# Patient Record
Sex: MALE | Race: WHITE | Employment: OTHER | URBAN - METROPOLITAN AREA
[De-identification: names, ages, dates, MRNs, and addresses within clinical notes are randomized per-mention and may not be internally consistent; named-entity substitution may affect disease eponyms.]

---

## 2017-05-01 ENCOUNTER — HOSPITAL ENCOUNTER (OUTPATIENT)
Dept: CT IMAGING | Age: 57
Discharge: HOME | End: 2017-05-01
Attending: INTERNAL MEDICINE
Payer: COMMERCIAL

## 2017-05-01 DIAGNOSIS — C64.2 RENAL CELL CARCINOMA OF LEFT KIDNEY (HCC): ICD-10-CM

## 2017-05-01 PROCEDURE — 71260 CT THORAX DX C+: CPT

## 2017-05-01 PROCEDURE — 74177 CT ABD & PELVIS W/CONTRAST: CPT

## 2017-05-17 ENCOUNTER — OFFICE VISIT (OUTPATIENT)
Dept: HEMATOLOGY/ONCOLOGY | Facility: HOSPITAL | Age: 57
End: 2017-05-17
Attending: INTERNAL MEDICINE
Payer: COMMERCIAL

## 2017-05-17 VITALS
OXYGEN SATURATION: 97 % | TEMPERATURE: 98 F | BODY MASS INDEX: 29.82 KG/M2 | DIASTOLIC BLOOD PRESSURE: 59 MMHG | HEART RATE: 91 BPM | HEIGHT: 70 IN | RESPIRATION RATE: 18 BRPM | SYSTOLIC BLOOD PRESSURE: 104 MMHG | WEIGHT: 208.31 LBS

## 2017-05-17 DIAGNOSIS — C64.2 RENAL CELL CARCINOMA OF LEFT KIDNEY (HCC): Primary | ICD-10-CM

## 2017-05-17 PROCEDURE — 99213 OFFICE O/P EST LOW 20 MIN: CPT | Performed by: INTERNAL MEDICINE

## 2017-05-17 NOTE — PROGRESS NOTES
Pt here for follow up. Pt states he fell and hurt his back. He states he then had fevers for a couple of weeks. He does not know how high his fever was because he didn't take his temperature.

## 2017-05-17 NOTE — PROGRESS NOTES
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      Adjacent to a calcified plaqu stability. 3. Mildly distended pelvic small bowel loops with stool like material and a more decompressed appearing distal ileum may be on the basis of stasis related to a large quantity of stool in the right colon.  The patient has GI symptoms, further sharon Oral Tab Take 30 mg by mouth daily. Disp:  Rfl:    Albuterol (PROVENTIL IN) Inhale 2 puffs into the lungs as needed.  Disp:  Rfl:          Vital Signs:      Height: 177.8 cm (5' 10\") (05/17 1346)  Weight: 94.484 kg (208 lb 4.8 oz) (05/17 1346)  BSA (Calcul I will see him in 12 months with labs and CT scan.     Peri Spencer MD

## 2017-05-18 ENCOUNTER — HOSPITAL ENCOUNTER (OUTPATIENT)
Age: 57
Discharge: HOME | End: 2017-05-18
Payer: COMMERCIAL

## 2017-05-18 ENCOUNTER — APPOINTMENT (OUTPATIENT)
Dept: GENERAL RADIOLOGY | Age: 57
End: 2017-05-18
Attending: NURSE PRACTITIONER
Payer: COMMERCIAL

## 2017-05-18 VITALS
OXYGEN SATURATION: 94 % | RESPIRATION RATE: 18 BRPM | DIASTOLIC BLOOD PRESSURE: 64 MMHG | TEMPERATURE: 99 F | SYSTOLIC BLOOD PRESSURE: 104 MMHG | HEART RATE: 76 BPM

## 2017-05-18 DIAGNOSIS — S92.535A CLOSED NONDISPLACED FRACTURE OF DISTAL PHALANX OF LESSER TOE OF LEFT FOOT, INITIAL ENCOUNTER: Primary | ICD-10-CM

## 2017-05-18 PROCEDURE — 99213 OFFICE O/P EST LOW 20 MIN: CPT

## 2017-05-18 PROCEDURE — 28510 TREATMENT OF TOE FRACTURE: CPT

## 2017-05-18 PROCEDURE — 99212 OFFICE O/P EST SF 10 MIN: CPT

## 2017-05-18 PROCEDURE — 73660 X-RAY EXAM OF TOE(S): CPT | Performed by: NURSE PRACTITIONER

## 2017-05-18 PROCEDURE — 73630 X-RAY EXAM OF FOOT: CPT | Performed by: NURSE PRACTITIONER

## 2017-05-18 NOTE — ED PROVIDER NOTES
Patient Seen in: THE [facility] Immediate Care In [facility]    History   Patient presents with: Toe Injury    Stated Complaint: L - toe injury    HPI    16-year-old male who presents to the Medicare with complaints of left fifth toe pain.   Patient states last COLONOSCOPY N/A 3/21/2016    Comment Procedure: COLONOSCOPY;  Surgeon: Shelton Rivas MD;  Location: [address] [place] [address] ENDOSCOPY     Right 3/25/2016    Comment Procedure: KNEE ARTHROSCOPY;  Surgeon: Mihir Ya MD;  Location: [address] [place] [address] MAIN OR       Medications : Neurological: Negative. Hematological: Negative. Psychiatric/Behavioral: Negative. All other systems reviewed and are negative. Positive for stated complaint: L - toe injury  Other systems are as noted in HPI.   Constitutional and vital signs 5/18/2017  PROCEDURE:  XR FOOT, COMPLETE (MIN 3 VIEWS), LEFT (CPT=73630)  TECHNIQUE:  AP, oblique, and lateral views were obtained. COMPARISON:  None.   INDICATIONS:  L - toe injury  PATIENT STATED HISTORY: (As transcribed by Technologist)  Patient has bru Closed nondisplaced fracture of distal phalanx of lesser toe of left foot, initial encounter  (primary encounter diagnosis)    Disposition:  Discharge    Follow-up:  David Wen, [address] [address]      As ne

## 2017-07-24 ENCOUNTER — HOSPITAL ENCOUNTER (OUTPATIENT)
Dept: NUCLEAR MEDICINE | Facility: HOSPITAL | Age: 57
Discharge: HOME | End: 2017-07-24
Attending: NURSE PRACTITIONER
Payer: COMMERCIAL

## 2017-07-24 DIAGNOSIS — R91.1 LUNG NODULE: ICD-10-CM

## 2017-07-24 DIAGNOSIS — J44.9 COPD (CHRONIC OBSTRUCTIVE PULMONARY DISEASE) (HCC): ICD-10-CM

## 2017-07-24 DIAGNOSIS — C64.9 RENAL CELL CARCINOMA (HCC): ICD-10-CM

## 2017-07-24 LAB — GLUCOSE BLD-MCNC: 114 MG/DL (ref 65–99)

## 2017-07-24 PROCEDURE — 78815 PET IMAGE W/CT SKULL-THIGH: CPT | Performed by: NURSE PRACTITIONER

## 2017-07-24 PROCEDURE — 82962 GLUCOSE BLOOD TEST: CPT

## 2017-07-25 VITALS — WEIGHT: 200 LBS | HEIGHT: 70 IN | BODY MASS INDEX: 28.63 KG/M2

## 2017-07-25 RX ORDER — ISOSORBIDE MONONITRATE 20 MG/1
20 TABLET ORAL DAILY
COMMUNITY
End: 2020-11-03

## 2017-08-01 ENCOUNTER — APPOINTMENT (OUTPATIENT)
Dept: LAB | Facility: HOSPITAL | Age: 57
End: 2017-08-01
Attending: NURSE PRACTITIONER
Payer: COMMERCIAL

## 2017-08-01 ENCOUNTER — HOSPITAL ENCOUNTER (OUTPATIENT)
Dept: CT IMAGING | Facility: HOSPITAL | Age: 57
Discharge: HOME | End: 2017-08-01
Attending: NURSE PRACTITIONER
Payer: COMMERCIAL

## 2017-09-22 RX ORDER — AMLODIPINE, VALSARTAN AND HYDROCHLOROTHIAZIDE 5; 160; 12.5 MG/1; MG/1; MG/1
TABLET ORAL DAILY
COMMUNITY
End: 2021-07-20 | Stop reason: ALTCHOICE

## 2017-09-25 ENCOUNTER — APPOINTMENT (OUTPATIENT)
Dept: LAB | Age: 57
End: 2017-09-25
Payer: COMMERCIAL

## 2017-09-25 DIAGNOSIS — I35.0 AORTIC STENOSIS: ICD-10-CM

## 2017-09-25 DIAGNOSIS — I48.91 ATRIAL FIBRILLATION (HCC): ICD-10-CM

## 2017-09-25 LAB
ATRIAL RATE: 86 BPM
P AXIS: 15 DEGREES
P-R INTERVAL: 132 MS
Q-T INTERVAL: 350 MS
QRS DURATION: 98 MS
QTC CALCULATION (BEZET): 418 MS
R AXIS: 38 DEGREES
T AXIS: 29 DEGREES
VENTRICULAR RATE: 86 BPM

## 2017-09-25 PROCEDURE — 93010 ELECTROCARDIOGRAM REPORT: CPT | Performed by: INTERNAL MEDICINE

## 2017-09-25 PROCEDURE — 93005 ELECTROCARDIOGRAM TRACING: CPT

## 2017-09-26 ENCOUNTER — ANESTHESIA EVENT (OUTPATIENT)
Dept: ENDOSCOPY | Facility: HOSPITAL | Age: 57
End: 2017-09-26
Payer: COMMERCIAL

## 2017-09-27 NOTE — OR NURSING
Received a call from Dr. Shivani Green office stating they request that the medical clearance come from patient's cardiologist, Dr. Edvin Tracey. Faxed this request to Dr. Emma Sawyer office as well as another FYI to Dr. Beth Mak.  Called Dr. Emma Sawyer office and s/w Rona Salas

## 2017-09-27 NOTE — OR NURSING
Chart reviewed by anesthesiologist, Dr. Matti Gardner for history of aortic stenosis. Received an order for medical clearance. Faxed this request to the surgeon and Dr. Asaf Arriaga office. Received fax confirmation.  Telephoned Dr. Asaf Arriaga office and spoke to [address] [place] [address]

## 2017-09-28 ENCOUNTER — ANESTHESIA (OUTPATIENT)
Dept: ENDOSCOPY | Facility: HOSPITAL | Age: 57
End: 2017-09-28
Payer: COMMERCIAL

## 2017-09-28 ENCOUNTER — SURGERY (OUTPATIENT)
Age: 57
End: 2017-09-28

## 2017-09-28 ENCOUNTER — HOSPITAL ENCOUNTER (OUTPATIENT)
Facility: HOSPITAL | Age: 57
Setting detail: HOSPITAL OUTPATIENT SURGERY
Discharge: HOME | End: 2017-09-28
Attending: INTERNAL MEDICINE | Admitting: INTERNAL MEDICINE
Payer: COMMERCIAL

## 2017-09-28 VITALS
TEMPERATURE: 98 F | BODY MASS INDEX: 28.06 KG/M2 | WEIGHT: 196 LBS | SYSTOLIC BLOOD PRESSURE: 146 MMHG | DIASTOLIC BLOOD PRESSURE: 96 MMHG | OXYGEN SATURATION: 96 % | HEIGHT: 70 IN | HEART RATE: 94 BPM | RESPIRATION RATE: 16 BRPM

## 2017-09-28 DIAGNOSIS — I35.0 AORTIC STENOSIS: ICD-10-CM

## 2017-09-28 DIAGNOSIS — I48.91 ATRIAL FIBRILLATION (HCC): Primary | ICD-10-CM

## 2017-09-28 PROCEDURE — 0DB78ZX EXCISION OF STOMACH, PYLORUS, VIA NATURAL OR ARTIFICIAL OPENING ENDOSCOPIC, DIAGNOSTIC: ICD-10-PCS | Performed by: INTERNAL MEDICINE

## 2017-09-28 PROCEDURE — 82962 GLUCOSE BLOOD TEST: CPT

## 2017-09-28 PROCEDURE — 88305 TISSUE EXAM BY PATHOLOGIST: CPT | Performed by: INTERNAL MEDICINE

## 2017-09-28 PROCEDURE — 0DB58ZX EXCISION OF ESOPHAGUS, VIA NATURAL OR ARTIFICIAL OPENING ENDOSCOPIC, DIAGNOSTIC: ICD-10-PCS | Performed by: INTERNAL MEDICINE

## 2017-09-28 PROCEDURE — 0DB98ZX EXCISION OF DUODENUM, VIA NATURAL OR ARTIFICIAL OPENING ENDOSCOPIC, DIAGNOSTIC: ICD-10-PCS | Performed by: INTERNAL MEDICINE

## 2017-09-28 RX ORDER — HYDROMORPHONE HYDROCHLORIDE 1 MG/ML
0.4 INJECTION, SOLUTION INTRAMUSCULAR; INTRAVENOUS; SUBCUTANEOUS EVERY 5 MIN PRN
Status: DISCONTINUED | OUTPATIENT
Start: 2017-09-28 | End: 2017-09-28

## 2017-09-28 RX ORDER — NALOXONE HYDROCHLORIDE 0.4 MG/ML
80 INJECTION, SOLUTION INTRAMUSCULAR; INTRAVENOUS; SUBCUTANEOUS AS NEEDED
Status: DISCONTINUED | OUTPATIENT
Start: 2017-09-28 | End: 2017-09-28

## 2017-09-28 RX ORDER — DEXTROSE MONOHYDRATE 25 G/50ML
50 INJECTION, SOLUTION INTRAVENOUS
Status: DISCONTINUED | OUTPATIENT
Start: 2017-09-28 | End: 2017-09-28

## 2017-09-28 RX ORDER — SODIUM CHLORIDE, SODIUM LACTATE, POTASSIUM CHLORIDE, CALCIUM CHLORIDE 600; 310; 30; 20 MG/100ML; MG/100ML; MG/100ML; MG/100ML
INJECTION, SOLUTION INTRAVENOUS CONTINUOUS
Status: DISCONTINUED | OUTPATIENT
Start: 2017-09-28 | End: 2017-09-28

## 2017-09-28 NOTE — OPERATIVE REPORT
Janelle Hayward Patient Status:  Hospital Outpatient Surgery    1960 MRN [id number]   [facility] ENDOSCOPY Attending No att. providers found   Hosp Day # 0 PCP Adrian White MD     PREOPERATIVE DIAGNOSIS/INDICATION: GIB, anemia  PO

## 2017-09-28 NOTE — ANESTHESIA POSTPROCEDURE EVALUATION
[address] Patient Status:  Hospital Outpatient Surgery   Age/Gender 61year old male MRN [id number]   Location [facility]. Attending Bandar Osborn MD   Hosp Day # 0 PCP Guido Eddy MD       Anesthesia Post-op N

## 2017-09-28 NOTE — INTERVAL H&P NOTE
Pre-op Diagnosis: HEMATEMESIS    The above referenced H&P was reviewed by Terence Vargas MD on 9/28/2017, the patient was examined and no significant changes have occurred in the patient's condition since the H&P was performed.   I discussed with the pat

## 2017-09-28 NOTE — ANESTHESIA PREPROCEDURE EVALUATION
PRE-OP EVALUATION    Patient Name: Rupert Faxon    Pre-op Diagnosis: HEMATEMESIS    Procedure(s):  ESOPHAGOGASTRODUODENOSCOPY     Surgeon(s) and Role:     Robert Romero MD - Primary    Pre-op vitals reviewed.   Temp: 98.4 °F (36.9 °C)  Pulse: 86  Re Anesthesia Evaluation    Patient summary reviewed.     Anesthetic Complications  (-) history of anesthetic complications         GI/Hepatic/Renal      (+) GERD                           Cardiovascular  Comment: Aortic stenosis mild with valve area 2.6  Pt i NPO status verified and patient meets guidelines.           Plan/risks discussed with: patient and spouse                Present on Admission:  **None**

## 2017-10-02 ENCOUNTER — HOSPITAL ENCOUNTER (OUTPATIENT)
Dept: CV DIAGNOSTICS | Age: 57
Discharge: HOME | End: 2017-10-02
Attending: FAMILY MEDICINE
Payer: COMMERCIAL

## 2017-10-02 ENCOUNTER — HOSPITAL ENCOUNTER (OUTPATIENT)
Dept: CT IMAGING | Age: 57
Discharge: HOME | End: 2017-10-02
Attending: INTERNAL MEDICINE
Payer: COMMERCIAL

## 2017-10-02 DIAGNOSIS — R91.1 LUNG NODULE: ICD-10-CM

## 2017-10-02 DIAGNOSIS — I35.0 AORTIC STENOSIS: ICD-10-CM

## 2017-10-02 PROCEDURE — 93306 TTE W/DOPPLER COMPLETE: CPT | Performed by: FAMILY MEDICINE

## 2017-10-02 PROCEDURE — 71250 CT THORAX DX C-: CPT | Performed by: INTERNAL MEDICINE

## 2017-10-10 ENCOUNTER — OFFICE VISIT (OUTPATIENT)
Dept: PHYSICAL THERAPY | Age: 57
End: 2017-10-10
Attending: FAMILY MEDICINE
Payer: COMMERCIAL

## 2017-10-10 DIAGNOSIS — M62.838 MUSCLE SPASM OF BOTH LOWER LEGS: ICD-10-CM

## 2017-10-10 PROCEDURE — 97110 THERAPEUTIC EXERCISES: CPT | Performed by: PHYSICAL THERAPIST

## 2017-10-10 PROCEDURE — 97161 PT EVAL LOW COMPLEX 20 MIN: CPT | Performed by: PHYSICAL THERAPIST

## 2017-10-10 NOTE — PROGRESS NOTES
LOWER EXTREMITY EVALUATION:   Referring Physician: Dr. Carter Hines  Diagnosis: Muscle spasm of both lower legs (T10.743)     Date of Service: 10/10/2017     PATIENT Tyesha Del Valle is a 61year old y/o male who presents to therapy today with complai lordosis  Gait: intact  Palpation: tender to left ITband, hamstring > R  Sensation: WNL to light touch          Myotomes: intact          Reflexes: diminished L4-5 on left    AROM:   Hip Knee   WNL limited to IR Full             Accessory motion: Central P Activity; Gait Training; Electrical Stim; Pt education; Home exercise program instructions;     Education or treatment limitation: None  Rehab Potential:good    FOTO: 66/100      Patient/Family/Caregiver was advised of these findings, precautions, and gallo

## 2017-10-12 ENCOUNTER — OFFICE VISIT (OUTPATIENT)
Dept: PHYSICAL THERAPY | Age: 57
End: 2017-10-12
Attending: FAMILY MEDICINE
Payer: COMMERCIAL

## 2017-10-12 PROCEDURE — 97110 THERAPEUTIC EXERCISES: CPT | Performed by: PHYSICAL THERAPIST

## 2017-10-12 PROCEDURE — 97140 MANUAL THERAPY 1/> REGIONS: CPT | Performed by: PHYSICAL THERAPIST

## 2017-10-12 NOTE — PROGRESS NOTES
Dx: Muscle spasm of both lower legs (B54.197)               Subjective: Porsche Xavier reports his symptoms were worse last night as he had difficulty getting to sleep with increased pain to his left leg.  He admits trying to stretch more which only increased his pa

## 2017-10-17 ENCOUNTER — APPOINTMENT (OUTPATIENT)
Dept: PHYSICAL THERAPY | Age: 57
End: 2017-10-17
Attending: FAMILY MEDICINE
Payer: COMMERCIAL

## 2017-10-19 ENCOUNTER — OFFICE VISIT (OUTPATIENT)
Dept: PHYSICAL THERAPY | Age: 57
End: 2017-10-19
Attending: FAMILY MEDICINE
Payer: COMMERCIAL

## 2017-10-19 PROCEDURE — 97140 MANUAL THERAPY 1/> REGIONS: CPT | Performed by: PHYSICAL THERAPIST

## 2017-10-19 PROCEDURE — 97110 THERAPEUTIC EXERCISES: CPT | Performed by: PHYSICAL THERAPIST

## 2017-10-19 NOTE — PROGRESS NOTES
Dx: Muscle spasm of both lower legs (Z98.549)               Subjective: [facility] reports his leg cramps are decreasing. He is performing his stretching and exercises which seems to be helping. He has improved ability to reach down and touch the floor.  He is h hamstring stretch and flossing 2x10            Charges: Arley 2( 27 min) M x 1 ( 8 min)   Total Timed Treatment: 35 min     Total Treatment Time: 35 min

## 2017-10-24 ENCOUNTER — OFFICE VISIT (OUTPATIENT)
Dept: PHYSICAL THERAPY | Age: 57
End: 2017-10-24
Attending: FAMILY MEDICINE
Payer: COMMERCIAL

## 2017-10-24 PROCEDURE — 97110 THERAPEUTIC EXERCISES: CPT | Performed by: PHYSICAL THERAPIST

## 2017-10-24 PROCEDURE — 97140 MANUAL THERAPY 1/> REGIONS: CPT | Performed by: PHYSICAL THERAPIST

## 2017-10-24 NOTE — PROGRESS NOTES
Dx: Muscle spasm of both lower legs (N46.760)               Subjective: Yocasta Erazo continues to report improved cramp symptoms allowing him improved sleep at night. He does have some knee and back discomfort today but relates it to the weather.      Objective: 2x20 RTB       Standing heel raises 2x20 On step 2x15 2x15       STM to glute med and max x 3 min STM to glute med and max x 3 min Swiss ball bent knee hamstring bridge 5 sec hold x 10       - Lateral hamstring stretch and flossing 2x10 DKSA lateral hamstr

## 2017-10-26 ENCOUNTER — OFFICE VISIT (OUTPATIENT)
Dept: PHYSICAL THERAPY | Age: 57
End: 2017-10-26
Attending: FAMILY MEDICINE
Payer: COMMERCIAL

## 2017-10-26 PROCEDURE — 97140 MANUAL THERAPY 1/> REGIONS: CPT | Performed by: PHYSICAL THERAPIST

## 2017-10-26 PROCEDURE — 97110 THERAPEUTIC EXERCISES: CPT | Performed by: PHYSICAL THERAPIST

## 2017-10-26 NOTE — PROGRESS NOTES
Dx: Muscle spasm of both lower legs (R53.998)               Subjective: Nida Esparza reports his hamstring flexibility continues to improve. He does have some increased back discomfort associated with cleaning out his garage over the last couple days.  He remains 3x30 sec       - Standing bent over hamstring flossing x 10 Standing bent over hamstring flossing x 10    Prostretch flossing 2x10 Standing bent over hamstring flossing x 10    Prostretch flossing 2x10      - Shuttle DL press 3x10 8 bands SL press 6 bands

## 2017-10-31 ENCOUNTER — OFFICE VISIT (OUTPATIENT)
Dept: PHYSICAL THERAPY | Age: 57
End: 2017-10-31
Attending: FAMILY MEDICINE
Payer: COMMERCIAL

## 2017-10-31 PROCEDURE — 97110 THERAPEUTIC EXERCISES: CPT | Performed by: PHYSICAL THERAPIST

## 2017-10-31 NOTE — PROGRESS NOTES
Dx: Muscle spasm of both lower legs (D12.622)               Subjective: Daniel Falling almost no hamstring cramping at this time. He did have slight increase in back pain following last session and would like to avoid back specific exercises.  He is encouraged by hi calf stretch 3x30 sec  3x30 sec  3x20 sec  3x30 sec  Slant board gastroc stretch 2x30 sec      - Standing bent over hamstring flossing x 10 Standing bent over hamstring flossing x 10    Prostretch flossing 2x10 Standing bent over hamstring flossing x 10

## 2017-11-03 ENCOUNTER — OFFICE VISIT (OUTPATIENT)
Dept: PHYSICAL THERAPY | Age: 57
End: 2017-11-03
Attending: FAMILY MEDICINE
Payer: COMMERCIAL

## 2017-11-03 PROCEDURE — 97110 THERAPEUTIC EXERCISES: CPT | Performed by: PHYSICAL THERAPIST

## 2017-11-03 NOTE — PROGRESS NOTES
Discharge Summary    Pt has attended 7, cancelled 0, and no shown 0 visits in Physical Therapy. Dx: Muscle spasm of both lower legs (C64.203)     Subjective: Efren Alice states he can continue independently at this time.  He has occasional back flare ups when visits)  · Pt will have normal neuromobility to decrease pain at night and allow for sleep (6 visits)  · Pt will be independent and compliant with comprehensive HEP to maintain progress achieved in PT (6 visits)    Plan: D/C to independent HEP    Patient/F bands SL press 6 bands  3x10 7 bands 2x15 2x15 7 bands Plank SA x 1:30   TRX squat 3x10 2x15 Standing hip extension 2x20 RTB Standing hip extension 2x20 RTB 2x10 flexion/extension RTB Side stepping RTB 15 feet x 3 laps   Standing heel raises 2x20 On step 2

## 2018-01-25 ENCOUNTER — HOSPITAL ENCOUNTER (OUTPATIENT)
Dept: CV DIAGNOSTICS | Age: 58
Discharge: HOME | End: 2018-01-25
Attending: FAMILY MEDICINE
Payer: COMMERCIAL

## 2018-01-25 DIAGNOSIS — I35.0 AORTIC VALVE STENOSIS: ICD-10-CM

## 2018-01-25 PROCEDURE — 93306 TTE W/DOPPLER COMPLETE: CPT | Performed by: FAMILY MEDICINE

## 2018-03-26 ENCOUNTER — RT VISIT (OUTPATIENT)
Dept: RESPIRATORY THERAPY | Facility: HOSPITAL | Age: 58
End: 2018-03-26
Attending: INTERNAL MEDICINE
Payer: COMMERCIAL

## 2018-03-26 DIAGNOSIS — J44.9 COPD (CHRONIC OBSTRUCTIVE PULMONARY DISEASE) (HCC): ICD-10-CM

## 2018-03-26 PROCEDURE — 94726 PLETHYSMOGRAPHY LUNG VOLUMES: CPT

## 2018-03-26 PROCEDURE — 94060 EVALUATION OF WHEEZING: CPT

## 2018-03-26 PROCEDURE — 94729 DIFFUSING CAPACITY: CPT

## 2018-03-26 NOTE — PROCEDURES
Spirometry and  flow volume loop are consistent with severe airway obstruction.   There was no change in spirometry after bronchodilator challenge   No evidence of restriction  There is an  increase in the RV suggesting possible air trapping   There is peyton

## 2018-05-18 DIAGNOSIS — C64.2 RENAL CELL CARCINOMA OF LEFT KIDNEY (HCC): Primary | ICD-10-CM

## 2018-05-23 ENCOUNTER — HOSPITAL ENCOUNTER (OUTPATIENT)
Dept: CT IMAGING | Age: 58
Discharge: HOME | End: 2018-05-23
Attending: INTERNAL MEDICINE
Payer: COMMERCIAL

## 2018-05-23 DIAGNOSIS — C64.2 RENAL CELL CARCINOMA OF LEFT KIDNEY (HCC): ICD-10-CM

## 2018-05-23 PROCEDURE — 71260 CT THORAX DX C+: CPT | Performed by: INTERNAL MEDICINE

## 2018-05-23 PROCEDURE — 82565 ASSAY OF CREATININE: CPT

## 2018-05-23 PROCEDURE — 74177 CT ABD & PELVIS W/CONTRAST: CPT | Performed by: INTERNAL MEDICINE

## 2018-05-30 ENCOUNTER — OFFICE VISIT (OUTPATIENT)
Dept: HEMATOLOGY/ONCOLOGY | Facility: HOSPITAL | Age: 58
End: 2018-05-30
Attending: INTERNAL MEDICINE
Payer: COMMERCIAL

## 2018-05-30 VITALS
HEART RATE: 78 BPM | WEIGHT: 218 LBS | SYSTOLIC BLOOD PRESSURE: 123 MMHG | TEMPERATURE: 98 F | BODY MASS INDEX: 31.21 KG/M2 | DIASTOLIC BLOOD PRESSURE: 77 MMHG | RESPIRATION RATE: 20 BRPM | HEIGHT: 70 IN | OXYGEN SATURATION: 91 %

## 2018-05-30 DIAGNOSIS — D50.0 IRON DEFICIENCY ANEMIA DUE TO CHRONIC BLOOD LOSS: ICD-10-CM

## 2018-05-30 DIAGNOSIS — C64.2 RENAL CELL CARCINOMA OF LEFT KIDNEY (HCC): Primary | ICD-10-CM

## 2018-05-30 PROCEDURE — 99214 OFFICE O/P EST MOD 30 MIN: CPT | Performed by: INTERNAL MEDICINE

## 2018-05-30 NOTE — PROGRESS NOTES
Cancer Center Progress Note    Problem List:      Patient Active Problem List:     Anemia     HTN (hypertension)     DM2 (diabetes mellitus, type 2) (HCC)     COPD (chronic obstructive pulmonary disease) (HCC)     Clear cell renal cell carcinoma of left ki measures 10 mm (image 103) which is stable. Essentially stable right upper lobe micro nodule measuring 5 mm on image 28. MEDIASTINUM:  Mildly enlarged lymph node within the pretracheal region is essentially stable measuring 1.2 x 1.7 cm.   SKYLER:  Right hil habits, diarrhea, constipation and abdominal pain. Hematologic/lymphatic: Negative for easy bruising, bleeding, and lymphadenopathy. Allergies:        Other                   ANAPHYLAXIS    Comment:Bee stings    Medications:    Amlodipine-Valsartan-HCTZ edema. No calf tenderness. Lymphatics: There is no palpable lymphadenopathy throughout in the cervical, supraclavicular, or axillary regions. Lab Results  Component Value Date   WBC 9.6 05/30/2018   RBC 5.29 05/30/2018   HGB 16.0 05/30/2018   HCT 47.

## 2018-05-30 NOTE — PROGRESS NOTES
Outpatient Oncology Care Plan  Problem list:  knowledge deficit    Problems related to:    disease/disease progression    Interventions:  provided nutrition education    Expected outcomes:  verbalize how to care for self    Progress towards outcome:  natalia

## 2018-10-15 ENCOUNTER — APPOINTMENT (OUTPATIENT)
Dept: GENERAL RADIOLOGY | Age: 58
End: 2018-10-15
Attending: NURSE PRACTITIONER
Payer: COMMERCIAL

## 2018-10-15 ENCOUNTER — HOSPITAL ENCOUNTER (OUTPATIENT)
Age: 58
Discharge: HOME | End: 2018-10-15
Payer: COMMERCIAL

## 2018-10-15 VITALS
HEIGHT: 70 IN | WEIGHT: 205 LBS | OXYGEN SATURATION: 94 % | TEMPERATURE: 98 F | BODY MASS INDEX: 29.35 KG/M2 | RESPIRATION RATE: 20 BRPM | SYSTOLIC BLOOD PRESSURE: 138 MMHG | HEART RATE: 82 BPM | DIASTOLIC BLOOD PRESSURE: 74 MMHG

## 2018-10-15 DIAGNOSIS — S90.32XA CONTUSION OF LEFT FOOT, INITIAL ENCOUNTER: Primary | ICD-10-CM

## 2018-10-15 PROCEDURE — 99213 OFFICE O/P EST LOW 20 MIN: CPT

## 2018-10-15 PROCEDURE — 73630 X-RAY EXAM OF FOOT: CPT | Performed by: NURSE PRACTITIONER

## 2018-10-15 NOTE — ED PROVIDER NOTES
Patient Seen in: [address] [facility] In [facility]    History   Patient presents with:   Foot Injury    Stated Complaint: left foot pain    54-year-old male who presents to the immediate care with complaints of bruising and swelling to the left foot ov Performed by Ayanna Garcia MD at [address] [place] [address] ENDOSCOPY   • CYSTOSCOPY N/A 5/6/2014    Performed by Gianna Melvin MD at [address] [place] [address] MAIN OR   • ESOPHAGOGASTRODUODENOSCOPY (EGD) N/A 9/28/2017    Performed by Olive Singh MD at [address] [place] [address] ENDOSCOPY   • KNEE ARTHROSCOPY Physical Exam   Constitutional: He is oriented to person, place, and time. He appears well-developed and well-nourished. HENT:   Head: Normocephalic and atraumatic. Eyes: Pupils are equal, round, and reactive to light. Neck: Normal range of motion. I have discussed with the patient and/or family the results of test, differential diagnosis, treatment plan, warning signs and symptoms which should prompt immediate return.   The patient and/or family expressed clear understanding of these instructions and

## 2018-10-15 NOTE — ED INITIAL ASSESSMENT (HPI)
Left foot -  Angle iron of bed frame fell on the foot Friday. Swelling and purplish bruise noted. Pain worse Saturday. Take tylenol as needed for pain.    C/o pain on weight bearing

## 2019-03-06 ENCOUNTER — HOSPITAL ENCOUNTER (OUTPATIENT)
Age: 59
Discharge: ANOTHER HOSPITAL | End: 2019-03-06
Attending: FAMILY MEDICINE
Payer: COMMERCIAL

## 2019-03-06 ENCOUNTER — HOSPITAL ENCOUNTER (EMERGENCY)
Facility: HOSPITAL | Age: 59
Discharge: HOME | End: 2019-03-06
Attending: EMERGENCY MEDICINE
Payer: COMMERCIAL

## 2019-03-06 ENCOUNTER — APPOINTMENT (OUTPATIENT)
Dept: GENERAL RADIOLOGY | Facility: HOSPITAL | Age: 59
End: 2019-03-06
Attending: EMERGENCY MEDICINE
Payer: COMMERCIAL

## 2019-03-06 VITALS
WEIGHT: 205 LBS | DIASTOLIC BLOOD PRESSURE: 85 MMHG | BODY MASS INDEX: 29.35 KG/M2 | OXYGEN SATURATION: 95 % | RESPIRATION RATE: 20 BRPM | HEIGHT: 70 IN | HEART RATE: 102 BPM | SYSTOLIC BLOOD PRESSURE: 147 MMHG | TEMPERATURE: 99 F

## 2019-03-06 VITALS
OXYGEN SATURATION: 94 % | RESPIRATION RATE: 34 BRPM | HEART RATE: 79 BPM | DIASTOLIC BLOOD PRESSURE: 106 MMHG | SYSTOLIC BLOOD PRESSURE: 161 MMHG

## 2019-03-06 DIAGNOSIS — J43.1 PANLOBULAR EMPHYSEMA (HCC): Primary | ICD-10-CM

## 2019-03-06 DIAGNOSIS — Z86.79 HISTORY OF ATRIAL FIBRILLATION: ICD-10-CM

## 2019-03-06 DIAGNOSIS — Z92.29 HX: ANTICOAGULATION: ICD-10-CM

## 2019-03-06 DIAGNOSIS — J44.1 COPD EXACERBATION (HCC): ICD-10-CM

## 2019-03-06 DIAGNOSIS — R09.02 HYPOXIA: Primary | ICD-10-CM

## 2019-03-06 LAB
ALBUMIN SERPL-MCNC: 3.1 G/DL (ref 3.4–5)
ALBUMIN/GLOB SERPL: 0.7 {RATIO} (ref 1–2)
ALP LIVER SERPL-CCNC: 67 U/L (ref 45–117)
ALT SERPL-CCNC: 30 U/L (ref 16–61)
ANION GAP SERPL CALC-SCNC: 8 MMOL/L (ref 0–18)
AST SERPL-CCNC: 24 U/L (ref 15–37)
BASOPHILS # BLD AUTO: 0.05 X10(3) UL (ref 0–0.2)
BASOPHILS NFR BLD AUTO: 0.6 %
BILIRUB SERPL-MCNC: 0.3 MG/DL (ref 0.1–2)
BUN BLD-MCNC: 15 MG/DL (ref 7–18)
BUN/CREAT SERPL: 20.8 (ref 10–20)
CALCIUM BLD-MCNC: 7.9 MG/DL (ref 8.5–10.1)
CHLORIDE SERPL-SCNC: 107 MMOL/L (ref 98–107)
CO2 SERPL-SCNC: 26 MMOL/L (ref 21–32)
CREAT BLD-MCNC: 0.72 MG/DL (ref 0.7–1.3)
D-DIMER: <0.27 UG/ML FEU (ref 0–0.49)
DEPRECATED RDW RBC AUTO: 42.3 FL (ref 35.1–46.3)
EOSINOPHIL # BLD AUTO: 0.29 X10(3) UL (ref 0–0.7)
EOSINOPHIL NFR BLD AUTO: 3.3 %
ERYTHROCYTE [DISTWIDTH] IN BLOOD BY AUTOMATED COUNT: 13.1 % (ref 11–15)
FLUAV + FLUBV RNA SPEC NAA+PROBE: NEGATIVE
GLOBULIN PLAS-MCNC: 4.4 G/DL (ref 2.8–4.4)
GLUCOSE BLD-MCNC: 93 MG/DL (ref 70–99)
HCT VFR BLD AUTO: 49.6 % (ref 39–53)
HGB BLD-MCNC: 17.1 G/DL (ref 13–17.5)
IMM GRANULOCYTES # BLD AUTO: 0.05 X10(3) UL (ref 0–1)
IMM GRANULOCYTES NFR BLD: 0.6 %
LYMPHOCYTES # BLD AUTO: 2.14 X10(3) UL (ref 1–4)
LYMPHOCYTES NFR BLD AUTO: 24.2 %
M PROTEIN MFR SERPL ELPH: 7.5 G/DL (ref 6.4–8.2)
MCH RBC QN AUTO: 30.5 PG (ref 26–34)
MCHC RBC AUTO-ENTMCNC: 34.5 G/DL (ref 31–37)
MCV RBC AUTO: 88.4 FL (ref 80–100)
MONOCYTES # BLD AUTO: 1.05 X10(3) UL (ref 0.1–1)
MONOCYTES NFR BLD AUTO: 11.9 %
NEUTROPHILS # BLD AUTO: 5.25 X10 (3) UL (ref 1.5–7.7)
NEUTROPHILS # BLD AUTO: 5.25 X10(3) UL (ref 1.5–7.7)
NEUTROPHILS NFR BLD AUTO: 59.4 %
NT-PROBNP SERPL-MCNC: 245 PG/ML (ref ?–125)
OSMOLALITY SERPL CALC.SUM OF ELEC: 293 MOSM/KG (ref 275–295)
PLATELET # BLD AUTO: 201 10(3)UL (ref 150–450)
POTASSIUM SERPL-SCNC: 3.4 MMOL/L (ref 3.5–5.1)
PROCALCITONIN SERPL-MCNC: 0.04 NG/ML
RBC # BLD AUTO: 5.61 X10(6)UL (ref 4.3–5.7)
SODIUM SERPL-SCNC: 141 MMOL/L (ref 136–145)
TROPONIN I SERPL-MCNC: <0.045 NG/ML (ref ?–0.04)
WBC # BLD AUTO: 8.8 X10(3) UL (ref 4–11)

## 2019-03-06 PROCEDURE — 87798 DETECT AGENT NOS DNA AMP: CPT | Performed by: EMERGENCY MEDICINE

## 2019-03-06 PROCEDURE — 71046 X-RAY EXAM CHEST 2 VIEWS: CPT | Performed by: EMERGENCY MEDICINE

## 2019-03-06 PROCEDURE — 94644 CONT INHLJ TX 1ST HOUR: CPT

## 2019-03-06 PROCEDURE — 94640 AIRWAY INHALATION TREATMENT: CPT

## 2019-03-06 PROCEDURE — 99215 OFFICE O/P EST HI 40 MIN: CPT

## 2019-03-06 PROCEDURE — 96374 THER/PROPH/DIAG INJ IV PUSH: CPT

## 2019-03-06 PROCEDURE — 99285 EMERGENCY DEPT VISIT HI MDM: CPT

## 2019-03-06 PROCEDURE — 93010 ELECTROCARDIOGRAM REPORT: CPT

## 2019-03-06 PROCEDURE — 84145 PROCALCITONIN (PCT): CPT | Performed by: EMERGENCY MEDICINE

## 2019-03-06 PROCEDURE — 87999 UNLISTED MICROBIOLOGY PX: CPT

## 2019-03-06 PROCEDURE — 85378 FIBRIN DEGRADE SEMIQUANT: CPT | Performed by: EMERGENCY MEDICINE

## 2019-03-06 PROCEDURE — 80053 COMPREHEN METABOLIC PANEL: CPT | Performed by: EMERGENCY MEDICINE

## 2019-03-06 PROCEDURE — 93005 ELECTROCARDIOGRAM TRACING: CPT

## 2019-03-06 PROCEDURE — 84484 ASSAY OF TROPONIN QUANT: CPT | Performed by: EMERGENCY MEDICINE

## 2019-03-06 PROCEDURE — 85025 COMPLETE CBC W/AUTO DIFF WBC: CPT | Performed by: EMERGENCY MEDICINE

## 2019-03-06 PROCEDURE — 83880 ASSAY OF NATRIURETIC PEPTIDE: CPT | Performed by: EMERGENCY MEDICINE

## 2019-03-06 PROCEDURE — 87502 INFLUENZA DNA AMP PROBE: CPT | Performed by: EMERGENCY MEDICINE

## 2019-03-06 RX ORDER — IPRATROPIUM BROMIDE AND ALBUTEROL SULFATE 2.5; .5 MG/3ML; MG/3ML
3 SOLUTION RESPIRATORY (INHALATION)
Status: DISCONTINUED | OUTPATIENT
Start: 2019-03-06 | End: 2019-03-06

## 2019-03-06 RX ORDER — IPRATROPIUM BROMIDE AND ALBUTEROL SULFATE 2.5; .5 MG/3ML; MG/3ML
3 SOLUTION RESPIRATORY (INHALATION) ONCE
Status: COMPLETED | OUTPATIENT
Start: 2019-03-06 | End: 2019-03-06

## 2019-03-06 RX ORDER — METHYLPREDNISOLONE SODIUM SUCCINATE 125 MG/2ML
125 INJECTION, POWDER, LYOPHILIZED, FOR SOLUTION INTRAMUSCULAR; INTRAVENOUS ONCE
Status: COMPLETED | OUTPATIENT
Start: 2019-03-06 | End: 2019-03-06

## 2019-03-06 NOTE — ED INITIAL ASSESSMENT (HPI)
Ambulated to room with labored breathing, shortness of breath. Has had shortness of breath and cold symptoms for the last three days.

## 2019-03-06 NOTE — ED INITIAL ASSESSMENT (HPI)
Pt stated he saw his PCP on Friday after having flu like symptoms. Pt states since then he has been increasing SOB.  Pt states he is unable to walk without being SOB      Pt denies any chest pain      Pt was seen at immediate care today      Per EMS saw st

## 2019-03-06 NOTE — ED PROVIDER NOTES
Patient Seen in: THE [facility] Immediate Care In [facility]    History   Patient presents with:  Dyspnea CHRISTI SOB (respiratory)    Stated Complaint: DIFF IN BREATHING, HX OF COPD    HPI  This is a 59 yo M with remarkable for COPD, A fib, Renal cell Ca here with 5/6/2014   • LAPAROSCOPIC NEPHRECTOMY Left 5/6/2014    Performed by Lora Ervin MD at [facility] MAIN OR   • OTHER  meatus of urethra   • OTHER Right     foreign body removal-arm   • REPAIR ROTATOR CUFF,ACUTE Right    • UPPER GI ENDOSCOPY,EXAM         Family h Encounter      EKG 12 Lead Once      Place PIV Once          Order Comments:              Saline lock      Initiate Oxygen Protocol Until Discontinued          Order Comments:              2L Supplemental Oxygen for all patients presenting with SPO2 is <95

## 2019-03-06 NOTE — ED NOTES
Ambulated to room with shortness of breath, in respiratory distress, labored breathing. Pulse Ox showed 85-86% on room air. Patient appears flushed, diaphoretic. Placed patient on oxygen at 4LPM/NC. Dr Isha Downing was called to the room to assess the patient.

## 2019-03-07 NOTE — ED PROVIDER NOTES
Patient Seen in: [facility] Emergency Department    History   No chief complaint on file. Stated Complaint: SOB    HPI    13-year-old male presents emergency department who saw his primary care physician on Friday after flulike symptoms.   States MD at [facility] MAIN OR   • [address] [address] Left 5/6/2014   • LAPAROSCOPIC NEPHRECTOMY Left 5/6/2014    Performed by Joanna Sharma MD at [facility] MAIN OR   • OTHER  meatus of urethra   • OTHER Right     foreign body removal-arm   • REPAIR ROTATOR CUFF,ACUT through XII intact with no gross focal sensory or motor abnormality.       ED Course     Labs Reviewed   COMP METABOLIC PANEL (14) - Abnormal; Notable for the following components:       Result Value    Potassium 3.4 (*)     BUN/CREA Ratio 20.8 (*)     Calc (cpt=71046)    Result Date: 3/6/2019  CONCLUSION:  Similar findings to prior exam.  Emphysematous changes are noted. A definite consolidation is not identified.     Dictated by: Lissette Dasilva MD on 3/06/2019 at 19:02     Approved by: Lissette Dasilva

## 2019-03-08 LAB
ATRIAL RATE: 94 BPM
ATRIAL RATE: 99 BPM
P AXIS: 56 DEGREES
P AXIS: 61 DEGREES
P-R INTERVAL: 148 MS
P-R INTERVAL: 156 MS
Q-T INTERVAL: 352 MS
Q-T INTERVAL: 354 MS
QRS DURATION: 102 MS
QRS DURATION: 104 MS
QTC CALCULATION (BEZET): 442 MS
QTC CALCULATION (BEZET): 451 MS
R AXIS: 36 DEGREES
R AXIS: 37 DEGREES
T AXIS: 37 DEGREES
T AXIS: 51 DEGREES
VENTRICULAR RATE: 94 BPM
VENTRICULAR RATE: 99 BPM

## 2019-05-09 DIAGNOSIS — C64.2 RENAL CELL CARCINOMA OF LEFT KIDNEY (HCC): Primary | ICD-10-CM

## 2019-05-14 ENCOUNTER — HOSPITAL ENCOUNTER (OUTPATIENT)
Dept: CT IMAGING | Age: 59
Discharge: HOME | End: 2019-05-14
Attending: INTERNAL MEDICINE
Payer: COMMERCIAL

## 2019-05-14 DIAGNOSIS — C64.2 RENAL CELL CARCINOMA OF LEFT KIDNEY (HCC): ICD-10-CM

## 2019-05-14 PROCEDURE — 82565 ASSAY OF CREATININE: CPT

## 2019-05-14 PROCEDURE — 74177 CT ABD & PELVIS W/CONTRAST: CPT | Performed by: INTERNAL MEDICINE

## 2019-05-14 PROCEDURE — 71260 CT THORAX DX C+: CPT | Performed by: INTERNAL MEDICINE

## 2019-05-20 ENCOUNTER — APPOINTMENT (OUTPATIENT)
Dept: HEMATOLOGY/ONCOLOGY | Facility: HOSPITAL | Age: 59
End: 2019-05-20
Attending: INTERNAL MEDICINE
Payer: COMMERCIAL

## 2019-08-28 ENCOUNTER — HOSPITAL ENCOUNTER (OUTPATIENT)
Age: 59
Discharge: HOME | End: 2019-08-28
Payer: COMMERCIAL

## 2019-08-28 VITALS
HEART RATE: 88 BPM | DIASTOLIC BLOOD PRESSURE: 75 MMHG | SYSTOLIC BLOOD PRESSURE: 123 MMHG | BODY MASS INDEX: 29 KG/M2 | OXYGEN SATURATION: 91 % | RESPIRATION RATE: 22 BRPM | WEIGHT: 202 LBS | TEMPERATURE: 99 F

## 2019-08-28 DIAGNOSIS — S05.02XA ABRASION OF LEFT CORNEA, INITIAL ENCOUNTER: Primary | ICD-10-CM

## 2019-08-28 PROCEDURE — 90471 IMMUNIZATION ADMIN: CPT

## 2019-08-28 PROCEDURE — 99213 OFFICE O/P EST LOW 20 MIN: CPT

## 2019-08-28 PROCEDURE — 99214 OFFICE O/P EST MOD 30 MIN: CPT

## 2019-08-28 RX ORDER — TETRACAINE HYDROCHLORIDE 5 MG/ML
1 SOLUTION OPHTHALMIC ONCE
Status: COMPLETED | OUTPATIENT
Start: 2019-08-28 | End: 2019-08-28

## 2019-08-28 RX ORDER — OFLOXACIN 3 MG/ML
2 SOLUTION/ DROPS OPHTHALMIC EVERY 4 HOURS
Qty: 5 ML | Refills: 0 | Status: SHIPPED | OUTPATIENT
Start: 2019-08-28 | End: 2020-11-03

## 2019-08-28 RX ORDER — ACETAMINOPHEN 500 MG
500 TABLET ORAL EVERY 6 HOURS PRN
COMMUNITY
End: 2021-07-20 | Stop reason: ALTCHOICE

## 2019-08-28 RX ORDER — BUDESONIDE AND FORMOTEROL FUMARATE DIHYDRATE 160; 4.5 UG/1; UG/1
1 AEROSOL RESPIRATORY (INHALATION) 2 TIMES DAILY
Status: ON HOLD | COMMUNITY
End: 2021-09-28

## 2019-08-28 NOTE — ED INITIAL ASSESSMENT (HPI)
Pt. Got Lt. Eye poked with a pine needle yesterday evening about 5pm. Pt. States he & his wife flushed the eye.

## 2019-08-28 NOTE — ED PROVIDER NOTES
Patient Seen in: [facility]    History   Patient presents with:   Eye Visual Problem (opthalmic): Lt.    Stated Complaint: left eye injury x yesterday     HPI    Patient is a 48-year-old male with a medical history of A. fib curr are as noted in HPI. Constitutional and vital signs reviewed. All other systems reviewed and negative except as noted above.     Physical Exam     ED Triage Vitals [08/28/19 1033]   /75   Pulse 88   Resp 22   Temp 99.1 °F (37.3 °C)   Temp src Te MD  [address] [facility]  [phone]              Medications Prescribed:  Current Discharge Medication List    START taking these medications    ofloxacin 0.3 % Ophthalmic Solution  Place 2 drops into the left eye every 4 (four) hours.   Tarry Flathumberto

## 2019-11-29 ENCOUNTER — HOSPITAL ENCOUNTER (OUTPATIENT)
Dept: CV DIAGNOSTICS | Age: 59
Discharge: HOME | End: 2019-11-29
Attending: SPECIALIST
Payer: COMMERCIAL

## 2019-11-29 DIAGNOSIS — I35.0 AORTIC STENOSIS: ICD-10-CM

## 2019-11-29 DIAGNOSIS — J44.9 COPD (CHRONIC OBSTRUCTIVE PULMONARY DISEASE) (HCC): ICD-10-CM

## 2019-11-29 DIAGNOSIS — I48.91 A-FIB (HCC): ICD-10-CM

## 2019-11-29 DIAGNOSIS — I10 HYPERTENSION, UNSPECIFIED TYPE: ICD-10-CM

## 2019-11-29 PROCEDURE — 93306 TTE W/DOPPLER COMPLETE: CPT | Performed by: SPECIALIST

## 2019-12-06 ENCOUNTER — HOSPITAL ENCOUNTER (OUTPATIENT)
Dept: MRI IMAGING | Age: 59
Discharge: HOME | End: 2019-12-06
Attending: ORTHOPAEDIC SURGERY
Payer: COMMERCIAL

## 2019-12-06 DIAGNOSIS — M23.91 INTERNAL DERANGEMENT OF RIGHT KNEE: ICD-10-CM

## 2019-12-06 PROCEDURE — 73721 MRI JNT OF LWR EXTRE W/O DYE: CPT | Performed by: ORTHOPAEDIC SURGERY

## 2020-01-01 ENCOUNTER — EXTERNAL RECORD (OUTPATIENT)
Dept: HEALTH INFORMATION MANAGEMENT | Facility: OTHER | Age: 60
End: 2020-01-01

## 2020-11-02 ENCOUNTER — APPOINTMENT (OUTPATIENT)
Dept: LAB | Age: 60
End: 2020-11-02
Attending: SPECIALIST
Payer: COMMERCIAL

## 2020-11-02 DIAGNOSIS — I35.0 AORTIC STENOSIS: ICD-10-CM

## 2020-11-03 VITALS — BODY MASS INDEX: 32.21 KG/M2 | WEIGHT: 225 LBS | HEIGHT: 70 IN

## 2020-11-04 ENCOUNTER — HOSPITAL ENCOUNTER (OUTPATIENT)
Dept: INTERVENTIONAL RADIOLOGY/VASCULAR | Facility: HOSPITAL | Age: 60
Discharge: HOME | End: 2020-11-04
Attending: SPECIALIST
Payer: COMMERCIAL

## 2020-11-04 DIAGNOSIS — I35.0 AORTIC STENOSIS: Primary | ICD-10-CM

## 2020-11-05 ENCOUNTER — HOSPITAL ENCOUNTER (OUTPATIENT)
Dept: INTERVENTIONAL RADIOLOGY/VASCULAR | Facility: HOSPITAL | Age: 60
Discharge: HOME | End: 2020-11-05
Attending: SPECIALIST | Admitting: SPECIALIST
Payer: COMMERCIAL

## 2020-11-05 VITALS
HEIGHT: 70 IN | WEIGHT: 225 LBS | HEART RATE: 82 BPM | BODY MASS INDEX: 32.21 KG/M2 | DIASTOLIC BLOOD PRESSURE: 67 MMHG | TEMPERATURE: 98 F | OXYGEN SATURATION: 98 % | RESPIRATION RATE: 17 BRPM | SYSTOLIC BLOOD PRESSURE: 126 MMHG

## 2020-11-05 DIAGNOSIS — I35.0 NONRHEUMATIC AORTIC VALVE STENOSIS: ICD-10-CM

## 2020-11-05 PROCEDURE — 93010 ELECTROCARDIOGRAM REPORT: CPT | Performed by: INTERNAL MEDICINE

## 2020-11-05 PROCEDURE — 99153 MOD SED SAME PHYS/QHP EA: CPT

## 2020-11-05 PROCEDURE — 4A023N8 MEASUREMENT OF CARDIAC SAMPLING AND PRESSURE, BILATERAL, PERCUTANEOUS APPROACH: ICD-10-PCS | Performed by: SPECIALIST

## 2020-11-05 PROCEDURE — 82962 GLUCOSE BLOOD TEST: CPT

## 2020-11-05 PROCEDURE — 99152 MOD SED SAME PHYS/QHP 5/>YRS: CPT

## 2020-11-05 PROCEDURE — 93460 R&L HRT ART/VENTRICLE ANGIO: CPT

## 2020-11-05 PROCEDURE — B215YZZ FLUOROSCOPY OF LEFT HEART USING OTHER CONTRAST: ICD-10-PCS | Performed by: SPECIALIST

## 2020-11-05 PROCEDURE — B211YZZ FLUOROSCOPY OF MULTIPLE CORONARY ARTERIES USING OTHER CONTRAST: ICD-10-PCS | Performed by: SPECIALIST

## 2020-11-05 PROCEDURE — 93005 ELECTROCARDIOGRAM TRACING: CPT

## 2020-11-05 RX ORDER — SODIUM CHLORIDE 9 MG/ML
INJECTION, SOLUTION INTRAVENOUS
Status: DISCONTINUED | OUTPATIENT
Start: 2020-11-06 | End: 2020-11-05 | Stop reason: HOSPADM

## 2020-11-05 RX ORDER — TRAMADOL HYDROCHLORIDE 50 MG/1
50 TABLET ORAL EVERY 6 HOURS PRN
Status: CANCELLED | OUTPATIENT
Start: 2020-11-05

## 2020-11-05 RX ORDER — MIDAZOLAM HYDROCHLORIDE 1 MG/ML
INJECTION INTRAMUSCULAR; INTRAVENOUS
Status: COMPLETED
Start: 2020-11-05 | End: 2020-11-05

## 2020-11-05 RX ORDER — LIDOCAINE HYDROCHLORIDE 10 MG/ML
INJECTION, SOLUTION EPIDURAL; INFILTRATION; INTRACAUDAL; PERINEURAL
Status: COMPLETED
Start: 2020-11-05 | End: 2020-11-05

## 2020-11-05 RX ORDER — TRAMADOL HYDROCHLORIDE 50 MG/1
100 TABLET ORAL EVERY 6 HOURS PRN
Status: CANCELLED | OUTPATIENT
Start: 2020-11-05

## 2020-11-05 RX ORDER — HEPARIN SODIUM 5000 [USP'U]/ML
INJECTION, SOLUTION INTRAVENOUS; SUBCUTANEOUS
Status: COMPLETED
Start: 2020-11-05 | End: 2020-11-05

## 2020-11-05 RX ORDER — ACETAMINOPHEN 325 MG/1
650 TABLET ORAL EVERY 4 HOURS PRN
Status: CANCELLED | OUTPATIENT
Start: 2020-11-05

## 2020-11-05 RX ORDER — ASPIRIN 81 MG/1
TABLET, CHEWABLE ORAL
Status: COMPLETED
Start: 2020-11-05 | End: 2020-11-05

## 2020-11-06 NOTE — PROGRESS NOTES
Assumed care of patient at [phone]. Right groin cath site CDI, soft, no oozing or hematoma. Patient able to ambulate to the bathroom and void without difficulty. Groin site stable. Discharge instructions discussed by previous RN and reiterated this evening.  P

## 2020-11-07 NOTE — PROCEDURES
[facility]    PATIENT'S NAME: Addis Astudillo   ATTENDING PHYSICIAN: Edu Núñez M.D. OPERATING PHYSICIAN: Edu Núñez M.D.    PATIENT ACCOUNT#:   [de-identified]    LOCATION:  [address]  MEDICAL RECORD #:   [id number]       DATE OF BIRTH mean of 3. RV 47/3, EDP 6. PA 49/17, mean of 30. Wedge pressure 15/10, mean of 9. LVEDP of 12. LV pressure was 583 systolic. Mean gradient across the aortic valve was 49.83. Calculated aortic valve area is 0.75 sq cm.   Cardiac output was calculated

## 2020-11-12 ENCOUNTER — TELEPHONE (OUTPATIENT)
Dept: CARDIOLOGY CLINIC | Facility: HOSPITAL | Age: 60
End: 2020-11-12

## 2020-12-01 ENCOUNTER — APPOINTMENT (OUTPATIENT)
Dept: LAB | Age: 60
End: 2020-12-01
Attending: FAMILY MEDICINE
Payer: COMMERCIAL

## 2020-12-01 DIAGNOSIS — Z20.828 EXPOSURE TO SARS-ASSOCIATED CORONAVIRUS: ICD-10-CM

## 2020-12-10 ENCOUNTER — HOSPITAL ENCOUNTER (OUTPATIENT)
Dept: ULTRASOUND IMAGING | Facility: HOSPITAL | Age: 60
Discharge: HOME | End: 2020-12-10
Attending: NURSE PRACTITIONER
Payer: COMMERCIAL

## 2020-12-10 ENCOUNTER — HOSPITAL ENCOUNTER (OUTPATIENT)
Dept: CARDIOLOGY CLINIC | Facility: HOSPITAL | Age: 60
Discharge: HOME | End: 2020-12-10
Attending: NURSE PRACTITIONER
Payer: COMMERCIAL

## 2020-12-10 ENCOUNTER — HOSPITAL ENCOUNTER (OUTPATIENT)
Dept: CV DIAGNOSTICS | Facility: HOSPITAL | Age: 60
Discharge: HOME | End: 2020-12-10
Attending: INTERNAL MEDICINE
Payer: COMMERCIAL

## 2020-12-10 ENCOUNTER — HOSPITAL ENCOUNTER (OUTPATIENT)
Dept: CT IMAGING | Facility: HOSPITAL | Age: 60
Discharge: HOME | End: 2020-12-10
Attending: INTERNAL MEDICINE
Payer: COMMERCIAL

## 2020-12-10 ENCOUNTER — HOSPITAL ENCOUNTER (OUTPATIENT)
Dept: CT IMAGING | Facility: HOSPITAL | Age: 60
Discharge: HOME | End: 2020-12-10
Attending: NURSE PRACTITIONER
Payer: COMMERCIAL

## 2020-12-10 ENCOUNTER — HOSPITAL ENCOUNTER (OUTPATIENT)
Dept: CV DIAGNOSTICS | Facility: HOSPITAL | Age: 60
Discharge: HOME | End: 2020-12-10
Attending: NURSE PRACTITIONER
Payer: COMMERCIAL

## 2020-12-10 ENCOUNTER — HOSPITAL ENCOUNTER (OUTPATIENT)
Dept: LAB | Facility: HOSPITAL | Age: 60
Discharge: HOME | End: 2020-12-10
Attending: INTERNAL MEDICINE
Payer: COMMERCIAL

## 2020-12-10 ENCOUNTER — APPOINTMENT (OUTPATIENT)
Dept: CT IMAGING | Facility: HOSPITAL | Age: 60
End: 2020-12-10
Attending: NURSE PRACTITIONER
Payer: COMMERCIAL

## 2020-12-10 VITALS
RESPIRATION RATE: 12 BRPM | OXYGEN SATURATION: 90 % | HEART RATE: 100 BPM | DIASTOLIC BLOOD PRESSURE: 67 MMHG | SYSTOLIC BLOOD PRESSURE: 136 MMHG

## 2020-12-10 DIAGNOSIS — I35.0 AORTIC STENOSIS: Primary | ICD-10-CM

## 2020-12-10 DIAGNOSIS — J44.9 STAGE 3 SEVERE COPD BY GOLD CLASSIFICATION (HCC): ICD-10-CM

## 2020-12-10 DIAGNOSIS — I35.0 AORTIC STENOSIS, SEVERE: ICD-10-CM

## 2020-12-10 DIAGNOSIS — I35.0 AORTIC STENOSIS, SEVERE: Primary | ICD-10-CM

## 2020-12-10 DIAGNOSIS — R91.1 LUNG NODULE: ICD-10-CM

## 2020-12-10 DIAGNOSIS — R59.0 LYMPHADENOPATHY, MEDIASTINAL: ICD-10-CM

## 2020-12-10 PROCEDURE — 36415 COLL VENOUS BLD VENIPUNCTURE: CPT | Performed by: NURSE PRACTITIONER

## 2020-12-10 PROCEDURE — 99242 OFF/OP CONSLTJ NEW/EST SF 20: CPT | Performed by: INTERNAL MEDICINE

## 2020-12-10 PROCEDURE — 93880 EXTRACRANIAL BILAT STUDY: CPT | Performed by: NURSE PRACTITIONER

## 2020-12-10 PROCEDURE — 93306 TTE W/DOPPLER COMPLETE: CPT | Performed by: NURSE PRACTITIONER

## 2020-12-10 PROCEDURE — 93010 ELECTROCARDIOGRAM REPORT: CPT | Performed by: INTERNAL MEDICINE

## 2020-12-10 PROCEDURE — 71275 CT ANGIOGRAPHY CHEST: CPT | Performed by: INTERNAL MEDICINE

## 2020-12-10 PROCEDURE — 80048 BASIC METABOLIC PNL TOTAL CA: CPT | Performed by: NURSE PRACTITIONER

## 2020-12-10 PROCEDURE — 71275 CT ANGIOGRAPHY CHEST: CPT | Performed by: NURSE PRACTITIONER

## 2020-12-10 PROCEDURE — 93005 ELECTROCARDIOGRAM TRACING: CPT

## 2020-12-10 PROCEDURE — 75635 CT ANGIO ABDOMINAL ARTERIES: CPT | Performed by: NURSE PRACTITIONER

## 2020-12-10 NOTE — CONSULTS
[address]    PATIENT'S NAME: Samuel Drummond   ATTENDING PHYSICIAN: BRAD Montero   CONSULTING PHYSICIAN: Salvatore Calderon M.D.    PATIENT ACCOUNT#:   [de-identified]    LOCATION:  [facility]  MEDICAL RECORD #:   [id number]       DATE OF BIRTH:  10 to surgery, yet the patient strongly prefers a percutaneous approach and Dr. Itzel Pedraza has recommended consideration for transcatheter aortic valve replacement.       PHYSICAL EXAMINATION:    LUNGS:  The lungs are hyperinflated with a prolongation of the expirato

## 2020-12-17 ENCOUNTER — TELEPHONE (OUTPATIENT)
Dept: CARDIOLOGY CLINIC | Facility: HOSPITAL | Age: 60
End: 2020-12-17

## 2020-12-17 NOTE — TELEPHONE ENCOUNTER
I called to review TAVR testing results, and update pt that structural team  feel he is a good candidate for TF TAVR. Agreed on TAVR date 1/7. I further advised I would contact him next week with specific preop instructions. He verbalized understanding.

## 2020-12-22 ENCOUNTER — TELEPHONE (OUTPATIENT)
Dept: CARDIOLOGY CLINIC | Facility: HOSPITAL | Age: 60
End: 2020-12-22

## 2020-12-22 NOTE — TELEPHONE ENCOUNTER
Preop TAVR Instructions:    I spoke with patient and provided the following preop/admission instructions for upcoming transcatheter valve surgery on 1/7/21  · Patient will have Covid testing on Monday 1/4/21 at [facility] 0945am -prior to surgery and jairo

## 2021-01-04 ENCOUNTER — LAB ENCOUNTER (OUTPATIENT)
Dept: LAB | Age: 61
End: 2021-01-04
Attending: NURSE PRACTITIONER
Payer: COMMERCIAL

## 2021-01-04 DIAGNOSIS — Z01.810 PREOP CARDIOVASCULAR EXAM: ICD-10-CM

## 2021-01-04 NOTE — H&P
[facility]  [facility] H & P    Iris Guadarrama Patient Status:  Surgery Admit - Inpt    1960 MRN [id number]   Location [facility] 8NE-A Attending Raymundo Francis MD   Hosp Day # 0 PCP Aristeo Boyer MD     History of Present 9/28/2017    Performed by Alee Sethi MD at [facility] ENDOSCOPY   • KNEE ARTHROSCOPY Right 3/25/2016    Performed by Jason Fernández MD at [facility] MAIN OR   • [address] [address] Left 5/6/2014   • LAPAROSCOPIC NEPHRECTOMY Left 5/6/2014    Performed by daily., Disp: , Rfl:     •  Tiotropium Bromide Monohydrate (SPIRIVA HANDIHALER) 18 MCG Inhalation Cap, Inhale 18 mcg into the lungs daily. , Disp: , Rfl:     •  Multiple Vitamins-Minerals (MULTI-VITAMIN/MINERALS) Oral Tab, Take 1 tablet by mouth daily. , Dis apex      Echo: 12/10/20:  1. Procedure narrative: Transthoracic echocardiography for ventricular function evaluation and assessment of valvular function.  Image quality was suboptimal. The study was technically limited due to poor acoustic window availabil criteria. Labs:       Lab Results   Component Value Date    PT 16.9 (H) 05/05/2014    PT 13.3 05/04/2014    INR 1.41 (H) 05/05/2014    INR 1.05 05/04/2014       Assessment:  1. Severe, symptomatic aortic stenosis  2.  Chronic diastolic HF, preserved E

## 2021-01-05 ENCOUNTER — ANESTHESIA EVENT (OUTPATIENT)
Dept: CARDIAC SURGERY | Facility: HOSPITAL | Age: 61
DRG: 287 | End: 2021-01-05
Payer: COMMERCIAL

## 2021-01-05 LAB — SARS-COV-2 RNA RESP QL NAA+PROBE: NOT DETECTED

## 2021-01-06 ENCOUNTER — HOSPITAL ENCOUNTER (INPATIENT)
Facility: HOSPITAL | Age: 61
LOS: 2 days | Discharge: HOME | DRG: 287 | End: 2021-01-08
Attending: INTERNAL MEDICINE | Admitting: INTERNAL MEDICINE
Payer: COMMERCIAL

## 2021-01-06 ENCOUNTER — APPOINTMENT (OUTPATIENT)
Dept: GENERAL RADIOLOGY | Facility: HOSPITAL | Age: 61
DRG: 287 | End: 2021-01-06
Attending: NURSE PRACTITIONER
Payer: COMMERCIAL

## 2021-01-06 LAB
ALBUMIN SERPL-MCNC: 3.2 G/DL (ref 3.4–5)
ALBUMIN/GLOB SERPL: 0.7 {RATIO} (ref 1–2)
ALP LIVER SERPL-CCNC: 69 U/L
ALT SERPL-CCNC: 25 U/L
ANION GAP SERPL CALC-SCNC: 6 MMOL/L (ref 0–18)
ANTIBODY SCREEN: NEGATIVE
AST SERPL-CCNC: 12 U/L (ref 15–37)
ATRIAL RATE: 91 BPM
BILIRUB SERPL-MCNC: 0.5 MG/DL (ref 0.1–2)
BUN BLD-MCNC: 23 MG/DL (ref 7–18)
BUN/CREAT SERPL: 24 (ref 10–20)
CALCIUM BLD-MCNC: 10 MG/DL (ref 8.5–10.1)
CHLORIDE SERPL-SCNC: 99 MMOL/L (ref 98–112)
CO2 SERPL-SCNC: 31 MMOL/L (ref 21–32)
CREAT BLD-MCNC: 0.96 MG/DL
DEPRECATED RDW RBC AUTO: 44.5 FL (ref 35.1–46.3)
ERYTHROCYTE [DISTWIDTH] IN BLOOD BY AUTOMATED COUNT: 14.3 % (ref 11–15)
EST. AVERAGE GLUCOSE BLD GHB EST-MCNC: 126 MG/DL (ref 68–126)
GLOBULIN PLAS-MCNC: 4.6 G/DL (ref 2.8–4.4)
GLUCOSE BLD-MCNC: 127 MG/DL (ref 70–99)
HAV IGM SER QL: 2.1 MG/DL (ref 1.6–2.6)
HBA1C MFR BLD HPLC: 6 % (ref ?–5.7)
HCT VFR BLD AUTO: 51.7 %
HGB BLD-MCNC: 17.3 G/DL
INR BLD: 0.87 (ref 0.89–1.11)
M PROTEIN MFR SERPL ELPH: 7.8 G/DL (ref 6.4–8.2)
MCH RBC QN AUTO: 29.4 PG (ref 26–34)
MCHC RBC AUTO-ENTMCNC: 33.5 G/DL (ref 31–37)
MCV RBC AUTO: 87.9 FL
NT-PROBNP SERPL-MCNC: 251 PG/ML (ref ?–125)
OSMOLALITY SERPL CALC.SUM OF ELEC: 287 MOSM/KG (ref 275–295)
P AXIS: 52 DEGREES
P-R INTERVAL: 158 MS
PLATELET # BLD AUTO: 222 10(3)UL (ref 150–450)
POTASSIUM SERPL-SCNC: 3.7 MMOL/L (ref 3.5–5.1)
PSA SERPL DL<=0.01 NG/ML-MCNC: 12.1 SECONDS (ref 12.4–14.6)
Q-T INTERVAL: 358 MS
QRS DURATION: 100 MS
QTC CALCULATION (BEZET): 440 MS
R AXIS: -2 DEGREES
RBC # BLD AUTO: 5.88 X10(6)UL
RH BLOOD TYPE: POSITIVE
SODIUM SERPL-SCNC: 136 MMOL/L (ref 136–145)
T AXIS: 52 DEGREES
VENTRICULAR RATE: 91 BPM
WBC # BLD AUTO: 11.5 X10(3) UL (ref 4–11)

## 2021-01-06 PROCEDURE — 86901 BLOOD TYPING SEROLOGIC RH(D): CPT | Performed by: NURSE PRACTITIONER

## 2021-01-06 PROCEDURE — 71045 X-RAY EXAM CHEST 1 VIEW: CPT | Performed by: NURSE PRACTITIONER

## 2021-01-06 PROCEDURE — 83036 HEMOGLOBIN GLYCOSYLATED A1C: CPT | Performed by: NURSE PRACTITIONER

## 2021-01-06 PROCEDURE — 87081 CULTURE SCREEN ONLY: CPT | Performed by: NURSE PRACTITIONER

## 2021-01-06 PROCEDURE — 83880 ASSAY OF NATRIURETIC PEPTIDE: CPT | Performed by: NURSE PRACTITIONER

## 2021-01-06 PROCEDURE — 86900 BLOOD TYPING SEROLOGIC ABO: CPT | Performed by: NURSE PRACTITIONER

## 2021-01-06 PROCEDURE — 80053 COMPREHEN METABOLIC PANEL: CPT | Performed by: NURSE PRACTITIONER

## 2021-01-06 PROCEDURE — 93010 ELECTROCARDIOGRAM REPORT: CPT | Performed by: INTERNAL MEDICINE

## 2021-01-06 PROCEDURE — 93005 ELECTROCARDIOGRAM TRACING: CPT

## 2021-01-06 PROCEDURE — 99223 1ST HOSP IP/OBS HIGH 75: CPT | Performed by: INTERNAL MEDICINE

## 2021-01-06 PROCEDURE — 76937 US GUIDE VASCULAR ACCESS: CPT

## 2021-01-06 PROCEDURE — 86850 RBC ANTIBODY SCREEN: CPT | Performed by: NURSE PRACTITIONER

## 2021-01-06 PROCEDURE — 85027 COMPLETE CBC AUTOMATED: CPT | Performed by: NURSE PRACTITIONER

## 2021-01-06 PROCEDURE — 83735 ASSAY OF MAGNESIUM: CPT | Performed by: NURSE PRACTITIONER

## 2021-01-06 PROCEDURE — 85610 PROTHROMBIN TIME: CPT | Performed by: NURSE PRACTITIONER

## 2021-01-06 PROCEDURE — 86920 COMPATIBILITY TEST SPIN: CPT

## 2021-01-06 RX ORDER — VARENICLINE TARTRATE 1 MG/1
1 TABLET, FILM COATED ORAL 2 TIMES DAILY
COMMUNITY
Start: 2020-12-15 | End: 2021-07-20 | Stop reason: ALTCHOICE

## 2021-01-06 RX ORDER — METFORMIN HYDROCHLORIDE 500 MG/1
500 TABLET, EXTENDED RELEASE ORAL DAILY
COMMUNITY
Start: 2020-12-18

## 2021-01-06 RX ORDER — SODIUM CHLORIDE 9 MG/ML
INJECTION, SOLUTION INTRAVENOUS CONTINUOUS
Status: DISCONTINUED | OUTPATIENT
Start: 2021-01-07 | End: 2021-01-07

## 2021-01-06 RX ORDER — ATORVASTATIN CALCIUM 20 MG/1
20 TABLET, FILM COATED ORAL DAILY
Status: DISCONTINUED | OUTPATIENT
Start: 2021-01-06 | End: 2021-01-07

## 2021-01-06 RX ORDER — VARENICLINE TARTRATE 1 MG/1
1 TABLET, FILM COATED ORAL 2 TIMES DAILY
Status: DISCONTINUED | OUTPATIENT
Start: 2021-01-06 | End: 2021-01-08

## 2021-01-06 RX ORDER — AMLODIPINE, VALSARTAN AND HYDROCHLOROTHIAZIDE 10; 320; 25 MG/1; MG/1; MG/1
1 TABLET ORAL DAILY
Status: ON HOLD | COMMUNITY
Start: 2020-12-16 | End: 2021-01-08

## 2021-01-06 RX ORDER — SODIUM CHLORIDE 9 MG/ML
INJECTION, SOLUTION INTRAVENOUS CONTINUOUS
Status: DISCONTINUED | OUTPATIENT
Start: 2021-01-06 | End: 2021-01-06

## 2021-01-06 RX ORDER — ATORVASTATIN CALCIUM 20 MG/1
20 TABLET, FILM COATED ORAL NIGHTLY
COMMUNITY
Start: 2020-12-16 | End: 2021-11-29

## 2021-01-06 RX ORDER — ACETAMINOPHEN 500 MG
500 TABLET ORAL EVERY 6 HOURS PRN
Status: DISCONTINUED | OUTPATIENT
Start: 2021-01-06 | End: 2021-01-08

## 2021-01-06 RX ORDER — MULTIVITAMIN/IRON/FOLIC ACID 18MG-0.4MG
375 TABLET ORAL NIGHTLY
Status: DISCONTINUED | OUTPATIENT
Start: 2021-01-06 | End: 2021-01-08

## 2021-01-06 RX ORDER — CHLORHEXIDINE GLUCONATE 4 G/100ML
30 SOLUTION TOPICAL ONCE
Status: COMPLETED | OUTPATIENT
Start: 2021-01-06 | End: 2021-01-06

## 2021-01-06 NOTE — CM/SW NOTE
Received per protocol \"surgery\" order for a TAVR on 1/7. Will alina order complete, and follow closely for discharge plans/recommendations.  &  to remain available and supportive for discharge planning needs.

## 2021-01-07 ENCOUNTER — APPOINTMENT (OUTPATIENT)
Dept: CV DIAGNOSTICS | Facility: HOSPITAL | Age: 61
DRG: 287 | End: 2021-01-07
Attending: NURSE PRACTITIONER
Payer: COMMERCIAL

## 2021-01-07 ENCOUNTER — ANESTHESIA (OUTPATIENT)
Dept: CARDIAC SURGERY | Facility: HOSPITAL | Age: 61
DRG: 287 | End: 2021-01-07
Payer: COMMERCIAL

## 2021-01-07 ENCOUNTER — APPOINTMENT (OUTPATIENT)
Dept: GENERAL RADIOLOGY | Facility: HOSPITAL | Age: 61
DRG: 287 | End: 2021-01-07
Attending: NURSE PRACTITIONER
Payer: COMMERCIAL

## 2021-01-07 PROBLEM — Z95.2 S/P TAVR (TRANSCATHETER AORTIC VALVE REPLACEMENT): Status: ACTIVE | Noted: 2021-01-07

## 2021-01-07 PROBLEM — I35.0 MODERATE AORTIC STENOSIS: Status: ACTIVE | Noted: 2021-01-07

## 2021-01-07 PROBLEM — Z95.2 S/P TAVR (TRANSCATHETER AORTIC VALVE REPLACEMENT): Status: RESOLVED | Noted: 2021-01-07 | Resolved: 2021-01-07

## 2021-01-07 LAB
ALBUMIN SERPL-MCNC: 3 G/DL (ref 3.4–5)
ALBUMIN/GLOB SERPL: 0.7 {RATIO} (ref 1–2)
ALP LIVER SERPL-CCNC: 66 U/L
ALT SERPL-CCNC: 20 U/L
ANION GAP SERPL CALC-SCNC: 7 MMOL/L (ref 0–18)
APTT PPP: 59 SECONDS (ref 25.4–36.1)
AST SERPL-CCNC: 20 U/L (ref 15–37)
ATRIAL RATE: 88 BPM
BILIRUB SERPL-MCNC: 0.5 MG/DL (ref 0.1–2)
BUN BLD-MCNC: 21 MG/DL (ref 7–18)
BUN/CREAT SERPL: 19.6 (ref 10–20)
CALCIUM BLD-MCNC: 8.6 MG/DL (ref 8.5–10.1)
CHLORIDE SERPL-SCNC: 107 MMOL/L (ref 98–112)
CO2 SERPL-SCNC: 25 MMOL/L (ref 21–32)
CREAT BLD-MCNC: 1.07 MG/DL
DEPRECATED RDW RBC AUTO: 45.4 FL (ref 35.1–46.3)
ERYTHROCYTE [DISTWIDTH] IN BLOOD BY AUTOMATED COUNT: 14.1 % (ref 11–15)
GLOBULIN PLAS-MCNC: 4.1 G/DL (ref 2.8–4.4)
GLUCOSE BLD-MCNC: 100 MG/DL (ref 70–99)
GLUCOSE BLD-MCNC: 109 MG/DL (ref 70–99)
GLUCOSE BLD-MCNC: 114 MG/DL (ref 70–99)
GLUCOSE BLD-MCNC: 123 MG/DL (ref 70–99)
GLUCOSE BLD-MCNC: 94 MG/DL (ref 70–99)
HAV IGM SER QL: 2 MG/DL (ref 1.6–2.6)
HCT VFR BLD AUTO: 49.9 %
HGB BLD-MCNC: 16.3 G/DL
INR BLD: 0.98 (ref 0.89–1.11)
ISTAT ACTIVATED CLOTTING TIME: 125 SECONDS (ref 74–137)
ISTAT ACTIVATED CLOTTING TIME: 153 SECONDS (ref 74–137)
ISTAT BLOOD GAS BASE EXCESS: 3 MMOL/L (ref ?–30)
ISTAT BLOOD GAS HCO3: 30.1 MEQ/L (ref 22–26)
ISTAT BLOOD GAS O2 SATURATION: 100 % (ref 92–100)
ISTAT BLOOD GAS PCO2: 64.1 MMHG (ref 35–45)
ISTAT BLOOD GAS PH: 7.28 (ref 7.35–7.45)
ISTAT BLOOD GAS PO2: >400 MMHG (ref 80–105)
ISTAT BLOOD GAS TCO2: 32 MMOL/L (ref 22–32)
ISTAT HEMATOCRIT: 49 %
ISTAT IONIZED CALCIUM: 1.16 MMOL/L (ref 1.12–1.32)
ISTAT POTASSIUM: 4 MMOL/L (ref 3.6–5.1)
ISTAT SODIUM: 139 MMOL/L (ref 136–145)
M PROTEIN MFR SERPL ELPH: 7.1 G/DL (ref 6.4–8.2)
MCH RBC QN AUTO: 29.2 PG (ref 26–34)
MCHC RBC AUTO-ENTMCNC: 32.7 G/DL (ref 31–37)
MCV RBC AUTO: 89.3 FL
OSMOLALITY SERPL CALC.SUM OF ELEC: 291 MOSM/KG (ref 275–295)
P AXIS: 68 DEGREES
P-R INTERVAL: 156 MS
PLATELET # BLD AUTO: 218 10(3)UL (ref 150–450)
POTASSIUM SERPL-SCNC: 3.8 MMOL/L (ref 3.5–5.1)
PSA SERPL DL<=0.01 NG/ML-MCNC: 13.3 SECONDS (ref 12.4–14.6)
Q-T INTERVAL: 388 MS
QRS DURATION: 106 MS
QTC CALCULATION (BEZET): 469 MS
R AXIS: 42 DEGREES
RBC # BLD AUTO: 5.59 X10(6)UL
SODIUM SERPL-SCNC: 139 MMOL/L (ref 136–145)
T AXIS: 39 DEGREES
VENTRICULAR RATE: 88 BPM
WBC # BLD AUTO: 9.9 X10(3) UL (ref 4–11)

## 2021-01-07 PROCEDURE — 85014 HEMATOCRIT: CPT

## 2021-01-07 PROCEDURE — 93010 ELECTROCARDIOGRAM REPORT: CPT | Performed by: INTERNAL MEDICINE

## 2021-01-07 PROCEDURE — 82962 GLUCOSE BLOOD TEST: CPT

## 2021-01-07 PROCEDURE — 94640 AIRWAY INHALATION TREATMENT: CPT

## 2021-01-07 PROCEDURE — 4A023N7 MEASUREMENT OF CARDIAC SAMPLING AND PRESSURE, LEFT HEART, PERCUTANEOUS APPROACH: ICD-10-PCS | Performed by: INTERNAL MEDICINE

## 2021-01-07 PROCEDURE — 93005 ELECTROCARDIOGRAM TRACING: CPT

## 2021-01-07 PROCEDURE — 85027 COMPLETE CBC AUTOMATED: CPT | Performed by: NURSE PRACTITIONER

## 2021-01-07 PROCEDURE — 93355 ECHO TRANSESOPHAGEAL (TEE): CPT | Performed by: NURSE PRACTITIONER

## 2021-01-07 PROCEDURE — 84295 ASSAY OF SERUM SODIUM: CPT

## 2021-01-07 PROCEDURE — 84132 ASSAY OF SERUM POTASSIUM: CPT

## 2021-01-07 PROCEDURE — 85730 THROMBOPLASTIN TIME PARTIAL: CPT | Performed by: NURSE PRACTITIONER

## 2021-01-07 PROCEDURE — 82330 ASSAY OF CALCIUM: CPT

## 2021-01-07 PROCEDURE — 85610 PROTHROMBIN TIME: CPT | Performed by: NURSE PRACTITIONER

## 2021-01-07 PROCEDURE — B24BZZ4 ULTRASONOGRAPHY OF HEART WITH AORTA, TRANSESOPHAGEAL: ICD-10-PCS | Performed by: INTERNAL MEDICINE

## 2021-01-07 PROCEDURE — 80053 COMPREHEN METABOLIC PANEL: CPT | Performed by: NURSE PRACTITIONER

## 2021-01-07 PROCEDURE — 71045 X-RAY EXAM CHEST 1 VIEW: CPT | Performed by: NURSE PRACTITIONER

## 2021-01-07 PROCEDURE — 83735 ASSAY OF MAGNESIUM: CPT | Performed by: NURSE PRACTITIONER

## 2021-01-07 PROCEDURE — 85347 COAGULATION TIME ACTIVATED: CPT

## 2021-01-07 PROCEDURE — 82803 BLOOD GASES ANY COMBINATION: CPT

## 2021-01-07 PROCEDURE — 33361 REPLACE AORTIC VALVE PERQ: CPT | Performed by: INTERNAL MEDICINE

## 2021-01-07 PROCEDURE — B41CYZZ FLUOROSCOPY OF PELVIC ARTERIES USING OTHER CONTRAST: ICD-10-PCS | Performed by: INTERNAL MEDICINE

## 2021-01-07 PROCEDURE — 93355 ECHO TRANSESOPHAGEAL (TEE): CPT | Performed by: INTERNAL MEDICINE

## 2021-01-07 RX ORDER — DEXTROSE MONOHYDRATE 25 G/50ML
50 INJECTION, SOLUTION INTRAVENOUS
Status: DISCONTINUED | OUTPATIENT
Start: 2021-01-07 | End: 2021-01-08

## 2021-01-07 RX ORDER — ACETAMINOPHEN AND CODEINE PHOSPHATE 300; 30 MG/1; MG/1
1 TABLET ORAL EVERY 4 HOURS PRN
Status: DISCONTINUED | OUTPATIENT
Start: 2021-01-07 | End: 2021-01-08

## 2021-01-07 RX ORDER — MEPERIDINE HYDROCHLORIDE 25 MG/ML
12.5 INJECTION INTRAMUSCULAR; INTRAVENOUS; SUBCUTANEOUS AS NEEDED
Status: DISCONTINUED | OUTPATIENT
Start: 2021-01-07 | End: 2021-01-08

## 2021-01-07 RX ORDER — SODIUM PHOSPHATE, DIBASIC AND SODIUM PHOSPHATE, MONOBASIC 7; 19 G/133ML; G/133ML
1 ENEMA RECTAL ONCE AS NEEDED
Status: DISCONTINUED | OUTPATIENT
Start: 2021-01-07 | End: 2021-01-07

## 2021-01-07 RX ORDER — ATORVASTATIN CALCIUM 20 MG/1
20 TABLET, FILM COATED ORAL NIGHTLY
Status: DISCONTINUED | OUTPATIENT
Start: 2021-01-07 | End: 2021-01-08

## 2021-01-07 RX ORDER — FAMOTIDINE 20 MG/1
20 TABLET ORAL 2 TIMES DAILY
Status: DISCONTINUED | OUTPATIENT
Start: 2021-01-07 | End: 2021-01-08

## 2021-01-07 RX ORDER — METOCLOPRAMIDE HYDROCHLORIDE 5 MG/ML
10 INJECTION INTRAMUSCULAR; INTRAVENOUS ONCE AS NEEDED
Status: ACTIVE | OUTPATIENT
Start: 2021-01-07 | End: 2021-01-07

## 2021-01-07 RX ORDER — VERAPAMIL HYDROCHLORIDE 2.5 MG/ML
INJECTION, SOLUTION INTRAVENOUS
Status: COMPLETED
Start: 2021-01-07 | End: 2021-01-07

## 2021-01-07 RX ORDER — ROCURONIUM BROMIDE 10 MG/ML
INJECTION, SOLUTION INTRAVENOUS AS NEEDED
Status: DISCONTINUED | OUTPATIENT
Start: 2021-01-07 | End: 2021-01-07 | Stop reason: SURG

## 2021-01-07 RX ORDER — SODIUM CHLORIDE 9 MG/ML
INJECTION, SOLUTION INTRAVENOUS CONTINUOUS
Status: DISCONTINUED | OUTPATIENT
Start: 2021-01-07 | End: 2021-01-08

## 2021-01-07 RX ORDER — DOBUTAMINE HYDROCHLORIDE 200 MG/100ML
INJECTION INTRAVENOUS CONTINUOUS PRN
Status: DISCONTINUED | OUTPATIENT
Start: 2021-01-07 | End: 2021-01-08

## 2021-01-07 RX ORDER — CEFAZOLIN SODIUM/WATER 2 G/20 ML
2 SYRINGE (ML) INTRAVENOUS EVERY 8 HOURS
Status: COMPLETED | OUTPATIENT
Start: 2021-01-07 | End: 2021-01-08

## 2021-01-07 RX ORDER — NITROGLYCERIN 20 MG/100ML
INJECTION INTRAVENOUS
Status: COMPLETED
Start: 2021-01-07 | End: 2021-01-07

## 2021-01-07 RX ORDER — POLYETHYLENE GLYCOL 3350 17 G/17G
17 POWDER, FOR SOLUTION ORAL DAILY PRN
Status: DISCONTINUED | OUTPATIENT
Start: 2021-01-07 | End: 2021-01-08

## 2021-01-07 RX ORDER — NALOXONE HYDROCHLORIDE 0.4 MG/ML
80 INJECTION, SOLUTION INTRAMUSCULAR; INTRAVENOUS; SUBCUTANEOUS AS NEEDED
Status: ACTIVE | OUTPATIENT
Start: 2021-01-07 | End: 2021-01-07

## 2021-01-07 RX ORDER — ONDANSETRON 2 MG/ML
4 INJECTION INTRAMUSCULAR; INTRAVENOUS ONCE AS NEEDED
Status: ACTIVE | OUTPATIENT
Start: 2021-01-07 | End: 2021-01-07

## 2021-01-07 RX ORDER — DIPHENHYDRAMINE HYDROCHLORIDE 50 MG/ML
12.5 INJECTION INTRAMUSCULAR; INTRAVENOUS AS NEEDED
Status: ACTIVE | OUTPATIENT
Start: 2021-01-07 | End: 2021-01-07

## 2021-01-07 RX ORDER — SODIUM CHLORIDE 9 MG/ML
INJECTION, SOLUTION INTRAVENOUS CONTINUOUS PRN
Status: DISCONTINUED | OUTPATIENT
Start: 2021-01-07 | End: 2021-01-07 | Stop reason: SURG

## 2021-01-07 RX ORDER — NITROGLYCERIN 20 MG/100ML
INJECTION INTRAVENOUS CONTINUOUS PRN
Status: DISCONTINUED | OUTPATIENT
Start: 2021-01-07 | End: 2021-01-08

## 2021-01-07 RX ORDER — SODIUM CHLORIDE, SODIUM LACTATE, POTASSIUM CHLORIDE, CALCIUM CHLORIDE 600; 310; 30; 20 MG/100ML; MG/100ML; MG/100ML; MG/100ML
INJECTION, SOLUTION INTRAVENOUS CONTINUOUS
Status: DISCONTINUED | OUTPATIENT
Start: 2021-01-07 | End: 2021-01-07

## 2021-01-07 RX ORDER — ONDANSETRON 2 MG/ML
4 INJECTION INTRAMUSCULAR; INTRAVENOUS EVERY 6 HOURS PRN
Status: DISCONTINUED | OUTPATIENT
Start: 2021-01-07 | End: 2021-01-08

## 2021-01-07 RX ORDER — PHENYLEPHRINE HCL 10 MG/ML
VIAL (ML) INJECTION AS NEEDED
Status: DISCONTINUED | OUTPATIENT
Start: 2021-01-07 | End: 2021-01-07 | Stop reason: SURG

## 2021-01-07 RX ORDER — ALBUMIN, HUMAN INJ 5% 5 %
250 SOLUTION INTRAVENOUS ONCE AS NEEDED
Status: ACTIVE | OUTPATIENT
Start: 2021-01-07 | End: 2021-01-07

## 2021-01-07 RX ORDER — DOCUSATE SODIUM 100 MG/1
100 CAPSULE, LIQUID FILLED ORAL 2 TIMES DAILY
Status: DISCONTINUED | OUTPATIENT
Start: 2021-01-07 | End: 2021-01-08

## 2021-01-07 RX ORDER — HEPARIN SODIUM 1000 [USP'U]/ML
INJECTION, SOLUTION INTRAVENOUS; SUBCUTANEOUS AS NEEDED
Status: DISCONTINUED | OUTPATIENT
Start: 2021-01-07 | End: 2021-01-07 | Stop reason: SURG

## 2021-01-07 RX ORDER — FAMOTIDINE 10 MG/ML
20 INJECTION, SOLUTION INTRAVENOUS 2 TIMES DAILY
Status: DISCONTINUED | OUTPATIENT
Start: 2021-01-07 | End: 2021-01-07

## 2021-01-07 RX ORDER — BISACODYL 10 MG
10 SUPPOSITORY, RECTAL RECTAL
Status: DISCONTINUED | OUTPATIENT
Start: 2021-01-07 | End: 2021-01-07

## 2021-01-07 RX ORDER — MIDAZOLAM HYDROCHLORIDE 1 MG/ML
INJECTION INTRAMUSCULAR; INTRAVENOUS AS NEEDED
Status: DISCONTINUED | OUTPATIENT
Start: 2021-01-07 | End: 2021-01-07 | Stop reason: SURG

## 2021-01-07 RX ADMIN — HEPARIN SODIUM 2000 UNITS: 1000 INJECTION, SOLUTION INTRAVENOUS; SUBCUTANEOUS at 14:21:00

## 2021-01-07 RX ADMIN — PHENYLEPHRINE HCL 100 MCG: 10 MG/ML VIAL (ML) INJECTION at 13:10:00

## 2021-01-07 RX ADMIN — SODIUM CHLORIDE: 9 INJECTION, SOLUTION INTRAVENOUS at 12:20:00

## 2021-01-07 RX ADMIN — MIDAZOLAM HYDROCHLORIDE 1 MG: 1 INJECTION INTRAMUSCULAR; INTRAVENOUS at 12:24:00

## 2021-01-07 RX ADMIN — MIDAZOLAM HYDROCHLORIDE 1 MG: 1 INJECTION INTRAMUSCULAR; INTRAVENOUS at 12:29:00

## 2021-01-07 RX ADMIN — PHENYLEPHRINE HCL 100 MCG: 10 MG/ML VIAL (ML) INJECTION at 13:02:00

## 2021-01-07 RX ADMIN — MIDAZOLAM HYDROCHLORIDE 2 MG: 1 INJECTION INTRAMUSCULAR; INTRAVENOUS at 12:22:00

## 2021-01-07 RX ADMIN — SODIUM CHLORIDE: 9 INJECTION, SOLUTION INTRAVENOUS at 15:04:00

## 2021-01-07 RX ADMIN — ROCURONIUM BROMIDE 40 MG: 10 INJECTION, SOLUTION INTRAVENOUS at 13:50:00

## 2021-01-07 RX ADMIN — PHENYLEPHRINE HCL 100 MCG: 10 MG/ML VIAL (ML) INJECTION at 14:23:00

## 2021-01-07 RX ADMIN — ROCURONIUM BROMIDE 80 MG: 10 INJECTION, SOLUTION INTRAVENOUS at 12:45:00

## 2021-01-07 NOTE — PROGRESS NOTES
Pt is A+Ox4. VSS on RA. NSR on tele. Aortic murmur. Voids via bathroom. NPO since midnight. IVF infusing. PreTAVR orders completed. Up independently. Wife at bedside. Pt and wife updated on POC for today. Awaiting TAVR. WCTM patient at this time.

## 2021-01-07 NOTE — CONSULTS
[facility]  Report of Consultation    Neelam Ramirez Patient Status:  Inpatient    1960 MRN [id number]   [facility] 2NE-A Attending Victory Goodell, MD   Hosp Day # 0 PCP Aubrie De La Garza MD     Reason for Consultation:   Melvin Deleon 5/6/2014    Performed by Gianna Melvin MD at [facility] MAIN OR   • NEPHRECTOMY Left 2014   • OTHER  meatus of urethra   • OTHER Right     foreign body removal-arm   • REPAIR ROTATOR CUFF,ACUTE Right    • UPPER GI ENDOSCOPY,EXAM       Family History   Problem Re is 32.4 kg/m².   General:  No acute distress, alert, oriente   Cardiovascular: Regular heart sounds, ++ systolic murmus heard at all cardiac areas   Lungs: Normal respiratory effort, CTABL  No edema    Labs:  Lab Results   Component Value Date    WBC 11.5 0

## 2021-01-07 NOTE — ANESTHESIA PROCEDURE NOTES
Airway  Date/Time: 1/7/2021 12:46 PM  Urgency: elective      General Information and Staff    Patient location during procedure: OR  Anesthesiologist: Marito Ernandez MD  Performed: anesthesiologist     Indications and Patient Condition  Indications f

## 2021-01-07 NOTE — ANESTHESIA PROCEDURE NOTES
Peripheral IV  Date/Time: 1/7/2021 1:00 PM  Inserted by: Tracey Sequeira MD    Placement  Needle size: 16 G  Laterality: right  Location: hand  Local anesthetic: none  Site prep: Betadine  Technique: anatomical landmarks  Attempts: 1

## 2021-01-07 NOTE — PLAN OF CARE
1500 Received from [facility] lab awake from sedation . Upset because they were not able to due TAVR . Wanting to speak to an MD. Dr. Melissa Rosales here to discuss case with wife and Patient. Received with Right arterial and venous sheaths.  Removed manual pressure

## 2021-01-07 NOTE — OPERATIVE REPORT
[address]    PATIENT'S NAME: Lance Zhang   ATTENDING PHYSICIAN: Idalmis Mosqueda M.D. OPERATING PHYSICIAN: Macy Moscoso M.D.    PATIENT ACCOUNT#:   [de-identified]    LOCATION:  [address] [place] [address]  MEDICAL RECORD #:   [id number]       DATE OF BIRTH: micropuncture sheath followed, followed by placement of a 5-Romanian sheath. During this time, Dr. Tobias Lopez interrogated the ascending aorta and aortic valve.   His measurements suggested that the patient had moderate aortic stenosis, but he did appear to ha placed in the right common femoral vein using ultrasound approach, which was a 5-Bengali sheath, and it will also be held manually for hemostasis. There were no apparent acute complications noted, and the patient tolerated the procedure well.     SUMMARY:

## 2021-01-07 NOTE — PAYOR COMM NOTE
--------------  CONTINUED STAY REVIEW    Payor: [facility]  Subscriber #:  [id number]  Authorization Number: [id number]    Admit date: 1/6/21  Admit time: 1107    Admitting Physician: Cody Paz MD  Attending Physician:  Cody Paz MD  Primary Care gradient across the left upper outflow tract is likely a product of a mixed picture of a subaortic membrane in the presence of moderate aortic stenosis.     Case was discussed with Dr. Ana Maria Hummel.   Patient will require additional imaging to reassess this area suc injection     Date Action Dose Route User    1/7/2021 1245 Given 50 mg Intravenous Eddie Birmingham MD      Rocuronium Bromide [facility]) injection     Date Action Dose Route User    1/7/2021 1350 Given 40 mg Intravenous Eddie Birmingham MD    1/7/2

## 2021-01-07 NOTE — PROGRESS NOTES
Pt A/Ox4. VSS. NSR. Room air, maintaining O2 sats. Voiding, ambulating ad anuel. Plan of care: TAVR today. Groins shaved, CHG bath done, fluids at 50ml/hr. All needs met, updated on plan of care.

## 2021-01-07 NOTE — OPERATIVE REPORT
CARDIOVASCULAR SURGERY  OPERATIVE NOTE  TAVR        DATE OF PROCEDURE: 1/7/21    INDICATIONS:         63year old with symptomatic aortic stenosis  Pt seen by Dr Chad Cowan   STS Risk Score: 0.65 %      The assessment is that the patient is at low risk for open

## 2021-01-07 NOTE — ANESTHESIA POSTPROCEDURE EVALUATION
[facility] Patient Status:  Inpatient   Age/Gender 63year old male MRN [id number]   [facility] 6NE-A Attending Raymundo Francis MD   Hosp Day # 1 PCP Aristeo Boyer MD       Anesthesia Post-op Note    Procedure(s):   Evonne

## 2021-01-07 NOTE — PROGRESS NOTES
[facility]  JOSE Note    Kathya Eugenio Location:      N [id number]   Admission Date 1/6/2021 Operation Date 1/7/2021   Attending Physician Bhargavi Patricia MD Operating Physician Khalif Salcido MD     Pre-Operative Diagnosis: Severe aortic stenosis.

## 2021-01-07 NOTE — OPERATIVE REPORT
Full note dictated,    By JOSE, patient had a blood emergent aortic valve area of 2.1 cm². There appeared to be a subaortic membrane. There was no significant mitral stenosis.     Pull back across left-ventricular apex revealed gradient at the apex of appr

## 2021-01-07 NOTE — ANESTHESIA PREPROCEDURE EVALUATION
PRE-OP EVALUATION    Patient Name: Nathalia Mode    Pre-op Diagnosis: aortic stenosis    Procedure(s):  transcatheter aortic valve replacment, 26mm  Ultra, femoral approach    Surgeon(s) and Role:     * Mundo Maier MD - Primary     * Lincoln Burks •  Tiotropium Bromide Monohydrate (SPIRIVA HANDIHALER) 18 MCG Inhalation Cap, Inhale 18 mcg into the lungs daily. , Disp: , Rfl: , 1/6/2021 at Unknown time    •  Multiple Vitamins-Minerals (MULTI-VITAMIN/MINERALS) Oral Tab, Take 1 tablet by mouth daily. Michael Newton 1. Procedure narrative: Transthoracic echocardiography for ventricular function evaluation and assessment of valvular function. Image quality was suboptimal. The study was technically limited due to poor acoustic window availability.  Intravenous contrast ( ECG reviewed.   Exercise tolerance: poor           (+) hypertension and well controlled                                    Endo/Other  Comment: hba1c 6    (+) diabetes and well controlled, type 2,                          Pulmonary        (+) COPD and mild MCV 87.9 01/06/2021    MCH 29.4 01/06/2021    MCHC 33.5 01/06/2021    RDW 14.3 01/06/2021    .0 01/06/2021     Lab Results   Component Value Date     01/06/2021    K 3.7 01/06/2021    CL 99 01/06/2021    CO2 31.0 01/06/2021    BUN 23 (H) 01/0

## 2021-01-08 ENCOUNTER — APPOINTMENT (OUTPATIENT)
Dept: GENERAL RADIOLOGY | Facility: HOSPITAL | Age: 61
DRG: 287 | End: 2021-01-08
Attending: NURSE PRACTITIONER
Payer: COMMERCIAL

## 2021-01-08 ENCOUNTER — APPOINTMENT (OUTPATIENT)
Dept: CV DIAGNOSTICS | Facility: HOSPITAL | Age: 61
DRG: 287 | End: 2021-01-08
Attending: NURSE PRACTITIONER
Payer: COMMERCIAL

## 2021-01-08 VITALS
RESPIRATION RATE: 32 BRPM | TEMPERATURE: 99 F | BODY MASS INDEX: 32 KG/M2 | WEIGHT: 225.81 LBS | OXYGEN SATURATION: 83 % | HEART RATE: 91 BPM | SYSTOLIC BLOOD PRESSURE: 119 MMHG | DIASTOLIC BLOOD PRESSURE: 63 MMHG

## 2021-01-08 LAB
ANION GAP SERPL CALC-SCNC: 3 MMOL/L (ref 0–18)
BUN BLD-MCNC: 21 MG/DL (ref 7–18)
BUN/CREAT SERPL: 20.8 (ref 10–20)
CALCIUM BLD-MCNC: 8.4 MG/DL (ref 8.5–10.1)
CHLORIDE SERPL-SCNC: 105 MMOL/L (ref 98–112)
CO2 SERPL-SCNC: 31 MMOL/L (ref 21–32)
CREAT BLD-MCNC: 1.01 MG/DL
DEPRECATED RDW RBC AUTO: 46.7 FL (ref 35.1–46.3)
ERYTHROCYTE [DISTWIDTH] IN BLOOD BY AUTOMATED COUNT: 14.3 % (ref 11–15)
GLUCOSE BLD-MCNC: 89 MG/DL (ref 70–99)
GLUCOSE BLD-MCNC: 97 MG/DL (ref 70–99)
HAV IGM SER QL: 2.2 MG/DL (ref 1.6–2.6)
HCT VFR BLD AUTO: 48.3 %
HGB BLD-MCNC: 15.5 G/DL
INR BLD: 0.98 (ref 0.89–1.11)
MCH RBC QN AUTO: 29.1 PG (ref 26–34)
MCHC RBC AUTO-ENTMCNC: 32.1 G/DL (ref 31–37)
MCV RBC AUTO: 90.8 FL
OSMOLALITY SERPL CALC.SUM OF ELEC: 290 MOSM/KG (ref 275–295)
PLATELET # BLD AUTO: 211 10(3)UL (ref 150–450)
POTASSIUM SERPL-SCNC: 4.2 MMOL/L (ref 3.5–5.1)
PSA SERPL DL<=0.01 NG/ML-MCNC: 13.3 SECONDS (ref 12.4–14.6)
RBC # BLD AUTO: 5.32 X10(6)UL
SODIUM SERPL-SCNC: 139 MMOL/L (ref 136–145)
WBC # BLD AUTO: 11.8 X10(3) UL (ref 4–11)

## 2021-01-08 PROCEDURE — 71045 X-RAY EXAM CHEST 1 VIEW: CPT | Performed by: NURSE PRACTITIONER

## 2021-01-08 PROCEDURE — 82962 GLUCOSE BLOOD TEST: CPT

## 2021-01-08 PROCEDURE — 85610 PROTHROMBIN TIME: CPT | Performed by: INTERNAL MEDICINE

## 2021-01-08 PROCEDURE — 85027 COMPLETE CBC AUTOMATED: CPT | Performed by: NURSE PRACTITIONER

## 2021-01-08 PROCEDURE — 80048 BASIC METABOLIC PNL TOTAL CA: CPT | Performed by: NURSE PRACTITIONER

## 2021-01-08 PROCEDURE — 83735 ASSAY OF MAGNESIUM: CPT | Performed by: NURSE PRACTITIONER

## 2021-01-08 PROCEDURE — 99238 HOSP IP/OBS DSCHRG MGMT 30/<: CPT | Performed by: INTERNAL MEDICINE

## 2021-01-08 NOTE — PROCEDURES
[address]    PATIENT'S NAME: Nina Montgomery   ATTENDING PHYSICIAN: Georgie Ruiz M.D. OPERATING PHYSICIAN: Purnima Johnston M.D.    PATIENT ACCOUNT#:   [de-identified]    LOCATION:  [address] [place] [address]  MEDICAL RECORD #:   [id number]       DATE OF BIRTH:  5 pseudofusion with the raphe between the left and right coronary cusp. The leaflets are calcified and thickened, with mildly-moderately reduced systolic separation noted in ventricular systole.   Adequate residual systolic excursion is present such that the gradient across the native aortic valve was approximately 25 mmHg, consistent with moderate valvular stenosis. CONCLUSIONS:  Moderate aortic valve stenosis.   Subaortic left ventricular outflow tract membrane with an associated left ventricular outflow t

## 2021-01-08 NOTE — PLAN OF CARE
Assumed care of patient at [address] [place] [address]. Patient is AOX4, following commands, and OKEEFE. On  3L NC attempted to wean O2 but was unsuccessful. Patient states his O2 sats at home are 89-90% and he does not use O2. Dyspnea with exertion. Bilateral groins accessed.  Righ

## 2021-01-08 NOTE — PROGRESS NOTES
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 • Chronic HFpEF, NYHA II  • HTN  • HL-statin  • PAF-Xarelto  • DMII  • PHTN  • COPD  • Hx renal ca with left nephrectomy  • ABIGAIL    Plan:   · No TAVR valve placed as above d/t gradient above subaortic membrane.   · Patient will

## 2021-01-08 NOTE — PLAN OF CARE
Received this a.m. rob groins intact, no hematoma, immediately asking about being discharged, insisting that he was leaving no matter what, asking to speak with doctors. vaughn Mariscal notified with pt wishes.   at bedside to speak with pt, o

## 2021-01-08 NOTE — PROGRESS NOTES
[facility]  Progress Note    Reshma Jimenez Patient Status:  Inpatient    1960 MRN [id number]   [facility] 6NE-A Attending Waqar Reyes MD   Hosp Day # 2 PCP Claude Jj MD     Assessment/Plan:  Patient Active Problem List: normoactive. Nontender. Extremities:  No lower extremity edema noted.         Labs:  Recent Labs     01/06/21  1220 01/07/21  1508   WBC 11.5* 9.9   HGB 17.3 16.3   .0 218.0     Recent Labs     01/06/21  1220 01/06/21  1220 01/07/21  1508 01/07/21

## 2021-01-10 LAB — BLOOD TYPE BARCODE: 5100

## 2021-01-11 NOTE — DISCHARGE SUMMARY
[facility]  Discharge Summary    Kathya Becker Patient Status:  Inpatient    1960 MRN [id number]   [facility] 6NE-A Attending No att. providers found   Hosp Day # 2 PCP Candy Whiting MD         Admit date: 2021    [address] Angio-seal, and the R was sealed with manual hold d/t presence of calcifications and stenosis in R KRITSAN.  He suffered no complications, and was discharged home the next day with plans to have Cardiac MRI and f/u with Dr. Marianna Glover.          Procedures:   JOSE, abor anything heavier than 5lbs for 1 week. · Walk at least 3 times per day for 5-10 minutes at a time. Increase your activity gradually. · Keep legs elevated while sitting. · No tight fitting underwear.      HYGIENE:  · Wash incisions with mild soap and nirav could result in death.      CONTACT NUMBERS FOR YOUR REFERENCE:  · [facility] (Dr. Farida Godwin) [phone]  · [facility] (Dr. Berlin Dickinson and Dr. Patricia Bush) [phone]       Signed:  Rhea Bertrand, SATNAM  1/11/2021  1400

## 2021-01-11 NOTE — PAYOR COMM NOTE
Payor:  IGLESIA ROMERO  Subscriber #:  [id number]  Authorization Number: [id number]    Admit date: 1/6/21  Admit time:  1107  Discharge Date: 1/8/2021

## 2021-01-13 ENCOUNTER — HOSPITAL ENCOUNTER (OUTPATIENT)
Dept: ULTRASOUND IMAGING | Facility: HOSPITAL | Age: 61
Discharge: HOME | End: 2021-01-13
Attending: SPECIALIST
Payer: COMMERCIAL

## 2021-01-13 ENCOUNTER — ORDER TRANSCRIPTION (OUTPATIENT)
Dept: ADMINISTRATIVE | Facility: HOSPITAL | Age: 61
End: 2021-01-13

## 2021-01-13 DIAGNOSIS — I72.9 PSEUDOANEURYSM (HCC): Primary | ICD-10-CM

## 2021-01-13 DIAGNOSIS — I72.9 PSEUDOANEURYSM (HCC): ICD-10-CM

## 2021-01-13 PROCEDURE — 93925 LOWER EXTREMITY STUDY: CPT | Performed by: SPECIALIST

## 2021-02-04 NOTE — PAYOR COMM NOTE
--------------  REQUESTING APPROVAL FOR ALL DAYS UP TO ( but not including)  1/8    PLEASE FAX  DAYS AUTHORIZED -[phone]    [facility] YOU!      1/7 CONTINUED STAY REVIEW    Payor: Tatyana Mora 150 PPO  Subscriber #:  [id number]  Authorization Number: [id number] leaflets are calcified and thickened, with mildly-moderately reduced systolic separation noted in ventricular systole.   Adequate residual systolic excursion is present such that the presence of severe significant aortic stenosis can be excluded based on th with moderate valvular stenosis.     CONCLUSIONS:  Moderate aortic valve stenosis. Subaortic left ventricular outflow tract membrane with an associated left ventricular outflow tract gradient as described above. Transfemoral TAVR was not performed.     NU

## 2021-03-16 ENCOUNTER — LAB ENCOUNTER (OUTPATIENT)
Dept: LAB | Age: 61
End: 2021-03-16
Attending: INTERNAL MEDICINE
Payer: COMMERCIAL

## 2021-03-16 DIAGNOSIS — Z01.818 PREOP EXAMINATION: ICD-10-CM

## 2021-03-16 DIAGNOSIS — Z11.59 ENCOUNTER FOR SCREENING FOR OTHER VIRAL DISEASES: ICD-10-CM

## 2021-03-17 LAB — SARS-COV-2 RNA RESP QL NAA+PROBE: NOT DETECTED

## 2021-03-19 ENCOUNTER — RT VISIT (OUTPATIENT)
Dept: RESPIRATORY THERAPY | Facility: HOSPITAL | Age: 61
End: 2021-03-19
Attending: INTERNAL MEDICINE
Payer: COMMERCIAL

## 2021-03-19 DIAGNOSIS — J44.9 STAGE 3 SEVERE COPD BY GOLD CLASSIFICATION (HCC): ICD-10-CM

## 2021-03-19 PROCEDURE — 94060 EVALUATION OF WHEEZING: CPT

## 2021-03-19 PROCEDURE — 94726 PLETHYSMOGRAPHY LUNG VOLUMES: CPT

## 2021-03-19 PROCEDURE — 94729 DIFFUSING CAPACITY: CPT

## 2021-03-19 NOTE — PROCEDURES
Findings:  Postbronchodilator FEV1 is 1.31L, 37% predicted. Postbronchodilator FVC is 3.17L, 69% predicted. FEV1/ FVC ratio is 0.41. There is a significant bronchodilator response after   administration of albuterol.    The flow-volume loop demonstrates

## 2021-04-02 ENCOUNTER — IMMUNIZATION (OUTPATIENT)
Dept: LAB | Age: 61
End: 2021-04-02
Attending: HOSPITALIST
Payer: COMMERCIAL

## 2021-04-02 DIAGNOSIS — Z23 NEED FOR VACCINATION: Primary | ICD-10-CM

## 2021-04-02 PROCEDURE — 0001A SARSCOV2 VAC 30MCG/0.3ML IM: CPT

## 2021-05-27 ENCOUNTER — HOSPITAL ENCOUNTER (OUTPATIENT)
Dept: GENERAL RADIOLOGY | Age: 61
Discharge: HOME | End: 2021-05-27
Attending: INTERNAL MEDICINE
Payer: COMMERCIAL

## 2021-05-27 DIAGNOSIS — J44.9 STAGE 3 SEVERE COPD BY GOLD CLASSIFICATION (HCC): ICD-10-CM

## 2021-05-27 PROCEDURE — 71046 X-RAY EXAM CHEST 2 VIEWS: CPT | Performed by: INTERNAL MEDICINE

## 2021-06-11 ENCOUNTER — IMMUNIZATION (OUTPATIENT)
Dept: LAB | Facility: HOSPITAL | Age: 61
End: 2021-06-11
Attending: HOSPITALIST
Payer: COMMERCIAL

## 2021-06-11 DIAGNOSIS — Z23 NEED FOR VACCINATION: Primary | ICD-10-CM

## 2021-06-11 PROCEDURE — 0002A SARSCOV2 VAC 30MCG/0.3ML IM: CPT

## 2021-07-20 ENCOUNTER — HOSPITAL ENCOUNTER (OUTPATIENT)
Facility: HOSPITAL | Age: 61
Setting detail: OBSERVATION
Discharge: HOME | End: 2021-07-22
Attending: EMERGENCY MEDICINE | Admitting: INTERNAL MEDICINE
Payer: COMMERCIAL

## 2021-07-20 ENCOUNTER — APPOINTMENT (OUTPATIENT)
Dept: GENERAL RADIOLOGY | Facility: HOSPITAL | Age: 61
End: 2021-07-20
Attending: EMERGENCY MEDICINE
Payer: COMMERCIAL

## 2021-07-20 ENCOUNTER — APPOINTMENT (OUTPATIENT)
Dept: ULTRASOUND IMAGING | Facility: HOSPITAL | Age: 61
End: 2021-07-20
Attending: EMERGENCY MEDICINE
Payer: COMMERCIAL

## 2021-07-20 ENCOUNTER — APPOINTMENT (OUTPATIENT)
Dept: CT IMAGING | Facility: HOSPITAL | Age: 61
End: 2021-07-20
Attending: EMERGENCY MEDICINE
Payer: COMMERCIAL

## 2021-07-20 ENCOUNTER — APPOINTMENT (OUTPATIENT)
Dept: CV DIAGNOSTICS | Facility: HOSPITAL | Age: 61
End: 2021-07-20
Attending: EMERGENCY MEDICINE
Payer: COMMERCIAL

## 2021-07-20 DIAGNOSIS — E87.70 HYPERVOLEMIA, UNSPECIFIED HYPERVOLEMIA TYPE: Primary | ICD-10-CM

## 2021-07-20 DIAGNOSIS — I48.91 ATRIAL FIBRILLATION WITH RVR (HCC): ICD-10-CM

## 2021-07-20 DIAGNOSIS — R06.00 DYSPNEA, UNSPECIFIED TYPE: ICD-10-CM

## 2021-07-20 DIAGNOSIS — R09.02 HYPOXEMIA: ICD-10-CM

## 2021-07-20 PROBLEM — R73.9 HYPERGLYCEMIA: Status: ACTIVE | Noted: 2021-07-20

## 2021-07-20 LAB
ALBUMIN SERPL-MCNC: 3 G/DL (ref 3.4–5)
ALBUMIN/GLOB SERPL: 0.6 {RATIO} (ref 1–2)
ALP LIVER SERPL-CCNC: 73 U/L
ALT SERPL-CCNC: 22 U/L
ANION GAP SERPL CALC-SCNC: 3 MMOL/L (ref 0–18)
AST SERPL-CCNC: 26 U/L (ref 15–37)
BASOPHILS # BLD AUTO: 0.06 X10(3) UL (ref 0–0.2)
BASOPHILS NFR BLD AUTO: 0.5 %
BILIRUB SERPL-MCNC: 0.6 MG/DL (ref 0.1–2)
BILIRUB UR QL STRIP.AUTO: NEGATIVE
BUN BLD-MCNC: 18 MG/DL (ref 7–18)
BUN/CREAT SERPL: 18.9 (ref 10–20)
CALCIUM BLD-MCNC: 9 MG/DL (ref 8.5–10.1)
CHLORIDE SERPL-SCNC: 103 MMOL/L (ref 98–112)
CO2 SERPL-SCNC: 32 MMOL/L (ref 21–32)
COLOR UR AUTO: YELLOW
CREAT BLD-MCNC: 0.95 MG/DL
D-DIMER: 2.13 UG/ML FEU (ref ?–0.61)
DEPRECATED RDW RBC AUTO: 51.3 FL (ref 35.1–46.3)
EOSINOPHIL # BLD AUTO: 0.13 X10(3) UL (ref 0–0.7)
EOSINOPHIL NFR BLD AUTO: 1.1 %
ERYTHROCYTE [DISTWIDTH] IN BLOOD BY AUTOMATED COUNT: 16.2 % (ref 11–15)
EST. AVERAGE GLUCOSE BLD GHB EST-MCNC: 114 MG/DL (ref 68–126)
GLOBULIN PLAS-MCNC: 5.3 G/DL (ref 2.8–4.4)
GLUCOSE BLD-MCNC: 129 MG/DL (ref 70–99)
GLUCOSE BLD-MCNC: 168 MG/DL (ref 70–99)
GLUCOSE UR STRIP.AUTO-MCNC: NEGATIVE MG/DL
HBA1C MFR BLD HPLC: 5.6 % (ref ?–5.7)
HCT VFR BLD AUTO: 39.3 %
HGB BLD-MCNC: 11.7 G/DL
IMM GRANULOCYTES # BLD AUTO: 0.08 X10(3) UL (ref 0–1)
IMM GRANULOCYTES NFR BLD: 0.7 %
INR BLD: 1.94 (ref 0.89–1.11)
KETONES UR STRIP.AUTO-MCNC: NEGATIVE MG/DL
LEUKOCYTE ESTERASE UR QL STRIP.AUTO: NEGATIVE
LIPASE SERPL-CCNC: 86 U/L (ref 73–393)
LYMPHOCYTES # BLD AUTO: 1.25 X10(3) UL (ref 1–4)
LYMPHOCYTES NFR BLD AUTO: 10.5 %
M PROTEIN MFR SERPL ELPH: 8.3 G/DL (ref 6.4–8.2)
MCH RBC QN AUTO: 27.8 PG (ref 26–34)
MCHC RBC AUTO-ENTMCNC: 29.8 G/DL (ref 31–37)
MCV RBC AUTO: 93.3 FL
MONOCYTES # BLD AUTO: 1.04 X10(3) UL (ref 0.1–1)
MONOCYTES NFR BLD AUTO: 8.7 %
NEUTROPHILS # BLD AUTO: 9.37 X10 (3) UL (ref 1.5–7.7)
NEUTROPHILS # BLD AUTO: 9.37 X10(3) UL (ref 1.5–7.7)
NEUTROPHILS NFR BLD AUTO: 78.5 %
NITRITE UR QL STRIP.AUTO: NEGATIVE
NT-PROBNP SERPL-MCNC: 4454 PG/ML (ref ?–125)
OSMOLALITY SERPL CALC.SUM OF ELEC: 290 MOSM/KG (ref 275–295)
PH UR STRIP.AUTO: 6 [PH] (ref 5–8)
PLATELET # BLD AUTO: 339 10(3)UL (ref 150–450)
POTASSIUM SERPL-SCNC: 4 MMOL/L (ref 3.5–5.1)
PROT UR STRIP.AUTO-MCNC: 30 MG/DL
PSA SERPL DL<=0.01 NG/ML-MCNC: 22.7 SECONDS (ref 12.4–14.6)
RBC # BLD AUTO: 4.21 X10(6)UL
RBC UR QL AUTO: NEGATIVE
SARS-COV-2 RNA RESP QL NAA+PROBE: NOT DETECTED
SODIUM SERPL-SCNC: 138 MMOL/L (ref 136–145)
SP GR UR STRIP.AUTO: 1.01 (ref 1–1.03)
TROPONIN I SERPL-MCNC: <0.045 NG/ML (ref ?–0.04)
TSI SER-ACNC: 2.03 MIU/ML (ref 0.36–3.74)
UROBILINOGEN UR STRIP.AUTO-MCNC: <2 MG/DL
WBC # BLD AUTO: 11.9 X10(3) UL (ref 4–11)

## 2021-07-20 PROCEDURE — 71045 X-RAY EXAM CHEST 1 VIEW: CPT | Performed by: EMERGENCY MEDICINE

## 2021-07-20 PROCEDURE — 93306 TTE W/DOPPLER COMPLETE: CPT | Performed by: EMERGENCY MEDICINE

## 2021-07-20 PROCEDURE — 93971 EXTREMITY STUDY: CPT | Performed by: EMERGENCY MEDICINE

## 2021-07-20 PROCEDURE — 71260 CT THORAX DX C+: CPT | Performed by: EMERGENCY MEDICINE

## 2021-07-20 PROCEDURE — 99220 INITIAL OBSERVATION CARE,LEVL III: CPT | Performed by: INTERNAL MEDICINE

## 2021-07-20 RX ORDER — DEXTROSE MONOHYDRATE 25 G/50ML
50 INJECTION, SOLUTION INTRAVENOUS
Status: DISCONTINUED | OUTPATIENT
Start: 2021-07-20 | End: 2021-07-22

## 2021-07-20 RX ORDER — SODIUM PHOSPHATE, DIBASIC AND SODIUM PHOSPHATE, MONOBASIC 7; 19 G/133ML; G/133ML
1 ENEMA RECTAL ONCE AS NEEDED
Status: DISCONTINUED | OUTPATIENT
Start: 2021-07-20 | End: 2021-07-22

## 2021-07-20 RX ORDER — PYRIDOXINE HCL (VITAMIN B6) 100 MG
1 TABLET ORAL DAILY
COMMUNITY

## 2021-07-20 RX ORDER — MELATONIN
3 NIGHTLY PRN
Status: DISCONTINUED | OUTPATIENT
Start: 2021-07-20 | End: 2021-07-22

## 2021-07-20 RX ORDER — POLYETHYLENE GLYCOL 3350 17 G/17G
17 POWDER, FOR SOLUTION ORAL DAILY PRN
Status: DISCONTINUED | OUTPATIENT
Start: 2021-07-20 | End: 2021-07-22

## 2021-07-20 RX ORDER — ALBUTEROL SULFATE 90 UG/1
2 AEROSOL, METERED RESPIRATORY (INHALATION) EVERY 6 HOURS PRN
COMMUNITY

## 2021-07-20 RX ORDER — GUAIFENESIN 600 MG
1200 TABLET, EXTENDED RELEASE 12 HR ORAL DAILY
Status: DISCONTINUED | OUTPATIENT
Start: 2021-07-21 | End: 2021-07-22

## 2021-07-20 RX ORDER — ATORVASTATIN CALCIUM 20 MG/1
20 TABLET, FILM COATED ORAL DAILY
Status: DISCONTINUED | OUTPATIENT
Start: 2021-07-21 | End: 2021-07-22

## 2021-07-20 RX ORDER — ACETAMINOPHEN 325 MG/1
650 TABLET ORAL EVERY 6 HOURS PRN
Status: DISCONTINUED | OUTPATIENT
Start: 2021-07-20 | End: 2021-07-22

## 2021-07-20 RX ORDER — METOPROLOL SUCCINATE 25 MG/1
25 TABLET, EXTENDED RELEASE ORAL 2 TIMES DAILY
COMMUNITY
End: 2021-08-10

## 2021-07-20 RX ORDER — METOPROLOL SUCCINATE 25 MG/1
25 TABLET, EXTENDED RELEASE ORAL
Status: DISCONTINUED | OUTPATIENT
Start: 2021-07-20 | End: 2021-07-22

## 2021-07-20 RX ORDER — METOPROLOL SUCCINATE 25 MG/1
25 TABLET, EXTENDED RELEASE ORAL ONCE
Status: DISCONTINUED | OUTPATIENT
Start: 2021-07-20 | End: 2021-07-22

## 2021-07-20 RX ORDER — METOPROLOL TARTRATE 5 MG/5ML
5 INJECTION INTRAVENOUS ONCE
Status: COMPLETED | OUTPATIENT
Start: 2021-07-20 | End: 2021-07-20

## 2021-07-20 RX ORDER — ONDANSETRON 2 MG/ML
4 INJECTION INTRAMUSCULAR; INTRAVENOUS EVERY 6 HOURS PRN
Status: DISCONTINUED | OUTPATIENT
Start: 2021-07-20 | End: 2021-07-22

## 2021-07-20 RX ORDER — FUROSEMIDE 40 MG/1
40 TABLET ORAL DAILY
COMMUNITY
End: 2021-08-10

## 2021-07-20 RX ORDER — LEVALBUTEROL INHALATION SOLUTION 0.63 MG/3ML
0.63 SOLUTION RESPIRATORY (INHALATION) EVERY 4 HOURS PRN
COMMUNITY
End: 2021-07-20 | Stop reason: CLARIF

## 2021-07-20 RX ORDER — MULTIVITAMIN WITH IRON
250 TABLET ORAL EVERY EVENING
COMMUNITY

## 2021-07-20 RX ORDER — TRAMADOL HYDROCHLORIDE 50 MG/1
50 TABLET ORAL EVERY 6 HOURS PRN
Status: DISCONTINUED | OUTPATIENT
Start: 2021-07-20 | End: 2021-07-22

## 2021-07-20 RX ORDER — ALBUTEROL SULFATE 2.5 MG/3ML
1.25 SOLUTION RESPIRATORY (INHALATION) EVERY 6 HOURS PRN
Status: DISCONTINUED | OUTPATIENT
Start: 2021-07-20 | End: 2021-07-22

## 2021-07-20 RX ORDER — BISACODYL 10 MG
10 SUPPOSITORY, RECTAL RECTAL
Status: DISCONTINUED | OUTPATIENT
Start: 2021-07-20 | End: 2021-07-22

## 2021-07-20 RX ORDER — FUROSEMIDE 10 MG/ML
40 INJECTION INTRAMUSCULAR; INTRAVENOUS ONCE
Status: COMPLETED | OUTPATIENT
Start: 2021-07-20 | End: 2021-07-20

## 2021-07-20 RX ORDER — ALBUTEROL SULFATE 90 UG/1
2 AEROSOL, METERED RESPIRATORY (INHALATION) EVERY 6 HOURS PRN
Status: DISCONTINUED | OUTPATIENT
Start: 2021-07-20 | End: 2021-07-22

## 2021-07-20 RX ORDER — ASPIRIN 81 MG/1
81 TABLET ORAL DAILY
COMMUNITY

## 2021-07-20 RX ORDER — HYDROCHLOROTHIAZIDE 25 MG/1
25 TABLET ORAL DAILY
COMMUNITY
End: 2021-07-20 | Stop reason: CLARIF

## 2021-07-20 RX ORDER — TRAMADOL HYDROCHLORIDE 50 MG/1
50 TABLET ORAL EVERY 6 HOURS PRN
COMMUNITY
End: 2021-09-15

## 2021-07-20 RX ORDER — ALBUTEROL SULFATE 1.5 MG/3ML
1.25 SOLUTION RESPIRATORY (INHALATION) EVERY 6 HOURS PRN
Status: ON HOLD | COMMUNITY
End: 2021-09-22

## 2021-07-20 RX ORDER — ASPIRIN 81 MG/1
81 TABLET, CHEWABLE ORAL DAILY
Status: DISCONTINUED | OUTPATIENT
Start: 2021-07-21 | End: 2021-07-22

## 2021-07-20 RX ORDER — FLUTICASONE FUROATE, UMECLIDINIUM BROMIDE AND VILANTEROL TRIFENATATE 200; 62.5; 25 UG/1; UG/1; UG/1
1 POWDER RESPIRATORY (INHALATION) DAILY
COMMUNITY
End: 2021-07-20 | Stop reason: CLARIF

## 2021-07-20 RX ORDER — FUROSEMIDE 10 MG/ML
40 INJECTION INTRAMUSCULAR; INTRAVENOUS
Status: DISCONTINUED | OUTPATIENT
Start: 2021-07-20 | End: 2021-07-20

## 2021-07-20 RX ORDER — GUAIFENESIN 600 MG
1200 TABLET, EXTENDED RELEASE 12 HR ORAL DAILY
COMMUNITY
End: 2021-09-28

## 2021-07-20 RX ORDER — FUROSEMIDE 10 MG/ML
40 INJECTION INTRAMUSCULAR; INTRAVENOUS
Status: DISCONTINUED | OUTPATIENT
Start: 2021-07-20 | End: 2021-07-22

## 2021-07-20 NOTE — H&P
[facility]  History and Physical     Raj Franks Patient Status:  Emergency    1960 MRN [id number]   Location [facility] Attending Calvin Henry MD   Hosp Day # 0 PCP Jair Roldan MD     Chief Complaint: f Other and unspecified hyperlipidemia    • Paroxysmal atrial fibrillation (HCC)    • Pneumonia, organism unspecified(486)    • Shortness of breath     with exertion   • Type II or unspecified type diabetes mellitus without mention of complication, not state Tablet 12 Hr, Take 1,200 mg by mouth daily. , Disp: , Rfl:   metoprolol succinate 25 MG Oral Tablet 24 Hr, Take 25 mg by mouth 2 (two) times a day., Disp: , Rfl:   traMADol HCl 50 MG Oral Tab, Take 50 mg by mouth every 6 (six) hours as needed for Pain., Dis 3.  HEENT: Normocephalic atraumatic. Moist mucous membranes. EOM-I. PERRLA. Anicteric. Neck: No lymphadenopathy. No JVD. No carotid bruits. Respiratory: +bibasilar crackles. No wheezes. No rhonchi.  On 2L NC  Cardiovascular: tachycardic to 140's, mid-ster tachycardia  1. Rate control as above  2. Continue IV diuresis lasix 40mg IV BID  3. Repeat echo following ablation  3. Hx of AS, s/p AVR and subaortic myomectomy 4/21 at [address].  COPD - recently on 2L NC at rest, 4L with exertion - not in exacerbation

## 2021-07-20 NOTE — ED PROVIDER NOTES
Patient Seen in: [facility] Emergency Department      History   Patient presents with:  Arrythmia/Palpitations    Stated Complaint: afib RVR    HPI/Subjective:   HPI    69-year-old man, history of COPD on home O2, recent aortic valve replacement 2 m 105.2 kg   SpO2 93%   BMI 33.29 kg/m²         Physical Exam        Physical Exam  Vitals signs and nursing note reviewed. General: Chronically ill-appearing gentleman sitting up in bed no acute distress, diaphoretic. Head: Normocephalic and atraumatic. Normal   TROPONIN I - Normal   TSH W REFLEX TO FREE T4 - Normal   RAPID SARS-COV-2 BY PCR - Normal   CBC WITH DIFFERENTIAL WITH PLATELET    Narrative: The following orders were created for panel order CBC With Differential With Platelet.   Procedure Aleena Love MD on 7/20/2021 at 1:39 PM     Finalized by (CST): Luis Stiles MD on 7/20/2021 at 1:39 PM       Total critical care time: Approximately 45 minutes  Due to a high probability of clinically significant, life threatening deterioration, the patient r spoke with Dr. Darinel Zaman covering for him and he would prefer the patient be [facility] to the [facility].  Admission disposition: 7/20/2021  3:50 PM                                  Disposition and Plan     Clinical Impression:  Hypervolemia, unspecif

## 2021-07-20 NOTE — ED QUICK NOTES
Orders for admission, patient is aware of plan and ready to go upstairs. Any questions, please call ED RN [address]  at [address] [place] [address] At [place].      Vaccinated: YES  Type of COVID test sent:RAPID  COVID Suspicion level: Low      Titratable drug(s) infusing:DILTIAZEM  R

## 2021-07-20 NOTE — CONSULTS
[facility] [address] [place] [address] Cardiology  Consultation Note      Merlin Hocking Patient Status:  Emergency    1960 MRN [id number]   Location [facility] [facility] Attending Crissy Duggan MD   Hosp Day # 0 PCP Olga Villanueva MD     [facility] noted. Established locally with Dr. Sue Staples, seen in office today, SOB and tachycardia, ECG showed likely atrial fibrillation/flutter with LBBB, sent to ER. Trop negative, BNP >4000. Lasix 40mg IV given.      Medications:  No current facilit smoked 0.00 packs per day for 30.00 years. He has never used smokeless tobacco. He reports previous alcohol use of about 8.0 standard drinks of alcohol per week. He reports that he does not use drugs.      Allergies    Bees                    ANAPHYLAXIS  O testing:    EKG: Normal sinus rhythm    Labs:   Lab Results   Component Value Date    PT 16.9 (H) 05/05/2014    PT 13.3 05/04/2014    INR 1.94 (H) 07/20/2021    INR 0.98 01/08/2021        Lab Results   Component Value Date    WBC 11.9 07/20/2021    HGB 11.

## 2021-07-21 ENCOUNTER — APPOINTMENT (OUTPATIENT)
Dept: INTERVENTIONAL RADIOLOGY/VASCULAR | Facility: HOSPITAL | Age: 61
End: 2021-07-21
Attending: INTERNAL MEDICINE
Payer: COMMERCIAL

## 2021-07-21 LAB
ANION GAP SERPL CALC-SCNC: 2 MMOL/L (ref 0–18)
ATRIAL RATE: 68 BPM
ATRIAL RATE: 74 BPM
ATRIAL RATE: 76 BPM
BASOPHILS # BLD AUTO: 0.07 X10(3) UL (ref 0–0.2)
BASOPHILS NFR BLD AUTO: 0.5 %
BUN BLD-MCNC: 17 MG/DL (ref 7–18)
BUN/CREAT SERPL: 22.4 (ref 10–20)
CALCIUM BLD-MCNC: 8.7 MG/DL (ref 8.5–10.1)
CHLORIDE SERPL-SCNC: 101 MMOL/L (ref 98–112)
CO2 SERPL-SCNC: 35 MMOL/L (ref 21–32)
CREAT BLD-MCNC: 0.76 MG/DL
DEPRECATED RDW RBC AUTO: 51.8 FL (ref 35.1–46.3)
EOSINOPHIL # BLD AUTO: 0.2 X10(3) UL (ref 0–0.7)
EOSINOPHIL NFR BLD AUTO: 1.5 %
ERYTHROCYTE [DISTWIDTH] IN BLOOD BY AUTOMATED COUNT: 16.3 % (ref 11–15)
GLUCOSE BLD-MCNC: 109 MG/DL (ref 70–99)
GLUCOSE BLD-MCNC: 118 MG/DL (ref 70–99)
GLUCOSE BLD-MCNC: 128 MG/DL (ref 70–99)
GLUCOSE BLD-MCNC: 139 MG/DL (ref 70–99)
GLUCOSE BLD-MCNC: 155 MG/DL (ref 70–99)
HCT VFR BLD AUTO: 38.7 %
HGB BLD-MCNC: 11.6 G/DL
IMM GRANULOCYTES # BLD AUTO: 0.06 X10(3) UL (ref 0–1)
IMM GRANULOCYTES NFR BLD: 0.5 %
LYMPHOCYTES # BLD AUTO: 1.72 X10(3) UL (ref 1–4)
LYMPHOCYTES NFR BLD AUTO: 13.2 %
MCH RBC QN AUTO: 27.8 PG (ref 26–34)
MCHC RBC AUTO-ENTMCNC: 30 G/DL (ref 31–37)
MCV RBC AUTO: 92.6 FL
MONOCYTES # BLD AUTO: 1.35 X10(3) UL (ref 0.1–1)
MONOCYTES NFR BLD AUTO: 10.4 %
NEUTROPHILS # BLD AUTO: 9.6 X10 (3) UL (ref 1.5–7.7)
NEUTROPHILS # BLD AUTO: 9.6 X10(3) UL (ref 1.5–7.7)
NEUTROPHILS NFR BLD AUTO: 73.9 %
OSMOLALITY SERPL CALC.SUM OF ELEC: 289 MOSM/KG (ref 275–295)
P AXIS: 50 DEGREES
P AXIS: 66 DEGREES
P-R INTERVAL: 166 MS
P-R INTERVAL: 172 MS
PLATELET # BLD AUTO: 335 10(3)UL (ref 150–450)
POTASSIUM SERPL-SCNC: 3.6 MMOL/L (ref 3.5–5.1)
POTASSIUM SERPL-SCNC: 4.3 MMOL/L (ref 3.5–5.1)
Q-T INTERVAL: 360 MS
Q-T INTERVAL: 452 MS
Q-T INTERVAL: 456 MS
QRS DURATION: 158 MS
QRS DURATION: 162 MS
QRS DURATION: 166 MS
QTC CALCULATION (BEZET): 484 MS
QTC CALCULATION (BEZET): 501 MS
QTC CALCULATION (BEZET): 553 MS
R AXIS: 49 DEGREES
R AXIS: 66 DEGREES
R AXIS: 69 DEGREES
RBC # BLD AUTO: 4.18 X10(6)UL
SODIUM SERPL-SCNC: 138 MMOL/L (ref 136–145)
T AXIS: 201 DEGREES
T AXIS: 217 DEGREES
T AXIS: 225 DEGREES
VENTRICULAR RATE: 142 BPM
VENTRICULAR RATE: 68 BPM
VENTRICULAR RATE: 74 BPM
WBC # BLD AUTO: 13 X10(3) UL (ref 4–11)

## 2021-07-21 PROCEDURE — 5A2204Z RESTORATION OF CARDIAC RHYTHM, SINGLE: ICD-10-PCS | Performed by: INTERNAL MEDICINE

## 2021-07-21 PROCEDURE — 99225 SUBSEQUENT OBSERVATION CARE: CPT | Performed by: HOSPITALIST

## 2021-07-21 RX ORDER — DILTIAZEM HYDROCHLORIDE 120 MG/1
120 CAPSULE, EXTENDED RELEASE ORAL DAILY
Status: DISCONTINUED | OUTPATIENT
Start: 2021-07-21 | End: 2021-07-22

## 2021-07-21 RX ORDER — SODIUM CHLORIDE 9 MG/ML
INJECTION, SOLUTION INTRAVENOUS CONTINUOUS
Status: DISCONTINUED | OUTPATIENT
Start: 2021-07-21 | End: 2021-07-22

## 2021-07-21 RX ORDER — POTASSIUM CHLORIDE 20 MEQ/1
40 TABLET, EXTENDED RELEASE ORAL EVERY 4 HOURS
Status: COMPLETED | OUTPATIENT
Start: 2021-07-21 | End: 2021-07-21

## 2021-07-21 NOTE — PROGRESS NOTES
[facility]  Progress Note     Rubin Garcia Patient Status:  Observation    1960 MRN [id number]   [facility] 8NE-A Attending Chelsea Zhu MD   Hosp Day # 0 PCP Mona Lopes MD     Chief Complaint: fariba    S: Lyssae Results    COVID-19  Lab Results   Component Value Date    COVID19 Not Detected 07/20/2021    COVID19 Not Detected 03/16/2021    COVID19 Not Detected 01/04/2021       Pro-Calcitonin  No results for input(s): PCT in the last 168 hours.     Cardiac  Recent La MD

## 2021-07-21 NOTE — PLAN OF CARE
Pt received from ED around [facility], upon arriving HR sustaining in 140s. Pt denies chest pain/ palpitations. Cardizem gtt infusing at 20 mg/hr. EKG completed. 10 mg bolus of cardizem given per order, HR 120s-130s.  Attempted to call [facility] cards x2, unable to co

## 2021-07-21 NOTE — PLAN OF CARE
Assumed care this am.  Pt down for cardioversion. CV successful and pt arrived back in NSR. Remains on 4L NC. Lungs dim with crackles in bases. No SOB at rest. Pt  Has been transitioned to PO cardizem. Plans for lasix today. Xarelto given as ordered.

## 2021-07-21 NOTE — RESPIRATORY THERAPY NOTE
ABIGAIL protocol initiated. Pt has CPAP unit at home, but does not use. Refusing CPAP at this time. Will continue to monitor.

## 2021-07-21 NOTE — PROGRESS NOTES
Dr Radha Menon at [facility] for CV and spoke with patient about procedure. Cardizem drip turned off. Once ready, patient sedated and shocked x1 to NSR. Patient tolerated well. EKG obtained. Patient awake shortly after, oriented, neuro intact.  Patients wife at bedside

## 2021-07-21 NOTE — PROGRESS NOTES
Patient here for cardioversion with Dr Pilo Ramirez. Informed consent obtained. Patient states he is on Xarelto with no missed doses. Patients wife, Melody Aragon, was updated with POC.

## 2021-07-21 NOTE — PROGRESS NOTES
[facility] Card[facility]  Progress Note    Clifford Moran Patient Status:  Observation    1960 MRN [id number]   [facility] 8NE-A Attending Solomon Sargent MD   Hosp Day # 0 PCP Scout Melton MD     Outpatient cardiologist:  Stephanie Ramsey NaCl (cardIZEM) 1 mg/mL IVPB add-vantage, 2.5-20 mg/hr, Intravenous, Continuous  furosemide (LASIX) injection 40 mg, 40 mg, Intravenous, BID (Diuretic)  albuterol sulfate (VENTOLIN) (2.5 MG/3ML) 0.083% nebulizer solution 1.25 mg, 1.25 mg, Nebulization, Q6H (XARELTO) tab 20 mg, 20 mg, Oral, Daily with food  metoprolol succinate (Toprol XL) 24 hr tab 25 mg, 25 mg, Oral, Once        Laboratory Data:  Lab Results   Component Value Date    WBC 13.0 07/21/2021    HGB 11.6 07/21/2021    HCT 38.7 07/21/2021    PLT 3

## 2021-07-21 NOTE — PLAN OF CARE
Alert and oriented x4. On 3L O2, sats 92%. A flutter on tele, rates controlled in the 90's. Cardizem gtt infusing. Continent to bowel and bladder. Denies pain. QID accucheck. Up with SBA. Call light within reach.        Problem: Patient/Family Goals  Goal:

## 2021-07-21 NOTE — DIETARY NOTE
[facility]     Admitting diagnosis:  Hypoxemia [R09.02]  Atrial fibrillation with RVR ([facility] [address] [I48.91]  Hypervolemia, unspecified hypervolemia type [E87.70]  Dyspnea, unspecified type [R06.00]    Ht: 177.8 cm (5' 10

## 2021-07-22 VITALS
RESPIRATION RATE: 20 BRPM | DIASTOLIC BLOOD PRESSURE: 68 MMHG | HEIGHT: 70 IN | TEMPERATURE: 98 F | BODY MASS INDEX: 32.35 KG/M2 | OXYGEN SATURATION: 93 % | WEIGHT: 226 LBS | HEART RATE: 92 BPM | SYSTOLIC BLOOD PRESSURE: 136 MMHG

## 2021-07-22 LAB
ATRIAL RATE: 138 BPM
ATRIAL RATE: 141 BPM
GLUCOSE BLD-MCNC: 103 MG/DL (ref 70–99)
Q-T INTERVAL: 356 MS
Q-T INTERVAL: 360 MS
QRS DURATION: 152 MS
QRS DURATION: 158 MS
QTC CALCULATION (BEZET): 539 MS
QTC CALCULATION (BEZET): 543 MS
R AXIS: 12 DEGREES
R AXIS: 63 DEGREES
T AXIS: 180 DEGREES
T AXIS: 237 DEGREES
VENTRICULAR RATE: 137 BPM
VENTRICULAR RATE: 138 BPM

## 2021-07-22 PROCEDURE — 99217 OBSERVATION CARE DISCHARGE: CPT | Performed by: HOSPITALIST

## 2021-07-22 RX ORDER — DILTIAZEM HYDROCHLORIDE 120 MG/1
120 CAPSULE, COATED, EXTENDED RELEASE ORAL DAILY
Qty: 90 CAPSULE | Refills: 3 | Status: SHIPPED | OUTPATIENT
Start: 2021-07-23 | End: 2021-09-15

## 2021-07-22 NOTE — PROGRESS NOTES
[facility]  Progress Note    Edi Celis Patient Status:  Observation    1960 MRN [id number]   [facility] 8NE-A Attending Julieth Horowitz MD   Hosp Day # 0 PCP Leia Pereira MD     Outpatient cardiologist:  Buster Mtz clubbing/cyanosis; moves all 4 extremities normally    0.9% NaCl infusion, , Intravenous, Continuous  dilTIAZem (DILACOR XR) 24 hr cap 120 mg, 120 mg, Oral, Daily  furosemide (LASIX) injection 40 mg, 40 mg, Intravenous, BID (Diuretic)  albuterol sulfate (V 100 UNIT/ML flexpen 1-10 Units, 1-10 Units, Subcutaneous, TID AC and HS  Rivaroxaban (XARELTO) tab 20 mg, 20 mg, Oral, Daily with food  metoprolol succinate (Toprol XL) 24 hr tab 25 mg, 25 mg, Oral, Once        Laboratory Data:     Lab Results   Component

## 2021-07-22 NOTE — PROGRESS NOTES
[facility]  Progress Note     Samantha Krishna Patient Status:  Observation    1960 MRN [id number]   [facility] 8NE-A Attending Leticia Jose MD   Hosp Day # 0 PCP Mariano Grace MD     Chief Complaint: palpitations    S: [employer] COVID-19 Lab Results    COVID-19  Lab Results   Component Value Date    COVID19 Not Detected 07/20/2021    COVID19 Not Detected 03/16/2021    COVID19 Not Detected 01/04/2021       Pro-Calcitonin  No results for input(s): PCT in the last 168 hours.     C

## 2021-07-22 NOTE — PLAN OF CARE
Pt is s/p successful cardioversion. NSR w/ BBB, on Xarelto. Resp status at baseline. Cleared for dc and pt eager to go home. Await hospitalist for final dc clearance.       Problem: Patient/Family Goals  Goal: Patient/Family Long Term Goal  Description: hematoma  - Assess quality of pulses, skin color and temperature  - Assess for signs of decreased coronary artery perfusion - ex.  Angina  - Evaluate fluid balance, assess for edema, trend weights  Outcome: Adequate for Discharge  Goal: Absence of cardiac a

## 2021-07-22 NOTE — PLAN OF CARE
Received patient, alert and oriented. Denied chest pain. SR on tele. On baseline O2 at 4L/NC. Discussed POC. Due meds given. O2 sat down to 83% on 4L/NC. RT placed patient on 6L/Hi patricia NC and O2 sat 90-93%.  Safety measures reinforced, call light within donald optimize hemodynamic stability  - Monitor arterial and/or venous puncture sites for bleeding and/or hematoma  - Assess quality of pulses, skin color and temperature  - Assess for signs of decreased coronary artery perfusion - ex.  Angina  - Evaluate fluid b

## 2021-07-26 NOTE — DISCHARGE SUMMARY
[facility]  DISCHARGE SUMMARY     Tanner Elena Patient Status:  Observation    1960 MRN [id number]   [facility] 8NE-A Attending No att. providers found   Hosp Day # 0 PCP Harry Singleton MD     Date of Admission: 2021 wide-complex tachycardia. His heart rate was 130s. He was seen by cardiology here. He underwent cardioversion which was successful. He was started on p.o. Cardizem as well as Xarelto.   He was also diuresed for tachycardia related heart failure exacerba Ipratropium Bromide 0.02 % Soln  Commonly known as: ATROVENT      Take 500 mcg by nebulization 4 (four) times daily. Refills: 0     magnesium 250 MG Tabs      Take 375 mg by mouth every evening.    Refills: 0     metFORMIN HCl  MG Tb24  Commonly k patients, visitors and care teams, all patients and visitors are required to wear a mask during their entire visit, only to be removed if asked to do so by your provider.   We are working to limit the number of people in our waiting rooms and ask that mary

## 2021-09-22 ENCOUNTER — HOSPITAL ENCOUNTER (INPATIENT)
Facility: HOSPITAL | Age: 61
LOS: 6 days | Discharge: HOME HEALTH | DRG: 291 | End: 2021-09-28
Attending: EMERGENCY MEDICINE | Admitting: HOSPITALIST
Payer: COMMERCIAL

## 2021-09-22 ENCOUNTER — APPOINTMENT (OUTPATIENT)
Dept: GENERAL RADIOLOGY | Facility: HOSPITAL | Age: 61
DRG: 291 | End: 2021-09-22
Attending: EMERGENCY MEDICINE
Payer: COMMERCIAL

## 2021-09-22 DIAGNOSIS — R09.02 HYPOXIA: ICD-10-CM

## 2021-09-22 DIAGNOSIS — E87.6 HYPOKALEMIA: ICD-10-CM

## 2021-09-22 DIAGNOSIS — I50.9 ACUTE ON CHRONIC CONGESTIVE HEART FAILURE, UNSPECIFIED HEART FAILURE TYPE (HCC): Primary | ICD-10-CM

## 2021-09-22 DIAGNOSIS — J90 PLEURAL EFFUSION: ICD-10-CM

## 2021-09-22 DIAGNOSIS — I48.91 ATRIAL FIBRILLATION WITH RAPID VENTRICULAR RESPONSE (HCC): ICD-10-CM

## 2021-09-22 PROCEDURE — 99223 1ST HOSP IP/OBS HIGH 75: CPT | Performed by: INTERNAL MEDICINE

## 2021-09-22 PROCEDURE — 71045 X-RAY EXAM CHEST 1 VIEW: CPT | Performed by: EMERGENCY MEDICINE

## 2021-09-22 RX ORDER — FUROSEMIDE 10 MG/ML
40 INJECTION INTRAMUSCULAR; INTRAVENOUS ONCE
Status: COMPLETED | OUTPATIENT
Start: 2021-09-22 | End: 2021-09-22

## 2021-09-22 RX ORDER — MULTIVITAMIN WITH FOLIC ACID 400 MCG
1 TABLET ORAL DAILY
COMMUNITY

## 2021-09-22 RX ORDER — ATORVASTATIN CALCIUM 20 MG/1
20 TABLET, FILM COATED ORAL NIGHTLY
Status: DISCONTINUED | OUTPATIENT
Start: 2021-09-22 | End: 2021-09-28

## 2021-09-22 RX ORDER — POTASSIUM CHLORIDE 20 MEQ/1
40 TABLET, EXTENDED RELEASE ORAL ONCE
Status: COMPLETED | OUTPATIENT
Start: 2021-09-22 | End: 2021-09-22

## 2021-09-22 RX ORDER — CYCLOBENZAPRINE HCL 10 MG
10 TABLET ORAL 3 TIMES DAILY PRN
Status: DISCONTINUED | OUTPATIENT
Start: 2021-09-22 | End: 2021-09-28

## 2021-09-22 RX ORDER — ASPIRIN 81 MG/1
81 TABLET ORAL DAILY
Status: DISCONTINUED | OUTPATIENT
Start: 2021-09-23 | End: 2021-09-28

## 2021-09-22 RX ORDER — BUDESONIDE 0.5 MG/2ML
0.5 INHALANT ORAL 2 TIMES DAILY
Status: DISCONTINUED | OUTPATIENT
Start: 2021-09-22 | End: 2021-09-28

## 2021-09-22 RX ORDER — ALBUTEROL SULFATE 90 UG/1
2 AEROSOL, METERED RESPIRATORY (INHALATION) EVERY 6 HOURS PRN
Status: DISCONTINUED | OUTPATIENT
Start: 2021-09-22 | End: 2021-09-28

## 2021-09-22 RX ORDER — FUROSEMIDE 10 MG/ML
20 INJECTION INTRAMUSCULAR; INTRAVENOUS
Status: DISCONTINUED | OUTPATIENT
Start: 2021-09-22 | End: 2021-09-23

## 2021-09-22 RX ORDER — ENOXAPARIN SODIUM 100 MG/ML
40 INJECTION SUBCUTANEOUS DAILY
Status: DISCONTINUED | OUTPATIENT
Start: 2021-09-22 | End: 2021-09-22 | Stop reason: ALTCHOICE

## 2021-09-22 RX ORDER — MELATONIN
3 NIGHTLY PRN
Status: DISCONTINUED | OUTPATIENT
Start: 2021-09-22 | End: 2021-09-28

## 2021-09-22 RX ORDER — KETOCONAZOLE 20 MG/G
CREAM TOPICAL DAILY
COMMUNITY

## 2021-09-22 RX ORDER — ACETAMINOPHEN 325 MG/1
650 TABLET ORAL EVERY 6 HOURS PRN
Status: DISCONTINUED | OUTPATIENT
Start: 2021-09-22 | End: 2021-09-28

## 2021-09-22 RX ORDER — METOPROLOL TARTRATE 50 MG/1
50 TABLET, FILM COATED ORAL
Status: DISCONTINUED | OUTPATIENT
Start: 2021-09-22 | End: 2021-09-28

## 2021-09-22 RX ORDER — ALBUTEROL SULFATE 2.5 MG/3ML
1.25 SOLUTION RESPIRATORY (INHALATION) EVERY 4 HOURS PRN
Status: DISCONTINUED | OUTPATIENT
Start: 2021-09-22 | End: 2021-09-28

## 2021-09-22 RX ORDER — ONDANSETRON 2 MG/ML
4 INJECTION INTRAMUSCULAR; INTRAVENOUS EVERY 6 HOURS PRN
Status: DISCONTINUED | OUTPATIENT
Start: 2021-09-22 | End: 2021-09-28

## 2021-09-22 RX ORDER — CYCLOBENZAPRINE HCL 10 MG
10 TABLET ORAL 3 TIMES DAILY PRN
COMMUNITY

## 2021-09-22 NOTE — ED INITIAL ASSESSMENT (HPI)
Patient sts was at his doctors office and was instructed to come here. His doctor said he's retaining fluid. Open heart surgery done earlier this year and on 2 l NC at rest and 4L with activity.

## 2021-09-22 NOTE — ED PROVIDER NOTES
Patient Seen in: [facility] Emergency Department      History   Patient presents with:  Abnormal Result    Stated Complaint: abn cardiac work up     Subjective:   HPI    50-year-old male with history of atrial fibrillation on Xarelto, aortic stenosi Procedure Laterality Date   • ADENOIDECTOMY     • ANGIOGRAM     • APPENDECTOMY     • COLONOSCOPY N/A 3/21/2016    Procedure: COLONOSCOPY;  Surgeon: Xi Willis MD;  Location: [address] [address] ENDOSCOPY   • COLONOSCOPY     • LAPARO RADICAL NEPHRECTOMY Left 5/6/ present, no rebound, no rigidity, no guarding. no pulsatile masses.  No CVA tenderness  EXTREMITIES: 1+ bilateral pretibial edema, no calf tenderness  Rectal exam: Guaiac negative brown stool    ED Course     Labs Reviewed   COMP METABOLIC PANEL (14) - Abnor previous EKG.               A total of 40 minutes of critical care time (exclusive of billable procedures) was administered to manage the patient's cardiovascular instability due to his rapid atrial fibrillation requiring Cardizem bolus and continuous infus ICD-10-CM Noted POA    * (Principal) Acute on chronic congestive heart failure (HCC) I50.9 9/22/2021 Unknown

## 2021-09-23 ENCOUNTER — APPOINTMENT (OUTPATIENT)
Dept: CV DIAGNOSTICS | Facility: HOSPITAL | Age: 61
DRG: 291 | End: 2021-09-23
Attending: INTERNAL MEDICINE
Payer: COMMERCIAL

## 2021-09-23 PROCEDURE — 99232 SBSQ HOSP IP/OBS MODERATE 35: CPT | Performed by: INTERNAL MEDICINE

## 2021-09-23 PROCEDURE — 93306 TTE W/DOPPLER COMPLETE: CPT | Performed by: INTERNAL MEDICINE

## 2021-09-23 RX ORDER — POTASSIUM CHLORIDE 20 MEQ/1
40 TABLET, EXTENDED RELEASE ORAL EVERY 4 HOURS
Status: COMPLETED | OUTPATIENT
Start: 2021-09-23 | End: 2021-09-23

## 2021-09-23 RX ORDER — MAGNESIUM OXIDE 400 MG (241.3 MG MAGNESIUM) TABLET
400 TABLET ONCE
Status: COMPLETED | OUTPATIENT
Start: 2021-09-23 | End: 2021-09-23

## 2021-09-23 RX ORDER — FUROSEMIDE 10 MG/ML
40 INJECTION INTRAMUSCULAR; INTRAVENOUS
Status: DISCONTINUED | OUTPATIENT
Start: 2021-09-23 | End: 2021-09-24

## 2021-09-23 RX ORDER — METOLAZONE 2.5 MG/1
2.5 TABLET ORAL DAILY
Status: COMPLETED | OUTPATIENT
Start: 2021-09-23 | End: 2021-09-23

## 2021-09-23 NOTE — CONSULTS
[facility]  Report of Consultation    Janelle Hayward Patient Status:  Inpatient    1960 MRN [id number]   [facility] 8NE-A Attending Joi Carballo, [facility] Day # 1 PCP Adrian White MD     Reason for Consultation:  hy removal-arm   • OTHER SURGICAL HISTORY Right 3/25/2016    Procedure: KNEE ARTHROSCOPY;  Surgeon: Jonn Arteaga MD;  Location: [facility] MAIN OR   • REPAIR ROTATOR CUFF,ACUTE Right    • UPPER GI ENDOSCOPY,EXAM       Family History   Problem Relation Age of Ons budesonide (PULMICORT) 0.5 MG/2ML nebulizer solution 0.5 mg, 0.5 mg, Nebulization, BID  •  albuterol sulfate (VENTOLIN) (2.5 MG/3ML) 0.083% nebulizer solution 1.25 mg, 1.25 mg, Nebulization, Q4H PRN  •  Umeclidinium Bromide (INCRUSE ELLIPTA) 62.5 MCG/INH i (!) 82 % — —   09/22/21 2106 — — — — — (!) 83 % — —   09/22/21 2101 128/88 — — 118 — 93 % — —   09/22/21 1953 123/79 — — 120 — — — —   09/22/21 1952 120/69 — — 119 — — — —   09/22/21 1951 127/90 98 °F (36.7 °C) Oral 119 24 94 % — —   09/22/21 1900 119/88 — 54*  --    ALT 89*  --    BILT 0.6  --    TP 7.7  --        Recent Labs   Lab 09/22/21  1543 09/23/21  0433   MG 2.0 1.8       Lab Results   Component Value Date    PHOS 4.3 09/23/2021        Recent Labs   Lab 09/22/21  1543   INR 2.26*       No results fo

## 2021-09-23 NOTE — PAYOR COMM NOTE
--------------  ADMISSION REVIEW     Payor: IGLESIA ROMERO  Subscriber #:  [id number]  Authorization Number: [id number]    Admit date: 9/22/21  Admit time:  7:33 PM     Patient Seen in: [facility] Emergency Department    History   Stated Complaint: abn ca tolerate CPAP/BiPAP   • Visual impairment     reading glasses     Past Surgical History:   Procedure Laterality Date   • ADENOIDECTOMY     • ANGIOGRAM     • APPENDECTOMY     • COLONOSCOPY N/A 3/21/2016    Procedure: COLONOSCOPY;  Surgeon: Kentrell Pelayo for the following components:    Pro-Beta Natriuretic Peptide 4,978 (*)     All other components within normal limits   PROTHROMBIN TIME (PT) - Abnormal; Notable for the following components:    PT 25.5 (*)     INR 2.26 (*)     All other components within with shortness of breath, started 2 weeks back and has been progressively worsening. Uses 2 to 3 L of oxygen at rest at baseline at home and up to 4 L with activity at baseline according to patient.   Oxygen requirement has also been increasing at home, as 09/22/2021    CREATSERUM 1.00 09/22/2021    BUN 15 09/22/2021     09/22/2021    K 3.4 09/22/2021    CL 95 09/22/2021    CO2 37.0 09/22/2021     09/22/2021    CA 10.4 09/22/2021    ALB 2.6 09/22/2021    ALKPHO 92 09/22/2021    BILT 0.6 09/22/20 Cardizem drip from the emergency room  3. Cardiology on consult  3. Acute on chronic hypoxic respiratory failure  1. Oxygen as needed  2. On IV Lasix  4. Hypokalemia   1. Replace with electrolyte protocol  5. small bilateral pleural effusion  1.  On IV Lasi today.  Hr range 90-110s.        PHYSICAL EXAM:   /90   Pulse 57   Ht 5' 10\" (1.778 m)   Wt 230 lb (104.3 kg)   SpO2 94%   BMI 33.00 kg/m²   General: Awake and alert; in no acute distress  HEENT: Extraocular movements are intact; sclerae are anicter overload. He has received his second dose of lasix so far today and metolazone also added. States he is feeling a little better this AM.  On home O2, 2-3lpm at rest and 4lpm with exertion.     Physical Exam:               General: alert, cooperative, orie    Assessment:  · Acute on chronic systolic heart failure exacerbation  · Afib w RVR  · COPD  · Acute on chronic hypoxic resp failure sec to volume overload  · Hx left nephrectomy, renal cell ca  · Aortic stenosis        Plan:  · No signs of infection/CO Action Dose Route User    9/23/2021 0152 Given  Inhalation Yasmine Farrar RCP      aspirin EC tab 81 mg     Date Action Dose Route User    9/23/2021 0901 Given  Oral Mirlande Choe, RN      atorvastatin (LIPITOR) tab 20 mg     Date Action Dose Route Action Dose Route User    9/23/2021 0901 Given  Intravenous Garcia Choe, RADHA      Ipratropium Bromide (ATROVENT) 0.02 % nebulizer solution 500 mcg     Date Action Dose Route User    9/22/2021 2146 Given  Nebulization Siobhan Ng nasal cannula 7 L/min     09/23/21 0240 — 78 — — 88 % — High flow nasal cannula 7 L/min J    09/23/21 0235 — 78 — — 89 % — High flow nasal cannula 7 L/min     09/23/21 0234 — 78 — — 90 % — High flow nasal cannula 7 L/min J    09/23/21 0214 — 78 — — 8 L/min     09/22/21 2115 — 120 — — 88 % — High flow/High humidity 7 L/min     09/22/21 2113 — 119 — — 86 % — High flow/High humidity 6 L/min     09/22/21 2112 — 120 — — 87 % — High flow/High humidity 6 L/min     09/22/21 2108 — — — — 82 % — Nasal ca

## 2021-09-23 NOTE — CONSULTS
[facility]  Consultation Note      Basil Banegas Patient Status:  Inpatient    1960 MRN [id number]   [facility] 8NE-A Attending Nubia Hathaway, [address] [facility] Day # 1 PCP Gonzalo Harris MD     Reason for [facility] 4 mg, Intravenous, Q6H PRN  Fluticasone Furoate-Vilanterol (BREO ELLIPTA) 200-25 MCG/INH inhaler 1 puff, 1 puff, Inhalation, Daily  atorvastatin (LIPITOR) tab 20 mg, 20 mg, Oral, Nightly  Ipratropium Bromide (ATROVENT) 0.02 % nebulizer solution 500 mcg, 50 COLONOSCOPY     • LAPARO RADICAL NEPHRECTOMY Left 5/6/2014   • NEPHRECTOMY Left 2014   • OTHER  meatus of urethra   • OTHER Right     foreign body removal-arm   • OTHER SURGICAL HISTORY Right 3/25/2016    Procedure: KNEE ARTHROSCOPY;  Surgeon: Hannah Haynes are anicteric; scalp is atrauamatic; no thyromegaly  Neck: Supple; no JVD; no carotid bruits  Cardiac: Regular rate and regular rhythm; no murmurs/rubs/gallops are appreciated; PMI is non-displaced; there is no evidence of a sternal heave  Lungs: Clear to

## 2021-09-23 NOTE — ED QUICK NOTES
Orders for admission, patient is aware of plan and ready to go upstairs. Any questions, please call ED RN Lizzie Brown   at extension [phone]. Vaccinated?  yes  Type of COVID test sent: rapid   COVID Suspicion level: Low     Titratable drug(s) infusing:

## 2021-09-23 NOTE — PROGRESS NOTES
[facility]     Hospitalist Progress Note     Jim Gross Patient Status:  Inpatient    1960 MRN [id number]   [facility] 8NE-A Attending Patsy Burk, [address] [facility] Day # 1 PCP Guido Eddy MD     Chief Complaint: SOB 07/20/2021    COVID19 Not Detected 03/16/2021       Pro-Calcitonin  No results for input(s): PCT in the last 168 hours.     Cardiac  Recent Labs   Lab 09/22/21  1543   TROP <0.045   PBNP 4,978*       Creatinine Kinase  No results for input(s): CK in the las negative, may discontinue isolation: yes     Will the patient be referred to TCC on discharge?: no  Estimated date of discharge: 2 or more midnights  Discharge is dependent on: clinical state, consultant recs  At this point Mr. Jerad Blue is expected to be disc

## 2021-09-23 NOTE — PLAN OF CARE
NURSING ADMISSION NOTE        Patient admitted via Cart  Oriented to room. Safety precautions initiated. Bed in low position.      Admission navigator completed, PTA meds reviewed with pt and wife.     09/22/21 1951 09/22/21 1952 09/22/21 1953   Vital Sig Goal: to be able to move without becoming short of breath    Interventions:   - HFNC with humidity, , IS  - PRN albuterol and ipratropium nebs   - See additional Care Plan goals for specific interventions  - pulm notified of consult  - IV lasix  Outcome RESPIRATORY - ADULT  Goal: Achieves optimal ventilation and oxygenation  Description: INTERVENTIONS:  - Assess for changes in respiratory status  - Assess for changes in mentation and behavior  - Position to facilitate oxygenation and minimize respiratory

## 2021-09-23 NOTE — PLAN OF CARE
Pt is A+Ox4. VSS on 5L per NC. Baseline is 2.5L at rest. Afib with LBBB on tele. IV lasix. Cardizem gtt infusing per protocol. Oral Metoprolol started last night. Voiding in urinal. Up with SBA. Pt updated on POC at this time. [address] patient.          Problem measures for life threatening arrhythmias  - Monitor electrolytes and administer replacement therapy as ordered  Outcome: Progressing     Problem: SAFETY ADULT - FALL  Goal: Free from fall injury  Description: INTERVENTIONS:  - Assess pt frequently for phy

## 2021-09-23 NOTE — H&P
[facility] HOSPITALIST                                                               History & Physical         Kathya Eugenio Patient Status:  Emergency    1960 MRN [id number]   Location [address] Attending Mae Winston DO apnea     He was unable to tolerate CPAP/BiPAP   • Visual impairment     reading glasses       Past Surgical History:   Procedure Laterality Date   • ADENOIDECTOMY     • ANGIOGRAM     • APPENDECTOMY     • COLONOSCOPY N/A 3/21/2016    Procedure: COLONOSCOPY No wheezes. No rhonchi. Cardiovascular: S1, S2. Irregular ate and rhythm. No murmurs. Equal pulses   Abdomen: Soft, nontender, nondistended. Positive bowel sounds.  No rebound tenderness  Rectal exam done by ER physician and guaiac negative brown stool             Dictated by (CST): Meagan Longoria MD on 9/22/2021 at 4:45 PM        EKG  Sinus tachycardia with occasional Premature ventricular complexes   Left bundle branch block   Abnormal ECG   When compared with ECG of 21-JUL-2021 09:51,   Vent.  rate h

## 2021-09-24 PROCEDURE — 99232 SBSQ HOSP IP/OBS MODERATE 35: CPT | Performed by: INTERNAL MEDICINE

## 2021-09-24 RX ORDER — FUROSEMIDE 10 MG/ML
80 INJECTION INTRAMUSCULAR; INTRAVENOUS
Status: DISCONTINUED | OUTPATIENT
Start: 2021-09-24 | End: 2021-09-27

## 2021-09-24 RX ORDER — AMIODARONE HYDROCHLORIDE 200 MG/1
400 TABLET ORAL 3 TIMES DAILY
Status: DISCONTINUED | OUTPATIENT
Start: 2021-09-24 | End: 2021-09-28

## 2021-09-24 RX ORDER — POTASSIUM CHLORIDE 20 MEQ/1
40 TABLET, EXTENDED RELEASE ORAL EVERY 4 HOURS
Status: COMPLETED | OUTPATIENT
Start: 2021-09-24 | End: 2021-09-24

## 2021-09-24 RX ORDER — METOLAZONE 2.5 MG/1
2.5 TABLET ORAL DAILY
Status: COMPLETED | OUTPATIENT
Start: 2021-09-24 | End: 2021-09-26

## 2021-09-24 RX ORDER — SODIUM CHLORIDE/ALOE VERA
GEL (GRAM) NASAL AS NEEDED
Status: DISCONTINUED | OUTPATIENT
Start: 2021-09-24 | End: 2021-09-28

## 2021-09-24 NOTE — PLAN OF CARE
Assumed care for pt at 19:30 on 9/23    A&Ox4. Denies CHRISTI at rest. Upon return from , denies ARCEO. VSS on 6L HFNC. Around 2AM SpO2 downtrending, pt actually asleep, mouth open. Increased to 9L without result, resp therapist called.  Obtained order for JILLIAN [facility] Angina  - Evaluate fluid balance, assess for edema, trend weights  Outcome: Progressing  Goal: Absence of cardiac arrhythmias or at baseline  Description: INTERVENTIONS:  - Continuous cardiac monitoring, monitor vital signs, obtain 12 lead EKG if indicated

## 2021-09-24 NOTE — PROGRESS NOTES
[facility] Cardiology  Progress Note    Samantha Krishna Patient Status:  Inpatient    1960 MRN [id number]   [facility] 8NE-A Attending Alexus Pulliam, [address] [facility] Day # 2 PCP Mariano Grace MD     Reason for consultation CR tab 40 mEq, 40 mEq, Oral, Q4H  furosemide (LASIX) injection 40 mg, 40 mg, Intravenous, BID (Diuretic)  Roflumilast TABS 500 mcg, 500 mcg, Oral, Daily  dilTIAZem 100mg/100ml in NaCl (cardIZEM) 1 mg/mL IVPB add-vantage, 2.5-20 mg/hr, Intravenous, Continuo

## 2021-09-24 NOTE — DIETARY NOTE
[address]     Admitting diagnosis:  Hypokalemia [E87.6]  Pleural effusion [J90]  Hypoxia [R09.02]  Atrial fibrillation with rapid ventricular response ([address] [I48.91]  Acute on chronic congestive heart failure, un

## 2021-09-24 NOTE — PROGRESS NOTES
[facility]     Hospitalist Progress Note     Raj Franks Patient Status:  Inpatient    1960 MRN [id number]   [facility] 8NE-A Attending Rocio Martinez, [address] [facility] Day # 2 PCP Jair Roldan MD     Chief Complaint: SOB COVID-19 Lab Results    COVID-19  Lab Results   Component Value Date    COVID19 Not Detected 09/22/2021    COVID19 Not Detected 07/20/2021    COVID19 Not Detected 03/16/2021       Pro-Calcitonin  No results for input(s): PCT in the last 168 hours.     C doses  o Will need outpt GI f/u  • Hx of RCC with L nephrectomy        Plan of care discussed with pt, RN    Bisi Dasilva DO    Supplementary Documentation:     Quality:  · DVT Prophylaxis: xarelto  · CODE status: FULL  · Peters: no  · Central line:

## 2021-09-24 NOTE — PROGRESS NOTES
[facility] Pulmonary/Critical Care Progress Note     SUBJECTIVE/Interval history: All events, procedures, notes reviewed. Pt feels better today with less sob. LE edema improving.  He is concerned about restarting amiodar --    BUN 15  --  18 19*  --    CREATSERUM 1.00  --  0.90 0.99  --    GFRAA 93  --  106 95  --    GFRNAA 81  --  92 82  --    CA 10.4*  --  9.6 9.3  --      --  139 138  --    K 3.4*   < > 3.4* 3.3* 3.1*   CL 95*  --  98 96*  --    CO2 37.0*  --  38. • Roflumilast  500 mcg Oral Daily   • Fluticasone Furoate-Vilanterol  1 puff Inhalation Daily   • atorvastatin  20 mg Oral Nightly   • aspirin  81 mg Oral Daily   • Rivaroxaban  20 mg Oral Daily with food   • metoprolol tartrate  50 mg Oral 2x Daily(Beta

## 2021-09-24 NOTE — CM/SW NOTE
Care Progression Note:  Active Acute Medical Issue:   Acute on chronic congestive heart failure, unspecified heart failure type (HCC)   AFIB RVR, on iv diltiazem drip    Other Contributing Medical Factors/Dx:  Hx of afib, on xarelto, HTN, COPD    Length of

## 2021-09-25 PROCEDURE — 99232 SBSQ HOSP IP/OBS MODERATE 35: CPT | Performed by: INTERNAL MEDICINE

## 2021-09-25 NOTE — PLAN OF CARE
Problem: Patient/Family Goals  Goal: Patient/Family Long Term Goal  Description: Patient's Long Term Goal: to stop coming to the hospital and be able to finally start my pulmonary therapy coming up in October.      Interventions:  - CHF protocol, IV lasix for physical needs  - Identify cognitive and physical deficits and behaviors that affect risk of falls.   - [place] fall precautions as indicated by assessment.  - Educate pt/family on patient safety including physical limitations  - Instruct pt to call f

## 2021-09-25 NOTE — PLAN OF CARE
Assumed care of pt at 1930 a/o x4 resting in bed in no apparent distress. He is on 6L per nc and sats mid 90's and stated he is comfortable there. Lungs with crackles in bilat bases.   Monitor shows controlled AF with BBB on a Cardizem gtt and PO Amio dayron Progressing  Goal: Absence of cardiac arrhythmias or at baseline  Description: INTERVENTIONS:  - Continuous cardiac monitoring, monitor vital signs, obtain 12 lead EKG if indicated  - Evaluate effectiveness of antiarrhythmic and heart rate control medicati and rhythm and assess patient for signs and symptoms of electrolyte imbalances  - Administer electrolyte replacement as ordered  - Monitor response to electrolyte replacements, including rhythm and repeat lab results as appropriate  - Fluid restriction as

## 2021-09-25 NOTE — PROGRESS NOTES
[facility] Cardiology  Progress Note    Sunni Loss Patient Status:  Inpatient    1960 MRN [id number]   [facility] 8NE-A Attending Spring Carlin, [address] [facility] Day # 3 PCP Nirali Quiroga MD     Reason for consultation 2.5 mg, 2.5 mg, Oral, Daily  amiodarone (PACERONE) tab 400 mg, 400 mg, Oral, TID  furosemide (LASIX) injection 80 mg, 80 mg, Intravenous, BID (Diuretic)  Roflumilast TABS 500 mcg, 500 mcg, Oral, Daily  dilTIAZem 100mg/100ml in NaCl (cardIZEM) 1 mg/mL IVPB

## 2021-09-25 NOTE — PROGRESS NOTES
[facility]     Hospitalist Progress Note     Edi Celis Patient Status:  Inpatient    1960 MRN [id number]   [facility] 8NE-A Attending Jonatan Salas, [address] [facility] Day # 3 PCP Leia Pereira MD     Chief Complaint: SOB 38.0*  --  38.0*  --   --   --  40.0*   ALKPHO 92  --   --   --   --   --   --   --   --    AST 54*  --   --   --   --   --   --   --   --    ALT 89*  --   --   --   --   --   --   --   --    BILT 0.6  --   --   --   --   --   --   --   --    TP 7.7  -- BID  o Metolazone added daily  o BB  • Atrial fibrillation with RVR.  DCCV on 7/21  o Metoprolol BID  o xarelto  o Echo as above  o amiodarone  o repeat DCCV Monday if no improvement  • Hx of AVR - repeat Echo with normal functioning prosthesis  • Hypokalem

## 2021-09-25 NOTE — PLAN OF CARE
Pt. Is alert and oriented times four. . Lungs with crackles on bases. O2 at 6 liters nasal cannula. Pt. Is afib with BBB on monitor. Pt. Is up with stand by assist with walker.

## 2021-09-25 NOTE — PROGRESS NOTES
[facility]  Progress Note    Maria Elena Eubanks Patient Status:  Inpatient    1960 MRN [id number]   [place] 8NE-A Attending Piper England DO   Hosp Day # 3 PCP Khoa Arriaga MD     Subjective:  Maria Elena Eubanks is a(n) 63 y 38.0*  --  38.0*  --   --   --  40.0*   ALKPHO 92  --   --   --   --   --   --   --   --    AST 54*  --   --   --   --   --   --   --   --    ALT 89*  --   --   --   --   --   --   --   --    BILT 0.6  --   --   --   --   --   --   --   --    TP 7.7  -- Furoate-Vilanterol (BREO ELLIPTA) 200-25 MCG/INH inhaler 1 puff, 1 puff, Inhalation, Daily  atorvastatin (LIPITOR) tab 20 mg, 20 mg, Oral, Nightly  Ipratropium Bromide (ATROVENT) 0.02 % nebulizer solution 500 mcg, 500 mcg, Nebulization, Q6H PRN  cyclobenza

## 2021-09-26 ENCOUNTER — APPOINTMENT (OUTPATIENT)
Dept: GENERAL RADIOLOGY | Facility: HOSPITAL | Age: 61
DRG: 291 | End: 2021-09-26
Attending: INTERNAL MEDICINE
Payer: COMMERCIAL

## 2021-09-26 PROCEDURE — 99232 SBSQ HOSP IP/OBS MODERATE 35: CPT | Performed by: INTERNAL MEDICINE

## 2021-09-26 PROCEDURE — 71045 X-RAY EXAM CHEST 1 VIEW: CPT | Performed by: INTERNAL MEDICINE

## 2021-09-26 NOTE — PLAN OF CARE
Assumed care of pt at 1930 a/o x4 in no apparent distress getting a neb tx. Monitor shows controlled AFib on a cardizem gtt at 10mg/hr and also on PO Amiodarone which pt is not happy about.   He apparently had been cardioverted before when he failed on po Evaluate fluid balance, assess for edema, trend weights  Outcome: Progressing  Goal: Absence of cardiac arrhythmias or at baseline  Description: INTERVENTIONS:  - Continuous cardiac monitoring, monitor vital signs, obtain 12 lead EKG if indicated  - Evalua volume excess or deficit  - Monitor intake, output and patient weight  - Monitor urine specific gravity, serum osmolarity and serum sodium as indicated or ordered  - Monitor response to interventions for patient's volume status, including labs, urine outpu

## 2021-09-26 NOTE — PROGRESS NOTES
[facility]     Hospitalist Progress Note     Ayanna Howard Patient Status:  Inpatient    1960 MRN [id number]   [facility] 8NE-A Attending Unknown Ice, [address] [facility] [address] Day # 4 PCP Isela Blackwood MD     Chief Complaint: SOB of 1.55 mg/dL (H)).     Recent Labs   Lab 09/22/21  1543   PTP 25.5*   INR 2.26*            COVID-19 Lab Results    COVID-19  Lab Results   Component Value Date    COVID19 Not Detected 09/22/2021    COVID19 Not Detected 07/20/2021    COVID19 Not Detected 03 protocol, ICF  • Normocytic anemia  o Iron studies c/w iron deficiency  o IV iron x3 doses  o Will need outpt GI f/u  • Hx of RCC with L nephrectomy        Plan of care discussed with pt, RN    Rocio Gordon DO    Supplementary Documentation:     Vena Median

## 2021-09-26 NOTE — PROGRESS NOTES
[facility] Cardiology  Progress Note    Jenna Jennifervinita Patient Status:  Inpatient    1960 MRN [id number]   [facility] 8NE-A Attending Lorena Gonzalez, [address] [facility] Day # 4 PCP Isa Woods MD     Reason for consultation mg, Intravenous, BID  ayr saline nasal gel, , Nasal, PRN  amiodarone (PACERONE) tab 400 mg, 400 mg, Oral, TID  furosemide (LASIX) injection 80 mg, 80 mg, Intravenous, BID (Diuretic)  Roflumilast TABS 500 mcg, 500 mcg, Oral, Daily  dilTIAZem 100mg/100ml in

## 2021-09-26 NOTE — PLAN OF CARE
Pt. Is alert and oriented times four. Lungs with scattered wheezes and rhonchi noted. He is v paced on monitor. Afib with BBB on monitor. He is up in room with walker. O2 at 6 liters nasal cannula. IV cardizem infusing at 10 mg/hr.

## 2021-09-26 NOTE — PLAN OF CARE
Dr. Hazel Briscoe notified that patient is now on amiodarone and that he has had it in the past and has had side effects from it. He and his wife say that they think it impacted his lungs.

## 2021-09-26 NOTE — HOME CARE LIAISON
Patient provided with list of [facility] Patel providers from [facility], patient choice is [phone] reserved in [facility] and contact information placed on AVS.   Financial interest disclosure provided to patient.

## 2021-09-26 NOTE — PLAN OF CARE
Assumed care of patient at 0730. Alert and oriented x4. Tele rhythm a-fib, rates controlled. On 6 L/min HFNC. Breath sounds diminished bilaterally. Bed locked and in low position. Call light and personal items within reach. No c/o chest pain or n/v.  Some S baseline  Description: INTERVENTIONS:  - Continuous cardiac monitoring, monitor vital signs, obtain 12 lead EKG if indicated  - Evaluate effectiveness of antiarrhythmic and heart rate control medications as ordered  - Initiate emergency measures for life t electrolyte imbalances  - Administer electrolyte replacement as ordered  - Monitor response to electrolyte replacements, including rhythm and repeat lab results as appropriate  - Fluid restriction as ordered  - Instruct patient on fluid and nutrition restr

## 2021-09-26 NOTE — PROGRESS NOTES
[facility]  Progress Note    Clifford Moran Patient Status:  Inpatient    1960 MRN [id number]   [facility] 8NE-A Attending Cash Puga, DO   Hosp Day # 4 PCP Scout Melton MD     Subjective:  Clifford Moran is a(n) 63 y --     < > = values in this interval not displayed.        Recent Labs   Lab 09/22/21  1543 09/23/21  0433 09/24/21  0534   MG 2.0 1.8 1.9       Lab Results   Component Value Date    PHOS 4.3 09/23/2021        Recent Labs   Lab 09/22/21  1543   INR 2.26* PRN  cyclobenzaprine (FLEXERIL) tab 10 mg, 10 mg, Oral, TID PRN  aspirin EC tab 81 mg, 81 mg, Oral, Daily  Albuterol Sulfate  (90 Base) MCG/ACT inhaler 2 puff, 2 puff, Inhalation, Q6H PRN  Rivaroxaban (XARELTO) tab 20 mg, 20 mg, Oral, Daily with hillary

## 2021-09-27 ENCOUNTER — APPOINTMENT (OUTPATIENT)
Dept: INTERVENTIONAL RADIOLOGY/VASCULAR | Facility: HOSPITAL | Age: 61
DRG: 291 | End: 2021-09-27
Attending: INTERNAL MEDICINE
Payer: COMMERCIAL

## 2021-09-27 PROCEDURE — 99232 SBSQ HOSP IP/OBS MODERATE 35: CPT | Performed by: INTERNAL MEDICINE

## 2021-09-27 PROCEDURE — 5A2204Z RESTORATION OF CARDIAC RHYTHM, SINGLE: ICD-10-PCS | Performed by: INTERNAL MEDICINE

## 2021-09-27 RX ORDER — FUROSEMIDE 10 MG/ML
80 INJECTION INTRAMUSCULAR; INTRAVENOUS
Status: DISCONTINUED | OUTPATIENT
Start: 2021-09-27 | End: 2021-09-28

## 2021-09-27 RX ORDER — FUROSEMIDE 10 MG/ML
40 INJECTION INTRAMUSCULAR; INTRAVENOUS
Status: DISCONTINUED | OUTPATIENT
Start: 2021-09-27 | End: 2021-09-27

## 2021-09-27 RX ORDER — POTASSIUM CHLORIDE 20 MEQ/1
40 TABLET, EXTENDED RELEASE ORAL EVERY 4 HOURS
Status: COMPLETED | OUTPATIENT
Start: 2021-09-27 | End: 2021-09-27

## 2021-09-27 NOTE — PLAN OF CARE
Assumed care of patient at 0730. Alert and oriented x4. Tele rhythm a-fib, rates controlled. Cardizem gtt decreased to 5 mg/min based on parameters. On 6 L/min o2. Breath sounds diminished bilaterally. Bed locked and in low position.  Call light and persona fluid balance, assess for edema, trend weights  Outcome: Progressing  Goal: Absence of cardiac arrhythmias or at baseline  Description: INTERVENTIONS:  - Continuous cardiac monitoring, monitor vital signs, obtain 12 lead EKG if indicated  - Evaluate effect normal limits  Description: INTERVENTIONS:  - Monitor labs and rhythm and assess patient for signs and symptoms of electrolyte imbalances  - Administer electrolyte replacement as ordered  - Monitor response to electrolyte replacements, including rhythm and

## 2021-09-27 NOTE — PROGRESS NOTES
Patient here for elective cardioversion. Patient placed on monitor and rhythm verified. Procedure discussed with patient and questions/concerns addressed. Informed consent on chart from CTU 8.  Patient arrived on 4L nc, and maintained on the same throughout

## 2021-09-27 NOTE — CONSULTS
[facility]    Report of Consultation    Kathya Becker Patient Status:  Inpatient    1960 MRN [id number]   [facility] 8NE-A Attending Thalia Sawyer, DO   Hosp Day # 5 PCP Candy Whiting MD     Date of consult: 2021 meatus of urethra   • OTHER Right     foreign body removal-arm   • OTHER SURGICAL HISTORY Right 3/25/2016    Procedure: KNEE ARTHROSCOPY;  Surgeon: Kandi Armstrong MD;  Location: [facility] MAIN OR   • REPAIR ROTATOR CUFF,ACUTE Right    • UPPER GI ENDOSCOPY,EXAM tartrate (LOPRESSOR) tab 50 mg, 50 mg, Oral, 2x Daily(Beta Blocker)  •  budesonide (PULMICORT) 0.5 MG/2ML nebulizer solution 0.5 mg, 0.5 mg, Nebulization, BID  •  albuterol sulfate (VENTOLIN) (2.5 MG/3ML) 0.083% nebulizer solution 1.25 mg, 1.25 mg, Nebuliz WBC 10.5 09/23/2021    RBC 4.10 (L) 09/23/2021    HGB 10.0 (L) 09/23/2021    HCT 34.3 (L) 09/23/2021    .0 09/23/2021    MPV 9.8 02/21/2013    MCV 83.7 09/23/2021    MCH 24.4 (L) 09/23/2021    MCHC 29.2 (L) 09/23/2021    RDW 15.9 09/23/2021    NE held and not given till today  Sp metolazone on 9/23-9/26  -- cardioversion will likely help   -- check UA and urine lytes  -- If Cr worsening check renal US  -- monitor labs and urine output   -- avoid nsaids and nephrotoxins.  Renally dose meds     [place]

## 2021-09-27 NOTE — PLAN OF CARE
Assumed care for pt at 19:30 on 9/26    A&Ox4. Denies pain. Reports ARCEO. VSS n 6L HF NC. Lungs coarse, On Xarelto. +1 RLE edema, nonpitting on left. Voids in urinal. Cardizem @ 10mg/hr. Up with standby. NPO since midnight. Rapid covid swab sent.      Plan: Evaluate effectiveness of antiarrhythmic and heart rate control medications as ordered  - Initiate emergency measures for life threatening arrhythmias  - Monitor electrolytes and administer replacement therapy as ordered  Outcome: Progressing     Problem: including rhythm and repeat lab results as appropriate  - Fluid restriction as ordered  - Instruct patient on fluid and nutrition restrictions as appropriate  Outcome: Progressing  Goal: Hemodynamic stability and optimal renal function maintained  Descript

## 2021-09-27 NOTE — PROGRESS NOTES
[facility] Cardiology  Progress Note    Brian Allen Patient Status:  Inpatient    1960 MRN [id number]   [facility] 8NE-A Attending Flor Martines, [address] [facility] Day # 5 PCP Lavern Almaraz MD     Reason for consultation kg)  09/22/21 : 230 lb (104.3 kg)  09/15/21 : 233 lb (105.7 kg)      General: Awake and alert; in no acute distress  Cardiac: irregularly irregular; no murmurs/rubs/gallops are appreciated  Lungs: Clear to auscultation bilaterally; no accessory muscle use allowing our practice to participate in the care of your patient. Please do not hesitate to contact me if you have any questions.

## 2021-09-27 NOTE — PAYOR COMM NOTE
--------------   CONTINUED STAY REVIEW    Payor: Sancho Jacobs S #:  [id number]  Authorization Number: [id number]      :     [facility] Cardiology  Progress Note           Matthew Odom Patient Status:  Inpatient     all 4 extremities normally     ayr saline nasal gel, , Nasal, PRN  iron sucrose (VENOFER) IV Push 200 mg, 200 mg, Intravenous, Daily  metOLazone (ZAROXOLYN) tab 2.5 mg, 2.5 mg, Oral, Daily  amiodarone (PACERONE) tab 400 mg, 400 mg, Oral, TID  furosemide (L Group Cardiology  Progress Note           Sravani Vazquez Patient Status:  Inpatient    1960 MRN [id number]   [facility] 8NE-A Attending Ansley Silva, [address] [facility] Day # 4 PCP Mitzi Walter MD      Reason for consultation: acute 500 mg, Intravenous, BID  ayr saline nasal gel, , Nasal, PRN  amiodarone (PACERONE) tab 400 mg, 400 mg, Oral, TID  furosemide (LASIX) injection 80 mg, 80 mg, Intravenous, BID (Diuretic)  Roflumilast TABS 500 mcg, 500 mcg, Oral, Daily  dilTIAZem 100mg/100ml per cardiology     Objective:  Blood pressure 129/83, pulse 69, temperature 97.8 °F (36.6 °C), temperature source Oral, resp. rate 18, height 5' 10\" (1.778 m), weight 225 lb 1.4 oz (102.1 kg), SpO2 97 %.  8L HF     Intake/Output:     Intake/Output Summary BIIEGZ8Q, PIKPF2T, ABGHCO3, ABGBE, TEMP, ROMARIO, SITE, DEV, THGB in the last 168 hours.     Invalid input(s): VJJ22VRB, CHOB     Invalid input(s): CKTOTAL, TROPONINI, TROPONINT, CKMBINDEX     COVID-19 Lab Results     COVID-19        Lab Results   Component (LOPRESSOR) tab 50 mg, 50 mg, Oral, 2x Daily(Beta Blocker)  budesonide (PULMICORT) 0.5 MG/2ML nebulizer solution 0.5 mg, 0.5 mg, Nebulization, BID  albuterol sulfate (VENTOLIN) (2.5 MG/3ML) 0.083% nebulizer solution 1.25 mg, 1.25 mg, Nebulization, Q4H PRN signs: Temp:  [97.5 °F (36.4 °C)-98.1 °F (36.7 °C)] 97.5 °F (36.4 °C)  Pulse:  [64-91] 75  Resp:  [17-18] 18  BP: (104-132)/(58-83) 104/58     Physical Exam:    General: No acute distress. AO   Respiratory:  Course  bases. No wheezes. No rhonchi.  O2 via       Creatinine Kinase  No results for input(s): CK in the last 168 hours.     Inflammatory Markers  No results for input(s): CRP, SANTHOSH, LDH, DDIMER in the last 168 hours.     Imaging: Imaging data reviewed in Epic.     Medications:   • furosemide  40 mg PROCEDURE:  The risks, benefits and alternatives to cardioversion were discussed with the patient. The risks included, but are not limited to: lethal arrhythmia, stroke, respiratory failure, shock and death.  The benefits of the procedure included symptomat Date Action Dose Route User    9/26/2021 1600 Given  Intravenous Misty Goyal RN      amiodarone (PACERONE) tab 400 mg     Date Action Dose Route User    9/27/2021 8186 Given  Oral Misty Goyal RN    9/26/2021 2050 Given  Oral Jennifer Neil RN Procedures:      Plan:

## 2021-09-27 NOTE — PROCEDURES
[facility] Cardiology  Cardioversion Report    PREOPERATIVE DIAGNOSIS:  Atrial fibrillation. POSTOPERATIVE DIAGNOSIS:  Normal sinus rhythm. PROCEDURE PERFORMED:  Direct current cardioversion.      DESCRIPTION OF PROCEDURE:  The risks, benefi

## 2021-09-27 NOTE — PROGRESS NOTES
[facility]     Hospitalist Progress Note     Bronson Parker Patient Status:  Inpatient    1960 MRN [id number]   [facility] 8NE-A Attending [facility], [address] [facility] Day # 5 PCP Sulema Devine MD     Chief Complaint: SOB Clearance: 51.7 mL/min (A) (based on SCr of 1.55 mg/dL (H)).     Recent Labs   Lab 09/22/21  1543   PTP 25.5*   INR 2.26*            COVID-19 Lab Results    COVID-19  Lab Results   Component Value Date    COVID19 Not Detected 09/26/2021    COVID19 Not Detec metformin  o Hyperglycemia protocol, ICF  • Normocytic anemia  o Iron studies c/w iron deficiency  o IV iron x3 doses  o Will need outpt GI f/u  • Hx of RCC with L nephrectomy        Plan of care discussed with pt, RN  Alyson Verma NP     Patient seen a

## 2021-09-27 NOTE — PROGRESS NOTES
[facility] Pulmonary/Critical Care Progress Note     SUBJECTIVE/Interval history: All events, procedures, notes reviewed. Pt is s/p cardioversion. He feels well and denies cp or sob or palpiations. He wishes to go home. 8.9 8.5 8.2*    138 136   K 4.3 3.9 3.4*   CL 96* 95* 96*   CO2 40.0* 36.0* 36.0*     Recent Labs   Lab 09/22/21  1543 09/23/21  0433   RBC 4.47 4.10*   HGB 10.7* 10.0*   HCT 37.7* 34.3*   MCV 84.3 83.7   MCH 23.9* 24.4*   MCHC 28.4* 29.2*   RDW 15. 9 Bromide  1 puff Inhalation Daily     ayr saline nasal, acetaminophen, melatonin, ondansetron, Ipratropium Bromide, cyclobenzaprine, Albuterol Sulfate HFA, albuterol sulfate    ASSESSMENT    · Acute on chronic systolic heart failure exacerbation  · Afib w R

## 2021-09-28 VITALS
SYSTOLIC BLOOD PRESSURE: 108 MMHG | OXYGEN SATURATION: 86 % | BODY MASS INDEX: 31.5 KG/M2 | HEART RATE: 73 BPM | DIASTOLIC BLOOD PRESSURE: 88 MMHG | WEIGHT: 220 LBS | RESPIRATION RATE: 16 BRPM | HEIGHT: 70 IN | TEMPERATURE: 97 F

## 2021-09-28 PROCEDURE — 99239 HOSP IP/OBS DSCHRG MGMT >30: CPT | Performed by: INTERNAL MEDICINE

## 2021-09-28 RX ORDER — BUDESONIDE AND FORMOTEROL FUMARATE DIHYDRATE 160; 4.5 UG/1; UG/1
1 AEROSOL RESPIRATORY (INHALATION) DAILY
Refills: 0 | Status: SHIPPED | COMMUNITY
Start: 2021-09-28

## 2021-09-28 RX ORDER — AMIODARONE HYDROCHLORIDE 400 MG/1
TABLET ORAL
Qty: 270 TABLET | Refills: 0 | Status: SHIPPED | OUTPATIENT
Start: 2021-09-28 | End: 2021-11-29

## 2021-09-28 RX ORDER — FUROSEMIDE 40 MG/1
40 TABLET ORAL
Qty: 180 TABLET | Refills: 3 | Status: SHIPPED | OUTPATIENT
Start: 2021-09-28

## 2021-09-28 RX ORDER — POTASSIUM CHLORIDE 20 MEQ/1
40 TABLET, EXTENDED RELEASE ORAL EVERY 4 HOURS
Status: COMPLETED | OUTPATIENT
Start: 2021-09-28 | End: 2021-09-28

## 2021-09-28 RX ORDER — FUROSEMIDE 10 MG/ML
80 INJECTION INTRAMUSCULAR; INTRAVENOUS ONCE
Status: DISCONTINUED | OUTPATIENT
Start: 2021-09-28 | End: 2021-09-28

## 2021-09-28 RX ORDER — FUROSEMIDE 40 MG/1
40 TABLET ORAL
Status: DISCONTINUED | OUTPATIENT
Start: 2021-09-28 | End: 2021-09-28

## 2021-09-28 NOTE — PLAN OF CARE
Assumed care for pt at 19:30 on 9/27    A&Ox4, denies pain or CHRISTI. VSS on 4.5L HFNC, NSR 1AVB and BBB on tele. On Xarelto. Nonpitting BLE edema, lungs diminished. Continent of B&B, up with standby.  Melatonin given for trouble sleeping, pt reports mother ho coronary artery perfusion - ex.  Angina  - Evaluate fluid balance, assess for edema, trend weights  Outcome: Progressing  Goal: Absence of cardiac arrhythmias or at baseline  Description: INTERVENTIONS:  - Continuous cardiac monitoring, monitor vital signs, ELECTROLYTES - ADULT  Goal: Electrolytes maintained within normal limits  Description: INTERVENTIONS:  - Monitor labs and rhythm and assess patient for signs and symptoms of electrolyte imbalances  - Administer electrolyte replacement as ordered  - Monitor

## 2021-09-28 NOTE — PROGRESS NOTES
[facility] Cardiology  Progress Note    Humera Gil Patient Status:  Inpatient    1960 MRN [id number]   [facility] 8NE-A Attending Deirdre Jasmine, 160[address] [facility] Day # 6 PCP Manisha Brown MD     Reason for consultation injection 80 mg, 80 mg, Intravenous, BID (Diuretic)  ayr saline nasal gel, , Nasal, PRN  amiodarone (PACERONE) tab 400 mg, 400 mg, Oral, TID  Roflumilast TABS 500 mcg, 500 mcg, Oral, Daily  dilTIAZem 100mg/100ml in NaCl (cardIZEM) 1 mg/mL IVPB add-vantage,

## 2021-09-28 NOTE — PLAN OF CARE
NURSING DISCHARGE NOTE    Discharged Home via Wheelchair. Accompanied by Family member  Belongings Taken by patient/family. IVs taken out. Pt wheeled off unit in stable condition with family member. Belongings returned to pt.

## 2021-09-28 NOTE — PAYOR COMM NOTE
--------------   CONTINUED STAY REVIEW    Payor: IGLESIA PPSTACIA  Subscriber #:  [id number]  Authorization Number: [id number]    [facility] [address] [place] [address] Cardiology  Progress Note           Raj Franks Patient Status:  Inpatient    1960 MRN [id number] use  Abdomen: Soft, non-tender; bowel sounds are normoactive  Extremities: No clubbing/cyanosis; moves all 4 extremities normally     furosemide (LASIX) injection 80 mg, 80 mg, Intravenous, BID (Diuretic)  ayr saline nasal gel, , Nasal, PRN  amiodarone (PA Replace K  Po lasix now  Back on back 5 liters NC   Wt down 5 lbs good UO Monday   Cr level lower today  neph said ok for dc BMP 1 week   Follows w/ Dr June Briceño  As outpt   Dc this am mother entering hospice at [facility] - stressed need for him to follow diet a 9/27/2021 1650 Given  Oral Clifford Jaime RN      potassium chloride (K-DUR M20) CR tab 40 mEq     Date Action Dose Route User    9/28/2021 0818 Given  Oral Stephen Caba RN    9/28/2021 7447 Given  Oral Josh Casanova RN      Rivaroxaban Fei Reece t

## 2021-09-28 NOTE — PLAN OF CARE
Assumed care at 0730. A&O x4. 5 L via nasal cannula (on 2.5-4.5 at home). Pulse ox goal: 88%. Tele; NSR with BBB and AV block. Voids in urinal.  9/27. Standby assist. L and R peripheral IV heparin locked. Hopeful for discharge today. C at discharge. medications as ordered  - Initiate emergency measures for life threatening arrhythmias  - Monitor electrolytes and administer replacement therapy as ordered  Outcome: Progressing     Problem: SAFETY ADULT - FALL  Goal: Free from fall injury  Description: I restriction as ordered  - Instruct patient on fluid and nutrition restrictions as appropriate  Outcome: Progressing  Goal: Hemodynamic stability and optimal renal function maintained  Description: INTERVENTIONS:  - Monitor labs and assess for signs and sym

## 2021-09-28 NOTE — PROGRESS NOTES
[facility] Pulmonary/Critical Care Progress Note     SUBJECTIVE/Interval history: All events, procedures, notes reviewed. No arrhythmia overnight. Stable on 4.5 L O2. No cp or worsening hypoxia.      Review of Systems: 56*  --  67   CA 8.5  --  8.2*  --  8.4*     --  136  --  135*   K 3.9   < > 3.4* 3.1* 3.4*   CL 95*  --  96*  --  93*   CO2 36.0*  --  36.0*  --  39.0*    < > = values in this interval not displayed.      Recent Labs   Lab 09/22/21  1543 09/23/21  04 metoprolol tartrate  50 mg Oral 2x Daily(Beta Blocker)   • budesonide  0.5 mg Nebulization BID   • Umeclidinium Bromide  1 puff Inhalation Daily     ayr saline nasal, acetaminophen, melatonin, ondansetron, Ipratropium Bromide, cyclobenzaprine, albuterol, a

## 2021-09-28 NOTE — CM/SW NOTE
09/28/21 1100   Discharge disposition   Expected discharge disposition Home-Health   Post Acute Care Provider Residential   DME/Infusion Providers   (hx of home 02)   Discharge transportation Private car     Discussed w/rn, for dc today.  Per notes, need

## 2021-09-28 NOTE — PROGRESS NOTES
[facility]     Hospitalist Progress Note     Cas Smallwood Patient Status:  Inpatient    1960 MRN [id number]   [facility] 8NE-A Attending Augie Machado, 160[address] [facility] Day # 6 PCP Bina Lane MD     Chief Complaint: SOB --    BILT 0.6  --   --   --   --   --   --    TP 7.7  --   --   --   --   --   --     < > = values in this interval not displayed. Estimated Creatinine Clearance: 68.5 mL/min (based on SCr of 1.17 mg/dL).     Recent Labs   Lab 09/22/21  1543   PTP 25 prosthesis  • Hypokalemia  o replace  • CAD  • HTN, controlled  o BB  • HLD  o statin  • COPD - not in exacerbation  o PTA inhalers  • DM2  o Hold metformin  o Hyperglycemia protocol, ICF  • Normocytic anemia  o Iron studies c/w iron deficiency  o IV iron

## 2021-09-28 NOTE — PAYOR COMM NOTE
Discharge Notification    Patient Name: Juliet Ground: [facility]  Subscriber #: [id number]  Authorization Number: [id number]  Admit Date/Time: 9/22/2021 3:30 PM  Discharge Date/Time: 9/28/2021 12:00 PM

## 2021-09-29 ENCOUNTER — PATIENT OUTREACH (OUTPATIENT)
Dept: CASE MANAGEMENT | Age: 61
End: 2021-09-29

## 2021-09-29 NOTE — PROGRESS NOTES
1st attempt to contact Pt to help schedule the following AVS ADT HFU Appts:    ADT ORDERS     Follow up with MD Mykel Greene [id number] [address] [phone]    Follow up with Harry Singleton MD in 1 week(s) Na

## 2021-09-29 NOTE — DISCHARGE SUMMARY
[facility] HOSPITALIST  DISCHARGE SUMMARY     Maria Elena Eubanks Patient Status:  Inpatient    1960 MRN [id number]   [facility] 8NE-A Attending No att. providers found   Hosp Day # 6 PCP Khoa Arriaga MD     Date of Admission: 2021  Da stable followed by nephrology. Diuretic dose will be changed to 40 mg twice daily at discharge needs BMP 1 week. Creatinine lower 1.17 on day of discharge. Patient was also found to be anemic was given Venofer.   Patient also found to be in A. fib with R rocking motion, and showed no evidence of dehiscence.  Peak velocity (S): 2.81m/sec. Mean velocity (S): 1.9m/sec. Mean gradient   (S): 17mm Hg. Peak gradient (S): 32mm Hg. Valve area (VTI): 1.78cm^2.  Indexed valve area (VTI): 0.81cm^2/m^2.  Peak velocity r known as: PACERONE      Take 200mg PO TID thru 9/30 THEN reduce dose to 200mg PO BID   Quantity: 270 tablet  Refills: 0        CHANGE how you take these medications      Instructions Prescription details   Budesonide-Formoterol Fumarate 160-4.5 MCG/ACT Aer tartrate 50 MG Tabs  Commonly known as: LOPRESSOR      Take 1 tablet (50 mg total) by mouth 2 (two) times daily. Quantity: 180 tablet  Refills: 3     Rivaroxaban 20 MG Tabs  Commonly known as: XARELTO      Take 20 mg by mouth daily with food.    Refills: [address] Anny [address]               10/6/2021  1:30 PM  [facility] INITIAL [2143] 150 min [facility] Cardiopulmonary Rehabilitation [facility] PULM REHAB ROOM 1    Patient Instructions:         Location Instructions:      You diuresis given rising creatinine and darker UO with increased lasix. He underwent DCCV cardioversion with success and remained in NSR following.  His cardiac and diuretic regimen was optimized, he was weaned to his baseline O2 needs of 4L NC and was dischar

## 2021-10-06 ENCOUNTER — CARDPULM VISIT (OUTPATIENT)
Dept: CARDIAC REHAB | Facility: HOSPITAL | Age: 61
End: 2021-10-06
Attending: INTERNAL MEDICINE
Payer: COMMERCIAL

## 2021-10-12 ENCOUNTER — CARDPULM VISIT (OUTPATIENT)
Dept: CARDIAC REHAB | Facility: HOSPITAL | Age: 61
End: 2021-10-12
Attending: INTERNAL MEDICINE
Payer: COMMERCIAL

## 2021-10-14 ENCOUNTER — CARDPULM VISIT (OUTPATIENT)
Dept: CARDIAC REHAB | Facility: HOSPITAL | Age: 61
End: 2021-10-14
Attending: INTERNAL MEDICINE
Payer: COMMERCIAL

## 2021-10-19 ENCOUNTER — CARDPULM VISIT (OUTPATIENT)
Dept: CARDIAC REHAB | Facility: HOSPITAL | Age: 61
End: 2021-10-19
Attending: INTERNAL MEDICINE
Payer: COMMERCIAL

## 2021-10-21 ENCOUNTER — APPOINTMENT (OUTPATIENT)
Dept: CARDIAC REHAB | Facility: HOSPITAL | Age: 61
End: 2021-10-21
Attending: INTERNAL MEDICINE
Payer: COMMERCIAL

## 2021-10-26 ENCOUNTER — CARDPULM VISIT (OUTPATIENT)
Dept: CARDIAC REHAB | Facility: HOSPITAL | Age: 61
End: 2021-10-26
Attending: INTERNAL MEDICINE
Payer: COMMERCIAL

## 2021-10-28 ENCOUNTER — CARDPULM VISIT (OUTPATIENT)
Dept: CARDIAC REHAB | Facility: HOSPITAL | Age: 61
End: 2021-10-28
Attending: INTERNAL MEDICINE
Payer: COMMERCIAL

## 2021-11-02 ENCOUNTER — APPOINTMENT (OUTPATIENT)
Dept: CARDIAC REHAB | Facility: HOSPITAL | Age: 61
End: 2021-11-02
Attending: INTERNAL MEDICINE
Payer: COMMERCIAL

## 2021-11-04 ENCOUNTER — APPOINTMENT (OUTPATIENT)
Dept: CARDIAC REHAB | Facility: HOSPITAL | Age: 61
End: 2021-11-04
Attending: INTERNAL MEDICINE
Payer: COMMERCIAL

## 2021-11-09 ENCOUNTER — APPOINTMENT (OUTPATIENT)
Dept: CARDIAC REHAB | Facility: HOSPITAL | Age: 61
End: 2021-11-09
Attending: INTERNAL MEDICINE
Payer: COMMERCIAL

## 2021-11-10 PROBLEM — I50.32 CHRONIC HEART FAILURE WITH PRESERVED EJECTION FRACTION (HCC): Status: ACTIVE | Noted: 2021-11-10

## 2021-11-11 ENCOUNTER — APPOINTMENT (OUTPATIENT)
Dept: CARDIAC REHAB | Facility: HOSPITAL | Age: 61
End: 2021-11-11
Attending: INTERNAL MEDICINE
Payer: COMMERCIAL

## 2021-11-16 ENCOUNTER — APPOINTMENT (OUTPATIENT)
Dept: CARDIAC REHAB | Facility: HOSPITAL | Age: 61
End: 2021-11-16
Attending: INTERNAL MEDICINE
Payer: COMMERCIAL

## 2021-11-18 ENCOUNTER — APPOINTMENT (OUTPATIENT)
Dept: CARDIAC REHAB | Facility: HOSPITAL | Age: 61
End: 2021-11-18
Attending: INTERNAL MEDICINE
Payer: COMMERCIAL

## 2021-11-23 ENCOUNTER — APPOINTMENT (OUTPATIENT)
Dept: CARDIAC REHAB | Facility: HOSPITAL | Age: 61
End: 2021-11-23
Attending: INTERNAL MEDICINE
Payer: COMMERCIAL

## 2021-11-25 ENCOUNTER — APPOINTMENT (OUTPATIENT)
Dept: CARDIAC REHAB | Facility: HOSPITAL | Age: 61
End: 2021-11-25
Attending: INTERNAL MEDICINE
Payer: COMMERCIAL

## 2021-11-30 ENCOUNTER — APPOINTMENT (OUTPATIENT)
Dept: CARDIAC REHAB | Facility: HOSPITAL | Age: 61
End: 2021-11-30
Attending: INTERNAL MEDICINE
Payer: COMMERCIAL

## 2021-12-02 ENCOUNTER — APPOINTMENT (OUTPATIENT)
Dept: CARDIAC REHAB | Facility: HOSPITAL | Age: 61
End: 2021-12-02
Attending: INTERNAL MEDICINE
Payer: COMMERCIAL

## 2021-12-07 ENCOUNTER — APPOINTMENT (OUTPATIENT)
Dept: CARDIAC REHAB | Facility: HOSPITAL | Age: 61
End: 2021-12-07
Attending: INTERNAL MEDICINE
Payer: COMMERCIAL

## 2021-12-09 ENCOUNTER — APPOINTMENT (OUTPATIENT)
Dept: CARDIAC REHAB | Facility: HOSPITAL | Age: 61
End: 2021-12-09
Attending: INTERNAL MEDICINE
Payer: COMMERCIAL

## 2021-12-14 ENCOUNTER — APPOINTMENT (OUTPATIENT)
Dept: CARDIAC REHAB | Facility: HOSPITAL | Age: 61
End: 2021-12-14
Attending: INTERNAL MEDICINE
Payer: COMMERCIAL

## 2021-12-16 ENCOUNTER — APPOINTMENT (OUTPATIENT)
Dept: CARDIAC REHAB | Facility: HOSPITAL | Age: 61
End: 2021-12-16
Attending: INTERNAL MEDICINE
Payer: COMMERCIAL

## 2021-12-21 ENCOUNTER — APPOINTMENT (OUTPATIENT)
Dept: CARDIAC REHAB | Facility: HOSPITAL | Age: 61
End: 2021-12-21
Attending: INTERNAL MEDICINE
Payer: COMMERCIAL

## 2021-12-23 ENCOUNTER — APPOINTMENT (OUTPATIENT)
Dept: CARDIAC REHAB | Facility: HOSPITAL | Age: 61
End: 2021-12-23
Attending: INTERNAL MEDICINE
Payer: COMMERCIAL

## 2021-12-28 ENCOUNTER — APPOINTMENT (OUTPATIENT)
Dept: CARDIAC REHAB | Facility: HOSPITAL | Age: 61
End: 2021-12-28
Attending: INTERNAL MEDICINE
Payer: COMMERCIAL

## 2021-12-30 ENCOUNTER — APPOINTMENT (OUTPATIENT)
Dept: CARDIAC REHAB | Facility: HOSPITAL | Age: 61
End: 2021-12-30
Attending: INTERNAL MEDICINE
Payer: COMMERCIAL

## 2022-01-04 ENCOUNTER — APPOINTMENT (OUTPATIENT)
Dept: CARDIAC REHAB | Facility: HOSPITAL | Age: 62
End: 2022-01-04
Attending: INTERNAL MEDICINE

## 2022-01-06 ENCOUNTER — APPOINTMENT (OUTPATIENT)
Dept: CARDIAC REHAB | Facility: HOSPITAL | Age: 62
End: 2022-01-06
Attending: INTERNAL MEDICINE

## 2022-01-11 ENCOUNTER — APPOINTMENT (OUTPATIENT)
Dept: CARDIAC REHAB | Facility: HOSPITAL | Age: 62
End: 2022-01-11
Attending: INTERNAL MEDICINE

## 2022-01-13 ENCOUNTER — APPOINTMENT (OUTPATIENT)
Dept: CARDIAC REHAB | Facility: HOSPITAL | Age: 62
End: 2022-01-13
Attending: INTERNAL MEDICINE

## 2022-01-18 ENCOUNTER — APPOINTMENT (OUTPATIENT)
Dept: CARDIAC REHAB | Facility: HOSPITAL | Age: 62
End: 2022-01-18
Attending: INTERNAL MEDICINE

## 2022-01-20 ENCOUNTER — APPOINTMENT (OUTPATIENT)
Dept: CARDIAC REHAB | Facility: HOSPITAL | Age: 62
End: 2022-01-20
Attending: INTERNAL MEDICINE

## 2022-01-24 ENCOUNTER — HOSPITAL ENCOUNTER (OUTPATIENT)
Dept: CT IMAGING | Age: 62
Discharge: HOME | End: 2022-01-24
Attending: INTERNAL MEDICINE
Payer: COMMERCIAL

## 2022-01-24 DIAGNOSIS — J44.9 COPD, SEVERE (HCC): ICD-10-CM

## 2022-01-24 DIAGNOSIS — R59.0 LYMPHADENOPATHY, MEDIASTINAL: ICD-10-CM

## 2022-01-24 DIAGNOSIS — R91.1 LUNG NODULE: ICD-10-CM

## 2022-01-24 PROCEDURE — 71250 CT THORAX DX C-: CPT | Performed by: INTERNAL MEDICINE

## 2022-01-25 ENCOUNTER — APPOINTMENT (OUTPATIENT)
Dept: CARDIAC REHAB | Facility: HOSPITAL | Age: 62
End: 2022-01-25
Attending: INTERNAL MEDICINE

## 2022-01-27 ENCOUNTER — APPOINTMENT (OUTPATIENT)
Dept: CARDIAC REHAB | Facility: HOSPITAL | Age: 62
End: 2022-01-27
Attending: INTERNAL MEDICINE

## 2022-02-01 ENCOUNTER — APPOINTMENT (OUTPATIENT)
Dept: CARDIAC REHAB | Facility: HOSPITAL | Age: 62
End: 2022-02-01
Attending: INTERNAL MEDICINE

## 2022-02-03 ENCOUNTER — APPOINTMENT (OUTPATIENT)
Dept: CARDIAC REHAB | Facility: HOSPITAL | Age: 62
End: 2022-02-03
Attending: INTERNAL MEDICINE

## 2022-02-08 ENCOUNTER — APPOINTMENT (OUTPATIENT)
Dept: CARDIAC REHAB | Facility: HOSPITAL | Age: 62
End: 2022-02-08
Attending: INTERNAL MEDICINE

## 2022-02-10 ENCOUNTER — APPOINTMENT (OUTPATIENT)
Dept: CARDIAC REHAB | Facility: HOSPITAL | Age: 62
End: 2022-02-10
Attending: INTERNAL MEDICINE

## 2022-04-18 ENCOUNTER — HOSPITAL ENCOUNTER (OUTPATIENT)
Age: 62
Discharge: HOME | End: 2022-04-18
Payer: COMMERCIAL

## 2022-04-18 VITALS
BODY MASS INDEX: 28.63 KG/M2 | OXYGEN SATURATION: 93 % | DIASTOLIC BLOOD PRESSURE: 58 MMHG | HEIGHT: 70 IN | SYSTOLIC BLOOD PRESSURE: 144 MMHG | TEMPERATURE: 98 F | HEART RATE: 61 BPM | RESPIRATION RATE: 19 BRPM | WEIGHT: 200 LBS

## 2022-04-18 DIAGNOSIS — S00.412A EAR CANAL ABRASION, LEFT, INITIAL ENCOUNTER: Primary | ICD-10-CM

## 2022-04-18 PROCEDURE — 99213 OFFICE O/P EST LOW 20 MIN: CPT

## 2022-04-18 PROCEDURE — 99212 OFFICE O/P EST SF 10 MIN: CPT

## 2022-04-18 NOTE — ED INITIAL ASSESSMENT (HPI)
sts ear itched last night. Took a q-tip to clean them out. sts approx 20 min later, left ear started to bleed. sts been bleeding since. sts that he is on blood thinners and anemic. Does feel a little light headed but doesn'y know if related to his chronic conditions.   Pt is 2L NC home O2

## 2022-04-19 PROBLEM — I35.1 AORTIC VALVE REGURGITATION: Status: ACTIVE | Noted: 2022-04-19

## 2022-04-19 PROBLEM — D72.829 LEUKOCYTOSIS (LEUCOCYTOSIS): Status: ACTIVE | Noted: 2021-05-07

## 2022-08-10 ENCOUNTER — HOSPITAL ENCOUNTER (OUTPATIENT)
Dept: GENERAL RADIOLOGY | Age: 62
Discharge: HOME | End: 2022-08-10
Attending: INTERNAL MEDICINE
Payer: COMMERCIAL

## 2022-08-10 DIAGNOSIS — J44.9 STAGE 3 SEVERE COPD BY GOLD CLASSIFICATION (HCC): ICD-10-CM

## 2022-08-10 PROCEDURE — 71046 X-RAY EXAM CHEST 2 VIEWS: CPT | Performed by: INTERNAL MEDICINE

## 2022-11-18 ENCOUNTER — LAB ENCOUNTER (OUTPATIENT)
Dept: LAB | Age: 62
End: 2022-11-18
Attending: STUDENT IN AN ORGANIZED HEALTH CARE EDUCATION/TRAINING PROGRAM
Payer: COMMERCIAL

## 2022-11-26 ENCOUNTER — LAB ENCOUNTER (OUTPATIENT)
Dept: LAB | Age: 62
End: 2022-11-26
Attending: STUDENT IN AN ORGANIZED HEALTH CARE EDUCATION/TRAINING PROGRAM
Payer: COMMERCIAL

## 2022-11-26 DIAGNOSIS — Z01.818 PRE-OP TESTING: ICD-10-CM

## 2022-11-27 LAB — SARS-COV-2 RNA RESP QL NAA+PROBE: NOT DETECTED

## 2022-12-22 ENCOUNTER — MED REC SCAN ONLY (OUTPATIENT)
Facility: CLINIC | Age: 62
End: 2022-12-22

## 2022-12-27 ENCOUNTER — OFFICE VISIT (OUTPATIENT)
Facility: CLINIC | Age: 62
End: 2022-12-27
Payer: COMMERCIAL

## 2022-12-27 VITALS
RESPIRATION RATE: 16 BRPM | OXYGEN SATURATION: 94 % | DIASTOLIC BLOOD PRESSURE: 60 MMHG | HEART RATE: 84 BPM | WEIGHT: 193 LBS | SYSTOLIC BLOOD PRESSURE: 98 MMHG | HEIGHT: 70 IN | BODY MASS INDEX: 27.63 KG/M2

## 2022-12-27 DIAGNOSIS — R91.8 MULTIPLE PULMONARY NODULES: ICD-10-CM

## 2022-12-27 DIAGNOSIS — J44.9 CHRONIC OBSTRUCTIVE PULMONARY DISEASE, UNSPECIFIED COPD TYPE (HCC): Primary | ICD-10-CM

## 2022-12-27 DIAGNOSIS — Z72.0 TOBACCO ABUSE: ICD-10-CM

## 2022-12-27 DIAGNOSIS — J96.11 CHRONIC RESPIRATORY FAILURE WITH HYPOXIA (HCC): ICD-10-CM

## 2022-12-27 PROCEDURE — 3078F DIAST BP <80 MM HG: CPT | Performed by: INTERNAL MEDICINE

## 2022-12-27 PROCEDURE — 99214 OFFICE O/P EST MOD 30 MIN: CPT | Performed by: INTERNAL MEDICINE

## 2022-12-27 PROCEDURE — 3074F SYST BP LT 130 MM HG: CPT | Performed by: INTERNAL MEDICINE

## 2022-12-27 PROCEDURE — 3008F BODY MASS INDEX DOCD: CPT | Performed by: INTERNAL MEDICINE

## 2022-12-27 RX ORDER — LEVALBUTEROL INHALATION SOLUTION 1.25 MG/3ML
1.25 SOLUTION RESPIRATORY (INHALATION) EVERY 6 HOURS PRN
Status: DISCONTINUED | OUTPATIENT
Start: 2023-01-05 | End: 2022-12-27

## 2022-12-27 RX ORDER — ZOLPIDEM TARTRATE 10 MG/1
10 TABLET ORAL NIGHTLY PRN
COMMUNITY
Start: 2022-07-14

## 2022-12-27 RX ORDER — SACUBITRIL AND VALSARTAN 49; 51 MG/1; MG/1
1 TABLET, FILM COATED ORAL 2 TIMES DAILY
COMMUNITY
Start: 2022-08-08

## 2022-12-27 RX ORDER — ATORVASTATIN CALCIUM 20 MG/1
20 TABLET, FILM COATED ORAL NIGHTLY
COMMUNITY
Start: 2022-08-08

## 2022-12-27 RX ORDER — LEVALBUTEROL 1.25 MG/.5ML
1.25 SOLUTION, CONCENTRATE RESPIRATORY (INHALATION) EVERY 6 HOURS PRN
OUTPATIENT
Start: 2023-01-05

## 2022-12-27 RX ORDER — METOPROLOL TARTRATE 50 MG/1
50 TABLET, FILM COATED ORAL DAILY
COMMUNITY
Start: 2022-12-13

## 2022-12-28 ENCOUNTER — ANESTHESIA EVENT (OUTPATIENT)
Dept: ENDOSCOPY | Facility: HOSPITAL | Age: 62
End: 2022-12-28
Payer: COMMERCIAL

## 2023-01-03 ENCOUNTER — LAB ENCOUNTER (OUTPATIENT)
Dept: LAB | Age: 63
End: 2023-01-03
Attending: STUDENT IN AN ORGANIZED HEALTH CARE EDUCATION/TRAINING PROGRAM
Payer: COMMERCIAL

## 2023-01-03 DIAGNOSIS — I10 PRIMARY HYPERTENSION: ICD-10-CM

## 2023-01-03 DIAGNOSIS — I50.31 ACUTE HEART FAILURE WITH PRESERVED EJECTION FRACTION (HFPEF) (HCC): ICD-10-CM

## 2023-01-03 DIAGNOSIS — I48.91 ATRIAL FIBRILLATION WITH RAPID VENTRICULAR RESPONSE (HCC): ICD-10-CM

## 2023-01-03 DIAGNOSIS — D64.9 ANEMIA: ICD-10-CM

## 2023-01-03 DIAGNOSIS — J44.9 CHRONIC OBSTRUCTIVE PULMONARY DISEASE, UNSPECIFIED COPD TYPE (HCC): ICD-10-CM

## 2023-01-04 LAB — SARS-COV-2 RNA RESP QL NAA+PROBE: NOT DETECTED

## 2023-01-05 ENCOUNTER — HOSPITAL ENCOUNTER (OUTPATIENT)
Facility: HOSPITAL | Age: 63
Setting detail: HOSPITAL OUTPATIENT SURGERY
Discharge: HOME | End: 2023-01-05
Attending: STUDENT IN AN ORGANIZED HEALTH CARE EDUCATION/TRAINING PROGRAM | Admitting: STUDENT IN AN ORGANIZED HEALTH CARE EDUCATION/TRAINING PROGRAM
Payer: COMMERCIAL

## 2023-01-05 ENCOUNTER — ANESTHESIA (OUTPATIENT)
Dept: ENDOSCOPY | Facility: HOSPITAL | Age: 63
End: 2023-01-05
Payer: COMMERCIAL

## 2023-01-05 VITALS
HEART RATE: 80 BPM | TEMPERATURE: 99 F | SYSTOLIC BLOOD PRESSURE: 121 MMHG | BODY MASS INDEX: 28.06 KG/M2 | OXYGEN SATURATION: 90 % | HEIGHT: 70 IN | DIASTOLIC BLOOD PRESSURE: 86 MMHG | WEIGHT: 196 LBS | RESPIRATION RATE: 18 BRPM

## 2023-01-05 DIAGNOSIS — Z86.010 PERSONAL HISTORY OF COLONIC POLYPS: ICD-10-CM

## 2023-01-05 DIAGNOSIS — J44.9 CHRONIC OBSTRUCTIVE PULMONARY DISEASE, UNSPECIFIED COPD TYPE (HCC): ICD-10-CM

## 2023-01-05 DIAGNOSIS — I10 PRIMARY HYPERTENSION: Primary | ICD-10-CM

## 2023-01-05 DIAGNOSIS — D50.9 IRON DEFICIENCY ANEMIA, UNSPECIFIED IRON DEFICIENCY ANEMIA TYPE: ICD-10-CM

## 2023-01-05 DIAGNOSIS — I48.91 ATRIAL FIBRILLATION WITH RAPID VENTRICULAR RESPONSE (HCC): ICD-10-CM

## 2023-01-05 DIAGNOSIS — D64.9 ANEMIA: ICD-10-CM

## 2023-01-05 DIAGNOSIS — I50.31 ACUTE HEART FAILURE WITH PRESERVED EJECTION FRACTION (HFPEF) (HCC): ICD-10-CM

## 2023-01-05 PROCEDURE — 88305 TISSUE EXAM BY PATHOLOGIST: CPT | Performed by: STUDENT IN AN ORGANIZED HEALTH CARE EDUCATION/TRAINING PROGRAM

## 2023-01-05 PROCEDURE — 0DBN8ZX EXCISION OF SIGMOID COLON, VIA NATURAL OR ARTIFICIAL OPENING ENDOSCOPIC, DIAGNOSTIC: ICD-10-PCS | Performed by: STUDENT IN AN ORGANIZED HEALTH CARE EDUCATION/TRAINING PROGRAM

## 2023-01-05 PROCEDURE — 0DB78ZX EXCISION OF STOMACH, PYLORUS, VIA NATURAL OR ARTIFICIAL OPENING ENDOSCOPIC, DIAGNOSTIC: ICD-10-PCS | Performed by: STUDENT IN AN ORGANIZED HEALTH CARE EDUCATION/TRAINING PROGRAM

## 2023-01-05 PROCEDURE — 0DBK8ZX EXCISION OF ASCENDING COLON, VIA NATURAL OR ARTIFICIAL OPENING ENDOSCOPIC, DIAGNOSTIC: ICD-10-PCS | Performed by: STUDENT IN AN ORGANIZED HEALTH CARE EDUCATION/TRAINING PROGRAM

## 2023-01-05 PROCEDURE — 0DB68ZX EXCISION OF STOMACH, VIA NATURAL OR ARTIFICIAL OPENING ENDOSCOPIC, DIAGNOSTIC: ICD-10-PCS | Performed by: STUDENT IN AN ORGANIZED HEALTH CARE EDUCATION/TRAINING PROGRAM

## 2023-01-05 RX ORDER — SODIUM CHLORIDE, SODIUM LACTATE, POTASSIUM CHLORIDE, CALCIUM CHLORIDE 600; 310; 30; 20 MG/100ML; MG/100ML; MG/100ML; MG/100ML
INJECTION, SOLUTION INTRAVENOUS CONTINUOUS
Status: DISCONTINUED | OUTPATIENT
Start: 2023-01-05 | End: 2023-01-05

## 2023-01-05 RX ORDER — SODIUM CHLORIDE, SODIUM LACTATE, POTASSIUM CHLORIDE, CALCIUM CHLORIDE 600; 310; 30; 20 MG/100ML; MG/100ML; MG/100ML; MG/100ML
INJECTION, SOLUTION INTRAVENOUS CONTINUOUS
OUTPATIENT
Start: 2023-01-05

## 2023-01-05 RX ORDER — NALOXONE HYDROCHLORIDE 0.4 MG/ML
80 INJECTION, SOLUTION INTRAMUSCULAR; INTRAVENOUS; SUBCUTANEOUS AS NEEDED
OUTPATIENT
Start: 2023-01-05 | End: 2023-01-05

## 2023-01-05 RX ORDER — NICOTINE POLACRILEX 4 MG
15 LOZENGE BUCCAL
OUTPATIENT
Start: 2023-01-05

## 2023-01-05 RX ORDER — LIDOCAINE HYDROCHLORIDE 10 MG/ML
INJECTION, SOLUTION EPIDURAL; INFILTRATION; INTRACAUDAL; PERINEURAL AS NEEDED
Status: DISCONTINUED | OUTPATIENT
Start: 2023-01-05 | End: 2023-01-05 | Stop reason: SURG

## 2023-01-05 RX ORDER — NICOTINE POLACRILEX 4 MG
30 LOZENGE BUCCAL
OUTPATIENT
Start: 2023-01-05

## 2023-01-05 RX ORDER — IPRATROPIUM BROMIDE AND ALBUTEROL SULFATE 2.5; .5 MG/3ML; MG/3ML
3 SOLUTION RESPIRATORY (INHALATION) ONCE
Status: COMPLETED | OUTPATIENT
Start: 2023-01-05 | End: 2023-01-05

## 2023-01-05 RX ORDER — IPRATROPIUM BROMIDE AND ALBUTEROL SULFATE 2.5; .5 MG/3ML; MG/3ML
SOLUTION RESPIRATORY (INHALATION)
Status: DISCONTINUED
Start: 2023-01-05 | End: 2023-01-05

## 2023-01-05 RX ORDER — DEXTROSE MONOHYDRATE 25 G/50ML
50 INJECTION, SOLUTION INTRAVENOUS
OUTPATIENT
Start: 2023-01-05

## 2023-01-05 RX ADMIN — SODIUM CHLORIDE, SODIUM LACTATE, POTASSIUM CHLORIDE, CALCIUM CHLORIDE: 600; 310; 30; 20 INJECTION, SOLUTION INTRAVENOUS at 09:23:00

## 2023-01-05 RX ADMIN — LIDOCAINE HYDROCHLORIDE 100 MG: 10 INJECTION, SOLUTION EPIDURAL; INFILTRATION; INTRACAUDAL; PERINEURAL at 09:08:00

## 2023-01-05 RX ADMIN — SODIUM CHLORIDE, SODIUM LACTATE, POTASSIUM CHLORIDE, CALCIUM CHLORIDE: 600; 310; 30; 20 INJECTION, SOLUTION INTRAVENOUS at 09:03:00

## 2023-01-05 NOTE — DISCHARGE INSTRUCTIONS
Per Dr Rogel Parents  OK to resume Xarelto tomorrow AM.  Mild inflammatory changes in the stomach. No ulcers, and no worrisome findings. Random biopsies were taken. Two small polyps identified and removed on the colonoscopy. There are small pockets called diverticuli on the exam, as well. No explanation for anemia found on the colonoscopy. Repeat iron studies and CBC in 2/2023 (last levels in 8/2022 show normalization of Hgb and iron studies).

## 2023-01-05 NOTE — ANESTHESIA POSTPROCEDURE EVALUATION
[address] Patient Status:  Hospital Outpatient Surgery   Age/Gender 60year old male MRN [id number]   Location [address] [address] Attending Claudio Okeefe MD   Hosp Day # 0 PCP Hannah Ayon MD       Anesthesia Post-op Note    ESOPHAGOGASTRODUODENOSCOPY with biopsy,,COLONOSCOPY with cold snare polypectomy    Procedure Summary     Date: 01/05/23 Room / Location: [facility] ENDOSCOPY 02 / 1404 [facility] ENDOSCOPY    Anesthesia Start: 2636 Anesthesia Stop: 1340    Procedures:       ESOPHAGOGASTRODUODENOSCOPY with biopsy,,COLONOSCOPY with cold snare polypectomy      . Diagnosis:       Iron deficiency anemia, unspecified iron deficiency anemia type      Personal history of colonic polyps      (Gastritis, colon polyps, diverticulosis)    Surgeons: Rosanna Sanabria MD Anesthesiologist: Ivana Cheung MD    Anesthesia Type: MAC ASA Status: 4          Anesthesia Type: MAC    Vitals Value Taken Time   /59 01/05/23 0947   Temp  01/05/23 0951   Pulse 86 01/05/23 0949   Resp 22 01/05/23 0951   SpO2 90 % 01/05/23 0949   Vitals shown include unvalidated device data. Patient Location: Endoscopy    Anesthesia Type: MAC    Airway Patency: patent    Postop Pain Control: adequate    Mental Status: preanesthetic baseline    Nausea/Vomiting: none    Cardiopulmonary/Hydration status: stable euvolemic    Complications: no apparent anesthesia related complications    Postop vital signs: stable    Dental Exam: Unchanged from Preop    Patient to be discharged home when criteria met.

## 2023-01-05 NOTE — OPERATIVE REPORT
Colon operative report  Patient Name: Leon Chairez  Procedure: Colonoscopy with snare polypectomy  Indication: iron def anemia  Attending: Epifanio Danielson M.D. Consent: The risks, benefits, and alternatives were discussed with the patient / POA. Risks included, but were not limited to, bleeding, perforation, medication effects, cardiac arrhythmias, missed polyps, and aspiration. After all questions were answered to their satisfaction, a signed, informed, and witnessed consent was obtained. Sedation: Monitored Anesthesia Care  Monitoring: Pulsoximetry, pulse, respirations, and blood pressure were monitored throughout the entire procedure    Preparation Quality: fair. [place] Bowel Prep Score: Right 2 / Transverse 3 / Left 2   Procedure: After achieving adequate sedation, and placing the patient in the left lateral decubitus position, a digital rectal examination was performed. The lubricated tip of the pediatric colonoscope was then introduced into the rectum and advanced to the terminal ileum. The appendiceal orifice and ileocecal valve were clearly and distinctly visualized, thus verifying the cecum. The terminal ileum was intubated and found to be normal to the extent examined. The endoscope was then carefully withdrawn from the patient with careful visualization of the colonic mucosa revealing no additional pathologic findings. Air was suctioned to the best of my ability, during withdrawal of the endoscope. When the endoscope reached the rectum, it was placed in a retroflexed position, and the rectal bulb was thus visualized. The endoscope was righted, and then removed from the patient. The patient tolerated the procedure without apparent procedural complications. The patient left the procedure room in stable condition for recovery. Findings:    1. Normal terminal ileum. 2. 4-5 mm sessile polyp in the ascending colon, removed with a cold snare polypectomy.   3. 4-5 mm sessile polyp in the sigmoid colon, removed with a cold snare polypectomy. 4. Diverticulosis throughout the left colon. 5. Stool (solid fecal debris) in the right and left colon, lavaged to the best of my ability. 6. Moderate internal hemorrhoids. Impression: Findings as above. Recommendations:   1. Await pathology   2. Consider repeat colonoscopy in 5 years based on patient's health at that time  3. OK to resume Xarelto tomorrow am  4.  Repeat iron studies and CBC in 2/2023 (has not been on iron supplementation since summer 2022 and last CBC and iron studies normal 8/2022)    Matthieu Okeefe MD

## 2023-01-05 NOTE — OPERATIVE REPORT
EGD operative report  Patient Name: Maryanne Jones  Procedure: Esophagogastroduodenoscopy with biopsy   Indication: iron def anemia  Attending: Bandar Singh M.D. Consent:  The risks, benefits, and alternatives were discussed with the patient / POA. Risks included, but were not limited to, bleeding, perforation, medication effects, cardiac arrhythmias, and aspiration. After all questions were answered to their satisfaction, a signed, informed, and witnessed consent was obtained. Sedation: Monitored Anesthesia Care  Monitoring:  Pulsoximetry, pulse, respirations, and blood pressure were monitored throughout the entire procedure  Procedure: After achieving adequate sedation and placing the patient in the left lateral decubitus position, the lubricated upper endoscope was introduced into the mouth and advanced to the descending duodenum. The endoscope was then withdrawn into the gastric antrum and placed in a retroflexed position. The endoscope was then righted, and air was suctioned from the stomach. The endoscope was then withdrawn from the patient, with careful visual inspection of the mucosa revealing no additional pathologic findings. The patient tolerated the procedure without apparent procedural complications. The patient left the procedure room in stable condition for recovery. Findings:    Esophagus: The mucosa was normal.   GE junction: There is a small amount of erythema along the GE junction. Stomach: There is diffusely scattered flecks of heme and patchy erythema throughout the  gastric body and antrum. The gastric fundus, cardia, and angularis were normal.  Biopsies were obtained from the antrum, body, and fundus, to evaluate for H.pylori. Duodenum: The duodenal bulb, post-bulbar duodenum, and descending duodenum were normal.  Impression: Findings as above. Recommendations:   1. Await pathology  2.  Colonoscopy today    Bandar Singh MD

## 2023-01-27 NOTE — OCCUPATIONAL THERAPY NOTE
Patient intermittently walking around hallways naked without able to be redirected. Writer spoke with Kris, from psych asking to prioritize psych consult for patient. Stated that they will round on within next hour.   Received OT order through functional mobility screening. Per PT who communicated with RN, no need for therapy. Independent. OT will sign off.

## 2023-01-31 ENCOUNTER — LAB ENCOUNTER (OUTPATIENT)
Dept: LAB | Age: 63
End: 2023-01-31
Attending: INTERNAL MEDICINE
Payer: COMMERCIAL

## 2023-01-31 DIAGNOSIS — I50.22 CHRONIC SYSTOLIC HEART FAILURE (HCC): ICD-10-CM

## 2023-01-31 PROCEDURE — 82728 ASSAY OF FERRITIN: CPT | Performed by: STUDENT IN AN ORGANIZED HEALTH CARE EDUCATION/TRAINING PROGRAM

## 2023-01-31 PROCEDURE — 36415 COLL VENOUS BLD VENIPUNCTURE: CPT

## 2023-01-31 PROCEDURE — 83550 IRON BINDING TEST: CPT | Performed by: STUDENT IN AN ORGANIZED HEALTH CARE EDUCATION/TRAINING PROGRAM

## 2023-01-31 PROCEDURE — 80048 BASIC METABOLIC PNL TOTAL CA: CPT | Performed by: STUDENT IN AN ORGANIZED HEALTH CARE EDUCATION/TRAINING PROGRAM

## 2023-01-31 PROCEDURE — 85025 COMPLETE CBC W/AUTO DIFF WBC: CPT | Performed by: STUDENT IN AN ORGANIZED HEALTH CARE EDUCATION/TRAINING PROGRAM

## 2023-01-31 PROCEDURE — 83540 ASSAY OF IRON: CPT | Performed by: STUDENT IN AN ORGANIZED HEALTH CARE EDUCATION/TRAINING PROGRAM

## 2023-01-31 PROCEDURE — 80162 ASSAY OF DIGOXIN TOTAL: CPT

## 2023-02-01 LAB — DIGOXIN SERPL-MCNC: 0.53 NG/ML (ref 0.8–2)

## 2023-02-28 RX ORDER — VARENICLINE TARTRATE 0.5 MG/1
TABLET, FILM COATED ORAL
Qty: 11 TABLET | Refills: 0 | Status: SHIPPED | OUTPATIENT
Start: 2023-02-28

## 2023-02-28 RX ORDER — VARENICLINE TARTRATE 1 MG/1
TABLET, FILM COATED ORAL
Qty: 60 TABLET | Refills: 0 | Status: SHIPPED | OUTPATIENT
Start: 2023-03-07

## 2023-03-14 ENCOUNTER — HOSPITAL ENCOUNTER (OUTPATIENT)
Dept: CT IMAGING | Facility: HOSPITAL | Age: 63
Discharge: HOME | End: 2023-03-14
Attending: INTERNAL MEDICINE
Payer: COMMERCIAL

## 2023-03-14 DIAGNOSIS — Z87.891 PERSONAL HISTORY OF TOBACCO USE: ICD-10-CM

## 2023-03-14 PROCEDURE — 71271 CT THORAX LUNG CANCER SCR C-: CPT | Performed by: INTERNAL MEDICINE

## 2023-03-22 ENCOUNTER — OFFICE VISIT (OUTPATIENT)
Facility: CLINIC | Age: 63
End: 2023-03-22
Payer: COMMERCIAL

## 2023-03-22 VITALS
RESPIRATION RATE: 16 BRPM | BODY MASS INDEX: 25.2 KG/M2 | HEIGHT: 70 IN | SYSTOLIC BLOOD PRESSURE: 120 MMHG | WEIGHT: 176 LBS | DIASTOLIC BLOOD PRESSURE: 62 MMHG | OXYGEN SATURATION: 89 % | HEART RATE: 69 BPM

## 2023-03-22 DIAGNOSIS — R91.8 LUNG NODULES: Primary | ICD-10-CM

## 2023-03-22 DIAGNOSIS — R91.8 MULTIPLE PULMONARY NODULES: ICD-10-CM

## 2023-03-22 DIAGNOSIS — J96.11 CHRONIC RESPIRATORY FAILURE WITH HYPOXIA (HCC): ICD-10-CM

## 2023-03-22 DIAGNOSIS — Z72.0 TOBACCO ABUSE: ICD-10-CM

## 2023-03-22 DIAGNOSIS — J44.9 CHRONIC OBSTRUCTIVE PULMONARY DISEASE, UNSPECIFIED COPD TYPE (HCC): ICD-10-CM

## 2023-03-22 PROCEDURE — 3008F BODY MASS INDEX DOCD: CPT | Performed by: INTERNAL MEDICINE

## 2023-03-22 PROCEDURE — 3078F DIAST BP <80 MM HG: CPT | Performed by: INTERNAL MEDICINE

## 2023-03-22 PROCEDURE — 3074F SYST BP LT 130 MM HG: CPT | Performed by: INTERNAL MEDICINE

## 2023-03-22 PROCEDURE — 99214 OFFICE O/P EST MOD 30 MIN: CPT | Performed by: INTERNAL MEDICINE

## 2023-03-22 RX ORDER — VARENICLINE TARTRATE 1 MG/1
TABLET, FILM COATED ORAL
Qty: 60 TABLET | Refills: 0 | Status: SHIPPED | OUTPATIENT
Start: 2023-03-22

## 2023-03-22 RX ORDER — SODIUM CHLORIDE FOR INHALATION 3 %
3 VIAL, NEBULIZER (ML) INHALATION EVERY 8 HOURS PRN
Qty: 270 ML | Refills: 3 | Status: SHIPPED | OUTPATIENT
Start: 2023-03-22

## 2023-03-22 NOTE — PATIENT INSTRUCTIONS
Continue the symbicort twice a day and spiriva once a day  Continue to use albuterol 2 puffs every 6 hours as needed for your breathing or cough  Start using hypertonic saline nebulizers - one vial every 8 hours as needed for your cough.    Plan on a repeat CT scan in September 2023

## 2023-03-22 NOTE — TELEPHONE ENCOUNTER
Last seen by Dr. Froylan Harper on 12-27-22. Pt advised to follow up in 3 months and appt scheduled for today.

## 2023-04-24 PROBLEM — I50.20 HFREF (HEART FAILURE WITH REDUCED EJECTION FRACTION) (HCC): Status: ACTIVE | Noted: 2022-05-17

## 2023-05-05 ENCOUNTER — TELEPHONE (OUTPATIENT)
Facility: CLINIC | Age: 63
End: 2023-05-05

## 2023-05-05 ENCOUNTER — HOSPITAL ENCOUNTER (OUTPATIENT)
Dept: CT IMAGING | Facility: HOSPITAL | Age: 63
Discharge: HOME | End: 2023-05-05
Attending: FAMILY MEDICINE
Payer: COMMERCIAL

## 2023-05-05 DIAGNOSIS — R10.13 EPIGASTRIC PAIN: ICD-10-CM

## 2023-05-05 DIAGNOSIS — R63.4 WEIGHT LOSS: ICD-10-CM

## 2023-05-05 DIAGNOSIS — J44.9 CHRONIC OBSTRUCTIVE PULMONARY DISEASE, UNSPECIFIED COPD TYPE (HCC): Primary | ICD-10-CM

## 2023-05-05 LAB
CREAT BLD-MCNC: 0.9 MG/DL
GFR SERPLBLD BASED ON 1.73 SQ M-ARVRAT: 96 ML/MIN/1.73M2 (ref 60–?)

## 2023-05-05 PROCEDURE — 82565 ASSAY OF CREATININE: CPT

## 2023-05-05 PROCEDURE — 74170 CT ABD WO CNTRST FLWD CNTRST: CPT | Performed by: FAMILY MEDICINE

## 2023-05-05 RX ORDER — ROFLUMILAST 500 UG/1
500 TABLET ORAL DAILY
Qty: 30 TABLET | Refills: 5 | Status: SHIPPED | OUTPATIENT
Start: 2023-05-05

## 2023-05-05 NOTE — TELEPHONE ENCOUNTER
Pt's wife is calling, the pharmacy is out of refills on [address] and they need a new prescription sent to the [facility] pharmacy. Pt does have a couple left but seeks refill as soon as possible please.

## 2023-05-05 NOTE — TELEPHONE ENCOUNTER
Last office visit with Dr. Buell Crigler on 3-22-23. Advised to follow up in 6 months. Appt scheduled for 9-19-23. Left message that Rx sent to [address] [place] [address].

## 2023-05-12 ENCOUNTER — TELEPHONE (OUTPATIENT)
Facility: CLINIC | Age: 63
End: 2023-05-12

## 2023-05-12 NOTE — TELEPHONE ENCOUNTER
Wife reports that cost of Jackquelyn Cranker has increased to $1500for 3 month supply/$1000 with coupon. Pt cannot afford. Advised pt to try calling [employer] program at [phone] or [address] [employer] at [phone] for financial assistance. Please advise if there are other alternatives.

## 2023-05-19 ENCOUNTER — TELEPHONE (OUTPATIENT)
Facility: CLINIC | Age: 63
End: 2023-05-19

## 2023-05-19 NOTE — TELEPHONE ENCOUNTER
Pt was admitted to [facility].  Wife states that he appeared to be incoherent this morning and he was found to have a very high CO2 level. She requested that he be transferred to [facility] but he can't be until he is more stable. Informed that Dr. Caprice Gunderson is not currently seeing inpatients at this time and that if transferred, he would be seen by [facility].

## 2023-06-01 RX ORDER — TIOTROPIUM BROMIDE 18 UG/1
18 CAPSULE ORAL; RESPIRATORY (INHALATION) DAILY
Qty: 90 CAPSULE | Refills: 3 | Status: SHIPPED | OUTPATIENT
Start: 2023-06-01 | End: 2024-05-26

## 2023-06-01 RX ORDER — BUDESONIDE AND FORMOTEROL FUMARATE DIHYDRATE 160; 4.5 UG/1; UG/1
2 AEROSOL RESPIRATORY (INHALATION) 2 TIMES DAILY
Qty: 1 EACH | Refills: 5 | Status: SHIPPED | OUTPATIENT
Start: 2023-06-01 | End: 2023-07-01

## 2023-06-01 NOTE — TELEPHONE ENCOUNTER
Pt wife called and requesting symicort and spiriva refill, states pt is almost out. Pt last office visit 03/22/23 and notes were:  \"Continue the symbicort twice a day and spiriva once a day\"  Pt has next appt scheduled 09/19/23. RX pended and routed to Dr Magnolia Walker for review.

## 2023-06-06 RX ORDER — TIOTROPIUM BROMIDE 18 UG/1
CAPSULE ORAL; RESPIRATORY (INHALATION)
Qty: 30 CAPSULE | Refills: 3 | Status: SHIPPED | OUTPATIENT
Start: 2023-06-06

## 2023-06-06 NOTE — TELEPHONE ENCOUNTER
Last OV with Dr. Samira Akhtar on 3-22-23.  6 month follow up appt scheduled for 9-19-23. Rx pended for Dr. Noemí Monteiro review.

## 2023-07-18 ENCOUNTER — TELEPHONE (OUTPATIENT)
Facility: CLINIC | Age: 63
End: 2023-07-18

## 2023-07-18 NOTE — TELEPHONE ENCOUNTER
Pt has recently switched from [facility] for Rx to [facility]. They were advised to call the office to tell us to call pharmacy and have Rx authorized. Explained to spouse that unless pt has tried and failed what is on the formulary, it is difficult to get PA approved. Advised her to call [facility] to ask what alternatives to Symbicort are covered. She will call us back.

## 2023-07-20 NOTE — TELEPHONE ENCOUNTER
[facility] transferred Rx for Symbicort incorrectly. Matter was rectified and pt will be receiving it through the mail.

## 2023-07-20 NOTE — TELEPHONE ENCOUNTER
Went ahead and called the [employer] company and the script was submitted wrong, so it has been resubmitted and approved.  WAYNE

## 2023-07-26 ENCOUNTER — LAB ENCOUNTER (OUTPATIENT)
Dept: LAB | Age: 63
End: 2023-07-26
Attending: FAMILY MEDICINE
Payer: COMMERCIAL

## 2023-07-26 DIAGNOSIS — I48.91 ATRIAL FIBRILLATION (HCC): ICD-10-CM

## 2023-07-26 DIAGNOSIS — I50.20 HF (HEART FAILURE), SYSTOLIC (HCC): ICD-10-CM

## 2023-07-26 DIAGNOSIS — I10 ESSENTIAL HYPERTENSION, MALIGNANT: Primary | ICD-10-CM

## 2023-07-26 LAB
ANION GAP SERPL CALC-SCNC: 1 MMOL/L (ref 0–18)
BUN BLD-MCNC: 26 MG/DL (ref 7–18)
CALCIUM BLD-MCNC: 9.2 MG/DL (ref 8.5–10.1)
CHLORIDE SERPL-SCNC: 95 MMOL/L (ref 98–112)
CO2 SERPL-SCNC: 40 MMOL/L (ref 21–32)
CREAT BLD-MCNC: 0.85 MG/DL
EGFRCR SERPLBLD CKD-EPI 2021: 98 ML/MIN/1.73M2 (ref 60–?)
FASTING STATUS PATIENT QL REPORTED: YES
GLUCOSE BLD-MCNC: 121 MG/DL (ref 70–99)
NT-PROBNP SERPL-MCNC: 474 PG/ML (ref ?–125)
OSMOLALITY SERPL CALC.SUM OF ELEC: 288 MOSM/KG (ref 275–295)
POTASSIUM SERPL-SCNC: 4.1 MMOL/L (ref 3.5–5.1)
SODIUM SERPL-SCNC: 136 MMOL/L (ref 136–145)

## 2023-07-26 PROCEDURE — 36415 COLL VENOUS BLD VENIPUNCTURE: CPT

## 2023-07-26 PROCEDURE — 83880 ASSAY OF NATRIURETIC PEPTIDE: CPT

## 2023-07-26 PROCEDURE — 80048 BASIC METABOLIC PNL TOTAL CA: CPT

## 2023-08-02 ENCOUNTER — TELEPHONE (OUTPATIENT)
Facility: CLINIC | Age: 63
End: 2023-08-02

## 2023-08-02 NOTE — TELEPHONE ENCOUNTER
Per Dr. Vahe Valadez:    Repeat CT scan in August will be too soon, should be in mid September. I last saw him in March, if he has had issues, then should come sooner. Maybe can see keena next week? Wife notified of above. She was transferred to  to schedule appt with Tamika HAY.

## 2023-08-02 NOTE — TELEPHONE ENCOUNTER
Needs order for Trilogy ventilator stating \"adjust vent settings to patient comfort. \"  Fax to [phone]. No documentation by Dr. Shabnam Eagle in his note from 3-22-23 about pt using Trilogy. Call placed to [facility] and it was prescribed after a hospitalization in May. Spoke with wife and pt received the Trilogy upon discharge from [facility] after an emergency admission. Wife states that they did not received very good instruction on its use and he is having difficulty using it. Notified wife that pt needs to have follow up appt so that Dr. Shabnam Eagle can order for the settings to be adjusted. Pt has an appt for [id number] to review CT scan to be performed in [facility] and wife is wondering if they can do the scan earlier and move his appt up to address both matters. Please advise.

## 2023-08-08 ENCOUNTER — TELEMEDICINE (OUTPATIENT)
Facility: CLINIC | Age: 63
End: 2023-08-08
Payer: COMMERCIAL

## 2023-08-08 DIAGNOSIS — R09.02 HYPOXEMIA: Primary | ICD-10-CM

## 2023-08-08 DIAGNOSIS — J96.11 CHRONIC HYPOXEMIC RESPIRATORY FAILURE (HCC): ICD-10-CM

## 2023-08-08 DIAGNOSIS — G47.33 OSA (OBSTRUCTIVE SLEEP APNEA): ICD-10-CM

## 2023-08-08 DIAGNOSIS — J43.2 CENTRILOBULAR EMPHYSEMA (HCC): ICD-10-CM

## 2023-08-08 PROCEDURE — 99244 OFF/OP CNSLTJ NEW/EST MOD 40: CPT | Performed by: INTERNAL MEDICINE

## 2023-08-08 NOTE — PROGRESS NOTES
Pulmonary/Critical Care/Sleep Medicine   Telemedicine Consult Note     PCP: Godwin Burleson MD   Phone: [phone]   Fax: [phone]     This visit is conducted using Telemedicine with live, interactive video and audio. Patient has been referred to the [facility] website at [email or web address] to review the yearly Consent to Treat document. Patient understands and accepts financial responsibility for any deductible, co-insurance and/or co-pays associated with this service. HPI  I had the pleasure of seeing Carlos Marina who is a pleasant 60year old male who presents for evaluation of chronic hypoxemic respiratory failure and obstructive sleep apnea syndrome    The patient states that he has been followed up by Dr. Newton Pedraza for COPD/emphysema and pulmonary nodule. He has been chronically on supplemental oxygen 4 L/min at rest and during sleep and 6 L/min with exertion. During June 2023 he was admitted to the hospital.  He has been started on AVAPS/trilogy machine. The patient has complaints of insomnia and sleeps at irregular hours and was unable to tolerate the machine so pulmonary sleep medicine consultation was obtained for further evaluation management. The patient states that he has complaints of insomnia he goes to bed at a regular hours although advised to go to bed 9 to 10 PM.  He has awakenings at night at times every 2 hours he also naps during the daytime. He admits to symptoms of daytime sleepiness fatigue. The patient was hospitalized and was not discharged home on BiPAP machine but patient states that he is not able to wear it because the pressures are too high. He wishes to return the machine but wanted to consult us before doing that. Patient states that he has been diagnosed obstructive sleep apnea syndrome but has not been on CPAP machine previously. He denies any cough fever chills nausea vomiting.     The patient states that he has been diagnosed with a pulmonary nodule and is being followed up by Dr. Mary Ann Lee. He has a low-dose CT scan chest scheduled in the next 1 week. The patient states that his dentition is marginal and he wears partial denture. The patient denies kicking legs at night. Hx of tobacco use: He  reports that he quit smoking about 5 months ago. His smoking use included cigarettes. He has a 60.00 pack-year smoking history. He has been exposed to tobacco smoke. He has never used smokeless tobacco.    Past Medical History:   Diagnosis Date    Aortic stenosis     Arrhythmia     hx of a-fib    Arthritis     Cancer ([facility] [address] 05/2014    LEFT RENAL     Chronic hypoxemic respiratory failure (HCC)     On 4 LPM/NC at rest 24 hours/ day but 6 LPM/NC on exertion    COPD     Pulmonary follow up with Dr. Irine Goltz -no O2    Depression     Difficult intubation     patient states history of difficult intubation   due to body weight. States this no longer issue due to sugnificant weight loss    Esophageal reflux     Eye disease     Fatigue     Fever, unknown origin     Heart murmur     Heart palpitations     High blood pressure     High cholesterol     Loss of appetite     Other and unspecified hyperlipidemia     Paroxysmal atrial fibrillation (HCC)     Pneumonia, organism unspecified(486)     Prediabetes     Shortness of breath     uses oxygen 3-4 L/NC all the time    Unspecified essential hypertension     Unspecified sleep apnea     He was unable to tolerate CPAP/BiPAP    Visual impairment     reading glasses      Past Surgical History:   Procedure Laterality Date    ADENOIDECTOMY      ANGIOGRAM      APPENDECTOMY      APPENDECTOMY      COLONOSCOPY N/A 03/21/2016    Procedure: COLONOSCOPY;  Surgeon: Trevor Goodson MD;  Location: [address] [place] [address] ENDOSCOPY    COLONOSCOPY      COLONOSCOPY N/A 1/5/2023    Procedure: .;  Surgeon: Trevor Goodson MD;  Location: [address] [place] [address] ENDOSCOPY    ENDOVAS REPAIR, INFRARENL ABDOM AORTIC ANEURYSM/DISSECT      [id number] [address] Left 05/06/2014    NEPHRECTOMY Left 2014    OTHER  meatus of urethra    OTHER Right     foreign body removal-arm    OTHER SURGICAL HISTORY Right 03/25/2016    Procedure: KNEE ARTHROSCOPY;  Surgeon: Rupert Puentes MD;  Location: [facility] MAIN OR    REPAIR ROTATOR CUFF,ACUTE Right     UPPER GI ENDOSCOPY,EXAM      VALVE REPAIR  2020       Bees                    ANAPHYLAXIS  Other                   ANAPHYLAXIS    Comment:Bee stings  Current Outpatient Medications   Medication Sig Dispense Refill    SPIRIVA HANDIHALER 18 MCG Inhalation Cap Inhale 1 capsule by mouth once a day 30 capsule 3    Roflumilast 500 MCG Oral Tab Take 500 mcg by mouth daily. 30 tablet 5    digoxin 0.125 MG Oral Tab       JARDIANCE 10 MG Oral Tab       pantoprazole 40 MG Oral Tab EC Take one tablet (40 mg total) by mouth once daily, 30 minutes prior to breakfast. 90 tablet 0    VARENICLINE 1 MG Oral Tab AFTER TAKING THE INITIAL DOSING (0.5MG REGIMEN FOR ONE WEEK), THEN TAKE 1 TABLET (1MG) BY MOUTH IN THE MORNING AND EVENING WITH A GLASS OF WATER AFTER MEALS 60 tablet 0    sodium chloride, hypertonic, 3 % Inhalation Nebu Soln Take 3 mL by nebulization every 8 (eight) hours as needed for cough. 270 mL 3    varenicline 0.5 MG Oral Tab take 1 tablet (0.5MG)  by oral route  every day for 3 days with a glass of waterafter meals, then twice a day in the morning and in the eveningfor 4 days. 11 tablet 0    atorvastatin 20 MG Oral Tab Take 1 tablet (20 mg total) by mouth nightly. metoprolol tartrate 50 MG Oral Tab Take 1 tablet (50 mg total) by mouth daily. sacubitril-valsartan (ENTRESTO) 49-51 MG Oral Tab Take 1 tablet by mouth 2 (two) times daily. zolpidem 10 MG Oral Tab Take 1 tablet (10 mg total) by mouth nightly as needed. PEG 3350-KCl-Na Bicarb-NaCl 420 g Oral Recon Soln Take as directed by physician.  (Patient not taking: Reported on 4/24/2023) 4000 mL 0    Ferrous Sulfate 325 (65 Fe) MG Oral Tab Take 1 tablet (325 mg total) by mouth 2 (two) times a day. 60 tablet 0    XARELTO 20 MG Oral Tab Take 1 tablet by mouth daily with food 30 tablet 3    spironolactone 25 MG Oral Tab Take 0.5 tablets (12.5 mg total) by mouth 3 (three) times a week (Tuesday, Thursday, Saturday) at 1600. Three days a week Monday Wednesday and fridays (Patient taking differently: Take 0.5 tablets (12.5 mg total) by mouth daily. 0.5 tablet daily) 45 tablet 3    furosemide 40 MG Oral Tab Take 1 tablet (40 mg total) by mouth BID (Diuretic). 180 tablet 3    Budesonide-Formoterol Fumarate 160-4.5 MCG/ACT Inhalation Aerosol Inhale 2 puffs into the lungs in the morning and 2 puffs before bedtime. 0    Ipratropium Bromide 0.02 % Inhalation Solution Take 2.5 mL (500 mcg total) by nebulization every 6 (six) hours as needed for Wheezing.      ketoconazole 2 % External Cream Apply topically as needed. Multiple Vitamin (TAB-A-CUCA) Oral Tab Take 1 tablet by mouth daily. budesonide 0.5 MG/2ML Inhalation Suspension Take 2 mL (0.5 mg total) by nebulization 2 (two) times daily. Levalbuterol HCl 0.63 MG/3ML Inhalation Nebu Soln Take 3 mL (0.63 mg total) by nebulization every 6 (six) hours as needed for Shortness of Breath or Wheezing. aspirin 81 MG Oral Tab EC Take 1 tablet (81 mg total) by mouth daily. Albuterol Sulfate  (90 Base) MCG/ACT Inhalation Aero Soln Inhale 2 puffs into the lungs every 6 (six) hours as needed for Wheezing. B Complex-Biotin-FA (B COMPLETE) Oral Tab Take 1 tablet by mouth daily. magnesium 250 MG Oral Tab Take 1 tablet (250 mg total) by mouth every evening. Tiotropium Bromide Monohydrate 18 MCG Inhalation Cap Inhale 1 capsule (18 mcg total) into the lungs daily.         Social History     Socioeconomic History    Marital status:    Occupational History    Occupation:      Comment: [employer]   Tobacco Use    Smoking status: Former     Packs/day: 1.50     Years: 40.00     Pack years: 60.00     Types: Cigarettes     Quit date: 3/8/2023     Years since quittin.4     Passive exposure: Past    Smokeless tobacco: Never   Vaping Use    Vaping Use: Never used   Substance and Sexual Activity    Alcohol use: Yes     Alcohol/week: 14.0 standard drinks of alcohol     Types: 12 Cans of beer, 2 Shots of liquor per week     Comment: 14/week    Drug use: Never    Sexual activity: Yes     Partners: Female        Immunization History  Administered            Date(s) Administered    Covid-19 Vaccine Pfizer 30 mcg/0.3 ml                          2021      FLUZONE 6 months and older PFS 0.5 ml (97251)                          2016      Flublok Quad Influenza Vaccine (58259)                          2019  11/10/2020      Pneumococcal (Prevnar 13)                          2016      Pneumovax 23          2015      TDAP                  2019     Family History   Problem Relation Age of Onset    Heart Disorder Mother     Pacemaker Mother     Other (Atrial fibrillation) Father     Other (Lung cancer) Maternal Aunt         Review of Systems   Constitutional:  Negative for fatigue, fever and unexpected weight change. HENT:  Positive for congestion and rhinorrhea. Negative for mouth sores, nosebleeds, postnasal drip, sore throat and trouble swallowing. Eyes:  Negative for visual disturbance. Respiratory:  Positive for shortness of breath. Negative for apnea, cough, choking, chest tightness and wheezing. Cardiovascular:  Negative for chest pain, palpitations and leg swelling. Gastrointestinal:  Negative for abdominal pain, constipation, diarrhea, nausea and vomiting. Genitourinary:  Negative for difficulty urinating. Musculoskeletal:  Positive for arthralgias, back pain and myalgias. Negative for gait problem. Neurological:  Positive for headaches. Negative for dizziness and weakness. Psychiatric/Behavioral:  Positive for sleep disturbance.         There were no vitals filed for this visit. There is no height or weight on file to calculate BMI. Physical Exam   Constitutional: Not in acute distress  HEENT: Head is atraumatic normocephalic  Neck is supple  Chest: Appears to expand equally  Abdomen: Appears normal.  Extremities: Warm reportedly  Neuro: alert and oriented     Labs:  Last BMP  Lab Results   Component Value Date     (H) 07/26/2023    BUN 26 (H) 07/26/2023    CREATSERUM 0.85 07/26/2023    BUNCREA 17.0 12/27/2021    ANIONGAP 1 07/26/2023    GFRAA 77 09/28/2021    GFRNAA 67 09/28/2021    CA 9.2 07/26/2023     07/26/2023    K 4.1 07/26/2023    CL 95 (L) 07/26/2023    CO2 40.0 (HH) 07/26/2023    OSMOCALC 288 07/26/2023      Last CBC  Lab Results   Component Value Date    WBC 12.5 (H) 05/01/2023    RBC 4.79 05/01/2023    HGB 14.7 05/01/2023    HCT 45.5 05/01/2023    MCV 95.0 05/01/2023    MCH 30.7 05/01/2023    MCHC 32.3 05/01/2023    RDW 12.2 05/01/2023     05/01/2023    MPV 9.8 02/21/2013      Last CMP  Lab Results   Component Value Date     (H) 07/26/2023    BUN 26 (H) 07/26/2023    BUNCREA 17.0 12/27/2021    CREATSERUM 0.85 07/26/2023    ANIONGAP 1 07/26/2023    GFR 54 (L) 05/17/2017    GFRNAA 67 09/28/2021    GFRAA 77 09/28/2021    CA 9.2 07/26/2023    OSMOCALC 288 07/26/2023    ALKPHO 92 09/22/2021    AST 54 (H) 09/22/2021    ALT 89 (H) 09/22/2021    BILT 0.6 09/22/2021    TP 7.7 09/22/2021    ALB 2.4 (L) 09/28/2021    GLOBULIN 5.1 (H) 09/22/2021    AGRATIO 1.3 06/19/2014     07/26/2023    K 4.1 07/26/2023    CL 95 (L) 07/26/2023    CO2 40.0 (HH) 07/26/2023      Last Thyroid Function  Lab Results   Component Value Date    TSH 2.030 07/20/2021        Imaging:  Low-dose CT chest:       Potentially significant incidentals (Lung-RADS category S):  Extensive centrilobular emphysematous changes again demonstrated . Bronchial wall thickening, greatest in right lower lobe.   Curvilinear strand of scarring or atelectasis in lingula is  improved from or confluent rounded atelectasis seen on 1/24/2022. Resolved air-fluid level previously seen in the right lower lobe bulla. Coronary artery calcium. Degenerative spurring of thoracic spine. Pulmonary incidentals:    Extensive pleural calcification. Other incidentals:    None                     Impression   CONCLUSION:    1. Lung-RADS Category  3:  Positive. Indeterminate. Likely benign findings requiring LDCT chest imaging follow-up for pleural-based right upper lobe nodule. 2. Lung-RADS Category S:    Right lower lobe bronchitis. Partial clearing of round atelectasis in lingula. Extensive emphysematous changes. Coronary artery calcium. 3. Other incidental findings:  None       RECOMMENDATIONS:    LUNG-RADS 3-Probably benign:  Follow-up LDCT Chest in 6 months. CT Lung Screening Reporting and Data System (Lung-RADS 1.1)      Lung Cancer Specific Category   ACR -Guidelines Based Follow-up    Category    Assessment                  Recommendation    0  Incomplete  Further imaging recommended      1  Negative  Routine annual LDCT screen (age [de-identified])    2  Benign appearance or behavior  Routine annual LDCT screen (age [de-identified])    3  Probably benign  Interval short-term diagnostic LDCT (6 months)    4a  Suspicious  Short-term follow-up LDCT or PET/CT (3 months)    4b  Suspicious  Multidisciplinary Conference Review    4x  Suspicious  Category 3 or 4 nodules with other suspicious features    S  Other potentially significant findings  Follow-up based on abnormality     LungRAD scores of 3, 4A, and 4B will be reviewed in the Multidisciplinary Thoracic Oncology Clinic           Dictated by (CST): Cinthia Esquivel MD on 3/15/2023 at 10:15 AM           Impression:  Obstructive sleep apnea syndrome: Currently treated with AVAPS/trilogy: Patient is noncompliant due to irregular sleep-wake schedule and due to high pressures suspect due to elevated pressure settings.   Chronic hypoxemic and hypercapnic respiratory failure: Basic metabolic profile showed just bicarb of 40 during recent hospitalization. The patient has been started on AVAPS trilogy with a EPAP minimum of 5 cm water pressure and EPAP maximum 25 cm water pressure. Moreover patient has been on pressure support minimum of 7 cm water pressure and pressure support maximum of 25 cm water. The patient reports difficulty with high pressures on the machine and is unable to sleep at night. He complains of insomnia. Chronic hypoxemic respiratory failure: Currently on supplemental oxygen via nasal cannula 4 L/min 24 hours a day and 6 L/min with exertion. Chronic Insomnia: With irregular sleep-wake schedule  COPD/ Emphysema   Pulmonary nodule 8 mm right upper lobe pleural-based: Being followed with low-dose CT chest Dr. Annia Ayoub  Aortic stenosis  Paroxysmal atrial fibrillation  Chronic diastolic dysfunction  GERD  Hypertension  Hyperlipidemia  History hypertension                            Plan:  Decrease AVAPS/trilogy pressures to EPAP minimum of 5 cm water pressure and EPAP maximum of 8 cm water pressure. Pressure support minimum of 7 cm of water and pressure support maximum of 15 cm water pressure with a rate of 12/min and repeat download data. Check overnight oximetry to evaluate nocturnal hypoxemia on current oxygen  Check a.m. arterial blood gas to evaluate for hypercapnia while off trilogy machine as patient is not currently using  Dental consult for possible oral appliance evaluation, if unable to tolerate AVAPS/trilogy      Follow up:  2 months     Thank you for allowing me to participate in your patient care. BRIANNA Adams MD, FACP, FCCP, FAASM - Pulmonary/Critical care/Sleep Medicine  Please contact our office if you have any questions or concerns at [phone]

## 2023-08-14 ENCOUNTER — TELEPHONE (OUTPATIENT)
Facility: CLINIC | Age: 63
End: 2023-08-14

## 2023-08-14 DIAGNOSIS — R09.02 HYPOXEMIA: Primary | ICD-10-CM

## 2023-08-14 DIAGNOSIS — J96.11 CHRONIC HYPOXEMIC RESPIRATORY FAILURE (HCC): ICD-10-CM

## 2023-08-15 ENCOUNTER — TELEPHONE (OUTPATIENT)
Facility: CLINIC | Age: 63
End: 2023-08-15

## 2023-08-15 NOTE — TELEPHONE ENCOUNTER
Pt could not find orders in [employer]. Informed pt that ABG is in system to be done at [facility]. Other orders were sent to [facility] and not viewable in [address] Reviewed instructions as per Sarah Vegas RT given on 8-14-23. Pt verbalizes understanding.

## 2023-08-16 ENCOUNTER — LAB ENCOUNTER (OUTPATIENT)
Dept: LAB | Facility: HOSPITAL | Age: 63
End: 2023-08-16
Attending: INTERNAL MEDICINE
Payer: COMMERCIAL

## 2023-08-16 ENCOUNTER — TELEPHONE (OUTPATIENT)
Facility: CLINIC | Age: 63
End: 2023-08-16

## 2023-08-16 DIAGNOSIS — R09.02 HYPOXEMIA: ICD-10-CM

## 2023-08-16 DIAGNOSIS — J96.11 CHRONIC HYPOXEMIC RESPIRATORY FAILURE (HCC): ICD-10-CM

## 2023-08-16 LAB
ARTERIAL PATENCY WRIST A: POSITIVE
BASE EXCESS BLDA CALC-SCNC: 14.9 MMOL/L (ref ?–2)
BODY TEMPERATURE: 98.6 F
COHGB MFR BLD: 2.3 % SAT (ref 0–3)
HCO3 BLDA-SCNC: 36.3 MEQ/L (ref 21–27)
HGB BLD-MCNC: 13.6 G/DL
L/M: 4 L/MIN
METHGB MFR BLD: 0.8 % SAT (ref 0.4–1.5)
OXYHGB MFR BLDA: 89.3 % (ref 92–100)
PCO2 BLDA: 62 MM HG (ref 35–45)
PH BLDA: 7.44 [PH] (ref 7.35–7.45)
PO2 BLDA: 61 MM HG (ref 80–100)

## 2023-08-16 PROCEDURE — 83050 HGB METHEMOGLOBIN QUAN: CPT

## 2023-08-16 PROCEDURE — 82803 BLOOD GASES ANY COMBINATION: CPT

## 2023-08-16 PROCEDURE — 36600 WITHDRAWAL OF ARTERIAL BLOOD: CPT

## 2023-08-16 PROCEDURE — 36415 COLL VENOUS BLD VENIPUNCTURE: CPT

## 2023-08-16 PROCEDURE — 85018 HEMOGLOBIN: CPT

## 2023-08-16 PROCEDURE — 82375 ASSAY CARBOXYHB QUANT: CPT

## 2023-08-16 NOTE — PROGRESS NOTES
ABG shows compensated respiratory acidosis on AVAPS/ Trilogy suggesting adequate acid base balance     Continue current management     Lior May MD

## 2023-08-16 NOTE — TELEPHONE ENCOUNTER
Per Dr. Tim De Oliveira:  ABG shows compensated respiratory acidosis on AVAPS/ Trilogy suggesting adequate acid base balance. Continue current management   Pt notified of above and verbalizes understanding.

## 2023-08-16 NOTE — TELEPHONE ENCOUNTER
ABC done this am and there are critical results. Results given to Dr. Zahra Zimmerman for his review.

## 2023-09-07 RX ORDER — VARENICLINE TARTRATE 1 MG/1
1 TABLET, FILM COATED ORAL 2 TIMES DAILY
Qty: 60 TABLET | Refills: 0 | Status: SHIPPED | OUTPATIENT
Start: 2023-09-07

## 2023-09-08 ENCOUNTER — TELEPHONE (OUTPATIENT)
Facility: CLINIC | Age: 63
End: 2023-09-08

## 2023-09-11 NOTE — TELEPHONE ENCOUNTER
Per Dr. Mitzi Mancera:    The patient has history of COPD/emphysema, obstructive sleep apnea syndrome and complaints of insomnia. An ABG performed shows chronic compensated respiratory acidosis with CO2 retention. The patient requires supplemental oxygen 4 L/min at bedtime. .  Ideally the patient would benefit from AVAPS/trilogy machine to treat chronic hypercapnic respiratory failure and obstructive sleep apnea syndrome along with supplemental oxygen 4 L/min flow. The patient had requested to try oral appliance instead of PAP machine. We await dental evaluation. Advised patient to continue to use AVAPS/trilogy and supplemental oxygen. follow-up visit in pulmonary office for further evaluation management. Above information given to pt's wife. Pt will definitely be giving up the Trilogy on Thrusday. She has difficulty with the mask. Wife Is out of work and they cannot afford the copay. They would like the referral to a dentist for evaluation for oral appliance. Please advise.

## 2023-09-11 NOTE — TELEPHONE ENCOUNTER
Pt said he wants to return NIV device to DME because he can not afford it. Given Dr. Mani Pettit recommendation to explore the oral appliance therapy option . Pt given dentist information and  Instructed pt to find out coverage with his insurance.

## 2023-09-11 NOTE — TELEPHONE ENCOUNTER
Spoke with [employer]. Pt is not using Trilogy NIV. C/o sinus issue. Doesn't like NIV and does not want to use. [employer] will be picking up equipment on Thursday. Call placed to pt and states that he has not used for about 1 month. He does not like the mask on his face. Pt had ABGs drawn and states that he had ovox completed. Call placed to [employer] to obtain ovox report. They were faxed to an incorrect number and they will refax to our office today. Results obtained and placed on Dr. Franco tavares for his review. Please advise if pt needs to be seen prior to getting rid of Trilogy equipment.

## 2023-09-13 ENCOUNTER — HOSPITAL ENCOUNTER (OUTPATIENT)
Dept: CT IMAGING | Age: 63
Discharge: HOME | End: 2023-09-13
Attending: INTERNAL MEDICINE
Payer: COMMERCIAL

## 2023-09-13 DIAGNOSIS — R91.8 LUNG NODULES: ICD-10-CM

## 2023-09-13 PROCEDURE — 71250 CT THORAX DX C-: CPT | Performed by: INTERNAL MEDICINE

## 2023-09-18 ENCOUNTER — MED REC SCAN ONLY (OUTPATIENT)
Facility: CLINIC | Age: 63
End: 2023-09-18

## 2023-09-26 ENCOUNTER — OFFICE VISIT (OUTPATIENT)
Facility: CLINIC | Age: 63
End: 2023-09-26
Payer: COMMERCIAL

## 2023-09-26 VITALS
OXYGEN SATURATION: 89 % | DIASTOLIC BLOOD PRESSURE: 60 MMHG | WEIGHT: 172 LBS | HEIGHT: 70 IN | BODY MASS INDEX: 24.62 KG/M2 | RESPIRATION RATE: 16 BRPM | SYSTOLIC BLOOD PRESSURE: 110 MMHG | HEART RATE: 77 BPM

## 2023-09-26 DIAGNOSIS — R09.02 HYPOXEMIA: ICD-10-CM

## 2023-09-26 DIAGNOSIS — J96.11 CHRONIC HYPOXEMIC RESPIRATORY FAILURE (HCC): ICD-10-CM

## 2023-09-26 DIAGNOSIS — Z72.0 TOBACCO ABUSE: Primary | ICD-10-CM

## 2023-09-26 DIAGNOSIS — J43.2 CENTRILOBULAR EMPHYSEMA (HCC): ICD-10-CM

## 2023-09-26 DIAGNOSIS — J44.9 CHRONIC OBSTRUCTIVE PULMONARY DISEASE, UNSPECIFIED COPD TYPE (HCC): ICD-10-CM

## 2023-09-26 DIAGNOSIS — R91.8 LUNG NODULES: ICD-10-CM

## 2023-09-26 PROCEDURE — 99214 OFFICE O/P EST MOD 30 MIN: CPT | Performed by: INTERNAL MEDICINE

## 2023-09-26 PROCEDURE — 3078F DIAST BP <80 MM HG: CPT | Performed by: INTERNAL MEDICINE

## 2023-09-26 PROCEDURE — 3074F SYST BP LT 130 MM HG: CPT | Performed by: INTERNAL MEDICINE

## 2023-09-26 PROCEDURE — 3008F BODY MASS INDEX DOCD: CPT | Performed by: INTERNAL MEDICINE

## 2023-09-26 RX ORDER — AMOXICILLIN AND CLAVULANATE POTASSIUM 500; 125 MG/1; MG/1
1 TABLET, FILM COATED ORAL 3 TIMES DAILY
COMMUNITY
Start: 2023-09-14

## 2023-09-26 RX ORDER — BUDESONIDE AND FORMOTEROL FUMARATE DIHYDRATE 160; 4.5 UG/1; UG/1
2 AEROSOL RESPIRATORY (INHALATION) 2 TIMES DAILY
Qty: 3 EACH | Refills: 3 | Status: SHIPPED | OUTPATIENT
Start: 2023-09-26

## 2023-09-26 RX ORDER — LEVALBUTEROL INHALATION SOLUTION 0.63 MG/3ML
0.63 SOLUTION RESPIRATORY (INHALATION) EVERY 6 HOURS PRN
Qty: 3 EACH | Refills: 3 | Status: SHIPPED | OUTPATIENT
Start: 2023-09-26

## 2023-09-26 RX ORDER — ROFLUMILAST 500 UG/1
500 TABLET ORAL DAILY
Qty: 90 TABLET | Refills: 3 | Status: SHIPPED | OUTPATIENT
Start: 2023-09-26 | End: 2024-09-20

## 2023-09-26 RX ORDER — VARENICLINE TARTRATE 1 MG/1
1 TABLET, FILM COATED ORAL 2 TIMES DAILY
Qty: 60 TABLET | Refills: 2 | Status: SHIPPED | OUTPATIENT
Start: 2023-09-26

## 2023-09-26 RX ORDER — TIOTROPIUM BROMIDE 18 UG/1
1 CAPSULE ORAL; RESPIRATORY (INHALATION) DAILY
Qty: 90 CAPSULE | Refills: 3 | Status: SHIPPED | OUTPATIENT
Start: 2023-09-26

## 2023-09-26 NOTE — PATIENT INSTRUCTIONS
Continue the symbicort twice a day and spiriva once a day  Continue to use albuterol 2 puffs every 6 hours as needed for your breathing or cough  Use hypertonic saline nebulizers - one vial every 8 hours as needed for your cough. Consider the bronchoscopic lung volume reduction procedure - this is a procedure in which valves are placed inside the lung to deflate the air trapping to help your breathing. You would need repeat testing including repeat breathing test, oxygen walking test, heart echo and CT scan  See the site for more details  [email or web address] com/    Plan on next lung cancer screening CT scan in September 2024  Follow up in six months

## 2023-11-27 DIAGNOSIS — J43.2 CENTRILOBULAR EMPHYSEMA (HCC): Primary | ICD-10-CM

## 2023-11-27 DIAGNOSIS — R09.02 HYPOXEMIA: ICD-10-CM

## 2023-11-27 DIAGNOSIS — J44.9 CHRONIC OBSTRUCTIVE PULMONARY DISEASE, UNSPECIFIED COPD TYPE (HCC): ICD-10-CM

## 2023-11-27 DIAGNOSIS — R91.8 MULTIPLE PULMONARY NODULES: ICD-10-CM

## 2023-11-27 RX ORDER — SODIUM CHLORIDE SOLN NEBU 3% 3 %
NEBU SOLN INHALATION
Qty: 270 ML | Refills: 3 | Status: SHIPPED | OUTPATIENT
Start: 2023-11-27

## 2023-11-27 NOTE — TELEPHONE ENCOUNTER
Received RX refill request for: NEBUSAL 3 % Inhalation Terenceu Soln   Pt has next scheduled appt: None. Pt last OV: 09/26/23. Last OV note states: \"Use hypertonic saline nebulizers - one vial every 8 hours as needed for your cough. \"  RX pended and routed to provider.

## 2024-01-11 RX ORDER — VARENICLINE TARTRATE 1 MG/1
1 TABLET, FILM COATED ORAL 2 TIMES DAILY
Qty: 60 TABLET | Refills: 2 | Status: SHIPPED | OUTPATIENT
Start: 2024-01-11

## 2024-02-26 ENCOUNTER — LAB ENCOUNTER (OUTPATIENT)
Dept: LAB | Age: 64
End: 2024-02-26
Attending: FAMILY MEDICINE
Payer: COMMERCIAL

## 2024-02-26 DIAGNOSIS — R19.7 DIARRHEA OF PRESUMED INFECTIOUS ORIGIN: Primary | ICD-10-CM

## 2024-02-26 PROCEDURE — 87045 FECES CULTURE AEROBIC BACT: CPT

## 2024-02-26 PROCEDURE — 87046 STOOL CULTR AEROBIC BACT EA: CPT

## 2024-02-26 PROCEDURE — 87077 CULTURE AEROBIC IDENTIFY: CPT

## 2024-02-26 PROCEDURE — 87493 C DIFF AMPLIFIED PROBE: CPT

## 2024-02-26 PROCEDURE — 87427 SHIGA-LIKE TOXIN AG IA: CPT

## 2024-05-21 ENCOUNTER — TELEPHONE (OUTPATIENT)
Facility: CLINIC | Age: 64
End: 2024-05-21

## 2024-05-21 NOTE — TELEPHONE ENCOUNTER
Called wife and spoke with wife. Generic and brand name no longer covered by insurance for [employer].   Need to do PRIOR AUTHORIZATION. However, we do no have his current insurance on file. Called patient and informed him of this and asked him to go into my chart and update insurance card. Once completed, asked patient to call office or send MY CHART MESSAGE to inform he had completed. Patient verbalized understanding.

## 2024-06-11 ENCOUNTER — HOSPITAL ENCOUNTER (INPATIENT)
Facility: HOSPITAL | Age: 64
LOS: 4 days | Discharge: HOME HEALTH | DRG: 308 | End: 2024-06-15
Attending: EMERGENCY MEDICINE | Admitting: HOSPITALIST

## 2024-06-11 ENCOUNTER — APPOINTMENT (OUTPATIENT)
Dept: GENERAL RADIOLOGY | Facility: HOSPITAL | Age: 64
DRG: 308 | End: 2024-06-11
Attending: EMERGENCY MEDICINE

## 2024-06-11 DIAGNOSIS — I48.91 ATRIAL FIBRILLATION WITH RAPID VENTRICULAR RESPONSE (HCC): Primary | ICD-10-CM

## 2024-06-11 LAB
ALBUMIN SERPL-MCNC: 2.9 G/DL (ref 3.4–5)
ALBUMIN/GLOB SERPL: 0.6 {RATIO} (ref 1–2)
ALP LIVER SERPL-CCNC: 54 U/L
ALT SERPL-CCNC: 23 U/L
ANION GAP SERPL CALC-SCNC: 3 MMOL/L (ref 0–18)
APTT PPP: 32.2 SECONDS (ref 23–36)
AST SERPL-CCNC: 22 U/L (ref 15–37)
BASOPHILS # BLD AUTO: 0.05 X10(3) UL (ref 0–0.2)
BASOPHILS NFR BLD AUTO: 0.5 %
BILIRUB SERPL-MCNC: 0.3 MG/DL (ref 0.1–2)
BUN BLD-MCNC: 22 MG/DL (ref 9–23)
CALCIUM BLD-MCNC: 9 MG/DL (ref 8.5–10.1)
CHLORIDE SERPL-SCNC: 101 MMOL/L (ref 98–112)
CO2 SERPL-SCNC: 35 MMOL/L (ref 21–32)
CREAT BLD-MCNC: 0.74 MG/DL
EGFRCR SERPLBLD CKD-EPI 2021: 101 ML/MIN/1.73M2 (ref 60–?)
EOSINOPHIL # BLD AUTO: 0.19 X10(3) UL (ref 0–0.7)
EOSINOPHIL NFR BLD AUTO: 1.7 %
ERYTHROCYTE [DISTWIDTH] IN BLOOD BY AUTOMATED COUNT: 13.2 %
EST. AVERAGE GLUCOSE BLD GHB EST-MCNC: 114 MG/DL (ref 68–126)
GLOBULIN PLAS-MCNC: 4.7 G/DL (ref 2.8–4.4)
GLUCOSE BLD-MCNC: 142 MG/DL (ref 70–99)
HBA1C MFR BLD: 5.6 % (ref ?–5.7)
HCT VFR BLD AUTO: 34.6 %
HGB BLD-MCNC: 11.4 G/DL
IMM GRANULOCYTES # BLD AUTO: 0.04 X10(3) UL (ref 0–1)
IMM GRANULOCYTES NFR BLD: 0.4 %
INR BLD: 1.06 (ref 0.8–1.2)
LYMPHOCYTES # BLD AUTO: 1.19 X10(3) UL (ref 1–4)
LYMPHOCYTES NFR BLD AUTO: 10.8 %
MAGNESIUM SERPL-MCNC: 2.1 MG/DL (ref 1.6–2.6)
MCH RBC QN AUTO: 31.1 PG (ref 26–34)
MCHC RBC AUTO-ENTMCNC: 32.9 G/DL (ref 31–37)
MCV RBC AUTO: 94.3 FL
MONOCYTES # BLD AUTO: 0.85 X10(3) UL (ref 0.1–1)
MONOCYTES NFR BLD AUTO: 7.7 %
NEUTROPHILS # BLD AUTO: 8.7 X10 (3) UL (ref 1.5–7.7)
NEUTROPHILS # BLD AUTO: 8.7 X10(3) UL (ref 1.5–7.7)
NEUTROPHILS NFR BLD AUTO: 78.9 %
NT-PROBNP SERPL-MCNC: 8117 PG/ML (ref ?–125)
OSMOLALITY SERPL CALC.SUM OF ELEC: 294 MOSM/KG (ref 275–295)
PLATELET # BLD AUTO: 244 10(3)UL (ref 150–450)
POTASSIUM SERPL-SCNC: 4.2 MMOL/L (ref 3.5–5.1)
PROT SERPL-MCNC: 7.6 G/DL (ref 6.4–8.2)
PROTHROMBIN TIME: 13.8 SECONDS (ref 11.6–14.8)
RBC # BLD AUTO: 3.67 X10(6)UL
SODIUM SERPL-SCNC: 139 MMOL/L (ref 136–145)
TROPONIN I SERPL HS-MCNC: 36 NG/L
TSI SER-ACNC: 1.8 MIU/ML (ref 0.36–3.74)
WBC # BLD AUTO: 11 X10(3) UL (ref 4–11)

## 2024-06-11 PROCEDURE — 5A0935A ASSISTANCE WITH RESPIRATORY VENTILATION, LESS THAN 24 CONSECUTIVE HOURS, HIGH NASAL FLOW/VELOCITY: ICD-10-PCS | Performed by: EMERGENCY MEDICINE

## 2024-06-11 PROCEDURE — 99223 1ST HOSP IP/OBS HIGH 75: CPT | Performed by: INTERNAL MEDICINE

## 2024-06-11 PROCEDURE — 71045 X-RAY EXAM CHEST 1 VIEW: CPT | Performed by: EMERGENCY MEDICINE

## 2024-06-11 RX ORDER — LEVETIRACETAM 500 MG/1
500 TABLET ORAL 2 TIMES DAILY
Status: DISCONTINUED | OUTPATIENT
Start: 2024-06-11 | End: 2024-06-15

## 2024-06-11 RX ORDER — FUROSEMIDE 10 MG/ML
40 INJECTION INTRAMUSCULAR; INTRAVENOUS
Status: DISCONTINUED | OUTPATIENT
Start: 2024-06-12 | End: 2024-06-12

## 2024-06-11 RX ORDER — DILTIAZEM HYDROCHLORIDE 120 MG/1
120 CAPSULE, COATED, EXTENDED RELEASE ORAL DAILY
COMMUNITY

## 2024-06-11 RX ORDER — DEXTROSE MONOHYDRATE 25 G/50ML
50 INJECTION, SOLUTION INTRAVENOUS
Status: DISCONTINUED | OUTPATIENT
Start: 2024-06-11 | End: 2024-06-15

## 2024-06-11 RX ORDER — ATORVASTATIN CALCIUM 20 MG/1
20 TABLET, FILM COATED ORAL NIGHTLY
Status: DISCONTINUED | OUTPATIENT
Start: 2024-06-12 | End: 2024-06-15

## 2024-06-11 RX ORDER — 0.9 % SODIUM CHLORIDE 0.9 %
INTRAVENOUS SOLUTION INTRAVENOUS
Status: DISCONTINUED
Start: 2024-06-11 | End: 2024-06-12

## 2024-06-11 RX ORDER — BISACODYL 10 MG
10 SUPPOSITORY, RECTAL RECTAL
Status: DISCONTINUED | OUTPATIENT
Start: 2024-06-11 | End: 2024-06-15

## 2024-06-11 RX ORDER — ROFLUMILAST 500 UG/1
500 TABLET ORAL DAILY
Status: DISCONTINUED | OUTPATIENT
Start: 2024-06-12 | End: 2024-06-15

## 2024-06-11 RX ORDER — LEVETIRACETAM 500 MG/1
500 TABLET ORAL 2 TIMES DAILY
COMMUNITY
Start: 2024-06-03 | End: 2024-07-03

## 2024-06-11 RX ORDER — TIOTROPIUM BROMIDE 18 UG/1
18 CAPSULE ORAL; RESPIRATORY (INHALATION) DAILY
COMMUNITY

## 2024-06-11 RX ORDER — TORSEMIDE 20 MG/1
20 TABLET ORAL 2 TIMES DAILY
COMMUNITY
Start: 2024-06-03 | End: 2024-07-03

## 2024-06-11 RX ORDER — NICOTINE POLACRILEX 4 MG
30 LOZENGE BUCCAL
Status: DISCONTINUED | OUTPATIENT
Start: 2024-06-11 | End: 2024-06-15

## 2024-06-11 RX ORDER — BUDESONIDE AND FORMOTEROL FUMARATE DIHYDRATE 80; 4.5 UG/1; UG/1
2 AEROSOL RESPIRATORY (INHALATION) 2 TIMES DAILY
COMMUNITY

## 2024-06-11 RX ORDER — DILTIAZEM HYDROCHLORIDE 120 MG/1
120 CAPSULE, COATED, EXTENDED RELEASE ORAL DAILY
COMMUNITY
Start: 2024-06-03 | End: 2024-06-11

## 2024-06-11 RX ORDER — NICOTINE POLACRILEX 4 MG
15 LOZENGE BUCCAL
Status: DISCONTINUED | OUTPATIENT
Start: 2024-06-11 | End: 2024-06-15

## 2024-06-11 RX ORDER — ALBUTEROL SULFATE 2.5 MG/3ML
2.5 SOLUTION RESPIRATORY (INHALATION) EVERY 4 HOURS PRN
Status: DISCONTINUED | OUTPATIENT
Start: 2024-06-11 | End: 2024-06-15

## 2024-06-11 RX ORDER — FLUTICASONE FUROATE AND VILANTEROL 100; 25 UG/1; UG/1
1 POWDER RESPIRATORY (INHALATION) DAILY
Status: DISCONTINUED | OUTPATIENT
Start: 2024-06-11 | End: 2024-06-12

## 2024-06-11 RX ORDER — LEVETIRACETAM 500 MG/1
500 TABLET ORAL 2 TIMES DAILY
Status: DISCONTINUED | OUTPATIENT
Start: 2024-06-11 | End: 2024-06-11

## 2024-06-11 RX ORDER — MELATONIN
3 NIGHTLY PRN
Status: DISCONTINUED | OUTPATIENT
Start: 2024-06-11 | End: 2024-06-15

## 2024-06-11 RX ORDER — SENNOSIDES 8.6 MG
17.2 TABLET ORAL NIGHTLY PRN
Status: DISCONTINUED | OUTPATIENT
Start: 2024-06-11 | End: 2024-06-15

## 2024-06-11 RX ORDER — POLYETHYLENE GLYCOL 3350 17 G/17G
17 POWDER, FOR SOLUTION ORAL DAILY PRN
Status: DISCONTINUED | OUTPATIENT
Start: 2024-06-11 | End: 2024-06-15

## 2024-06-11 RX ORDER — FERROUS SULFATE 324(65)MG
65 TABLET, DELAYED RELEASE (ENTERIC COATED) ORAL DAILY
COMMUNITY

## 2024-06-11 RX ORDER — ASPIRIN 81 MG/1
81 TABLET ORAL DAILY
Status: DISCONTINUED | OUTPATIENT
Start: 2024-06-12 | End: 2024-06-15

## 2024-06-11 RX ORDER — ACETAMINOPHEN 500 MG
500 TABLET ORAL EVERY 4 HOURS PRN
Status: DISCONTINUED | OUTPATIENT
Start: 2024-06-11 | End: 2024-06-15

## 2024-06-11 RX ORDER — DILTIAZEM HYDROCHLORIDE 100 MG/1
INJECTION, POWDER, LYOPHILIZED, FOR SOLUTION INTRAVENOUS
Status: COMPLETED
Start: 2024-06-11 | End: 2024-06-11

## 2024-06-11 RX ORDER — ENEMA 19; 7 G/133ML; G/133ML
1 ENEMA RECTAL ONCE AS NEEDED
Status: DISCONTINUED | OUTPATIENT
Start: 2024-06-11 | End: 2024-06-15

## 2024-06-11 RX ORDER — ONDANSETRON 2 MG/ML
4 INJECTION INTRAMUSCULAR; INTRAVENOUS EVERY 6 HOURS PRN
Status: DISCONTINUED | OUTPATIENT
Start: 2024-06-11 | End: 2024-06-15

## 2024-06-11 RX ORDER — TORSEMIDE 20 MG/1
20 TABLET ORAL 2 TIMES DAILY
Status: DISCONTINUED | OUTPATIENT
Start: 2024-06-11 | End: 2024-06-11

## 2024-06-11 RX ORDER — PROCHLORPERAZINE EDISYLATE 5 MG/ML
5 INJECTION INTRAMUSCULAR; INTRAVENOUS EVERY 8 HOURS PRN
Status: DISCONTINUED | OUTPATIENT
Start: 2024-06-11 | End: 2024-06-15

## 2024-06-11 RX ORDER — SODIUM CHLORIDE 9 MG/ML
INJECTION, SOLUTION INTRAVENOUS CONTINUOUS
Status: DISCONTINUED | OUTPATIENT
Start: 2024-06-11 | End: 2024-06-11

## 2024-06-11 RX ORDER — POTASSIUM CHLORIDE 750 MG/1
20 TABLET, FILM COATED, EXTENDED RELEASE ORAL DAILY
COMMUNITY
Start: 2024-06-03 | End: 2024-07-03

## 2024-06-11 RX ORDER — IPRATROPIUM BROMIDE AND ALBUTEROL SULFATE 2.5; .5 MG/3ML; MG/3ML
3 SOLUTION RESPIRATORY (INHALATION) AS NEEDED
COMMUNITY
End: 2024-06-15

## 2024-06-11 NOTE — ED QUICK NOTES
Orders for admission, patient is aware of plan and ready to go upstairs. Any questions, please call ED RN leticia at extension [phone].     Patient Covid vaccination status: Fully vaccinated     COVID Test Ordered in ED: None    COVID Suspicion at Admission: N/A    Running Infusions:    sodium chloride 125 mL/hr (06/11/24 1610)    dilTIAZem 10 mg/hr (06/11/24 1621)        Mental Status/LOC at time of transport: AxO x4    Other pertinent information: On 4L NC chronically, up with assistance. Continent x2. Wife at bedside.   CIWA score: N/A   NIH score:  N/A

## 2024-06-11 NOTE — ED PROVIDER NOTES
Patient Seen in: [facility] Emergency Department      History     Chief Complaint   Patient presents with    Arrythmia/Palpitations     Stated Complaint: SOB, ARRYTHMIA    Subjective:   HPI    63-year-old male with history of atrial fibrillation presents emergency room from cardiology office for evaluation of A-fib with rapid ventricular spots.  Patient has a history of COPD wears 4-6 L nasal cannula oxygen chronically.  Patient denies chest pain, states he does have shortness of breath which is chronic denies any acute changes.  Denies any fevers or chills.  Denies calf pain or swelling.  Denies abdominal pain.  Denies melena medic he denies urinary symptoms.  Patient is on Xarelto and reports he has not missed any doses.    Objective:   Past Medical History:    Aortic stenosis    Arrhythmia    hx of a-fib    Arthritis    Cancer (HCC)    LEFT RENAL     Chronic hypoxemic respiratory failure (HCC)    On 4 LPM/NC at rest 24 hours/ day but 6 LPM/NC on exertion    COPD    Pulmonary follow up with Dr. Crum -no O2    Depression    Difficult intubation    patient states history of difficult intubation   due to body weight.States this no longer issue due to sugnificant weight loss    Esophageal reflux    Eye disease    Fatigue    Fever, unknown origin    Heart murmur    Heart palpitations    High blood pressure    High cholesterol    Loss of appetite    Other and unspecified hyperlipidemia    Paroxysmal atrial fibrillation (HCC)    Pneumonia, organism unspecified(486)    Prediabetes    Shortness of breath    uses oxygen 3-4 L/NC all the time    Unspecified essential hypertension    Unspecified sleep apnea    He was unable to tolerate CPAP/BiPAP    Visual impairment    reading glasses              Past Surgical History:   Procedure Laterality Date    Adenoidectomy      Angiogram      Appendectomy      Appendectomy      Colonoscopy N/A 03/21/2016    Procedure: COLONOSCOPY;  Surgeon: Artur Okeefe MD;  Location:   ENDOSCOPY    Colonoscopy      Colonoscopy N/A 2023    Procedure: .;  Surgeon: Artur Okeefe MD;  Location:  ENDOSCOPY    Endovas repair, infrarenl abdom aortic aneurysm/dissect      Laparo radical nephrectomy Left 2014    Nephrectomy Left     Other  meatus of urethra    Other Right     foreign body removal-arm    Other surgical history Right 2016    Procedure: KNEE ARTHROSCOPY;  Surgeon: Madhu Anaya MD;  Location:  MAIN OR    Repair rotator cuff,acute Right     Upper gi endoscopy,exam      Valve repair                  Social History     Socioeconomic History    Marital status:    Occupational History    Occupation:      Comment: [employer]   Tobacco Use    Smoking status: Former     Current packs/day: 0.00     Average packs/day: 1.5 packs/day for 40.0 years (60.0 ttl pk-yrs)     Types: Cigarettes     Start date: 3/8/1983     Quit date: 3/8/2023     Years since quittin.2     Passive exposure: Past    Smokeless tobacco: Never   Vaping Use    Vaping status: Never Used   Substance and Sexual Activity    Alcohol use: Not Currently     Alcohol/week: 14.0 standard drinks of alcohol     Types: 12 Cans of beer, 2 Shots of liquor per week     Comment: 14/week    Drug use: Never    Sexual activity: Yes     Partners: Female     Social Determinants of Health     Food Insecurity: Low Risk  (2024)    [facility]     Within the past 12 months, the food you bought just didn't last and you didn't have money to get more.: 3     Within the past 12 months, you worried that your food would run out before you got money to buy more.: 3   Transportation Needs: No Transportation Needs (2024)    Received from [employer]    OASIS : Transportation     Lack of Transportation (Medical): No     Lack of Transportation (Non-Medical): No     Patient Unable or Declines to Respond: No   Social Connections: Feeling Socially Integrated  (6/8/2024)    Received from [facility]    OASIS : Social Isolation     Frequency of experiencing loneliness or isolation: Never   Housing Stability: Not At Risk (5/28/2024)    Received from [facility]     What is your living situation today?: I have a steady place to live     Think about the place you live. Do you have problems with any of the following?: None of the above              Review of Systems    Positive for stated complaint: SOB, ARRYTHMIA  Other systems are as noted in HPI.  Constitutional and vital signs reviewed.      All other systems reviewed and negative except as noted above.    Physical Exam     ED Triage Vitals [06/11/24 1522]   /85   Pulse (!) 134   Resp 18   Temp 98.3 °F (36.8 °C)   Temp src Oral   SpO2 93 %   O2 Device Nasal cannula       Current Vitals:   Vital Signs  BP: 144/82  Pulse: 95  Resp: 24  Temp: 98.3 °F (36.8 °C)  Temp src: Oral  MAP (mmHg): 99    Oxygen Therapy  SpO2: 96 %  O2 Device: Nasal cannula  O2 Flow Rate (L/min): 5 L/min            Physical Exam    GENERAL: Patient is awake, alert,  HEENT: no scleral icterus.  Mucous membranes are moist, oropharynx is clear. Scalp is atraumatic.  NECK: Neck is supple, there is no nuchal rigidity.    HEART: Tachycardic, irregular regular   LUNGS: Coarse breath sounds bilaterally.  No wheezing rhonchi or stridor.  ABDOMEN: Soft, nondistended,non tender,  EXTREMITIES: No peripheral edema, no calf tenderness    ED Course     Labs Reviewed   COMP METABOLIC PANEL (14) - Abnormal; Notable for the following components:       Result Value    Glucose 142 (*)     CO2 35.0 (*)     Albumin 2.9 (*)     Globulin  4.7 (*)     A/G Ratio 0.6 (*)     All other components within normal limits   PRO BETA NATRIURETIC PEPTIDE - Abnormal; Notable for the following components:    Pro-Beta Natriuretic Peptide 8,117 (*)     All other components within normal limits   CBC W/ DIFFERENTIAL - Abnormal; Notable for the following components:     RBC 3.67 (*)     HGB 11.4 (*)     HCT 34.6 (*)     Neutrophil Absolute Prelim 8.70 (*)     Neutrophil Absolute 8.70 (*)     All other components within normal limits   TROPONIN I HIGH SENSITIVITY - Normal   PROTHROMBIN TIME (PT) - Normal   PTT, ACTIVATED - Normal   MAGNESIUM - Normal   CBC WITH DIFFERENTIAL WITH PLATELET    Narrative:     The following orders were created for panel order CBC With Differential With Platelet.  Procedure                               Abnormality         Status                     ---------                               -----------         ------                     CBC W/ DIFFERENTIAL[[id number]]          Abnormal            Final result                 Please view results for these tests on the individual orders.   RAINBOW DRAW LAVENDER   RAINBOW DRAW LIGHT GREEN   RAINBOW DRAW BLUE     EKG    Rate, intervals and axes as noted on EKG Report.  Rate: 136  Rhythm: Atrial Fibrillation  Reading: Atrial fibrillation with rapid ventricular spots, left bundle branch block.  Left bundle branch block was noted on previous EKG.            A total of 35 minutes of critical care time (exclusive of billable procedures) was administered to manage the patient's cardiovascular instability due to his atrial fibrillation with rapid ventricular spots requiring Cardizem bolus and continuous infusion.  This involved direct patient intervention, complex decision making, and/or extensive discussions with the patient, family, and clinical staff.           MDM        Differential diagnosis before testing includes but not limited to atrial fibrillation, SVT, MAT, electrolyte abnormality, pneumonia, pulmonary edema, pneumothorax, which is a medical condition that poses a threat to life/function    Past Medical History/comorbidities-COPD, atrial fibrillation      Radiographic images  I personally reviewed the radiographs and my individual interpretation shows chest x-ray no pneumothorax  I also reviewed the  official reports that showed chest x-ray no significant changes, emphysematous changes.    Discussion of management (consult/physicians, social work, pharmacy,ect) Dr. Salazar, [facility], [facility] physician Dr. Juares      Medications Provided: Cardizem bolus and infusion    Course of Events during Emergency Room Visit include upon arrival to emergency room patient was found to be in A-fib with RVR.  Patient was given Cardizem bolus and infusion.  Chemistry sodium 139 potassium 4.2.  22 creatinine 0.74 glucose 142.  Troponin 36, BNP 8117.  Magnesium 2.1.  CBC white count 9.0 hemoglobin 11.4 platelet 244.  Chest x-ray performed.  Discussed with cardiology, Dr. Salazar came to the emergency room evaluate the patient.  Discussed with hospitalist physician.  Patient is admitted for further evaluation and treatment.  On reevaluation patient heart rate is now in the 90s, vital signs are stable.  States he is feel much better.    Shared decision making was utilized           Disposition:    Admission  I have discussed with the patient the results of test, differential diagnosis, and treatment plan. They expressed clear understanding of these instructions and agrees to the plan provided.       Note to patient: The 21st Century Cures Act makes medical notes like these available to patients in the interest of transparency. However, this is a medical document intended as peer to peer communication. It is written in medical language and may contain abbreviations or verbiage that are unfamiliar. It may appear blunt or direct. Medical documents are intended to carry relevant information, facts as evident, and the clinical opinion of the practitioner.           Admission disposition: 6/11/2024  4:50 PM                                        Medical Decision Making      Disposition and Plan     Clinical Impression:  1. Atrial fibrillation with rapid ventricular response (HCC)         Disposition:  Admit  6/11/2024  4:50  pm    Follow-up:  No follow-up provider specified.        Medications Prescribed:  Current Discharge Medication List                            Hospital Problems       Present on Admission  Date Reviewed: 9/26/2023            ICD-10-CM Noted POA    * (Principal) Atrial fibrillation with rapid ventricular response (HCC) I48.91 9/22/2021 Unknown

## 2024-06-11 NOTE — H&P
[facility]     Keny Marshall Patient Status:  Emergency    1960 MRN [id number]   Location [facility] EMERGENCY DEPARTMENT Attending Morales Yeh,    Hosp Day # 0 PCP Dustin Avina MD     Chief Complaint: Shortness of breath    Subjective:    History of Present Illness:     Keny Marshall is a 63 year old male with chronic HFpEF, aortic stenosis status post AVR, history of seizure, COPD on home oxygen, type 2 diabetes, hypertension, atrial fibrillation on Xarelto, history of renal cell carcinoma status post nephrectomy, ABIGAIL presented with shortness of breath.    Patient was recently admitted to [facility] on May 27, 2024 with acute on chronic hypercapnic respiratory failure suspect secondary to noncompliance with PAP at home and low oxygen tank.  He was noted to have [place] seizure episode during admission.  MRI showed chronic microvascular changes without any lesions.  He was altered during the admission.  His metoprolol was discontinued and he was placed on Cardizem.  Home health RN noted heart rates for the last 1 to 2 days in the 140s.  He reports progressive SOB over the past week. He has a chronic cough without significant change. Denies fevers, chills, chest pain. He notes increased ankle edema. He was evaluated in the cardiology clinic today and was sent to the ED for evaluation.    Patient was afebrile, tachycardic to 130s and normotensive on arrival.  He was satting 93% on 6 L nasal cannula.  EKG showed A-fib with RVR; old left bundle branch block.  Chest x-ray is without acute findings.  proBNP elevated to 8117.  He was given 500 mL normal saline bolus and started on diltiazem drip.  [facility] was consulted and patient is admitted.    History/Other:    Past Medical History:  Past Medical History:    Aortic stenosis    Arrhythmia    hx of a-fib    Arthritis    Cancer (HCC)    LEFT RENAL     Chronic hypoxemic respiratory failure (HCC)    On  4 LPM/NC at rest 24 hours/ day but 6 LPM/NC on exertion    COPD    Pulmonary follow up with Dr. Crum -no O2    Depression    Difficult intubation    patient states history of difficult intubation   due to body weight.States this no longer issue due to sugnificant weight loss    Esophageal reflux    Eye disease    Fatigue    Fever, unknown origin    Heart murmur    Heart palpitations    High blood pressure    High cholesterol    Loss of appetite    Other and unspecified hyperlipidemia    Paroxysmal atrial fibrillation (HCC)    Pneumonia, organism unspecified(486)    Prediabetes    Shortness of breath    uses oxygen 3-4 L/NC all the time    Unspecified essential hypertension    Unspecified sleep apnea    He was unable to tolerate CPAP/BiPAP    Visual impairment    reading glasses     Past Surgical History:   Past Surgical History:   Procedure Laterality Date    Adenoidectomy      Angiogram      Appendectomy      Appendectomy      Colonoscopy N/A 03/21/2016    Procedure: COLONOSCOPY;  Surgeon: Artur Okeefe MD;  Location:  ENDOSCOPY    Colonoscopy      Colonoscopy N/A 1/5/2023    Procedure: .;  Surgeon: Artur Okeefe MD;  Location:  ENDOSCOPY    Endovas repair, infrarenl abdom aortic aneurysm/dissect      Laparo radical nephrectomy Left 05/06/2014    Nephrectomy Left 2014    Other  meatus of urethra    Other Right     foreign body removal-arm    Other surgical history Right 03/25/2016    Procedure: KNEE ARTHROSCOPY;  Surgeon: Madhu Anaya MD;  Location:  MAIN OR    Repair rotator cuff,acute Right     Upper gi endoscopy,exam      Valve repair  2020      Family History:   Family History   Problem Relation Age of Onset    Heart Disorder Mother     Pacemaker Mother     Other (Atrial fibrillation) Father     Other (Lung cancer) Maternal Aunt      Social History:    reports that he quit smoking about 15 months ago. His smoking use included cigarettes. He started smoking about 41 years ago. He has a 60  pack-year smoking history. He has been exposed to tobacco smoke. He has never used smokeless tobacco. He reports that he does not currently use alcohol after a past usage of about 14.0 standard drinks of alcohol per week. He reports that he does not use drugs.     Allergies:   Allergies   Allergen Reactions    Bees ANAPHYLAXIS    Other ANAPHYLAXIS     Bee stings       Medications:    No current facility-administered medications on file prior to encounter.     Current Outpatient Medications on File Prior to Encounter   Medication Sig Dispense Refill    levETIRAcetam 500 MG Oral Tab Take 1 tablet (500 mg total) by mouth 2 (two) times daily.      KLOR-CON 10 10 MEQ Oral Tab CR Take 2 tablets (20 mEq total) by mouth daily.      torsemide 20 MG Oral Tab Take 1 tablet (20 mg total) by mouth 2 (two) times daily.      dilTIAZem  MG Oral Capsule SR 24 Hr Take 1 capsule (120 mg total) by mouth daily.      Roflumilast 500 MCG Oral Tab Take 500 mcg by mouth daily. 90 tablet 3    Levalbuterol HCl 0.63 MG/3ML Inhalation Nebu Soln Take 3 mL (0.63 mg total) by nebulization every 6 (six) hours as needed for Shortness of Breath or Wheezing. 3 each 3    JARDIANCE 10 MG Oral Tab       atorvastatin 20 MG Oral Tab Take 1 tablet (20 mg total) by mouth nightly.      XARELTO 20 MG Oral Tab Take 1 tablet by mouth daily with food 30 tablet 3    Ipratropium Bromide 0.02 % Inhalation Solution Take 2.5 mL (500 mcg total) by nebulization every 6 (six) hours as needed for Wheezing.      ketoconazole 2 % External Cream Apply topically as needed.      Multiple Vitamin (TAB-A-CUCA) Oral Tab Take 1 tablet by mouth daily.      aspirin 81 MG Oral Tab EC Take 1 tablet (81 mg total) by mouth daily.      B Complex-Biotin-FA (B COMPLETE) Oral Tab Take 1 tablet by mouth daily.      magnesium 250 MG Oral Tab Take 1 tablet (250 mg total) by mouth every evening.      zolpidem 10 MG Oral Tab Take 1 tablet (10 mg total) by mouth nightly as needed. (Patient  not taking: Reported on 6/11/2024)         Review of Systems:   A comprehensive review of systems was completed.    Pertinent positives and negatives noted in the HPI.    Objective:   Physical Exam:    /84   Pulse 93   Temp 98.3 °F (36.8 °C) (Oral)   Resp 24   Ht 5' 10\" (1.778 m)   Wt 165 lb (74.8 kg)   SpO2 94%   BMI 23.68 kg/m²   General: chronically ill appearing  Respiratory: No rhonchi, no wheezes  Cardiovascular: S1, S2. Irregularly irregular rhythm. Grade III systolic murmur  Abdomen: Soft, Non-tender, non-distended, positive bowel sounds  Neuro: No new focal deficits  Extremities: b/l ankle edema    Results:    Labs:      Labs Last 24 Hours:    Recent Labs   Lab 06/11/24  1527   RBC 3.67*   HGB 11.4*   HCT 34.6*   MCV 94.3   MCH 31.1   MCHC 32.9   RDW 13.2   NEPRELIM 8.70*   WBC 11.0   .0       Recent Labs   Lab 06/11/24  1527   *   BUN 22   CREATSERUM 0.74   EGFRCR 101   CA 9.0   ALB 2.9*      K 4.2      CO2 35.0*   ALKPHO 54   AST 22   ALT 23   BILT 0.3   TP 7.6       Lab Results   Component Value Date    PT 16.9 (H) 05/05/2014    PT 13.3 05/04/2014    INR 1.06 06/11/2024    INR 2.26 (H) 09/22/2021    INR 1.94 (H) 07/20/2021       Recent Labs   Lab 06/11/24  1527   TROPHS 36       Recent Labs   Lab 06/11/24  1527   PBNP 8,117*       No results for input(s): \"PCT\" in the last 168 hours.    Imaging: Imaging data reviewed in Epic.    Assessment & Plan:      # Atrial fibrillation with RVR  -Diltiazem drip  -Cardiology following  -Xarelto    #Acute on chronic HFpEF  #Aortic stenosis   TTE on 5/30/24 at OSH with EF 50-55%, moderate to severe aortic stenosis with valve area 0.8 cm2, peak systolic gradient 67 mmHg.   -IV lasix  -Cardiology following  -Strict I&O    #Mild normocytic anemia-trend    #Left basilic and cephalic vein thrombus - xarelto     #History of seizure - keppra  #COPD with chronic hypoxic respiratory failure on home oxygen - continue home inhalers. Wean O2  as tolerated    #Type 2 diabetes - ISS    #Hypertension   #History of renal cell carcinoma status post nephrectomy  #ABIGAIL    Plan of care discussed with patient    Devorah Allen MD    Supplementary Documentation:     The 21st Century Cures Act makes medical notes like these available to patients in the interest of transparency. Please be advised this is a medical document. Medical documents are intended to carry relevant information, facts as evident, and the clinical opinion of the practitioner. The medical note is intended as peer to peer communication and may appear blunt or direct. It is written in medical language and may contain abbreviations or verbiage that are unfamiliar.

## 2024-06-12 ENCOUNTER — APPOINTMENT (OUTPATIENT)
Dept: CV DIAGNOSTICS | Facility: HOSPITAL | Age: 64
DRG: 308 | End: 2024-06-12
Attending: INTERNAL MEDICINE

## 2024-06-12 PROBLEM — J96.12 ACUTE HYPOXIC ON CHRONIC HYPERCAPNIC RESPIRATORY FAILURE (HCC): Status: ACTIVE | Noted: 2024-06-12

## 2024-06-12 PROBLEM — J96.01 ACUTE HYPOXIC ON CHRONIC HYPERCAPNIC RESPIRATORY FAILURE (HCC): Status: ACTIVE | Noted: 2024-06-12

## 2024-06-12 PROBLEM — J96.21 ACUTE ON CHRONIC RESPIRATORY FAILURE WITH HYPOXIA (HCC): Status: ACTIVE | Noted: 2024-06-12

## 2024-06-12 PROBLEM — J44.1 COPD EXACERBATION (HCC): Status: ACTIVE | Noted: 2024-06-12

## 2024-06-12 LAB
ALBUMIN SERPL-MCNC: 2.8 G/DL (ref 3.4–5)
ALBUMIN/GLOB SERPL: 0.6 {RATIO} (ref 1–2)
ALP LIVER SERPL-CCNC: 54 U/L
ALT SERPL-CCNC: 25 U/L
ANION GAP SERPL CALC-SCNC: 5 MMOL/L (ref 0–18)
ARTERIAL PATENCY WRIST A: POSITIVE
ARTERIAL PATENCY WRIST A: POSITIVE
AST SERPL-CCNC: 24 U/L (ref 15–37)
BASE EXCESS BLDA CALC-SCNC: 11.1 MMOL/L (ref ?–2)
BASE EXCESS BLDA CALC-SCNC: 12.7 MMOL/L (ref ?–2)
BILIRUB SERPL-MCNC: 0.3 MG/DL (ref 0.1–2)
BODY TEMPERATURE: 98.6 F
BODY TEMPERATURE: 98.6 F
BUN BLD-MCNC: 20 MG/DL (ref 9–23)
CA-I BLD-SCNC: 1.19 MMOL/L (ref 0.95–1.32)
CALCIUM BLD-MCNC: 8.6 MG/DL (ref 8.5–10.1)
CHLORIDE SERPL-SCNC: 102 MMOL/L (ref 98–112)
CO2 SERPL-SCNC: 33 MMOL/L (ref 21–32)
COHGB MFR BLD: 2.5 % SAT (ref 0–3)
COHGB MFR BLD: 2.7 % SAT (ref 0–3)
CREAT BLD-MCNC: 0.65 MG/DL
EGFRCR SERPLBLD CKD-EPI 2021: 105 ML/MIN/1.73M2 (ref 60–?)
ERYTHROCYTE [DISTWIDTH] IN BLOOD BY AUTOMATED COUNT: 13.2 %
GLOBULIN PLAS-MCNC: 4.7 G/DL (ref 2.8–4.4)
GLUCOSE BLD-MCNC: 117 MG/DL (ref 70–99)
GLUCOSE BLD-MCNC: 135 MG/DL (ref 70–99)
GLUCOSE BLD-MCNC: 151 MG/DL (ref 70–99)
GLUCOSE BLD-MCNC: 153 MG/DL (ref 70–99)
GLUCOSE BLD-MCNC: 161 MG/DL (ref 70–99)
GLUCOSE BLD-MCNC: 261 MG/DL (ref 70–99)
HCO3 BLDA-SCNC: 33.6 MEQ/L (ref 21–27)
HCO3 BLDA-SCNC: 34.8 MEQ/L (ref 21–27)
HCT VFR BLD AUTO: 36.7 %
HGB BLD-MCNC: 10.9 G/DL
HGB BLD-MCNC: 11.2 G/DL
HGB BLD-MCNC: 11.2 G/DL
L/M: 12 L/MIN
L/M: 15 L/MIN
LACTATE BLD-SCNC: 0.5 MMOL/L (ref 0.5–2)
MAGNESIUM SERPL-MCNC: 2 MG/DL (ref 1.6–2.6)
MCH RBC QN AUTO: 30.8 PG (ref 26–34)
MCHC RBC AUTO-ENTMCNC: 30.5 G/DL (ref 31–37)
MCV RBC AUTO: 100.8 FL
METHGB MFR BLD: 0.5 % SAT (ref 0.4–1.5)
METHGB MFR BLD: 0.7 % SAT (ref 0.4–1.5)
OSMOLALITY SERPL CALC.SUM OF ELEC: 296 MOSM/KG (ref 275–295)
OXYHGB MFR BLDA: 94.4 % (ref 92–100)
OXYHGB MFR BLDA: 95.6 % (ref 92–100)
PCO2 BLDA: 70 MM HG (ref 35–45)
PCO2 BLDA: 82 MM HG (ref 35–45)
PH BLDA: 7.3 [PH] (ref 7.35–7.45)
PH BLDA: 7.37 [PH] (ref 7.35–7.45)
PHOSPHATE SERPL-MCNC: 5.6 MG/DL (ref 2.5–4.9)
PLATELET # BLD AUTO: 224 10(3)UL (ref 150–450)
PO2 BLDA: 72 MM HG (ref 80–100)
PO2 BLDA: 85 MM HG (ref 80–100)
POTASSIUM BLD-SCNC: 4.8 MMOL/L (ref 3.6–5.1)
POTASSIUM SERPL-SCNC: 4.2 MMOL/L (ref 3.5–5.1)
PROT SERPL-MCNC: 7.5 G/DL (ref 6.4–8.2)
Q-T INTERVAL: 376 MS
QRS DURATION: 148 MS
QTC CALCULATION (BEZET): 565 MS
R AXIS: 62 DEGREES
RBC # BLD AUTO: 3.64 X10(6)UL
SODIUM BLD-SCNC: 136 MMOL/L (ref 135–145)
SODIUM SERPL-SCNC: 140 MMOL/L (ref 136–145)
T AXIS: 185 DEGREES
VENTRICULAR RATE: 136 BPM
WBC # BLD AUTO: 14.5 X10(3) UL (ref 4–11)

## 2024-06-12 PROCEDURE — 93306 TTE W/DOPPLER COMPLETE: CPT | Performed by: INTERNAL MEDICINE

## 2024-06-12 PROCEDURE — 5A09357 ASSISTANCE WITH RESPIRATORY VENTILATION, LESS THAN 24 CONSECUTIVE HOURS, CONTINUOUS POSITIVE AIRWAY PRESSURE: ICD-10-PCS | Performed by: INTERNAL MEDICINE

## 2024-06-12 PROCEDURE — 99223 1ST HOSP IP/OBS HIGH 75: CPT | Performed by: INTERNAL MEDICINE

## 2024-06-12 PROCEDURE — 99232 SBSQ HOSP IP/OBS MODERATE 35: CPT | Performed by: INTERNAL MEDICINE

## 2024-06-12 RX ORDER — BUDESONIDE 0.5 MG/2ML
0.5 INHALANT ORAL
Status: DISCONTINUED | OUTPATIENT
Start: 2024-06-12 | End: 2024-06-15

## 2024-06-12 RX ORDER — METHYLPREDNISOLONE SODIUM SUCCINATE 125 MG/2ML
60 INJECTION, POWDER, LYOPHILIZED, FOR SOLUTION INTRAMUSCULAR; INTRAVENOUS ONCE
Status: COMPLETED | OUTPATIENT
Start: 2024-06-12 | End: 2024-06-12

## 2024-06-12 RX ORDER — DOCUSATE SODIUM 100 MG/1
100 CAPSULE, LIQUID FILLED ORAL DAILY
Status: DISCONTINUED | OUTPATIENT
Start: 2024-06-12 | End: 2024-06-15

## 2024-06-12 RX ORDER — ARFORMOTEROL TARTRATE 15 UG/2ML
15 SOLUTION RESPIRATORY (INHALATION)
Status: DISCONTINUED | OUTPATIENT
Start: 2024-06-12 | End: 2024-06-13

## 2024-06-12 RX ORDER — FUROSEMIDE 10 MG/ML
40 INJECTION INTRAMUSCULAR; INTRAVENOUS
Status: DISCONTINUED | OUTPATIENT
Start: 2024-06-12 | End: 2024-06-14

## 2024-06-12 RX ORDER — METHYLPREDNISOLONE SODIUM SUCCINATE 40 MG/ML
40 INJECTION, POWDER, LYOPHILIZED, FOR SOLUTION INTRAMUSCULAR; INTRAVENOUS EVERY 12 HOURS
Status: DISCONTINUED | OUTPATIENT
Start: 2024-06-12 | End: 2024-06-15

## 2024-06-12 RX ORDER — SODIUM CHLORIDE FOR INHALATION 3 %
3 VIAL, NEBULIZER (ML) INHALATION 2 TIMES DAILY
Status: DISCONTINUED | OUTPATIENT
Start: 2024-06-12 | End: 2024-06-15

## 2024-06-12 RX ORDER — DILTIAZEM HYDROCHLORIDE 60 MG/1
60 TABLET, FILM COATED ORAL EVERY 6 HOURS SCHEDULED
Status: DISCONTINUED | OUTPATIENT
Start: 2024-06-12 | End: 2024-06-14

## 2024-06-12 NOTE — PAYOR COMM NOTE
--------------  ADMISSION REVIEW     Payor: [employer]  Subscriber #:  [id number]  Authorization Number: [id number]    Admit date: 6/11/24  Admit time:  6:50 PM       History   HPI  63-year-old male with history of atrial fibrillation presents emergency room from cardiology office for evaluation of A-fib with rapid ventricular spots.  Patient has a history of COPD wears 4-6 L nasal cannula oxygen chronically.  Patient denies chest pain, states he does have shortness of breath which is chronic denies any acute changes.  Denies any fevers or chills.  Denies calf pain or swelling.  Denies abdominal pain.  Denies melena medic he denies urinary symptoms.  Patient is on Xarelto and reports he has not missed any doses.  ED Triage Vitals [06/11/24 1522]   /85   Pulse (!) 134   Resp 18   Temp 98.3 °F (36.8 °C)   Temp src Oral   SpO2 93 %   O2 Device Nasal cannula   HEENT: no scleral icterus.  Mucous membranes are moist, oropharynx is clear. Scalp is atraumatic.  NECK: Neck is supple, there is no nuchal rigidity.    HEART: Tachycardic, irregular regular   LUNGS: Coarse breath sounds bilaterally.  No wheezing rhonchi or stridor.  ABDOMEN: Soft, nondistended,non tender,  EXTREMITIES: No peripheral edema, no calf tenderness    COMP METABOLIC PANEL (14) - Abnormal; Notable for the following components:       Result Value    Glucose 142 (*)     CO2 35.0 (*)     Albumin 2.9 (*)     Globulin  4.7 (*)     A/G Ratio 0.6 (*)     All other components within normal limits   PRO BETA NATRIURETIC PEPTIDE - Abnormal; Notable for the following components:    Pro-Beta Natriuretic Peptide 8,117 (*)     All other components within normal limits   CBC W/ DIFFERENTIAL - Abnormal; Notable for the following components:    RBC 3.67 (*)     HGB 11.4 (*)     HCT 34.6 (*)     Neutrophil Absolute Prelim 8.70 (*)     Neutrophil Absolute 8.70 (*)    EKG    Rate, intervals and axes as noted on EKG Report.  Rate: 136  Rhythm: Atrial  Fibrillation  Reading: Atrial fibrillation with rapid ventricular spots, left bundle branch block.  Left bundle branch block was noted on previous EKG.    Admission disposition: 6/11/2024  4:50 PM       Disposition and Plan   Clinical Impression:  1. Atrial fibrillation with rapid ventricular response (HCC)       Disposition:  Admit  6/11/2024  4:50 pm  History and Physical   History of Present Illness:    Keny Marshall is a 63 year old male with chronic HFpEF, aortic stenosis status post AVR, history of seizure, COPD on home oxygen, type 2 diabetes, hypertension, atrial fibrillation on Xarelto, history of renal cell carcinoma status post nephrectomy, ABIGAIL presented with shortness of breath.     Patient was recently admitted to [facility] on May 27, 2024 with acute on chronic hypercapnic respiratory failure suspect secondary to noncompliance with PAP at home and low oxygen tank.  He was noted to have [place] seizure episode during admission.  MRI showed chronic microvascular changes without any lesions.  He was altered during the admission.  His metoprolol was discontinued and he was placed on Cardizem.  Home health RN noted heart rates for the last 1 to 2 days in the 140s.  He reports progressive SOB over the past week. He has a chronic cough without significant change. Denies fevers, chills, chest pain. He notes increased ankle edema. He was evaluated in the cardiology clinic today and was sent to the ED for evaluation.     Patient was afebrile, tachycardic to 130s and normotensive on arrival.  He was satting 93% on 6 L nasal cannula.  EKG showed A-fib with RVR; old left bundle branch block.  Chest x-ray is without acute findings.  proBNP elevated to 8117.  He was given 500 mL normal saline bolus and started on diltiazem drip.  [facility] was consulted and patient is admitted.      Assessment & Plan:       # Atrial fibrillation with RVR  -Diltiazem drip  -Cardiology following  -Xarelto     #Acute  on chronic HFpEF  #Aortic stenosis   TTE on 5/30/24 at OSH with EF 50-55%, moderate to severe aortic stenosis with valve area 0.8 cm2, peak systolic gradient 67 mmHg.   -IV lasix  -Cardiology following  -Strict I&O     #Mild normocytic anemia-trend     #Left basilic and cephalic vein thrombus - xarelto      #History of seizure - keppra  #COPD with chronic hypoxic respiratory failure on home oxygen - continue home inhalers. Wean O2 as tolerated     #Type 2 diabetes - ISS     #Hypertension   #History of renal cell carcinoma status post nephrectomy  #ABIGAIL         6/12/24  Temp:  [98.3 °F (36.8 °C)-99 °F (37.2 °C)] 99 °F (37.2 °C)  Pulse:  [] 120  Resp:  [18-28] 24  BP: (110-144)/(54-94) 132/82  SpO2:  [82 %-100 %] 85 %     Physical Exam:    Respiratory: No wheezes, no rhonchi  Cardiovascular: S1, S2, regular rate and rhythm  Abdomen: Soft, Non-tender, non-distended, positive bowel sounds  Neuro: No new focal deficits.   Extremities: No edema     Lab 06/11/24  1527 06/12/24  0535   WBC 11.0 14.5*   HGB 11.4* 11.2*   MCV 94.3 100.8*   .0 224.0   INR 1.06  --       Lab 06/11/24  1527 06/12/24  0539   * 153*   BUN 22 20   CREATSERUM 0.74 0.65*   CA 9.0 8.6   ALB 2.9* 2.8*    140   K 4.2 4.2    102   CO2 35.0* 33.0*   ALKPHO 54 54   AST 22 24   ALT 23 25   BILT 0.3 0.3   TP 7.6 7.5      Lab 06/11/24  1527   TROPHS 36      Lab 06/11/24  1527   PTP 13.8   INR 1.06     Medications:    furosemide  40 mg Intravenous BID (Diuretic)    aspirin  81 mg Oral Daily    atorvastatin  20 mg Oral Nightly    [Held by provider] Roflumilast  500 mcg Oral Daily    insulin aspart  1-5 Units Subcutaneous TID AC and HS    levETIRAcetam  500 mg Oral BID    rivaroxaban  20 mg Oral Daily with dinner    fluticasone furoate-vilanterol  1 puff Inhalation Daily       Assessment & Plan:     #Acute on Chronic Hypercapnic and hypoxic respiratory failure   -noncompliance with BipAP  -Pulmonology on consult      # Atrial  fibrillation with RVR  -Diltiazem drip  -Cardiology following  -Xarelto     #Acute on chronic HFpEF  #Aortic stenosis s/p AVR  TTE on 5/30/24 at OSH with EF 50-55%, moderate to severe aortic stenosis with valve area 0.8 cm2, peak systolic gradient 67 mmHg.   -IV lasix  -Cardiology following     #Mild normocytic anemia  -monitor      #Left basilic and cephalic vein thrombus   -xarelto      #History of seizure - keppra  #COPD with chronic hypoxic respiratory failure on home oxygen - continue home inhalers. Wean O2 as tolerated     #Type 2 diabetes - ISS     #Hypertension   #History of renal cell carcinoma status post nephrectomy  #ABIGAIL      MEDICATIONS ADMINISTERED IN LAST 1 DAY:  acetaminophen (Tylenol Extra Strength) tab 500 mg       Date Action Dose Route User    6/11/2024 2201 Given 500 mg Oral Karla Calderón RN          albuterol (Ventolin) (2.5 MG/3ML) 0.083% nebulizer solution 2.5 mg       Date Action Dose Route User    6/12/2024 0120 Given 2.5 mg Nebulization Nikky Pagan RCP    6/11/2024 2039 Given 2.5 mg Nebulization Kandace Whitehead, CM          aspirin DR tab 81 mg       Date Action Dose Route User    6/12/2024 0836 Given 81 mg Oral Gary Berkowitz RN          dilTIAZem (cardIZEM) 100 mg in sodium chloride 0.9% 100 mL IVPB-ADDV       Date Action Dose Route User    6/12/2024 1312 Rate/Dose Change 5 mg/hr Intravenous Gary Berkowitz RN    6/12/2024 1212 Rate/Dose Change 10 mg/hr Intravenous Gary Berkowitz RN    6/12/2024 1202 Rate/Dose Change 15 mg/hr Intravenous Gary Berkowitz RN    6/12/2024 0901 New Bag 20 mg/hr Intravenous Gary Berkowitz RN    6/12/2024 0555 Rate/Dose Change 20 mg/hr Intravenous Karla Calderón RN    6/12/2024 0348 Rate/Dose Change 15 mg/hr Intravenous Karla Calderón, RN    6/12/2024 0343 New Bag 10 mg/hr Intravenous Karla Calderón, RN    6/12/2024 0121 Rate/Dose Change 10 mg/hr Intravenous Karla Calderón, RN    6/11/2024 0909 Rate/Dose Change 5 mg/hr Intravenous  Karla Calderón RN    6/11/2024 2220 Rate/Dose Change 10 mg/hr Intravenous Karla Calderón RN    6/11/2024 2201 Rate/Dose Change 15 mg/hr Intravenous Karla Calderón RN    6/11/2024 2035 New Bag 20 mg/hr Intravenous Karla Calderón RN    6/11/2024 1751 Rate/Dose Change 20 mg/hr Intravenous Karla Blount RN    6/11/2024 1711 Rate/Dose Change 15 mg/hr Intravenous Karla Blount RN          dilTIAZem (cardIZEM) tab 60 mg       Date Action Dose Route User    6/12/2024 1126 Given 60 mg Oral Gary Berkowitz RN          fluticasone furoate-vilanterol (Breo Ellipta) 100-25 MCG/ACT inhaler 1 puff       Date Action Dose Route User    6/12/2024 0806 Given 1 puff Inhalation Samira Mcmahon RCP          furosemide (Lasix) 10 mg/mL injection 40 mg       Date Action Dose Route User    6/12/2024 0626 Given 40 mg Intravenous Karla Calderón RN          insulin aspart (NovoLOG) 100 Units/mL FlexPen 1-5 Units       Date Action Dose Route User    6/12/2024 1127 Given 4 Units Subcutaneous (Right Upper Arm) Gary Berkowitz RN    6/12/2024 0845 Given 2 Units Subcutaneous (Right Upper Arm) Gary Berkowitz RN          levETIRAcetam (Keppra) tab 500 mg       Date Action Dose Route User    6/12/2024 0836 Given 500 mg Oral Gary Berkowitz RN    6/11/2024 2023 Given 500 mg Oral Karla Calderón RN          methylPREDNISolone sodium succinate (Solu-MEDROL) injection 60 mg       Date Action Dose Route User    6/12/2024 0134 Given 60 mg Intravenous Karla Calderón RN          rivaroxaban (Xarelto) tab 20 mg       Date Action Dose Route User    6/11/2024 2023 Given 20 mg Oral Karla Calderón RN          Roflumilast TABS 500 mcg (Patient Supplied)       Date Action Dose Route User    6/12/2024 1120 Given 500 mcg Oral Gary Berkowitz RN          sodium chloride 0.9% infusion       Date Action Dose Route User    6/11/2024 6019 New Bag (none) Intravenous Liliana Teixeira, RN          torsemide (Demadex) tab 20 mg        Date Action Dose Route User    6/11/2024 2023 Given 20 mg Oral Karla Calderón, RN            Vitals (last day)       Date/Time Temp Pulse Resp BP SpO2 Weight O2 Device O2 Flow Rate (L/min) [facility]    06/12/24 1646 -- 109 22 137/67 97 % -- -- -- MG    06/12/24 1130 99 °F (37.2 °C) 93 22 121/77 100 % -- Bi-PAP --     06/12/24 0920 -- 103 -- -- 100 % -- -- -- SB    06/12/24 0913 98.1 °F (36.7 °C) 114 22 119/84 91 % -- -- -- MG    06/12/24 0846 -- -- -- -- -- 161 lb -- --     06/12/24 0554 -- -- -- 132/82 -- -- High flow nasal cannula 15 L/min     06/12/24 0549 -- 120 -- -- 85 % -- -- --     06/12/24 0548 -- 125 -- -- 87 % -- -- --     06/12/24 0547 -- 125 -- -- 87 % -- -- --     06/12/24 0545 -- 127 -- -- 82 % -- -- --     06/12/24 0543 -- 127 -- -- 82 % -- -- --     06/12/24 0258 -- -- -- -- -- -- High flow nasal cannula 10 L/min     06/12/24 0200 -- 76 -- -- 100 % -- -- --     06/12/24 0113 -- -- -- -- -- -- High flow nasal cannula 8 L/min     06/12/24 0020 -- 79 -- -- 92 % -- -- --     06/11/24 2021 -- 102 -- -- 87 % -- -- --     06/11/24 1935 99 °F (37.2 °C) -- 22 -- -- -- -- -- OW    06/11/24 1935 -- 100 -- 122/63 93 % -- Nasal cannula 7 L/min     06/11/24 1934 -- 125 -- -- 88 % -- -- --     06/11/24 1933 -- 108 -- -- 82 % -- Nasal cannula 5 L/min     06/11/24 1645 -- 93 24 120/84 94 % -- Nasal cannula 4 L/min     06/11/24 1630 -- 98 26 118/54 95 % -- Nasal cannula 5 L/min     06/11/24 1615 -- 92 28 125/65 93 % -- Nasal cannula 5 L/min     06/11/24 1522 98.3 °F (36.8 °C) 134 18 120/85 93 % 165 lb Nasal cannula 6 L/min

## 2024-06-12 NOTE — PROGRESS NOTES
06/12/24 1710   BiPAP   $ RT Standby Charge (per 15 min) 1   Device Trilogy   BiPAP / CPAP CE# 1   BiPAP bacteria filter Yes   BIPAP plugged into main power? Yes   Mode AVAPS   Interface Full face mask   Mask Size Medium   Control Settings   Set Rate 16 breaths/min   Set EPAP 5   Oxygen Percent 40 %   Inspiratory time 0.9   Insp rise time 3   Max P 20   Min P 10      AVAPS   Min IPAP 10   Max IPAP 20   EPAP Level 5   Set rate 16   Tidal volume 450   BiPAP/CPAP Monitored Parameters   Toleration   ( in pts room for tonight)       Recent Labs   Lab 06/12/24  1556   ABGPHT 7.37   QHBNUN0L 70*   SIVMR2H 72*   ABGHCO3 34.8*   ABGBE 12.7*   TEMP 98.6   ROMARIO Positive   SITE Left Brachial   DEV High flow nasal cannula   THGB 10.9*       Patient received on 12L nasal cannula, was recently taken off bipap 14/6. ABG drawn and results shown above. Patient changed to a trilogy and the above avaps settings entered to be worn overnight tonight. Neb treatments given as scheduled. Nasal cannula weaned to keep sats WNL. Patient reports an improvements in his breathing since the morning. Will continue to monitor.

## 2024-06-12 NOTE — CONSULTS
[facility]  Report of Consultation    Keny Marshall Patient Status:  Inpatient    1960 MRN [id number]   [facility] 8NE-A Attending Kary Ley, DO   Hosp Day # 1 PCP Dustin Avina MD     Reason for Consultation:  Hypercapnia, hypoxia    History of Present Illness:  Keny Marshall is a a(n) 63 year old male smoker with PMH severe COPD, chronic hypoxic respiratory failure on supplemental o2, chronic hypercapnic respiratory failure previously managed on AVAPS, CHF, afib, RCCa s/p left nephrectomy  who was admitted with worsening dyspnea. The patient was just recently hospitalized about two weeks ago at [place] with hypercapnic respiratory failure after being found unresponsive.  He was intubated at that time and subsequently able to be extubated and managed on BIPAP but refused to use. He was able to be discharged home however he developed worsening dyspnea over the past few days. He was seen in his cardiologist office and noted dyspneic and with BLE edema. He was again noted hypercapnic with PCO2 of 82. He was agreeable to BIPAP and just removed a short time ago and feels better. Still with dyspnea. No chest pain/pressure, pleurisy. No fevers.   Does complain of chronic cough/phlegm. No blood.     2023 previously  Keny Marshall is a 62 year old male smoker with significant PMH severe COPD, chronic hypoxic respiratory failure on supplemental o2, severe aortic stenosis, CHF, hx RCCA s/p left nephrectomy  who presents today for follow up. He denies acute concerns today, stable chronic ARCEO. Continued thick phlegm.no fevers. No pain    2023 previously  He denies new concerns, had his CT recently and concerned about results. Using inhaler and nebs, breathing is stable. Has thick phlegm, no blood. no fevers. No pain    previously  The patient has followed with me the last several years for his COPD and is on maximal therapy including inhalers  and nebulizers. He does admit to continued smoking and is aware of recommendation for smoking cessation.   He denies change in chronic dyspnea on exertion. Has chronic cough and phlegm, no blood. No fevers. No pain  Has been monitoring his weight and following with [facility]  Hx ABIGAIL and intolerant of CPAP  History:  Past Medical History:    Aortic stenosis    Arrhythmia    hx of a-fib    Arthritis    Cancer (HCC)    LEFT RENAL     Chronic hypoxemic respiratory failure (HCC)    On 4 LPM/NC at rest 24 hours/ day but 6 LPM/NC on exertion    COPD    Pulmonary follow up with Dr. Crum -no O2    Depression    Difficult intubation    patient states history of difficult intubation   due to body weight.States this no longer issue due to sugnificant weight loss    Esophageal reflux    Eye disease    Fatigue    Fever, unknown origin    Heart murmur    Heart palpitations    High blood pressure    High cholesterol    Loss of appetite    Other and unspecified hyperlipidemia    Paroxysmal atrial fibrillation (HCC)    Pneumonia, organism unspecified(486)    Prediabetes    Shortness of breath    uses oxygen 3-4 L/NC all the time    Unspecified essential hypertension    Unspecified sleep apnea    He was unable to tolerate CPAP/BiPAP    Visual impairment    reading glasses     Past Surgical History:   Procedure Laterality Date    Adenoidectomy      Angiogram      Appendectomy      Appendectomy      Colonoscopy N/A 03/21/2016    Procedure: COLONOSCOPY;  Surgeon: Artur Okeefe MD;  Location:  ENDOSCOPY    Colonoscopy      Colonoscopy N/A 1/5/2023    Procedure: .;  Surgeon: Artur Okeefe MD;  Location:  ENDOSCOPY    Endovas repair, infrarenl abdom aortic aneurysm/dissect      Laparo radical nephrectomy Left 05/06/2014    Nephrectomy Left 2014    Other  meatus of urethra    Other Right     foreign body removal-arm    Other surgical history Right 03/25/2016    Procedure: KNEE ARTHROSCOPY;  Surgeon: Madhu Anaya  MD;  Location:  MAIN OR    Repair rotator cuff,acute Right     Upper gi endoscopy,exam      Valve repair  2020     Family History   Problem Relation Age of Onset    Heart Disorder Mother     Pacemaker Mother     Other (Atrial fibrillation) Father     Other (Lung cancer) Maternal Aunt       reports that he quit smoking about 15 months ago. His smoking use included cigarettes. He started smoking about 41 years ago. He has a 60 pack-year smoking history. He has been exposed to tobacco smoke. He has never used smokeless tobacco. He reports that he does not currently use alcohol after a past usage of about 14.0 standard drinks of alcohol per week. He reports that he does not use drugs.    Allergies:  Allergies   Allergen Reactions    Bees ANAPHYLAXIS    Other ANAPHYLAXIS     Bee stings       Medications:  reviewed   furosemide  40 mg Intravenous BID (Diuretic)    dilTIAZem  60 mg Oral 4 times per day    aspirin  81 mg Oral Daily    atorvastatin  20 mg Oral Nightly    Roflumilast  500 mcg Oral Daily    insulin aspart  1-5 Units Subcutaneous TID AC and HS    levETIRAcetam  500 mg Oral BID    rivaroxaban  20 mg Oral Daily with dinner    fluticasone furoate-vilanterol  1 puff Inhalation Daily       ipratropium    albuterol    acetaminophen    melatonin    polyethylene glycol (PEG 3350)    sennosides    bisacodyl    fleet enema    ondansetron    prochlorperazine    glucose **OR** glucose **OR** glucose-vitamin C **OR** dextrose **OR** glucose **OR** glucose **OR** glucose-vitamin C    Review of Systems:   Constitutional: fatigue  Eyes: Negative for visual disturbance, irritation and redness.  Ears, nose, mouth, throat, and face: Negative for hearing loss, tinnitus, nasal congestion, snoring, sore throat, hoarseness and voice change.  Respiratory: see HPI  Cardiovascular: see HPI  Gastrointestinal: Negative for dysphagia, odynophagia, reflux symptoms, nausea, vomiting, change in bowel habits, diarrhea, constipation and  abdominal pain.  Integument/breast: Negative for rash, skin lesions, and pruritus.  Hematologic/lymphatic: Negative for easy bruising, bleeding, and lymphadenopathy.  Musculoskeletal: Negative for myalgias, arthralgias, muscle weakness.  Neurological: Negative for headaches, dizziness, seizures, memory problems, trouble swallowing, speech problems, gait problems and weakness.  : Denies dysuria, hematuria, urinary retention.  Behavioral/Psych: Normal affect, mood, speech. No AVH, No SI/HI    All other review of systems are negative.    Vital signs in last 24 hours:  Patient Vitals for the past 24 hrs:   BP Temp Temp src Pulse Resp SpO2 Height Weight   06/12/24 1130 121/77 99 °F (37.2 °C) Oral 93 22 100 % -- --   06/12/24 0920 -- -- -- 103 -- 100 % -- --   06/12/24 0913 119/84 98.1 °F (36.7 °C) Oral 114 22 91 % -- --   06/12/24 0846 -- -- -- -- -- -- -- 161 lb (73 kg)   06/12/24 0554 132/82 -- -- -- -- -- -- --   06/12/24 0549 -- -- -- 120 -- (!) 85 % -- --   06/12/24 0548 -- -- -- (!) 125 -- (!) 87 % -- --   06/12/24 0547 -- -- -- (!) 125 -- (!) 87 % -- --   06/12/24 0545 -- -- -- (!) 127 -- (!) 82 % -- --   06/12/24 0543 -- -- -- (!) 127 -- (!) 82 % -- --   06/12/24 0430 -- -- -- 96 -- 98 % -- --   06/12/24 0415 -- -- -- 94 -- 99 % -- --   06/12/24 0345 125/75 -- -- 93 24 98 % -- --   06/12/24 0300 -- -- -- 104 -- 94 % -- --   06/12/24 0200 -- -- -- 76 -- 100 % -- --   06/12/24 0020 -- -- -- 79 -- 92 % -- --   06/11/24 2320 110/79 -- -- 82 22 90 % -- --   06/11/24 2027 -- -- -- 89 -- (!) 89 % -- --   06/11/24 2026 -- -- -- 103 -- (!) 85 % -- --   06/11/24 2025 -- -- -- 100 -- (!) 86 % -- --   06/11/24 2024 -- -- -- 100 -- (!) 88 % -- --   06/11/24 2023 -- -- -- 106 -- (!) 88 % -- --   06/11/24 2022 -- -- -- 95 -- (!) 89 % -- --   06/11/24 2021 -- -- -- 102 -- (!) 87 % -- --   06/11/24 1935 122/63 99 °F (37.2 °C) Oral 100 22 93 % -- --   06/11/24 1934 -- -- -- (!) 125 -- (!) 88 % -- --   06/11/24 1933 -- -- -- 108  -- (!) 82 % -- --   06/11/24 1905 114/85 -- -- 102 -- 91 % -- 159 lb 13.3 oz (72.5 kg)   06/11/24 1903 137/78 -- -- 100 -- 95 % -- --   06/11/24 1901 133/71 -- -- 84 -- -- -- --   06/11/24 1830 122/85 -- -- 86 21 92 % -- --   06/11/24 1815 144/82 -- -- 95 24 96 % -- --   06/11/24 1800 111/77 -- -- 86 23 100 % -- --   06/11/24 1745 110/85 -- -- 119 19 93 % -- --   06/11/24 1730 130/56 -- -- (!) 137 (!) 28 95 % -- --   06/11/24 1715 134/88 -- -- 104 21 94 % -- --   06/11/24 1700 141/68 -- -- 110 22 93 % -- --   06/11/24 1645 120/84 -- -- 93 24 94 % -- --   06/11/24 1630 118/54 -- -- 98 26 95 % -- --   06/11/24 1615 125/65 -- -- 92 (!) 28 93 % -- --   06/11/24 1600 112/71 -- -- 70 22 93 % -- --   06/11/24 1545 (!) 129/94 -- -- 99 24 93 % -- --   06/11/24 1530 120/85 -- -- (!) 134 21 98 % -- --   06/11/24 1522 120/85 98.3 °F (36.8 °C) Oral (!) 134 18 93 % 5' 10\" (1.778 m) 165 lb (74.8 kg)       Intake/Output:    Intake/Output Summary (Last 24 hours) at 6/12/2024 1252  Last data filed at 6/12/2024 0913  Gross per 24 hour   Intake 740 ml   Output 1525 ml   Net -785 ml        PHYSICAL EXAM  GEN: Appears alert, mild tachypnea at rest, but no distress  PSYCH: Normal mood and affect, AAOX3  NEURO: CN 2-12 grossly intact, no focal deficits  HEENT: Atraumatic, normocephalic, EOMI, no icterus/hemorrhage, no conjunctival injection/discharge, nares normal  MOUTH: dry mucosa, poor dentition  NECK: Trachea midline, symmetric, no visible masses or scars, no crepitus, normal flexion/extension  CHEST: Normal chest excursion, no visible deformity or scars, no tenderness to palpation  PULMONARY:diminished breath sounds bilaterally. No wheeze heard. Mild tachypnea but no laboring  COR: tachycardia, irregular rhythm, no murmur heard  GI: NABS X 4, S/NT/ND, No hernias or HSM  LYMPHATIC: No palpable or visible lymphadenopathy in neck, axillae, groin  MSK: Normal strength and sensation in all extremities  EXT: No overt deformities, +BLE  edema    Lab Data Review:  Recent Labs   Lab 06/11/24  1527 06/12/24  0539   * 153*   BUN 22 20   CREATSERUM 0.74 0.65*   CA 9.0 8.6    140   K 4.2 4.2    102   CO2 35.0* 33.0*     Recent Labs   Lab 06/11/24  1527 06/12/24  0535   RBC 3.67* 3.64*   HGB 11.4* 11.2*   HCT 34.6* 36.7*   MCV 94.3 100.8*   MCH 31.1 30.8   MCHC 32.9 30.5*   RDW 13.2 13.2   NEPRELIM 8.70*  --    WBC 11.0 14.5*   .0 224.0     No results for input(s): \"BNP\" in the last 168 hours.  No results for input(s): \"TROP\", \"CK\" in the last 168 hours.  Recent Labs   Lab 06/11/24  1527   INR 1.06   PTT 32.2       Other Labs:     ABG:  Recent Labs   Lab 06/12/24  0736   ABGPHT 7.30*   TSWDJL8P 82*   NPGAH9X 85   ABGHCO3 33.6*   ABGBE 11.1*   TEMP 98.6   ROMARIO Positive   SITE Left Brachial   DEV High flow nasal cannula   THGB 11.2*       Cultures:   No results found for this visit on 06/11/24.  No results for input(s): \"COLORUR\", \"CLARITY\", \"SPECGRAVITY\", \"GLUUR\", \"BILUR\", \"KETUR\", \"BLOODURINE\", \"PHURINE\", \"PROUR\", \"UROBILINOGEN\", \"NITRITE\", \"LEUUR\", \"WBCUR\", \"RBCUR\", \"BACUR\", \"EPIUR\" in the last 168 hours.    Radiology:   Reviewed personally  XR CHEST AP PORTABLE  (CPT=71045)    Result Date: 6/11/2024  CONCLUSION:  1. No significant interval changes, with stable diffuse emphysematous changes again noted. 2. Stable linear regions of scarring radiating from a focus of pleural thickening and calcified plaque along the mid aspect of the left lateral pleural margin. 3. No evidence of an acute process.   LOCATION:  [id number]      Dictated by (CST): Marielena Martines DO on 6/11/2024 at 4:43 PM     Finalized by (CST): Marielena Martines DO on 6/11/2024 at 4:46 PM        Assessment/Plan:  #1. Acute on chronic hypercapnic and hypoxic respiratory failure  Multifactorial due to afib with RVR, CHF exacerbation, COPD exacerbation and nonadherence to PAP therapy  Treat/control afib and CHF as per cardiology  Continue nebs, steroids as below  Wean o2 for  goal saturations of 88-92% given hypercapnia. His previous o2 baseline was: 2L at rest, 4L on exertion; using and benefiting from portable oxygen concentrator (3 at rest, 6 on exertion)  Will need o2 walk prior to discharge  Repeat ABG to assess hypercapnia  Previously ordered and used AVAPS with Dr. Lorenz however he self discontinued.  Reviewed extensively with patient - he is agreeable to restart on using NIPPV. Presently on BIPAP, but suspect will need AVAPS - check ABG and if hypercapnia, then change to AVAPS. Will also work with SW to arrange AVAPS for home    #2. Acute CHF exacerbation, afib with RVR  Diurese as per cardiology    #3. Acute COPD exacerbation  Severe obstruction on previous PFTs 3/2021  Now with exacerbation  Will hold inhalers given plan to use nebs below (home regimen: symbicort 160 BID, spiriva daily)  He cannot tolerate albuterol due to palpitations, tremors and onw with afib with RVR; stop duonebs  Start scheduled ipratropium  Start budesonide and arformoterol BID; apparently hospital does not stock levalbuterol  Add hypertonic saline BID for airway clearance  Continue methylpred  Continue roflumilast  Previously reviewed BLVR - not candidate at this time, reassess as outpatient    #4. Pulmonary nodules  7/2017 CT with worsening peripheral lingular atelectasis. Nodules stable   5/2018 CT stable nodules  12/2020 CT stable  7/2021 CT stable but worsening mediastinal LAD suspect reactive to recent cardiac surgery  1/2022 CT with stable nodules, improved LAD  3/2023 CT chest with several small nodules  9/2023 CT chest with stable findings   Nodules have been stable for over two years so no need to follow nodules in particular, however he wishes to proceed with LDCT (see below) so will follow on repeat imaging    #5. Tobacco abuse  30+ pack years, active pipe smoker  Next due for LDCT in 9/2024    Thank you for the consultation.  Will follow with you.    Stefano Randle MD

## 2024-06-12 NOTE — PROGRESS NOTES
Assumed care of patient at 0700. Received on 15L HFNC. Increased work of breathing noted and accessory muscle use. ABG obtained and found to have Co2 retention. Placed on Bipap which improved symptoms. Plan for Avaps going forward when asleep and also try to encourage use when awake but breaks are ok. When off avaps 6L HFNC at rest and 12L HFNC needed for movement. Iv steroids, nebs continue. Iv lasix with good urine output. Complaints of pain to coccyx. Some pink fragile skin noted. Cream and mepilex applied and waffle cushion provided with good relief. Echo done on shift. Transitioned off cardizem gtt to po cardizem on shift. HR 80's-90's at rest and 100's-120's with activity but recover with rest. Updated on plan of care and condition update. All needs met on shift.

## 2024-06-12 NOTE — CM/SW NOTE
Received call from [facility] stating that they are current w/ pt for [facility]. Updates/DONA order sent in [facility].     Sherrie Dean MSW, LSW  Discharge Planner  [id number]

## 2024-06-12 NOTE — PLAN OF CARE
Patient received at 1930  A&Ox4.  7L-10L high flow nasal cannula, dyspnea on exertion noted. A flutter on telemetry, heart rates 70s-90s, continuous cardizem gtt infusing per orders. On xarelto, dose received this evening. Continent, voiding without difficulty. Prn tylenol given for headache with relief noted. Ambulating with standby assist. Frequent rounding and fall precautions in place.     0130 pt c/o sob and intermittent \"chest pressure\" at rest. MD notified, x1 iv solumedrol given as ordered    0550 patient with dyspnea and sob after voiding in urinal in the bed. Requiring 15 L high flow. Hrs sustaining 120s, cardizem gtt titrated. Respiratory therapist notified, hospitalist and cardiology notified.      Problem: CARDIOVASCULAR - ADULT  Goal: Maintains optimal cardiac output and hemodynamic stability  Description: INTERVENTIONS:  - Monitor vital signs, rhythm, and trends  - Monitor for bleeding, hypotension and signs of decreased cardiac output  - Evaluate effectiveness of vasoactive medications to optimize hemodynamic stability  - Monitor arterial and/or venous puncture sites for bleeding and/or hematoma  - Assess quality of pulses, skin color and temperature  - Assess for signs of decreased coronary artery perfusion - ex. Angina  - Evaluate fluid balance, assess for edema, trend weights  Outcome: Progressing  Goal: Absence of cardiac arrhythmias or at baseline  Description: INTERVENTIONS:  - Continuous cardiac monitoring, monitor vital signs, obtain 12 lead EKG if indicated  - Evaluate effectiveness of antiarrhythmic and heart rate control medications as ordered  - Initiate emergency measures for life threatening arrhythmias  - Monitor electrolytes and administer replacement therapy as ordered  Outcome: Progressing

## 2024-06-12 NOTE — CONSULTS
[facility] Consultation    Keny Marshall Patient Status:  Inpatient    1960 MRN [id number]   [facility] 8NE-A Attending Kary Ley, DO   Hosp Day # 1 PCP Dustin Avina MD     Reason for Consultation:  Atrial Flutter      History of Present Illness:  Keny Marshall is a a(n) 63 year old male. Patient of Dr. Salazar with complicated med hx:  he has hx of bio-AVR + myomectomy for AS and subaortic stenosis ; Paroxysmal Atrial Fibrillation, last DCCV , on xarelto; COPD (O2 dependent); Hypertension ; Dyslipidemia ; RCC, s/p resection;  HFpEF.  He was evaluated at [facility] 2024 for a syncopal spell.  Thought related to CO2 retention.  Echos there stated \"severe AVR stenosis with valve area 0.8 cm square\", and JOSE was advised.      He was seen at [facility] yesterday for scheduled hosp f/u.  Atrial flutter with RVR was documented and he was sent to ER.    Patient denies chest pain. He has chronic dyspnea        History:  Past Medical History:    Aortic stenosis    Arrhythmia    hx of a-fib    Arthritis    Cancer (HCC)    LEFT RENAL     Chronic hypoxemic respiratory failure (HCC)    On 4 LPM/NC at rest 24 hours/ day but 6 LPM/NC on exertion    COPD    Pulmonary follow up with Dr. Crum -no O2    Depression    Difficult intubation    patient states history of difficult intubation   due to body weight.States this no longer issue due to sugnificant weight loss    Esophageal reflux    Eye disease    Fatigue    Fever, unknown origin    Heart murmur    Heart palpitations    High blood pressure    High cholesterol    Loss of appetite    Other and unspecified hyperlipidemia    Paroxysmal atrial fibrillation (HCC)    Pneumonia, organism unspecified(486)    Prediabetes    Shortness of breath    uses oxygen 3-4 L/NC all the time    Unspecified essential hypertension    Unspecified sleep apnea    He was unable to tolerate CPAP/BiPAP    Visual impairment    reading glasses     Past Surgical History:    Procedure Laterality Date    Adenoidectomy      Angiogram      Appendectomy      Appendectomy      Colonoscopy N/A 03/21/2016    Procedure: COLONOSCOPY;  Surgeon: Artur Okeefe MD;  Location:  ENDOSCOPY    Colonoscopy      Colonoscopy N/A 1/5/2023    Procedure: .;  Surgeon: Artur Okeefe MD;  Location:  ENDOSCOPY    Endovas repair, infrarenl abdom aortic aneurysm/dissect      Laparo radical nephrectomy Left 05/06/2014    Nephrectomy Left 2014    Other  meatus of urethra    Other Right     foreign body removal-arm    Other surgical history Right 03/25/2016    Procedure: KNEE ARTHROSCOPY;  Surgeon: Madhu Anaya MD;  Location:  MAIN OR    Repair rotator cuff,acute Right     Upper gi endoscopy,exam      Valve repair  2020     Family History   Problem Relation Age of Onset    Heart Disorder Mother     Pacemaker Mother     Other (Atrial fibrillation) Father     Other (Lung cancer) Maternal Aunt          Allergies:  Allergies   Allergen Reactions    Bees ANAPHYLAXIS    Other ANAPHYLAXIS     Bee stings       Medications:   furosemide  40 mg Intravenous BID (Diuretic)    aspirin  81 mg Oral Daily    atorvastatin  20 mg Oral Nightly    [Held by provider] Roflumilast  500 mcg Oral Daily    insulin aspart  1-5 Units Subcutaneous TID AC and HS    levETIRAcetam  500 mg Oral BID    rivaroxaban  20 mg Oral Daily with dinner    fluticasone furoate-vilanterol  1 puff Inhalation Daily       Continuous Infusions:   dilTIAZem 20 mg/hr (06/12/24 0901)       Social History:   reports that he quit smoking about 15 months ago. His smoking use included cigarettes. He started smoking about 41 years ago. He has a 60 pack-year smoking history. He has been exposed to tobacco smoke. He has never used smokeless tobacco. He reports that he does not currently use alcohol after a past usage of about 14.0 standard drinks of alcohol per week. He reports that he does not use drugs.    Review of Systems:  All systems were  reviewed and are negative except as described above in HPI.    Physical Exam:      Wt Readings from Last 3 Encounters:   06/12/24 161 lb (73 kg)   09/26/23 172 lb (78 kg)   04/24/23 168 lb (76.2 kg)       Vitals:    06/12/24 0549 06/12/24 0554 06/12/24 0846 06/12/24 0913   BP:  132/82  119/84   BP Location:  Right arm  Right arm   Pulse: 120   114   Resp:    22   Temp:    98.1 °F (36.7 °C)   TempSrc:    Oral   SpO2: (!) 85%   91%   Weight:   161 lb (73 kg)    Height:           Temp:  [98.1 °F (36.7 °C)-99 °F (37.2 °C)] 98.1 °F (36.7 °C)  Pulse:  [] 114  Resp:  [18-28] 22  BP: (110-144)/(54-94) 119/84  SpO2:  [82 %-100 %] 91 %    Temp: 98.1 °F (36.7 °C)  Pulse: 114  Resp: 22  BP: 119/84      General:  Appears comfortable  HEENT: No focal deficits.  Neck: No JVD, carotids 2+ no bruits.  Cardiac: Irregular S1S2.  No S3, S4, rub, click.  2/6 systolic   murmur.  Lungs: Clear to auscultation and percussion.  Abdomen: Soft, non-tender.   Extremities: No LE edema.  No clubbing or cyanosis  Neurologic: Alert and oriented, normal affect.  Skin: Warm and dry.     Labs:      HEM:  Recent Labs   Lab 06/11/24  1527 06/12/24  0535   WBC 11.0 14.5*   HGB 11.4* 11.2*   HCT 34.6* 36.7*   .0 224.0       Chem:  Recent Labs   Lab 06/11/24  1527 06/12/24  0539    140   K 4.2 4.2    102   CO2 35.0* 33.0*   BUN 22 20   CREATSERUM 0.74 0.65*   MG 2.1 2.0   ALT 23 25   AST 22 24   ALB 2.9* 2.8*       Recent Labs   Lab 06/11/24  1527   INR 1.06                     Lab Results   Component Value Date    TROP <0.045 09/22/2021    TROP <0.045 07/20/2021    TROP <0.045 03/06/2019         Invalid input(s): \"PBNPML\"                 Telemetry: atrial flutter,     Laboratories and Data:  Diagnostics:    EKG, 6/11/2024:  atrial flutter, LBBB, 136/min    CXR, 6/11/2024:  CONCLUSION:    1. No significant interval changes, with stable diffuse emphysematous changes again noted.   2. Stable linear regions of scarring radiating  from a focus of pleural thickening and calcified plaque along the mid aspect of the left lateral pleural margin.   3. No evidence of an acute process.         Echo, 5/30/24:    Summary:   1.  Left ventricle: Systolic function is normal. The estimated ejection fraction is 50-55%. Left ventricular diastolic function is indeterminate.    2.  Aortic valve: A bicuspid morphology cannot be excluded. The leaflets are severely calcified. There is moderate to severe stenosis. There is trace regurgitation. The mean systolic gradient is 35 mm Hg. The peak systolic gradient is 67 mm Hg. The valve area is 0.8 cm².    3.  Mitral valve: The annulus is severely calcified. There is mild regurgitation.    4.  Left atrium: The atrium is mildly dilated.    5.  Tricuspid valve: There is trace regurgitation.      Echo, 9/2021:    1.  Procedure narrative: Transthoracic echocardiography for ventricular       function evaluation and assessment of valvular function. Image quality       was suboptimal. The study was technically limited due to poor acoustic       window availability and body habitus. Intravenous contrast (Definity)       was administered to opacify the LV.   2.  Left ventricle: The cavity size was normal. Wall thickness was       increased. Systolic function was normal. The estimated ejection fraction       was 55-60%. The study is not technically sufficient to allow evaluation       of LV diastolic function.   3.  Aortic valve: A prosthesis was present and functioning normally. The       prosthesis had a normal range of motion. The sewing ring appeared       normal, had no rocking motion, and showed no evidence of dehiscence.       Peak velocity (S): 2.81m/sec. Mean velocity (S): 1.9m/sec. Mean gradient       (S): 17mm Hg. Peak gradient (S): 32mm Hg. Valve area (VTI): 1.78cm^2.       Indexed valve area (VTI): 0.81cm^2/m^2. Peak velocity ratio of LVOT to       aortic valve: 0.54.   4.  Mitral valve: Moderately  calcified annulus. Mitral valve demonstrates       mildly thickened leaflets. There was mild regurgitation.   5.  Left atrium: The left atrium was moderately dilated. The left atrial       volume was moderately increased.   6.  Right ventricle: The cavity size was increased. RV systolic pressure (S,       est): 45mm Hg.   7.  Right atrium: The atrium was moderately dilated.   8.  Tricuspid valve: There was mild-moderate regurgitation.   9.  Pulmonary arteries: Systolic pressure was mildly increased, in the range       of 40mm Hg to 45mm Hg. PA peak pressure: 45mm Hg (S).   10. Inferior vena cava: The vessel was normal in size. The respirophasic       diameter changes were in the normal range (greater than or equal to       50%).   11. Pericardium, extracardiac: There was no pericardial effusion.         Impression:    1.  Atrial flutter, RVR.  On xarelto for hx of Paroxysmal Atrial Fibrillation.  2. Severe COPD.  Hx of CO2 retention. On home O2  3. hx of bio-AVR + myomectomy for AoS and subaortic stenosis 2021. Outside Echo, 5/2024 suggests severe prosthetic valve stenosis  4. Acute on chronic HFpEF  5. Hypertension   6. Dyslipidemia      Recommend:    1.  Telemetry  2. IV cardizem. Will transition back to po cardizem  3. Continue DOAC  4. Echo to assess AVR  5. BiPAP/CPAP per pulm  6. Short course  IV diuresis        Giovani Shukla MD  6/12/2024  10:04 AM

## 2024-06-12 NOTE — OCCUPATIONAL THERAPY NOTE
OT orders received and chart reviewed. Per RN pt. Going on Bipap due to crit high co2. Pt. Not appropriate to participate in OT eval session at this time. Will follow and re-attempt as able and appropriate.

## 2024-06-12 NOTE — PHYSICAL THERAPY NOTE
PT order received and chart reviewed. Per RN via secure chat, hold PT at this time as pt to be placed on BIPAP for critically high CO2. Plan to follow up as medically appropriate and schedule allows.

## 2024-06-12 NOTE — PROGRESS NOTES
[facility]   part of [facility]     Hospitalist Progress Note     Keny Marshall Patient Status:  Inpatient    1960 MRN [id number]   Location [facility] 8NE-A Attending Kary Ley,    Hosp Day # 1 PCP Dustin Avina MD     Chief Complaint: Dyspnea    Subjective:     Patient noncomplaint with BiPAP but was able to get a nasal one and is now feeling breathing is much easier     Objective:    Review of Systems:   A comprehensive review of systems was completed; pertinent positive and negatives stated in subjective.    Vital signs:  Temp:  [98.3 °F (36.8 °C)-99 °F (37.2 °C)] 99 °F (37.2 °C)  Pulse:  [] 120  Resp:  [18-28] 24  BP: (110-144)/(54-94) 132/82  SpO2:  [82 %-100 %] 85 %    Physical Exam:    General: No acute distress  Respiratory: No wheezes, no rhonchi  Cardiovascular: S1, S2, regular rate and rhythm  Abdomen: Soft, Non-tender, non-distended, positive bowel sounds  Neuro: No new focal deficits.   Extremities: No edema      Diagnostic Data:    Labs:  Recent Labs   Lab 24  1527 24  0535   WBC 11.0 14.5*   HGB 11.4* 11.2*   MCV 94.3 100.8*   .0 224.0   INR 1.06  --        Recent Labs   Lab 24  1527 24  0539   * 153*   BUN 22 20   CREATSERUM 0.74 0.65*   CA 9.0 8.6   ALB 2.9* 2.8*    140   K 4.2 4.2    102   CO2 35.0* 33.0*   ALKPHO 54 54   AST 22 24   ALT 23 25   BILT 0.3 0.3   TP 7.6 7.5       Estimated Creatinine Clearance: 117.7 mL/min (A) (based on SCr of 0.65 mg/dL (L)).    Recent Labs   Lab 24  1527   TROPHS 36       Recent Labs   Lab 24  1527   PTP 13.8   INR 1.06       Lab Results   Component Value Date    TSH 1.800 2024             Microbiology    No results found for this visit on 24.      Imaging: Reviewed in Epic.    Medications:    furosemide  40 mg Intravenous BID (Diuretic)    aspirin  81 mg Oral Daily    atorvastatin  20 mg Oral Nightly    [Held by provider] Roflumilast  500 mcg Oral Daily     insulin aspart  1-5 Units Subcutaneous TID AC and HS    levETIRAcetam  500 mg Oral BID    rivaroxaban  20 mg Oral Daily with dinner    fluticasone furoate-vilanterol  1 puff Inhalation Daily       Assessment & Plan:      #Acute on Chronic Hypercapnic and hypoxic respiratory failure   -noncompliance with BipAP  -Pulmonology on consult     # Atrial fibrillation with RVR  -Diltiazem drip  -Cardiology following  -Xarelto     #Acute on chronic HFpEF  #Aortic stenosis s/p AVR  TTE on 5/30/24 at OSH with EF 50-55%, moderate to severe aortic stenosis with valve area 0.8 cm2, peak systolic gradient 67 mmHg.   -IV lasix  -Cardiology following     #Mild normocytic anemia  -monitor      #Left basilic and cephalic vein thrombus   -xarelto      #History of seizure - keppra  #COPD with chronic hypoxic respiratory failure on home oxygen - continue home inhalers. Wean O2 as tolerated     #Type 2 diabetes - ISS     #Hypertension   #History of renal cell carcinoma status post nephrectomy  #ABIGAIL      Kary Ley,     Supplementary Documentation:     Quality:  DVT Mechanical Prophylaxis:     Early ambuation  DVT Pharmacologic Prophylaxis   Medication    rivaroxaban (Xarelto) tab 20 mg         DVT Pharmacologic prophylaxis: Aspirin 81 mg      Code Status: Full Code  Peters: No urinary catheter in place      The 21st Century Cures Act makes medical notes like these available to patients in the interest of transparency. Please be advised this is a medical document. Medical documents are intended to carry relevant information, facts as evident, and the clinical opinion of the practitioner. The medical note is intended as peer to peer communication and may appear blunt or direct. It is written in medical language and may contain abbreviations or verbiage that are unfamiliar.             **Certification      PHYSICIAN Certification of Need for Inpatient Hospitalization - Initial Certification    Patient will require inpatient services  that will reasonably be expected to span two midnight's based on the clinical documentation in H+P.   Based on patients current state of illness, I anticipate that, after discharge, patient will require TBD.

## 2024-06-13 LAB
ANION GAP SERPL CALC-SCNC: 3 MMOL/L (ref 0–18)
BASE EXCESS BLDA CALC-SCNC: 13.1 MMOL/L (ref ?–2)
BODY TEMPERATURE: 98.6 F
BUN BLD-MCNC: 22 MG/DL (ref 9–23)
CA-I BLD-SCNC: 1.21 MMOL/L (ref 0.95–1.32)
CALCIUM BLD-MCNC: 8.6 MG/DL (ref 8.5–10.1)
CHLORIDE SERPL-SCNC: 99 MMOL/L (ref 98–112)
CO2 SERPL-SCNC: 35 MMOL/L (ref 21–32)
COHGB MFR BLD: 2.8 % SAT (ref 0–3)
CREAT BLD-MCNC: 0.52 MG/DL
EGFRCR SERPLBLD CKD-EPI 2021: 113 ML/MIN/1.73M2 (ref 60–?)
GLUCOSE BLD-MCNC: 138 MG/DL (ref 70–99)
GLUCOSE BLD-MCNC: 166 MG/DL (ref 70–99)
GLUCOSE BLD-MCNC: 173 MG/DL (ref 70–99)
GLUCOSE BLD-MCNC: 195 MG/DL (ref 70–99)
GLUCOSE BLD-MCNC: 206 MG/DL (ref 70–99)
HCO3 BLDA-SCNC: 35.1 MEQ/L (ref 21–27)
HGB BLD-MCNC: 10.9 G/DL
L/M: 6 L/MIN
LACTATE BLD-SCNC: 0.5 MMOL/L (ref 0.5–2)
METHGB MFR BLD: 0.4 % SAT (ref 0.4–1.5)
OSMOLALITY SERPL CALC.SUM OF ELEC: 291 MOSM/KG (ref 275–295)
OXYHGB MFR BLDA: 95.2 % (ref 92–100)
PCO2 BLDA: 73 MM HG (ref 35–45)
PH BLDA: 7.36 [PH] (ref 7.35–7.45)
PO2 BLDA: 76 MM HG (ref 80–100)
POTASSIUM BLD-SCNC: 4.7 MMOL/L (ref 3.6–5.1)
POTASSIUM SERPL-SCNC: 4.5 MMOL/L (ref 3.5–5.1)
SODIUM BLD-SCNC: 135 MMOL/L (ref 135–145)
SODIUM SERPL-SCNC: 137 MMOL/L (ref 136–145)

## 2024-06-13 PROCEDURE — 99233 SBSQ HOSP IP/OBS HIGH 50: CPT | Performed by: INTERNAL MEDICINE

## 2024-06-13 RX ORDER — FLUTICASONE FUROATE AND VILANTEROL 200; 25 UG/1; UG/1
1 POWDER RESPIRATORY (INHALATION) DAILY
Status: DISCONTINUED | OUTPATIENT
Start: 2024-06-13 | End: 2024-06-15

## 2024-06-13 RX ORDER — DILTIAZEM HYDROCHLORIDE 5 MG/ML
5 INJECTION INTRAVENOUS ONCE
Status: COMPLETED | OUTPATIENT
Start: 2024-06-13 | End: 2024-06-13

## 2024-06-13 NOTE — PROGRESS NOTES
[place] [facility] Progress Note     SUBJECTIVE/Interval history:  No acute events overnight, he feels better today. Noted worsening heart rates this am after receiving nebs. Cough/congestion is better. No chest pain/pressure. No fevers  Remains on 4-6L    Review of Systems:   A comprehensive 14 point review of systems was completed.   Pertinent positives and negatives noted in the HPI.    Medications  Reviewed personally   furosemide  40 mg Intravenous BID (Diuretic)    dilTIAZem  60 mg Oral 4 times per day    ipratropium  0.5 mg Nebulization TID & HS    methylPREDNISolone  40 mg Intravenous Q12H    budesonide  0.5 mg Nebulization 2 times daily    arformoterol  15 mcg Nebulization 2 times daily    sodium chloride (hypertonic)  3 mL Nebulization BID    docusate sodium  100 mg Oral Daily    aspirin  81 mg Oral Daily    atorvastatin  20 mg Oral Nightly    Roflumilast  500 mcg Oral Daily    insulin aspart  1-5 Units Subcutaneous TID AC and HS    levETIRAcetam  500 mg Oral BID    rivaroxaban  20 mg Oral Daily with dinner       ipratropium    albuterol    acetaminophen    melatonin    polyethylene glycol (PEG 3350)    sennosides    bisacodyl    fleet enema    ondansetron    prochlorperazine    glucose **OR** glucose **OR** glucose-vitamin C **OR** dextrose **OR** glucose **OR** glucose **OR** glucose-vitamin C    OBJECTIVE:  Vitals:    06/13/24 0358 06/13/24 0500 06/13/24 0647 06/13/24 0816   BP: 117/70 112/84  109/79   BP Location: Right arm Right arm     Pulse: 100 (!) 135  (!) 141   Resp: 18 18     Temp: 98 °F (36.7 °C) 98.4 °F (36.9 °C)     TempSrc: Axillary Oral     SpO2: 97% 96%     Weight:   159 lb 6.3 oz (72.3 kg)    Height:           Vital signs in last 24 hours:  Blood pressure 109/79, pulse (!) 141, temperature 98.4 °F (36.9 °C), temperature source Oral, resp. rate 18, height 5' 10\" (1.778 m), weight 159 lb 6.3 oz (72.3 kg), SpO2 96%.     Intake/Output:  I/O  last 3 completed shifts:  In: 240 [P.O.:240]  Out: 3225 [Urine:3225]       Physical Exam:  General: Appears alert, comfortable at rest. No distress  Neurologic:No focal deficits.  Alert.  Oriented.  Lungs:improved air entry bilaterally. No wheeze. No distress  Heart:irreg RR. No m heard  Abdomen:Soft, non-tender.  No masses.  No guarding.  No rebound.  Extremities:edema    Lab Data Review:   Recent Labs   Lab 06/11/24  1527 06/12/24  0539 06/13/24  0534   * 153* 173*   BUN 22 20 22   CREATSERUM 0.74 0.65* 0.52*   CA 9.0 8.6 8.6    140 137   K 4.2 4.2 4.5    102 99   CO2 35.0* 33.0* 35.0*     Recent Labs   Lab 06/11/24  1527 06/12/24  0535   RBC 3.67* 3.64*   HGB 11.4* 11.2*   HCT 34.6* 36.7*   MCV 94.3 100.8*   MCH 31.1 30.8   MCHC 32.9 30.5*   RDW 13.2 13.2   NEPRELIM 8.70*  --    WBC 11.0 14.5*   .0 224.0     No results for input(s): \"BNP\" in the last 168 hours.  No results for input(s): \"TROP\", \"CK\" in the last 168 hours.  Recent Labs   Lab 06/11/24  1527   INR 1.06   PTT 32.2     Recent Labs   Lab 06/13/24  0714   ABGPHT 7.36   QSTKFL2I 73*   CVWGM3A 76*   ABGHCO3 35.1*   ABGBE 13.1*   TEMP 98.6   ROMARIO Not Applicable   SITE Left Brachial   DEV Nasal cannula   THGB 10.9*       Other Labs:  Interval Culture Data:   No results found for this visit on 06/11/24.  No results for input(s): \"COLORUR\", \"CLARITY\", \"SPECGRAVITY\", \"GLUUR\", \"BILUR\", \"KETUR\", \"BLOODURINE\", \"PHURINE\", \"PROUR\", \"UROBILINOGEN\", \"NITRITE\", \"LEUUR\", \"WBCUR\", \"RBCUR\", \"BACUR\", \"EPIUR\" in the last 168 hours.    Interval Radiology:   Reviewed personally  XR CHEST AP PORTABLE  (CPT=71045)    Result Date: 6/11/2024  CONCLUSION:  1. No significant interval changes, with stable diffuse emphysematous changes again noted. 2. Stable linear regions of scarring radiating from a focus of pleural thickening and calcified plaque along the mid aspect of the left lateral pleural margin. 3. No evidence of an acute process.   LOCATION:   [id number]      Dictated by (CST): Marielena Martines DO on 6/11/2024 at 4:43 PM     Finalized by (CST): Marielena Martines DO on 6/11/2024 at 4:46 PM        Assessment/Plan:  #1. Acute on chronic hypercapnic and hypoxic respiratory failure  Multifactorial due to afib with RVR, CHF exacerbation, COPD exacerbation and nonadherence to PAP therapy  Treat/control afib and CHF as per cardiology  Continue nebs, steroids as below  Wean o2 for goal saturations of 88-92% given hypercapnia. His previous o2 baseline was: 2L at rest, 4L on exertion; using and benefiting from portable oxygen concentrator (3 at rest, 6 on exertion)  Will need o2 walk prior to discharge  Repeat ABG tomorrow am and PRN  Previously ordered and used AVAPS with Dr. Lorenz however he self discontinued.  Discussed yesterday with pateint - willing to resume/continue. AVAPS settings adjusted today and will plan to order for home/discharge  During his hospitalization, the patient has been trialed on and failed CPAP and BPAP with persistent hypercapnia with ABG showing PCO2 of 73. He requires noninvasive ventilation with AVAPS for chronic respiratory failure. Without NIV in the home, the patient's condition could worsen resulting in re-admissions and/or harm to the patient. He will continue to use and benefit from noninvasive ventilation. This should be continued on discharge    #2. Acute CHF exacerbation, afib with RVR  Rate control and diurese as per cardiology     #3. Acute COPD exacerbation  Severe obstruction on previous PFTs 3/2021  Now with exacerbation  Stop arformoterol given worsening afib/tachycardia this am  Start Breo 200 in place of his home inhalers (home regimen: symbicort 160 BID, spiriva daily)  He cannot tolerate albuterol due to palpitations, tremors and onw with afib with RVR; stop duonebs  continue scheduled ipratropium, hypertonic saline and budesonide; apparently hospital does not stock levalbuterol  Continue methylpred  Continue  roflumilast  Previously reviewed BLVR - not candidate at this time, reassess as outpatient     #4. Pulmonary nodules  7/2017 CT with worsening peripheral lingular atelectasis. Nodules stable   5/2018 CT stable nodules  12/2020 CT stable  7/2021 CT stable but worsening mediastinal LAD suspect reactive to recent cardiac surgery  1/2022 CT with stable nodules, improved LAD  3/2023 CT chest with several small nodules  9/2023 CT chest with stable findings   Nodules have been stable for over two years so no need to follow nodules in particular, however he wishes to proceed with LDCT (see below) so will follow on repeat imaging     #5. Tobacco abuse  30+ pack years, active pipe smoker  Next due for LDCT in 9/2024     Stefano Randle MD

## 2024-06-13 NOTE — OCCUPATIONAL THERAPY NOTE
OCCUPATIONAL THERAPY EVALUATION - INPATIENT     Room Number: 8613/8613-A  Evaluation Date: 6/13/2024  Type of Evaluation: Initial  Presenting Problem: Atrial fibrillation with RVR    Physician Order: IP Consult to Occupational Therapy  Reason for Therapy: ADL/IADL Dysfunction and Discharge Planning    OCCUPATIONAL THERAPY ASSESSMENT   Patient is currently functioning near baseline with toileting, bathing, upper body dressing, lower body dressing, grooming, eating, bed mobility, transfers, stating sitting balance, dynamic sitting balance, static standing balance, dynamic standing balance, maintaining seated position, functional standing tolerance, and energy conservation strategies. Prior to admission, patient's baseline is IND.  Patient is requiring stand-by assist as a result of the following impairments: decreased endurance, impaired standing balance, decreased muscular endurance, medical status, and increased O2 needs from baseline. Occupational Therapy will continue to follow for duration of hospitalization.    Patient will benefit from continued skilled OT Services For duration of hospitalization, however, given the patient is functioning near baseline level do not anticipate skilled therapy needs at discharge       History Related to Current Admission: Patient is a 63 year old male admitted on 6/11/2024 with Presenting Problem: Atrial fibrillation with RVR. Co-Morbidities : chf, AVR, seizure, copd, DM, HTN, afib, renal cell carcinoma, nephrectomy, rotator cuff repair    WEIGHT BEARING RESTRICTION  Weight Bearing Restriction: None                Recommendations for nursing staff:   Transfers: Sup  Toileting location: Toilet     EVALUATION SESSION:  Patient Start of Session: Pt. Supine in bed  FUNCTIONAL TRANSFER ASSESSMENT  Sit to Stand: Edge of Bed  Edge of Bed: Contact Guard Assist  Toilet Transfer: Contact Guard Assist (Pt. simulated toilet transfer completing sit to stand; stand to sit transfer in chair  similar height to [facility] toilet.)    BED MOBILITY  Supine to Sit : Supervision  Scooting: Scooting to EOB    BALANCE ASSESSMENT  Static Sitting: Independent  Sitting Bilateral: Independent  Static Standing: Supervision  Standing Bilateral: Contact Guard Assist (CGA while ambulating.)    FUNCTIONAL ADL ASSESSMENT  LB Dressing Seated: Independent (Pt. simulated LB dressing by adjusting socks at EOB utilizing figure four method.)      ACTIVITY TOLERANCE: Pt. Ambulating in hallway without assistive device; requiring one rest break. Pt. Completing few stairs and ambulating back to room. Pt. HR reaching 142 following ambulation; returning to mid-upper 90s once seated. Pt. O2 sat at 96 on 7L O2 at rest. Pt. Increased to 10-15 L with ambulation. Pt. Returned to room after ambulation with O2 sat 82, increasing O2 to 10L  on wall pt. Recovered into 88-90's range weaning back down to 7L maintaining.                           O2 SATURATIONS  Oxygen Therapy  SPO2% on Oxygen at Rest: 96  At rest oxygen flow (liters per minute): 7    COGNITION  Overall Cognitive Status:  WFL - within functional limits    Upper Extremity   ROM: within functional limits   Strength: within functional limits   Coordination  Gross motor: WFL  Fine motor: WFL  Sensation: Light touch:  intact    EDUCATION PROVIDED  Patient: Role of Occupational Therapy; Plan of Care; Discharge Recommendations; Energy Conservation  Patient's Response to Education: Verbalized Understanding; Returned Demonstration    Equipment used: Gait belt  Demonstrates functional use,      Therapist comments: Pt. Holding IV pole during ambulation for stability. Pt. Educated on pursed lip breathing technique during ambulation to reduce SOB with exertion.     Patient End of Session: Up in chair;With  staff;Needs met;Call light within reach;RN aware of session/findings;All patient questions and concerns addressed    OCCUPATIONAL PROFILE    HOME SITUATION  Type of Home: House  Home Layout:  Multi-level  Lives With: Spouse    Toilet and Equipment: Standard height toilet  Shower/Tub and Equipment: Walk-in shower;Shower chair             Drives: No  Patient Regularly Uses: Home O2;Reading glasses    Prior Level of Function: Prior to admission pt. Was IND in ADLs besides bathing. Pt. Reported wife assist him with bathing at home. Pt. Reports he can complete the tasks he needs to with taking breaks due to his shortness of breath.     SUBJECTIVE   \"I took a different path in life\"     PAIN ASSESSMENT  Ratin  Location: No pain reported at this time       OBJECTIVE  Precautions: Bed/chair alarm;Seizure  Fall Risk: High fall risk      ASSESSMENTS    AM-PAC ‘6-Clicks’ Inpatient Daily Activity Short Form  -   Putting on and taking off regular lower body clothing?: A Little  -   Bathing (including washing, rinsing, drying)?: A Lot  -   Toileting, which includes using toilet, bedpan or urinal? : A Lot  -   Putting on and taking off regular upper body clothing?: None  -   Taking care of personal grooming such as brushing teeth?: None  -   Eating meals?: None    AM-PAC Score:  Score: 19  Approx Degree of Impairment: 42.8%  Standardized Score (AM-PAC Scale): 40.22    ADDITIONAL TESTS     NEUROLOGICAL FINDINGS      COGNITION ASSESSMENTS       PLAN  OT Treatment Plan: Balance activities;Energy conservation/work simplification techniques;ADL training;Endurance training;Compensatory technique education;Patient/Family education;Patient/Family training  Rehab Potential : Good  Frequency: 3-5x/week  Number of Visits to Meet Established Goals: 2    ADL Goals   Patient will perform grooming: independently and while standing at sink  Patient will perform lower body dressing:  independently  Patient will perform toileting: with supervision    Functional Transfer Goals  Patient will transfer from sit to stand:  independently   Patient will transfer to toilet:  with supervision      Additional Goals      Therapist  Goals    Patient Evaluation Complexity Level:   Occupational Profile/Medical History MODERATE - Expanded review of history including review of medical or therapy record   Specific performance deficits impacting engagement in ADL/IADL LOW  1 - 3 performance deficits    Client Assessment/Performance Deficits LOW - No comorbidities nor modifications of tasks    Clinical Decision Making LOW - Analysis of occupational profile, problem-focused assessments, limited treatment options    Overall Complexity LOW     OT Session Time: 20 minutes  Self-Care Home Management: 5 minutes  Therapeutic Activity: 10 minutes  Neuromuscular Re-education: 0 minutes  Therapeutic Exercise: 0 minutes  Cognitive Skills: 0 minutes  Sensory Integrative: 0 minutes  Orthotic Management and Trainin minutes  Can add/delete any of these

## 2024-06-13 NOTE — CM/SW NOTE
ROSALINDA sent clinical info to [facility] in [place] for AVAPS machine. Spoke with Ramana at [facility] to make aware of the new order. Will receive order and place on pt's chart for MD to complete.     Sherrie Dean MSW, LSW  Discharge Planner  [id number]

## 2024-06-13 NOTE — CM/SW NOTE
AVAPS order signed by MD Randle. SW uploaded to [facility] in [place] and spoke with Servando at [facility] to make aware. Awaiting approval.     Sherrie Dean, MSW, LSW  Discharge Planner  [id number]

## 2024-06-13 NOTE — PROGRESS NOTES
06/13/24 0944   AVAPS   Min IPAP 12   Max IPAP 25   EPAP Level 8   Set rate 12   Tidal volume 500

## 2024-06-13 NOTE — PROGRESS NOTES
[facility]   part of [facility]     Hospitalist Progress Note     Keny Marshall Patient Status:  Inpatient    1960 MRN [id number]   [facility] 8NE-A Attending Kary Ley,    Hosp Day # 2 PCP Dustin Avina MD     Chief Complaint: Dyspnea    Subjective:     RVR overnight   [place] palpations after nebs     Objective:    Review of Systems:   A comprehensive review of systems was completed; pertinent positive and negatives stated in subjective.    Vital signs:  Temp:  [97.6 °F (36.4 °C)-99 °F (37.2 °C)] 98.4 °F (36.9 °C)  Pulse:  [] 141  Resp:  [18-22] 18  BP: (109-137)/(67-89) 124/89  SpO2:  [82 %-100 %] 87 %    Physical Exam:    General: No acute distress  Respiratory: No wheezes, no rhonchi  Cardiovascular: IRIR, fast heart rate    Abdomen: Soft, Non-tender, non-distended, positive bowel sounds  Neuro: No new focal deficits.   Extremities: No edema      Diagnostic Data:    Labs:  Recent Labs   Lab 24  1527 24  0535   WBC 11.0 14.5*   HGB 11.4* 11.2*   MCV 94.3 100.8*   .0 224.0   INR 1.06  --        Recent Labs   Lab 24  1527 24  0539 24  0534   * 153* 173*   BUN 22 20 22   CREATSERUM 0.74 0.65* 0.52*   CA 9.0 8.6 8.6   ALB 2.9* 2.8*  --     140 137   K 4.2 4.2 4.5    102 99   CO2 35.0* 33.0* 35.0*   ALKPHO 54 54  --    AST 22 24  --    ALT 23 25  --    BILT 0.3 0.3  --    TP 7.6 7.5  --        Estimated Creatinine Clearance: 146.8 mL/min (A) (based on SCr of 0.52 mg/dL (L)).    Recent Labs   Lab 24  1527   TROPHS 36       Recent Labs   Lab 24  1527   PTP 13.8   INR 1.06                    Microbiology    No results found for this visit on 24.      Imaging: Reviewed in Epic.    Medications:    fluticasone furoate-vilanterol  1 puff Inhalation Daily    furosemide  40 mg Intravenous BID (Diuretic)    dilTIAZem  60 mg Oral 4 times per day    ipratropium  0.5 mg Nebulization TID & HS    methylPREDNISolone   40 mg Intravenous Q12H    budesonide  0.5 mg Nebulization 2 times daily    sodium chloride (hypertonic)  3 mL Nebulization BID    docusate sodium  100 mg Oral Daily    aspirin  81 mg Oral Daily    atorvastatin  20 mg Oral Nightly    Roflumilast  500 mcg Oral Daily    insulin aspart  1-5 Units Subcutaneous TID AC and HS    levETIRAcetam  500 mg Oral BID    rivaroxaban  20 mg Oral Daily with dinner       Assessment & Plan:      #Acute on Chronic Hypercapnic and hypoxic respiratory failure  #COPD exacerbation    -baseline noncompliance with PAP therapy  -Pulmonology on consult   -AVAPs  -Systemic steroids/Nebs    #Atrial fibrillation with RVR  -Diltiazem drip  -Cardiology following  -Xarelto     #Acute on chronic HFpEF  #Aortic stenosis s/p AVR  TTE on 5/30/24 at OSH with EF 50-55%, moderate to severe aortic stenosis with valve area 0.8 cm2, peak systolic gradient 67 mmHg.   -IV lasix  -Cardiology following     #Mild normocytic anemia  -monitor      #Left basilic and cephalic vein thrombus   -xarelto      #History of seizure - keppra  #COPD with chronic hypoxic respiratory failure on home oxygen - continue home inhalers. Wean O2 as tolerated     #Type 2 diabetes - ISS     #Hypertension   #History of renal cell carcinoma status post nephrectomy  #ABIGAIL      Kary Ley, DO    Supplementary Documentation:     Quality:  DVT Mechanical Prophylaxis:     Early ambuation  DVT Pharmacologic Prophylaxis   Medication    rivaroxaban (Xarelto) tab 20 mg         DVT Pharmacologic prophylaxis: Aspirin 81 mg      Code Status: Full Code  Peters: No urinary catheter in place      The 21st Century Cures Act makes medical notes like these available to patients in the interest of transparency. Please be advised this is a medical document. Medical documents are intended to carry relevant information, facts as evident, and the clinical opinion of the practitioner. The medical note is intended as peer to peer communication and may appear blunt  or direct. It is written in medical language and may contain abbreviations or verbiage that are unfamiliar.             **Certification      PHYSICIAN Certification of Need for Inpatient Hospitalization - Initial Certification    Patient will require inpatient services that will reasonably be expected to span two midnight's based on the clinical documentation in H+P.   Based on patients current state of illness, I anticipate that, after discharge, patient will require TBD.

## 2024-06-13 NOTE — PAYOR COMM NOTE
--------------  6/13:  CONTINUED STAY REVIEW    Payor: [employer] MEDIC[employer] ADV PPO  Subscriber #:  [id number]  Authorization Number: [id number]    Admit date: 6/11/24  Admit time:  6:50 PM            MEDICATIONS ADMINISTERED IN LAST 1 DAY:  acetaminophen (Tylenol Extra Strength) tab 500 mg       Date Action Dose Route User    6/13/2024 0538 Given 500 mg Oral Karla Calderón RN    6/12/2024 2123 Given 500 mg Oral Karla Calderón RN          arformoterol (Brovana) 15 MCG/2ML nebulizer solution 15 mcg       Date Action Dose Route User    6/12/2024 2028 Given 15 mcg Nebulization Madelin Oswald          aspirin DR tab 81 mg       Date Action Dose Route User    6/12/2024 0836 Given 81 mg Oral Gary Berkowitz RN          atorvastatin (Lipitor) tab 20 mg       Date Action Dose Route User    6/12/2024 2123 Given 20 mg Oral Karla Calderón RN          budesonide (Pulmicort) 0.5 MG/2ML nebulizer suspension 0.5 mg       Date Action Dose Route User    6/13/2024 0725 Given 0.5 mg Nebulization Manoj Dubose RCP    6/12/2024 2031 Given by Other 0.5 mg Nebulization Madelin Oswald          dilTIAZem (cardIZEM) 100 mg in sodium chloride 0.9% 100 mL IVPB-ADDV       Date Action Dose Route User    6/12/2024 1312 Rate/Dose Change 5 mg/hr Intravenous Gary Berkowitz RN    6/12/2024 1212 Rate/Dose Change 10 mg/hr Intravenous Gary Berkowitz RN    6/12/2024 1202 Rate/Dose Change 15 mg/hr Intravenous Gary Berkowitz RN    6/12/2024 0901 New Bag 20 mg/hr Intravenous Gary Berkowitz RN          dilTIAZem (cardIZEM) tab 60 mg       Date Action Dose Route User    6/13/2024 0538 Given 60 mg Oral Karla Calderón RN    6/12/2024 2314 Given 60 mg Oral Karla Calderón RN    6/12/2024 1743 Given 60 mg Oral Gary Berkowitz RN    6/12/2024 1126 Given 60 mg Oral Gary Berkowitz RN          docusate sodium (Colace) cap 100 mg       Date Action Dose Route User    6/12/2024 1743 Given 100 mg Oral Gary Berkowitz, RADHA          fluticasone  furoate-vilanterol (Breo Ellipta) 100-25 MCG/ACT inhaler 1 puff       Date Action Dose Route User    6/12/2024 0806 Given 1 puff Inhalation Samira Mcmahon RCP          furosemide (Lasix) 10 mg/mL injection 40 mg       Date Action Dose Route User    6/12/2024 1743 Given 40 mg Intravenous Gary Berkowitz RN          insulin aspart (NovoLOG) 100 Units/mL FlexPen 1-5 Units       Date Action Dose Route User    6/13/2024 0539 Given 1 Units Subcutaneous (Right Upper Arm) Karla Calderón RN    6/12/2024 2140 Given 1 Units Subcutaneous (Left Upper Arm) Karla Calderón RN    6/12/2024 1127 Given 4 Units Subcutaneous (Right Upper Arm) Gary Berkowitz RN    6/12/2024 0845 Given 2 Units Subcutaneous (Right Upper Arm) Gary Berkowitz RN          ipratropium (Atrovent) 0.02 % nebulizer solution 0.5 mg       Date Action Dose Route User    6/13/2024 0720 Given 0.5 mg Nebulization Manoj Dubose RCP    6/12/2024 2031 Given by Other 0.5 mg Nebulization Madelin Oswald    6/12/2024 1710 Given 0.5 mg Nebulization Madelin Oswald          levETIRAcetam (Keppra) tab 500 mg       Date Action Dose Route User    6/12/2024 2123 Given 500 mg Oral Karla Calderón RN    6/12/2024 0836 Given 500 mg Oral Gary Berkowitz RN          methylPREDNISolone sodium succinate (Solu-MEDROL) injection 40 mg       Date Action Dose Route User    6/12/2024 2123 Given 40 mg Intravenous Karla Calderón RN          rivaroxaban (Xarelto) tab 20 mg       Date Action Dose Route User    6/12/2024 1743 Given 20 mg Oral Gary Berkowitz RN          Roflumilast TABS 500 mcg (Patient Supplied)       Date Action Dose Route User    6/12/2024 1120 Given 500 mcg Oral Gary Berkowitz RN          sodium chloride (hypertonic) 3 % nebulizer solution 3 mL       Date Action Dose Route User    6/13/2024 0720 Given 3 mL Nebulization Manoj Dubose RCP    6/12/2024 2031 Given by Other 3 mL Nebulization Madelin Oswald            Vitals (last day)       Date/Time  Temp Pulse Resp BP SpO2 Weight O2 Device O2 Flow Rate (L/min) [facility]    06/13/24 0658 -- -- -- -- -- -- High flow nasal cannula 7 L/min     06/13/24 0647 -- -- -- -- -- 159 lb 6.3 oz -- --     06/13/24 0500 98.4 °F (36.9 °C) 135 18 112/84 96 % -- -- -- HERRERA    06/13/24 0404 -- -- -- -- -- -- High flow nasal cannula 8 L/min     06/13/24 0358 98 °F (36.7 °C) 100 18 117/70 97 % -- Other (Comment) --     06/13/24 0059 -- 96 -- -- 97 % -- Other (Comment) --     06/12/24 2320 97.6 °F (36.4 °C) 107 -- 134/71 95 % -- High flow nasal cannula 8 L/min     06/12/24 1954 97.7 °F (36.5 °C) 97 20 113/69 90 % -- High flow nasal cannula 8 L/min     06/12/24 1952 -- 97 -- -- 88 % -- High flow nasal cannula 7 L/min     06/12/24 1951 -- 91 -- -- 86 % -- -- --     06/12/24 1950 -- 102 -- -- 85 % -- -- --     06/12/24 1949 -- 96 -- -- 82 % -- -- --     06/12/24 1948 -- 92 -- -- 86 % -- -- --     06/12/24 1946 -- 104 -- -- 83 % -- High flow nasal cannula 5 L/min     06/12/24 1945 -- 103 -- -- 83 % -- High flow nasal cannula 5 L/min     06/12/24 1944 -- 103 -- -- 83 % -- High flow nasal cannula 5 L/min     06/12/24 1943 -- 101 -- -- 84 % -- High flow nasal cannula 5 L/min     06/12/24 1942 -- 98 -- -- 83 % -- High flow nasal cannula 5 L/min     06/12/24 1710 -- -- -- -- -- -- -- 10 L/min CL    06/12/24 1646 98.4 °F (36.9 °C) 109 22 137/67 97 % -- -- -- MG    06/12/24 1130 99 °F (37.2 °C) 93 22 121/77 100 % -- Bi-PAP --     06/12/24 0920 -- 103 -- -- 100 % -- -- --     06/12/24 0913 98.1 °F (36.7 °C) 114 22 119/84 91 % -- -- -- MG    06/12/24 0846 -- -- -- -- -- 161 lb -- --     06/12/24 0554 -- -- -- 132/82 -- -- High flow nasal cannula 15 L/min     06/12/24 0549 -- 120 -- -- 85 % -- -- --     06/12/24 0548 -- 125 -- -- 87 % -- -- --     06/12/24 0547 -- 125 -- -- 87 % -- -- --     06/12/24 0545 -- 127 -- -- 82 % -- -- --     06/12/24 0543 -- 127 -- -- 82 % -- -- --     06/12/24 0430 -- 96 --  -- 98 % -- -- --     06/12/24 0415 -- 94 -- -- 99 % -- -- --     06/12/24 0345 -- 93 24 125/75 98 % -- High flow nasal cannula 10 L/min     06/12/24 0300 -- 104 -- -- 94 % -- -- --     06/12/24 0258 -- -- -- -- -- -- High flow nasal cannula 10 L/min     06/12/24 0200 -- 76 -- -- 100 % -- -- --     06/12/24 0113 -- -- -- -- -- -- High flow nasal cannula 8 L/min     06/12/24 0020 -- 79 -- -- 92 % -- -- -- OW

## 2024-06-13 NOTE — PAYOR COMM NOTE
--------------  :  CONTINUED STAY REVIEW    Payor: [employer]  Subscriber #:  [id number]  Authorization Number: [id number]    Admit date: 24  Admit time:  6:50 PM      CARDIOLOGY:    Impression:  1. atrial flutter RVR, on rivaroxaban  2. AS with sub-aortic membrane s/p 23mm Inspiris SAVR and myomectomy  3. COPD/O2 dependent  4. recent syncope ([place] )--> concern regarding prosthetic stenosis  - TTE (24): LVEF 55%, 23mm Inspiris SAVR- 3.3m/s, mean 22mmHg.     Plan:  1.  rates are requiring IV cardizem gtt, hold oral dosing.   2. AC: rivaroxaban. pt states that he has not missed any doses in the past 30 days  3. COPD: severe.  rates jumped with albuterol neb.  inhalers per pulmonary  4. we discussed possibility of DCCV; however, I worry that if pulmonary status not optimized, SR (if he converts) may be short-lived.  Will see how HR fares today. NPO p mn just in case.        Subjective:  Rates elevated this AM, after neb therapy, had to start cardizem gtt. no resting dyspnea, but O2 requirement remains 7-8L.     Objective:  BP (!) 125/111 (BP Location: Right arm)   Pulse 93   Temp 98.2 °F (36.8 °C) (Oral)   Resp 19   Ht 70\"   Wt 159 lb 6.3 oz (72.3 kg)   SpO2 95%   BMI 22.87 kg/m²   Temp (24hrs), Av.1 °F (36.7 °C), Min:97.6 °F (36.4 °C), Max:98.4 °F (36.9 °C)        Intake/Output Summary (Last 24 hours) at 2024 1232  Last data filed at 2024 0900      Gross per 24 hour   Intake 100 ml   Output 1175 ml   Net -1075 ml          Wt Readings from Last 3 Encounters:   24 159 lb 6.3 oz (72.3 kg)   23 172 lb (78 kg)   23 168 lb (76.2 kg)        General: Awake and alert; in no acute distress  Cardiac: irregular and tachycardic; no murmurs/rubs/gallops are appreciated  Lungs: decreased breath sounds/ no active wheezes; no accessory muscle use  Abdomen: Soft, non-tender; bowel sounds are normoactive  Extremities: No clubbing/cyanosis; moves all 4  extremities normally    6/13 PULMONARY:         Assessment/Plan:  #1. Acute on chronic hypercapnic and hypoxic respiratory failure  Multifactorial due to afib with RVR, CHF exacerbation, COPD exacerbation and nonadherence to PAP therapy  Treat/control afib and CHF as per cardiology  Continue nebs, steroids as below  Wean o2 for goal saturations of 88-92% given hypercapnia. His previous o2 baseline was: 2L at rest, 4L on exertion; using and benefiting from portable oxygen concentrator (3 at rest, 6 on exertion)  Will need o2 walk prior to discharge  Repeat ABG tomorrow am and PRN  Previously ordered and used AVAPS with Dr. Lorenz however he self discontinued.  Discussed yesterday with pateint - willing to resume/continue. AVAPS settings adjusted today and will plan to order for home/discharge  During his hospitalization, the patient has been trialed on and failed CPAP and BPAP with persistent hypercapnia with ABG showing PCO2 of 73. He requires noninvasive ventilation with AVAPS for chronic respiratory failure. Without NIV in the home, the patient's condition could worsen resulting in re-admissions and/or harm to the patient. He will continue to use and benefit from noninvasive ventilation. This should be continued on discharge     #2. Acute CHF exacerbation, afib with RVR  Rate control and diurese as per cardiology     #3. Acute COPD exacerbation  Severe obstruction on previous PFTs 3/2021  Now with exacerbation  Stop arformoterol given worsening afib/tachycardia this am  Start Breo 200 in place of his home inhalers (home regimen: symbicort 160 BID, spiriva daily)  He cannot tolerate albuterol due to palpitations, tremors and onw with afib with RVR; stop duonebs  continue scheduled ipratropium, hypertonic saline and budesonide; apparently hospital does not stock levalbuterol  Continue methylpred  Continue roflumilast  Previously reviewed BLVR - not candidate at this time, reassess as outpatient     #4. Pulmonary  nodules  7/2017 CT with worsening peripheral lingular atelectasis. Nodules stable   5/2018 CT stable nodules  12/2020 CT stable  7/2021 CT stable but worsening mediastinal LAD suspect reactive to recent cardiac surgery  1/2022 CT with stable nodules, improved LAD  3/2023 CT chest with several small nodules  9/2023 CT chest with stable findings   Nodules have been stable for over two years so no need to follow nodules in particular, however he wishes to proceed with LDCT (see below) so will follow on repeat imaging     #5. Tobacco abuse  30+ pack years, active pipe smoker  Next due for LDCT in 9/2024     Stefano Randle MD           Revision History            MEDICATIONS ADMINISTERED IN LAST 1 DAY:  acetaminophen (Tylenol Extra Strength) tab 500 mg       Date Action Dose Route User    6/13/2024 0538 Given 500 mg Oral Karla Calderón RN    6/12/2024 2123 Given 500 mg Oral Karla Calderón RN          arformoterol (Brovana) 15 MCG/2ML nebulizer solution 15 mcg       Date Action Dose Route User    6/13/2024 0740 Given 15 mcg Nebulization Manoj Dubose RCP    6/12/2024 2028 Given 15 mcg Nebulization Madelin Oswald          aspirin DR tab 81 mg       Date Action Dose Route User    6/13/2024 0838 Given 81 mg Oral Tamika Henry RN          atorvastatin (Lipitor) tab 20 mg       Date Action Dose Route User    6/12/2024 2123 Given 20 mg Oral Karla Calderón RN          budesonide (Pulmicort) 0.5 MG/2ML nebulizer suspension 0.5 mg       Date Action Dose Route User    6/13/2024 0725 Given 0.5 mg Nebulization Manoj Dubose RCP    6/12/2024 2031 Given by Other 0.5 mg Nebulization Madelin Oswald          dilTIAZem (cardIZEM) 100 mg in sodium chloride 0.9% 100 mL IVPB-ADDV       Date Action Dose Route User    6/13/2024 1110 New Bag 10 mg/hr Intravenous Tamika Henry RN    6/13/2024 1109 Bolus from Bag 10 mg/hr Intravenous Tamika Henry RN          dilTIAZem (cardIZEM) 25 mg/5mL injection 5 mg        Date Action Dose Route User    6/13/2024 0812 Given 5 mg Intravenous Tamika Henry RN          dilTIAZem (cardIZEM) tab 60 mg       Date Action Dose Route User    6/13/2024 1050 Given 60 mg Oral Tamika Henry RN    6/13/2024 0538 Given 60 mg Oral Karla Calderón RN    6/12/2024 2314 Given 60 mg Oral Karla Calderón RN    6/12/2024 1743 Given 60 mg Oral Gary Berkowitz RN          docusate sodium (Colace) cap 100 mg       Date Action Dose Route User    6/13/2024 0838 Given 100 mg Oral Tamika Henry RN    6/12/2024 1743 Given 100 mg Oral Gary Berkowitz RN          furosemide (Lasix) 10 mg/mL injection 40 mg       Date Action Dose Route User    6/13/2024 0838 Given 40 mg Intravenous Tamika Henry RN    6/12/2024 1743 Given 40 mg Intravenous Gary Berkowitz RN          insulin aspart (NovoLOG) 100 Units/mL FlexPen 1-5 Units       Date Action Dose Route User    6/13/2024 0539 Given 1 Units Subcutaneous (Right Upper Arm) Karla Calderón RN    6/12/2024 2140 Given 1 Units Subcutaneous (Left Upper Arm) Karla Calderón RN          ipratropium (Atrovent) 0.02 % nebulizer solution 0.5 mg       Date Action Dose Route User    6/13/2024 1340 Given 0.5 mg Nebulization Manoj Dubose RCP    6/13/2024 0720 Given 0.5 mg Nebulization Manoj Dubose RCP    6/12/2024 2031 Given by Other 0.5 mg Nebulization Madelin Oswald    6/12/2024 1710 Given 0.5 mg Nebulization Madelin Oswald          levETIRAcetam (Keppra) tab 500 mg       Date Action Dose Route User    6/13/2024 0838 Given 500 mg Oral Tamika Henry RN    6/12/2024 2123 Given 500 mg Oral Karla Calderón RN          methylPREDNISolone sodium succinate (Solu-MEDROL) injection 40 mg       Date Action Dose Route User    6/13/2024 0838 Given 40 mg Intravenous Tamika Henry RN    6/12/2024 2123 Given 40 mg Intravenous Karla Calderón, RADHA          rivaroxaban (Xarelto) tab 20 mg       Date Action Dose Route User    6/12/2024 8765  Given 20 mg Oral Gary Berkowitz RN          Roflumilast TABS 500 mcg (Patient Supplied)       Date Action Dose Route User    6/13/2024 0838 Given 500 mcg Oral Tamika Henry RN          sodium chloride (hypertonic) 3 % nebulizer solution 3 mL       Date Action Dose Route User    6/13/2024 0720 Given 3 mL Nebulization Manoj Dubose, RCCM    6/12/2024 2031 Given by Other 3 mL Nebulization Madelin Oswald            Vitals (last day)       Date/Time Temp Pulse Resp BP SpO2 Weight O2 Device O2 Flow Rate (L/min) [facility]    06/13/24 1220 98.2 °F (36.8 °C) 93 19 125/111 95 % -- -- --     06/13/24 1053 -- -- -- 124/89 -- -- -- --     06/13/24 1015 -- 141 -- -- 87 % -- -- --     06/13/24 0900 -- 104 -- -- 96 % -- -- --     06/13/24 0816 -- 141 -- 109/79 -- -- -- --     06/13/24 0658 -- -- -- -- -- -- High flow nasal cannula 7 L/min     06/13/24 0647 -- -- -- -- -- 159 lb 6.3 oz -- --     06/13/24 0500 98.4 °F (36.9 °C) 135 18 112/84 96 % -- -- -- HERRERA    06/13/24 0404 -- -- -- -- -- -- High flow nasal cannula 8 L/min     06/13/24 0358 98 °F (36.7 °C) 100 18 117/70 97 % -- Other (Comment) --     06/13/24 0059 -- 96 -- -- 97 % -- Other (Comment) --     06/12/24 2320 97.6 °F (36.4 °C) 107 -- 134/71 95 % -- High flow nasal cannula 8 L/min     06/12/24 1954 97.7 °F (36.5 °C) 97 20 113/69 90 % -- High flow nasal cannula 8 L/min     06/12/24 1952 -- 97 -- -- 88 % -- High flow nasal cannula 7 L/min     06/12/24 1951 -- 91 -- -- 86 % -- -- --     06/12/24 1950 -- 102 -- -- 85 % -- -- --     06/12/24 1949 -- 96 -- -- 82 % -- -- --     06/12/24 1948 -- 92 -- -- 86 % -- -- --     06/12/24 1946 -- 104 -- -- 83 % -- High flow nasal cannula 5 L/min     06/12/24 1945 -- 103 -- -- 83 % -- High flow nasal cannula 5 L/min     06/12/24 1944 -- 103 -- -- 83 % -- High flow nasal cannula 5 L/min     06/12/24 1943 -- 101 -- -- 84 % -- High flow nasal cannula 5 L/min     06/12/24 1942 -- 98 -- -- 83 % --  High flow nasal cannula 5 L/min     06/12/24 1710 -- -- -- -- -- -- -- 10 L/min CL    06/12/24 1646 98.4 °F (36.9 °C) 109 22 137/67 97 % -- -- -- MG    06/12/24 1130 99 °F (37.2 °C) 93 22 121/77 100 % -- Bi-PAP --     06/12/24 0920 -- 103 -- -- 100 % -- -- -- SB    06/12/24 0913 98.1 °F (36.7 °C) 114 22 119/84 91 % -- -- -- MG    06/12/24 0846 -- -- -- -- -- 161 lb -- --     06/12/24 0554 -- -- -- 132/82 -- -- High flow nasal cannula 15 L/min     06/12/24 0549 -- 120 -- -- 85 % -- -- --     06/12/24 0548 -- 125 -- -- 87 % -- -- --     06/12/24 0547 -- 125 -- -- 87 % -- -- --     06/12/24 0545 -- 127 -- -- 82 % -- -- --     06/12/24 0543 -- 127 -- -- 82 % -- -- --     06/12/24 0430 -- 96 -- -- 98 % -- -- --     06/12/24 0415 -- 94 -- -- 99 % -- -- --     06/12/24 0345 -- 93 24 125/75 98 % -- High flow nasal cannula 10 L/min     06/12/24 0300 -- 104 -- -- 94 % -- -- --     06/12/24 0258 -- -- -- -- -- -- High flow nasal cannula 10 L/min     06/12/24 0200 -- 76 -- -- 100 % -- -- --     06/12/24 0113 -- -- -- -- -- -- High flow nasal cannula 8 L/min     06/12/24 0020 -- 79 -- -- 92 % -- -- -- OW

## 2024-06-13 NOTE — PLAN OF CARE
Assumed pt care at approximately 0730. A&Ox4. 6L NC, AVAPS at night/when sleeping. Pt denies any pain, reports some shortness of breath. HR elevated in the 140's, MD aware, see orders. Aflutter on tele. Voids. Up with standby assist.     Plan of care: cardizem gtt, nebs, AVAPS at night      Plan of care updated with patient. Questions answered, pt verbalized understanding. Call light is within reach, all needs met at this time.    Problem: Patient/Family Goals  Goal: Patient/Family Long Term Goal  Description: Patient's Long Term Goal: Stay out of the hospital    Interventions:  - Follow up appointments after discharge (pcp, cardiology, pulmonology)  - Chronic home oxygen  - Adhere to medication regimen  - Diabetes management     - See additional Care Plan goals for specific interventions  Outcome: Progressing  Goal: Patient/Family Short Term Goal  Description: Patient's Short Term Goal: Breathe better    Interventions:   - Telemetry monitoring  - Continuous Pulse Oximetry  - High flow nasal cannula   - AVAPs with sleep as tolerated  - IV steroids  - IV lasix    - See additional Care Plan goals for specific interventions  Outcome: Progressing     Problem: CARDIOVASCULAR - ADULT  Goal: Maintains optimal cardiac output and hemodynamic stability  Description: INTERVENTIONS:  - Monitor vital signs, rhythm, and trends  - Monitor for bleeding, hypotension and signs of decreased cardiac output  - Evaluate effectiveness of vasoactive medications to optimize hemodynamic stability  - Monitor arterial and/or venous puncture sites for bleeding and/or hematoma  - Assess quality of pulses, skin color and temperature  - Assess for signs of decreased coronary artery perfusion - ex. Angina  - Evaluate fluid balance, assess for edema, trend weights  Outcome: Progressing  Goal: Absence of cardiac arrhythmias or at baseline  Description: INTERVENTIONS:  - Continuous cardiac monitoring, monitor vital signs, obtain 12 lead EKG if  indicated  - Evaluate effectiveness of antiarrhythmic and heart rate control medications as ordered  - Initiate emergency measures for life threatening arrhythmias  - Monitor electrolytes and administer replacement therapy as ordered  Outcome: Progressing

## 2024-06-13 NOTE — PLAN OF CARE
Pt received at 1930  A&Ox4. 5-8L high flow at rest, AVAPs encouraged with sleep. Patient reporting improving shortness of breath. A flutter rates 90s-110s, on xarelto. Voiding without difficulty. Ambulating with standby assist. Frequent rounding and fall precautions in place. Pt updated and agrees with plan of care. 7am ABG ordered for the morning.     ~0500 patient tolerated avaps for about 4hours overnight     Problem: Patient/Family Goals  Goal: Patient/Family Long Term Goal  Description: Patient's Long Term Goal: Stay out of the hospital    Interventions:  - Follow up appointments after discharge (pcp, cardiology, pulmonology)  - Chronic home oxygen  - Adhere to medication regimen  - Diabetes management     - See additional Care Plan goals for specific interventions  Outcome: Progressing  Goal: Patient/Family Short Term Goal  Description: Patient's Short Term Goal: Breathe better    Interventions:   - Telemetry monitoring  - Continuous Pulse Oximetry  - High flow nasal cannula   - AVAPs with sleep as tolerated  - IV steroids  - IV lasix    - See additional Care Plan goals for specific interventions  Outcome: Progressing

## 2024-06-13 NOTE — OCCUPATIONAL THERAPY NOTE
OT orders received and chart reviewed. Per chart review and RN pt. HR elevated and is not currently appropriate to participate in OT eval session. Will follow and re-attempt as able and appropriate.

## 2024-06-13 NOTE — PHYSICAL THERAPY NOTE
PT orders received and chart reviewed. Per chart review and confirmation with RN, pt with elevated HR and is not appropriate for PT at this time. Will follow and re-attempt as able and appropriate.

## 2024-06-13 NOTE — PROGRESS NOTES
[facility] Cardiology  Progress Note    Keny Marshall Patient Status:  Inpatient    1960 MRN [id number]   Location [facility] 8NE-A Attending Kary Ley, DO   Hosp Day # 2 PCP Dustin Avina MD     Impression:  1. atrial flutter RVR, on rivaroxaban  2. AS with sub-aortic membrane s/p 23mm Inspiris SAVR and myomectomy  3. COPD/O2 dependent  4. recent syncope ([place] )--> concern regarding prosthetic stenosis  - TTE (24): LVEF 55%, 23mm Inspiris SAVR- 3.3m/s, mean 22mmHg.    Plan:  1.  rates are requiring IV cardizem gtt, hold oral dosing.   2. AC: rivaroxaban. pt states that he has not missed any doses in the past 30 days  3. COPD: severe.  rates jumped with albuterol neb.  inhalers per pulmonary  4. we discussed possibility of DCCV; however, I worry that if pulmonary status not optimized, SR (if he converts) may be short-lived.  Will see how HR fares today. NPO p mn just in case.      Subjective:  Rates elevated this AM, after neb therapy, had to start cardizem gtt. no resting dyspnea, but O2 requirement remains 7-8L.    Objective:  BP (!) 125/111 (BP Location: Right arm)   Pulse 93   Temp 98.2 °F (36.8 °C) (Oral)   Resp 19   Ht 70\"   Wt 159 lb 6.3 oz (72.3 kg)   SpO2 95%   BMI 22.87 kg/m²   Temp (24hrs), Av.1 °F (36.7 °C), Min:97.6 °F (36.4 °C), Max:98.4 °F (36.9 °C)      Intake/Output Summary (Last 24 hours) at 2024 1232  Last data filed at 2024 0900  Gross per 24 hour   Intake 100 ml   Output 1175 ml   Net -1075 ml     Wt Readings from Last 3 Encounters:   24 159 lb 6.3 oz (72.3 kg)   23 172 lb (78 kg)   23 168 lb (76.2 kg)       General: Awake and alert; in no acute distress  Cardiac: irregular and tachycardic; no murmurs/rubs/gallops are appreciated  Lungs: decreased breath sounds/ no active wheezes; no accessory muscle use  Abdomen: Soft, non-tender; bowel sounds are normoactive  Extremities: No clubbing/cyanosis; moves all 4  extremities normally    Current Facility-Administered Medications   Medication Dose Route Frequency    fluticasone furoate-vilanterol (Breo Ellipta) 200-25 MCG/ACT inhaler 1 puff  1 puff Inhalation Daily    furosemide (Lasix) 10 mg/mL injection 40 mg  40 mg Intravenous BID (Diuretic)    dilTIAZem (cardIZEM) tab 60 mg  60 mg Oral 4 times per day    ipratropium (Atrovent) 0.02 % nebulizer solution 0.5 mg  0.5 mg Nebulization TID & HS    methylPREDNISolone sodium succinate (Solu-MEDROL) injection 40 mg  40 mg Intravenous Q12H    budesonide (Pulmicort) 0.5 MG/2ML nebulizer suspension 0.5 mg  0.5 mg Nebulization 2 times daily    sodium chloride (hypertonic) 3 % nebulizer solution 3 mL  3 mL Nebulization BID    docusate sodium (Colace) cap 100 mg  100 mg Oral Daily    dilTIAZem (cardIZEM) 100 mg in sodium chloride 0.9% 100 mL IVPB-ADDV  2.5-20 mg/hr Intravenous Continuous    aspirin DR tab 81 mg  81 mg Oral Daily    atorvastatin (Lipitor) tab 20 mg  20 mg Oral Nightly    ipratropium (Atrovent) 0.02 % nebulizer solution 500 mcg  500 mcg Nebulization Q6H PRN    albuterol (Ventolin) (2.5 MG/3ML) 0.083% nebulizer solution 2.5 mg  2.5 mg Nebulization Q4H PRN    Roflumilast TABS 500 mcg (Patient Supplied)  500 mcg Oral Daily    acetaminophen (Tylenol Extra Strength) tab 500 mg  500 mg Oral Q4H PRN    melatonin tab 3 mg  3 mg Oral Nightly PRN    polyethylene glycol (PEG 3350) (Miralax) 17 g oral packet 17 g  17 g Oral Daily PRN    sennosides (Senokot) tab 17.2 mg  17.2 mg Oral Nightly PRN    bisacodyl (Dulcolax) 10 MG rectal suppository 10 mg  10 mg Rectal Daily PRN    fleet enema (Fleet) 7-19 GM/118ML rectal enema 133 mL  1 enema Rectal Once PRN    ondansetron (Zofran) 4 MG/2ML injection 4 mg  4 mg Intravenous Q6H PRN    prochlorperazine (Compazine) 10 MG/2ML injection 5 mg  5 mg Intravenous Q8H PRN    glucose (Dex4) 15 GM/59ML oral liquid 15 g  15 g Oral Q15 Min PRN    Or    glucose (Glutose) 40% oral gel 15 g  15 g Oral Q15  Min PRN    Or    glucose-vitamin C (Dex-4) chewable tab 4 tablet  4 tablet Oral Q15 Min PRN    Or    dextrose 50% injection 50 mL  50 mL Intravenous Q15 Min PRN    Or    glucose (Dex4) 15 GM/59ML oral liquid 30 g  30 g Oral Q15 Min PRN    Or    glucose (Glutose) 40% oral gel 30 g  30 g Oral Q15 Min PRN    Or    glucose-vitamin C (Dex-4) chewable tab 8 tablet  8 tablet Oral Q15 Min PRN    insulin aspart (NovoLOG) 100 Units/mL FlexPen 1-5 Units  1-5 Units Subcutaneous TID AC and HS    levETIRAcetam (Keppra) tab 500 mg  500 mg Oral BID    rivaroxaban (Xarelto) tab 20 mg  20 mg Oral Daily with dinner       Laboratory Data:     Lab Results   Component Value Date    INR 1.06 06/11/2024    INR 2.26 (H) 09/22/2021    INR 1.94 (H) 07/20/2021     Lab Results   Component Value Date     06/13/2024    K 4.5 06/13/2024    CL 99 06/13/2024    CO2 35.0 06/13/2024    BUN 22 06/13/2024    CREATSERUM 0.52 06/13/2024     06/13/2024    CA 8.6 06/13/2024       Telemetry: No malignant tachyarrhythmias or bradyarrhythmias      Thank you for allowing our practice to participate in the care of your patient. Please do not hesitate to contact me if you have any questions.

## 2024-06-13 NOTE — CM/SW NOTE
AVAPS form for HME placed in chart for MD to sign. Await signature and will send to [facility].     [facility]  P:[phone]  F:[phone]    CAROLINA Gonzales, LSW  Discharge Planner  [id number]

## 2024-06-13 NOTE — PHYSICAL THERAPY NOTE
PHYSICAL THERAPY EVALUATION - INPATIENT     Room Number: 8613/8613-A  Evaluation Date: 6/13/2024  Type of Evaluation: Initial  Physician Order: PT Eval and Treat    Presenting Problem: afib RVR, shortness of breath  Co-Morbidities : chf, AVR, seizure, copd, DM, HTN, afib, renal cell carcinoma, nephrectomy, rotator cuff repair  Reason for Therapy: Mobility Dysfunction and Discharge Planning    PHYSICAL THERAPY ASSESSMENT   Patient is currently functioning near baseline with bed mobility, transfers, gait, and stair negotiation.  Prior to admission, patient's baseline is independent with no AD.  Patient is requiring contact guard assist as a result of the following impairments: decreased endurance/aerobic capacity, impaired   balance, decreased muscular endurance, and increased O2 needs from baseline.  Physical Therapy will continue to follow for duration of hospitalization.    Patient will benefit from continued skilled PT Services at discharge to promote functional independence in home.  Anticipate patient will return home with home health PT.    PLAN  PT Treatment Plan: Bed mobility;Endurance;Energy conservation;Patient education;Family education;Gait training;Strengthening;Stoop training;Stair training;Transfer training;Balance training  Rehab Potential : Good  Frequency (Obs): 3-5x/week  Number of Visits to Meet Established Goals: 2      CURRENT GOALS    Goal #1 Patient is able to demonstrate supine - sit EOB @ level: independent     Goal #2 Patient is able to demonstrate transfers Sit to/from Stand at assistance level: supervision     Goal #3 Patient is able to ambulate 150 feet with assist device: none at assistance level: supervision     Goal #4 Patient is able to stair climb 7 stairs with supervision   Goal #5    Goal #6    Goal Comments: Goals established on 6/13/2024      PHYSICAL THERAPY MEDICAL/SOCIAL HISTORY  History related to current admission: Patient is a 63 year old male admitted on 6/11/2024  from home for afib RVR and shortness of breath.  Pt diagnosed with acute CHF exacerbation, acute COPD exacerbation, and afib RVR.      HOME SITUATION  Type of Home: House   Home Layout: Multi-level                Lives With: Spouse  Drives: No  Patient Owned Equipment: None  Patient Regularly Uses: Home O2;Reading glasses    Prior Level of [place]: Pt is typically independent for ADLs, though reports spouse assists with shower. Pt ambulates independently with no AD. Pt reports he recently tripped on the stairs.     SUBJECTIVE  \"I need a break for a minute\" - after ambulating in [place] prior to stair climbing       OBJECTIVE  Precautions: Bed/chair alarm;Seizure  Fall Risk: High fall risk    WEIGHT BEARING RESTRICTION  Weight Bearing Restriction: None                PAIN ASSESSMENT  Rating: Unable to rate  Location: R knee and hamstring  Management Techniques: Activity promotion;Breathing techniques;Repositioning;Relaxation    COGNITION  Overall Cognitive Status:  WFL - within functional limits    RANGE OF MOTION AND STRENGTH ASSESSMENT  Upper extremity ROM and strength are within functional limits     Lower extremity ROM is within functional limits     Lower extremity strength is within functional limits       BALANCE  Static Sitting: Good  Dynamic Sitting: Good  Static Standing: Poor +  Dynamic Standing: Poor +    ADDITIONAL TESTS                                    ACTIVITY TOLERANCE   Pt on 7 L O2 at rest with SPO2 96%. Pt on 10-15 L O2 when ambulating. SPO2 82% after ambulating in [place]. Recovered well on 10 L O2 and was weaned back down to 7 L O2. RN notified. Pt HR after ambulating and stair climbing was 142, recovered to 99 with rest. RN notified.                       O2 WALK       NEUROLOGICAL FINDINGS                        AM-PAC '6-Clicks' INPATIENT SHORT FORM - BASIC MOBILITY  How much difficulty does the patient currently have...  Patient Difficulty: Turning over in bed (including adjusting  bedclothes, sheets and blankets)?: None   Patient Difficulty: Sitting down on and standing up from a chair with arms (e.g., wheelchair, bedside commode, etc.): A Little   Patient Difficulty: Moving from lying on back to sitting on the side of the bed?: None   How much help from another person does the patient currently need...   Help from Another: Moving to and from a bed to a chair (including a wheelchair)?: A Little   Help from Another: Need to walk in hospital room?: A Little   Help from Another: Climbing 3-5 steps with a railing?: A Little       AM-PAC Score:  Raw Score: 20   Approx Degree of Impairment: 35.83%   Standardized Score (AM-PAC Scale): 47.67   CMS Modifier (G-Code): CJ    FUNCTIONAL ABILITY STATUS  Gait Assessment   Functional Mobility/Gait Assessment  Gait Assistance: Contact guard assist  Distance (ft): 90  Assistive Device: None (IV pole for half of the time)  Pattern:  (increased sway and unsteadiness)  Stairs: Stairs  How Many Stairs: 3  Device: 1 Rail  Assist: Minimal assist  Pattern: Ascend;Descend  Ascend: Reciprocal  Descend: Step to    Skilled Therapy Provided: Per RN okay to work with pt. Pt received in supine and was agreeable to PT session.     Bed Mobility:  Rolling: NT  Supine to sit: mod ind    Sit to supine: NT     Transfer Mobility:  Sit to stand: CGA   Stand to sit: CGA   Gait = Pt ambulated with IV pole and CGA. Pt ambulated with no AD and CGA. Pt stair climbed as above. Pt generally unsteady and shaky when ambulating.     Therapist's Comments: Pt educated on role of therapy, goals for session, safety, fall prevention, energy conservation, and activity recommendations.     Exercise/Education Provided:  Bed mobility  Energy conservation  Functional activity tolerated  Gait training  Posture  Strengthening  Transfer training    Patient End of Session: Up in chair;With  staff;Needs met;Call light within reach;RN aware of session/findings;All patient questions and concerns  addressed;Discussed recommendations with / (with respiratory)      Patient Evaluation Complexity Level:  History Moderate - 1 or 2 personal factors and/or co-morbidities   Examination of body systems Low - addressing 1-2 elements   Clinical Presentation Low - Stable   Clinical Decision Making Low - Stable       PT Session Time: 30 minutes  Gait Training: 10 minutes

## 2024-06-14 ENCOUNTER — APPOINTMENT (OUTPATIENT)
Dept: INTERVENTIONAL RADIOLOGY/VASCULAR | Facility: HOSPITAL | Age: 64
DRG: 308 | End: 2024-06-14
Attending: INTERNAL MEDICINE

## 2024-06-14 LAB
ANION GAP SERPL CALC-SCNC: 0 MMOL/L (ref 0–18)
ARTERIAL PATENCY WRIST A: POSITIVE
ATRIAL RATE: 79 BPM
BASE EXCESS BLDA CALC-SCNC: 17.6 MMOL/L (ref ?–2)
BASOPHILS # BLD AUTO: 0.01 X10(3) UL (ref 0–0.2)
BASOPHILS NFR BLD AUTO: 0.1 %
BODY TEMPERATURE: 98.6 F
BUN BLD-MCNC: 27 MG/DL (ref 9–23)
CA-I BLD-SCNC: 1.24 MMOL/L (ref 0.95–1.32)
CALCIUM BLD-MCNC: 9 MG/DL (ref 8.5–10.1)
CHLORIDE SERPL-SCNC: 100 MMOL/L (ref 98–112)
CO2 SERPL-SCNC: 38 MMOL/L (ref 21–32)
COHGB MFR BLD: 2.4 % SAT (ref 0–3)
CREAT BLD-MCNC: 0.72 MG/DL
EGFRCR SERPLBLD CKD-EPI 2021: 102 ML/MIN/1.73M2 (ref 60–?)
EOSINOPHIL # BLD AUTO: 0 X10(3) UL (ref 0–0.7)
EOSINOPHIL NFR BLD AUTO: 0 %
ERYTHROCYTE [DISTWIDTH] IN BLOOD BY AUTOMATED COUNT: 13.2 %
GLUCOSE BLD-MCNC: 144 MG/DL (ref 70–99)
GLUCOSE BLD-MCNC: 149 MG/DL (ref 70–99)
GLUCOSE BLD-MCNC: 150 MG/DL (ref 70–99)
GLUCOSE BLD-MCNC: 184 MG/DL (ref 70–99)
HCO3 BLDA-SCNC: 38.7 MEQ/L (ref 21–27)
HCT VFR BLD AUTO: 35.7 %
HGB BLD-MCNC: 11 G/DL
HGB BLD-MCNC: 11.2 G/DL
IMM GRANULOCYTES # BLD AUTO: 0.04 X10(3) UL (ref 0–1)
IMM GRANULOCYTES NFR BLD: 0.5 %
L/M: 5 L/MIN
LACTATE BLD-SCNC: 1.2 MMOL/L (ref 0.5–2)
LYMPHOCYTES # BLD AUTO: 0.29 X10(3) UL (ref 1–4)
LYMPHOCYTES NFR BLD AUTO: 3.9 %
MCH RBC QN AUTO: 30.8 PG (ref 26–34)
MCHC RBC AUTO-ENTMCNC: 31.4 G/DL (ref 31–37)
MCV RBC AUTO: 98.1 FL
METHGB MFR BLD: 0.4 % SAT (ref 0.4–1.5)
MONOCYTES # BLD AUTO: 0.16 X10(3) UL (ref 0.1–1)
MONOCYTES NFR BLD AUTO: 2.2 %
NEUTROPHILS # BLD AUTO: 6.92 X10 (3) UL (ref 1.5–7.7)
NEUTROPHILS # BLD AUTO: 6.92 X10(3) UL (ref 1.5–7.7)
NEUTROPHILS NFR BLD AUTO: 93.3 %
OSMOLALITY SERPL CALC.SUM OF ELEC: 294 MOSM/KG (ref 275–295)
OXYHGB MFR BLDA: 96.8 % (ref 92–100)
P AXIS: 54 DEGREES
P-R INTERVAL: 170 MS
PCO2 BLDA: 78 MM HG (ref 35–45)
PH BLDA: 7.38 [PH] (ref 7.35–7.45)
PLATELET # BLD AUTO: 253 10(3)UL (ref 150–450)
PO2 BLDA: 93 MM HG (ref 80–100)
POTASSIUM BLD-SCNC: 4.7 MMOL/L (ref 3.6–5.1)
POTASSIUM SERPL-SCNC: 4.4 MMOL/L (ref 3.5–5.1)
Q-T INTERVAL: 456 MS
QRS DURATION: 170 MS
QTC CALCULATION (BEZET): 522 MS
R AXIS: 59 DEGREES
RBC # BLD AUTO: 3.64 X10(6)UL
SODIUM BLD-SCNC: 135 MMOL/L (ref 135–145)
SODIUM SERPL-SCNC: 138 MMOL/L (ref 136–145)
T AXIS: 136 DEGREES
VENTRICULAR RATE: 79 BPM
WBC # BLD AUTO: 7.4 X10(3) UL (ref 4–11)

## 2024-06-14 PROCEDURE — 99233 SBSQ HOSP IP/OBS HIGH 50: CPT | Performed by: INTERNAL MEDICINE

## 2024-06-14 PROCEDURE — 5A2204Z RESTORATION OF CARDIAC RHYTHM, SINGLE: ICD-10-PCS | Performed by: INTERNAL MEDICINE

## 2024-06-14 PROCEDURE — 99232 SBSQ HOSP IP/OBS MODERATE 35: CPT | Performed by: INTERNAL MEDICINE

## 2024-06-14 RX ORDER — METHOHEXITAL IN WATER/PF 100MG/10ML
SYRINGE (ML) INTRAVENOUS
Status: COMPLETED
Start: 2024-06-14 | End: 2024-06-14

## 2024-06-14 RX ORDER — DILTIAZEM HYDROCHLORIDE 120 MG/1
120 CAPSULE, EXTENDED RELEASE ORAL DAILY
Status: DISCONTINUED | OUTPATIENT
Start: 2024-06-15 | End: 2024-06-15

## 2024-06-14 RX ORDER — SODIUM CHLORIDE 9 MG/ML
INJECTION, SOLUTION INTRAVENOUS CONTINUOUS
Status: DISCONTINUED | OUTPATIENT
Start: 2024-06-14 | End: 2024-06-15

## 2024-06-14 RX ORDER — METHOHEXITAL IN WATER/PF 100MG/10ML
100 SYRINGE (ML) INTRAVENOUS ONCE
Status: COMPLETED | OUTPATIENT
Start: 2024-06-14 | End: 2024-06-14

## 2024-06-14 NOTE — PROGRESS NOTES
[facility] Progress Note     SUBJECTIVE/Interval history:  No acute events overnight, he had trouble with wearing the AVAPS mask overnight -did not fit well and leaked.   He otherwise feels better today. Less dyspnea and cough. No pain. Remains on dilt    Review of Systems:   A comprehensive 14 point review of systems was completed.   Pertinent positives and negatives noted in the HPI.    Medications  Reviewed personally   fluticasone furoate-vilanterol  1 puff Inhalation Daily    furosemide  40 mg Intravenous BID (Diuretic)    dilTIAZem  60 mg Oral 4 times per day    ipratropium  0.5 mg Nebulization TID & HS    methylPREDNISolone  40 mg Intravenous Q12H    budesonide  0.5 mg Nebulization 2 times daily    sodium chloride (hypertonic)  3 mL Nebulization BID    docusate sodium  100 mg Oral Daily    aspirin  81 mg Oral Daily    atorvastatin  20 mg Oral Nightly    Roflumilast  500 mcg Oral Daily    insulin aspart  1-5 Units Subcutaneous TID AC and HS    levETIRAcetam  500 mg Oral BID    rivaroxaban  20 mg Oral Daily with dinner       ipratropium    albuterol    acetaminophen    melatonin    polyethylene glycol (PEG 3350)    sennosides    bisacodyl    fleet enema    ondansetron    prochlorperazine    glucose **OR** glucose **OR** glucose-vitamin C **OR** dextrose **OR** glucose **OR** glucose **OR** glucose-vitamin C    OBJECTIVE:  Vitals:    06/13/24 1649 06/13/24 1900 06/13/24 2320 06/14/24 0510   BP: 136/77 104/53 (!) 128/94 117/74   BP Location: Right arm Right arm Right arm Right arm   Pulse: 93 104 102 98   Resp: 17 20 16 18   Temp: 98.3 °F (36.8 °C) 97.7 °F (36.5 °C) 98.1 °F (36.7 °C) 98.3 °F (36.8 °C)   TempSrc: Oral Oral Oral Oral   SpO2: 90% 93% 92% 96%   Weight:       Height:           Vital signs in last 24 hours:  Blood pressure 117/74, pulse 98, temperature 98.3 °F (36.8 °C), temperature source Oral, resp. rate 18, height 5' 10\" (1.778 m), weight 159  lb 6.3 oz (72.3 kg), SpO2 96%.     Intake/Output:  I/O last 3 completed shifts:  In: 100 [I.V.:100]  Out: 2550 [Urine:2550]       Physical Exam:  General: Appears alert, comfortable at rest. No distress  Neurologic:No focal deficits.  Alert.  Oriented.  Lungs:improved air entry bilaterally. No wheeze. No distress  Heart:irreg RR. No m heard  Abdomen:Soft, non-tender.  No masses.  No guarding.  No rebound.  Extremities:edema    Lab Data Review:   Recent Labs   Lab 06/12/24  0539 06/13/24  0534 06/14/24  0426   * 173* 150*   BUN 20 22 27*   CREATSERUM 0.65* 0.52* 0.72   CA 8.6 8.6 9.0    137 138   K 4.2 4.5 4.4    99 100   CO2 33.0* 35.0* 38.0*     Recent Labs   Lab 06/11/24  1527 06/12/24  0535 06/14/24  0426   RBC 3.67* 3.64* 3.64*   HGB 11.4* 11.2* 11.2*   HCT 34.6* 36.7* 35.7*   MCV 94.3 100.8* 98.1   MCH 31.1 30.8 30.8   MCHC 32.9 30.5* 31.4   RDW 13.2 13.2 13.2   NEPRELIM 8.70*  --  6.92   WBC 11.0 14.5* 7.4   .0 224.0 253.0     No results for input(s): \"BNP\" in the last 168 hours.  No results for input(s): \"TROP\", \"CK\" in the last 168 hours.  Recent Labs   Lab 06/11/24  1527   INR 1.06   PTT 32.2     Recent Labs   Lab 06/13/24  0714   ABGPHT 7.36   IDNDQX9O 73*   ZORYT6O 76*   ABGHCO3 35.1*   ABGBE 13.1*   TEMP 98.6   ROMARIO Not Applicable   SITE Left Brachial   DEV Nasal cannula   THGB 10.9*       Other Labs:  Interval Culture Data:   No results found for this visit on 06/11/24.  No results for input(s): \"COLORUR\", \"CLARITY\", \"SPECGRAVITY\", \"GLUUR\", \"BILUR\", \"KETUR\", \"BLOODURINE\", \"PHURINE\", \"PROUR\", \"UROBILINOGEN\", \"NITRITE\", \"LEUUR\", \"WBCUR\", \"RBCUR\", \"BACUR\", \"EPIUR\" in the last 168 hours.    Interval Radiology:   Reviewed personally  XR CHEST AP PORTABLE  (CPT=71045)    Result Date: 6/11/2024  CONCLUSION:  1. No significant interval changes, with stable diffuse emphysematous changes again noted. 2. Stable linear regions of scarring radiating from a focus of pleural thickening and  calcified plaque along the mid aspect of the left lateral pleural margin. 3. No evidence of an acute process.   LOCATION:  [id number]      Dictated by (CST): Marielena Martines DO on 6/11/2024 at 4:43 PM     Finalized by (CST): Marielena Martines DO on 6/11/2024 at 4:46 PM        Assessment/Plan:  #1. Acute on chronic hypercapnic and hypoxic respiratory failure  Multifactorial due to afib with RVR, CHF exacerbation, COPD exacerbation and nonadherence to PAP therapy  Treat/control afib and CHF as per cardiology  Continue nebs, steroids as below  Wean o2 for goal saturations of 88-92% given hypercapnia. His previous o2 baseline was: 2L at rest, 4L on exertion; using and benefiting from portable oxygen concentrator (3 at rest, 6 on exertion)  Will need o2 walk prior to discharge  Previously ordered and used AVAPS with Dr. Lorenz however he self discontinued.  See previous day's note - he is agreeable to resume AVAPS  Will ask RT to assist with mask fitting today    #2. Acute CHF exacerbation, afib with RVR  Rate control and diurese as per cardiology  Reviewed with Dr. Marie montano am regarding cardioversion     #3. Acute COPD exacerbation  Severe obstruction on previous PFTs 3/2021  Now with exacerbation  off arformoterol given worsening afib/tachycardia  Continue  Breo 200 in place of his home inhalers (home regimen: symbicort 160 BID, spiriva daily)  He cannot tolerate albuterol due to palpitations, tremors and with afib with RVR; off duonebs  continue scheduled ipratropium, hypertonic saline and budesonide; apparently hospital does not stock levalbuterol  Continue methylpred  Continue roflumilast  Previously reviewed BLVR - not candidate at this time, reassess as outpatient     #4. Pulmonary nodules  7/2017 CT with worsening peripheral lingular atelectasis. Nodules stable   5/2018 CT stable nodules  12/2020 CT stable  7/2021 CT stable but worsening mediastinal LAD suspect reactive to recent cardiac surgery  1/2022 CT with stable  nodules, improved LAD  3/2023 CT chest with several small nodules  9/2023 CT chest with stable findings   Nodules have been stable for over two years so no need to follow nodules in particular, however he wishes to proceed with LDCT (see below) so will follow on repeat imaging     #5. Tobacco abuse  30+ pack years, active pipe smoker  Next due for LDCT in 9/2024     Stefano Randle MD

## 2024-06-14 NOTE — PLAN OF CARE
Assumed pt care at approximately 0730. A&Ox4. 6L NC, AVAPS at  night. Pt denies any pain or shortness of breath, vital signs stable, NSR post cardioversion on tele. Voids. Up with standby assist.     Plan of care: O2 walk, monitor HR     Plan of care updated with patient. Questions answered, pt verbalized understanding. Call light is within reach, all needs met at this time.    Problem: Patient/Family Goals  Goal: Patient/Family Long Term Goal  Description: Patient's Long Term Goal: Stay out of the hospital    Interventions:  - Follow up appointments after discharge (pcp, cardiology, pulmonology)  - Chronic home oxygen  - Adhere to medication regimen  - Diabetes management     - See additional Care Plan goals for specific interventions  Outcome: Progressing  Goal: Patient/Family Short Term Goal  Description: Patient's Short Term Goal: Breathe better    Interventions:   - Telemetry monitoring  - Continuous Pulse Oximetry  - High flow nasal cannula   - AVAPs with sleep as tolerated  - IV steroids  - IV lasix    - See additional Care Plan goals for specific interventions  Outcome: Progressing     Problem: CARDIOVASCULAR - ADULT  Goal: Maintains optimal cardiac output and hemodynamic stability  Description: INTERVENTIONS:  - Monitor vital signs, rhythm, and trends  - Monitor for bleeding, hypotension and signs of decreased cardiac output  - Evaluate effectiveness of vasoactive medications to optimize hemodynamic stability  - Monitor arterial and/or venous puncture sites for bleeding and/or hematoma  - Assess quality of pulses, skin color and temperature  - Assess for signs of decreased coronary artery perfusion - ex. Angina  - Evaluate fluid balance, assess for edema, trend weights  Outcome: Progressing  Goal: Absence of cardiac arrhythmias or at baseline  Description: INTERVENTIONS:  - Continuous cardiac monitoring, monitor vital signs, obtain 12 lead EKG if indicated  - Evaluate effectiveness of antiarrhythmic and  heart rate control medications as ordered  - Initiate emergency measures for life threatening arrhythmias  - Monitor electrolytes and administer replacement therapy as ordered  Outcome: Progressing

## 2024-06-14 NOTE — PLAN OF CARE
Pt received at 1930. A&Ox4. 6-8L high flow nasal cannula, AVAPs encouraged with sleep. A flutter on tele monitor, cardizem gtt infusing. Voiding in urinal. Ambulating with standby assist. Frequent rounding and fall precautions in place.    Problem: Patient/Family Goals  Goal: Patient/Family Long Term Goal  Description: Patient's Long Term Goal: Stay out of the hospital    Interventions:  - Follow up appointments after discharge (pcp, cardiology, pulmonology)  - Chronic home oxygen  - Adhere to medication regimen  - Diabetes management     - See additional Care Plan goals for specific interventions  Outcome: Progressing  Goal: Patient/Family Short Term Goal  Description: Patient's Short Term Goal: Breathe better    Interventions:   - Telemetry monitoring  - Continuous Pulse Oximetry  - High flow nasal cannula   - AVAPs with sleep as tolerated  - IV steroids  - IV lasix    - See additional Care Plan goals for specific interventions  Outcome: Progressing

## 2024-06-14 NOTE — PAYOR COMM NOTE
--------------  6/14:  CONTINUED STAY REVIEW    Payor: [employer] MED[employer] AD[employer] PPO  Subscriber #:  [id number]  Authorization Number: [id number]    Admit date: 6/11/24  Admit time:  6:50 PM    PULMONARY:    SUBJECTIVE/Interval history:  No acute events overnight, he had trouble with wearing the AVAPS mask overnight -did not fit well and leaked.   He otherwise feels better today. Less dyspnea and cough. No pain. Remains on dilt       Assessment/Plan:  #1. Acute on chronic hypercapnic and hypoxic respiratory failure  Multifactorial due to afib with RVR, CHF exacerbation, COPD exacerbation and nonadherence to PAP therapy  Treat/control afib and CHF as per cardiology  Continue nebs, steroids as below  Wean o2 for goal saturations of 88-92% given hypercapnia. His previous o2 baseline was: 2L at rest, 4L on exertion; using and benefiting from portable oxygen concentrator (3 at rest, 6 on exertion)  Will need o2 walk prior to discharge  Previously ordered and used AVAPS with Dr. Lorenz however he self discontinued.  See previous day's note - he is agreeable to resume AVAPS  Will ask RT to assist with mask fitting today     #2. Acute CHF exacerbation, afib with RVR  Rate control and diurese as per cardiology  Reviewed with Dr. Salazar this am regarding cardioversion     #3. Acute COPD exacerbation  Severe obstruction on previous PFTs 3/2021  Now with exacerbation  off arformoterol given worsening afib/tachycardia  Continue  Breo 200 in place of his home inhalers (home regimen: symbicort 160 BID, spiriva daily)  He cannot tolerate albuterol due to palpitations, tremors and with afib with RVR; off duonebs  continue scheduled ipratropium, hypertonic saline and budesonide; apparently hospital does not stock levalbuterol  Continue methylpred  Continue roflumilast  Previously reviewed BLVR - not candidate at this time, reassess as outpatient     #4. Pulmonary nodules  7/2017 CT with worsening peripheral lingular atelectasis. Nodules stable    5/2018 CT stable nodules  12/2020 CT stable  7/2021 CT stable but worsening mediastinal LAD suspect reactive to recent cardiac surgery  1/2022 CT with stable nodules, improved LAD  3/2023 CT chest with several small nodules  9/2023 CT chest with stable findings   Nodules have been stable for over two years so no need to follow nodules in particular, however he wishes to proceed with LDCT (see below) so will follow on repeat imaging     #5. Tobacco abuse  30+ pack years, active pipe smoker  Next due for LDCT in 9/2024     Stefano Randle MD                        6/14 CARDIOLOGY:    Impression:  1. atrial flutter RVR, on rivaroxaban  2. AS with sub-aortic membrane s/p 23mm Inspiris SAVR and myomectomy  3. COPD/O2 dependent  4. recent syncope ([place] 5/24)--> concern regarding prosthetic stenosis  - TTE (6/23/24): LVEF 55%, 23mm Inspiris SAVR- 3.3m/s, mean 22mmHg.     Plan:  1.  rates are requiring IV cardizem gtt, 140s with activity. Plan for DCCV today.    2. AC: rivaroxaban. pt states that he has not missed any doses in the past 30 days  3. COPD: severe.  rates jumped with albuterol neb.  inhalers per pulmonary     **ADDENDUM 0920**  - successful DCCV to SR with 200J x 1.    - dc cardizem gtt, transition to cardizem 120mg daily. continue rivaroxaban.  - ok for dc from cv standpoint this afternoon if cleared by other services.  - f/u in office 2-4 weeks.  cv meds reconciled.     Subjective:  no resting dyspnea, but O2 requirement remains 6-7 and rates remain elevated with activity, dependent upon cardizem gtt.        MEDICATIONS ADMINISTERED IN LAST 1 DAY:  acetaminophen (Tylenol Extra Strength) tab 500 mg       Date Action Dose Route User    6/14/2024 0532 Given 500 mg Oral Karla Calderón RN    6/13/2024 1927 Given 500 mg Oral Karla Calderón, RADHA          atorvastatin (Lipitor) tab 20 mg       Date Action Dose Route User    6/13/2024 2208 Given 20 mg Oral Karla Calderón RN          budesonide  (Pulmicort) 0.5 MG/2ML nebulizer suspension 0.5 mg       Date Action Dose Route User    6/13/2024 2055 Given 0.5 mg Nebulization Paty Oseguera RCP          dilTIAZem (cardIZEM) 100 mg in sodium chloride 0.9% 100 mL IVPB-ADDV       Date Action Dose Route User    6/14/2024 0533 New Bag 10 mg/hr Intravenous Karla Calderón RN    6/13/2024 2209 New Bag 10 mg/hr Intravenous Karla Calderón RN    6/13/2024 1927 New Bag 10 mg/hr Intravenous Karla Calderón RN    6/13/2024 1110 New Bag 10 mg/hr Intravenous Tamika Henry RN    6/13/2024 1109 Bolus from Bag 10 mg/hr Intravenous Tamika Henry RN          dilTIAZem (cardIZEM) tab 60 mg       Date Action Dose Route User    6/13/2024 1050 Given 60 mg Oral Tamika Henry RN          furosemide (Lasix) 10 mg/mL injection 40 mg       Date Action Dose Route User    6/13/2024 1716 Given 40 mg Intravenous Tamika Henry RN          insulin aspart (NovoLOG) 100 Units/mL FlexPen 1-5 Units       Date Action Dose Route User    6/13/2024 2208 Given 2 Units Subcutaneous (Left Upper Arm) Karla Calderón RN          ipratropium (Atrovent) 0.02 % nebulizer solution 0.5 mg       Date Action Dose Route User    6/13/2024 2033 Given 0.5 mg Nebulization Paty Oseguera RCP    6/13/2024 1340 Given 0.5 mg Nebulization Manoj Dubose RCP          levETIRAcetam (Keppra) tab 500 mg       Date Action Dose Route User    6/13/2024 2208 Given 500 mg Oral Karla Calderón RN          methohexital (BrevITAL) 100 mg/10mL IV syringe       Date Action Dose Route User    6/14/2024 0909 Given 30 mg Intravenous Keny Salazar MD          methohexital (BrevITAL) 100 mg/10mL IV syringe 100 mg       Date Action Dose Route User    6/14/2024 0909 Given 30 mg Intravenous Keny Salazar MD          methylPREDNISolone sodium succinate (Solu-MEDROL) injection 40 mg       Date Action Dose Route User    6/13/2024 2204 Given 40 mg Intravenous Karla Calderón RN          rivaroxaban (Xarelto)  tab 20 mg       Date Action Dose Route User    6/13/2024 1716 Given 20 mg Oral Tamika Henry, RN          sodium chloride 0.9% infusion       Date Action Dose Route User    6/14/2024 0900 New Bag (none) Intravenous Leslye Gloria, RN          sodium chloride (hypertonic) 3 % nebulizer solution 3 mL       Date Action Dose Route User    6/13/2024 2046 Given 3 mL Nebulization Paty Oseguera, RCP            Vitals (last day)       Date/Time Temp Pulse Resp BP SpO2 Weight O2 Device O2 Flow Rate (L/min) [facility]    06/14/24 0945 -- 82 20 117/81 95 % -- -- --     06/14/24 0930 -- 81 31 116/79 96 % -- -- --     06/14/24 0915 -- 77 30 114/75 98 % -- Nasal cannula 4 L/min     06/14/24 0912 -- 78 17 119/82 97 % -- -- --     06/14/24 0909 -- 83 21 152/80 98 % -- Nasal cannula 6 L/min     06/14/24 0845 -- 92 23 124/61 98 % -- Nasal cannula 6 L/min     06/14/24 0805 98 °F (36.7 °C) 99 17 130/72 95 % -- High flow nasal cannula 7 L/min     06/14/24 0510 98.3 °F (36.8 °C) 98 18 117/74 96 % -- High flow nasal cannula 7 L/min     06/13/24 2320 98.1 °F (36.7 °C) 102 16 128/94 92 % -- High flow nasal cannula 7 L/min     06/13/24 2040 -- -- -- -- -- -- High flow nasal cannula 7 L/min     06/13/24 1900 97.7 °F (36.5 °C) 104 20 104/53 93 % -- High flow nasal cannula 7 L/min     06/13/24 1649 98.3 °F (36.8 °C) 93 17 136/77 90 % -- High flow nasal cannula 7 L/min     06/13/24 1220 98.2 °F (36.8 °C) 93 19 125/111 95 % -- -- -- PT    06/13/24 1053 -- -- -- 124/89 -- -- -- -- AC    06/13/24 1015 -- 141 -- -- 87 % -- -- -- PT    06/13/24 0900 -- 104 -- -- 96 % -- -- -- PT    06/13/24 0816 -- 141 -- 109/79 -- -- -- -- AC    06/13/24 0658 -- -- -- -- -- -- High flow nasal cannula 7 L/min RH    06/13/24 0647 -- -- -- -- -- 159 lb 6.3 oz -- -- RH    06/13/24 0500 98.4 °F (36.9 °C) 135 18 112/84 96 % -- -- -- HERRERA    06/13/24 0404 -- -- -- -- -- -- High flow nasal cannula 8 L/min RH    06/13/24 0358 98 °F (36.7 °C) 100 18 117/70  97 % -- Other (Comment) --     06/13/24 0059 -- 96 -- -- 97 % -- Other (Comment) -- RH

## 2024-06-14 NOTE — CM/SW NOTE
Received update from [facility] that the AVAPS machine was approved. When pt discharges, pt will have to discharge by 3pm so that RT can come to pt's residence and set up the AVAPS.     Weekend SW/CM will have to call the on call E rep on day of discharge. RN updated that pt has to discharge early in the day.     [facility]  P:[phone]  F:[phone]    Sherrie Dean, MSW, LSW  Discharge Planner  [phone]

## 2024-06-14 NOTE — PROGRESS NOTES
[facility] Cardiology  Progress Note    Keny Marshall Patient Status:  Inpatient    1960 MRN [id number]   [facility] 8NE-A Attending Kary Ley, DO   Hosp Day # 3 PCP Dustin Avina MD     Impression:  1. atrial flutter RVR, on rivaroxaban  2. AS with sub-aortic membrane s/p 23mm Inspiris SAVR and myomectomy  3. COPD/O2 dependent  4. recent syncope ([place] )--> concern regarding prosthetic stenosis  - TTE (24): LVEF 55%, 23mm Inspiris SAVR- 3.3m/s, mean 22mmHg.    Plan:  1.  rates are requiring IV cardizem gtt, 140s with activity. Plan for DCCV today.    2. AC: rivaroxaban. pt states that he has not missed any doses in the past 30 days  3. COPD: severe.  rates jumped with albuterol neb.  inhalers per pulmonary    **ADDENDUM 0998**  - successful DCCV to SR with 200J x 1.    - dc cardizem gtt, transition to cardizem 120mg daily. continue rivaroxaban.  - ok for dc from cv standpoint this afternoon if cleared by other services.  - f/u in office 2-4 weeks.  cv meds reconciled.    Subjective:  no resting dyspnea, but O2 requirement remains 6-7 and rates remain elevated with activity, dependent upon cardizem gtt.    Objective:  /61 (BP Location: Left arm)   Pulse 92   Temp 98 °F (36.7 °C) (Oral)   Resp 23   Ht 70\"   Wt 159 lb 6.3 oz (72.3 kg)   SpO2 98%   BMI 22.87 kg/m²   Temp (24hrs), Av.1 °F (36.7 °C), Min:97.7 °F (36.5 °C), Max:98.3 °F (36.8 °C)      Intake/Output Summary (Last 24 hours) at 2024 0856  Last data filed at 2024 0542  Gross per 24 hour   Intake 100 ml   Output 1775 ml   Net -1675 ml     Wt Readings from Last 3 Encounters:   24 159 lb 6.3 oz (72.3 kg)   23 172 lb (78 kg)   23 168 lb (76.2 kg)       General: Awake and alert; in no acute distress  Cardiac: irregular and tachycardic; no murmurs/rubs/gallops are appreciated  Lungs: decreased breath sounds/ no active wheezes; no accessory muscle use  Abdomen: Soft,  non-tender; bowel sounds are normoactive  Extremities: No clubbing/cyanosis; moves all 4 extremities normally    Current Facility-Administered Medications   Medication Dose Route Frequency    sodium chloride 0.9% infusion   Intravenous Continuous    methohexital (BrevITAL) 100 mg/10mL IV syringe 100 mg  100 mg Intravenous Once    fluticasone furoate-vilanterol (Breo Ellipta) 200-25 MCG/ACT inhaler 1 puff  1 puff Inhalation Daily    dilTIAZem (cardIZEM) tab 60 mg  60 mg Oral 4 times per day    ipratropium (Atrovent) 0.02 % nebulizer solution 0.5 mg  0.5 mg Nebulization TID & HS    methylPREDNISolone sodium succinate (Solu-MEDROL) injection 40 mg  40 mg Intravenous Q12H    budesonide (Pulmicort) 0.5 MG/2ML nebulizer suspension 0.5 mg  0.5 mg Nebulization 2 times daily    sodium chloride (hypertonic) 3 % nebulizer solution 3 mL  3 mL Nebulization BID    docusate sodium (Colace) cap 100 mg  100 mg Oral Daily    dilTIAZem (cardIZEM) 100 mg in sodium chloride 0.9% 100 mL IVPB-ADDV  2.5-20 mg/hr Intravenous Continuous    aspirin DR tab 81 mg  81 mg Oral Daily    atorvastatin (Lipitor) tab 20 mg  20 mg Oral Nightly    ipratropium (Atrovent) 0.02 % nebulizer solution 500 mcg  500 mcg Nebulization Q6H PRN    albuterol (Ventolin) (2.5 MG/3ML) 0.083% nebulizer solution 2.5 mg  2.5 mg Nebulization Q4H PRN    Roflumilast TABS 500 mcg (Patient Supplied)  500 mcg Oral Daily    acetaminophen (Tylenol Extra Strength) tab 500 mg  500 mg Oral Q4H PRN    melatonin tab 3 mg  3 mg Oral Nightly PRN    polyethylene glycol (PEG 3350) (Miralax) 17 g oral packet 17 g  17 g Oral Daily PRN    sennosides (Senokot) tab 17.2 mg  17.2 mg Oral Nightly PRN    bisacodyl (Dulcolax) 10 MG rectal suppository 10 mg  10 mg Rectal Daily PRN    fleet enema (Fleet) 7-19 GM/118ML rectal enema 133 mL  1 enema Rectal Once PRN    ondansetron (Zofran) 4 MG/2ML injection 4 mg  4 mg Intravenous Q6H PRN    prochlorperazine (Compazine) 10 MG/2ML injection 5 mg  5 mg  Intravenous Q8H PRN    glucose (Dex4) 15 GM/59ML oral liquid 15 g  15 g Oral Q15 Min PRN    Or    glucose (Glutose) 40% oral gel 15 g  15 g Oral Q15 Min PRN    Or    glucose-vitamin C (Dex-4) chewable tab 4 tablet  4 tablet Oral Q15 Min PRN    Or    dextrose 50% injection 50 mL  50 mL Intravenous Q15 Min PRN    Or    glucose (Dex4) 15 GM/59ML oral liquid 30 g  30 g Oral Q15 Min PRN    Or    glucose (Glutose) 40% oral gel 30 g  30 g Oral Q15 Min PRN    Or    glucose-vitamin C (Dex-4) chewable tab 8 tablet  8 tablet Oral Q15 Min PRN    insulin aspart (NovoLOG) 100 Units/mL FlexPen 1-5 Units  1-5 Units Subcutaneous TID AC and HS    levETIRAcetam (Keppra) tab 500 mg  500 mg Oral BID    rivaroxaban (Xarelto) tab 20 mg  20 mg Oral Daily with dinner       Laboratory Data:  Lab Results   Component Value Date    WBC 7.4 06/14/2024    HGB 11.2 06/14/2024    HCT 35.7 06/14/2024    .0 06/14/2024     Lab Results   Component Value Date    INR 1.06 06/11/2024    INR 2.26 (H) 09/22/2021    INR 1.94 (H) 07/20/2021     Lab Results   Component Value Date     06/14/2024    K 4.4 06/14/2024     06/14/2024    CO2 38.0 06/14/2024    BUN 27 06/14/2024    CREATSERUM 0.72 06/14/2024     06/14/2024    CA 9.0 06/14/2024       Telemetry: No malignant tachyarrhythmias or bradyarrhythmias      Thank you for allowing our practice to participate in the care of your patient. Please do not hesitate to contact me if you have any questions.

## 2024-06-14 NOTE — PROGRESS NOTES
Pt received for bedside CV with Dr. Salazar. Procedure complete, pt successfully cardioverted into SR. EKG done to confirm. Cardizem drip discontinued. Report called to Tamika GARCIA on CTU 8. Pt transferred to CTU 8 via bed with pt chart.

## 2024-06-14 NOTE — PROGRESS NOTES
[facility]   part of [facility]     Hospitalist Progress Note     Keny Marshall Patient Status:  Inpatient    1960 MRN [id number]   Location [facility] 8NE-A Attending Kary Ley,    Hosp Day # 3 PCP Dustin Avina MD     Chief Complaint: Dyspnea    Subjective:     Had successful ablation this morning  Feels better, LE swelling is gone   Still requiring 10+ L of O2 with exertion     Objective:    Review of Systems:   A comprehensive review of systems was completed; pertinent positive and negatives stated in subjective.    Vital signs:  Temp:  [97.7 °F (36.5 °C)-98.4 °F (36.9 °C)] 98.4 °F (36.9 °C)  Pulse:  [] 99  Resp:  [16-31] 19  BP: (104-152)/(53-94) 149/73  SpO2:  [90 %-98 %] 96 %    Physical Exam:    General: No acute distress  Respiratory: No wheezes, no rhonchi  Cardiovascular: RRR  Abdomen: Soft, Non-tender, non-distended, positive bowel sounds  Neuro: No new focal deficits.   Extremities: No edema      Diagnostic Data:    Labs:  Recent Labs   Lab 24  1527 24  0535 24  0426   WBC 11.0 14.5* 7.4   HGB 11.4* 11.2* 11.2*   MCV 94.3 100.8* 98.1   .0 224.0 253.0   INR 1.06  --   --        Recent Labs   Lab 24  1527 24  0539 24  0534 24  0426   * 153* 173* 150*   BUN 22 20 22 27*   CREATSERUM 0.74 0.65* 0.52* 0.72   CA 9.0 8.6 8.6 9.0   ALB 2.9* 2.8*  --   --     140 137 138   K 4.2 4.2 4.5 4.4    102 99 100   CO2 35.0* 33.0* 35.0* 38.0*   ALKPHO 54 54  --   --    AST 22 24  --   --    ALT 23 25  --   --    BILT 0.3 0.3  --   --    TP 7.6 7.5  --   --        Estimated Creatinine Clearance: 106 mL/min (based on SCr of 0.72 mg/dL).    Recent Labs   Lab 24  1527   TROPHS 36       Recent Labs   Lab 24  1527   PTP 13.8   INR 1.06                    Microbiology    No results found for this visit on 24.      Imaging: Reviewed in Epic.    Medications:    [START ON 6/15/2024] dilTIAZem ER  120 mg Oral  Daily    fluticasone furoate-vilanterol  1 puff Inhalation Daily    ipratropium  0.5 mg Nebulization TID & HS    methylPREDNISolone  40 mg Intravenous Q12H    budesonide  0.5 mg Nebulization 2 times daily    sodium chloride (hypertonic)  3 mL Nebulization BID    docusate sodium  100 mg Oral Daily    aspirin  81 mg Oral Daily    atorvastatin  20 mg Oral Nightly    Roflumilast  500 mcg Oral Daily    insulin aspart  1-5 Units Subcutaneous TID AC and HS    levETIRAcetam  500 mg Oral BID    rivaroxaban  20 mg Oral Daily with dinner       Assessment & Plan:      #Acute on Chronic Hypercapnic and hypoxic respiratory failure  #COPD exacerbation    -baseline noncompliance with PAP therapy, now agreeable  -Pulmonology on consult   -AVAPS  -Systemic steroids - taper per pulmonology   -Nebs    #Atrial Flutter  with RVR  -s/p successful DCCV 6/14  -PO Diltiazem   -Cardiology following  -Xarelto     #Acute on chronic HFpEF  #Aortic stenosis s/p AVR  TTE on 5/30/24 at OSH with EF 50-55%, moderate to severe aortic stenosis with valve area 0.8 cm2, peak systolic gradient 67 mmHg.   -seems adequately diuresed, hold further IV diuretics   -Cardiology following    #Metabolic Alkalosis  -primarily compensatory due to primary Co2 retention but may have some added contraction Alkalosis due to loop diuretics, monitor      #Mild normocytic anemia  -monitor      #Left basilic and cephalic vein thrombus   -xarelto      #Seizure disorder  - keppra     #Type 2 diabetes - ISS     #Hypertension   #History of renal cell carcinoma status post nephrectomy  #ABIGAIL    POC:  Still requiring 10+ L of O2 with exertion, no discharge    Kary Ley DO    Supplementary Documentation:     Quality:  DVT Mechanical Prophylaxis:     Early ambuation  DVT Pharmacologic Prophylaxis   Medication    rivaroxaban (Xarelto) tab 20 mg         DVT Pharmacologic prophylaxis: Aspirin 81 mg      Code Status: Full Code  Peters: No urinary catheter in place      The 21st  Century Cures Act makes medical notes like these available to patients in the interest of transparency. Please be advised this is a medical document. Medical documents are intended to carry relevant information, facts as evident, and the clinical opinion of the practitioner. The medical note is intended as peer to peer communication and may appear blunt or direct. It is written in medical language and may contain abbreviations or verbiage that are unfamiliar.             **Certification      PHYSICIAN Certification of Need for Inpatient Hospitalization - Initial Certification    Patient will require inpatient services that will reasonably be expected to span two midnight's based on the clinical documentation in H+P.   Based on patients current state of illness, I anticipate that, after discharge, patient will require TBD.

## 2024-06-14 NOTE — PROCEDURES
[facility] Cardiology  Cardioversion Report    PREOPERATIVE DIAGNOSIS:  Atrial flutter     POSTOPERATIVE DIAGNOSIS:  Normal sinus rhythm.    PROCEDURE PERFORMED:  Direct current cardioversion.     DESCRIPTION OF PROCEDURE:  The risks, benefits and alternatives to cardioversion were discussed with the patient. The risks included, but are not limited to: lethal arrhythmia, stroke, respiratory failure, shock and death. The benefits of the procedure included symptomatic improvement and improved survival. Alternatives to the procedure included: not performing a cardioversion, treatment with medications only and observation. he verbalized understanding, and agreed to proceed with the procedure.     Defibrillator pads were placed on the anterior and posterior aspects of the patient's left chest.  Considering that the patient has been consistently taking uninterrupted anticoagulation (rivaroxaban) for the last year without missed doses for the past month, JOSE imaging was not deemed necessary.      The IV was maintained by the RN and moderate conscious sedation with a total of 30mg of brevitol. The patient was assessed by nurse and myself during the exam 0908(time of 1st dose administered when I was present) to 0911 (procedure end time).  The RN served as an independent trained observer was present and assisted in the monitoring of the patient's level of consciousness and physiological status, watching the heart rate, blood pressure, oximetry, and rhythm, in addition to total moderation time.     Intravenous sedation was administered, with the patient monitored by continuous pulse oximetry and cardiac telemetry. Once adequate sedation was achieved, 200 joules of direct current, synchronized, biphasic energy were applied. The patient was successfully cardioverted to normal sinus rhythm. No complications were noted at the end of the procedure.    CONCLUSION:  Successful cardioversion to normal sinus rhythm.    Keny  MD Marie  6/14/2024  9:18 AM

## 2024-06-15 VITALS
BODY MASS INDEX: 22.82 KG/M2 | RESPIRATION RATE: 18 BRPM | OXYGEN SATURATION: 95 % | DIASTOLIC BLOOD PRESSURE: 91 MMHG | WEIGHT: 159.38 LBS | HEART RATE: 97 BPM | SYSTOLIC BLOOD PRESSURE: 162 MMHG | HEIGHT: 70 IN | TEMPERATURE: 98 F

## 2024-06-15 LAB
ATRIAL RATE: 96 BPM
GLUCOSE BLD-MCNC: 148 MG/DL (ref 70–99)
P AXIS: 104 DEGREES
P-R INTERVAL: 170 MS
Q-T INTERVAL: 412 MS
QRS DURATION: 162 MS
QTC CALCULATION (BEZET): 520 MS
R AXIS: 83 DEGREES
T AXIS: 194 DEGREES
VENTRICULAR RATE: 96 BPM

## 2024-06-15 PROCEDURE — 99233 SBSQ HOSP IP/OBS HIGH 50: CPT | Performed by: INTERNAL MEDICINE

## 2024-06-15 PROCEDURE — 99239 HOSP IP/OBS DSCHRG MGMT >30: CPT | Performed by: INTERNAL MEDICINE

## 2024-06-15 RX ORDER — GUAIFENESIN 600 MG/1
1200 TABLET, EXTENDED RELEASE ORAL 2 TIMES DAILY
COMMUNITY

## 2024-06-15 RX ORDER — PREDNISONE 10 MG/1
TABLET ORAL
Qty: 30 TABLET | Refills: 0 | Status: SHIPPED | OUTPATIENT
Start: 2024-06-15

## 2024-06-15 RX ORDER — GUAIFENESIN 600 MG/1
600 TABLET, EXTENDED RELEASE ORAL 2 TIMES DAILY
Status: DISCONTINUED | OUTPATIENT
Start: 2024-06-15 | End: 2024-06-15

## 2024-06-15 NOTE — PROGRESS NOTES
06/15/24 1054   Mobility   O2 walk? Yes   SPO2% on Oxygen at Rest 92   At rest oxygen flow (liters per minute) 4   SPO2% Ambulation on Oxygen 90   Ambulation oxygen flow (liters per minute) 6

## 2024-06-15 NOTE — PLAN OF CARE
Assumed care of patient at 1930. Patient is alert and orientated x4. Currently 5L O2 during the day. AVAPS on/off during the night. Encouraged AVAPS use. Lung sounds diminished. Continuous pulse ox maintained. NSR on tele. Continent of bowel and bladder. No complaints of pain. Up standby assist. Call light within reach. Fall precautions in place.    Plan of care:  IV steroids     Problem: Patient/Family Goals  Goal: Patient/Family Long Term Goal  Description: Patient's Long Term Goal: Stay out of the hospital    Interventions:  - Follow up appointments after discharge (pcp, cardiology, pulmonology)  - Chronic home oxygen  - Adhere to medication regimen  - Diabetes management     - See additional Care Plan goals for specific interventions  Outcome: Progressing  Goal: Patient/Family Short Term Goal  Description: Patient's Short Term Goal: Breathe better    Interventions:   - Telemetry monitoring  - Continuous Pulse Oximetry  - High flow nasal cannula   - AVAPs with sleep as tolerated  - IV steroids  - IV lasix    - See additional Care Plan goals for specific interventions  Outcome: Progressing     Problem: CARDIOVASCULAR - ADULT  Goal: Maintains optimal cardiac output and hemodynamic stability  Description: INTERVENTIONS:  - Monitor vital signs, rhythm, and trends  - Monitor for bleeding, hypotension and signs of decreased cardiac output  - Evaluate effectiveness of vasoactive medications to optimize hemodynamic stability  - Monitor arterial and/or venous puncture sites for bleeding and/or hematoma  - Assess quality of pulses, skin color and temperature  - Assess for signs of decreased coronary artery perfusion - ex. Angina  - Evaluate fluid balance, assess for edema, trend weights  Outcome: Progressing  Goal: Absence of cardiac arrhythmias or at baseline  Description: INTERVENTIONS:  - Continuous cardiac monitoring, monitor vital signs, obtain 12 lead EKG if indicated  - Evaluate effectiveness of antiarrhythmic and  heart rate control medications as ordered  - Initiate emergency measures for life threatening arrhythmias  - Monitor electrolytes and administer replacement therapy as ordered  Outcome: Progressing

## 2024-06-15 NOTE — CM/SW NOTE
Spoke with Xiemna from [facility] that patient will be discharging by 1pm today. They will be contacting patient to coordinate delivery/set up AVAPS today.    SW/CM to remain available for support and/or discharge planning.    DONIS Harris  Discharge Planner  [phone]

## 2024-06-15 NOTE — PROGRESS NOTES
NURSING DISCHARGE NOTE    Discharged Home via Wheelchair.  Accompanied by Spouse  Belongings Taken by patient/family.    Pt discharged on 4LO2 at rest and 6L with exertion, home portable concentrator on discharge. AVAPS to be delivered by [facility]> Instructions reviewed with patient and wife. All questions answered. IVs removed.

## 2024-06-15 NOTE — PROGRESS NOTES
[facility] Cardiology  Progress Note    Keny Marshall Patient Status:  Inpatient    1960 MRN [id number]   [facility] 8NE-A Attending Kary Ley, DO   Hosp Day # 4 PCP Dustin Avina MD     Impression:  1. atrial flutter RVR, on rivaroxaban  2. AS with sub-aortic membrane s/p 23mm Inspiris SAVR and myomectomy  3. COPD/O2 dependent  4. recent syncope ([place] )--> concern regarding prosthetic stenosis  - TTE (24): LVEF 55%, 23mm Inspiris SAVR- 3.3m/s, mean 22mmHg.    Plan:  - AF: ok for dc from cv standpoint this afternoon if cleared by other services.  - COPD: severe.  rates jumped with albuterol neb.  inhalers per pulmonary  - Follow up: in office 2-4 weeks.  cv meds reconciled.        Subjective:  DCCV yesterday, successful.  Remains in NSR.  No CP.  No palps.  SOB/ARCEO unchanged.   Planning DC home this afternoon.    Objective:  BP (!) 162/91 (BP Location: Right arm)   Pulse 97   Temp 98.2 °F (36.8 °C) (Oral)   Resp 18   Ht 70\"   Wt 159 lb 6.3 oz (72.3 kg)   SpO2 95%   BMI 22.87 kg/m²   Temp (24hrs), Av.3 °F (36.8 °C), Min:98.1 °F (36.7 °C), Max:98.6 °F (37 °C)      Intake/Output Summary (Last 24 hours) at 6/15/2024 1234  Last data filed at 6/15/2024 0931  Gross per 24 hour   Intake 480 ml   Output 1200 ml   Net -720 ml     Wt Readings from Last 3 Encounters:   24 159 lb 6.3 oz (72.3 kg)   23 172 lb (78 kg)   23 168 lb (76.2 kg)       General: Awake and alert; in no acute distress  Cardiac: irregular and tachycardic; no murmurs/rubs/gallops are appreciated  Lungs: decreased breath sounds/ no active wheezes; no accessory muscle use  Abdomen: Soft, non-tender; bowel sounds are normoactive  Extremities: No clubbing/cyanosis; moves all 4 extremities normally    Current Facility-Administered Medications   Medication Dose Route Frequency    guaiFENesin ER (Mucinex) 12 hr tab 600 mg  600 mg Oral BID    sodium chloride 0.9% infusion    Intravenous Continuous    dilTIAZem ER (Dilacor XR) 24 hr cap 120 mg  120 mg Oral Daily    fluticasone furoate-vilanterol (Breo Ellipta) 200-25 MCG/ACT inhaler 1 puff  1 puff Inhalation Daily    ipratropium (Atrovent) 0.02 % nebulizer solution 0.5 mg  0.5 mg Nebulization TID & HS    methylPREDNISolone sodium succinate (Solu-MEDROL) injection 40 mg  40 mg Intravenous Q12H    budesonide (Pulmicort) 0.5 MG/2ML nebulizer suspension 0.5 mg  0.5 mg Nebulization 2 times daily    sodium chloride (hypertonic) 3 % nebulizer solution 3 mL  3 mL Nebulization BID    docusate sodium (Colace) cap 100 mg  100 mg Oral Daily    aspirin DR tab 81 mg  81 mg Oral Daily    atorvastatin (Lipitor) tab 20 mg  20 mg Oral Nightly    ipratropium (Atrovent) 0.02 % nebulizer solution 500 mcg  500 mcg Nebulization Q6H PRN    albuterol (Ventolin) (2.5 MG/3ML) 0.083% nebulizer solution 2.5 mg  2.5 mg Nebulization Q4H PRN    Roflumilast TABS 500 mcg (Patient Supplied)  500 mcg Oral Daily    acetaminophen (Tylenol Extra Strength) tab 500 mg  500 mg Oral Q4H PRN    melatonin tab 3 mg  3 mg Oral Nightly PRN    polyethylene glycol (PEG 3350) (Miralax) 17 g oral packet 17 g  17 g Oral Daily PRN    sennosides (Senokot) tab 17.2 mg  17.2 mg Oral Nightly PRN    bisacodyl (Dulcolax) 10 MG rectal suppository 10 mg  10 mg Rectal Daily PRN    fleet enema (Fleet) 7-19 GM/118ML rectal enema 133 mL  1 enema Rectal Once PRN    ondansetron (Zofran) 4 MG/2ML injection 4 mg  4 mg Intravenous Q6H PRN    prochlorperazine (Compazine) 10 MG/2ML injection 5 mg  5 mg Intravenous Q8H PRN    glucose (Dex4) 15 GM/59ML oral liquid 15 g  15 g Oral Q15 Min PRN    Or    glucose (Glutose) 40% oral gel 15 g  15 g Oral Q15 Min PRN    Or    glucose-vitamin C (Dex-4) chewable tab 4 tablet  4 tablet Oral Q15 Min PRN    Or    dextrose 50% injection 50 mL  50 mL Intravenous Q15 Min PRN    Or    glucose (Dex4) 15 GM/59ML oral liquid 30 g  30 g Oral Q15 Min PRN    Or    glucose (Glutose)  40% oral gel 30 g  30 g Oral Q15 Min PRN    Or    glucose-vitamin C (Dex-4) chewable tab 8 tablet  8 tablet Oral Q15 Min PRN    insulin aspart (NovoLOG) 100 Units/mL FlexPen 1-5 Units  1-5 Units Subcutaneous TID AC and HS    levETIRAcetam (Keppra) tab 500 mg  500 mg Oral BID    rivaroxaban (Xarelto) tab 20 mg  20 mg Oral Daily with dinner       Laboratory Data:       Lab Results   Component Value Date    INR 1.06 06/11/2024    INR 2.26 (H) 09/22/2021    INR 1.94 (H) 07/20/2021            Telemetry: No malignant tachyarrhythmias or bradyarrhythmias      Thank you for allowing our practice to participate in the care of your patient. Please do not hesitate to contact me if you have any questions.

## 2024-06-15 NOTE — PROGRESS NOTES
[facility] Progress Note     SUBJECTIVE/Interval history:  No acute events overnight, underwent cardioversion yesterday without issue. Feels better today, less dyspnea. No pain. Remains on 4-5L    Review of Systems:   A comprehensive 14 point review of systems was completed.   Pertinent positives and negatives noted in the HPI.    Medications  Reviewed personally   dilTIAZem ER  120 mg Oral Daily    fluticasone furoate-vilanterol  1 puff Inhalation Daily    ipratropium  0.5 mg Nebulization TID & HS    methylPREDNISolone  40 mg Intravenous Q12H    budesonide  0.5 mg Nebulization 2 times daily    sodium chloride (hypertonic)  3 mL Nebulization BID    docusate sodium  100 mg Oral Daily    aspirin  81 mg Oral Daily    atorvastatin  20 mg Oral Nightly    Roflumilast  500 mcg Oral Daily    insulin aspart  1-5 Units Subcutaneous TID AC and HS    levETIRAcetam  500 mg Oral BID    rivaroxaban  20 mg Oral Daily with dinner       ipratropium    albuterol    acetaminophen    melatonin    polyethylene glycol (PEG 3350)    sennosides    bisacodyl    fleet enema    ondansetron    prochlorperazine    glucose **OR** glucose **OR** glucose-vitamin C **OR** dextrose **OR** glucose **OR** glucose **OR** glucose-vitamin C    OBJECTIVE:  Vitals:    06/14/24 1557 06/14/24 1910 06/14/24 2335 06/15/24 0530   BP: (!) 150/91 (!) 139/92 (!) 150/93 141/80   BP Location: Right arm Right arm Right arm Right arm   Pulse: 98 99 84 80   Resp: 18 20 18 21   Temp: 98.2 °F (36.8 °C) 98.6 °F (37 °C) 98.2 °F (36.8 °C) 98.1 °F (36.7 °C)   TempSrc: Oral Oral Oral Oral   SpO2: 98% 91% 97% 98%   Weight:       Height:           Vital signs in last 24 hours:  Blood pressure 141/80, pulse 80, temperature 98.1 °F (36.7 °C), temperature source Oral, resp. rate 21, height 5' 10\" (1.778 m), weight 159 lb 6.3 oz (72.3 kg), SpO2 98%.     Intake/Output:  I/O last 3 completed shifts:  In: 150 [I.V.:150]  Out:  2625 [Urine:2625]       Physical Exam:  General: Appears alert, comfortable at rest. No distress  Neurologic:No focal deficits.  Alert.  Oriented.  Lungs:improved air entry bilaterally. No wheeze. No distress  Heart:irreg RR. No m heard  Abdomen:Soft, non-tender.  No masses.  No guarding.  No rebound.  Extremities:edema    Lab Data Review:   Recent Labs   Lab 06/12/24  0539 06/13/24  0534 06/14/24  0426   * 173* 150*   BUN 20 22 27*   CREATSERUM 0.65* 0.52* 0.72   CA 8.6 8.6 9.0    137 138   K 4.2 4.5 4.4    99 100   CO2 33.0* 35.0* 38.0*     Recent Labs   Lab 06/11/24  1527 06/12/24  0535 06/14/24  0426   RBC 3.67* 3.64* 3.64*   HGB 11.4* 11.2* 11.2*   HCT 34.6* 36.7* 35.7*   MCV 94.3 100.8* 98.1   MCH 31.1 30.8 30.8   MCHC 32.9 30.5* 31.4   RDW 13.2 13.2 13.2   NEPRELIM 8.70*  --  6.92   WBC 11.0 14.5* 7.4   .0 224.0 253.0     No results for input(s): \"BNP\" in the last 168 hours.  No results for input(s): \"TROP\", \"CK\" in the last 168 hours.  Recent Labs   Lab 06/11/24  1527   INR 1.06   PTT 32.2     Recent Labs   Lab 06/14/24  1308   ABGPHT 7.38   SOAVHV5Y 78*   ALKEX9O 93   ABGHCO3 38.7*   ABGBE 17.6*   TEMP 98.6   ROMARIO Positive   SITE Left Radial   DEV Nasal cannula   THGB 11.0*       Other Labs:  Interval Culture Data:   No results found for this visit on 06/11/24.  No results for input(s): \"COLORUR\", \"CLARITY\", \"SPECGRAVITY\", \"GLUUR\", \"BILUR\", \"KETUR\", \"BLOODURINE\", \"PHURINE\", \"PROUR\", \"UROBILINOGEN\", \"NITRITE\", \"LEUUR\", \"WBCUR\", \"RBCUR\", \"BACUR\", \"EPIUR\" in the last 168 hours.    Interval Radiology:   Reviewed personally  No results found.XR CHEST AP PORTABLE  (CPT=71045)    Result Date: 6/11/2024  CONCLUSION:  1. No significant interval changes, with stable diffuse emphysematous changes again noted. 2. Stable linear regions of scarring radiating from a focus of pleural thickening and calcified plaque along the mid aspect of the left lateral pleural margin. 3. No evidence of an  acute process.   LOCATION:  [facility]      Dictated by (CST): Marielena Martines DO on 6/11/2024 at 4:43 PM     Finalized by (CST): Marielena Martines DO on 6/11/2024 at 4:46 PM          Assessment/Plan:  #1. Acute on chronic hypercapnic and hypoxic respiratory failure  Multifactorial due to afib with RVR, CHF exacerbation, COPD exacerbation and nonadherence to PAP therapy  Treat/control afib and CHF as per cardiology  Continue nebs, steroids as below  Wean o2 for goal saturations of 88-92% given hypercapnia. His previous o2 baseline was: 2L at rest, 4L on exertion; using and benefiting from portable oxygen concentrator (3 at rest, 6 on exertion)  Obtain o2 walk today  Previously ordered and used AVAPS with Dr. Lorenz however he self discontinued.  Now re-approved for home AVAPS yesterday    #2. Acute CHF exacerbation, afib with RVR  Rate control and diurese as per cardiology  Now s/p cardioversion 6/14     #3. Acute COPD exacerbation  Severe obstruction on previous PFTs 3/2021  Now with exacerbation as above  off arformoterol given worsening afib/tachycardia  Continue  Breo 200 in place of his home inhalers (home regimen: symbicort 160 BID, spiriva daily)  He cannot tolerate albuterol due to palpitations, tremors and with afib with RVR; off duonebs  continue scheduled ipratropium, hypertonic saline and budesonide; apparently hospital does not stock levalbuterol  Continue methylpred - plan PO pred taper on discharge  Continue roflumilast  Previously reviewed BLVR - not candidate at this time, reassess as outpatient     #4. Pulmonary nodules  7/2017 CT with worsening peripheral lingular atelectasis. Nodules stable   5/2018 CT stable nodules  12/2020 CT stable  7/2021 CT stable but worsening mediastinal LAD suspect reactive to recent cardiac surgery  1/2022 CT with stable nodules, improved LAD  3/2023 CT chest with several small nodules  9/2023 CT chest with stable findings   Nodules have been stable for over two years so no need  to follow nodules in particular, however he wishes to proceed with LDCT (see below) so will follow on repeat imaging     #5. Tobacco abuse  30+ pack years, active pipe smoker  Next due for LDCT in 9/2024    Discharge planning - today vs tomorrow     Stefano Randle MD

## 2024-06-15 NOTE — DISCHARGE INSTRUCTIONS
[facility] - for AVAPS Machine and supplies   Phone: [phone]  Fax: [phone]       [Nl] : Integumentary

## 2024-06-15 NOTE — DISCHARGE SUMMARY
[facility]     Keny Marshall Patient Status:  Inpatient    1960 MRN [id number]   Location [facility] 8NE-A Attending Kary Ley, DO   Hosp Day # 4 PCP Dustin Avina MD     Date of Admission: 2024  Date of Discharge:   6/15/2024    Discharge Disposition: Home or Self Care    Discharge Diagnosis:  #Acute on Chronic Hypercapnic and hypoxic respiratory failure  #COPD exacerbation    -baseline noncompliance with PAP therapy, now agreeable  -Pulmonology on consult   -AVAPS  -Systemic steroids - taper per pulmonology   -Nebs     #Atrial Flutter  with RVR  -s/p successful DCCV   -PO Diltiazem   -Cardiology following  -Xarelto     #Acute on chronic HFpEF  #Aortic stenosis s/p AVR  TTE on 24 at OSH with EF 50-55%, moderate to severe aortic stenosis with valve area 0.8 cm2, peak systolic gradient 67 mmHg.   -seems adequately diuresed, hold further IV diuretics   -Cardiology following     #Metabolic Alkalosis  -primarily compensatory due to primary Co2 retention but may have some added contraction Alkalosis due to loop diuretics, monitor      #Mild normocytic anemia  -monitor      #Left basilic and cephalic vein thrombus   -xarelto      #Seizure disorder  - keppra     #Type 2 diabetes - ISS     #Hypertension   #History of renal cell carcinoma status post nephrectomy  #ABIGAIL    History of Present Illness:      Keny Marshall is a 63 year old male with chronic HFpEF, aortic stenosis status post AVR, history of seizure, COPD on home oxygen, type 2 diabetes, hypertension, atrial fibrillation on Xarelto, history of renal cell carcinoma status post nephrectomy, ABIGAIL presented with shortness of breath.     Patient was recently admitted to [facility] on May 27, 2024 with acute on chronic hypercapnic respiratory failure suspect secondary to noncompliance with PAP at home and low oxygen tank.  He was noted to have grand mall seizure episode during admission.  MRI showed  chronic microvascular changes without any lesions.  He was altered during the admission.  His metoprolol was discontinued and he was placed on Cardizem.  Home health RN noted heart rates for the last 1 to 2 days in the 140s.  He reports progressive SOB over the past week. He has a chronic cough without significant change. Denies fevers, chills, chest pain. He notes increased ankle edema. He was evaluated in the cardiology clinic today and was sent to the ED for evaluation.     Patient was afebrile, tachycardic to 130s and normotensive on arrival.  He was satting 93% on 6 L nasal cannula.  EKG showed A-fib with RVR; old left bundle branch block.  Chest x-ray is without acute findings.  proBNP elevated to 8117.  He was given 500 mL normal saline bolus and started on diltiazem drip.  [facility] was consulted and patient is admitted.    Brief Synopsis:     6/11: ED with SOB, afib RVR. Cardizem gtt, 500mL bolus. CXR (-), trops (-) x1, pBNP (8117). 15L high flow. X1 iv solumedrol   6/12: CO2 82,  5-8L HFNC vs AVAPS. Pulm would like patient on AVAPS when asleep and also when able to tolerate when ok but breaks ok. Cardizem gtt to PO on shift. Echo: EF 55%   6/13: HR sustaining 140s-- start cardizem gtt; dc arformoterol neb; PT/OT: home w/ HH    6/14: cardioversion; NSR, start PO dilt, 10L O2 required with exertion   6/15: O2 needs lessened, 4-6 L including exertion, AVAPs being delivered to home, patient discharged in stable condition     Lace+ Score: 65  59-90 High Risk  29-58 Medium Risk  0-28   Low Risk       TCM Follow-Up Recommendation:  LACE > 58: High Risk of readmission after discharge from the hospital.      Procedures during hospitalization:   DCCV    Incidental or significant findings and recommendations (brief descriptions):  N/a    Lab/Test results pending at Discharge:   N/a    Consultants:  Pulmonology, Cardiology     Discharge Medication List:     Discharge Medications        START taking these  medications        Instructions Prescription details   predniSONE 10 MG Tabs  Commonly known as: Deltasone      take 4 tablets daily for 3 days then 3 tablets daily for 3 days then 2 tablets daily for 3 days then 1 tablets daily for 3 days then stop.   Quantity: 30 tablet  Refills: 0            CONTINUE taking these medications        Instructions Prescription details   aspirin 81 MG Tbec      Take 1 tablet (81 mg total) by mouth daily.   Refills: 0     atorvastatin 20 MG Tabs  Commonly known as: Lipitor      Take 1 tablet (20 mg total) by mouth nightly.   Refills: 0     B Complete Tabs      Take 1 tablet by mouth daily.   Refills: 0     Budesonide-Formoterol Fumarate 80-4.5 MCG/ACT Aero  Commonly known as: SYMBICORT      Inhale 2 puffs into the lungs 2 (two) times daily.   Refills: 0     dilTIAZem  MG Cp24  Commonly known as: Cardizem CD      Take 1 capsule (120 mg total) by mouth daily.   Refills: 0     Ferrous Sulfate 324 (65 Fe) MG Tbec      Take 65 mg by mouth daily.   Refills: 0     guaiFENesin  MG Tb12  Commonly known as: Mucinex      Take 2 tablets (1,200 mg total) by mouth 2 (two) times daily.   Refills: 0     ipratropium 0.02 % Soln  Commonly known as: Atrovent      Take 2.5 mL (500 mcg total) by nebulization every 6 (six) hours as needed for Wheezing.   Refills: 0     Jardiance 10 MG Tabs  Generic drug: empagliflozin       Refills: 0     ketoconazole 2 % Crea  Commonly known as: Nizoral      Apply topically as needed.   Refills: 0     Klor-Con 10 10 MEQ Tbcr  Generic drug: Potassium Chloride ER      Take 2 tablets (20 mEq total) by mouth daily.   Stop taking on: July 3, 2024  Refills: 0     Levalbuterol HCl 0.63 MG/3ML Nebu  Commonly known as: XOPENEX      Take 3 mL (0.63 mg total) by nebulization every 6 (six) hours as needed for Shortness of Breath or Wheezing.   Quantity: 3 each  Refills: 3     levETIRAcetam 500 MG Tabs  Commonly known as: Keppra      Take 1 tablet (500 mg total) by mouth 2  (two) times daily.   Stop taking on: July 3, 2024  Refills: 0     magnesium 250 MG Tabs      Take 1 tablet (250 mg total) by mouth every evening.   Refills: 0     Roflumilast 500 MCG Tabs      Take 500 mcg by mouth daily.   Quantity: 90 tablet  Refills: 3     Tab-A-Arnold Tabs      Take 1 tablet by mouth daily.   Refills: 0     tiotropium 18 MCG Caps  Commonly known as: Spiriva Handihaler      Inhale 1 capsule (18 mcg total) into the lungs daily.   Refills: 0     torsemide 20 MG Tabs  Commonly known as: Demadex      Take 1 tablet (20 mg total) by mouth 2 (two) times daily.   Stop taking on: July 3, 2024  Refills: 0     Xarelto 20 MG Tabs  Generic drug: rivaroxaban      Take 1 tablet by mouth daily with food   Quantity: 30 tablet  Refills: 3            STOP taking these medications      ipratropium-albuterol 0.5-2.5 (3) MG/3ML Soln  Commonly known as: Duoneb                  Where to Get Your Medications        These medications were sent to [facility]/pharmacy #[id number] [place], [place] - [address] [place] [address] AT [facility] [place], [phone], [phone]  [address] [place] [address]      Phone: [phone]   predniSONE 10 MG Tabs         [facility]    Follow-up appointment:   Stefano Randle MD  [address]  [address] [address] [phone]    Schedule an appointment as soon as possible for a visit in 2 week(s)      Appointments for Next 30 Days 6/15/2024 - 7/15/2024      None            Vital signs:  Temp:  [98.1 °F (36.7 °C)-98.6 °F (37 °C)] 98.2 °F (36.8 °C)  Pulse:  [80-99] 97  Resp:  [18-21] 18  BP: (139-162)/(73-93) 162/91  SpO2:  [82 %-98 %] 95 %    Physical Exam:    General: No acute distress   Lungs: clear to auscultation  Cardiovascular: S1, S2  Abdomen: Soft      -----------------------------------------------------------------------------------------------  PATIENT DISCHARGE INSTRUCTIONS: See electronic chart    Kary Ley DO    Total time spent on discharge plannin  minutes     The 21st Century Cures Act makes medical notes like these available to patients in the interest of transparency. Please be advised this is a medical document. Medical documents are intended to carry relevant information, facts as evident, and the clinical opinion of the practitioner. The medical note is intended as peer to peer communication and may appear blunt or direct. It is written in medical language and may contain abbreviations or verbiage that are unfamiliar.

## 2024-06-17 NOTE — PAYOR COMM NOTE
--------------  DISCHARGE REVIEW    Payor: [employer] MEDICARE ADV PPO  Subscriber #:  [id number]  Authorization Number: [id number]    Admit date: 24  Admit time:   6:50 PM  Discharge Date: 6/15/2024  1:14 PM     Admitting Physician: Pieter De La Garza MD  Attending Physician:  No att. providers found  Primary Care Physician: Dustin Avina MD          Discharge Summary Notes        Discharge Summary signed by Kary Ley DO at 6/15/2024 11:47 AM       Author: Kary Ley DO Specialty: HOSPITALIST, Internal Medicine Author Type: Physician    Filed: 6/15/2024 11:47 AM Date of Service: 6/15/2024 11:45 AM Status: Signed    : Kary Ley DO (Physician)           [facility]     Keny Marshall Patient Status:  Inpatient    1960 MRN [id number]   Location [facility] 8NE-A Attending Kary Ley DO   Hosp Day # 4 PCP Dustin Avina MD     Date of Admission: 2024  Date of Discharge:   6/15/2024    Discharge Disposition: Home or Self Care    Discharge Diagnosis:  #Acute on Chronic Hypercapnic and hypoxic respiratory failure  #COPD exacerbation    -baseline noncompliance with PAP therapy, now agreeable  -Pulmonology on consult   -AVAPS  -Systemic steroids - taper per pulmonology   -Nebs     #Atrial Flutter  with RVR  -s/p successful DCCV   -PO Diltiazem   -Cardiology following  -Xarelto     #Acute on chronic HFpEF  #Aortic stenosis s/p AVR  TTE on 24 at OSH with EF 50-55%, moderate to severe aortic stenosis with valve area 0.8 cm2, peak systolic gradient 67 mmHg.   -seems adequately diuresed, hold further IV diuretics   -Cardiology following     #Metabolic Alkalosis  -primarily compensatory due to primary Co2 retention but may have some added contraction Alkalosis due to loop diuretics, monitor      #Mild normocytic anemia  -monitor      #Left basilic and cephalic vein thrombus   -xarelto      #Seizure disorder  - keppra     #Type 2 diabetes - ISS      #Hypertension   #History of renal cell carcinoma status post nephrectomy  #ABIGAIL    History of Present Illness:      Keny Marshall is a 63 year old male with chronic HFpEF, aortic stenosis status post AVR, history of seizure, COPD on home oxygen, type 2 diabetes, hypertension, atrial fibrillation on Xarelto, history of renal cell carcinoma status post nephrectomy, ABIGAIL presented with shortness of breath.     Patient was recently admitted to [facility] on May 27, 2024 with acute on chronic hypercapnic respiratory failure suspect secondary to noncompliance with PAP at home and low oxygen tank.  He was noted to have grand mall seizure episode during admission.  MRI showed chronic microvascular changes without any lesions.  He was altered during the admission.  His metoprolol was discontinued and he was placed on Cardizem.  Home health RN noted heart rates for the last 1 to 2 days in the 140s.  He reports progressive SOB over the past week. He has a chronic cough without significant change. Denies fevers, chills, chest pain. He notes increased ankle edema. He was evaluated in the cardiology clinic today and was sent to the ED for evaluation.     Patient was afebrile, tachycardic to 130s and normotensive on arrival.  He was satting 93% on 6 L nasal cannula.  EKG showed A-fib with RVR; old left bundle branch block.  Chest x-ray is without acute findings.  proBNP elevated to 8117.  He was given 500 mL normal saline bolus and started on diltiazem drip.  [facility] was consulted and patient is admitted.    Brief Synopsis:     6/11: ED with SOB, afib RVR. Cardizem gtt, 500mL bolus. CXR (-), trops (-) x1, pBNP (8117). 15L high flow. X1 iv solumedrol   6/12: CO2 82,  5-8L HFNC vs AVAPS. Pulm would like patient on AVAPS when asleep and also when able to tolerate when ok but breaks ok. Cardizem gtt to PO on shift. Echo: EF 55%   6/13: HR sustaining 140s-- start cardizem gtt; dc arformoterol neb; PT/OT: home w/ HH     6/14: cardioversion; NSR, start PO dilt, 10L O2 required with exertion   6/15: O2 needs lessened, 4-6 L including exertion, AVAPs being delivered to home, patient discharged in stable condition     Lace+ Score: 65  59-90 High Risk  29-58 Medium Risk  0-28   Low Risk       TCM Follow-Up Recommendation:  LACE > 58: High Risk of readmission after discharge from the hospital.      Procedures during hospitalization:   DCCV    Incidental or significant findings and recommendations (brief descriptions):  N/a    Lab/Test results pending at Discharge:   N/a    Consultants:  Pulmonology, Cardiology     Discharge Medication List:     Discharge Medications        START taking these medications        Instructions Prescription details   predniSONE 10 MG Tabs  Commonly known as: Deltasone      take 4 tablets daily for 3 days then 3 tablets daily for 3 days then 2 tablets daily for 3 days then 1 tablets daily for 3 days then stop.   Quantity: 30 tablet  Refills: 0            CONTINUE taking these medications        Instructions Prescription details   aspirin 81 MG Tbec      Take 1 tablet (81 mg total) by mouth daily.   Refills: 0     atorvastatin 20 MG Tabs  Commonly known as: Lipitor      Take 1 tablet (20 mg total) by mouth nightly.   Refills: 0     B Complete Tabs      Take 1 tablet by mouth daily.   Refills: 0     Budesonide-Formoterol Fumarate 80-4.5 MCG/ACT Aero  Commonly known as: SYMBICORT      Inhale 2 puffs into the lungs 2 (two) times daily.   Refills: 0     dilTIAZem  MG Cp24  Commonly known as: Cardizem CD      Take 1 capsule (120 mg total) by mouth daily.   Refills: 0     Ferrous Sulfate 324 (65 Fe) MG Tbec      Take 65 mg by mouth daily.   Refills: 0     guaiFENesin  MG Tb12  Commonly known as: Mucinex      Take 2 tablets (1,200 mg total) by mouth 2 (two) times daily.   Refills: 0     ipratropium 0.02 % Soln  Commonly known as: Atrovent      Take 2.5 mL (500 mcg total) by nebulization every 6 (six)  hours as needed for Wheezing.   Refills: 0     Jardiance 10 MG Tabs  Generic drug: empagliflozin       Refills: 0     ketoconazole 2 % Crea  Commonly known as: Nizoral      Apply topically as needed.   Refills: 0     Klor-Con 10 10 MEQ Tbcr  Generic drug: Potassium Chloride ER      Take 2 tablets (20 mEq total) by mouth daily.   Stop taking on: July 3, 2024  Refills: 0     Levalbuterol HCl 0.63 MG/3ML Nebu  Commonly known as: XOPENEX      Take 3 mL (0.63 mg total) by nebulization every 6 (six) hours as needed for Shortness of Breath or Wheezing.   Quantity: 3 each  Refills: 3     levETIRAcetam 500 MG Tabs  Commonly known as: Keppra      Take 1 tablet (500 mg total) by mouth 2 (two) times daily.   Stop taking on: July 3, 2024  Refills: 0     magnesium 250 MG Tabs      Take 1 tablet (250 mg total) by mouth every evening.   Refills: 0     Roflumilast 500 MCG Tabs      Take 500 mcg by mouth daily.   Quantity: 90 tablet  Refills: 3     Tab-A-Arnold Tabs      Take 1 tablet by mouth daily.   Refills: 0     tiotropium 18 MCG Caps  Commonly known as: Spiriva Handihaler      Inhale 1 capsule (18 mcg total) into the lungs daily.   Refills: 0     torsemide 20 MG Tabs  Commonly known as: Demadex      Take 1 tablet (20 mg total) by mouth 2 (two) times daily.   Stop taking on: July 3, 2024  Refills: 0     Xarelto 20 MG Tabs  Generic drug: rivaroxaban      Take 1 tablet by mouth daily with food   Quantity: 30 tablet  Refills: 3            STOP taking these medications      ipratropium-albuterol 0.5-2.5 (3) MG/3ML Soln  Commonly known as: Duoneb                  Where to Get Your Medications        These medications were sent to [facility]/pharmacy #[id number] [place], [place] - [address] [facility] AT [facility], [phone], [phone]  [address] [facility] [address]      Phone: [phone]   predniSONE 10 MG Tabs         [place] n/a    Follow-up appointment:   Stefano Randle  [address]  [facility] [address]  [phone]    Schedule an appointment as soon as possible for a visit in 2 week(s)      Appointments for Next 30 Days 6/15/2024 - 7/15/2024      None            Vital signs:  Temp:  [98.1 °F (36.7 °C)-98.6 °F (37 °C)] 98.2 °F (36.8 °C)  Pulse:  [80-99] 97  Resp:  [18-21] 18  BP: (139-162)/(73-93) 162/91  SpO2:  [82 %-98 %] 95 %    Physical Exam:    General: No acute distress   Lungs: clear to auscultation  Cardiovascular: S1, S2  Abdomen: Soft      -----------------------------------------------------------------------------------------------  PATIENT DISCHARGE INSTRUCTIONS: See electronic chart    Kary Ley DO    Total time spent on discharge plannin minutes     The  Century Cures Act makes medical notes like these available to patients in the interest of transparency. Please be advised this is a medical document. Medical documents are intended to carry relevant information, facts as evident, and the clinical opinion of the practitioner. The medical note is intended as peer to peer communication and may appear blunt or direct. It is written in medical language and may contain abbreviations or verbiage that are unfamiliar.       Electronically signed by Kary Ley DO on 6/15/2024 11:47 AM         REVIEWER COMMENTS

## 2024-06-19 ENCOUNTER — PATIENT OUTREACH (OUTPATIENT)
Dept: CASE MANAGEMENT | Age: 64
End: 2024-06-19

## 2024-06-19 NOTE — PROGRESS NOTES
Hospital follow up.    Keny Salazar MD  Cardiology  [address]   [address]  [place], [place] [id number] [phone]  2-3 weeks      Patient will call the office to schedule.  Confirmed with patient.    Closing encounter.

## 2024-06-20 ENCOUNTER — TELEPHONE (OUTPATIENT)
Facility: CLINIC | Age: 64
End: 2024-06-20

## 2024-06-20 NOTE — TELEPHONE ENCOUNTER
IGLESIA called. Spoke with Faraz in reference to Roflumilast    Ref#[id number]. Questions answered, Per Faraz, will hear about outcome tomorrow, 6/21.

## 2024-06-24 ENCOUNTER — TELEPHONE (OUTPATIENT)
Facility: CLINIC | Age: 64
End: 2024-06-24

## 2024-06-24 NOTE — TELEPHONE ENCOUNTER
Returned pt call.  Pt states he had been using his AVAPS machine for 4 hrs. Woke up at 4AM shaking, confused and breathing fast.  Checked oxygen levels once awake and they were above 90.  Feels like the settings are not working.  Feels worse post using.  RT from [facility] is supposed to come out this afternoon.  Asked pt to remind RT to send Razia CURIEL a copy of the download to assess if any changes are needed.

## 2024-06-25 ENCOUNTER — TELEPHONE (OUTPATIENT)
Facility: CLINIC | Age: 64
End: 2024-06-25

## 2024-06-25 DIAGNOSIS — J44.9 CHRONIC OBSTRUCTIVE PULMONARY DISEASE, UNSPECIFIED COPD TYPE (HCC): ICD-10-CM

## 2024-06-25 RX ORDER — ROFLUMILAST 500 UG/1
500 TABLET ORAL DAILY
Qty: 90 TABLET | Refills: 0 | Status: ON HOLD | OUTPATIENT
Start: 2024-06-25 | End: 2025-06-20

## 2024-06-25 NOTE — TELEPHONE ENCOUNTER
Pt is out of med. Roflumilast he still couldn't get this med. Didn't know why prescription need to be sent to [facility]

## 2024-06-25 NOTE — TELEPHONE ENCOUNTER
Received fax from pt's insurance: Roflumilast prior auth was approved but his Tier exception was denied because there is no lower alternative.    Pt called, made aware of the above. Rx for Roflumilast 500 mcg sent to [facility] pharmacy in [place] per pt request.     Pharmacy called. Pt will have to pay $14 for Roflumilast but has to be special ordered. Will possibly receive Thursday. Pharmacy staff will call pt when is ready to be picked up. Pt called, made aware.

## 2024-06-26 ENCOUNTER — TELEMEDICINE (OUTPATIENT)
Facility: CLINIC | Age: 64
End: 2024-06-26

## 2024-06-26 DIAGNOSIS — J44.9 CHRONIC OBSTRUCTIVE PULMONARY DISEASE, UNSPECIFIED COPD TYPE (HCC): Primary | ICD-10-CM

## 2024-06-26 DIAGNOSIS — F17.210 SMOKING GREATER THAN 20 PACK YEARS: ICD-10-CM

## 2024-06-26 DIAGNOSIS — J96.11 CHRONIC HYPOXEMIC RESPIRATORY FAILURE (HCC): ICD-10-CM

## 2024-06-26 DIAGNOSIS — J96.12 CHRONIC HYPERCAPNIC RESPIRATORY FAILURE (HCC): ICD-10-CM

## 2024-06-26 PROCEDURE — 99214 OFFICE O/P EST MOD 30 MIN: CPT | Performed by: INTERNAL MEDICINE

## 2024-06-26 NOTE — PROGRESS NOTES
This visit is conducted using Telemedicine with live, interactive video and audio.    Patient has been referred to the [facility] website at [email or web address] to review the yearly Consent to Treat document.    Patient understands and accepts financial responsibility for any deductible, co-insurance and/or co-pays associated with this service.     Pulmonary/Critical Care/Sleep Medicine    Consult Note     PCP: Dustin Avina MD   Phone: [phone]   Fax: [phone]     Ref Provider: No ref. provider found     No chief complaint on file.      HPI  I had the pleasure of seeing Keny Marshall who is a pleasant 63 year old male who presents for evaluation of evaluation of chronic hypoxemic respiratory failure and COPD.  He was hospitalized at [facility] during early June 2024.    The patient states that he was started on AVAPS machine at the time of discharge from the [facility] but [employer] requires him to pay $250 a month to use the machine.  He states he does not have enough money to pay for AVAPS machine.  He is willing to wear any cheaper machine.  That can be used as an alternative.  Uses supplemental oxygen 4 L/min along with CPAP machine.    The patient states that he has diagnosis of paroxysmal atrial fibrillation and has been on Xarelto.         He has pets 2 cats    .       Hx of tobacco use: He  reports that he quit smoking about 15 months ago. His smoking use included cigarettes. He started smoking about 41 years ago. He has a 60 pack-year smoking history. He has been exposed to tobacco smoke. He has never used smokeless tobacco.    Past Medical History:    Aortic stenosis    Arrhythmia    hx of a-fib    Arthritis    Cancer (HCC)    LEFT RENAL     Chronic hypoxemic respiratory failure (HCC)    On 4 LPM/NC at rest 24 hours/ day but 6 LPM/NC on exertion    COPD    Pulmonary follow up with Dr. Crum -no O2    Depression    Difficult intubation    patient states history of difficult  intubation   due to body weight.States this no longer issue due to sugnificant weight loss    Esophageal reflux    Eye disease    Fatigue    Fever, unknown origin    Heart murmur    Heart palpitations    High blood pressure    High cholesterol    Loss of appetite    Other and unspecified hyperlipidemia    Paroxysmal atrial fibrillation (HCC)    Pneumonia, organism unspecified(486)    Prediabetes    Shortness of breath    uses oxygen 3-4 L/NC all the time    Unspecified essential hypertension    Unspecified sleep apnea    He was unable to tolerate CPAP/BiPAP    Visual impairment    reading glasses      Past Surgical History:   Procedure Laterality Date    Adenoidectomy      Angiogram      Appendectomy      Appendectomy      Colonoscopy N/A 03/21/2016    Procedure: COLONOSCOPY;  Surgeon: Artur Okeefe MD;  Location:  ENDOSCOPY    Colonoscopy      Colonoscopy N/A 1/5/2023    Procedure: .;  Surgeon: Artur Okeefe MD;  Location:  ENDOSCOPY    Endovas repair, infrarenl abdom aortic aneurysm/dissect      Laparo radical nephrectomy Left 05/06/2014    Nephrectomy Left 2014    Other  meatus of urethra    Other Right     foreign body removal-arm    Other surgical history Right 03/25/2016    Procedure: KNEE ARTHROSCOPY;  Surgeon: Madhu Anaya MD;  Location:  MAIN OR    Repair rotator cuff,acute Right     Upper gi endoscopy,exam      Valve repair  2020     Allergies   Allergen Reactions    Bees ANAPHYLAXIS    Other ANAPHYLAXIS     Bee stings     Current Outpatient Medications   Medication Sig Dispense Refill    Roflumilast 500 MCG Oral Tab Take 500 mcg by mouth daily. 90 tablet 0    guaiFENesin  MG Oral Tablet 12 Hr Take 2 tablets (1,200 mg total) by mouth 2 (two) times daily.      predniSONE 10 MG Oral Tab take 4 tablets daily for 3 days then 3 tablets daily for 3 days then 2 tablets daily for 3 days then 1 tablets daily for 3 days then stop. 30 tablet 0    levETIRAcetam 500 MG Oral Tab Take 1  tablet (500 mg total) by mouth 2 (two) times daily.      KLOR-CON 10 10 MEQ Oral Tab CR Take 2 tablets (20 mEq total) by mouth daily.      torsemide 20 MG Oral Tab Take 1 tablet (20 mg total) by mouth 2 (two) times daily.      dilTIAZem  MG Oral Capsule SR 24 Hr Take 1 capsule (120 mg total) by mouth daily.      Ferrous Sulfate 324 (65 Fe) MG Oral Tab EC Take 65 mg by mouth daily.      tiotropium 18 MCG Inhalation Cap Inhale 1 capsule (18 mcg total) into the lungs daily.      Budesonide-Formoterol Fumarate 80-4.5 MCG/ACT Inhalation Aerosol Inhale 2 puffs into the lungs 2 (two) times daily.      Levalbuterol HCl 0.63 MG/3ML Inhalation Nebu Soln Take 3 mL (0.63 mg total) by nebulization every 6 (six) hours as needed for Shortness of Breath or Wheezing. 3 each 3    JARDIANCE 10 MG Oral Tab       atorvastatin 20 MG Oral Tab Take 1 tablet (20 mg total) by mouth nightly.      XARELTO 20 MG Oral Tab Take 1 tablet by mouth daily with food 30 tablet 3    Ipratropium Bromide 0.02 % Inhalation Solution Take 2.5 mL (500 mcg total) by nebulization every 6 (six) hours as needed for Wheezing.      ketoconazole 2 % External Cream Apply topically as needed.      Multiple Vitamin (TAB-A-CUCA) Oral Tab Take 1 tablet by mouth daily.      aspirin 81 MG Oral Tab EC Take 1 tablet (81 mg total) by mouth daily.      B Complex-Biotin-FA (B COMPLETE) Oral Tab Take 1 tablet by mouth daily.      magnesium 250 MG Oral Tab Take 1 tablet (250 mg total) by mouth every evening.        Social History     Socioeconomic History    Marital status:    Occupational History    Occupation:      Comment: [employer]   Tobacco Use    Smoking status: Former     Current packs/day: 0.00     Average packs/day: 1.5 packs/day for 40.0 years (60.0 ttl pk-yrs)     Types: Cigarettes     Start date: 3/8/1983     Quit date: 3/8/2023     Years since quittin.3     Passive exposure: Past    Smokeless tobacco: Never   Vaping Use    Vaping  status: Never Used   Substance and Sexual Activity    Alcohol use: Not Currently     Alcohol/week: 14.0 standard drinks of alcohol     Types: 12 Cans of beer, 2 Shots of liquor per week     Comment: 14/week    Drug use: Never    Sexual activity: Yes     Partners: Female     Social Determinants of Health     Food Insecurity: No Food Insecurity (6/11/2024)    Food Insecurity     Food Insecurity: Never true   Transportation Needs: No Transportation Needs (6/18/2024)    Received from [employer]    OASIS : Transportation     Lack of Transportation (Medical): No     Lack of Transportation (Non-Medical): No     Patient Unable or Declines to Respond: No   Social Connections: Feeling Socially Integrated (6/18/2024)    Received from [employer]    OASIS : Social Isolation     Frequency of experiencing loneliness or isolation: Rarely   Housing Stability: Low Risk  (6/11/2024)    Housing Stability     Housing Instability: No      Immunization History   Administered Date(s) Administered    Covid-19 Vaccine Pfizer 30 mcg/0.3 ml 04/02/2021, 06/11/2021    FLUZONE 6 months and older PFS 0.5 ml (24612) 12/13/2016, 12/19/2018    Flublok Quad Influenza Vaccine (13216) 11/25/2019, 11/10/2020    Pneumococcal (Prevnar 13) 12/13/2016    Pneumovax 23 09/03/2015    TDAP 08/28/2019      Family History   Problem Relation Age of Onset    Heart Disorder Mother     Pacemaker Mother     Other (Atrial fibrillation) Father     Other (Lung cancer) Maternal Aunt         Review of Systems   Constitutional:  Positive for fatigue. Negative for fever and unexpected weight change.   HENT:  Positive for congestion and rhinorrhea. Negative for mouth sores, nosebleeds, postnasal drip, sore throat and trouble swallowing.    Eyes:  Negative for visual disturbance.   Respiratory:  Positive for shortness of breath and wheezing. Negative for apnea, cough, choking, chest tightness and stridor.    Cardiovascular:  Negative for chest pain, palpitations  and leg swelling.   Gastrointestinal:  Negative for abdominal pain, constipation, diarrhea, nausea and vomiting.   Genitourinary:  Negative for difficulty urinating.   Musculoskeletal:  Positive for arthralgias and back pain. Negative for gait problem and myalgias.   Neurological:  Positive for headaches. Negative for dizziness and weakness.   Psychiatric/Behavioral:  Positive for sleep disturbance.         There were no vitals filed for this visit.      Ht Readings from Last 1 Encounters:   06/11/24 5' 10\" (1.778 m)     Wt Readings from Last 1 Encounters:   06/13/24 159 lb 6.3 oz (72.3 kg)     There is no height or weight on file to calculate BMI.     Physical Exam    Constitutional: Appearance: Normal appearance form visual view during virtual video appointment.     HENT: Head: Normal appearance form visual view during virtual video appointment, Face: Normal appearance form visual view during virtual video appointment,     Eyes: Normal appearance form visual view during virtual video appointment  Neck: Normal appearance form visual view during virtual  video appointment.   Pulmonary:Effort: Pulmonary effort seems normal from visual view during virtual video appointment.     Musculoskeletal: Normal range of motion. General: No swelling, deformity or signs of injury from visual view during virtual video appointment.     Skin: General: Skin is Normal appearance form visual view during virtual video appointment.    Neurological: Mental Status: She is alert and oriented to person, place, and time form visual viewing during virtual doxy video appointment, Coordination: normal form visual viewing during virtual video appointment.    Psychiatric: Mood and Affect: Mood seems normal form visual viewing during virtual doxy video appointment, Behavior, Thought Content, and Judgment all seem normal form visual viewing during virtual video appointment     Labs:  Last BMP  Lab Results   Component Value Date     (H)  06/14/2024    BUN 27 (H) 06/14/2024    CREATSERUM 0.72 06/14/2024    BUNCREA 17.0 12/27/2021    ANIONGAP 0 06/14/2024    GFRAA 77 09/28/2021    GFRNAA 67 09/28/2021    CA 9.0 06/14/2024     06/14/2024    K 4.4 06/14/2024     06/14/2024    CO2 38.0 (H) 06/14/2024    OSMOCALC 294 06/14/2024      Last CBC  Lab Results   Component Value Date    WBC 7.4 06/14/2024    RBC 3.64 (L) 06/14/2024    HGB 11.2 (L) 06/14/2024    HCT 35.7 (L) 06/14/2024    MCV 98.1 06/14/2024    MCH 30.8 06/14/2024    MCHC 31.4 06/14/2024    RDW 13.2 06/14/2024    .0 06/14/2024    MPV 9.8 02/21/2013      Last CMP  Lab Results   Component Value Date     (H) 06/14/2024    BUN 27 (H) 06/14/2024    BUNCREA 17.0 12/27/2021    CREATSERUM 0.72 06/14/2024    ANIONGAP 0 06/14/2024    GFR 54 (L) 05/17/2017    GFRNAA 67 09/28/2021    GFRAA 77 09/28/2021    CA 9.0 06/14/2024    OSMOCALC 294 06/14/2024    ALKPHO 54 06/12/2024    AST 24 06/12/2024    ALT 25 06/12/2024    BILT 0.3 06/12/2024    TP 7.5 06/12/2024    ALB 2.8 (L) 06/12/2024    GLOBULIN 4.7 (H) 06/12/2024    AGRATIO 1.3 06/19/2014     06/14/2024    K 4.4 06/14/2024     06/14/2024    CO2 38.0 (H) 06/14/2024      Last Thyroid Function  Lab Results   Component Value Date    TSH 1.800 06/11/2024        Imaging:  PROCEDURE:  CT CHEST LD NO CAD(CPT=71250)     COMPARISON:  [facility] , CT, CT LUNG LD SCREENING (CPT=71271), 3/14/2023, 5:08 PM.     INDICATIONS:  R91.8 Lung nodules     TECHNIQUE:  Unenhanced multislice CT scanning is performed through the chest.  Dose reduction techniques were used. Dose information is transmitted to the ACR (American College of Radiology) NRDR (National Radiology Data Registry) which includes the Dose   Index Registry.     PATIENT STATED HISTORY: (As transcribed by Technologist)  Patient states to have left anterior chest discomfort for a few weeks. He has a history of lung nodules, renal cancer and is on continuous oxygen use.          FINDINGS:    This scan performed without IV contrast, with limitations thereof.     LUNGS/PLLEURA:  Ovoid subpleural nodule 9 x 5 mm right lower lobe laterally up against the pleural surface image 258 shows no change in size.  A similar morphology nodule image 96 posterior right upper lobe 7 mm.  No new nodule.  Chronic small rounded  atelectasis/scarring in the left upper lobe inferolaterally image 169 stable.  Otherwise, there is extensive chronic lung disease with emphysematous lucencies, architectural distortion, pleural and parenchymal scarring and pleural calcifications largely  unchanged since the prior CT from a few months ago.  No developing pleural effusion or acute lobar pneumonia.     THORACIC AORTA:  No aneurysm or other acute process. There are atherosclerotic changes including calcification involving the aorta, but no evidence for aneurysm involving the aorta.     MEDIASTINUM/SKYLER:  Unremarkable.     CARDIAC:  Coronary artery calcifications are present.  Prosthetic aortic valve.  Mitral calcifications are present.     CHEST WALL:  Unremarkable.     LIMITED ABDOMEN:  No acute process.     BONES:  No acute process.     OTHER:    None.                      Impression   CONCLUSION:  Stable appearance since prior examination from 6 months ago.  This includes stable subcentimeter peripheral pleural based nodules.  Otherwise, there is extensive but otherwise stable chronic lung disease bilaterally, without developing  pleural effusion, acute pneumonia, acute CHF, pneumothorax.  Other chronic and stable appearing findings as above.     LOCATION:  [place]        Dictated by (CST): Severino Roque MD on 9/13/2023 at 2:41 PM                Impression:  Chronic hypoxic and hypercapnic respiratory failure: Currently on supplemental oxygen 4 L/min 24 hours a day along with AVAPS at night and as needed but due to cost patient cannot afford to use AVAPS  COPD/emphysema, severe: With FEV1 of 1.31 L or 37% predicted  during March 2021.  And DLCO 31% predicted  Pulmonary nodules, high risk for lung cancer: On CT chest performed September 26, 2023 :Ovoid subpleural nodule 9 x 5 mm right lower lobe laterally up against the pleural surface image 258 shows no change in size.  A similar morphology nodule image 96 posterior right upper lobe 7 mm.  No new nodule.  Chronic small rounded atelectasis/scarring in the left upper lobe inferolaterally image 169 stable.  Otherwise, there is extensive chronic lung disease with emphysematous lucencies, architectural distortion, pleural and parenchymal scarring and pleural calcifications largely  unchanged since the prior CT from a few months ago.  No developing pleural effusion or acute lobar pneumonia.  Reportedly stable since December 10, 2020 CT chest   Paroxysmal atrial fibrillation  Pulmonary hypertension: With PA systolic pressure of 45 to 50 mmHg on echo performed 6/12/2024  Aortic stenosis: Status post 23mm InRadio Inspiris Resilia in 2020  History of HFpEF  GERD  Hypertension  Hyperlipidemia  History of depression                            Plan:  Arrange BiPAP at 15/8 cwp and oxygen 4 LPM at bedtime and as needed and obtain download data and overnight oximetry   Then discontinue AVAPS machine   Continue Symbicort 80/4.5 mcg 2 puffs  twice daily  Spiriva HandiHaler one puff daily   Levalbuterol Nebs 4 times daily as needed   Continue Roflumilast tablet daily  Schedule  low-dose CT chest to evaluate any suspicious pulmonary nodules in September 2024 for continued surveillance      Lung Cancer Counseling and Shared Decision Making Session in an Asymptomatic Smoker/Former Smoker   Keny Marshall is a 63 year old male without current symptoms of lung cancer.  History   Smoking Status    Former    Types: Cigarettes   Smokeless Tobacco    Never        He received information on the importance of adherence to annual lung cancer Low Dose CT (LDCT) screening, the impact of his  comorbidities and his ability or willingness to undergo diagnosis and treatment. he is in agreement and an order will be placed for CT LUNG LD SCREENING(CPT=71271).    We counseled the importance of maintaining cigarette smoking abstinence if he is a former smoker and the importance of smoking cessation if he is a current smoker and we discussed and furnished information about tobacco cessation interventions.     Follow up: 3  months     Thank you for allowing me to participate in your patient care.    BRIANNA Lorenz MD, FACP, FCCP, [facility] - Pulmonary/Critical care/Sleep Medicine  Please contact our office if you have any questions or concerns at [phone]    Note to the patient: The 21st Century Cures Act makes medical notes like these available to patients in the interest of transparency. However, be advised that this is a medical document. It is intended as peer to peer communication. It is written in medical language and may contain abbreviations or verbiage that are unfamiliar. It may appear blunt or direct. Medical documents are intended to carry relevant information, facts as evident, and clinical opinion of the practitioner.      Disclaimer: Components of this note were documented using voice recognition system and are subject to errors not corrected at proofreading. Contact the author of this note for any clarifications

## 2024-06-26 NOTE — PATIENT INSTRUCTIONS
Plan:  Arrange BiPAP at 15/8 cwp and oxygen 4 LPM at bedtime and as needed and obtain download data and overnight oximetry   Then discontinue AVAPS machine   Continue Symbicort 80/4.5 mcg 2 puffs  twice daily  Spiriva HandiHaler one puff daily    Continue Roflumilast tablet daily  Levalbuterol Nebs 4 times daily as needed   Schedule  low-dose CT chest to evaluate any suspicious pulmonary nodules in September 2024 for continued surveillance      Lung Cancer Counseling and Shared Decision Making Session in an Asymptomatic Smoker/Former Smoker   Keny Marshall is a 63 year old male without current symptoms of lung cancer.  History   Smoking Status    Former    Types: Cigarettes   Smokeless Tobacco    Never        He received information on the importance of adherence to annual lung cancer Low Dose CT (LDCT) screening, the impact of his comorbidities and his ability or willingness to undergo diagnosis and treatment. he is in agreement and an order will be placed for CT LUNG LD SCREENING(CPT=71271).    We counseled the importance of maintaining cigarette smoking abstinence if he is a former smoker and the importance of smoking cessation if he is a current smoker and we discussed and furnished information about tobacco cessation interventions.     Follow up: 3  months    Yanick Lorenz MD      What Is COPD?  COPD stands for chronic obstructive pulmonary disease. It means the airways in your lungs are blocked (obstructed). This makes it hard to breathe. You may have trouble with daily activities because of shortness of breath. Over time, the shortness of breath often gets worse. This makes it harder to take care of yourself and take part in activities. Chronic bronchitis and emphysema are 2 common types of COPD.   How did I get COPD?   Most people get COPD from smoking. Cigarette smoke damages lungs. This can develop into COPD over many years.   How COPD affects you   COPD makes you work harder to breathe. Air may get  trapped in the lungs. This prevents your lungs from filling completely with fresh oxygen-filled air when you breathe in (inhale). It's harder to take deep breaths, especially when you are active and start breathing faster. Over time, your lungs may become enlarged, filled with air that does not transfer oxygen into the blood. These problems lead to shortness of breath (also called dyspnea). Hoarse, whistling breathing (wheezing) and a chronic cough are common. So is feeling tired and worn-out (fatigue).    What happens in chronic bronchitis?     The cells in the airways make more mucus than normal. The mucus builds up, narrowing the airways. This means less air travels into and out of the lungs. The lining of the airways may also become swollen (inflamed). This causes the airways to narrow even more.   What happens in emphysema?     The small airways are damaged and lose their stretchiness. The airways collapse when you exhale. This causes air to get trapped in the air sacs. So less oxygen enters the blood vessels. And less oxygen is delivered to all the cells of your body. This makes it hard to breathe.   Damage to cilia     Cilia are small hairs that line and protect the airways. Smoking damages the cilia. Damaged cilia can’t sweep mucus and particles away. Some of the cilia are destroyed. This damage makes COPD worse.   How daily issues affect your health  Many things in your daily life impact your health. This can include transportation, money problems, housing, access to food, and . If you can’t get to medical appointments, you may not receive the care you need. When money is tight, it may be difficult to pay for medicines. And living far from a grocery store can make it hard to buy healthy food.   If you have concerns in any of these or other areas, talk with your healthcare team. They may know of local resources to assist you. Or they may have a staff person who can help.   [employer] last reviewed  this educational content on 12/1/2021 © 2000-2023 [employer]. All rights reserved. This information is not intended as a substitute for professional medical care. Always follow your healthcare professional's instructions.        Treatment for COPD    Your healthcare provider will prescribe the best treatments for your COPD.  Treatment  Advice includes:  Medicines. Some medicines help ease symptoms when they happen. Others are taken every day to control lung inflammation. Always take your medicines as prescribed. Learn the names of your medicines. Also know how and when to use them. Talk with your provider about other conditions you have and the medicines you take. Always use the correct technique for your metered dose inhaler or nebulizer.. Ask your healthcare provider how to use your medicine delivery device. You can also check the user manual of the device.  Tests. To monitor COPD risks, your provider may advise a blood test or sputum test for eosinophil count. Or you may need bronchodilator reversibility testing. All people with COPD should be screened once for alpha-1 antitrypsin deficiency (AATD).  Surgery. Your healthcare provider may advise that you have surgery. This might be lung volume reduction surgery, a bullectomy, or a lung transplant.  Oxygen therapy.  You may need to take oxygen if tests show that your blood has too little of it. Ask your healthcare provider about long-term oxygen therapy.  Smoking. If you smoke, quit. Smoking is the main cause of COPD. Quitting will help you be able to better manage your COPD. Also don't use e-cigarettes or vaping products. Ask your healthcare provider about ways to help you quit smoking.  Prevent infections.  Infections such as a cold or the flu can make your symptoms worse. Try to stay away from people who are sick. Wash your hands often. And ask your healthcare provider about vaccines for the flu and pneumonia.  Coping with shortness of  breath  Coping tips include:  Exercise. Be as active as you can. This will help your energy. It will also strengthen your muscles, so you can do more.  Breathing methods. Ask your healthcare provider or nurse to show you how to do pursed-lip breathing.  Pollution. Stay away from both indoor and outdoor pollution. Indoor pollution includes things such as burning wood, smoke from home cooking, and heating fuels. Outdoor pollution includes things such as dusts, vapors, fumes, gases, and other chemicals.  Balance rest and activity.  Try to balance rest with activity. For example, you might start the day with getting dressed and eating breakfast. Then you can relax and read the paper. After that, take a brief walk. And then sit with your feet up for a while.  Pulmonary rehab.   Community-based and home-based rehab programs work as well as hospital-based programs if they are done as often and as intensely. Standard home-based pulmonary rehab programs can help you with dyspnea. Traditional pulmonary rehab remains the standard of care. It's the best choice for people with COPD. These programs help manage your disease. They also help with breathing methods and exercise. They can provide support and counseling. To find one, ask your provider or call your local hospital. Also talk with your healthcare provider about which rehab or self-care program is best for you.  Healthy eating. A healthy, balanced diet is important to help you stay as healthy as possible. So is staying at your ideal weight. Being overweight or underweight can have affect your health. Make sure you have a lot of fruits and vegetables every day. And also eat balanced portions of whole grains, lean meats and fish, and low-fat dairy products.  [employer] last reviewed this educational content on 1/1/2022  © 6504-7697 [employer]. All rights reserved. This information is not intended as a substitute for professional medical care. Always follow your  healthcare professional's instructions.

## 2024-06-27 ENCOUNTER — TELEPHONE (OUTPATIENT)
Facility: CLINIC | Age: 64
End: 2024-06-27

## 2024-06-27 NOTE — TELEPHONE ENCOUNTER
Contacted Ramana from [facility] to find out if patient can just be converted to bipap with O2.   PT might need sleep studie??  Awaiting response.

## 2024-06-27 NOTE — TELEPHONE ENCOUNTER
Per Dr. Lorenz:    Arrange BiPAP at 15/8 cwp     and oxygen 4 LPM at bedtime and as needed     and obtain download data and overnight oximetry   Then discontinue AVAPS machine     TE route to sleep team.

## 2024-06-29 ENCOUNTER — HOSPITAL ENCOUNTER (INPATIENT)
Facility: HOSPITAL | Age: 64
LOS: 21 days | Discharge: HOME HEALTH | End: 2024-07-20
Attending: EMERGENCY MEDICINE | Admitting: HOSPITALIST
Payer: MEDICARE

## 2024-06-29 ENCOUNTER — APPOINTMENT (OUTPATIENT)
Dept: GENERAL RADIOLOGY | Facility: HOSPITAL | Age: 64
End: 2024-06-29
Attending: EMERGENCY MEDICINE
Payer: MEDICARE

## 2024-06-29 DIAGNOSIS — I48.92 ATRIAL FLUTTER WITH RAPID VENTRICULAR RESPONSE (HCC): Primary | ICD-10-CM

## 2024-06-29 DIAGNOSIS — E87.5 HYPERKALEMIA: ICD-10-CM

## 2024-06-29 DIAGNOSIS — Z99.81 SUPPLEMENTAL OXYGEN DEPENDENT: ICD-10-CM

## 2024-06-29 DIAGNOSIS — J44.9 CHRONIC OBSTRUCTIVE PULMONARY DISEASE, UNSPECIFIED COPD TYPE (HCC): ICD-10-CM

## 2024-06-29 DIAGNOSIS — R09.02 HYPOXIA: ICD-10-CM

## 2024-06-29 PROBLEM — J96.21 ACUTE ON CHRONIC RESPIRATORY FAILURE WITH HYPOXIA AND HYPERCAPNIA (HCC): Status: ACTIVE | Noted: 2024-06-12

## 2024-06-29 PROBLEM — E87.1 HYPONATREMIA: Status: ACTIVE | Noted: 2024-06-29

## 2024-06-29 PROBLEM — J96.22 ACUTE ON CHRONIC RESPIRATORY FAILURE WITH HYPOXIA AND HYPERCAPNIA (HCC): Status: ACTIVE | Noted: 2024-06-12

## 2024-06-29 LAB
ALBUMIN SERPL-MCNC: 3 G/DL (ref 3.4–5)
ALBUMIN/GLOB SERPL: 0.7 {RATIO} (ref 1–2)
ALP LIVER SERPL-CCNC: 60 U/L
ALT SERPL-CCNC: 39 U/L
ANION GAP SERPL CALC-SCNC: 3 MMOL/L (ref 0–18)
APTT PPP: 36.1 SECONDS (ref 23–36)
ARTERIAL PATENCY WRIST A: POSITIVE
AST SERPL-CCNC: 56 U/L (ref 15–37)
BASE EXCESS BLDA CALC-SCNC: 13.8 MMOL/L (ref ?–2)
BASE EXCESS BLDV CALC-SCNC: 16.4 MMOL/L
BASOPHILS # BLD AUTO: 0.02 X10(3) UL (ref 0–0.2)
BASOPHILS NFR BLD AUTO: 0.1 %
BILIRUB SERPL-MCNC: 0.9 MG/DL (ref 0.1–2)
BODY TEMPERATURE: 98.6 F
BUN BLD-MCNC: 22 MG/DL (ref 9–23)
CA-I BLD-SCNC: 1.17 MMOL/L (ref 0.95–1.32)
CA-I BLD-SCNC: 1.17 MMOL/L (ref 0.95–1.32)
CALCIUM BLD-MCNC: 9.2 MG/DL (ref 8.5–10.1)
CHLORIDE SERPL-SCNC: 89 MMOL/L (ref 98–112)
CO2 SERPL-SCNC: 40 MMOL/L (ref 21–32)
COHGB MFR BLD: 3.5 % SAT (ref 0–3)
CREAT BLD-MCNC: 0.84 MG/DL
EGFRCR SERPLBLD CKD-EPI 2021: 97 ML/MIN/1.73M2 (ref 60–?)
EOSINOPHIL # BLD AUTO: 0.07 X10(3) UL (ref 0–0.7)
EOSINOPHIL NFR BLD AUTO: 0.5 %
ERYTHROCYTE [DISTWIDTH] IN BLOOD BY AUTOMATED COUNT: 14 %
EXPIRATORY PRESSURE: 5 CM H2O
EXPIRATORY PRESSURE: 6 CM H2O
FIO2: 50 %
FIO2: 50 %
FLUAV + FLUBV RNA SPEC NAA+PROBE: NEGATIVE
FLUAV + FLUBV RNA SPEC NAA+PROBE: NEGATIVE
GLOBULIN PLAS-MCNC: 4.3 G/DL (ref 2.8–4.4)
GLUCOSE BLD-MCNC: 131 MG/DL (ref 70–99)
GLUCOSE BLD-MCNC: 142 MG/DL (ref 70–99)
GLUCOSE BLD-MCNC: 169 MG/DL (ref 70–99)
HCO3 BLDA-SCNC: 35.6 MEQ/L (ref 21–27)
HCO3 BLDV-SCNC: 36.2 MEQ/L (ref 22–26)
HCT VFR BLD AUTO: 42.4 %
HGB BLD-MCNC: 12.9 G/DL
HGB BLD-MCNC: 13 G/DL
IMM GRANULOCYTES # BLD AUTO: 0.07 X10(3) UL (ref 0–1)
IMM GRANULOCYTES NFR BLD: 0.5 %
INR BLD: 1.59 (ref 0.8–1.2)
INSP PRESSURE: 17 CM H2O
IPAP MAX: 30 CM H2O
IPAP MIN: 15 CM H2O
LACTATE BLD-SCNC: 0.7 MMOL/L (ref 0.5–2)
LYMPHOCYTES # BLD AUTO: 0.72 X10(3) UL (ref 1–4)
LYMPHOCYTES NFR BLD AUTO: 5.3 %
MCH RBC QN AUTO: 30.8 PG (ref 26–34)
MCHC RBC AUTO-ENTMCNC: 30.7 G/DL (ref 31–37)
MCV RBC AUTO: 100.5 FL
METHGB MFR BLD: 0.7 % SAT (ref 0.4–1.5)
MONOCYTES # BLD AUTO: 0.98 X10(3) UL (ref 0.1–1)
MONOCYTES NFR BLD AUTO: 7.2 %
MRSA DNA SPEC QL NAA+PROBE: NEGATIVE
NEUTROPHILS # BLD AUTO: 11.76 X10 (3) UL (ref 1.5–7.7)
NEUTROPHILS # BLD AUTO: 11.76 X10(3) UL (ref 1.5–7.7)
NEUTROPHILS NFR BLD AUTO: 86.4 %
NT-PROBNP SERPL-MCNC: 8409 PG/ML (ref ?–125)
OSMOLALITY SERPL CALC.SUM OF ELEC: 281 MOSM/KG (ref 275–295)
OXYHGB MFR BLDA: 93.9 % (ref 92–100)
OXYHGB MFR BLDV: 41.1 % (ref 72–78)
P/F RATIO: 164 MMHG
PCO2 BLDA: 90 MM HG (ref 35–45)
PCO2 BLDV: 100 MM HG (ref 38–50)
PH BLDA: 7.3 [PH] (ref 7.35–7.45)
PH BLDV: 7.29 [PH] (ref 7.33–7.43)
PLATELET # BLD AUTO: 185 10(3)UL (ref 150–450)
PO2 BLDA: 82 MM HG (ref 80–100)
PO2 BLDV: 26 MM HG (ref 30–50)
POTASSIUM BLD-SCNC: 4.8 MMOL/L (ref 3.6–5.1)
POTASSIUM BLD-SCNC: 5.9 MMOL/L (ref 3.6–5.1)
POTASSIUM SERPL-SCNC: 5.5 MMOL/L (ref 3.5–5.1)
PROT SERPL-MCNC: 7.3 G/DL (ref 6.4–8.2)
PROTHROMBIN TIME: 19.1 SECONDS (ref 11.6–14.8)
RBC # BLD AUTO: 4.22 X10(6)UL
RSV RNA SPEC NAA+PROBE: NEGATIVE
SARS-COV-2 RNA RESP QL NAA+PROBE: NOT DETECTED
SODIUM BLD-SCNC: 132 MMOL/L (ref 135–145)
SODIUM BLD-SCNC: 134 MMOL/L (ref 135–145)
SODIUM SERPL-SCNC: 132 MMOL/L (ref 136–145)
TIDAL VOLUME: 550 ML
TROPONIN I SERPL HS-MCNC: 63 NG/L
VENT RATE: 20 /MIN
WBC # BLD AUTO: 13.6 X10(3) UL (ref 4–11)

## 2024-06-29 PROCEDURE — 99223 1ST HOSP IP/OBS HIGH 75: CPT | Performed by: HOSPITALIST

## 2024-06-29 PROCEDURE — 5A09357 ASSISTANCE WITH RESPIRATORY VENTILATION, LESS THAN 24 CONSECUTIVE HOURS, CONTINUOUS POSITIVE AIRWAY PRESSURE: ICD-10-PCS | Performed by: HOSPITALIST

## 2024-06-29 PROCEDURE — 71045 X-RAY EXAM CHEST 1 VIEW: CPT | Performed by: EMERGENCY MEDICINE

## 2024-06-29 PROCEDURE — 99291 CRITICAL CARE FIRST HOUR: CPT | Performed by: INTERNAL MEDICINE

## 2024-06-29 RX ORDER — MELATONIN
3 NIGHTLY PRN
Status: DISCONTINUED | OUTPATIENT
Start: 2024-06-29 | End: 2024-07-20

## 2024-06-29 RX ORDER — GUAIFENESIN 600 MG/1
1200 TABLET, EXTENDED RELEASE ORAL 2 TIMES DAILY
Status: DISCONTINUED | OUTPATIENT
Start: 2024-06-29 | End: 2024-07-20

## 2024-06-29 RX ORDER — FUROSEMIDE 10 MG/ML
40 INJECTION INTRAMUSCULAR; INTRAVENOUS
Status: DISCONTINUED | OUTPATIENT
Start: 2024-06-29 | End: 2024-07-01

## 2024-06-29 RX ORDER — POLYETHYLENE GLYCOL 3350 17 G/17G
17 POWDER, FOR SOLUTION ORAL DAILY PRN
Status: DISCONTINUED | OUTPATIENT
Start: 2024-06-29 | End: 2024-07-20

## 2024-06-29 RX ORDER — DEXTROSE MONOHYDRATE 25 G/50ML
50 INJECTION, SOLUTION INTRAVENOUS
Status: DISCONTINUED | OUTPATIENT
Start: 2024-06-29 | End: 2024-07-20

## 2024-06-29 RX ORDER — LEVETIRACETAM 500 MG/1
500 TABLET ORAL 2 TIMES DAILY
Status: DISCONTINUED | OUTPATIENT
Start: 2024-06-29 | End: 2024-07-20

## 2024-06-29 RX ORDER — ENEMA 19; 7 G/133ML; G/133ML
1 ENEMA RECTAL ONCE AS NEEDED
Status: DISCONTINUED | OUTPATIENT
Start: 2024-06-29 | End: 2024-07-20

## 2024-06-29 RX ORDER — ASPIRIN 81 MG/1
81 TABLET ORAL DAILY
Status: DISCONTINUED | OUTPATIENT
Start: 2024-06-30 | End: 2024-07-20

## 2024-06-29 RX ORDER — PROCHLORPERAZINE EDISYLATE 5 MG/ML
5 INJECTION INTRAMUSCULAR; INTRAVENOUS EVERY 8 HOURS PRN
Status: DISCONTINUED | OUTPATIENT
Start: 2024-06-29 | End: 2024-07-20

## 2024-06-29 RX ORDER — ONDANSETRON 2 MG/ML
4 INJECTION INTRAMUSCULAR; INTRAVENOUS EVERY 6 HOURS PRN
Status: DISCONTINUED | OUTPATIENT
Start: 2024-06-29 | End: 2024-06-29

## 2024-06-29 RX ORDER — FLUTICASONE FUROATE AND VILANTEROL 100; 25 UG/1; UG/1
1 POWDER RESPIRATORY (INHALATION) DAILY
Status: DISCONTINUED | OUTPATIENT
Start: 2024-06-30 | End: 2024-06-29

## 2024-06-29 RX ORDER — NICOTINE POLACRILEX 4 MG
15 LOZENGE BUCCAL
Status: DISCONTINUED | OUTPATIENT
Start: 2024-06-29 | End: 2024-07-20

## 2024-06-29 RX ORDER — BUDESONIDE AND FORMOTEROL FUMARATE DIHYDRATE 160; 4.5 UG/1; UG/1
2 AEROSOL RESPIRATORY (INHALATION) 2 TIMES DAILY
COMMUNITY
End: 2024-07-29

## 2024-06-29 RX ORDER — ROFLUMILAST 500 UG/1
500 TABLET ORAL DAILY
Status: DISCONTINUED | OUTPATIENT
Start: 2024-06-29 | End: 2024-07-20

## 2024-06-29 RX ORDER — SODIUM CHLORIDE 9 MG/ML
INJECTION, SOLUTION INTRAVENOUS CONTINUOUS
Status: DISCONTINUED | OUTPATIENT
Start: 2024-06-29 | End: 2024-06-29

## 2024-06-29 RX ORDER — NICOTINE POLACRILEX 4 MG
30 LOZENGE BUCCAL
Status: DISCONTINUED | OUTPATIENT
Start: 2024-06-29 | End: 2024-07-20

## 2024-06-29 RX ORDER — ACETAMINOPHEN 500 MG
500 TABLET ORAL EVERY 4 HOURS PRN
Status: DISCONTINUED | OUTPATIENT
Start: 2024-06-29 | End: 2024-07-20

## 2024-06-29 RX ORDER — FLUTICASONE FUROATE AND VILANTEROL 200; 25 UG/1; UG/1
1 POWDER RESPIRATORY (INHALATION) DAILY
Status: DISCONTINUED | OUTPATIENT
Start: 2024-06-30 | End: 2024-07-20

## 2024-06-29 RX ORDER — ATORVASTATIN CALCIUM 20 MG/1
20 TABLET, FILM COATED ORAL NIGHTLY
Status: DISCONTINUED | OUTPATIENT
Start: 2024-06-29 | End: 2024-07-20

## 2024-06-29 RX ORDER — IPRATROPIUM BROMIDE AND ALBUTEROL SULFATE 2.5; .5 MG/3ML; MG/3ML
3 SOLUTION RESPIRATORY (INHALATION)
Status: DISCONTINUED | OUTPATIENT
Start: 2024-06-29 | End: 2024-07-01

## 2024-06-29 RX ORDER — FUROSEMIDE 10 MG/ML
20 INJECTION INTRAMUSCULAR; INTRAVENOUS
Status: DISCONTINUED | OUTPATIENT
Start: 2024-06-29 | End: 2024-06-29

## 2024-06-29 RX ORDER — SENNOSIDES 8.6 MG
17.2 TABLET ORAL NIGHTLY PRN
Status: DISCONTINUED | OUTPATIENT
Start: 2024-06-29 | End: 2024-07-20

## 2024-06-29 RX ORDER — BISACODYL 10 MG
10 SUPPOSITORY, RECTAL RECTAL
Status: DISCONTINUED | OUTPATIENT
Start: 2024-06-29 | End: 2024-07-20

## 2024-06-29 RX ORDER — IPRATROPIUM BROMIDE AND ALBUTEROL SULFATE 2.5; .5 MG/3ML; MG/3ML
3 SOLUTION RESPIRATORY (INHALATION) EVERY 6 HOURS PRN
Status: DISCONTINUED | OUTPATIENT
Start: 2024-06-29 | End: 2024-06-29

## 2024-06-29 NOTE — RESPIRATORY THERAPY NOTE
Pt received from ER on BiPAP, switched to AVAPS RR 20, , Max P 30, Min P 15, FiO2 50%, Epap 5. Pt switched to AVAPS due to increase of PCO2. Plan to repeat ABG and re-assess.

## 2024-06-29 NOTE — CONSULTS
[facility] Pulmonary Consult Note       Keny Marshall Patient Status:  Inpatient    1960 MRN [id number]   Location [facility] 6NE-A Attending Perfecto Pace, DO   Hosp Day # 0 PCP Dustin Avina MD     Reason for Consultation:  Shortness of breath, hypercapnic respiratory failure    History of Present Illness:  Keny Marshall is a a(n) 63 year old male with history of chronic hypoxic and hypercapnic respiratory failure, a flutter with prior ablation, COPD, seizure disorder who presented with palpitations and chest pain for the past 2 days.  Patient was found to be in atrial flutter and had increased work of breathing, has improved with BiPAP.  Patient denies any fevers, chills, nausea, vomiting.  Patient admitted to CCU for ongoing management.    History:  Past Medical History:    Aortic stenosis    Arrhythmia    hx of a-fib    Arthritis    Cancer (HCC)    LEFT RENAL     Chronic hypoxemic respiratory failure (HCC)    On 4 LPM/NC at rest 24 hours/ day but 6 LPM/NC on exertion    COPD    Pulmonary follow up with Dr. Crum -no O2    Depression    Difficult intubation    patient states history of difficult intubation   due to body weight.States this no longer issue due to sugnificant weight loss    Esophageal reflux    Eye disease    Fatigue    Fever, unknown origin    Heart murmur    Heart palpitations    High blood pressure    High cholesterol    Loss of appetite    Other and unspecified hyperlipidemia    Paroxysmal atrial fibrillation (HCC)    Pneumonia, organism unspecified(486)    Prediabetes    Renal cell carcinoma (HCC)    s/p left nephrectomy    Shortness of breath    uses oxygen 3-4 L/NC all the time    Unspecified essential hypertension    Unspecified sleep apnea    He was unable to tolerate CPAP/BiPAP    Visual impairment    reading glasses     Past Surgical History:   Procedure Laterality Date    Adenoidectomy      Angiogram      Appendectomy      Appendectomy      Colonoscopy N/A  03/21/2016    Procedure: COLONOSCOPY;  Surgeon: Artur Okeefe MD;  Location:  ENDOSCOPY    Colonoscopy      Colonoscopy N/A 1/5/2023    Procedure: .;  Surgeon: Artur Okeefe MD;  Location:  ENDOSCOPY    Endovas repair, infrarenl abdom aortic aneurysm/dissect      Laparo radical nephrectomy Left 05/06/2014    Nephrectomy Left 2014    Other  meatus of urethra    Other Right     foreign body removal-arm    Other surgical history Right 03/25/2016    Procedure: KNEE ARTHROSCOPY;  Surgeon: Madhu Anaya MD;  Location:  MAIN OR    Repair rotator cuff,acute Right     Upper gi endoscopy,exam      Valve repair  2020     Family History   Problem Relation Age of Onset    Heart Disorder Mother     Pacemaker Mother     Other (Atrial fibrillation) Father     Other (Lung cancer) Maternal Aunt       reports that he quit smoking about 15 months ago. His smoking use included cigarettes. He started smoking about 41 years ago. He has a 60 pack-year smoking history. He has been exposed to tobacco smoke. He has never used smokeless tobacco. He reports that he does not currently use alcohol after a past usage of about 14.0 standard drinks of alcohol per week. He reports that he does not use drugs.    Allergies:  Allergies   Allergen Reactions    Bees ANAPHYLAXIS    Other ANAPHYLAXIS     Bee stings       Medications:    Current Facility-Administered Medications:     dilTIAZem 10 mg BOLUS FROM BAG infusion, 10 mg, Intravenous, Q1H PRN    amiodarone (Cordarone) 450 mg in dextrose 5% 250 mL infusion, 1 mg/min, Intravenous, Continuous    [START ON 6/30/2024] aspirin DR tab 81 mg, 81 mg, Oral, Daily    atorvastatin (Lipitor) tab 20 mg, 20 mg, Oral, Nightly    fluticasone furoate-vilanterol (Breo Ellipta) 100-25 MCG/ACT inhaler 1 puff, 1 puff, Inhalation, Daily    guaiFENesin ER (Mucinex) 12 hr tab 1,200 mg, 1,200 mg, Oral, BID    ipratropium (Atrovent) 0.02 % nebulizer solution 500 mcg, 500 mcg, Nebulization, Q6H PRN     levETIRAcetam (Keppra) tab 500 mg, 500 mg, Oral, BID    Roflumilast TABS 500 mcg, 500 mcg, Oral, Daily    umeclidinium bromide (Incruse Ellipta) 62.5 MCG/ACT inhaler 1 puff, 1 puff, Inhalation, Daily    rivaroxaban (Xarelto) tab 20 mg, 20 mg, Oral, Daily with food    dilTIAZem (cardIZEM) 100 mg in sodium chloride 0.9% 100 mL IVPB-ADDV, 2.5-20 mg/hr, Intravenous, Continuous    glucose (Dex4) 15 GM/59ML oral liquid 15 g, 15 g, Oral, Q15 Min PRN **OR** glucose (Glutose) 40% oral gel 15 g, 15 g, Oral, Q15 Min PRN **OR** glucose-vitamin C (Dex-4) chewable tab 4 tablet, 4 tablet, Oral, Q15 Min PRN **OR** dextrose 50% injection 50 mL, 50 mL, Intravenous, Q15 Min PRN **OR** glucose (Dex4) 15 GM/59ML oral liquid 30 g, 30 g, Oral, Q15 Min PRN **OR** glucose (Glutose) 40% oral gel 30 g, 30 g, Oral, Q15 Min PRN **OR** glucose-vitamin C (Dex-4) chewable tab 8 tablet, 8 tablet, Oral, Q15 Min PRN    insulin aspart (NovoLOG) 100 Units/mL FlexPen 1-10 Units, 1-10 Units, Subcutaneous, TID AC and HS    melatonin tab 3 mg, 3 mg, Oral, Nightly PRN    acetaminophen (Tylenol Extra Strength) tab 500 mg, 500 mg, Oral, Q4H PRN    polyethylene glycol (PEG 3350) (Miralax) 17 g oral packet 17 g, 17 g, Oral, Daily PRN    sennosides (Senokot) tab 17.2 mg, 17.2 mg, Oral, Nightly PRN    bisacodyl (Dulcolax) 10 MG rectal suppository 10 mg, 10 mg, Rectal, Daily PRN    fleet enema (Fleet) 7-19 GM/118ML rectal enema 133 mL, 1 enema, Rectal, Once PRN    ondansetron (Zofran) 4 MG/2ML injection 4 mg, 4 mg, Intravenous, Q6H PRN    prochlorperazine (Compazine) 10 MG/2ML injection 5 mg, 5 mg, Intravenous, Q8H PRN    ipratropium-albuterol (Duoneb) 0.5-2.5 (3) MG/3ML inhalation solution 3 mL, 3 mL, Nebulization, Q6H PRN    furosemide (Lasix) 10 mg/mL injection 40 mg, 40 mg, Intravenous, BID (Diuretic)    Review of Systems:   All other review of systems are negative.    Vital signs in last 24 hours:  Patient Vitals for the past 24 hrs:   BP Temp Temp src Pulse  Resp SpO2 Weight   06/29/24 1504 -- -- -- -- -- -- 168 lb 3.2 oz (76.3 kg)   06/29/24 1500 123/84 -- -- 96 20 96 % --   06/29/24 1445 126/74 -- -- 97 (!) 27 94 % --   06/29/24 1430 122/74 -- -- 104 (!) 36 93 % --   06/29/24 1418 111/90 -- -- (!) 137 (!) 32 97 % --   06/29/24 1400 -- 98.4 °F (36.9 °C) Temporal (!) 137 22 94 % --   06/29/24 1345 -- -- -- (!) 137 25 95 % --   06/29/24 1330 -- -- -- (!) 138 (!) 35 94 % --   06/29/24 1315 (!) 112/92 -- -- (!) 136 (!) 38 93 % --   06/29/24 1215 128/83 -- -- (!) 143 (!) 49 94 % --   06/29/24 1200 126/87 -- -- -- -- -- --   06/29/24 1145 124/89 -- -- (!) 139 (!) 46 99 % --   06/29/24 1130 (!) 114/91 -- -- (!) 139 23 99 % --   06/29/24 1115 110/88 -- -- (!) 139 25 99 % --   06/29/24 1105 97/71 -- -- (!) 140 (!) 30 (!) 58 % 162 lb (73.5 kg)       Intake/Output:  No intake or output data in the 24 hours ending 06/29/24 1511    Physical Exam:   General: alert, cooperative, oriented.  In some mild respiratory distress   Head: Normocephalic, without obvious abnormality, atraumatic.   Eyes: Conjunctivae/corneas clear.  No scleral icterus.  No conjunctival     hemorrhage.   Nose: Nares normal.   Throat: Lips, mucosa, and tongue normal.  No thrush noted.   Neck: Soft, supple neck; trachea midline, no adenopathy, no thyromegaly.   Lungs: coarse breath sounds bilaterally   Chest wall: No tenderness or deformity.   Heart: Regular rate and rhythm, normal S1S2, no murmur.   Abdomen: soft, non-tender, non-distended, no masses, no guarding, no     rebound, positive BS.   Extremity: no edema, no cyanosis   Skin: No rashes or lesions.   Neurological: Alert, interactive, no focal deficits    Lab Data Review:  Recent Labs   Lab 06/29/24  1106   WBC 13.6*   HGB 13.0   HCT 42.4   .0       Recent Labs   Lab 06/29/24  1106   *   K 5.5*   CL 89*   CO2 40.0*   BUN 22   ALT 39   AST 56*       No results for input(s): \"MG\" in the last 168 hours.    Lab Results   Component Value Date     PHOS 5.6 (H) 06/12/2024        Recent Labs   Lab 06/29/24  1106   INR 1.59*   PTT 36.1*       Recent Labs   Lab 06/29/24  1435   ABGPHT 7.30*   VFJFCQ9C 90*   YUADS1C 82   ABGHCO3 35.6*   ABGBE 13.8*   TEMP 98.6   ROMARIO Positive   SITE Right Radial   DEV Bi-PAP   THGB 12.9*       Invalid input(s): \"CKTOTAL\", \"TROPONINI\", \"TROPONINT\", \"CKMBINDEX\"      Cultures:   No results found for this visit on 06/29/24.    Radiology:  XR CHEST AP PORTABLE  (CPT=71045)  Narrative: PROCEDURE:  XR CHEST AP PORTABLE  (CPT=71045)     TECHNIQUE:  AP chest radiograph was obtained.     COMPARISON:  [facility]OPAL SERNA, CT CHEST LD NO CAD(CPT=71250), 9/13/2023, 10:20 AM.  [facility] XR, XR CHEST AP PORTABLE  (CPT=71045), 6/11/2024, 3:38 PM.     INDICATIONS:  CHEST PAIN     PATIENT STATED HISTORY: (As transcribed by Technologist)  Patient offered no additional history at this time.          FINDINGS:  Stable cardiomegaly.  There is an aortic valve prosthesis noted.  Pulmonary vasculature is slightly prominent and stable from prior imaging.       Stable diffuse emphysematous changes are again noted.  Stable linear regions of scarring radiating from a focus of pleural thickening and calcified pleural plaque along the mid aspect of the left lateral pleural margin, stable from prior imaging.                           Impression: CONCLUSION:    Findings are unchanged relative to the previous study.  Please see further details above.        LOCATION:  [facility]                 Dictated by (CST): Marielena Martines DO on 6/29/2024 at 11:32 AM       Finalized by (DANIEL): Marielena Martines DO on 6/29/2024 at 11:38 AM         Assessment:  Acute on chronic hypoxic hypercapnic respiratory failure  Atrial flutter with RVR status post treatment with Cardizem and amiodarone, cardiology is following  Acute on chronic heart failure with preserved ejection fraction  COPD with chronic hypercapnia  Aortic stenosis status post aortic valve replacement  Hyponatremia, likely  hypervolemic        Plan:  Close monitoring of oxygenation status and respiratory status  Check ABG  Currently on BiPAP, given acute on chronic chronic hypercapnia, recommended transition to AVAPS  Recheck ABG at 4 PM today  Continue home inhalers and nebulizers  Scheduled regular scheduled DuoNebs  Would hold off on steroid therapy  Management of atrial flutter per cardiology  On home Bhupendra Whaley MD  CCM time: 45 minutes. The patient is critically ill and at high risk for clinical deterioration.    Thank you for the consultation.  Will follow with you.    Danya Whaley MD  [facility]  Office: [phone]

## 2024-06-29 NOTE — ED PROVIDER NOTES
Patient Seen in: [facility] Emergency Department      History     Chief Complaint   Patient presents with    Arrythmia/Palpitations     Stated Complaint:     Subjective:   HPI    63-year-old male with a history of COPD, atrial fibrillation and congestive heart failure who is supplemental oxygen dependent presents to the emergency department complaining of palpitations intermittently for the past 48 hours associated with increased difficulty breathing especially this morning.  No chest pain or fever.  Most recent hospitalization a couple of weeks ago was for atrial fibrillation with RVR, evidence of an existing left bundle branch block, and the patient underwent cardioversion at that time.    Objective:   Past Medical History:    Aortic stenosis    Arrhythmia    hx of a-fib    Arthritis    Cancer (HCC)    LEFT RENAL     Chronic hypoxemic respiratory failure (HCC)    On 4 LPM/NC at rest 24 hours/ day but 6 LPM/NC on exertion    COPD    Pulmonary follow up with Dr. Crum -no O2    Depression    Difficult intubation    patient states history of difficult intubation   due to body weight.States this no longer issue due to sugnificant weight loss    Esophageal reflux    Eye disease    Fatigue    Fever, unknown origin    Heart murmur    Heart palpitations    High blood pressure    High cholesterol    Loss of appetite    Other and unspecified hyperlipidemia    Paroxysmal atrial fibrillation (HCC)    Pneumonia, organism unspecified(486)    Prediabetes    Renal cell carcinoma (HCC)    s/p left nephrectomy    Shortness of breath    uses oxygen 3-4 L/NC all the time    Unspecified essential hypertension    Unspecified sleep apnea    He was unable to tolerate CPAP/BiPAP    Visual impairment    reading glasses              Past Surgical History:   Procedure Laterality Date    Adenoidectomy      Angiogram      Appendectomy      Appendectomy      Colonoscopy N/A 03/21/2016    Procedure: COLONOSCOPY;  Surgeon: Artur Okeefe  MD Antonio;  Location:  ENDOSCOPY    Colonoscopy      Colonoscopy N/A 2023    Procedure: .;  Surgeon: Artur Okeefe MD;  Location:  ENDOSCOPY    Endovas repair, infrarenl abdom aortic aneurysm/dissect      Laparo radical nephrectomy Left 2014    Nephrectomy Left     Other  meatus of urethra    Other Right     foreign body removal-arm    Other surgical history Right 2016    Procedure: KNEE ARTHROSCOPY;  Surgeon: Madhu Anaya MD;  Location:  MAIN OR    Repair rotator cuff,acute Right     Upper gi endoscopy,exam      Valve repair                  Social History     Socioeconomic History    Marital status:    Occupational History    Occupation:      Comment: [employer]   Tobacco Use    Smoking status: Former     Current packs/day: 0.00     Average packs/day: 1.5 packs/day for 40.0 years (60.0 ttl pk-yrs)     Types: Cigarettes     Start date: 3/8/1983     Quit date: 3/8/2023     Years since quittin.3     Passive exposure: Past    Smokeless tobacco: Never   Vaping Use    Vaping status: Never Used   Substance and Sexual Activity    Alcohol use: Not Currently     Alcohol/week: 14.0 standard drinks of alcohol     Types: 12 Cans of beer, 2 Shots of liquor per week     Comment: 14/week    Drug use: Never    Sexual activity: Yes     Partners: Female     Social Determinants of Health     Food Insecurity: No Food Insecurity (2024)    Food Insecurity     Food Insecurity: Never true   Transportation Needs: No Transportation Needs (2024)    Received from [facility]    OASIS : Transportation     Lack of Transportation (Medical): No     Lack of Transportation (Non-Medical): No     Patient Unable or Declines to Respond: No   Social Connections: Feeling Socially Integrated (2024)    Received from [employer]    OASIS : Social Isolation     Frequency of experiencing loneliness or isolation: Rarely   Housing Stability: Low Risk  (2024)     Housing Stability     Housing Instability: No              Review of Systems    Positive for stated Chief Complaint: Arrythmia/Palpitations    Other systems are as noted in HPI.  Constitutional and vital signs reviewed.      All other systems reviewed and negative except as noted above.    Physical Exam     ED Triage Vitals [06/29/24 1105]   BP 97/71   Pulse (!) 140   Resp (!) 30   Temp    Temp src    SpO2 (!) 58 %   O2 Device Nasal cannula       Current Vitals:   Vital Signs  BP: (!) 114/91  Pulse: (!) 139  Resp: 23  MAP (mmHg): 99    Oxygen Therapy  SpO2: 99 %  O2 Device: Nasal cannula  O2 Flow Rate (L/min): 4 L/min            Physical Exam    General appearance: This is a middle-aged male in moderate to severe respiratory distress sitting on a gurney.  Vital signs were reviewed per nurses notes.  Pulse oximetry on 5 L per nasal cannula was 58%.  Monitor revealed a wide-complex tachycardia rate of 140.  Initial blood pressure was 97/70.  HEENT: Normocephalic atraumatic.  Anicteric sclera.  Oral mucosa is moist.  Oropharynx is normal.  Neck: No adenopathy or thyromegaly.  Lungs: A few crackles in the lung bases but otherwise clear to auscultation.  Heart exam: Normal S1-S2 without extra sounds or murmurs.  Rapid rate, regular rhythm.  Abdomen is nontender.  Extremities are atraumatic.  Skin is dry without rashes or lesions.  Neuroexam: Awake, conversive and moving all 4 extremities well.    ED Course     Labs Reviewed   COMP METABOLIC PANEL (14) - Abnormal; Notable for the following components:       Result Value    Glucose 169 (*)     Sodium 132 (*)     Potassium 5.5 (*)     Chloride 89 (*)     CO2 40.0 (*)     AST 56 (*)     Albumin 3.0 (*)     A/G Ratio 0.7 (*)     All other components within normal limits   PRO BETA NATRIURETIC PEPTIDE - Abnormal; Notable for the following components:    Pro-Beta Natriuretic Peptide 8,409 (*)     All other components within normal limits   PROTHROMBIN TIME (PT) - Abnormal;  Notable for the following components:    PT 19.1 (*)     INR 1.59 (*)     All other components within normal limits   PTT, ACTIVATED - Abnormal; Notable for the following components:    PTT 36.1 (*)     All other components within normal limits   CBC W/ DIFFERENTIAL - Abnormal; Notable for the following components:    WBC 13.6 (*)     RBC 4.22 (*)     .5 (*)     MCHC 30.7 (*)     Neutrophil Absolute Prelim 11.76 (*)     Neutrophil Absolute 11.76 (*)     Lymphocyte Absolute 0.72 (*)     All other components within normal limits   TROPONIN I HIGH SENSITIVITY - Normal   CBC WITH DIFFERENTIAL WITH PLATELET    Narrative:     The following orders were created for panel order CBC With Differential With Platelet.  Procedure                               Abnormality         Status                     ---------                               -----------         ------                     CBC W/ DIFFERENTIAL[[id number]]          Abnormal            Final result                 Please view results for these tests on the individual orders.   RAINBOW DRAW LAVENDER   RAINBOW DRAW LIGHT GREEN   RAINBOW DRAW BLUE   RAPID SARS-COV-2 BY PCR     EKG    Rate, intervals and axes as noted on EKG Report.  Rate: 139  Rhythm: Sinus tachycardia with short ND.  Reading: Left bundle branch block.  Abnormal EKG.  Based on the patient's history this is consistent with atrial flutter not sinus tachycardia with RVR.  Otherwise agree with EKG report.                 Intravenous access was obtained.  Laboratory studies were drawn.  Cardizem by bolus and drip was initiated and although the patient has responded to this in the past his heart rate remained 1 30-1 40.    The patient was placed on BiPAP with 50% FiO2 per respiratory therapy because of his history of COPD and chronic oxygen use.  O2 sats were maintained in the 90s.    Initial blood pressure was 97/71.  After gentle IV fluids, blood pressure was 114/91.    XR CHEST AP PORTABLE   (CPT=71045)    Result Date: 6/29/2024  PROCEDURE:  XR CHEST AP PORTABLE  (CPT=71045)  TECHNIQUE:  AP chest radiograph was obtained.  COMPARISON:  [facility], CT, CT CHEST LD NO CAD(CPT=71250), 9/13/2023, 10:20 AM.  [facility] , XR, XR CHEST AP PORTABLE  (CPT=71045), 6/11/2024, 3:38 PM.  INDICATIONS:  CHEST PAIN  PATIENT STATED HISTORY: (As transcribed by Technologist)  Patient offered no additional history at this time.    FINDINGS:  Stable cardiomegaly.  There is an aortic valve prosthesis noted.  Pulmonary vasculature is slightly prominent and stable from prior imaging.    Stable diffuse emphysematous changes are again noted.  Stable linear regions of scarring radiating from a focus of pleural thickening and calcified pleural plaque along the mid aspect of the left lateral pleural margin, stable from prior imaging.               CONCLUSION:  Findings are unchanged relative to the previous study.  Please see further details above.   LOCATION:  [facility]      Dictated by (CST): Marielena Martines DO on 6/29/2024 at 11:32 AM     Finalized by (CST): Marielena Martines DO on 6/29/2024 at 11:38 AM       I personally reviewed the images myself and went over results with patient.    I viewed the chest x-ray films myself and there is no obvious pneumonia or pneumothorax.  No congestive heart failure noted.    Case was discussed with [facility] hospitalist Dr. Pace.   [facility] cardiology on-call Dr. Case was contacted.  Patient per his instructions was started on amiodarone 1 mg/min infusion without bolus.  Heart rate initially remained unchanged.    Test results and treatment plan were discussed with the patient and his wife prior to disposition.    A total of 35 minutes of critical care time (exclusive of billable procedures) was administered to manage the patient's unstable vital signs due to his severe hypoxia and atrial flutter with RVR.  This involved direct patient intervention, complex decision making, and/or extensive  discussions with the patient, family, and clinical staff.        MDM      #1.  Atrial flutter with RVR.  This is recurrent.  Initially treated with Cardizem and now with amiodarone.  2.  Hyperkalemia.  Modest elevation in potassium with no evidence of bradycardia arrhythmia on EKG.  3.  Hypoxia.  Likely secondary to decompensated congestive heart failure although x-ray findings may be lagging.  Once heart rate is controlled, symptoms should resolve.  Because of tenuous blood pressure, diuretics were not initiated right now.  Admission disposition: 6/29/2024 11:49 AM                                        Medical Decision Making      Disposition and Plan     Clinical Impression:  1. Atrial flutter with rapid ventricular response (HCC)    2. Hyperkalemia    3. Hypoxia    4. Chronic obstructive pulmonary disease, unspecified COPD type (HCC)    5. Supplemental oxygen dependent         Disposition:  Admit  6/29/2024 11:49 am    Follow-up:  No follow-up provider specified.        Medications Prescribed:  Current Discharge Medication List                            Hospital Problems       Present on Admission  Date Reviewed: 6/26/2024            ICD-10-CM Noted POA    * (Principal) Atrial flutter with rapid ventricular response (HCC) I48.92 Unknown Unknown

## 2024-06-29 NOTE — CONSULTS
Cardiology Consult Note       SUBJECTIVE:    Reason for Consultation: aflutter    History of Present Illness: Patient is 63 year old male with h/o atrial arrythmias s/p multiple CV most recently 6/24. Today pt noted rapid HR. Also became very SOB at rest. Pt has severe COPD.  Patient feels well. The patient has no chest pain or tightness and no PND, orthopnea, light headedness, dizziness, or syncope. Patient is tolerating medications with no reported side effects.      Prior to Admission medications    Medication Sig Start Date End Date Taking? Authorizing Provider   predniSONE 10 MG Oral Tab take 4 tablets daily for 3 days then 3 tablets daily for 3 days then 2 tablets daily for 3 days then 1 tablets daily for 3 days then stop. 6/15/24  Yes Stefano Randle MD   aspirin 81 MG Oral Tab EC Take 1 tablet (81 mg total) by mouth daily.   Yes External/Patient, Reported   Roflumilast 500 MCG Oral Tab Take 500 mcg by mouth daily. 6/25/24 6/20/25  Stefano Randle MD   guaiFENesin  MG Oral Tablet 12 Hr Take 2 tablets (1,200 mg total) by mouth 2 (two) times daily.    External/Patient, Reported   levETIRAcetam 500 MG Oral Tab Take 1 tablet (500 mg total) by mouth 2 (two) times daily. 6/3/24 7/3/24  External/Patient, Reported   KLOR-CON 10 10 MEQ Oral Tab CR Take 2 tablets (20 mEq total) by mouth daily. 6/3/24 7/3/24  External/Patient, Reported   torsemide 20 MG Oral Tab Take 1 tablet (20 mg total) by mouth 2 (two) times daily. 6/3/24 7/3/24  External/Patient, Reported   dilTIAZem  MG Oral Capsule SR 24 Hr Take 1 capsule (120 mg total) by mouth daily.    External/Patient, Reported   Ferrous Sulfate 324 (65 Fe) MG Oral Tab EC Take 65 mg by mouth daily.    External/Patient, Reported   tiotropium 18 MCG Inhalation Cap Inhale 1 capsule (18 mcg total) into the lungs daily.    External/Patient, Reported   Budesonide-Formoterol Fumarate 80-4.5 MCG/ACT Inhalation Aerosol Inhale 2 puffs into the lungs 2 (two) times daily.     External/Patient, Reported   Levalbuterol HCl 0.63 MG/3ML Inhalation Nebu Soln Take 3 mL (0.63 mg total) by nebulization every 6 (six) hours as needed for Shortness of Breath or Wheezing. 9/26/23   Stefano Randle MD   JARDIANCE 10 MG Oral Tab  3/7/23   External/Patient, Reported   atorvastatin 20 MG Oral Tab Take 1 tablet (20 mg total) by mouth nightly. 8/8/22   External/Patient, Reported   XARELTO 20 MG Oral Tab Take 1 tablet by mouth daily with food 3/10/22   Audi Larkin MD   Ipratropium Bromide 0.02 % Inhalation Solution Take 2.5 mL (500 mcg total) by nebulization every 6 (six) hours as needed for Wheezing.    External/Patient, Reported   ketoconazole 2 % External Cream Apply topically as needed.    External/Patient, Reported   Multiple Vitamin (TAB-A-CUCA) Oral Tab Take 1 tablet by mouth daily.    External/Patient, Reported   B Complex-Biotin-FA (B COMPLETE) Oral Tab Take 1 tablet by mouth daily.    External/Patient, Reported   magnesium 250 MG Oral Tab Take 1 tablet (250 mg total) by mouth every evening.    External/Patient, Reported       Allergies   Allergen Reactions    Bees ANAPHYLAXIS    Other ANAPHYLAXIS     Bee stings       Past Medical History:    Aortic stenosis    Arrhythmia    hx of a-fib    Arthritis    Cancer (HCC)    LEFT RENAL     Chronic hypoxemic respiratory failure (HCC)    On 4 LPM/NC at rest 24 hours/ day but 6 LPM/NC on exertion    COPD    Pulmonary follow up with Dr. Crum -no O2    Depression    Difficult intubation    patient states history of difficult intubation   due to body weight.States this no longer issue due to sugnificant weight loss    Esophageal reflux    Eye disease    Fatigue    Fever, unknown origin    Heart murmur    Heart palpitations    High blood pressure    High cholesterol    Loss of appetite    Other and unspecified hyperlipidemia    Paroxysmal atrial fibrillation (HCC)    Pneumonia, organism unspecified(486)    Prediabetes    Renal cell carcinoma (HCC)     s/p left nephrectomy    Shortness of breath    uses oxygen 3-4 L/NC all the time    Unspecified essential hypertension    Unspecified sleep apnea    He was unable to tolerate CPAP/BiPAP    Visual impairment    reading glasses       Past Surgical History:   Procedure Laterality Date    Adenoidectomy      Angiogram      Appendectomy      Appendectomy      Colonoscopy N/A 2016    Procedure: COLONOSCOPY;  Surgeon: Artur Okeefe MD;  Location:  ENDOSCOPY    Colonoscopy      Colonoscopy N/A 2023    Procedure: .;  Surgeon: Artur Okeefe MD;  Location:  ENDOSCOPY    Endovas repair, infrarenl abdom aortic aneurysm/dissect      Laparo radical nephrectomy Left 2014    Nephrectomy Left     Other  meatus of urethra    Other Right     foreign body removal-arm    Other surgical history Right 2016    Procedure: KNEE ARTHROSCOPY;  Surgeon: Madhu Anaya MD;  Location:  MAIN OR    Repair rotator cuff,acute Right     Upper gi endoscopy,exam      Valve repair           Family History   Problem Relation Age of Onset    Heart Disorder Mother     Pacemaker Mother     Other (Atrial fibrillation) Father     Other (Lung cancer) Maternal Aunt          Social   Social History     Socioeconomic History    Marital status:    Occupational History    Occupation:      Comment: [employer]   Tobacco Use    Smoking status: Former     Current packs/day: 0.00     Average packs/day: 1.5 packs/day for 40.0 years (60.0 ttl pk-yrs)     Types: Cigarettes     Start date: 3/8/1983     Quit date: 3/8/2023     Years since quittin.3     Passive exposure: Past    Smokeless tobacco: Never   Vaping Use    Vaping status: Never Used   Substance and Sexual Activity    Alcohol use: Not Currently     Alcohol/week: 14.0 standard drinks of alcohol     Types: 12 Cans of beer, 2 Shots of liquor per week     Comment: 14/week    Drug use: Never    Sexual activity: Yes     Partners: Female     Social  Determinants of Health     Food Insecurity: Unknown (6/29/2024)    Food Insecurity     Food Insecurity: Patient declined   Transportation Needs: Unknown (6/29/2024)    Transportation Needs     Lack of Transportation: Patient declined   Social Connections: Feeling Socially Integrated (6/18/2024)    Received from [email or web address]    OASIS : Social Isolation     Frequency of experiencing loneliness or isolation: Rarely   Housing Stability: Unknown (6/29/2024)    Housing Stability     Housing Instability: Patient declined       Review of Systems:    All systems were reviewed and are negative except as described above.      OBJECTIVE:    INPATIENT MEDICATIONS:    Scheduled Meds:    [START ON 6/30/2024] aspirin  81 mg Oral Daily    atorvastatin  20 mg Oral Nightly    fluticasone furoate-vilanterol  1 puff Inhalation Daily    guaiFENesin ER  1,200 mg Oral BID    levETIRAcetam  500 mg Oral BID    Roflumilast  500 mcg Oral Daily    umeclidinium bromide  1 puff Inhalation Daily    rivaroxaban  20 mg Oral Daily with food    insulin aspart  1-10 Units Subcutaneous TID AC and HS    furosemide  40 mg Intravenous BID (Diuretic)       Continuous Infusions:    amiodarone 1 mg/min (06/29/24 1200)    dilTIAZem           Vital signs in last 24 hours:    Patient Vitals for the past 24 hrs:   BP Temp Temp src Pulse Resp SpO2 Weight   06/29/24 1418 111/90 -- -- (!) 137 (!) 32 97 % --   06/29/24 1400 -- 98.4 °F (36.9 °C) Temporal (!) 137 22 94 % --   06/29/24 1345 -- -- -- (!) 137 25 95 % --   06/29/24 1330 -- -- -- (!) 138 (!) 35 94 % --   06/29/24 1315 (!) 112/92 -- -- (!) 136 (!) 38 93 % --   06/29/24 1215 128/83 -- -- (!) 143 (!) 49 94 % --   06/29/24 1200 126/87 -- -- -- -- -- --   06/29/24 1145 124/89 -- -- (!) 139 (!) 46 99 % --   06/29/24 1130 (!) 114/91 -- -- (!) 139 23 99 % --   06/29/24 1115 110/88 -- -- (!) 139 25 99 % --   06/29/24 1105 97/71 -- -- (!) 140 (!) 30 (!) 58 % 162 lb (73.5 kg)            Wt Readings from Last 3  Encounters:   06/29/24 162 lb (73.5 kg)   06/13/24 159 lb 6.3 oz (72.3 kg)   09/26/23 172 lb (78 kg)         Intake/Output Summary last 24 hours:  No intake or output data in the 24 hours ending 06/29/24 1440    Physical Exam:    The patient is alert and oriented x3 in no apparent distress. HEENT: There is no scleral icterus. There is no lymphadenopathy or thyromegaly.  The neck reveals no carotid bruits. Carotid pulses are normal.  There is no jugular venous distention. Oral mucosa appears normal. Lungs reveal wheezes.  Cardiovascular exam reveals normal S1 and S2.  There is no S3, S4, or murmur.  The apical impulse is not laterally displaced.  Pulse is irregularly irregular. Abdominal exam reveals normoactive bowel sounds with no tenderness or distention, there is no organomegaly.  Extremities reveal no clubbing, cyanosis, or edema. Muscular strength is normal. Peripheral pulses are 2+ bilaterally. The skin exam reveals no rashes.      Diagnostics:      Cardiac:  No results for input(s): \"CKMB\" in the last 168 hours.    Invalid input(s): \"CKTOTAL\", \"CKMBINDEX\", \"TROPONINT\"    HEM:  Recent Labs   Lab 06/29/24  1106   WBC 13.6*   HGB 13.0   .0   INR 1.59*       Chem:  Recent Labs   Lab 06/29/24  1106   *   K 5.5*   CL 89*   CO2 40.0*   BUN 22   ALT 39   AST 56*   ALB 3.0*       Coagulation:  Recent Labs   Lab 06/29/24  1106   PTT 36.1*   INR 1.59*   HCT 42.4   HGB 13.0   .0       Last Lipid Panel:  No results for input(s): \"CHOLESTEROL\", \"HDL\", \"TRIG\", \"NONHDL\", \"CHOLHDL\" in the last 168 hours.    Invalid input(s): \"LDLCA\"                                                     ASSESSMENT:    Principal Problem:    Atrial flutter with rapid ventricular response (HCC)  Active Problems:    Hypoxia    Acute on chronic respiratory failure with hypoxia and hypercapnia (HCC)    Hyponatremia    Hyperkalemia    Chronic obstructive pulmonary disease, unspecified COPD type (HCC)    Supplemental oxygen  dependent      Aflutter - s/p failed recent CV. Pt on AC. Now on CCB IV and amio  SOB - 2/2 acute dCHF and COPD exac  Acute diastolic CHF - pt on bipap and diuretics  S/p bio AVR    PLAN:    Cont diuresis  Rate control with CCB  Amio load

## 2024-06-29 NOTE — PROGRESS NOTES
Received pt from ED this afternoon. ABG done, switched to AVAPS, FiO2 @ 50%. Dyspneic with talking/movement in bed. Heart rhythm varies between Afib/flutter c RVR and ST. Diltiazem and Amio gtts infusing. NPO. Adequate UOP. Family @ bedside and updated on POC.

## 2024-06-29 NOTE — H&P
[facility]     Keny Marshall Patient Status:  Emergency    1960 MRN [id number]   Location [facility] EMERGENCY DEPARTMENT Attending Yesenia Anderson MD   Hosp Day # 0 PCP Dustin Avina MD     Chief Complaint: Palpitations/SOB    Subjective:    History of Present Illness:     Keny Marshall is a 63 year old male with a PMH of chronic hypoxic and hypercapnic respiratory failure, aflutter with prior ablation, COPD, seizure disorder, and DMII presented to ED due to palpitations that have been worsening for the past 2 days. History obtained from family member at bedside as patient currently on BiPAP. Patient has been increasingly SOB over the past few days as well. No chest pain. Mild non-productive cough. Denies fever/chills.     History/Other:    Past Medical History:  Past Medical History:    Aortic stenosis    Arrhythmia    hx of a-fib    Arthritis    Cancer (HCC)    LEFT RENAL     Chronic hypoxemic respiratory failure (HCC)    On 4 LPM/NC at rest 24 hours/ day but 6 LPM/NC on exertion    COPD    Pulmonary follow up with Dr. Crum -no O2    Depression    Difficult intubation    patient states history of difficult intubation   due to body weight.States this no longer issue due to sugnificant weight loss    Esophageal reflux    Eye disease    Fatigue    Fever, unknown origin    Heart murmur    Heart palpitations    High blood pressure    High cholesterol    Loss of appetite    Other and unspecified hyperlipidemia    Paroxysmal atrial fibrillation (HCC)    Pneumonia, organism unspecified(486)    Prediabetes    Renal cell carcinoma (HCC)    s/p left nephrectomy    Shortness of breath    uses oxygen 3-4 L/NC all the time    Unspecified essential hypertension    Unspecified sleep apnea    He was unable to tolerate CPAP/BiPAP    Visual impairment    reading glasses     Past Surgical History:   Past Surgical History:   Procedure Laterality Date    Adenoidectomy       Angiogram      Appendectomy      Appendectomy      Colonoscopy N/A 03/21/2016    Procedure: COLONOSCOPY;  Surgeon: Artur Okeefe MD;  Location:  ENDOSCOPY    Colonoscopy      Colonoscopy N/A 1/5/2023    Procedure: .;  Surgeon: Artur Okeefe MD;  Location:  ENDOSCOPY    Endovas repair, infrarenl abdom aortic aneurysm/dissect      Laparo radical nephrectomy Left 05/06/2014    Nephrectomy Left 2014    Other  meatus of urethra    Other Right     foreign body removal-arm    Other surgical history Right 03/25/2016    Procedure: KNEE ARTHROSCOPY;  Surgeon: Madhu Anaya MD;  Location:  MAIN OR    Repair rotator cuff,acute Right     Upper gi endoscopy,exam      Valve repair  2020      Family History:   Family History   Problem Relation Age of Onset    Heart Disorder Mother     Pacemaker Mother     Other (Atrial fibrillation) Father     Other (Lung cancer) Maternal Aunt      Social History:    reports that he quit smoking about 15 months ago. His smoking use included cigarettes. He started smoking about 41 years ago. He has a 60 pack-year smoking history. He has been exposed to tobacco smoke. He has never used smokeless tobacco. He reports that he does not currently use alcohol after a past usage of about 14.0 standard drinks of alcohol per week. He reports that he does not use drugs.     Allergies:   Allergies   Allergen Reactions    Bees ANAPHYLAXIS    Other ANAPHYLAXIS     Bee stings       Medications:    Current Facility-Administered Medications on File Prior to Encounter   Medication Dose Route Frequency Provider Last Rate Last Admin    [COMPLETED] methohexital (BrevITAL) 100 mg/10mL IV syringe 100 mg  100 mg Intravenous Once Keny Salazar MD   30 mg at 06/14/24 0909    [COMPLETED] dilTIAZem (cardIZEM) 25 mg/5mL injection 5 mg  5 mg Intravenous Once Audi Larkin MD   5 mg at 06/13/24 0812    [COMPLETED] methylPREDNISolone sodium succinate (Solu-MEDROL) injection 60 mg  60 mg Intravenous Once  Tomasz Lyonony, DO   60 mg at 24 0134    [COMPLETED] sodium chloride 0.9 % IV bolus 500 mL  500 mL Intravenous Once Yeh DO Morales   Stopped at 24 1609    [] dilTIAZem 5 mg BOLUS FROM BAG infusion  5 mg Intravenous Q1H PRN Morales Yeh DO   5 mg at 24 1539    [COMPLETED] dilTIAZem (cardIZEM) 100 MG injection              Current Outpatient Medications on File Prior to Encounter   Medication Sig Dispense Refill    Roflumilast 500 MCG Oral Tab Take 500 mcg by mouth daily. 90 tablet 0    guaiFENesin  MG Oral Tablet 12 Hr Take 2 tablets (1,200 mg total) by mouth 2 (two) times daily.      predniSONE 10 MG Oral Tab take 4 tablets daily for 3 days then 3 tablets daily for 3 days then 2 tablets daily for 3 days then 1 tablets daily for 3 days then stop. 30 tablet 0    levETIRAcetam 500 MG Oral Tab Take 1 tablet (500 mg total) by mouth 2 (two) times daily.      KLOR-CON 10 10 MEQ Oral Tab CR Take 2 tablets (20 mEq total) by mouth daily.      torsemide 20 MG Oral Tab Take 1 tablet (20 mg total) by mouth 2 (two) times daily.      dilTIAZem  MG Oral Capsule SR 24 Hr Take 1 capsule (120 mg total) by mouth daily.      Ferrous Sulfate 324 (65 Fe) MG Oral Tab EC Take 65 mg by mouth daily.      tiotropium 18 MCG Inhalation Cap Inhale 1 capsule (18 mcg total) into the lungs daily.      Budesonide-Formoterol Fumarate 80-4.5 MCG/ACT Inhalation Aerosol Inhale 2 puffs into the lungs 2 (two) times daily.      Levalbuterol HCl 0.63 MG/3ML Inhalation Nebu Soln Take 3 mL (0.63 mg total) by nebulization every 6 (six) hours as needed for Shortness of Breath or Wheezing. 3 each 3    JARDIANCE 10 MG Oral Tab       atorvastatin 20 MG Oral Tab Take 1 tablet (20 mg total) by mouth nightly.      XARELTO 20 MG Oral Tab Take 1 tablet by mouth daily with food 30 tablet 3    Ipratropium Bromide 0.02 % Inhalation Solution Take 2.5 mL (500 mcg total) by nebulization every 6 (six) hours as needed for  Wheezing.      ketoconazole 2 % External Cream Apply topically as needed.      Multiple Vitamin (TAB-A-CUCA) Oral Tab Take 1 tablet by mouth daily.      aspirin 81 MG Oral Tab EC Take 1 tablet (81 mg total) by mouth daily.      B Complex-Biotin-FA (B COMPLETE) Oral Tab Take 1 tablet by mouth daily.      magnesium 250 MG Oral Tab Take 1 tablet (250 mg total) by mouth every evening.         Review of Systems:   A comprehensive review of systems was completed.    Pertinent positives and negatives noted in the HPI.    Objective:   Physical Exam:    BP (!) 114/91   Pulse (!) 139   Resp 23   Wt 162 lb (73.5 kg)   SpO2 99%   BMI 23.24 kg/m²   General: No acute distress, Alert  Respiratory: Diminished bilaterally.   Cardiovascular: Rapid aflutter.   Abdomen: Soft, Non-tender, non-distended, positive bowel sounds  Neuro: No new focal deficits  Extremities: No edema    Results:    Labs:      Labs Last 24 Hours:    Recent Labs   Lab 06/29/24  1106   RBC 4.22*   HGB 13.0   HCT 42.4   .5*   MCH 30.8   MCHC 30.7*   RDW 14.0   NEPRELIM 11.76*   WBC 13.6*   .0       Recent Labs   Lab 06/29/24  1106   *   BUN 22   CREATSERUM 0.84   EGFRCR 97   CA 9.2   ALB 3.0*   *   K 5.5*   CL 89*   CO2 40.0*   ALKPHO 60   AST 56*   ALT 39   BILT 0.9   TP 7.3       Lab Results   Component Value Date    PT 16.9 (H) 05/05/2014    PT 13.3 05/04/2014    INR 1.59 (H) 06/29/2024    INR 1.06 06/11/2024    INR 2.26 (H) 09/22/2021       Recent Labs   Lab 06/29/24  1106   TROPHS 63       Recent Labs   Lab 06/29/24  1106   PBNP 8,409*       No results for input(s): \"PCT\" in the last 168 hours.    Imaging: Imaging data reviewed in Epic.    Assessment & Plan:      #Rapid aflutter  -Cardizem gtt  -Continue DOAC  -Tele  -Cardiology consulted  -Recently underwent DCCV 6/14    #Acute on chronic hypoxic and hypercapnic respiratory failure in setting of diastolic heart failure and emphysema   -? Related to #1 v COPD  exacerbation  -Wean BiPAP as able    #Acute on chronic diastolic heart failure  #Aortic stenosis s/p AVR  -Diuresis as BP tolerates   -Monitor daily weights and I/Os  -Cardiology consulted  -Recent echo reviewed     #Hyponatremia  -Suspect will improve with diuresis    #COPD  -Continue home inhalers    #H/o left basilic and cephalic vein thrombus   -Xarelto      #Seizure disorder  -Keppra     #Type 2 diabetes  -SSI     #History of renal cell carcinoma status post nephrectomy    #ABIGAIL  -CPAP protocol     Plan of care discussed with patient and ED physician.     Perfecto Pace,     Supplementary Documentation:     The 21st Century Cures Act makes medical notes like these available to patients in the interest of transparency. Please be advised this is a medical document. Medical documents are intended to carry relevant information, facts as evident, and the clinical opinion of the practitioner. The medical note is intended as peer to peer communication and may appear blunt or direct. It is written in medical language and may contain abbreviations or verbiage that are unfamiliar.

## 2024-06-29 NOTE — ED INITIAL ASSESSMENT (HPI)
PT TO THE ED WITH C/O RACING HEART AND SOB X 2 DAYS. PT STATES HE WAS ADMITTED TO THE HOSPITAL COUPLE WEEKS AGO AND HAD A CARDIOVERSION. PT NORMALLY ON 4L NC AFTER HIS OPEN HEART SURGERY X 2 YEARS AGO.

## 2024-06-30 LAB
ANION GAP SERPL CALC-SCNC: 5 MMOL/L (ref 0–18)
ARTERIAL PATENCY WRIST A: POSITIVE
ATRIAL RATE: 139 BPM
BASE EXCESS BLDA CALC-SCNC: 13.8 MMOL/L (ref ?–2)
BODY TEMPERATURE: 98.6 F
BUN BLD-MCNC: 29 MG/DL (ref 9–23)
CA-I BLD-SCNC: 1.2 MMOL/L (ref 0.95–1.32)
CALCIUM BLD-MCNC: 9.1 MG/DL (ref 8.5–10.1)
CHLORIDE SERPL-SCNC: 92 MMOL/L (ref 98–112)
CO2 SERPL-SCNC: 38 MMOL/L (ref 21–32)
COHGB MFR BLD: 3 % SAT (ref 0–3)
CREAT BLD-MCNC: 0.97 MG/DL
EGFRCR SERPLBLD CKD-EPI 2021: 87 ML/MIN/1.73M2 (ref 60–?)
EXPIRATORY PRESSURE: 5 CM H2O
FIO2: 50 %
FOLATE SERPL-MCNC: 53.5 NG/ML (ref 8.7–?)
GLUCOSE BLD-MCNC: 131 MG/DL (ref 70–99)
GLUCOSE BLD-MCNC: 136 MG/DL (ref 70–99)
GLUCOSE BLD-MCNC: 144 MG/DL (ref 70–99)
GLUCOSE BLD-MCNC: 179 MG/DL (ref 70–99)
GLUCOSE BLD-MCNC: 98 MG/DL (ref 70–99)
HCO3 BLDA-SCNC: 35.7 MEQ/L (ref 21–27)
HGB BLD-MCNC: 11.8 G/DL
IPAP MAX: 30 CM H2O
IPAP MIN: 15 CM H2O
LACTATE BLD-SCNC: 1 MMOL/L (ref 0.5–2)
METHGB MFR BLD: 0.4 % SAT (ref 0.4–1.5)
OSMOLALITY SERPL CALC.SUM OF ELEC: 288 MOSM/KG (ref 275–295)
OXYHGB MFR BLDA: 96.5 % (ref 92–100)
P-R INTERVAL: 96 MS
P/F RATIO: 210 MMHG
PCO2 BLDA: 84 MM HG (ref 35–45)
PH BLDA: 7.32 [PH] (ref 7.35–7.45)
PO2 BLDA: 105 MM HG (ref 80–100)
POTASSIUM BLD-SCNC: 4.5 MMOL/L (ref 3.6–5.1)
POTASSIUM SERPL-SCNC: 4.3 MMOL/L (ref 3.5–5.1)
Q-T INTERVAL: 352 MS
QRS DURATION: 150 MS
QTC CALCULATION (BEZET): 535 MS
R AXIS: 82 DEGREES
SODIUM BLD-SCNC: 133 MMOL/L (ref 135–145)
SODIUM SERPL-SCNC: 135 MMOL/L (ref 136–145)
T AXIS: 224 DEGREES
TIDAL VOLUME: 550 ML
VENT RATE: 20 /MIN
VENTRICULAR RATE: 139 BPM
VIT B12 SERPL-MCNC: 1496 PG/ML (ref 193–986)

## 2024-06-30 PROCEDURE — 99291 CRITICAL CARE FIRST HOUR: CPT | Performed by: INTERNAL MEDICINE

## 2024-06-30 PROCEDURE — 99232 SBSQ HOSP IP/OBS MODERATE 35: CPT | Performed by: HOSPITALIST

## 2024-06-30 RX ORDER — DIGOXIN 0.25 MG/ML
500 INJECTION INTRAMUSCULAR; INTRAVENOUS ONCE
Status: COMPLETED | OUTPATIENT
Start: 2024-06-30 | End: 2024-06-30

## 2024-06-30 NOTE — PROGRESS NOTES
Cardiology Progress Note    Keny Marshall Patient Status:  Inpatient    1960 MRN [id number]   Location [facility] 6NE-A Attending Perfecto Pace,    Hosp Day # 1 PCP Dustin Avina MD     Primary cardiologist: Dr. Salazar    Impression;  Aflutter - s/p failed recent CV. Pt on AC. Now on CCB IV and amio  SOB - 2/2 acute dCHF and COPD exac  Acute diastolic CHF - pt on bipap and diuretics  S/p bio AVR     Plan:  Cont diuresis  Continue dilt @ 5  Continue IV amio  Digoxin X 1  COPD per pulmonology    Subjective:  The patient denies any chest pain at this time.    Objective:  /83 (BP Location: Right arm)   Pulse (!) 129   Temp 98.2 °F (36.8 °C) (Temporal)   Resp 25   Wt 168 lb 3.2 oz (76.3 kg)   SpO2 95%   BMI 24.13 kg/m²   Temp (24hrs), Av.4 °F (36.9 °C), Min:98.2 °F (36.8 °C), Max:98.7 °F (37.1 °C)      Intake/Output Summary (Last 24 hours) at 2024 0832  Last data filed at 2024 0800  Gross per 24 hour   Intake 284 ml   Output --   Net 284 ml     Wt Readings from Last 3 Encounters:   24 168 lb 3.2 oz (76.3 kg)   24 159 lb 6.3 oz (72.3 kg)   23 172 lb (78 kg)       General: Awake and alert; in no acute distress  Cardiac: Tachy rate and irregular rhythm; no murmurs/rubs are appreciated  Lungs: Clear to auscultation bilaterally; no accessory muscle use  Abdomen: Soft, non-tender; bowel sounds are normoactive  Extremities: No clubbing/cyanosis/edema; moves all 4 extremities normally    Current Facility-Administered Medications   Medication Dose Route Frequency    amiodarone (Cordarone) 450 mg in dextrose 5% 250 mL infusion  1 mg/min Intravenous Continuous    aspirin DR tab 81 mg  81 mg Oral Daily    atorvastatin (Lipitor) tab 20 mg  20 mg Oral Nightly    guaiFENesin ER (Mucinex) 12 hr tab 1,200 mg  1,200 mg Oral BID    ipratropium (Atrovent) 0.02 % nebulizer solution 500 mcg  500 mcg Nebulization Q6H PRN    levETIRAcetam (Keppra) tab 500 mg  500 mg Oral BID     Roflumilast TABS 500 mcg  500 mcg Oral Daily    umeclidinium bromide (Incruse Ellipta) 62.5 MCG/ACT inhaler 1 puff  1 puff Inhalation Daily    rivaroxaban (Xarelto) tab 20 mg  20 mg Oral Daily with food    dilTIAZem (cardIZEM) 100 mg in sodium chloride 0.9% 100 mL IVPB-ADDV  2.5-20 mg/hr Intravenous Continuous    glucose (Dex4) 15 GM/59ML oral liquid 15 g  15 g Oral Q15 Min PRN    Or    glucose (Glutose) 40% oral gel 15 g  15 g Oral Q15 Min PRN    Or    glucose-vitamin C (Dex-4) chewable tab 4 tablet  4 tablet Oral Q15 Min PRN    Or    dextrose 50% injection 50 mL  50 mL Intravenous Q15 Min PRN    Or    glucose (Dex4) 15 GM/59ML oral liquid 30 g  30 g Oral Q15 Min PRN    Or    glucose (Glutose) 40% oral gel 30 g  30 g Oral Q15 Min PRN    Or    glucose-vitamin C (Dex-4) chewable tab 8 tablet  8 tablet Oral Q15 Min PRN    insulin aspart (NovoLOG) 100 Units/mL FlexPen 1-10 Units  1-10 Units Subcutaneous TID AC and HS    melatonin tab 3 mg  3 mg Oral Nightly PRN    acetaminophen (Tylenol Extra Strength) tab 500 mg  500 mg Oral Q4H PRN    polyethylene glycol (PEG 3350) (Miralax) 17 g oral packet 17 g  17 g Oral Daily PRN    sennosides (Senokot) tab 17.2 mg  17.2 mg Oral Nightly PRN    bisacodyl (Dulcolax) 10 MG rectal suppository 10 mg  10 mg Rectal Daily PRN    fleet enema (Fleet) 7-19 GM/118ML rectal enema 133 mL  1 enema Rectal Once PRN    prochlorperazine (Compazine) 10 MG/2ML injection 5 mg  5 mg Intravenous Q8H PRN    furosemide (Lasix) 10 mg/mL injection 40 mg  40 mg Intravenous BID (Diuretic)    ipratropium-albuterol (Duoneb) 0.5-2.5 (3) MG/3ML inhalation solution 3 mL  3 mL Nebulization Q6H WA    fluticasone furoate-vilanterol (Breo Ellipta) 200-25 MCG/ACT inhaler 1 puff  1 puff Inhalation Daily       Laboratory Data:  Lab Results   Component Value Date    WBC 13.6 06/29/2024    HGB 13.0 06/29/2024    HCT 42.4 06/29/2024    .0 06/29/2024     Lab Results   Component Value Date    INR 1.59 (H) 06/29/2024     INR 1.06 06/11/2024    INR 2.26 (H) 09/22/2021     Lab Results   Component Value Date     06/30/2024    K 4.3 06/30/2024    CL 92 06/30/2024    CO2 38.0 06/30/2024    BUN 29 06/30/2024    CREATSERUM 0.97 06/30/2024     06/30/2024    CA 9.1 06/30/2024       Telemetry: [facility]      Thank you for allowing our practice to participate in the care of your patient. Please do not hesitate to contact me if you have any questions.    Stanislaw Case MD, FACC

## 2024-06-30 NOTE — PLAN OF CARE
Assumed patient care at 0730. Patient awake, alert and oriented x4. VSS; SBP goal >90 & HR <120. Medications administered per MAR. AVAPS on for naps and overnight, per pulmonology.     Patient aware of plan of care and endorses understanding. All questions answered.

## 2024-06-30 NOTE — PROGRESS NOTES
[facility]   part of [facility]     Hospitalist Progress Note     Keny Marshall Patient Status:  Inpatient    1960 MRN [id number]   Location [facility] 6NE-A Attending Perfecto Pace,    Hosp Day # 1 PCP Dustin Avina MD     Chief Complaint: SOB    Subjective:     Patient seen and examined. Breathing better. Denies palpitations.     Objective:    Review of Systems:   A comprehensive review of systems was completed; pertinent positive and negatives stated in subjective.    Vital signs:  Temp:  [98.1 °F (36.7 °C)-98.7 °F (37.1 °C)] 98.1 °F (36.7 °C)  Pulse:  [] 105  Resp:  [18-41] 25  BP: (102-146)/() 135/85  SpO2:  [90 %-100 %] 90 %  FiO2 (%):  [50 %] 50 %    Physical Exam:    General: No acute distress  Respiratory: Diminished bilaterally.   Cardiovascular: Irregular rhythm, tachy.   Abdomen: Soft, Non-tender, non-distended, positive bowel sounds  Neuro: No new focal deficits.   Extremities: No edema    Diagnostic Data:    Labs:  Recent Labs   Lab 24  1106   WBC 13.6*   HGB 13.0   .5*   .0   INR 1.59*       Recent Labs   Lab 24  1106 24  0522   * 131*   BUN 22 29*   CREATSERUM 0.84 0.97   CA 9.2 9.1   ALB 3.0*  --    * 135*   K 5.5* 4.3   CL 89* 92*   CO2 40.0* 38.0*   ALKPHO 60  --    AST 56*  --    ALT 39  --    BILT 0.9  --    TP 7.3  --        Estimated Creatinine Clearance: 79.4 mL/min (based on SCr of 0.97 mg/dL).    Recent Labs   Lab 24  1106   TROPHS 63       Recent Labs   Lab 24  1106   PTP 19.1*   INR 1.59*                  Microbiology    No results found for this visit on 24.      Imaging: Reviewed in Epic.    Medications:    aspirin  81 mg Oral Daily    atorvastatin  20 mg Oral Nightly    guaiFENesin ER  1,200 mg Oral BID    levETIRAcetam  500 mg Oral BID    Roflumilast  500 mcg Oral Daily    umeclidinium bromide  1 puff Inhalation Daily    rivaroxaban  20 mg Oral Daily with food    insulin aspart  1-10  Units Subcutaneous TID AC and HS    furosemide  40 mg Intravenous BID (Diuretic)    ipratropium-albuterol  3 mL Nebulization Q6H WA    fluticasone furoate-vilanterol  1 puff Inhalation Daily       Assessment & Plan:      #Rapid aflutter  -Cardizem and amio gtt   -Continue DOAC  -Tele  -Cardiology following  -Recently underwent DCCV 6/14     #Acute on chronic hypoxic and hypercapnic respiratory failure in setting of diastolic heart failure and emphysema   -? Related to #1 v COPD exacerbation  -Off BiPAP  -Wean oxygen as able   -Pulmonary following      #Acute on chronic diastolic heart failure  #Aortic stenosis s/p AVR  -Diuresis as BP tolerates   -Monitor daily weights and I/Os  -Cardiology following  -Recent echo reviewed      #Hyponatremia  -Improved      #COPD  -Continue home inhalers     #H/o left basilic and cephalic vein thrombus   -Xarelto      #Seizure disorder  -Keppra     #Type 2 diabetes  -SSI     #History of renal cell carcinoma status post nephrectomy     #ABIGAIL  -CPAP protocol     Perfecto Pace DO    Supplementary Documentation:     Quality:  DVT Mechanical Prophylaxis:     Early ambuation  DVT Pharmacologic Prophylaxis   Medication    rivaroxaban (Xarelto) tab 20 mg         DVT Pharmacologic prophylaxis: Aspirin 81 mg      Code Status: Full Code  Peters: No urinary catheter in place  Peters Duration (in days):   Central line:    ARUN:     Discharge is dependent on: clinical progress  At this point Mr. Marshall is expected to be discharge to: home    The 21st Century Cures Act makes medical notes like these available to patients in the interest of transparency. Please be advised this is a medical document. Medical documents are intended to carry relevant information, facts as evident, and the clinical opinion of the practitioner. The medical note is intended as peer to peer communication and may appear blunt or direct. It is written in medical language and may contain abbreviations or verbiage that are  unfamiliar.             **Certification      PHYSICIAN Certification of Need for Inpatient Hospitalization - Initial Certification    Patient will require inpatient services that will reasonably be expected to span two midnight's based on the clinical documentation in H+P.   Based on patients current state of illness, I anticipate that, after discharge, patient will require TBD.

## 2024-06-30 NOTE — PROGRESS NOTES
[facility] Pulmonary Progress Note    Keny Marshall Patient Status:  Inpatient    1960 MRN [id number]   Location [facility] 6NE-A Attending Perfecto Pace,    Hosp Day # 1 PCP Dustin Avina MD     Subjective:  Keny Marshall is a(n) 63 year old male who is admitted for a flutter and respiratory failure.    Overnight: did well on AVAPS, anxious to get mask taken off    Objective:  /83 (BP Location: Right arm)   Pulse (!) 129   Temp 98.2 °F (36.8 °C) (Temporal)   Resp 25   Wt 168 lb 3.2 oz (76.3 kg)   SpO2 95%   BMI 24.13 kg/m²       Temp (24hrs), Av.4 °F (36.9 °C), Min:98.2 °F (36.8 °C), Max:98.7 °F (37.1 °C)      Intake/Output:    Intake/Output Summary (Last 24 hours) at 2024 0851  Last data filed at 2024 0800  Gross per 24 hour   Intake 284 ml   Output --   Net 284 ml       Physical Exam:   General: alert, cooperative, oriented.  No respiratory distress.   Head: Normocephalic, without obvious abnormality, atraumatic.   Throat: Lips, mucosa, and tongue normal.  No thrush noted.   Neck: trachea midline, no adenopathy, no thyromegaly. No JVD.   Lungs: clear to auscultation bilaterally   Chest wall: No tenderness or deformity.   Heart: regular rate and rhythm, S1, S2 normal, no murmur, click, rub or gallop   Abdomen: soft, non-distended, no masses, no guarding, no     Rebound.   Extremity: No edema or cyanosis   Skin: No rashes or lesions.   Neurological: Alert, interactive, no focal deficits    Lab Data Review:  Recent Labs     24  1106   WBC 13.6*   HGB 13.0   .0     Recent Labs     24  1106 24  0522   * 135*   K 5.5* 4.3   CL 89* 92*   CO2 40.0* 38.0*   BUN 22 29*   CREATSERUM 0.84 0.97     Recent Labs   Lab 24  1106   PTP 19.1*   INR 1.59*   PTT 36.1*       Cultures:   No results found for this visit on 24.    Radiology:  XR CHEST AP PORTABLE  (CPT=71045)  Narrative: PROCEDURE:  XR CHEST AP PORTABLE  (CPT=71045)     TECHNIQUE:  AP chest  radiograph was obtained.     COMPARISON:  [facility], CT, CT CHEST LD NO CAD(CPT=71250), 9/13/2023, 10:20 AM.  [facility] , XR, XR CHEST AP PORTABLE  (CPT=71045), 6/11/2024, 3:38 PM.     INDICATIONS:  CHEST PAIN     PATIENT STATED HISTORY: (As transcribed by Technologist)  Patient offered no additional history at this time.          FINDINGS:  Stable cardiomegaly.  There is an aortic valve prosthesis noted.  Pulmonary vasculature is slightly prominent and stable from prior imaging.       Stable diffuse emphysematous changes are again noted.  Stable linear regions of scarring radiating from a focus of pleural thickening and calcified pleural plaque along the mid aspect of the left lateral pleural margin, stable from prior imaging.                           Impression: CONCLUSION:    Findings are unchanged relative to the previous study.  Please see further details above.        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 (Formerly [facility])    Medial meniscus tear, right, initial encounter    Primary osteoarthritis of right knee              GLOBAL EXP 6-25-16 / RIGHT KNEE / TRK     Acute medial meniscus tear of right knee            GLOBAL EXP 6-25-16 / RIGHT KNEE / TRK     Acute lateral meniscus tear of left knee               GLOBAL EXP 6-25-16 / LEFT KNEE / TRK     Bursitis of left shoulder             GLOBAL EXP 6-25-16 / LEFT SHOULDER / TRK     Moderate aortic stenosis    Stage 3 severe COPD by GOLD classification (Formerly [facility])    Hyperglycemia    Hypervolemia    Hypervolemia, unspecified hypervolemia type    Dyspnea, unspecified type    Hypoxemia     Atrial fibrillation with RVR (HCC)    Acute heart failure with preserved ejection fraction (HFpEF) (HCC)    Atrial flutter with rapid ventricular response (HCC)    Acute on chronic congestive heart failure (HCC)    Acute on chronic congestive heart failure, unspecified heart failure type (HCC)    Hypoxia    Atrial fibrillation with rapid ventricular response (HCC)    Pleural effusion    Hypokalemia    Acute respiratory failure with hypoxia (HCC)    Normocytic anemia    Chronic heart failure with preserved ejection fraction (HCC)    Leukocytosis (leucocytosis)    Aortic valve regurgitation    HFrEF (heart failure with reduced ejection fraction) (HCC)    Chronic hypoxemic respiratory failure (HCC)    ABIGAIL (obstructive sleep apnea)    COPD exacerbation (HCC)    Acute on chronic respiratory failure with hypoxia (HCC)    Acute on chronic respiratory failure with hypoxia and hypercapnia (HCC)    Chronic hypercapnic respiratory failure (HCC)    Smoking greater than 20 pack years    Hyponatremia    Hyperkalemia    Chronic obstructive pulmonary disease, unspecified COPD type (HCC)    Supplemental oxygen dependent       Assessment:  Acute on chronic hypoxic hypercapnic respiratory failure, slowly improving  Atrial flutter with RVR status post treatment with Cardizem and amiodarone, cardiology is following  Acute on chronic heart failure with preserved ejection fraction  COPD with chronic hypercapnia  Aortic stenosis status post aortic valve replacement  Hyponatremia, likely hypervolemic          Plan:  Close monitoring of oxygenation status and respiratory status  Wean off AVAPS while awake, will need for naps and while asleep   I am ok with home machine as long as remains stable  Continue home inhalers and nebulizers  Scheduled DuoNebs  Would hold off on steroid therapy, I do not think this is COPD exacerbation  Management of atrial flutter per cardiology  On home Xarelto  Should stay in ICU today       Danya Rider  MD [facility]  CCM time: 38 minutes. The patient is critically ill and at high risk for clinical deterioration.      Danya Whaley MD  [facility]  Office: [phone]

## 2024-06-30 NOTE — PLAN OF CARE
Assumed care of patient at 1930.      Received patient on AVAPS.    Point of concern for patient is AVAPS setting and mask.  Patient states he would rather wear is own mask and or use his own AVAPS machine, per day shift RN pulmonologist was originally agreeable to this but after blood gas results it was determined that patient would not be able to wear home AVAPS.  Patient states that his setting and mask do not push so much air into his face and is more comfortable.  Patient educated by RN and respiratory therapist during shift regarding seriousness of blood gas results and setting for AVAPS.  Patient agreeable to wear hospital provided AVAPS equipment but states that as soon as possible would like to use his equipment.    Code status discussed with patient.  Patient verbalizes that he is a full code and is agreeable to intubation.  Patient states he was told one time that is a difficult intubation.  Night shift charge anesthesia provider notified patient condition and history of difficult intubation.  No orders given.      Wife at bedside at start of shift.  Wife and patient updated on plan of care, all questions answered and patient and wife verbalized understanding.

## 2024-07-01 ENCOUNTER — APPOINTMENT (OUTPATIENT)
Facility: HOSPITAL | Age: 64
End: 2024-07-01
Attending: HOSPITALIST
Payer: MEDICARE

## 2024-07-01 ENCOUNTER — APPOINTMENT (OUTPATIENT)
Dept: ULTRASOUND IMAGING | Facility: HOSPITAL | Age: 64
End: 2024-07-01
Attending: HOSPITALIST
Payer: MEDICARE

## 2024-07-01 ENCOUNTER — APPOINTMENT (OUTPATIENT)
Dept: CT IMAGING | Facility: HOSPITAL | Age: 64
End: 2024-07-01
Attending: HOSPITALIST
Payer: MEDICARE

## 2024-07-01 LAB
ANION GAP SERPL CALC-SCNC: 4 MMOL/L (ref 0–18)
ARTERIAL PATENCY WRIST A: POSITIVE
BASE EXCESS BLDA CALC-SCNC: 19.7 MMOL/L (ref ?–2)
BASOPHILS # BLD AUTO: 0.02 X10(3) UL (ref 0–0.2)
BASOPHILS NFR BLD AUTO: 0.2 %
BODY TEMPERATURE: 98.6 F
BUN BLD-MCNC: 18 MG/DL (ref 9–23)
CALCIUM BLD-MCNC: 8.9 MG/DL (ref 8.5–10.1)
CHLORIDE SERPL-SCNC: 90 MMOL/L (ref 98–112)
CO2 SERPL-SCNC: 41 MMOL/L (ref 21–32)
COHGB MFR BLD: 3.2 % SAT (ref 0–3)
CREAT BLD-MCNC: 0.46 MG/DL
EGFRCR SERPLBLD CKD-EPI 2021: 117 ML/MIN/1.73M2 (ref 60–?)
EOSINOPHIL # BLD AUTO: 0.11 X10(3) UL (ref 0–0.7)
EOSINOPHIL NFR BLD AUTO: 1.1 %
ERYTHROCYTE [DISTWIDTH] IN BLOOD BY AUTOMATED COUNT: 13.8 %
GLUCOSE BLD-MCNC: 108 MG/DL (ref 70–99)
GLUCOSE BLD-MCNC: 111 MG/DL (ref 70–99)
GLUCOSE BLD-MCNC: 121 MG/DL (ref 70–99)
GLUCOSE BLD-MCNC: 123 MG/DL (ref 70–99)
GLUCOSE BLD-MCNC: 129 MG/DL (ref 70–99)
HCO3 BLDA-SCNC: 40.3 MEQ/L (ref 21–27)
HCT VFR BLD AUTO: 36.9 %
HGB BLD-MCNC: 11.4 G/DL
HGB BLD-MCNC: 11.5 G/DL
IMM GRANULOCYTES # BLD AUTO: 0.05 X10(3) UL (ref 0–1)
IMM GRANULOCYTES NFR BLD: 0.5 %
L/M: 12 L/MIN
LYMPHOCYTES # BLD AUTO: 0.58 X10(3) UL (ref 1–4)
LYMPHOCYTES NFR BLD AUTO: 5.7 %
MCH RBC QN AUTO: 31 PG (ref 26–34)
MCHC RBC AUTO-ENTMCNC: 31.2 G/DL (ref 31–37)
MCV RBC AUTO: 99.5 FL
METHGB MFR BLD: 0.5 % SAT (ref 0.4–1.5)
MONOCYTES # BLD AUTO: 0.69 X10(3) UL (ref 0.1–1)
MONOCYTES NFR BLD AUTO: 6.8 %
NEUTROPHILS # BLD AUTO: 8.67 X10 (3) UL (ref 1.5–7.7)
NEUTROPHILS # BLD AUTO: 8.67 X10(3) UL (ref 1.5–7.7)
NEUTROPHILS NFR BLD AUTO: 85.7 %
OSMOLALITY SERPL CALC.SUM OF ELEC: 283 MOSM/KG (ref 275–295)
OXYHGB MFR BLDA: 95.1 % (ref 92–100)
PCO2 BLDA: 78 MM HG (ref 35–45)
PH BLDA: 7.4 [PH] (ref 7.35–7.45)
PLATELET # BLD AUTO: 160 10(3)UL (ref 150–450)
PO2 BLDA: 76 MM HG (ref 80–100)
POTASSIUM SERPL-SCNC: 3.5 MMOL/L (ref 3.5–5.1)
POTASSIUM SERPL-SCNC: 4.4 MMOL/L (ref 3.5–5.1)
RBC # BLD AUTO: 3.71 X10(6)UL
SODIUM SERPL-SCNC: 135 MMOL/L (ref 136–145)
WBC # BLD AUTO: 10.1 X10(3) UL (ref 4–11)

## 2024-07-01 PROCEDURE — 71260 CT THORAX DX C+: CPT | Performed by: HOSPITALIST

## 2024-07-01 PROCEDURE — 99232 SBSQ HOSP IP/OBS MODERATE 35: CPT | Performed by: HOSPITALIST

## 2024-07-01 PROCEDURE — 93970 EXTREMITY STUDY: CPT | Performed by: HOSPITALIST

## 2024-07-01 PROCEDURE — 5A0935A ASSISTANCE WITH RESPIRATORY VENTILATION, LESS THAN 24 CONSECUTIVE HOURS, HIGH NASAL FLOW/VELOCITY: ICD-10-PCS | Performed by: HOSPITALIST

## 2024-07-01 PROCEDURE — 99291 CRITICAL CARE FIRST HOUR: CPT | Performed by: INTERNAL MEDICINE

## 2024-07-01 RX ORDER — SODIUM CHLORIDE FOR INHALATION 3 %
3 VIAL, NEBULIZER (ML) INHALATION
Status: DISCONTINUED | OUTPATIENT
Start: 2024-07-01 | End: 2024-07-07

## 2024-07-01 RX ORDER — FUROSEMIDE 10 MG/ML
40 INJECTION INTRAMUSCULAR; INTRAVENOUS 3 TIMES DAILY
Status: DISCONTINUED | OUTPATIENT
Start: 2024-07-01 | End: 2024-07-06

## 2024-07-01 RX ORDER — POTASSIUM CHLORIDE 20 MEQ/1
40 TABLET, EXTENDED RELEASE ORAL EVERY 4 HOURS
Status: COMPLETED | OUTPATIENT
Start: 2024-07-01 | End: 2024-07-01

## 2024-07-01 NOTE — OCCUPATIONAL THERAPY NOTE
Received order for OT evaluation.  Patient on AVAPS, per RN, desaturating when off of AVAPS. Will continue to follow.

## 2024-07-01 NOTE — PLAN OF CARE
Assumed care at 1930.   A&Ox4, 5L NC/AVAPS when asleep, A fib/futter on tele  Prn tylenol given for pain  Voiding per urinal  Amio and cardizem infusing per order; HR <120 and SBP > 90    Call light within reach    Patient updated on plan of care         Problem: RESPIRATORY - ADULT  Goal: Achieves optimal ventilation and oxygenation  Description: INTERVENTIONS:  - Assess for changes in respiratory status  - Assess for changes in mentation and behavior  - Position to facilitate oxygenation and minimize respiratory effort  - Oxygen supplementation based on oxygen saturation or ABGs  - Provide Smoking Cessation handout, if applicable  - Encourage broncho-pulmonary hygiene including cough, deep breathe, Incentive Spirometry  - Assess the need for suctioning and perform as needed  - Assess and instruct to report SOB or any respiratory difficulty  - Respiratory Therapy support as indicated  - Manage/alleviate anxiety  - Monitor for signs/symptoms of CO2 retention  Outcome: Progressing

## 2024-07-01 NOTE — PROGRESS NOTES
[facility] Progress Note     SUBJECTIVE/Interval history:  Desaturation on exertion this am, returned to AVAPS. He feels 'good' now, denies dyspnea at rest. No cough or pain. No fevers    Review of Systems:   A comprehensive 14 point review of systems was completed.   Pertinent positives and negatives noted in the HPI.    Medications  Reviewed personally   aspirin  81 mg Oral Daily    atorvastatin  20 mg Oral Nightly    guaiFENesin ER  1,200 mg Oral BID    levETIRAcetam  500 mg Oral BID    Roflumilast  500 mcg Oral Daily    umeclidinium bromide  1 puff Inhalation Daily    rivaroxaban  20 mg Oral Daily with food    insulin aspart  1-10 Units Subcutaneous TID AC and HS    furosemide  40 mg Intravenous BID (Diuretic)    ipratropium-albuterol  3 mL Nebulization Q6H WA    fluticasone furoate-vilanterol  1 puff Inhalation Daily       ipratropium    glucose **OR** glucose **OR** glucose-vitamin C **OR** dextrose **OR** glucose **OR** glucose **OR** glucose-vitamin C    melatonin    acetaminophen    polyethylene glycol (PEG 3350)    sennosides    bisacodyl    fleet enema    prochlorperazine    OBJECTIVE:  Vitals:    07/01/24 0513 07/01/24 0600 07/01/24 0608 07/01/24 0700   BP:  127/88  146/86   BP Location:       Pulse:  116  99   Resp:  26  14   Temp:       TempSrc:       SpO2: 90% 91%  95%   Weight:   161 lb 9.6 oz (73.3 kg)        Vital signs in last 24 hours:  Blood pressure 146/86, pulse 99, temperature 97.3 °F (36.3 °C), temperature source Temporal, resp. rate 14, weight 161 lb 9.6 oz (73.3 kg), SpO2 95%.     Intake/Output:  I/O last 3 completed shifts:  In: 1736.5 [P.O.:720; I.V.:1016.5]  Out: 2095 [Urine:2095]       Physical Exam:  General: Appears alert, comfortable at rest while on AVAPS. No distress  Neurologic:No focal deficits.  Alert.  Oriented.  Lungs:diminished bs bilaterally. No wheeze heard. No distress on AVAPS  Heart:irreg RR. No m  heard  Abdomen:Soft, non-tender.  No masses.  No guarding.  No rebound.  Extremities:edema    Lab Data Review:   Recent Labs   Lab 06/29/24  1106 06/30/24  0522   * 131*   BUN 22 29*   CREATSERUM 0.84 0.97   CA 9.2 9.1   * 135*   K 5.5* 4.3   CL 89* 92*   CO2 40.0* 38.0*     Recent Labs   Lab 06/29/24  1106   RBC 4.22*   HGB 13.0   HCT 42.4   .5*   MCH 30.8   MCHC 30.7*   RDW 14.0   NEPRELIM 11.76*   WBC 13.6*   .0     No results for input(s): \"BNP\" in the last 168 hours.  No results for input(s): \"TROP\", \"CK\" in the last 168 hours.  Recent Labs   Lab 06/29/24  1106   INR 1.59*   PTT 36.1*     Recent Labs   Lab 06/30/24  0713   ABGPHT 7.32*   VOQWOC3H 84*   SRLHU2G 105*   ABGHCO3 35.7*   ABGBE 13.8*   TEMP 98.6   ROMARIO Positive   SITE Right Radial   DEV Bi-PAP   THGB 11.8*       Other Labs:  Interval Culture Data:   No results found for this visit on 06/29/24.  No results for input(s): \"COLORUR\", \"CLARITY\", \"SPECGRAVITY\", \"GLUUR\", \"BILUR\", \"KETUR\", \"BLOODURINE\", \"PHURINE\", \"PROUR\", \"UROBILINOGEN\", \"NITRITE\", \"LEUUR\", \"WBCUR\", \"RBCUR\", \"BACUR\", \"EPIUR\" in the last 168 hours.    Interval Radiology:   Reviewed personally  XR CHEST AP PORTABLE  (CPT=71045)    Result Date: 6/29/2024  CONCLUSION:  Findings are unchanged relative to the previous study.  Please see further details above.   LOCATION:  [facility]      Dictated by (CST): Marielena Martines DO on 6/29/2024 at 11:32 AM     Finalized by (CST): Marielena Martines DO on 6/29/2024 at 11:38 AM        Assessment/Plan:  #1. Acute on chronic hypercapnic and hypoxic respiratory failure  Multifactorial due to afib with RVR, CHF exacerbation with underlying end stage COPD  Treat/control afib and CHF as per cardiology  Continue nebs, steroids as below  Wean o2 for goal saturations of 88-92% given hypercapnia. His previous o2 baseline was: 2L at rest, 4L on exertion; using and benefiting from portable oxygen concentrator (3 at rest, 6 on exertion)  Continue on  AVAPS with naps/sleep and PRN    #2. Acute CHF exacerbation, afib with RVR  Rate control and diurese as per cardiology  Previous cardioversion 6/14     #3. COPD  Severe obstruction on previous PFTs 3/2021  off arformoterol given worsening afib/tachycardia  Continue Breo 200 in place of his home inhalers (home regimen: symbicort 160 BID, spiriva daily)  Resume his home neb regimen: continue scheduled ipratropium, hypertonic saline and budesonide; apparently hospital does not stock levalbuterol  He cannot tolerate albuterol due to palpitations, tremors and with afib with RVR; off duonebs  Continue roflumilast  Previously reviewed BLVR - not candidate at this time, reassess as outpatient     #4. Pulmonary nodules  7/2017 CT with worsening peripheral lingular atelectasis. Nodules stable   5/2018 CT stable nodules  12/2020 CT stable  7/2021 CT stable but worsening mediastinal LAD suspect reactive to recent cardiac surgery  1/2022 CT with stable nodules, improved LAD  3/2023 CT chest with several small nodules  9/2023 CT chest with stable findings   Nodules have been stable for over two years so no need to follow nodules in particular, however he wishes to proceed with LDCT (see below) so will follow on repeat imaging     #5. Tobacco abuse  30+ pack years, active pipe smoker  Next due for LDCT in 9/2024       Stefano Randle MD  CCM time: 35 minutes. The patient is critically ill and at high risk for clinical deterioration.

## 2024-07-01 NOTE — DIETARY NOTE
[facility]   part of [facility]   CLINICAL NUTRITION    Keny Marshall     Admitting diagnosis:  Hyperkalemia [E87.5]  Hypoxia [R09.02]  Supplemental oxygen dependent [Z99.81]  Atrial flutter with rapid ventricular response (HCC) [I48.92]  Chronic obstructive pulmonary disease, unspecified COPD type (HCC) [J44.9]    Ht:  5'10\"  Wt: 73.3 kg (161 lb 9.6 oz).   Body mass index is 23.19 kg/m².  IBW: 75.5 kg    Wt Readings from Last 6 Encounters:   07/01/24 73.3 kg (161 lb 9.6 oz)   06/13/24 72.3 kg (159 lb 6.3 oz)   09/26/23 78 kg (172 lb)   04/24/23 76.2 kg (168 lb)   03/22/23 79.8 kg (176 lb)   01/05/23 88.9 kg (196 lb)        Labs/Meds reviewed    Diet:       Procedures    Cardiac diet Cardiac; Sodium Restriction: 2 GM NA; Fluid Restriction: 2000 ml; Fluid Consistency: Thin Liquids ; Texture Consistency: Regular; Is Patient on Accuchecks? Yes; Is Patient on Suicide Precautions? No     Percent Meals Eaten (last 3 days)       Date/Time Percent Meals Eaten (%)    06/30/24 0800 100 %    06/30/24 2000 0 %    07/01/24 0800 0 %    07/01/24 1200 100 %          Pt chart reviewed d/t HF.  Patient reports good appetite at this time.  Nursing notes reports Percent Meals Eaten (%): 100 % intake for last meal.  Tolerating po diet without diarrhea, emesis, or constipation.   No significant weight changes noted.     PMH includes HTN, GERD, RCC with Nephrectomy, COPD, PreDM.  Pt p/w hypoerkalemia, Afib with RVR.  PT seen for HF Ed Consult.  Pt lying in bed, just finished breakfast. (Yogurt parfait, fruit, OJ).  Pt states his appetite is fair, usually eats 2x per day.  Pt denies any significant wt loss.  States he understands his diet and tries to follow Low NA at home. Wife does cooking and he reports \"She does her best\".  He has no questions re: diet at this time.    Patient is at low nutrition risk at this time.    Please consult if patient status changes or nutrition issues arise.    Rossi Diallo RD, LDN, CNSC  Clinical  Dietitian  Phone [phone]

## 2024-07-01 NOTE — PROGRESS NOTES
Cardiology Progress Note    Keny Marshall Patient Status:  Inpatient    1960 MRN [id number]   Location [facility] 6NE-A Attending Perfecto Pace,    Hosp Day # 2 PCP Dustin Avina MD     Primary cardiologist: Dr. Salazar    Impression;  Aflutter - s/p failed recent CV. Pt on AC. Now on CCB IV and amio  SOB - 2/2 acute dCHF and COPD exac  Acute diastolic CHF - pt on bipap and diuretics  S/p bio AVR  TTE (24): LVEF 55%, +DD, 23mm SAVR 3.3m/s, mean 22mmHg.     Plan:  diuresis:  limited by I/Os, pt states he's urinating a lot? Wt unchanged.  Cr stable.  Will increase lasix 40mg IV BID--> TID.  Consider metolazone if output not brisk.    rate control: diltiazem gtt.  I have my concerns re long-term amiodarone in setting of severe COPD. Titrate CCB, while holding amiodarone.    COPD, L lung mass- per pulmonology    Subjective:  No acute issues overnight. HR 90-100s, remains AF. dyspnea- about the same, but O2 requirement uptrending.      Objective:  /68 (BP Location: Right arm)   Pulse 99   Temp 98.6 °F (37 °C) (Temporal)   Resp 24   Wt 161 lb 9.6 oz (73.3 kg)   SpO2 96%   BMI 23.19 kg/m²   Temp (24hrs), Av.1 °F (36.7 °C), Min:97.3 °F (36.3 °C), Max:98.8 °F (37.1 °C)      Intake/Output Summary (Last 24 hours) at 2024 1335  Last data filed at 2024 1200  Gross per 24 hour   Intake 2336.5 ml   Output 2645 ml   Net -308.5 ml     Wt Readings from Last 3 Encounters:   24 161 lb 9.6 oz (73.3 kg)   24 159 lb 6.3 oz (72.3 kg)   23 172 lb (78 kg)       General: Awake and alert; in no acute distress  Cardiac:  irregular rhythm; no murmurs/rubs are appreciated  Lungs: Clear to auscultation bilaterally; no accessory muscle use  Abdomen: Soft, non-tender; bowel sounds are normoactive  Extremities: No clubbing/cyanosis/edema; moves all 4 extremities normally    Current Facility-Administered Medications   Medication Dose Route Frequency    ipratropium (Atrovent) 0.02 %  nebulizer solution 0.5 mg  0.5 mg Nebulization QID    sodium chloride (hypertonic) 3 % nebulizer solution 3 mL  3 mL Nebulization 2 times daily    potassium chloride (Klor-Con M20) tab 40 mEq  40 mEq Oral Q4H    amiodarone (Cordarone) 450 mg in dextrose 5% 250 mL infusion  0.5 mg/min Intravenous Continuous    aspirin DR tab 81 mg  81 mg Oral Daily    atorvastatin (Lipitor) tab 20 mg  20 mg Oral Nightly    guaiFENesin ER (Mucinex) 12 hr tab 1,200 mg  1,200 mg Oral BID    ipratropium (Atrovent) 0.02 % nebulizer solution 500 mcg  500 mcg Nebulization Q6H PRN    levETIRAcetam (Keppra) tab 500 mg  500 mg Oral BID    Roflumilast TABS 500 mcg  500 mcg Oral Daily    rivaroxaban (Xarelto) tab 20 mg  20 mg Oral Daily with food    dilTIAZem (cardIZEM) 100 mg in sodium chloride 0.9% 100 mL IVPB-ADDV  2.5-20 mg/hr Intravenous Continuous    glucose (Dex4) 15 GM/59ML oral liquid 15 g  15 g Oral Q15 Min PRN    Or    glucose (Glutose) 40% oral gel 15 g  15 g Oral Q15 Min PRN    Or    glucose-vitamin C (Dex-4) chewable tab 4 tablet  4 tablet Oral Q15 Min PRN    Or    dextrose 50% injection 50 mL  50 mL Intravenous Q15 Min PRN    Or    glucose (Dex4) 15 GM/59ML oral liquid 30 g  30 g Oral Q15 Min PRN    Or    glucose (Glutose) 40% oral gel 30 g  30 g Oral Q15 Min PRN    Or    glucose-vitamin C (Dex-4) chewable tab 8 tablet  8 tablet Oral Q15 Min PRN    insulin aspart (NovoLOG) 100 Units/mL FlexPen 1-10 Units  1-10 Units Subcutaneous TID AC and HS    melatonin tab 3 mg  3 mg Oral Nightly PRN    acetaminophen (Tylenol Extra Strength) tab 500 mg  500 mg Oral Q4H PRN    polyethylene glycol (PEG 3350) (Miralax) 17 g oral packet 17 g  17 g Oral Daily PRN    sennosides (Senokot) tab 17.2 mg  17.2 mg Oral Nightly PRN    bisacodyl (Dulcolax) 10 MG rectal suppository 10 mg  10 mg Rectal Daily PRN    fleet enema (Fleet) 7-19 GM/118ML rectal enema 133 mL  1 enema Rectal Once PRN    prochlorperazine (Compazine) 10 MG/2ML injection 5 mg  5 mg  Intravenous Q8H PRN    furosemide (Lasix) 10 mg/mL injection 40 mg  40 mg Intravenous BID (Diuretic)    fluticasone furoate-vilanterol (Breo Ellipta) 200-25 MCG/ACT inhaler 1 puff  1 puff Inhalation Daily       Laboratory Data:  Lab Results   Component Value Date    WBC 10.1 07/01/2024    HGB 11.5 07/01/2024    HCT 36.9 07/01/2024    .0 07/01/2024     Lab Results   Component Value Date    INR 1.59 (H) 06/29/2024    INR 1.06 06/11/2024    INR 2.26 (H) 09/22/2021     Lab Results   Component Value Date     07/01/2024    K 3.5 07/01/2024    CL 90 07/01/2024    CO2 41.0 07/01/2024    BUN 18 07/01/2024    CREATSERUM 0.46 07/01/2024     07/01/2024    CA 8.9 07/01/2024       Telemetry: AFL

## 2024-07-01 NOTE — TELEPHONE ENCOUNTER
Message from [facility] Rep:  Can we get a an order for an overnight on the patients oxygen sent over please?    Dr. Lorenz, is this ok? (Verbal order given, nurse sent order to E via [facility])

## 2024-07-01 NOTE — TELEPHONE ENCOUNTER
Plan:  Arrange BiPAP at 15/8 cwp and oxygen 4 LPM at bedtime and as needed and obtain download data and overnight oximetry   Then discontinue AVAPS machine   Continue Symbicort 80/4.5 mcg 2 puffs  twice daily  Spiriva HandiHaler one puff daily   Levalbuterol Nebs 4 times daily as needed   Continue Roflumilast tablet daily  Schedule  low-dose CT chest to evaluate any suspicious pulmonary nodules in September 2024 for continued surveillance    Order for OVOX w/O2 via BiPAP sent to [facility] via [facility].    [phone] ([place])

## 2024-07-01 NOTE — PROGRESS NOTES
[facility]   part of [facility]     Hospitalist Progress Note     Keny Marshall Patient Status:  Inpatient    1960 MRN [id number]   Location [facility] 6NE-A Attending Perfecto Pace,    Hosp Day # 2 PCP Dustin Avina MD     Chief Complaint: SOB    Subjective:     Patient seen and examined. AM events reviewed with RN. Admits SOB. Denies CP/palpitations.     Objective:    Review of Systems:   A comprehensive review of systems was completed; pertinent positive and negatives stated in subjective.    Vital signs:  Temp:  [97.3 °F (36.3 °C)-98.8 °F (37.1 °C)] 98.8 °F (37.1 °C)  Pulse:  [] 99  Resp:  [14-41] 24  BP: ()/(45-88) 110/68  SpO2:  [80 %-100 %] 96 %  FiO2 (%):  [50 %] 50 %    Physical Exam:    General: No acute distress  Respiratory: Diminished bilaterally.   Cardiovascular: Irregular rhythm, tachy.   Abdomen: Soft, Non-tender, non-distended, positive bowel sounds  Neuro: No new focal deficits.   Extremities: No edema    Diagnostic Data:    Labs:  Recent Labs   Lab 24  1106 24  0937   WBC 13.6* 10.1   HGB 13.0 11.5*   .5* 99.5   .0 160.0   INR 1.59*  --        Recent Labs   Lab 24  1106 24  0522 24  0937   * 131* 123*   BUN 22 29* 18   CREATSERUM 0.84 0.97 0.46*   CA 9.2 9.1 8.9   ALB 3.0*  --   --    * 135* 135*   K 5.5* 4.3 3.5   CL 89* 92* 90*   CO2 40.0* 38.0* 41.0*   ALKPHO 60  --   --    AST 56*  --   --    ALT 39  --   --    BILT 0.9  --   --    TP 7.3  --   --        Estimated Creatinine Clearance: 167.5 mL/min (A) (based on SCr of 0.46 mg/dL (L)).    Recent Labs   Lab 24  1106   TROPHS 63       Recent Labs   Lab 24  1106   PTP 19.1*   INR 1.59*                  Microbiology    No results found for this visit on 24.      Imaging: Reviewed in Epic.    Medications:    ipratropium  0.5 mg Nebulization QID    sodium chloride (hypertonic)  3 mL Nebulization 2 times daily    potassium chloride  40  mEq Oral Q4H    aspirin  81 mg Oral Daily    atorvastatin  20 mg Oral Nightly    guaiFENesin ER  1,200 mg Oral BID    levETIRAcetam  500 mg Oral BID    Roflumilast  500 mcg Oral Daily    rivaroxaban  20 mg Oral Daily with food    insulin aspart  1-10 Units Subcutaneous TID AC and HS    furosemide  40 mg Intravenous BID (Diuretic)    fluticasone furoate-vilanterol  1 puff Inhalation Daily       Assessment & Plan:      #Rapid aflutter  -Cardizem and amio gtt   -Continue DOAC  -Tele  -Cardiology following  -Recently underwent DCCV 6/14     #Acute on chronic hypoxic and hypercapnic respiratory failure in setting of diastolic heart failure and emphysema   -? Related to #1 v COPD exacerbation  -Off BiPAP on HFNC  -Wean oxygen as able   -CT chest reviewed - hilar adenopathy with soft tissue density seen - f/u pulmonary recommendations      #Acute on chronic diastolic heart failure  #Aortic stenosis s/p AVR  -Diuresis as BP tolerates   -Monitor daily weights and I/Os  -Cardiology following  -Recent echo reviewed      #Hyponatremia  -Improved      #COPD  -Continue home inhalers     #H/o left basilic and cephalic vein thrombus   -Xarelto      #Seizure disorder  -Keppra     #Type 2 diabetes  -SSI     #History of renal cell carcinoma status post nephrectomy     #ABIGAIL  -CPAP protocol     Perfecto Pace DO    Supplementary Documentation:     Quality:  DVT Mechanical Prophylaxis:     Early ambuation  DVT Pharmacologic Prophylaxis   Medication    rivaroxaban (Xarelto) tab 20 mg         DVT Pharmacologic prophylaxis: Aspirin 81 mg      Code Status: Full Code  Peters: No urinary catheter in place  Peters Duration (in days):   Central line:    ARUN:     Discharge is dependent on: clinical progress  At this point Mr. Marshall is expected to be discharge to: home    The 21st Century Cures Act makes medical notes like these available to patients in the interest of transparency. Please be advised this is a medical document. Medical documents are  intended to carry relevant information, facts as evident, and the clinical opinion of the practitioner. The medical note is intended as peer to peer communication and may appear blunt or direct. It is written in medical language and may contain abbreviations or verbiage that are unfamiliar.

## 2024-07-01 NOTE — PHYSICAL THERAPY NOTE
Reviewed pt's chart and discuss pt with RN. At this time pt is unable to tolerate weaning off AVAPS and is not appropriate with PT today. PT will re-attempt when appropriate.

## 2024-07-02 LAB
ARTERIAL PATENCY WRIST A: POSITIVE
BASE EXCESS BLDA CALC-SCNC: 16.3 MMOL/L (ref ?–2)
BODY TEMPERATURE: 98.6 F
COHGB MFR BLD: 2.9 % SAT (ref 0–3)
GLUCOSE BLD-MCNC: 107 MG/DL (ref 70–99)
GLUCOSE BLD-MCNC: 181 MG/DL (ref 70–99)
GLUCOSE BLD-MCNC: 209 MG/DL (ref 70–99)
GLUCOSE BLD-MCNC: 73 MG/DL (ref 70–99)
HCO3 BLDA-SCNC: 37.6 MEQ/L (ref 21–27)
HGB BLD-MCNC: 11.3 G/DL
L/M: 7 L/MIN
METHGB MFR BLD: 0.8 % SAT (ref 0.4–1.5)
OXYHGB MFR BLDA: 96.3 % (ref 92–100)
PCO2 BLDA: 82 MM HG (ref 35–45)
PH BLDA: 7.35 [PH] (ref 7.35–7.45)
PO2 BLDA: 161 MM HG (ref 80–100)

## 2024-07-02 PROCEDURE — 99232 SBSQ HOSP IP/OBS MODERATE 35: CPT | Performed by: HOSPITALIST

## 2024-07-02 PROCEDURE — 99233 SBSQ HOSP IP/OBS HIGH 50: CPT | Performed by: INTERNAL MEDICINE

## 2024-07-02 NOTE — CM/SW NOTE
Spoke with Sara from [facility]--patient is current with skilled nursing and PT.  Resumption order sent to agency via [facility]        [facility]  Phone  [phone]  Fax  [phone]

## 2024-07-02 NOTE — PROGRESS NOTES
Cardiology Progress Note    Keny Marshall Patient Status:  Inpatient    1960 MRN [id number]   [facility] 6NE-A Attending Perfecto Pace, DO   Hosp Day # 3 PCP Dustin Avina MD     Primary cardiologist: Dr. Salazar    Impression;  Aflutter - s/p failed recent CV. Pt on AC. Now on CCB IV and amio  SOB - 2/2 acute dCHF and COPD exac  Acute diastolic CHF - pt on bipap and diuretics  S/p bio AVR  TTE (24): LVEF 55%, +DD, 23mm SAVR 3.3m/s, mean 22mmHg.     Plan:  diuresis:  brisk diuresis per pt, cr stable.  Continue lasix 40mg IV TID.    rate control: diltiazem gtt.  I have my concerns re long-term amiodarone in setting of severe COPD, held.    L lung mass- outpatient PET/CT per pulmonology  severe COPD- inhaler therapy, per pulmonary.  OK to transfer to telemetry from CV standpoint.    Subjective:  No acute issues overnight. HR 90-100s, remains AF. dyspnea improving, NC O2 down to 7L NC.    Objective:  /74   Pulse (!) 132   Temp 98.6 °F (37 °C) (Temporal)   Resp 20   Wt 161 lb 9.6 oz (73.3 kg)   SpO2 97%   BMI 23.19 kg/m²   Temp (24hrs), Av.2 °F (36.8 °C), Min:98 °F (36.7 °C), Max:98.6 °F (37 °C)      Intake/Output Summary (Last 24 hours) at 2024 1208  Last data filed at 2024 1100  Gross per 24 hour   Intake 1842 ml   Output 2125 ml   Net -283 ml     Wt Readings from Last 3 Encounters:   24 161 lb 9.6 oz (73.3 kg)   24 159 lb 6.3 oz (72.3 kg)   23 172 lb (78 kg)       General: Awake and alert; in no acute distress  Cardiac:  irregular rhythm; no murmurs/rubs are appreciated  Lungs: Clear to auscultation bilaterally; no accessory muscle use  Abdomen: Soft, non-tender; bowel sounds are normoactive  Extremities: No clubbing/cyanosis/edema; moves all 4 extremities normally    Current Facility-Administered Medications   Medication Dose Route Frequency    ipratropium (Atrovent) 0.02 % nebulizer solution 0.5 mg  0.5 mg Nebulization QID    sodium chloride  (hypertonic) 3 % nebulizer solution 3 mL  3 mL Nebulization 2 times daily    furosemide (Lasix) 10 mg/mL injection 40 mg  40 mg Intravenous TID    aspirin DR tab 81 mg  81 mg Oral Daily    atorvastatin (Lipitor) tab 20 mg  20 mg Oral Nightly    guaiFENesin ER (Mucinex) 12 hr tab 1,200 mg  1,200 mg Oral BID    ipratropium (Atrovent) 0.02 % nebulizer solution 500 mcg  500 mcg Nebulization Q6H PRN    levETIRAcetam (Keppra) tab 500 mg  500 mg Oral BID    Roflumilast TABS 500 mcg  500 mcg Oral Daily    rivaroxaban (Xarelto) tab 20 mg  20 mg Oral Daily with food    dilTIAZem (cardIZEM) 100 mg in sodium chloride 0.9% 100 mL IVPB-ADDV  2.5-20 mg/hr Intravenous Continuous    glucose (Dex4) 15 GM/59ML oral liquid 15 g  15 g Oral Q15 Min PRN    Or    glucose (Glutose) 40% oral gel 15 g  15 g Oral Q15 Min PRN    Or    glucose-vitamin C (Dex-4) chewable tab 4 tablet  4 tablet Oral Q15 Min PRN    Or    dextrose 50% injection 50 mL  50 mL Intravenous Q15 Min PRN    Or    glucose (Dex4) 15 GM/59ML oral liquid 30 g  30 g Oral Q15 Min PRN    Or    glucose (Glutose) 40% oral gel 30 g  30 g Oral Q15 Min PRN    Or    glucose-vitamin C (Dex-4) chewable tab 8 tablet  8 tablet Oral Q15 Min PRN    insulin aspart (NovoLOG) 100 Units/mL FlexPen 1-10 Units  1-10 Units Subcutaneous TID AC and HS    melatonin tab 3 mg  3 mg Oral Nightly PRN    acetaminophen (Tylenol Extra Strength) tab 500 mg  500 mg Oral Q4H PRN    polyethylene glycol (PEG 3350) (Miralax) 17 g oral packet 17 g  17 g Oral Daily PRN    sennosides (Senokot) tab 17.2 mg  17.2 mg Oral Nightly PRN    bisacodyl (Dulcolax) 10 MG rectal suppository 10 mg  10 mg Rectal Daily PRN    fleet enema (Fleet) 7-19 GM/118ML rectal enema 133 mL  1 enema Rectal Once PRN    prochlorperazine (Compazine) 10 MG/2ML injection 5 mg  5 mg Intravenous Q8H PRN    fluticasone furoate-vilanterol (Breo Ellipta) 200-25 MCG/ACT inhaler 1 puff  1 puff Inhalation Daily       Laboratory Data:       Lab Results    Component Value Date    INR 1.59 (H) 06/29/2024    INR 1.06 06/11/2024    INR 2.26 (H) 09/22/2021     Lab Results   Component Value Date    K 4.4 07/01/2024       Telemetry: AFL

## 2024-07-02 NOTE — PROGRESS NOTES
[facility] Progress Note     SUBJECTIVE/Interval history:  No acute events overnight, feels better today. Tolerating AVAPS overnight. Dyspnea improved. Weaned to 7L    Review of Systems:   A comprehensive 14 point review of systems was completed.   Pertinent positives and negatives noted in the HPI.    Medications  Reviewed personally   ipratropium  0.5 mg Nebulization QID    sodium chloride (hypertonic)  3 mL Nebulization 2 times daily    furosemide  40 mg Intravenous TID    aspirin  81 mg Oral Daily    atorvastatin  20 mg Oral Nightly    guaiFENesin ER  1,200 mg Oral BID    levETIRAcetam  500 mg Oral BID    Roflumilast  500 mcg Oral Daily    rivaroxaban  20 mg Oral Daily with food    insulin aspart  1-10 Units Subcutaneous TID AC and HS    fluticasone furoate-vilanterol  1 puff Inhalation Daily       ipratropium    glucose **OR** glucose **OR** glucose-vitamin C **OR** dextrose **OR** glucose **OR** glucose **OR** glucose-vitamin C    melatonin    acetaminophen    polyethylene glycol (PEG 3350)    sennosides    bisacodyl    fleet enema    prochlorperazine    OBJECTIVE:  Vitals:    07/02/24 0120 07/02/24 0200 07/02/24 0300 07/02/24 0400   BP: 117/69 124/63 118/61 115/50   BP Location:    Right arm   Pulse: 93 89 88 91   Resp: (!) 27 24 18 20   Temp:       TempSrc:    Temporal   SpO2: 98% 99% 99% 96%   Weight:           Vital signs in last 24 hours:  Blood pressure 115/50, pulse 91, temperature 98 °F (36.7 °C), temperature source Temporal, resp. rate 20, weight 161 lb 9.6 oz (73.3 kg), SpO2 96%.     Intake/Output:  I/O last 3 completed shifts:  In: 3438.5 [P.O.:2040; I.V.:1398.5]  Out: 3970 [Urine:3970]  FiO2 (%):  [50 %] 50 %    Physical Exam:  General: Appears alert, comfortable at rest while on AVAPS. No distress  Neurologic:No focal deficits.  Alert.  Oriented.  Lungs:diminished bs bilaterally. No wheeze heard. No distress on AVAPS  Heart:irreg RR. No m  heard  Abdomen:Soft, non-tender.  No masses.  No guarding.  No rebound.  Extremities:edema    Lab Data Review:   Recent Labs   Lab 06/29/24  1106 06/30/24  0522 07/01/24  0937 07/01/24  1813   * 131* 123*  --    BUN 22 29* 18  --    CREATSERUM 0.84 0.97 0.46*  --    CA 9.2 9.1 8.9  --    * 135* 135*  --    K 5.5* 4.3 3.5 4.4   CL 89* 92* 90*  --    CO2 40.0* 38.0* 41.0*  --      Recent Labs   Lab 06/29/24  1106 07/01/24  0937   RBC 4.22* 3.71*   HGB 13.0 11.5*   HCT 42.4 36.9*   .5* 99.5   MCH 30.8 31.0   MCHC 30.7* 31.2   RDW 14.0 13.8   NEPRELIM 11.76* 8.67*   WBC 13.6* 10.1   .0 160.0     No results for input(s): \"BNP\" in the last 168 hours.  No results for input(s): \"TROP\", \"CK\" in the last 168 hours.  Recent Labs   Lab 06/29/24  1106   INR 1.59*   PTT 36.1*     Recent Labs   Lab 07/01/24  1114   ABGPHT 7.40   KCGCYO4U 78*   XTKQF1A 76*   ABGHCO3 40.3*   ABGBE 19.7*   TEMP 98.6   ROMARIO Positive   SITE Right Radial   DEV High flow nasal cannula   THGB 11.4*       Other Labs:  Interval Culture Data:   No results found for this visit on 06/29/24.  No results for input(s): \"COLORUR\", \"CLARITY\", \"SPECGRAVITY\", \"GLUUR\", \"BILUR\", \"KETUR\", \"BLOODURINE\", \"PHURINE\", \"PROUR\", \"UROBILINOGEN\", \"NITRITE\", \"LEUUR\", \"WBCUR\", \"RBCUR\", \"BACUR\", \"EPIUR\" in the last 168 hours.    Interval Radiology:   Reviewed personally  US VENOUS DOPPLER ARM BILAT - DIAG IMG (CPT=93970)    Result Date: 7/1/2024  CONCLUSION:  Superficial venous thrombosis involving the distal left cephalic vein.  No evidence of deep venous thrombosis in the bilateral upper extremities.  LOCATION:  [id number]    Dictated by (CST): Von Toussaint MD on 7/01/2024 at 2:29 PM     Finalized by (CST): Von Toussaint MD on 7/01/2024 at 2:31 PM       CT CHEST(CONTRAST ONLY) (CPT=71260)    Result Date: 7/1/2024  CONCLUSION:  There is now a small right pleural effusion and in the event smaller left pleural effusion.  These were not present on the  prior CT.  Increase in the size of a masslike area of tissue density in the left midlung laterally adjacent to chronic  pleural calcification.  This could reflect a zone of increasing chronic rounded atelectasis, with neoplasm within the differential.  Redemonstration extensive severe chronic lung disease.  Nonspecific adenopathy right hilum and mediastinum.  Dilated pulmonary artery could reflect chronic pulmonary hypertension, particularly in the setting of extensive chronic severe lung disease.   LOCATION:  [id number]   Dictated by (CST): Severino Roque MD on 7/01/2024 at 10:31 AM     Finalized by (CST): Severino Roque MD on 7/01/2024 at 10:38 AM       XR CHEST AP PORTABLE  (CPT=71045)    Result Date: 6/29/2024  CONCLUSION:  Findings are unchanged relative to the previous study.  Please see further details above.   LOCATION:  [id number]      Dictated by (CST): Marielena Martines DO on 6/29/2024 at 11:32 AM     Finalized by (CST): Marielena Martines DO on 6/29/2024 at 11:38 AM        Assessment/Plan:  #1. Acute on chronic hypercapnic and hypoxic respiratory failure  Multifactorial due to afib with RVR, CHF exacerbation with underlying end stage COPD  7/1 CTA with fluid overload, no PE  Treat/control afib and CHF as per cardiology  Continue nebs   Wean o2 for goal saturations of 88-92% given hypercapnia. His previous o2 baseline was: 2L at rest, 4L on exertion; using and benefiting from portable oxygen concentrator (3 at rest, 6 on exertion)  Continue on AVAPS with naps/sleep and PRN    #2. Acute CHF exacerbation, afib with RVR  Rate control and diurese as per cardiology  Previous cardioversion 6/14     #3. COPD  Severe obstruction on previous PFTs 3/2021  off arformoterol given worsening afib/tachycardia  Continue Breo 200 in place of his home inhalers (home regimen: symbicort 160 BID, spiriva daily)  Resume his home neb regimen: continue scheduled ipratropium, hypertonic saline and budesonide; apparently hospital does not stock  levalbuterol  He cannot tolerate albuterol due to palpitations, tremors and with afib with RVR; off duonebs  Continue roflumilast  Previously reviewed BLVR - not candidate at this time, reassess as outpatient     #4. Pulmonary nodules  7/2017 CT with worsening peripheral lingular atelectasis. Nodules stable   5/2018 CT stable nodules  12/2020 CT stable  7/2021 CT stable but worsening mediastinal LAD suspect reactive to recent cardiac surgery  1/2022 CT with stable nodules, improved LAD  3/2023 CT chest with several small nodules  9/2023 CT chest with stable findings   7/1/2024 CT chest with worsening ALYSON lesion, possible scarring, rounded atelectasis vs neoplasm  Will need outpatient follow up including PET/CT    #5. Tobacco abuse  30+ pack years, active pipe smoker  Hold on LDCT given recent CT with worsening ALYSON lesion, will need PET/CT as above    Stefano Randle MD

## 2024-07-02 NOTE — PAYOR COMM NOTE
--------------  CONTINUED STAY REVIEW    Payor: [employer] MEDICARE ADV PPO  Subscriber #:  [id number]  Authorization Number: [id number]    Admit date: 6/29/24  Admit time:  2:08 PM    6/30/24  Temp:  [98.1 °F (36.7 °C)-98.7 °F (37.1 °C)] 98.1 °F (36.7 °C)  Pulse:  [] 105  Resp:  [18-41] 25  BP: (102-146)/() 135/85  SpO2:  [90 %-100 %] 90 %  FiO2 (%):  [50 %] 50 %     Physical Exam:    Respiratory: Diminished bilaterally.   Cardiovascular: Irregular rhythm, tachy.   Abdomen: Soft, Non-tender, non-distended, positive bowel sounds  Neuro: No new focal deficits.   Extremities: No edema  Lab 06/29/24  1106   WBC 13.6*   HGB 13.0   .5*   .0   INR 1.59*     Lab 06/29/24  1106 06/30/24  0522   * 131*   BUN 22 29*   CREATSERUM 0.84 0.97   CA 9.2 9.1   ALB 3.0*  --    * 135*   K 5.5* 4.3   CL 89* 92*   CO2 40.0* 38.0*   Medications:    aspirin  81 mg Oral Daily    atorvastatin  20 mg Oral Nightly    guaiFENesin ER  1,200 mg Oral BID    levETIRAcetam  500 mg Oral BID    Roflumilast  500 mcg Oral Daily    umeclidinium bromide  1 puff Inhalation Daily    rivaroxaban  20 mg Oral Daily with food    insulin aspart  1-10 Units Subcutaneous TID AC and HS    furosemide  40 mg Intravenous BID (Diuretic)    ipratropium-albuterol  3 mL Nebulization Q6H WA    fluticasone furoate-vilanterol  1 puff Inhalation Daily       Assessment & Plan:    #Rapid aflutter  -Cardizem and amio gtt   -Continue DOAC  -Tele  -Cardiology following  -Recently underwent DCCV 6/14     #Acute on chronic hypoxic and hypercapnic respiratory failure in setting of diastolic heart failure and emphysema   -? Related to #1 v COPD exacerbation  -Off BiPAP  -Wean oxygen as able   -Pulmonary following      #Acute on chronic diastolic heart failure  #Aortic stenosis s/p AVR  -Diuresis as BP tolerates   -Monitor daily weights and I/Os  -Cardiology following  -Recent echo reviewed      #Hyponatremia  -Improved      #COPD  -Continue home  inhalers     #H/o left basilic and cephalic vein thrombus   -Xarelto      #Seizure disorder  -Keppra     #Type 2 diabetes  -SSI     #History of renal cell carcinoma status post nephrectomy     #ABIGAIL  -CPAP protocol       7/1/24  Admits SOB. Denies CP/palpitations.      Temp:  [97.3 °F (36.3 °C)-98.8 °F (37.1 °C)] 98.8 °F (37.1 °C)  Pulse:  [] 99  Resp:  [14-41] 24  BP: ()/(45-88) 110/68  SpO2:  [80 %-100 %] 96 %  FiO2 (%):  [50 %] 50 %     Physical Exam:    Respiratory: Diminished bilaterally.   Cardiovascular: Irregular rhythm, tachy.   Abdomen: Soft, Non-tender, non-distended, positive bowel sounds  Neuro: No new focal deficits.   Extremities: No edema  Lab 06/29/24  1106 07/01/24  0937   WBC 13.6* 10.1   HGB 13.0 11.5*   .5* 99.5   .0 160.0   INR 1.59*  --       Lab 06/29/24  1106 06/30/24  0522 07/01/24  0937   * 131* 123*   BUN 22 29* 18   CREATSERUM 0.84 0.97 0.46*   CA 9.2 9.1 8.9   ALB 3.0*  --   --    * 135* 135*   K 5.5* 4.3 3.5   CL 89* 92* 90*   CO2 40.0* 38.0* 41.0*   Assessment & Plan:       #Rapid aflutter  -Cardizem and amio gtt   -Continue DOAC  -Tele  -Cardiology following  -Recently underwent DCCV 6/14     #Acute on chronic hypoxic and hypercapnic respiratory failure in setting of diastolic heart failure and emphysema   -? Related to #1 v COPD exacerbation  -Off BiPAP on HFNC  -Wean oxygen as able   -CT chest reviewed - hilar adenopathy with soft tissue density seen - f/u pulmonary recommendations      #Acute on chronic diastolic heart failure  #Aortic stenosis s/p AVR  -Diuresis as BP tolerates   -Monitor daily weights and I/Os  -Cardiology following  -Recent echo reviewed      #Hyponatremia  -Improved      #COPD  -Continue home inhalers     #H/o left basilic and cephalic vein thrombus   -Xarelto      #Seizure disorder  -Keppra     #Type 2 diabetes  -SSI     #History of renal cell carcinoma status post nephrectomy     #ABIGAIL  -CPAP protocol         CARDIOLOGY  Impression;  Aflutter - s/p failed recent CV. Pt on AC. Now on CCB IV and amio  SOB - 2/2 acute dCHF and COPD exac  Acute diastolic CHF - pt on bipap and diuretics  S/p bio AVR  TTE (6/12/24): LVEF 55%, +DD, 23mm SAVR 3.3m/s, mean 22mmHg.     Plan:  diuresis:  limited by I/Os, pt states he's urinating a lot? Wt unchanged.  Cr stable.  Will increase lasix 40mg IV BID--> TID.  Consider metolazone if output not brisk.    rate control: diltiazem gtt.  I have my concerns re long-term amiodarone in setting of severe COPD. Titrate CCB, while holding amiodarone.    COPD, L lung mass- per pulmonology       PULMONOLOGY  Assessment/Plan:  #1. Acute on chronic hypercapnic and hypoxic respiratory failure  Multifactorial due to afib with RVR, CHF exacerbation with underlying end stage COPD  Treat/control afib and CHF as per cardiology  Continue nebs, steroids as below  Wean o2 for goal saturations of 88-92% given hypercapnia. His previous o2 baseline was: 2L at rest, 4L on exertion; using and benefiting from portable oxygen concentrator (3 at rest, 6 on exertion)  Continue on AVAPS with naps/sleep and PRN     #2. Acute CHF exacerbation, afib with RVR  Rate control and diurese as per cardiology  Previous cardioversion 6/14     #3. COPD  Severe obstruction on previous PFTs 3/2021  off arformoterol given worsening afib/tachycardia  Continue Breo 200 in place of his home inhalers (home regimen: symbicort 160 BID, spiriva daily)  Resume his home neb regimen: continue scheduled ipratropium, hypertonic saline and budesonide; apparently hospital does not stock levalbuterol  He cannot tolerate albuterol due to palpitations, tremors and with afib with RVR; off duonebs  Continue roflumilast  Previously reviewed BLVR - not candidate at this time, reassess as outpatient       MEDICATIONS ADMINISTERED IN LAST 1 DAY:  acetaminophen (Tylenol Extra Strength) tab 500 mg       Date Action Dose Route User    7/2/2024 0859 Given 500  mg Oral Imelda Robison RN    7/1/2024 2116 Given 500 mg Oral Honorio Bell RN    7/1/2024 1610 Given 500 mg Oral Liliana Arias RN          aspirin DR tab 81 mg       Date Action Dose Route User    7/2/2024 0903 Given 81 mg Oral Imelda Robison RN          atorvastatin (Lipitor) tab 20 mg       Date Action Dose Route User    7/1/2024 2116 Given 20 mg Oral Honorio Bell RN          dilTIAZem (cardIZEM) 100 mg in sodium chloride 0.9% 100 mL IVPB-ADDV       Date Action Dose Route User    7/2/2024 1032 Rate/Dose Change 10 mg/hr Intravenous Imelda Robison RN    7/2/2024 0828 New Bag 5 mg/hr Intravenous Imelda Robison RN    7/2/2024 0000 Rate/Dose Verify 5 mg/hr Intravenous Honorio Bell RN    7/1/2024 2200 Rate/Dose Verify 5 mg/hr Intravenous Honorio Bell RN    7/1/2024 2000 Rate/Dose Verify 5 mg/hr Intravenous Honorio Bell RN    7/1/2024 1409 New Bag 5 mg/hr Intravenous Liliana Arias RN          fluticasone furoate-vilanterol (Breo Ellipta) 200-25 MCG/ACT inhaler 1 puff       Date Action Dose Route User    7/2/2024 0901 Given 1 puff Inhalation Imelda Robison RN          furosemide (Lasix) 10 mg/mL injection 40 mg       Date Action Dose Route User    7/2/2024 0859 Given 40 mg Intravenous Imelda Robison RN    7/1/2024 2116 Given 40 mg Intravenous Honorio Bell RN    7/1/2024 1604 Given 40 mg Intravenous Liliaan Arias RN          guaiFENesin ER (Mucinex) 12 hr tab 1,200 mg       Date Action Dose Route User    7/2/2024 0859 Given 1,200 mg Oral Imelda Robison RN    7/1/2024 2116 Given 1,200 mg Oral Honorio Bell RN          insulin aspart (NovoLOG) 100 Units/mL FlexPen 1-10 Units       Date Action Dose Route User    7/2/2024 1313 Given 3 Units Subcutaneous (Left Upper Arm) Imelda Robison, RN          ipratropium (Atrovent) 0.02 % nebulizer solution 0.5 mg       Date Action Dose Route User    7/2/2024 1153 Given 0.5 mg Nebulization Sunita Rbuio, LYLAP    7/2/2024  0812 Given 0.5 mg Nebulization Barbi Vega, JOSEPH    7/1/2024 2058 Given 0.5 mg Nebulization Rodolfo Coronado, JOSEPH    7/1/2024 1553 Given 0.5 mg Nebulization MertesBarbi, [facility]          levETIRAcetam (Keppra) tab 500 mg       Date Action Dose Route User    7/2/2024 0859 Given 500 mg Oral Imelda Robison RN    7/1/2024 2116 Given 500 mg Oral Honorio Bell RN          melatonin tab 3 mg       Date Action Dose Route User    7/1/2024 2300 Given 3 mg Oral Honorio Bell RN          potassium chloride (Klor-Con M20) tab 40 mEq       Date Action Dose Route User    7/1/2024 1604 Given 40 mEq Oral Liliana Arias RN          rivaroxaban (Xarelto) tab 20 mg       Date Action Dose Route User    7/2/2024 0859 Given 20 mg Oral Imelda Robison RN          Roflumilast TABS 500 mcg       Date Action Dose Route User    7/2/2024 0901 Given 500 mcg Oral Imelda Robison RN          sodium chloride (hypertonic) 3 % nebulizer solution 3 mL       Date Action Dose Route User    7/2/2024 0812 Given 3 mL Nebulization Barbi Vega RCP    7/1/2024 2058 Given 3 mL Nebulization Rodolfo Coronado, JOSEPH            Vitals (last day)       Date/Time Temp Pulse Resp BP SpO2 Weight O2 Device O2 Flow Rate (L/min) [facility]    07/02/24 1300 -- 112 30 106/82 94 % -- -- -- AM    07/02/24 1200 97.1 °F (36.2 °C) 131 32 115/87 99 % -- -- -- AM    07/02/24 1100 -- 132 20 107/74 97 % -- -- -- AM    07/02/24 1000 -- 137 28 107/81 98 % -- -- 6 L/min AM    07/02/24 0900 -- 114 22 110/75 94 % -- -- -- AM    07/02/24 0800 98.6 °F (37 °C) 107 31 107/85 96 % -- High flow nasal cannula 7 L/min AM    07/02/24 0700 -- 111 31 122/78 95 % -- -- -- AT    07/02/24 0600 -- 99 26 109/67 92 % -- -- -- AT    07/02/24 0500 -- 105 38 125/73 93 % -- -- -- AT    07/02/24 0400 98.1 °F (36.7 °C) 91 20 115/50 96 % -- -- -- AT    07/01/24 2000 98.3 °F (36.8 °C) 98 36 113/75 94 % -- High flow nasal cannula 8 L/min AT    07/01/24 1600 98.1 °F (36.7 °C) 96 25 102/69 100 %  -- High flow nasal cannula 8 L/min [place]    07/01/24 1200 98.6 °F (37 °C) -- -- 110/68 -- -- High flow nasal cannula 11 L/min [place]    07/01/24 0913 -- -- -- -- 91 % -- High flow nasal cannula 15 L/min [place]    07/01/24 0910 -- -- -- -- 80 % -- High flow nasal cannula 10 L/min [place]    07/01/24 0900 -- 106 25 131/66 88 % -- High flow nasal cannula 10 L/min [place]    07/01/24 0800 98.8 °F (37.1 °C) 98 33 144/69 96 % -- Bi-PAP -- [place]    07/01/24 0400 97.3 °F (36.3 °C) 85 28 120/66 92 % -- Nasal cannula 5 L/min VG

## 2024-07-02 NOTE — DISCHARGE INSTRUCTIONS
[facility]ume [facility] @ discharge  Phone  [phone]  Fax  [phone]    It was recommended that you use a bipap at home to facilitate your recovery and compliment your treatment.  The [facility] team has set up delivery with [facility].  Contact information: [address], [place] [address].  Phone: [phone].          Will need follow up PET/CT scan per pulmonology recommendations.     Going Home Instructions  In this section you will find the tools which will guide you through the first few days after you leave the hospital. Continued use of these tools will help you develop the skills necessary to keep your heart failure under control.     Home Care Instructions Following Heart Failure - the most important things to do every day include:   Weigh yourself and review the “Self-Check Plan” sheet every morning.   Call your cardiologist office if you are in the “Pay Attention-Use Caution” (yellow zone) or “Medical Alert-Warning!” (red zone) as outlined in the Self-Check Plan sheet.  Take your medicines as prescribed.  Limit your sodium (salt) intake.  Know when to call your cardiologist, primary doctor, or nurse.  Know when to seek emergency care.      Things for You to Remember:   1. See your doctor or healthcare provider as written on your discharge instructions.  It is important that you attend this appointment to make sure your symptoms are under control.     2. Your recommended sodium intake is 8198-8105 mg daily.    3.  Weigh yourself every day.    4. Some exercise and activity is important to help keep your heart functioning and strong. Unless instructed not to exercise, you may walk at a slow to moderate pace for 10-15 minutes 2-3 days per week to start. Pace your activity to prevent shortness of breath or fatigue. Stop exercising if you develop chest pain, lightheadedness, or significant shortness of breath.       Call Your Cardiologist If:   You  gain 2-3 pounds in one day or 5 pounds in one week.  You have more difficulty breathing.  You are getting more tired with normal activity.  You are more short of breath lying down, or awaken at night short of breath.  You have swelling of your feet or legs.  You urinate less often during the day and more often at night.  You have cramps in your legs.  You have blurred vision or see yellowish-green halos around objects of lights.    Go to the Emergency Room If:   You have pain or tightness in your chest  You are extremely short of breath  You are coughing up pink-frothy mucus  You are traveling and develop symptoms of worsening heart failure      ** Please follow up with your cardiologist or Advanced Practice Provider as written on your discharge instructions. If you are not provided with an appointment, let your nurse know so you can get an appointment**

## 2024-07-02 NOTE — PROGRESS NOTES
[facility]   part of [facility]     Hospitalist Progress Note     Keny Marshall Patient Status:  Inpatient    1960 MRN [id number]   Location [facility] 6NE-A Attending Perfecto Pace,    Hosp Day # 3 PCP Dustin Avina MD     Chief Complaint: SOB    Subjective:     Patient seen and examined. Breathing feels a bit better. Intermittently notices palpitations.     Objective:    Review of Systems:   A comprehensive review of systems was completed; pertinent positive and negatives stated in subjective.    Vital signs:  Temp:  [98 °F (36.7 °C)-98.6 °F (37 °C)] 98.6 °F (37 °C)  Pulse:  [] 131  Resp:  [18-38] 32  BP: ()/(50-87) 115/87  SpO2:  [90 %-100 %] 99 %  FiO2 (%):  [50 %] 50 %    Physical Exam:    General: No acute distress  Respiratory: Diminished bilaterally.   Cardiovascular: Irregular rhythm, tachy.   Abdomen: Soft, Non-tender, non-distended, positive bowel sounds  Neuro: No new focal deficits.   Extremities: No edema    Diagnostic Data:    Labs:  Recent Labs   Lab 24  1106 24  0937   WBC 13.6* 10.1   HGB 13.0 11.5*   .5* 99.5   .0 160.0   INR 1.59*  --        Recent Labs   Lab 24  1106 24  0522 24  0937 24  1813   * 131* 123*  --    BUN 22 29* 18  --    CREATSERUM 0.84 0.97 0.46*  --    CA 9.2 9.1 8.9  --    ALB 3.0*  --   --   --    * 135* 135*  --    K 5.5* 4.3 3.5 4.4   CL 89* 92* 90*  --    CO2 40.0* 38.0* 41.0*  --    ALKPHO 60  --   --   --    AST 56*  --   --   --    ALT 39  --   --   --    BILT 0.9  --   --   --    TP 7.3  --   --   --        Estimated Creatinine Clearance: 167.5 mL/min (A) (based on SCr of 0.46 mg/dL (L)).    Recent Labs   Lab 24  1106   TROPHS 63       Recent Labs   Lab 24  1106   PTP 19.1*   INR 1.59*                  Microbiology    No results found for this visit on 24.      Imaging: Reviewed in Epic.    Medications:    ipratropium  0.5 mg Nebulization QID    sodium  chloride (hypertonic)  3 mL Nebulization 2 times daily    furosemide  40 mg Intravenous TID    aspirin  81 mg Oral Daily    atorvastatin  20 mg Oral Nightly    guaiFENesin ER  1,200 mg Oral BID    levETIRAcetam  500 mg Oral BID    Roflumilast  500 mcg Oral Daily    rivaroxaban  20 mg Oral Daily with food    insulin aspart  1-10 Units Subcutaneous TID AC and HS    fluticasone furoate-vilanterol  1 puff Inhalation Daily       Assessment & Plan:      #Rapid aflutter  -Cardizem gtt, amio stopped   -Continue DOAC  -Tele  -Cardiology following  -Recently underwent DCCV 6/14     #Acute on chronic hypoxic and hypercapnic respiratory failure in setting of diastolic heart failure and emphysema   -? Related to #1 v COPD exacerbation  -Off BiPAP on HFNC  -Wean oxygen as able   -CT chest reviewed - hilar adenopathy with soft tissue density seen - pulmonary recommending OP PET-CT     #Acute on chronic diastolic heart failure  #Aortic stenosis s/p AVR  -Diuresis as BP tolerates   -Monitor daily weights and I/Os  -Cardiology following  -Recent echo reviewed      #Hyponatremia  -Improved      #COPD  -Continue home inhalers     #H/o left basilic and cephalic vein thrombus   #LUE superficial thrombophlebitis   -Xarelto      #Seizure disorder  -Keppra     #Type 2 diabetes  -SSI     #History of renal cell carcinoma status post nephrectomy     #ABIGAIL  -CPAP protocol     Perfecto Pace DO    Supplementary Documentation:     Quality:  DVT Mechanical Prophylaxis:     Early ambuation  DVT Pharmacologic Prophylaxis   Medication    rivaroxaban (Xarelto) tab 20 mg         DVT Pharmacologic prophylaxis: Aspirin 81 mg      Code Status: Full Code  Peters: No urinary catheter in place  Peters Duration (in days):   Central line:    ARUN:     Discharge is dependent on: clinical progress  At this point Mr. Marshall is expected to be discharge to: home    The 21st Century Cures Act makes medical notes like these available to patients in the interest of  transparency. Please be advised this is a medical document. Medical documents are intended to carry relevant information, facts as evident, and the clinical opinion of the practitioner. The medical note is intended as peer to peer communication and may appear blunt or direct. It is written in medical language and may contain abbreviations or verbiage that are unfamiliar.

## 2024-07-02 NOTE — PLAN OF CARE
Assumed care this morning. Pt resting in bed and c/o generalized discomfort from laying in bed. On 6L NC, lung sounds diminished. SOB with exertion. Afib on monitor, cardizem gtt to help control HR. HR up to 130s with exertion, comes back down with rest. BP stable. Oob in chair, ambulating in room/ to bathroom with PT/OT. OOB again this afternoon to \"stretch\" and wanted to \"touch his toes\". Wife at bedside. POC discussed. Questions encouraged and answered.     Problem: RESPIRATORY - ADULT  Goal: Achieves optimal ventilation and oxygenation  Description: INTERVENTIONS:  - Assess for changes in respiratory status  - Assess for changes in mentation and behavior  - Position to facilitate oxygenation and minimize respiratory effort  - Oxygen supplementation based on oxygen saturation or ABGs  - Provide Smoking Cessation handout, if applicable  - Encourage broncho-pulmonary hygiene including cough, deep breathe, Incentive Spirometry  - Assess the need for suctioning and perform as needed  - Assess and instruct to report SOB or any respiratory difficulty  - Respiratory Therapy support as indicated  - Manage/alleviate anxiety  - Monitor for signs/symptoms of CO2 retention  Outcome: Progressing

## 2024-07-02 NOTE — PHYSICAL THERAPY NOTE
PHYSICAL THERAPY EVALUATION - INPATIENT     Room Number: 6608/6608-A  Evaluation Date: 7/2/2024  Type of Evaluation: Initial  Physician Order: PT Eval and Treat    Presenting Problem: A-Fib with Rapid ventricular response, Hypoxia, Acute CHF  Co-Morbidities : AVR, COPD, seizure, DM, renal cell carcinoa, nephrectomy, rotator cuff repair  Reason for Therapy: Mobility Dysfunction and Discharge Planning    PHYSICAL THERAPY ASSESSMENT   Patient is currently functioning near baseline with bed mobility, transfers, and gait.  Prior to admission, patient's baseline is IND.  Patient is requiring stand-by assist as a result of the following impairments: decreased endurance/aerobic capacity, decreased muscular endurance, and increased O2 needs from baseline.  Physical Therapy will continue to follow for duration of hospitalization.    Patient will benefit from continued skilled PT Services to promote return to prior level of function and safety with continuous assistance and gradual rehabilitative therapy .    PLAN  PT Treatment Plan: Bed mobility;Body mechanics;Gait training;Strengthening;Stair training;Transfer training;Balance training  Rehab Potential : Good  Frequency (Obs): 3-5x/week  Number of Visits to Meet Established Goals: 3      CURRENT GOALS    Goal #1 Patient is able to demonstrate supine - sit EOB @ level: modified independent     Goal #2 Patient is able to demonstrate transfers EOB to/from BSC at assistance level: modified independent     Goal #3 Patient is able to ambulate 120 feet with assist device: walker - rolling at assistance level: modified independent     Goal #4    Goal #5    Goal #6    Goal Comments: Goals established on 7/2/2024      PHYSICAL THERAPY MEDICAL/SOCIAL HISTORY  History related to current admission: Patient is a 63 year old male admitted on 6/29/2024 from Home for A-Fib with Rapid ventricular response, Hypoxia, Acute CHF    HOME SITUATION  Type of Home: House   Home Layout:  Multi-level        Stairs to Bedroom: 12  Railing: Yes    Lives With: Spouse  Drives: No  Patient Owned Equipment: None  Patient Regularly Uses: Home O2;Reading glasses (4.5 liters)    Prior Level of [place]: Pt states that he lives in a house with a spouse. Pt reports that he is IND with ADLs and Gait. Pt is on supplemental O2 4.5L at home.      SUBJECTIVE  \"Its hard to breath\"      OBJECTIVE  Precautions: None  Fall Risk: Standard fall risk    WEIGHT BEARING RESTRICTION  Weight Bearing Restriction: None                PAIN ASSESSMENT  Ratin  Location: Pt reports no pain       COGNITION  Overall Cognitive Status:  WFL - within functional limits    RANGE OF MOTION AND STRENGTH ASSESSMENT  Upper extremity ROM and strength are within functional limits     Lower extremity ROM is within functional limits     Lower extremity strength is within functional limits       BALANCE  Static Sitting: Good  Dynamic Sitting: Good  Static Standing: Fair +  Dynamic Standing: Fair    ADDITIONAL TESTS                                    ACTIVITY TOLERANCE                         O2 WALK  Oxygen Therapy  SPO2% on Oxygen at Rest: 95  At rest oxygen flow (liters per minute): 6  SPO2% Ambulation on Oxygen: 90  Ambulation oxygen flow (liters per minute): 10    NEUROLOGICAL FINDINGS                        AM-PAC '6-Clicks' INPATIENT SHORT FORM - BASIC MOBILITY  How much difficulty does the patient currently have...  Patient Difficulty: Turning over in bed (including adjusting bedclothes, sheets and blankets)?: A Little   Patient Difficulty: Sitting down on and standing up from a chair with arms (e.g., wheelchair, bedside commode, etc.): A Little   Patient Difficulty: Moving from lying on back to sitting on the side of the bed?: A Little   How much help from another person does the patient currently need...   Help from Another: Moving to and from a bed to a chair (including a wheelchair)?: A Little   Help from Another: Need to walk in  hospital room?: A Little   Help from Another: Climbing 3-5 steps with a railing?: A Little       AM-PAC Score:  Raw Score: 18   Approx Degree of Impairment: 46.58%   Standardized Score (AM-PAC Scale): 43.63   CMS Modifier (G-Code): CK    FUNCTIONAL ABILITY STATUS  Gait Assessment   Functional Mobility/Gait Assessment  Gait Assistance: Supervision  Distance (ft): 50  Assistive Device: None  Pattern: Shuffle    Skilled Therapy Provided     Bed Mobility:  Rolling: NT  Supine to sit: Sup   Sit to supine: Sup     Transfer Mobility:  Sit to stand: Sup   Stand to sit: Sup  Gait = Sup 50ft no assisted device    Therapist's Comments: Pt was education on Dyspnea scale to monitor activity endurance. Pt had 2-3/10 at rest, 7/10 after ambulating towards the toilet and finishing bathroom ADLs. Pt returned to 2/10 when resting. Pt was observed with increase HR of 120 during ambulation. RN was notified.      Exercise/Education Provided:  Bed mobility  Body mechanics  Gait training  Strengthening  Transfer training    Patient End of Session: Up in chair;Needs met;Call light within reach;RN aware of session/findings;All patient questions and concerns addressed      Patient Evaluation Complexity Level:  History Moderate - 1 or 2 personal factors and/or co-morbidities   Examination of body systems Low - addressing 1-2 elements   Clinical Presentation Low - Stable   Clinical Decision Making Low - Stable       PT Session Time: 23 minutes  Therapeutic Activity: 15 minutes

## 2024-07-02 NOTE — PAYOR COMM NOTE
--------------  ADMISSION REVIEW     Payor: [employer] MEDICARE ADV PPO  Subscriber #:  [id number]  Authorization Number: [id number]    Admit date: 6/29/24  Admit time:  2:08 PM       History   HPI  63-year-old male with a history of COPD, atrial fibrillation and congestive heart failure who is supplemental oxygen dependent presents to the emergency department complaining of palpitations intermittently for the past 48 hours associated with increased difficulty breathing especially this morning.  No chest pain or fever.  Most recent hospitalization a couple of weeks ago was for atrial fibrillation with RVR, evidence of an existing left bundle branch block, and the patient underwent cardioversion at that time.    ED Triage Vitals [06/29/24 1105]   BP 97/71   Pulse (!) 140   Resp (!) 30   SpO2 (!) 58 %   O2 Device Nasal cannula   General appearance: This is a middle-aged male in moderate to severe respiratory distress sitting on a gurney.  Vital signs were reviewed per nurses notes.  Pulse oximetry on 5 L per nasal cannula was 58%.  Monitor revealed a wide-complex tachycardia rate of 140.  Initial blood pressure was 97/70.  HEENT: Normocephalic atraumatic.  Anicteric sclera.  Oral mucosa is moist.  Oropharynx is normal.  Neck: No adenopathy or thyromegaly.  Lungs: A few crackles in the lung bases but otherwise clear to auscultation.  Heart exam: Normal S1-S2 without extra sounds or murmurs.  Rapid rate, regular rhythm.  Abdomen is nontender.  Extremities are atraumatic.  Skin is dry without rashes or lesions.  Neuroexam: Awake, conversive and moving all 4 extremities well.  Labs Reviewed   COMP METABOLIC PANEL (14) - Abnormal; Notable for the following components:       Result Value    Glucose 169 (*)     Sodium 132 (*)     Potassium 5.5 (*)     Chloride 89 (*)     CO2 40.0 (*)     AST 56 (*)     Albumin 3.0 (*)     A/G Ratio 0.7 (*)     All other components within normal limits   PRO BETA NATRIURETIC PEPTIDE - Abnormal;  Notable for the following components:    Pro-Beta Natriuretic Peptide 8,409 (*)     All other components within normal limits   PROTHROMBIN TIME (PT) - Abnormal; Notable for the following components:    PT 19.1 (*)     INR 1.59 (*)     All other components within normal limits   PTT, ACTIVATED - Abnormal; Notable for the following components:    PTT 36.1 (*)     All other components within normal limits   CBC W/ DIFFERENTIAL - Abnormal; Notable for the following components:    WBC 13.6 (*)     RBC 4.22 (*)     .5 (*)     MCHC 30.7 (*)     Neutrophil Absolute Prelim 11.76 (*)     Neutrophil Absolute 11.76 (*)     Lymphocyte Absolute 0.72 (*)    EKG    Rate, intervals and axes as noted on EKG Report.  Rate: 139  Rhythm: Sinus tachycardia with short VT.  Reading: Left bundle branch block.  Abnormal EKG.  Based on the patient's history this is consistent with atrial flutter not sinus tachycardia with RVR.  Otherwise agree with EKG report.    XR CHEST AP PORTABLE  (CPT=71045)  Result Date: 6/29/2024  CONCLUSION:  Findings are unchanged relative to the previous study.  Please see further details above.   LOCATION:  [facility]      Dictated by (CST): Marielena Martines DO on 6/29/2024 at 11:32 AM     Finalized by (CST): Marielena Martines DO on 6/29/2024 at 11:38 AM       Disposition and Plan   Clinical Impression:  1. Atrial flutter with rapid ventricular response (HCC)    2. Hyperkalemia    3. Hypoxia    4. Chronic obstructive pulmonary disease, unspecified COPD type (HCC)    5. Supplemental oxygen dependent       Disposition:  Admit  6/29/2024 11:49 am    History and Physical   History of Present Illness:    Keny Marshall is a 63 year old male with a PMH of chronic hypoxic and hypercapnic respiratory failure, aflutter with prior ablation, COPD, seizure disorder, and DMII presented to ED due to palpitations that have been worsening for the past 2 days. History obtained from family member at bedside as patient currently on  BiPAP. Patient has been increasingly SOB over the past few days as well. No chest pain. Mild non-productive cough. Denies fever/chills.      BP (!) 114/91   Pulse (!) 139   Resp 23   Wt 162 lb (73.5 kg)   SpO2 99%   BMI 23.24 kg/m²   General: No acute distress, Alert  Respiratory: Diminished bilaterally.   Cardiovascular: Rapid aflutter.   Abdomen: Soft, Non-tender, non-distended, positive bowel sounds  Neuro: No new focal deficits  Extremities: No edema  Lab 06/29/24  1106   RBC 4.22*   HGB 13.0   HCT 42.4   .5*   MCH 30.8   MCHC 30.7*   RDW 14.0   NEPRELIM 11.76*   WBC 13.6*   .0      Lab 06/29/24  1106   *   BUN 22   CREATSERUM 0.84   EGFRCR 97   CA 9.2   ALB 3.0*   *   K 5.5*   CL 89*   CO2 40.0*   ALKPHO 60   AST 56*   ALT 39   BILT 0.9   TP 7.3      Lab 06/29/24  1106   TROPHS 63      Lab 06/29/24  1106   PBNP 8,409*      Assessment & Plan:     #Rapid aflutter  -Cardizem gtt  -Continue DOAC  -Tele  -Cardiology consulted  -Recently underwent DCCV 6/14     #Acute on chronic hypoxic and hypercapnic respiratory failure in setting of diastolic heart failure and emphysema   -? Related to #1 v COPD exacerbation  -Wean BiPAP as able     #Acute on chronic diastolic heart failure  #Aortic stenosis s/p AVR  -Diuresis as BP tolerates   -Monitor daily weights and I/Os  -Cardiology consulted  -Recent echo reviewed      #Hyponatremia  -Suspect will improve with diuresis     #COPD  -Continue home inhalers     #H/o left basilic and cephalic vein thrombus   -Xarelto      #Seizure disorder  -Keppra     #Type 2 diabetes  -SSI     #History of renal cell carcinoma status post nephrectomy     #ABIGAIL  -CPAP protocol      MEDICATIONS ADMINISTERED IN LAST 1 DAY:  acetaminophen (Tylenol Extra Strength) tab 500 mg       Date Action Dose Route User    7/2/2024 0859 Given 500 mg Oral Imelda Robison RN    7/1/2024 2116 Given 500 mg Oral Honorio Bell RN    7/1/2024 1610 Given 500 mg Oral Liliana Arias  RADHA          aspirin DR tab 81 mg       Date Action Dose Route User    7/2/2024 0903 Given 81 mg Oral Imelda Robison RN          atorvastatin (Lipitor) tab 20 mg       Date Action Dose Route User    7/1/2024 2116 Given 20 mg Oral Honorio Bell RN          dilTIAZem (cardIZEM) 100 mg in sodium chloride 0.9% 100 mL IVPB-ADDV       Date Action Dose Route User    7/2/2024 1032 Rate/Dose Change 10 mg/hr Intravenous Imelda Robison RN    7/2/2024 0828 New Bag 5 mg/hr Intravenous Imelda Robison RN    7/2/2024 0000 Rate/Dose Verify 5 mg/hr Intravenous Honorio Bell RN    7/1/2024 2200 Rate/Dose Verify 5 mg/hr Intravenous Honorio Bell RN    7/1/2024 2000 Rate/Dose Verify 5 mg/hr Intravenous Honorio Bell RN    7/1/2024 1409 New Bag 5 mg/hr Intravenous Liliana Arias RN          fluticasone furoate-vilanterol (Breo Ellipta) 200-25 MCG/ACT inhaler 1 puff       Date Action Dose Route User    7/2/2024 0901 Given 1 puff Inhalation Imelda Robison RN          furosemide (Lasix) 10 mg/mL injection 40 mg       Date Action Dose Route User    7/2/2024 0859 Given 40 mg Intravenous Imelda Robison RN    7/1/2024 2116 Given 40 mg Intravenous Honorio Bell RN    7/1/2024 1604 Given 40 mg Intravenous Liliana Arias RN          guaiFENesin ER (Mucinex) 12 hr tab 1,200 mg       Date Action Dose Route User    7/2/2024 0859 Given 1,200 mg Oral Imelda Robison RN    7/1/2024 2116 Given 1,200 mg Oral Honorio Bell RN          insulin aspart (NovoLOG) 100 Units/mL FlexPen 1-10 Units       Date Action Dose Route User    7/2/2024 1313 Given 3 Units Subcutaneous (Left Upper Arm) Imelda Robison RN          ipratropium (Atrovent) 0.02 % nebulizer solution 0.5 mg       Date Action Dose Route User    7/2/2024 1153 Given 0.5 mg Nebulization Sunita Rubio, [facility]    7/2/2024 0812 Given 0.5 mg Nebulization Barbi Vega, [facility]    7/1/2024 2058 Given 0.5 mg Nebulization Rodolfo Coronado, [facility]    7/1/2024 1553 Given  0.5 mg Nebulization Barbi Vega, JOSEPH          levETIRAcetam (Keppra) tab 500 mg       Date Action Dose Route User    7/2/2024 0859 Given 500 mg Oral Imelda Robison RN    7/1/2024 2116 Given 500 mg Oral Honorio Bell RN          melatonin tab 3 mg       Date Action Dose Route User    7/1/2024 2300 Given 3 mg Oral Honorio Bell RN          potassium chloride (Klor-Con M20) tab 40 mEq       Date Action Dose Route User    7/1/2024 1604 Given 40 mEq Oral Liliana Arias RN          rivaroxaban (Xarelto) tab 20 mg       Date Action Dose Route User    7/2/2024 0859 Given 20 mg Oral Imelda Robison RN          Roflumilast TABS 500 mcg       Date Action Dose Route User    7/2/2024 0901 Given 500 mcg Oral Imelda Robison RN          sodium chloride (hypertonic) 3 % nebulizer solution 3 mL       Date Action Dose Route User    7/2/2024 0812 Given 3 mL Nebulization Barbi Vega RCP    7/1/2024 2058 Given 3 mL Nebulization Rodolfo Coronado RCP            Vitals (last day)       Date/Time Temp Pulse Resp BP SpO2 Weight O2 Device O2 Flow Rate (L/min) [facility]    07/02/24 1300 -- 112 30 106/82 94 % -- -- -- AM    07/02/24 1200 97.1 °F (36.2 °C) 131 32 115/87 99 % -- -- -- AM    07/02/24 1100 -- 132 20 107/74 97 % -- -- -- AM    07/02/24 1000 -- 137 28 107/81 98 % -- -- 6 L/min AM    07/02/24 0900 -- 114 22 110/75 94 % -- -- -- AM    07/02/24 0800 98.6 °F (37 °C) 107 31 107/85 96 % -- High flow nasal cannula 7 L/min AM    07/02/24 0400 98.1 °F (36.7 °C) 91 20 115/50 96 % -- -- -- AT    07/02/24 0000 98 °F (36.7 °C) 111 26 93/69 90 % -- -- -- AT    07/01/24 2000 98.3 °F (36.8 °C) 98 36 113/75 94 % -- High flow nasal cannula 8 L/min AT    07/01/24 1600 98.1 °F (36.7 °C) 96 25 102/69 100 % -- High flow nasal cannula 8 L/min [place]    07/01/24 0913 -- -- -- -- 91 % -- High flow nasal cannula 15 L/min [place]    07/01/24 0910 -- -- -- -- 80 % -- High flow nasal cannula 10 L/min [place]    07/01/24 0900 -- 106 25 131/66 88 % --  High flow nasal cannula 10 L/min AMA    07/01/24 0800 98.8 °F (37.1 °C) 98 33 144/69 96 % -- Bi-PAP -- AMA    07/01/24 0400 97.3 °F (36.3 °C) 85 28 120/66 92 % -- Nasal cannula 5 L/min VG    07/01/24 0300 -- 67 20 116/72 100 % -- -- -- VG    07/01/24 0200 -- 77 29 118/65 100 % -- -- -- VG    07/01/24 0100 -- 86 31 123/67 99 % -- -- -- VG    07/01/24 0000 98 °F (36.7 °C) 86 33 121/62 93 % -- -- -- VG

## 2024-07-02 NOTE — CM/SW NOTE
Met with patient to discuss discharge planning.  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  Patient has an AVAPS machine through [employer] and is current with [facility].  PCP is Dr Dustin Avina; uses [facility] to fill his prescriptions.  CM/SW to continue to follow for any additional discharge needs   07/02/24 1500   CM/SW Referral Data   Referral Source    Reason for Referral Discharge planning   Informant Patient   Readmission Assessment   Pt's living situation prior to admission? Home with family   Patient Info   Patient's Current Mental Status at Time of Assessment Alert;Oriented   Patient's Home Environment House   Number of Levels in Home 2   Number of Stair in Home 6-7 steps between main and upper floor   Patient lives with Spouse/Significant other   Patient Status Prior to Admission   Independent with ADLs and Mobility Yes   Discharge Needs   Anticipated D/C needs Home health care

## 2024-07-02 NOTE — PLAN OF CARE
Aflutter, controlled rate on monitor. Cardizem gtt 5mg/hr. Tylenol given for headache. 8L HFNC in use while awake, AVAPS on at bedtime, then off at 0430 as pt. Refused to wear it any longer stating he can't sleep with it on. Melatonin given to promote sleep.

## 2024-07-02 NOTE — OCCUPATIONAL THERAPY NOTE
OCCUPATIONAL THERAPY EVALUATION - INPATIENT     Room Number: 6608/6608-A  Evaluation Date: 7/2/2024  Type of Evaluation: Initial  Presenting Problem: Racing heart and shortness of breath x 2 days, admitted for Rapid aflutter,HF, respiratory failure    Physician Order: IP Consult to Occupational Therapy  Reason for Therapy: ADL/IADL Dysfunction and Discharge Planning    OCCUPATIONAL THERAPY ASSESSMENT   Patient is currently functioning near baseline with toileting, lower body dressing, and transfers. Prior to admission, patient's baseline is independent.  Patient is requiring stand-by assist and contact guard assist as a result of the following impairments: decreased endurance and increased O2 needs from baseline. Occupational Therapy will continue to follow for duration of hospitalization.    Patient will benefit from continued skilled OT Services For duration of hospitalization, however, given the patient is functioning near baseline level do not anticipate skilled therapy needs at discharge       History Related to Current Admission: Patient is a 63 year old male admitted on 6/29/2024 with Presenting Problem: Racing heart and shortness of breath x 2 days, admitted for Rapid aflutter,HF, respiratory failure. Co-Morbidities : AVR, COPD, seizure, DM, renal cell carcinoa, nephrectomy, rotator cuff repair    Recent hospital admission:  6/11 to 6/15/24 for respiratory failure, COPD exacerbation, cardioversion on 6/14/24 -> home PT  5/27 to 6/3/24 at another hospital for respiratory failure,grand mal seizure       WEIGHT BEARING RESTRICTION  Weight Bearing Restriction: None                Recommendations for nursing staff:   Transfers: supervision  Toileting location: toilet    EVALUATION SESSION:  Patient Start of Session: supine  FUNCTIONAL TRANSFER ASSESSMENT  Sit to Stand: Edge of Bed  Edge of Bed: Supervision  Toilet Transfer: Supervision    BED MOBILITY  Supine to Sit : Independent      O2 SATURATIONS  Oxygen  Therapy  SPO2% on Oxygen at Rest: 95  At rest oxygen flow (liters per minute): 6  SPO2% Ambulation on Oxygen: 90 (pleth not reading accurately)  Ambulation oxygen flow (liters per minute): 10    COGNITION  Overall Cognitive Status:  WFL - within functional limits    Upper Extremity   ROM: within functional limits   Strength: within functional limits       EDUCATION PROVIDED  Patient: Role of Occupational Therapy; Plan of Care; Energy Conservation  Patient's Response to Education: Returned Demonstration; Verbalized Understanding    Equipment used: none    Therapist comments: pleasant, using 6 liters at rest, 95%, , RR 24.  Supine to sit independent, supervision to stand and to walk into bathroom.  Supervision toilet transfer, clothing management, and sink side hand hygiene.   Pleth not reading accurately, 10L when pt was walking back to the chair, 90%. HR up to 136, RR up to 36.  After resting, 93% using 6 liters.   Murphy Dyspne scale 2 at rest, 6-7 after toileting.       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   Uses 4.5 liters oxygen at all times. Wife assists with bathing.  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        PAIN ASSESSMENT  Ratin          OBJECTIVE     Fall Risk: Standard fall risk      ASSESSMENTS    AM-PAC ‘6-Clicks’ Inpatient Daily Activity Short Form  -   Putting on and taking off regular lower body clothing?: A Little  -   Bathing  (including washing, rinsing, drying)?: A Little  -   Toileting, which includes using toilet, bedpan or urinal? : A Little  -   Putting on and taking off regular upper body clothing?: None  -   Taking care of personal grooming such as brushing teeth?: None  -   Eating meals?: None    AM-PAC Score:  Score: 21  Approx Degree of Impairment: 32.79%  Standardized Score (AM-PAC Scale): 44.27    ADDITIONAL TESTS     NEUROLOGICAL FINDINGS      COGNITION ASSESSMENTS       PLAN  OT Treatment Plan: ADL training;Energy conservation/work simplification techniques;Functional transfer training;Patient/Family education;Compensatory technique education  Rehab Potential : Fair  Frequency: 3x/week  Number of Visits to Meet Established Goals: 3    ADL Goals   Patient will perform grooming: with supervision  Patient will perform lower body dressing:  with supervision    Additional Goals  Pt will incorporate 2 energy conservation techniques into ADL.    Patient Evaluation Complexity Level:   Occupational Profile/Medical History LOW - Brief history including review of medical or therapy records    Specific performance deficits impacting engagement in ADL/IADL LOW  1 - 3 performance deficits    Client Assessment/Performance Deficits MODERATE - Comorbidities and min to mod modifications of tasks    Clinical Decision Making LOW - Analysis of occupational profile, problem-focused assessments, limited treatment options    Overall Complexity LOW     OT Session Time: 20 minutes  Self-Care Home Management: 8 minutes  Therapeutic Activity: 4 minutes

## 2024-07-02 NOTE — CM/SW NOTE
Per chart review, call placed to [facility] --await call back to confirm if active/services

## 2024-07-03 ENCOUNTER — APPOINTMENT (OUTPATIENT)
Dept: GENERAL RADIOLOGY | Facility: HOSPITAL | Age: 64
End: 2024-07-03
Attending: INTERNAL MEDICINE
Payer: MEDICARE

## 2024-07-03 LAB
ANION GAP SERPL CALC-SCNC: 2 MMOL/L (ref 0–18)
BUN BLD-MCNC: 16 MG/DL (ref 9–23)
CALCIUM BLD-MCNC: 8.7 MG/DL (ref 8.5–10.1)
CHLORIDE SERPL-SCNC: 91 MMOL/L (ref 98–112)
CO2 SERPL-SCNC: 42 MMOL/L (ref 21–32)
CREAT BLD-MCNC: 0.58 MG/DL
EGFRCR SERPLBLD CKD-EPI 2021: 109 ML/MIN/1.73M2 (ref 60–?)
GLUCOSE BLD-MCNC: 137 MG/DL (ref 70–99)
GLUCOSE BLD-MCNC: 185 MG/DL (ref 70–99)
GLUCOSE BLD-MCNC: 204 MG/DL (ref 70–99)
GLUCOSE BLD-MCNC: 272 MG/DL (ref 70–99)
GLUCOSE BLD-MCNC: 96 MG/DL (ref 70–99)
OSMOLALITY SERPL CALC.SUM OF ELEC: 286 MOSM/KG (ref 275–295)
POTASSIUM SERPL-SCNC: 4.4 MMOL/L (ref 3.5–5.1)
SODIUM SERPL-SCNC: 135 MMOL/L (ref 136–145)

## 2024-07-03 PROCEDURE — 99232 SBSQ HOSP IP/OBS MODERATE 35: CPT | Performed by: HOSPITALIST

## 2024-07-03 PROCEDURE — 71045 X-RAY EXAM CHEST 1 VIEW: CPT | Performed by: INTERNAL MEDICINE

## 2024-07-03 PROCEDURE — 99233 SBSQ HOSP IP/OBS HIGH 50: CPT | Performed by: INTERNAL MEDICINE

## 2024-07-03 RX ORDER — SELENIUM SULFIDE 2.5 MG/100ML
LOTION TOPICAL
Status: DISCONTINUED | OUTPATIENT
Start: 2024-07-03 | End: 2024-07-20

## 2024-07-03 RX ORDER — METHYLPREDNISOLONE SODIUM SUCCINATE 40 MG/ML
40 INJECTION, POWDER, LYOPHILIZED, FOR SOLUTION INTRAMUSCULAR; INTRAVENOUS EVERY 8 HOURS
Status: DISCONTINUED | OUTPATIENT
Start: 2024-07-03 | End: 2024-07-05

## 2024-07-03 NOTE — PROGRESS NOTES
Addendum:    Agree with above note except as documented below.  Patient is feeling more short of breath today.  Denies fever, chills, n/v.  No other acute complaints.      Gen: NAD  CVS: Diminished BS bilaterally  Resp: CTAB  Abd: soft, NT, ND    #Acute on chronic hypercapnic and hypoxic respiratory failure   #HFpEF with acute exacerbation  #Rapid a. Flutter   #Aortic stenosis s/p AVR  #Hyponatremia  #Left lung mass  #COPD  #Seizure d/o   #DM Type 2  #Hx of RCC s/p nephrectomy   #ABIGAIL  #Hx of VTE    Continue lasix, monitor strict intake/output.  Daily weights.  Net neg 684  Steroids, nebs, breo, Roflumilast  Outpatient PET/CT - pulm   Xarelto   Dilt drip       Pt seen and examined independently. Chart reviewed. Labs and imaging over the last 24 hours have been personally reviewed.  I personally made/approved 100% of the management plan for this patient and take full responsibility for the patient management.   Note has been reviewed by me and modified as needed.  Exam and Impression/ Recs as noted above.  D/w staff.    Alfredo Garcia MD          [facility]   [facility]     Keny IQBAL Joanna Patient Status:  Inpatient    1960 MRN [id number]   Location [facility] 6NE-A Attending Perfecto Pace, DO   Hosp Day # 4 PCP Dustin Avina MD     Chief Complaint: SOB    Subjective:  pt slighly tachypneic and currently on cardizem drip.  Pulm and cards evaluating.     Objective:    Review of Systems:   A comprehensive review of systems was completed; pertinent positive and negatives stated in subjective.    Vital signs:  Temp:  [97.1 °F (36.2 °C)-98.6 °F (37 °C)] 98.6 °F (37 °C)  Pulse:  [] 123  Resp:  [20-40] 26  BP: ()/(61-93) 112/88  SpO2:  [84 %-100 %] 98 %  FiO2 (%):  [40 %-50 %] 40 %    Physical Exam:    General: No acute distress 63 year old male   Respiratory: Diminished bilaterally. Mild wheezing  Cardiovascular: Irregular rhythm, tachy.   Abdomen: Soft,  Non-tender, non-distended  Neuro: No new focal deficits.   Extremities: No edema    Diagnostic Data:    Labs:  Recent Labs   Lab 06/29/24  1106 07/01/24  0937   WBC 13.6* 10.1   HGB 13.0 11.5*   .5* 99.5   .0 160.0   INR 1.59*  --        Recent Labs   Lab 06/29/24  1106 06/30/24  0522 07/01/24  0937 07/01/24  1813   * 131* 123*  --    BUN 22 29* 18  --    CREATSERUM 0.84 0.97 0.46*  --    CA 9.2 9.1 8.9  --    ALB 3.0*  --   --   --    * 135* 135*  --    K 5.5* 4.3 3.5 4.4   CL 89* 92* 90*  --    CO2 40.0* 38.0* 41.0*  --    ALKPHO 60  --   --   --    AST 56*  --   --   --    ALT 39  --   --   --    BILT 0.9  --   --   --    TP 7.3  --   --   --        Estimated Creatinine Clearance: 167.1 mL/min (A) (based on SCr of 0.46 mg/dL (L)).    Recent Labs   Lab 06/29/24  1106   TROPHS 63       Recent Labs   Lab 06/29/24  1106   PTP 19.1*   INR 1.59*                  Microbiology    No results found for this visit on 06/29/24.      Imaging: Reviewed in Epic.    Medications:    methylPREDNISolone  40 mg Intravenous Q8H    ipratropium  0.5 mg Nebulization QID    sodium chloride (hypertonic)  3 mL Nebulization 2 times daily    furosemide  40 mg Intravenous TID    aspirin  81 mg Oral Daily    atorvastatin  20 mg Oral Nightly    guaiFENesin ER  1,200 mg Oral BID    levETIRAcetam  500 mg Oral BID    Roflumilast  500 mcg Oral Daily    rivaroxaban  20 mg Oral Daily with food    insulin aspart  1-10 Units Subcutaneous TID AC and HS    fluticasone furoate-vilanterol  1 puff Inhalation Daily       Assessment & Plan:      #Acute on chronic hypoxic/hypercapnic respiratory failure 2/2 Acute on chronic diastolic heart failure and COPD exacerbation  -bipap prn/sleep, O2 NC  -steroids/mucinex/CXR added today per pulm  -IV diuresis TID per cards  -CT chest reviewed - hilar adenopathy with soft tissue density seen - pulmonary recommending OP PET-CT  -Monitor daily weights and I/Os  -Recent echo reviewed     #Rapid  aflutter  -Cardizem gtt, amio stopped per cards  -Continue DOAC  -Tele  -Recently underwent DCCV 6/14    #Aortic stenosis s/p AVR     #Hyponatremia  -Improved/ recheck     #H/o left basilic and cephalic vein thrombus   #LUE superficial thrombophlebitis   -Xarelto      #Seizure disorder  -Keppra     #Type 2 diabetes, 5.6%  -correctional scale, add nutritional scale and consider degludec as adding steroids     #History of renal cell carcinoma status post nephrectomy     #ABIGAIL  -CPAP protocol     #Seborrheic dermatitis  -topicals/shampoos.  Do not have ketoconazole shampoo here.    Case d/w pt, pulm, jesus alberto, RN.  F/u on CXR.      LAM Teague  9:11 AM     Supplementary Documentation:     Quality:  DVT Mechanical Prophylaxis:     Early ambuation  DVT Pharmacologic Prophylaxis   Medication    rivaroxaban (Xarelto) tab 20 mg         DVT Pharmacologic prophylaxis: Aspirin 81 mg      Code Status: Full Code  Peters: No urinary catheter in place    The 21st Century Cures Act makes medical notes like these available to patients in the interest of transparency. Please be advised this is a medical document. Medical documents are intended to carry relevant information, facts as evident, and the clinical opinion of the practitioner. The medical note is intended as peer to peer communication and may appear blunt or direct. It is written in medical language and may contain abbreviations or verbiage that are unfamiliar.

## 2024-07-03 NOTE — PROGRESS NOTES
Cardiology Progress Note    Keny Marshall Patient Status:  Inpatient    1960 MRN [id number]   [facility] 6NE-A Attending Perfecto Pace, DO   Hosp Day # 4 PCP Dustin Avina MD     Primary cardiologist: Dr. Salazar    Impression;  Aflutter - s/p failed recent CV. Pt on AC. Now on CCB IV and amio  SOB - 2/2 acute dCHF and COPD exac  Acute diastolic CHF - pt on bipap and diuretics  S/p bio AVR  TTE (24): LVEF 55%, +DD, 23mm SAVR 3.3m/s, mean 22mmHg.     Plan:  diuresis: continue lasix 40mg IV TID. f/u BMP today.  If Cr stable, consider metolazone 2.5mg x 1.  rate control: diltiazem gtt.  I have my concerns re long-term amiodarone in setting of severe COPD, held.    L lung mass- outpatient PET/CT per pulmonology  severe COPD- increased O2 requirement.  started steroids.  continue inhaler therapy.  Discussed with Dr. Randle, confirming advanced/end-stage emphysema. Inpatient course will likely be protracted over several days.    Subjective:  No acute issues overnight. HR labile, up to 130s with transfers. Breathing remains stable- mild dyspnea at rest.  O2 requirement uptrending.    Objective:  /81   Pulse (!) 133   Temp 98.6 °F (37 °C) (Temporal)   Resp 26   Wt 160 lb 7.9 oz (72.8 kg)   SpO2 (!) 89%   BMI 23.03 kg/m²   Temp (24hrs), Av.1 °F (36.7 °C), Min:97.1 °F (36.2 °C), Max:98.6 °F (37 °C)      Intake/Output Summary (Last 24 hours) at 7/3/2024 0954  Last data filed at 7/3/2024 0630  Gross per 24 hour   Intake 1653 ml   Output 1200 ml   Net 453 ml     Wt Readings from Last 3 Encounters:   24 160 lb 7.9 oz (72.8 kg)   24 159 lb 6.3 oz (72.3 kg)   23 172 lb (78 kg)       General: Awake and alert; in no acute distress  Cardiac:  irregular rhythm; no murmurs/rubs are appreciated  Lungs: distant bilateral BS without clear crackles or wheezes; no accessory muscle use  Abdomen: Soft, non-tender; bowel sounds are normoactive  Extremities: No  clubbing/cyanosis/edema; moves all 4 extremities normally    Current Facility-Administered Medications   Medication Dose Route Frequency    methylPREDNISolone sodium succinate (Solu-MEDROL) injection 40 mg  40 mg Intravenous Q8H    selenium sulfide (Selsun) 2.5 % shampoo   Topical Daily PRN    insulin aspart (NovoLOG) 100 Units/mL FlexPen 1-20 Units  1-20 Units Subcutaneous TID CC and HS    ipratropium (Atrovent) 0.02 % nebulizer solution 0.5 mg  0.5 mg Nebulization QID    sodium chloride (hypertonic) 3 % nebulizer solution 3 mL  3 mL Nebulization 2 times daily    furosemide (Lasix) 10 mg/mL injection 40 mg  40 mg Intravenous TID    aspirin DR tab 81 mg  81 mg Oral Daily    atorvastatin (Lipitor) tab 20 mg  20 mg Oral Nightly    guaiFENesin ER (Mucinex) 12 hr tab 1,200 mg  1,200 mg Oral BID    ipratropium (Atrovent) 0.02 % nebulizer solution 500 mcg  500 mcg Nebulization Q6H PRN    levETIRAcetam (Keppra) tab 500 mg  500 mg Oral BID    Roflumilast TABS 500 mcg  500 mcg Oral Daily    rivaroxaban (Xarelto) tab 20 mg  20 mg Oral Daily with food    dilTIAZem (cardIZEM) 100 mg in sodium chloride 0.9% 100 mL IVPB-ADDV  2.5-20 mg/hr Intravenous Continuous    glucose (Dex4) 15 GM/59ML oral liquid 15 g  15 g Oral Q15 Min PRN    Or    glucose (Glutose) 40% oral gel 15 g  15 g Oral Q15 Min PRN    Or    glucose-vitamin C (Dex-4) chewable tab 4 tablet  4 tablet Oral Q15 Min PRN    Or    dextrose 50% injection 50 mL  50 mL Intravenous Q15 Min PRN    Or    glucose (Dex4) 15 GM/59ML oral liquid 30 g  30 g Oral Q15 Min PRN    Or    glucose (Glutose) 40% oral gel 30 g  30 g Oral Q15 Min PRN    Or    glucose-vitamin C (Dex-4) chewable tab 8 tablet  8 tablet Oral Q15 Min PRN    insulin aspart (NovoLOG) 100 Units/mL FlexPen 1-10 Units  1-10 Units Subcutaneous TID AC and HS    melatonin tab 3 mg  3 mg Oral Nightly PRN    acetaminophen (Tylenol Extra Strength) tab 500 mg  500 mg Oral Q4H PRN    polyethylene glycol (PEG 3350) (Miralax) 17 g  oral packet 17 g  17 g Oral Daily PRN    sennosides (Senokot) tab 17.2 mg  17.2 mg Oral Nightly PRN    bisacodyl (Dulcolax) 10 MG rectal suppository 10 mg  10 mg Rectal Daily PRN    fleet enema (Fleet) 7-19 GM/118ML rectal enema 133 mL  1 enema Rectal Once PRN    prochlorperazine (Compazine) 10 MG/2ML injection 5 mg  5 mg Intravenous Q8H PRN    fluticasone furoate-vilanterol (Breo Ellipta) 200-25 MCG/ACT inhaler 1 puff  1 puff Inhalation Daily       Laboratory Data:       Lab Results   Component Value Date    INR 1.59 (H) 06/29/2024    INR 1.06 06/11/2024    INR 2.26 (H) 09/22/2021            Telemetry: AFL

## 2024-07-03 NOTE — PROGRESS NOTES
[facility] Progress Note     SUBJECTIVE/Interval history:  No acute events overnight, used AVAPS overnight, ~7 hours. Feels more dyspneic this am. Having cough/congestion as well, given nebs earlier with some improvement. No pain  No fevers    Review of Systems:   A comprehensive 14 point review of systems was completed.   Pertinent positives and negatives noted in the HPI.    Medications  Reviewed personally   ipratropium  0.5 mg Nebulization QID    sodium chloride (hypertonic)  3 mL Nebulization 2 times daily    furosemide  40 mg Intravenous TID    aspirin  81 mg Oral Daily    atorvastatin  20 mg Oral Nightly    guaiFENesin ER  1,200 mg Oral BID    levETIRAcetam  500 mg Oral BID    Roflumilast  500 mcg Oral Daily    rivaroxaban  20 mg Oral Daily with food    insulin aspart  1-10 Units Subcutaneous TID AC and HS    fluticasone furoate-vilanterol  1 puff Inhalation Daily       ipratropium    glucose **OR** glucose **OR** glucose-vitamin C **OR** dextrose **OR** glucose **OR** glucose **OR** glucose-vitamin C    melatonin    acetaminophen    polyethylene glycol (PEG 3350)    sennosides    bisacodyl    fleet enema    prochlorperazine    OBJECTIVE:  Vitals:    07/03/24 0500 07/03/24 0600 07/03/24 0630 07/03/24 0700   BP: 130/61 132/61  131/69   BP Location:       Pulse: 89 90  100   Resp: 23 23  23   Temp:       TempSrc:       SpO2: 94% 94%  95%   Weight:   160 lb 7.9 oz (72.8 kg)        Vital signs in last 24 hours:  Blood pressure 131/69, pulse 100, temperature 98.2 °F (36.8 °C), temperature source Temporal, resp. rate 23, weight 160 lb 7.9 oz (72.8 kg), SpO2 95%.     Intake/Output:  I/O last 3 completed shifts:  In: 2133 [P.O.:1918; I.V.:215]  Out: 2450 [Urine:2450]  FiO2 (%):  [40 %-50 %] 40 %    Physical Exam:  General: Appears alert, mild tachypnea at rest on 8L  Neurologic:No focal deficits.  Alert.  Oriented.  Lungs:diminished bs bilaterally. Wheeze noted  today  Heart:irreg RR. No m heard  Abdomen:Soft, non-tender.  No masses.  No guarding.  No rebound.  Extremities:edema    Lab Data Review:   Recent Labs   Lab 06/29/24  1106 06/30/24  0522 07/01/24  0937 07/01/24  1813   * 131* 123*  --    BUN 22 29* 18  --    CREATSERUM 0.84 0.97 0.46*  --    CA 9.2 9.1 8.9  --    * 135* 135*  --    K 5.5* 4.3 3.5 4.4   CL 89* 92* 90*  --    CO2 40.0* 38.0* 41.0*  --      Recent Labs   Lab 06/29/24  1106 07/01/24  0937   RBC 4.22* 3.71*   HGB 13.0 11.5*   HCT 42.4 36.9*   .5* 99.5   MCH 30.8 31.0   MCHC 30.7* 31.2   RDW 14.0 13.8   NEPRELIM 11.76* 8.67*   WBC 13.6* 10.1   .0 160.0     No results for input(s): \"BNP\" in the last 168 hours.  No results for input(s): \"TROP\", \"CK\" in the last 168 hours.  Recent Labs   Lab 06/29/24  1106   INR 1.59*   PTT 36.1*     Recent Labs   Lab 07/02/24  0834   ABGPHT 7.35   KRLECJ6F 82*   KLYTP6J 161*   ABGHCO3 37.6*   ABGBE 16.3*   TEMP 98.6   ROMARIO Positive   SITE Right Radial   DEV Nasal cannula   THGB 11.3*       Other Labs:  Interval Culture Data:   No results found for this visit on 06/29/24.  No results for input(s): \"COLORUR\", \"CLARITY\", \"SPECGRAVITY\", \"GLUUR\", \"BILUR\", \"KETUR\", \"BLOODURINE\", \"PHURINE\", \"PROUR\", \"UROBILINOGEN\", \"NITRITE\", \"LEUUR\", \"WBCUR\", \"RBCUR\", \"BACUR\", \"EPIUR\" in the last 168 hours.    Interval Radiology:   Reviewed personally  US VENOUS DOPPLER ARM BILAT - DIAG IMG (CPT=93970)    Result Date: 7/1/2024  CONCLUSION:  Superficial venous thrombosis involving the distal left cephalic vein.  No evidence of deep venous thrombosis in the bilateral upper extremities.  LOCATION:  [id number]    Dictated by (CST): Von Toussaint MD on 7/01/2024 at 2:29 PM     Finalized by (CST): Von Toussaint MD on 7/01/2024 at 2:31 PM       CT CHEST(CONTRAST ONLY) (CPT=71260)    Result Date: 7/1/2024  CONCLUSION:  There is now a small right pleural effusion and in the event smaller left pleural effusion.  These were not  present on the prior CT.  Increase in the size of a masslike area of tissue density in the left midlung laterally adjacent to chronic  pleural calcification.  This could reflect a zone of increasing chronic rounded atelectasis, with neoplasm within the differential.  Redemonstration extensive severe chronic lung disease.  Nonspecific adenopathy right hilum and mediastinum.  Dilated pulmonary artery could reflect chronic pulmonary hypertension, particularly in the setting of extensive chronic severe lung disease.   LOCATION:  [id number]   Dictated by (CST): Severino Roque MD on 7/01/2024 at 10:31 AM     Finalized by (CST): Severino Roque MD on 7/01/2024 at 10:38 AM        Assessment/Plan:  #1. Acute on chronic hypercapnic and hypoxic respiratory failure  Multifactorial due to afib with RVR, CHF exacerbation with underlying end stage COPD  7/1 CTA with fluid overload, no PE  Treat/control afib and CHF as per cardiology  Continue nebs   Start methylprednisolone  Wean o2 for goal saturations of 88-92% given hypercapnia. His previous o2 baseline was: 2L at rest, 4L on exertion; using and benefiting from portable oxygen concentrator (3 at rest, 6 on exertion)  Continue on AVAPS with naps/sleep and PRN    #2. Acute CHF exacerbation, afib with RVR  Rate control and diurese as per cardiology  Previous cardioversion 6/14     #3. COPD  Severe obstruction on previous PFTs 3/2021  off arformoterol given worsening afib/tachycardia  Continue Breo 200 in place of his home inhalers (home regimen: symbicort 160 BID, spiriva daily)  Resume his home neb regimen: continue scheduled ipratropium, hypertonic saline and budesonide; apparently hospital does not stock levalbuterol  He cannot tolerate albuterol due to palpitations, tremors and with afib with RVR; off duonebs  Start methylpred  Continue roflumilast  Previously reviewed BLVR - not candidate at this time, reassess as outpatient     #4. Pulmonary nodules  7/2017 CT with worsening  peripheral lingular atelectasis. Nodules stable   5/2018 CT stable nodules  12/2020 CT stable  7/2021 CT stable but worsening mediastinal LAD suspect reactive to recent cardiac surgery  1/2022 CT with stable nodules, improved LAD  3/2023 CT chest with several small nodules  9/2023 CT chest with stable findings   7/1/2024 CT chest with worsening ALYSON lesion, possible scarring, rounded atelectasis vs neoplasm  Will need outpatient follow up including PET/CT    #5. Tobacco abuse  30+ pack years, active pipe smoker  Hold on LDCT given recent CT with worsening ALYSON lesion, will need PET/CT as above    Stefano Randle MD

## 2024-07-03 NOTE — PROGRESS NOTES
07/03/24 0449   BiPAP   Device V60   BiPAP / CPAP CE# V60-08   BiPAP bacteria filter Yes   BiPAP Pre-use check ok? Yes   BIPAP plugged into main power? Yes   Mode AVAPS   Interface Full face mask   Mask Size Large   Control Settings   Oxygen Percent 40 %   Inspiratory time 0.9   Insp rise time 3   AVAPS   Min IPAP 15   Max IPAP 30   EPAP Level 5   Set rate 20   Tidal volume 550   SIPAP   Resp 22   FiO2 (%) 40 %   BiPAP/CPAP Alarm Settings   Hi Rate 50   Low Rate 10   Hi VT 1400   Low    Hi Pressure 35   Low Pressure 8   Low Pressure Delay 20   Low MV 2   BiPAP/CPAP Monitored Parameters   Pulse 84   SpO2 98 %   PIP 20   Total Rate 22 breaths/min   Minute Volume 12   Tidal Volume 584   Total Leak 17   Trigger % 68   Ti/Ttot % 33   Toleration Well     No new events.  Patient tolerated AVAPS overnight with the above settings. Will transition back to a nasal cannula this AM.

## 2024-07-03 NOTE — PLAN OF CARE
Assumed care of patient at 1930. Pt is AOx4, following commands, and OKEEFE.Cardizem gtt infusing for HR control Tylenol given as needed for generalized pain. With exertion 's and becomes dyspneic, both come down with rest. POC discussed with patient. For complete assessment see E charting.

## 2024-07-04 ENCOUNTER — APPOINTMENT (OUTPATIENT)
Dept: GENERAL RADIOLOGY | Facility: HOSPITAL | Age: 64
End: 2024-07-04
Attending: INTERNAL MEDICINE
Payer: MEDICARE

## 2024-07-04 LAB
ANION GAP SERPL CALC-SCNC: 0 MMOL/L (ref 0–18)
BUN BLD-MCNC: 23 MG/DL (ref 9–23)
CALCIUM BLD-MCNC: 8.6 MG/DL (ref 8.5–10.1)
CHLORIDE SERPL-SCNC: 94 MMOL/L (ref 98–112)
CO2 SERPL-SCNC: 41 MMOL/L (ref 21–32)
CREAT BLD-MCNC: 0.61 MG/DL
EGFRCR SERPLBLD CKD-EPI 2021: 107 ML/MIN/1.73M2 (ref 60–?)
GLUCOSE BLD-MCNC: 145 MG/DL (ref 70–99)
GLUCOSE BLD-MCNC: 157 MG/DL (ref 70–99)
GLUCOSE BLD-MCNC: 166 MG/DL (ref 70–99)
GLUCOSE BLD-MCNC: 234 MG/DL (ref 70–99)
GLUCOSE BLD-MCNC: 296 MG/DL (ref 70–99)
MAGNESIUM SERPL-MCNC: 2 MG/DL (ref 1.6–2.6)
OSMOLALITY SERPL CALC.SUM OF ELEC: 287 MOSM/KG (ref 275–295)
POTASSIUM SERPL-SCNC: 4.6 MMOL/L (ref 3.5–5.1)
SODIUM SERPL-SCNC: 135 MMOL/L (ref 136–145)

## 2024-07-04 PROCEDURE — 99232 SBSQ HOSP IP/OBS MODERATE 35: CPT | Performed by: HOSPITALIST

## 2024-07-04 PROCEDURE — 99233 SBSQ HOSP IP/OBS HIGH 50: CPT | Performed by: INTERNAL MEDICINE

## 2024-07-04 PROCEDURE — 71045 X-RAY EXAM CHEST 1 VIEW: CPT | Performed by: INTERNAL MEDICINE

## 2024-07-04 RX ORDER — METOPROLOL TARTRATE 1 MG/ML
5 INJECTION, SOLUTION INTRAVENOUS ONCE
Status: COMPLETED | OUTPATIENT
Start: 2024-07-04 | End: 2024-07-04

## 2024-07-04 RX ORDER — DILTIAZEM HYDROCHLORIDE 60 MG/1
60 TABLET, FILM COATED ORAL EVERY 12 HOURS SCHEDULED
Status: DISCONTINUED | OUTPATIENT
Start: 2024-07-04 | End: 2024-07-05

## 2024-07-04 NOTE — PROGRESS NOTES
07/04/24 0454   BiPAP   Device V60   BiPAP / CPAP CE# V60-08   BiPAP bacteria filter Yes   BiPAP Pre-use check ok? Yes   BIPAP plugged into main power? Yes   Mode AVAPS   Interface Full face mask   Mask Size Medium   Control Settings   Oxygen Percent 40 %   Inspiratory time 0.9   Insp rise time 3   AVAPS   Min IPAP 15   Max IPAP 30   EPAP Level 5   Set rate 20   Tidal volume 550   SIPAP   Resp (!) 29   FiO2 (%) 40 %   BiPAP/CPAP Alarm Settings   Hi Rate 50   Low Rate 10   Hi VT 1400   Low    Hi Pressure 35   Low Pressure 8   Low Pressure Delay 20   Low MV 2   BiPAP/CPAP Monitored Parameters   Pulse 68   SpO2 95 %   PIP 23   Total Rate 29 breaths/min   Minute Volume 16   Tidal Volume 557   Total Leak 20   Trigger % 85   Ti/Ttot % 30   Toleration Well     Patient placed on AVAPS overnight with the above settings.On for about 6 hours before being transitioned back to a NC. Currently at 5L.

## 2024-07-04 NOTE — PLAN OF CARE
Assumed care at 1930. Cardizem gtt titrated as able. Attempted to stand at the bedside, will desat into the 70s and HR up to 130s. On HFNC at 10L while awake, and AVAPS at night- on for about 6hours. Plan of care discussed with patient at bedside.

## 2024-07-04 NOTE — PLAN OF CARE
Assumed patient care, patient is alert and oriented x4. On 4-5 L of oxygen. Afib on tele, no complains of chest pain. Abdomen is soft, non-tendered, bowel sounds are clear in all four quadrants. Bed in lower position, call light within reach. Wife at bed side, questions are answered, plan of care updated.    1645: Patient's heart rate up to 130s when he is eating or walking, the writer notified the NP via perfectserve, await for response.     Problem: RESPIRATORY - ADULT  Goal: Achieves optimal ventilation and oxygenation  Description: INTERVENTIONS:  - Assess for changes in respiratory status  - Assess for changes in mentation and behavior  - Position to facilitate oxygenation and minimize respiratory effort  - Oxygen supplementation based on oxygen saturation or ABGs  - Provide Smoking Cessation handout, if applicable  - Encourage broncho-pulmonary hygiene including cough, deep breathe, Incentive Spirometry  - Assess the need for suctioning and perform as needed  - Assess and instruct to report SOB or any respiratory difficulty  - Respiratory Therapy support as indicated  - Manage/alleviate anxiety  - Monitor for signs/symptoms of CO2 retention  Outcome: Progressing

## 2024-07-04 NOTE — PROGRESS NOTES
Cardiology Progress Note    Keny Marshall Patient Status:  Inpatient    1960 MRN [id number]   [facility] 6NE-A Attending Perfecto Pace,    Hosp Day # 5 PCP Dustin Avina MD     Primary cardiologist: Dr. Salazar    Impression;  Aflutter - s/p failed recent CV. Pt on AC. Now on CCB IV and amio  SOB - 2/2 acute dCHF and COPD exac  Acute diastolic CHF - pt on bipap and diuretics  S/p bio AVR  TTE (24): LVEF 55%, +DD, 23mm SAVR 3.3m/s, mean 22mmHg.     Plan:  diuresis: continue lasix 40mg IV TID. f/u BMP today. BMP pending. If bicarb increasing, may consider giving 1x diamox dose.  rate control: diltiazem gtt.  I have my concerns re long-term amiodarone in setting of severe COPD, held.    L lung mass- outpatient PET/CT per pulmonology  severe COPD- increased O2 requirement.  started steroids.  continue inhaler therapy.  Discussed with Dr. Randle, confirming advanced/end-stage emphysema. Inpatient course will likely be protracted over several days.    Subjective:  Pt continues to have SOB. When attempted to stand at bedside, pt desatted to 70s with HR up to 130s.   Remains hemodynamically stable this morning.     Objective:  /63   Pulse 84   Temp 98.8 °F (37.1 °C) (Temporal)   Resp 21   Wt 160 lb 11.2 oz (72.9 kg)   SpO2 100%   BMI 23.06 kg/m²   Temp (24hrs), Av.4 °F (36.9 °C), Min:97.9 °F (36.6 °C), Max:98.8 °F (37.1 °C)      Intake/Output Summary (Last 24 hours) at 2024 0822  Last data filed at 2024 0513  Gross per 24 hour   Intake 1612.92 ml   Output 2000 ml   Net -387.08 ml     Wt Readings from Last 3 Encounters:   24 160 lb 11.2 oz (72.9 kg)   24 159 lb 6.3 oz (72.3 kg)   23 172 lb (78 kg)       General: Awake and alert; in no acute distress  Cardiac:  irregular rhythm; no murmurs/rubs are appreciated  Lungs: distant bilateral BS without clear crackles or wheezes; no accessory muscle use  Abdomen: Soft, non-tender; bowel sounds are  normoactive  Extremities: No clubbing/cyanosis/edema; moves all 4 extremities normally    Current Facility-Administered Medications   Medication Dose Route Frequency    methylPREDNISolone sodium succinate (Solu-MEDROL) injection 40 mg  40 mg Intravenous Q8H    selenium sulfide (Selsun) 2.5 % shampoo   Topical Daily PRN    insulin aspart (NovoLOG) 100 Units/mL FlexPen 1-20 Units  1-20 Units Subcutaneous TID CC and HS    ipratropium (Atrovent) 0.02 % nebulizer solution 0.5 mg  0.5 mg Nebulization QID    sodium chloride (hypertonic) 3 % nebulizer solution 3 mL  3 mL Nebulization 2 times daily    furosemide (Lasix) 10 mg/mL injection 40 mg  40 mg Intravenous TID    aspirin DR tab 81 mg  81 mg Oral Daily    atorvastatin (Lipitor) tab 20 mg  20 mg Oral Nightly    guaiFENesin ER (Mucinex) 12 hr tab 1,200 mg  1,200 mg Oral BID    ipratropium (Atrovent) 0.02 % nebulizer solution 500 mcg  500 mcg Nebulization Q6H PRN    levETIRAcetam (Keppra) tab 500 mg  500 mg Oral BID    Roflumilast TABS 500 mcg  500 mcg Oral Daily    rivaroxaban (Xarelto) tab 20 mg  20 mg Oral Daily with food    dilTIAZem (cardIZEM) 100 mg in sodium chloride 0.9% 100 mL IVPB-ADDV  2.5-20 mg/hr Intravenous Continuous    glucose (Dex4) 15 GM/59ML oral liquid 15 g  15 g Oral Q15 Min PRN    Or    glucose (Glutose) 40% oral gel 15 g  15 g Oral Q15 Min PRN    Or    glucose-vitamin C (Dex-4) chewable tab 4 tablet  4 tablet Oral Q15 Min PRN    Or    dextrose 50% injection 50 mL  50 mL Intravenous Q15 Min PRN    Or    glucose (Dex4) 15 GM/59ML oral liquid 30 g  30 g Oral Q15 Min PRN    Or    glucose (Glutose) 40% oral gel 30 g  30 g Oral Q15 Min PRN    Or    glucose-vitamin C (Dex-4) chewable tab 8 tablet  8 tablet Oral Q15 Min PRN    insulin aspart (NovoLOG) 100 Units/mL FlexPen 1-10 Units  1-10 Units Subcutaneous TID AC and HS    melatonin tab 3 mg  3 mg Oral Nightly PRN    acetaminophen (Tylenol Extra Strength) tab 500 mg  500 mg Oral Q4H PRN    polyethylene  glycol (PEG 3350) (Miralax) 17 g oral packet 17 g  17 g Oral Daily PRN    sennosides (Senokot) tab 17.2 mg  17.2 mg Oral Nightly PRN    bisacodyl (Dulcolax) 10 MG rectal suppository 10 mg  10 mg Rectal Daily PRN    fleet enema (Fleet) 7-19 GM/118ML rectal enema 133 mL  1 enema Rectal Once PRN    prochlorperazine (Compazine) 10 MG/2ML injection 5 mg  5 mg Intravenous Q8H PRN    fluticasone furoate-vilanterol (Breo Ellipta) 200-25 MCG/ACT inhaler 1 puff  1 puff Inhalation Daily       Laboratory Data:       Lab Results   Component Value Date    INR 1.59 (H) 06/29/2024    INR 1.06 06/11/2024    INR 2.26 (H) 09/22/2021     Lab Results   Component Value Date     07/03/2024    K 4.4 07/03/2024    CL 91 07/03/2024    CO2 42.0 07/03/2024    BUN 16 07/03/2024    CREATSERUM 0.58 07/03/2024     07/03/2024    CA 8.7 07/03/2024         Telemetry: AFL

## 2024-07-04 NOTE — PROGRESS NOTES
[facility] Progress Note     SUBJECTIVE/Interval history:  No acute events overnight, using AVAPS with sleep overnight. He feels better today, improved dyspnea and less cough. No pain. No fevers  Weaned to 5L today    Review of Systems:   A comprehensive 14 point review of systems was completed.   Pertinent positives and negatives noted in the HPI.    Medications  Reviewed personally   methylPREDNISolone  40 mg Intravenous Q8H    insulin aspart  1-20 Units Subcutaneous TID CC and HS    ipratropium  0.5 mg Nebulization QID    sodium chloride (hypertonic)  3 mL Nebulization 2 times daily    furosemide  40 mg Intravenous TID    aspirin  81 mg Oral Daily    atorvastatin  20 mg Oral Nightly    guaiFENesin ER  1,200 mg Oral BID    levETIRAcetam  500 mg Oral BID    Roflumilast  500 mcg Oral Daily    rivaroxaban  20 mg Oral Daily with food    insulin aspart  1-10 Units Subcutaneous TID AC and HS    fluticasone furoate-vilanterol  1 puff Inhalation Daily       selenium sulfide    ipratropium    glucose **OR** glucose **OR** glucose-vitamin C **OR** dextrose **OR** glucose **OR** glucose **OR** glucose-vitamin C    melatonin    acetaminophen    polyethylene glycol (PEG 3350)    sennosides    bisacodyl    fleet enema    prochlorperazine    OBJECTIVE:  Vitals:    07/04/24 0500 07/04/24 0511 07/04/24 0600 07/04/24 0630   BP: 117/67  129/63    BP Location:       Pulse: 65  84    Resp:   21    Temp:       TempSrc:       SpO2: 96% 98% 100% 100%   Weight: 160 lb 11.2 oz (72.9 kg)          Vital signs in last 24 hours:  Blood pressure 129/63, pulse 84, temperature 98.8 °F (37.1 °C), temperature source Temporal, resp. rate 21, weight 160 lb 11.2 oz (72.9 kg), SpO2 100%.     Intake/Output:  I/O last 3 completed shifts:  In: 3007.9 [P.O.:2500; I.V.:507.9]  Out: 2875 [Urine:2875]  FiO2 (%):  [40 %] 40 %    Physical Exam:  General: Appears alert, comfortable at rest on  5L  Neurologic:No focal deficits.  Alert.  Oriented.  Lungs:improved air entry bilaterally today. There is no wheeze today  Heart:irreg RR. No m heard  Abdomen:Soft, non-tender.  No masses.  No guarding.  No rebound.  Extremities:edema    Lab Data Review:   Recent Labs   Lab 06/30/24  0522 07/01/24  0937 07/01/24  1813 07/03/24  1132   * 123*  --  185*   BUN 29* 18  --  16   CREATSERUM 0.97 0.46*  --  0.58*   CA 9.1 8.9  --  8.7   * 135*  --  135*   K 4.3 3.5 4.4 4.4   CL 92* 90*  --  91*   CO2 38.0* 41.0*  --  42.0*     Recent Labs   Lab 06/29/24  1106 07/01/24  0937   RBC 4.22* 3.71*   HGB 13.0 11.5*   HCT 42.4 36.9*   .5* 99.5   MCH 30.8 31.0   MCHC 30.7* 31.2   RDW 14.0 13.8   NEPRELIM 11.76* 8.67*   WBC 13.6* 10.1   .0 160.0     No results for input(s): \"BNP\" in the last 168 hours.  No results for input(s): \"TROP\", \"CK\" in the last 168 hours.  Recent Labs   Lab 06/29/24  1106   INR 1.59*   PTT 36.1*     Recent Labs   Lab 07/02/24  0834   ABGPHT 7.35   QFJGCJ1L 82*   TSJIU1K 161*   ABGHCO3 37.6*   ABGBE 16.3*   TEMP 98.6   ROMARIO Positive   SITE Right Radial   DEV Nasal cannula   THGB 11.3*       Other Labs:  Interval Culture Data:   No results found for this visit on 06/29/24.  No results for input(s): \"COLORUR\", \"CLARITY\", \"SPECGRAVITY\", \"GLUUR\", \"BILUR\", \"KETUR\", \"BLOODURINE\", \"PHURINE\", \"PROUR\", \"UROBILINOGEN\", \"NITRITE\", \"LEUUR\", \"WBCUR\", \"RBCUR\", \"BACUR\", \"EPIUR\" in the last 168 hours.    Interval Radiology:   Reviewed personally  XR CHEST AP PORTABLE  (CPT=71045)    Result Date: 7/3/2024  CONCLUSION:  No significant change since prior examination.   LOCATION:  [facility]      Dictated by (CST): Lior Donnelly MD on 7/03/2024 at 11:00 AM     Finalized by (CST): Lior Donnelly MD on 7/03/2024 at 11:01 AM       US VENOUS DOPPLER ARM BILAT - DIAG IMG (CPT=93970)    Result Date: 7/1/2024  CONCLUSION:  Superficial venous thrombosis involving the distal left cephalic vein.  No evidence  of deep venous thrombosis in the bilateral upper extremities.  LOCATION:  [id number]    Dictated by (CST): Von Toussaint MD on 7/01/2024 at 2:29 PM     Finalized by (CST): Von Toussaint MD on 7/01/2024 at 2:31 PM       CT CHEST(CONTRAST ONLY) (CPT=71260)    Result Date: 7/1/2024  CONCLUSION:  There is now a small right pleural effusion and in the event smaller left pleural effusion.  These were not present on the prior CT.  Increase in the size of a masslike area of tissue density in the left midlung laterally adjacent to chronic  pleural calcification.  This could reflect a zone of increasing chronic rounded atelectasis, with neoplasm within the differential.  Redemonstration extensive severe chronic lung disease.  Nonspecific adenopathy right hilum and mediastinum.  Dilated pulmonary artery could reflect chronic pulmonary hypertension, particularly in the setting of extensive chronic severe lung disease.   LOCATION:  [id number]   Dictated by (CST): Severino Roque MD on 7/01/2024 at 10:31 AM     Finalized by (CST): Severino Roque MD on 7/01/2024 at 10:38 AM        Assessment/Plan:  #1. Acute on chronic hypercapnic and hypoxic respiratory failure  Multifactorial due to afib with RVR, CHF exacerbation with underlying end stage COPD  7/1 CTA with fluid overload, no PE  Treat/control afib and CHF as per cardiology  Continue nebs   Continue on methylprednisolone today  Wean o2 for goal saturations of 88-92% given hypercapnia. His previous o2 baseline was: 2L at rest, 4L on exertion; using and benefiting from portable oxygen concentrator (3 at rest, 6 on exertion)  Continue on AVAPS with naps/sleep and PRN    #2. Acute CHF exacerbation, afib with RVR  Rate control and diurese as per cardiology  Previous cardioversion 6/14     #3. COPD  Severe obstruction on previous PFTs 3/2021  off arformoterol given worsening afib/tachycardia  Continue Breo 200 in place of his home inhalers (home regimen: symbicort 160 BID, spiriva  daily)  Resume his home neb regimen: continue scheduled ipratropium, hypertonic saline and budesonide; apparently hospital does not stock levalbuterol  He cannot tolerate albuterol due to palpitations, tremors and with afib with RVR; off duonebs  Continue on methylpred  Continue roflumilast  Previously reviewed BLVR - not candidate at this time, reassess as outpatient     #4. Pulmonary nodules  7/2017 CT with worsening peripheral lingular atelectasis. Nodules stable   5/2018 CT stable nodules  12/2020 CT stable  7/2021 CT stable but worsening mediastinal LAD suspect reactive to recent cardiac surgery  1/2022 CT with stable nodules, improved LAD  3/2023 CT chest with several small nodules  9/2023 CT chest with stable findings   7/1/2024 CT chest with worsening ALYSON lesion, possible scarring, rounded atelectasis vs neoplasm  Will need outpatient follow up including PET/CT    #5. Tobacco abuse  30+ pack years, active pipe smoker  Hold on LDCT given recent CT with worsening ALYSON lesion, will need PET/CT as above    Dispo:  CCU; possible tx to floor pending other services input    Stefano Randle MD

## 2024-07-04 NOTE — PROGRESS NOTES
[facility] [place]   part of [facility]     Hospitalist Progress Note     Keny Marshall Patient Status:  Inpatient    1960 MRN [id number]   Location [facility] 6NE-A Attending Alfredo Garcia MD   Hosp Day # 5 PCP Dustin Avina MD     Chief Complaint: SOB, palpitations     Subjective:     Patient is feeling better today, SOB is improved.  Denies cp, fever, n/v.  No other acute complaints .     Objective:    Review of Systems:   A comprehensive review of systems was completed; pertinent positive and negatives stated in subjective.    Vital signs:  Temp:  [97.9 °F (36.6 °C)-98.8 °F (37.1 °C)] 98.8 °F (37.1 °C)  Pulse:  [] 84  Resp:  [17-36] 21  BP: (105-134)/(62-96) 129/63  SpO2:  [89 %-100 %] 100 %  FiO2 (%):  [40 %] 40 %    Physical Exam:    General: No acute distress, pleasant   Respiratory: Diminished BS b/l   Cardiovascular: S1, S2, regular rate and rhythm  Abdomen: Soft, Non-tender, non-distended, + bowel sounds  Extremities: No edema    Diagnostic Data:    Labs:  Recent Labs   Lab 24  1106 24  0937   WBC 13.6* 10.1   HGB 13.0 11.5*   .5* 99.5   .0 160.0   INR 1.59*  --        Recent Labs   Lab 24  1106 24  0522 24  0937 24  1813 24  1132   * 131* 123*  --  185*   BUN 22 29* 18  --  16   CREATSERUM 0.84 0.97 0.46*  --  0.58*   CA 9.2 9.1 8.9  --  8.7   ALB 3.0*  --   --   --   --    * 135* 135*  --  135*   K 5.5* 4.3 3.5 4.4 4.4   CL 89* 92* 90*  --  91*   CO2 40.0* 38.0* 41.0*  --  42.0*   ALKPHO 60  --   --   --   --    AST 56*  --   --   --   --    ALT 39  --   --   --   --    BILT 0.9  --   --   --   --    TP 7.3  --   --   --   --        Estimated Creatinine Clearance: 132.7 mL/min (A) (based on SCr of 0.58 mg/dL (L)).    Recent Labs   Lab 24  1106   TROPHS 63       Recent Labs   Lab 24  1106   PTP 19.1*   INR 1.59*                  Microbiology    No results found for this visit on 24.      Imaging:  Reviewed in Epic.    Medications:    methylPREDNISolone  40 mg Intravenous Q8H    insulin aspart  1-20 Units Subcutaneous TID CC and HS    ipratropium  0.5 mg Nebulization QID    sodium chloride (hypertonic)  3 mL Nebulization 2 times daily    furosemide  40 mg Intravenous TID    aspirin  81 mg Oral Daily    atorvastatin  20 mg Oral Nightly    guaiFENesin ER  1,200 mg Oral BID    levETIRAcetam  500 mg Oral BID    Roflumilast  500 mcg Oral Daily    rivaroxaban  20 mg Oral Daily with food    insulin aspart  1-10 Units Subcutaneous TID AC and HS    fluticasone furoate-vilanterol  1 puff Inhalation Daily       Assessment & Plan:      #Acute on chronic hypercapnic and hypoxic respiratory failure  Multifactorial - see below      #End stage COPD  Wean oxygen  Breo, budesonide, Roflumilast, Nebs, steroids  Pulm following    #HFpEF with acute exacerbation  Echo in June with EF 55%  Net neg 500mL since admission  Lasix 40mg TID  Cards    #Rapid a. Flutter   Rate better controlled   Dilt  Xarelto for AC    #Aortic stenosis s/p AVR  #HLD - statin   #Hyponatremia - na 135  #Left lung mass - follows with Pulm, outpatient PET/CT  #Seizure d/o - Keppra   #DM Type 2- ISS  #Hx of RCC s/p nephrectomy   #ABIGAIL  #Hx of VTE - Xarelto       Alfredo Garcia MD    Supplementary Documentation:     Quality:  DVT Mechanical Prophylaxis:     Early ambuation  DVT Pharmacologic Prophylaxis   Medication    rivaroxaban (Xarelto) tab 20 mg         DVT Pharmacologic prophylaxis: Aspirin 81 mg      Code Status: Full Code  Peters: No urinary catheter in place  Peters Duration (in days):   Central line:    ARUN:     Discharge is dependent on: weaning oxygen   At this point Mr. Marshall is expected to be discharge to: Home    The 21st Century Cures Act makes medical notes like these available to patients in the interest of transparency. Please be advised this is a medical document. Medical documents are intended to carry relevant information, facts as evident, and  the clinical opinion of the practitioner. The medical note is intended as peer to peer communication and may appear blunt or direct. It is written in medical language and may contain abbreviations or verbiage that are unfamiliar.

## 2024-07-04 NOTE — PLAN OF CARE
Received pt at 0730. Pt alert and oriented x4. Pt in afib, rate less than 120s, cardizem weaned down, PO cardizem started and IV cardizem stopped. SBP stable. Pt with 5L NC, increased to 10L HFNC when OOB. Pt lungs sound clear/diminished with some crackling in the posterior bases. Pt updated on plan of care, transfer orders in place, bed assigned, pt brought to new room in wheelchair.

## 2024-07-05 LAB
ATRIAL RATE: 258 BPM
GLUCOSE BLD-MCNC: 147 MG/DL (ref 70–99)
GLUCOSE BLD-MCNC: 157 MG/DL (ref 70–99)
GLUCOSE BLD-MCNC: 219 MG/DL (ref 70–99)
GLUCOSE BLD-MCNC: 273 MG/DL (ref 70–99)
P AXIS: 263 DEGREES
Q-T INTERVAL: 434 MS
QRS DURATION: 156 MS
QTC CALCULATION (BEZET): 522 MS
R AXIS: 85 DEGREES
T AXIS: 108 DEGREES
VENTRICULAR RATE: 87 BPM

## 2024-07-05 PROCEDURE — 99233 SBSQ HOSP IP/OBS HIGH 50: CPT | Performed by: HOSPITALIST

## 2024-07-05 PROCEDURE — 99233 SBSQ HOSP IP/OBS HIGH 50: CPT | Performed by: INTERNAL MEDICINE

## 2024-07-05 RX ORDER — DILTIAZEM HYDROCHLORIDE 60 MG/1
60 TABLET, FILM COATED ORAL EVERY 8 HOURS SCHEDULED
Status: DISCONTINUED | OUTPATIENT
Start: 2024-07-05 | End: 2024-07-05

## 2024-07-05 RX ORDER — METOPROLOL TARTRATE 1 MG/ML
5 INJECTION, SOLUTION INTRAVENOUS EVERY 6 HOURS PRN
Status: DISCONTINUED | OUTPATIENT
Start: 2024-07-05 | End: 2024-07-20

## 2024-07-05 RX ORDER — DILTIAZEM HYDROCHLORIDE 5 MG/ML
5 INJECTION INTRAVENOUS ONCE
Status: COMPLETED | OUTPATIENT
Start: 2024-07-05 | End: 2024-07-05

## 2024-07-05 RX ORDER — DILTIAZEM HYDROCHLORIDE 60 MG/1
60 TABLET, FILM COATED ORAL EVERY 6 HOURS SCHEDULED
Status: DISCONTINUED | OUTPATIENT
Start: 2024-07-06 | End: 2024-07-05

## 2024-07-05 RX ORDER — METHYLPREDNISOLONE SODIUM SUCCINATE 40 MG/ML
40 INJECTION, POWDER, LYOPHILIZED, FOR SOLUTION INTRAMUSCULAR; INTRAVENOUS DAILY
Status: DISCONTINUED | OUTPATIENT
Start: 2024-07-06 | End: 2024-07-06

## 2024-07-05 RX ORDER — DIGOXIN 125 MCG
125 TABLET ORAL DAILY
Status: DISCONTINUED | OUTPATIENT
Start: 2024-07-05 | End: 2024-07-12

## 2024-07-05 NOTE — PLAN OF CARE
Pt is A&O x4. Reports headache. Managed w/ tylenol.  Maintaining O2 on 5L NC. AVAPS while sleeping. Afib / Aflutter on tele.   Bowel sounds active in all quadrants. Continent of bowel and bladder.  Pt updated on plan of care. Bed in lowest position. Bed alarm on. Call light within reach.     2130: Rates in the 130s after PO cardizem & pt reporting increased SOB. MD notified. IV Lopressor given. CXR & EKG completed. Rates now controlled.    Problem: RESPIRATORY - ADULT  Goal: Achieves optimal ventilation and oxygenation  Description: INTERVENTIONS:  - Assess for changes in respiratory status  - Assess for changes in mentation and behavior  - Position to facilitate oxygenation and minimize respiratory effort  - Oxygen supplementation based on oxygen saturation or ABGs  - Provide Smoking Cessation handout, if applicable  - Encourage broncho-pulmonary hygiene including cough, deep breathe, Incentive Spirometry  - Assess the need for suctioning and perform as needed  - Assess and instruct to report SOB or any respiratory difficulty  - Respiratory Therapy support as indicated  - Manage/alleviate anxiety  - Monitor for signs/symptoms of CO2 retention  Outcome: Progressing     Problem: CARDIOVASCULAR - ADULT  Goal: Maintains optimal cardiac output and hemodynamic stability  Description: INTERVENTIONS:  - Monitor vital signs, rhythm, and trends  - Monitor for bleeding, hypotension and signs of decreased cardiac output  - Evaluate effectiveness of vasoactive medications to optimize hemodynamic stability  - Monitor arterial and/or venous puncture sites for bleeding and/or hematoma  - Assess quality of pulses, skin color and temperature  - Assess for signs of decreased coronary artery perfusion - ex. Angina  - Evaluate fluid balance, assess for edema, trend weights  Outcome: Progressing  Goal: Absence of cardiac arrhythmias or at baseline  Description: INTERVENTIONS:  - Continuous cardiac monitoring, monitor vital signs, obtain  12 lead EKG if indicated  - Evaluate effectiveness of antiarrhythmic and heart rate control medications as ordered  - Initiate emergency measures for life threatening arrhythmias  - Monitor electrolytes and administer replacement therapy as ordered  Outcome: Progressing

## 2024-07-05 NOTE — PROGRESS NOTES
[facility]   part of [facility]     Hospitalist Progress Note     Keny Marshall Patient Status:  Inpatient    1960 MRN [id number]   Location [facility] 6NE-A Attending Alfredo Garcia MD   Hosp Day # 6 PCP Dustin Avina MD     Chief Complaint: SOB, palpitations     Subjective:     Patient feels his sob is better today.  Denies fever, chills, n/v.  No cp.  No other acute complaints.      Objective:    Review of Systems:   A comprehensive review of systems was completed; pertinent positive and negatives stated in subjective.    Vital signs:  Temp:  [97.5 °F (36.4 °C)-98.5 °F (36.9 °C)] 97.5 °F (36.4 °C)  Pulse:  [] 128  Resp:  [18-32] 26  BP: (107-134)/(65-91) 111/90  SpO2:  [81 %-100 %] 94 %    Physical Exam:    General: No acute distress, pleasant   Respiratory: Diminished BS b/l   Cardiovascular: S1, S2, irregularly irregular, tachycardic   Abdomen: Soft, Non-tender, non-distended, + bowel sounds  Extremities: + edema    Diagnostic Data:    Labs:  Recent Labs   Lab 24  1106 24  0937   WBC 13.6* 10.1   HGB 13.0 11.5*   .5* 99.5   .0 160.0   INR 1.59*  --        Recent Labs   Lab 24  1106 24  0522 24  0937 24  1813 24  1132 24  0831   *   < > 123*  --  185* 166*   BUN 22   < > 18  --  16 23   CREATSERUM 0.84   < > 0.46*  --  0.58* 0.61*   CA 9.2   < > 8.9  --  8.7 8.6   ALB 3.0*  --   --   --   --   --    *   < > 135*  --  135* 135*   K 5.5*   < > 3.5 4.4 4.4 4.6   CL 89*   < > 90*  --  91* 94*   CO2 40.0*   < > 41.0*  --  42.0* 41.0*   ALKPHO 60  --   --   --   --   --    AST 56*  --   --   --   --   --    ALT 39  --   --   --   --   --    BILT 0.9  --   --   --   --   --    TP 7.3  --   --   --   --   --     < > = values in this interval not displayed.       Estimated Creatinine Clearance: 126 mL/min (A) (based on SCr of 0.61 mg/dL (L)).    Recent Labs   Lab 24  1106   TROPHS 63       Recent Labs   Lab  06/29/24  1106   PTP 19.1*   INR 1.59*                  Microbiology    No results found for this visit on 06/29/24.      Imaging: Reviewed in Epic.    Medications:    dilTIAZem  60 mg Oral Q8H ERENDIRA    methylPREDNISolone  40 mg Intravenous Q8H    insulin aspart  1-20 Units Subcutaneous TID CC and HS    ipratropium  0.5 mg Nebulization QID    sodium chloride (hypertonic)  3 mL Nebulization 2 times daily    furosemide  40 mg Intravenous TID    aspirin  81 mg Oral Daily    atorvastatin  20 mg Oral Nightly    guaiFENesin ER  1,200 mg Oral BID    levETIRAcetam  500 mg Oral BID    Roflumilast  500 mcg Oral Daily    rivaroxaban  20 mg Oral Daily with food    insulin aspart  1-10 Units Subcutaneous TID AC and HS    fluticasone furoate-vilanterol  1 puff Inhalation Daily       Assessment & Plan:      #Acute on chronic hypercapnic and hypoxic respiratory failure  Multifactorial - see below      #End stage COPD  Wean oxygen  Breo, budesonide, Roflumilast, Nebs, steroids  Pulm following    #HFpEF with acute exacerbation  Echo in June with EF 55%  Net neg 1.7L out since admission  Lasix 40mg TID  Cards    #Rapid a. Flutter   Rate still into 130s  Dilt drip, Digoxin added today  Cards following, discussed  Xarelto for AC    #Aortic stenosis s/p AVR  #HLD - statin   #Hyponatremia - na 135  #Left lung mass - follows with Pulm, outpatient PET/CT  #Seizure d/o - Keppra   #DM Type 2- ISS  #Hx of RCC s/p nephrectomy   #ABIGAIL  #Hx of VTE - Xarelto       Alfredo Garcia MD    Supplementary Documentation:     Quality:  DVT Mechanical Prophylaxis:     Early ambuation  DVT Pharmacologic Prophylaxis   Medication    rivaroxaban (Xarelto) tab 20 mg         DVT Pharmacologic prophylaxis: Aspirin 81 mg      Code Status: Full Code  Peters: No urinary catheter in place  Peters Duration (in days):   Central line:    ARUN:     Discharge is dependent on: weaning oxygen   At this point Mr. Marshall is expected to be discharge to: Home    The 21st Century Cures Act  makes medical notes like these available to patients in the interest of transparency. Please be advised this is a medical document. Medical documents are intended to carry relevant information, facts as evident, and the clinical opinion of the practitioner. The medical note is intended as peer to peer communication and may appear blunt or direct. It is written in medical language and may contain abbreviations or verbiage that are unfamiliar.

## 2024-07-05 NOTE — PROGRESS NOTES
[facility]  Progress Note    Keny Marshall Patient Status:  Inpatient    1960 MRN [id number]   [facility] 8NE-A Attending Alfredo Garcia MD   Hosp Day # 6 PCP Dustin Avina MD       Impression;  Aflutter - s/p failed recent CV. Pt on AC. Now on CCB IV and amio  SOB - 2/2 acute dCHF and COPD exac  Acute diastolic CHF - pt on bipap and diuretics  S/p bio AVR  TTE (24): LVEF 55%, +DD, 23mm SAVR 3.3m/s, mean 22mmHg.     Plan:  diuresis: continue lasix 40mg IV TID, Cr has been stable and improving O2 requirement.  Continue dosing 24 hr, considering decreasing tomorrow.  rate control: remains in A tach vs flutter 129bpm despite diltiazem.  concerns re BB use in acute COPD (also, spot doses of IV metoprolol have had minimal effect).  Will start digoxin 125mcg daily.  In addition, while amiodarone may be potentially an issue longer-term, persistent tachycardia necessitates re-starting gtt, ordered.  L lung mass- outpatient PET/CT per pulmonology  severe COPD- on steroids, inhaler therapy.  Discussed with Dr. Randle, confirming advanced/end-stage emphysema. Inpatient course will likely be protracted over several days.      Subjective:  No issues overnight. Remains in A flutter vs AT, 129bpm.  Denies resting dyspnea, O2 requirement stable.    Objective:  BP (!) 120/94 (BP Location: Right arm)   Pulse (!) 129   Temp 97.9 °F (36.6 °C) (Oral)   Resp (!) 33   Wt 160 lb 7.9 oz (72.8 kg)   SpO2 96%   BMI 23.03 kg/m²   Temp (24hrs), Av °F (36.7 °C), Min:97.5 °F (36.4 °C), Max:98.4 °F (36.9 °C)      Intake/Output Summary (Last 24 hours) at 2024 1427  Last data filed at 2024 0800  Gross per 24 hour   Intake 240 ml   Output 1300 ml   Net -1060 ml     Wt Readings from Last 3 Encounters:   24 160 lb 7.9 oz (72.8 kg)   24 159 lb 6.3 oz (72.3 kg)   23 172 lb (78 kg)       General: Awake and alert; in no acute distress  Cardiac: Regular tachycardia; no  murmurs/rubs/gallops are appreciated  Lungs: Clear to auscultation bilaterally; no accessory muscle use  Abdomen: Soft, non-tender; bowel sounds are normoactive  Extremities: No clubbing/cyanosis; moves all 4 extremities normally    Current Facility-Administered Medications   Medication Dose Route Frequency    dilTIAZem (cardIZEM) tab 60 mg  60 mg Oral Q8H ERENDIRA    metoprolol (Lopressor) 5 mg/5mL injection 5 mg  5 mg Intravenous Q6H PRN    digoxin (Lanoxin) tab 125 mcg  125 mcg Oral Daily    methylPREDNISolone sodium succinate (Solu-MEDROL) injection 40 mg  40 mg Intravenous Q8H    selenium sulfide (Selsun) 2.5 % shampoo   Topical Daily PRN    insulin aspart (NovoLOG) 100 Units/mL FlexPen 1-20 Units  1-20 Units Subcutaneous TID CC and HS    ipratropium (Atrovent) 0.02 % nebulizer solution 0.5 mg  0.5 mg Nebulization QID    sodium chloride (hypertonic) 3 % nebulizer solution 3 mL  3 mL Nebulization 2 times daily    furosemide (Lasix) 10 mg/mL injection 40 mg  40 mg Intravenous TID    aspirin DR tab 81 mg  81 mg Oral Daily    atorvastatin (Lipitor) tab 20 mg  20 mg Oral Nightly    guaiFENesin ER (Mucinex) 12 hr tab 1,200 mg  1,200 mg Oral BID    ipratropium (Atrovent) 0.02 % nebulizer solution 500 mcg  500 mcg Nebulization Q6H PRN    levETIRAcetam (Keppra) tab 500 mg  500 mg Oral BID    Roflumilast TABS 500 mcg  500 mcg Oral Daily    rivaroxaban (Xarelto) tab 20 mg  20 mg Oral Daily with food    dilTIAZem (cardIZEM) 100 mg in sodium chloride 0.9% 100 mL IVPB-ADDV  2.5-20 mg/hr Intravenous Continuous    glucose (Dex4) 15 GM/59ML oral liquid 15 g  15 g Oral Q15 Min PRN    Or    glucose (Glutose) 40% oral gel 15 g  15 g Oral Q15 Min PRN    Or    glucose-vitamin C (Dex-4) chewable tab 4 tablet  4 tablet Oral Q15 Min PRN    Or    dextrose 50% injection 50 mL  50 mL Intravenous Q15 Min PRN    Or    glucose (Dex4) 15 GM/59ML oral liquid 30 g  30 g Oral Q15 Min PRN    Or    glucose (Glutose) 40% oral gel 30 g  30 g Oral Q15 Min  PRN    Or    glucose-vitamin C (Dex-4) chewable tab 8 tablet  8 tablet Oral Q15 Min PRN    insulin aspart (NovoLOG) 100 Units/mL FlexPen 1-10 Units  1-10 Units Subcutaneous TID AC and HS    melatonin tab 3 mg  3 mg Oral Nightly PRN    acetaminophen (Tylenol Extra Strength) tab 500 mg  500 mg Oral Q4H PRN    polyethylene glycol (PEG 3350) (Miralax) 17 g oral packet 17 g  17 g Oral Daily PRN    sennosides (Senokot) tab 17.2 mg  17.2 mg Oral Nightly PRN    bisacodyl (Dulcolax) 10 MG rectal suppository 10 mg  10 mg Rectal Daily PRN    fleet enema (Fleet) 7-19 GM/118ML rectal enema 133 mL  1 enema Rectal Once PRN    prochlorperazine (Compazine) 10 MG/2ML injection 5 mg  5 mg Intravenous Q8H PRN    fluticasone furoate-vilanterol (Breo Ellipta) 200-25 MCG/ACT inhaler 1 puff  1 puff Inhalation Daily       Laboratory Data:     Lab Results   Component Value Date    INR 1.59 (H) 06/29/2024    INR 1.06 06/11/2024    INR 2.26 (H) 09/22/2021          Telemetry: No malignant tachyarrhythmias or bradyarrhythmias      Thank you for allowing our practice to participate in the care of your patient. Please do not hesitate to contact me if you have any questions.

## 2024-07-05 NOTE — CM/SW NOTE
SW sent updates to [facility] via [facility].     Sherrie Dean, MSW, LSW  Discharge Planner

## 2024-07-05 NOTE — PLAN OF CARE
Assumed patient care, patient is alert and oriented x4. On 4-5 L of oxygen. Afib on tele, no complains of chest pain. However, patient's heart rate is sustaining around 130s even when rest in the bed. The writer paged , po Cardizem and digoxin, and IV metoprolol given per order, please see details in MAR. Heart rate is still sustaining around 130s after medications. The writer paged  again, amiodarone drip ordered. Bed in lower position, call light within reach. Questions are answered, plan of care updated.     1829: Paged  regarding patient's HR sustaining around 125-130s, await for response.     1854:  ordered Cardizem 5mg IV push once, await for pharmacy to verify.     Problem: RESPIRATORY - ADULT  Goal: Achieves optimal ventilation and oxygenation  Description: INTERVENTIONS:  - Assess for changes in respiratory status  - Assess for changes in mentation and behavior  - Position to facilitate oxygenation and minimize respiratory effort  - Oxygen supplementation based on oxygen saturation or ABGs  - Provide Smoking Cessation handout, if applicable  - Encourage broncho-pulmonary hygiene including cough, deep breathe, Incentive Spirometry  - Assess the need for suctioning and perform as needed  - Assess and instruct to report SOB or any respiratory difficulty  - Respiratory Therapy support as indicated  - Manage/alleviate anxiety  - Monitor for signs/symptoms of CO2 retention  Outcome: Progressing

## 2024-07-05 NOTE — PROGRESS NOTES
[facility] Progress Note     SUBJECTIVE/Interval history:  No acute events overnight, using AVAPS with sleep overnight. He feels good today, denies much dyspnea. Cough controlled. No pain. Noted elevated HR today and started on amio    Review of Systems:   A comprehensive 14 point review of systems was completed.   Pertinent positives and negatives noted in the HPI.    Medications  Reviewed personally   dilTIAZem  60 mg Oral Q8H ERENDIRA    digoxin  125 mcg Oral Daily    methylPREDNISolone  40 mg Intravenous Q8H    insulin aspart  1-20 Units Subcutaneous TID CC and HS    ipratropium  0.5 mg Nebulization QID    sodium chloride (hypertonic)  3 mL Nebulization 2 times daily    furosemide  40 mg Intravenous TID    aspirin  81 mg Oral Daily    atorvastatin  20 mg Oral Nightly    guaiFENesin ER  1,200 mg Oral BID    levETIRAcetam  500 mg Oral BID    Roflumilast  500 mcg Oral Daily    rivaroxaban  20 mg Oral Daily with food    insulin aspart  1-10 Units Subcutaneous TID AC and HS    fluticasone furoate-vilanterol  1 puff Inhalation Daily       metoprolol    selenium sulfide    ipratropium    glucose **OR** glucose **OR** glucose-vitamin C **OR** dextrose **OR** glucose **OR** glucose **OR** glucose-vitamin C    melatonin    acetaminophen    polyethylene glycol (PEG 3350)    sennosides    bisacodyl    fleet enema    prochlorperazine    OBJECTIVE:  Vitals:    07/05/24 0400 07/05/24 0602 07/05/24 0800 07/05/24 0910   BP:  111/90  124/86   BP Location:  Right arm  Right arm   Pulse: 96 (!) 128 (!) 129 (!) 131   Resp: 24 26 26 15   Temp:  97.5 °F (36.4 °C)  98.4 °F (36.9 °C)   TempSrc:  Oral  Oral   SpO2: 100% 94% 97% 98%   Weight:  160 lb 7.9 oz (72.8 kg)         Vital signs in last 24 hours:  Blood pressure 124/86, pulse (!) 131, temperature 98.4 °F (36.9 °C), temperature source Oral, resp. rate 15, weight 160 lb 7.9 oz (72.8 kg), SpO2 98%.     Intake/Output:  I/O last 3  completed shifts:  In: 839.9 [P.O.:720; I.V.:119.9]  Out: 2400 [Urine:2400]       Physical Exam:  General: Appears alert, comfortable at rest on 5L  Neurologic:No focal deficits.  Alert.  Oriented.  Lungs:diminished but clear breath sounds today. No wheeze. No distress  Heart:irreg RR. No m heard  Abdomen:Soft, non-tender.  No masses.  No guarding.  No rebound.  Extremities:edema    Lab Data Review:   Recent Labs   Lab 07/01/24  0937 07/01/24  1813 07/03/24  1132 07/04/24  0831   *  --  185* 166*   BUN 18  --  16 23   CREATSERUM 0.46*  --  0.58* 0.61*   CA 8.9  --  8.7 8.6   *  --  135* 135*   K 3.5 4.4 4.4 4.6   CL 90*  --  91* 94*   CO2 41.0*  --  42.0* 41.0*     Recent Labs   Lab 06/29/24  1106 07/01/24  0937   RBC 4.22* 3.71*   HGB 13.0 11.5*   HCT 42.4 36.9*   .5* 99.5   MCH 30.8 31.0   MCHC 30.7* 31.2   RDW 14.0 13.8   NEPRELIM 11.76* 8.67*   WBC 13.6* 10.1   .0 160.0     No results for input(s): \"BNP\" in the last 168 hours.  No results for input(s): \"TROP\", \"CK\" in the last 168 hours.  Recent Labs   Lab 06/29/24  1106   INR 1.59*   PTT 36.1*     Recent Labs   Lab 07/02/24  0834   ABGPHT 7.35   VONGRH2U 82*   MWRLS6Q 161*   ABGHCO3 37.6*   ABGBE 16.3*   TEMP 98.6   ROMARIO Positive   SITE Right Radial   DEV Nasal cannula   THGB 11.3*       Other Labs:  Interval Culture Data:   No results found for this visit on 06/29/24.  No results for input(s): \"COLORUR\", \"CLARITY\", \"SPECGRAVITY\", \"GLUUR\", \"BILUR\", \"KETUR\", \"BLOODURINE\", \"PHURINE\", \"PROUR\", \"UROBILINOGEN\", \"NITRITE\", \"LEUUR\", \"WBCUR\", \"RBCUR\", \"BACUR\", \"EPIUR\" in the last 168 hours.    Interval Radiology:   Reviewed personally  XR CHEST AP PORTABLE  (CPT=71045)    Result Date: 7/4/2024  CONCLUSION:  1. Interval improvement in interstitial and alveolar opacities since prior examination with residual remaining. 2. Small bilateral pleural effusions with bilateral basilar atelectasis/infiltrates. 3. Hyperexpansion of the lungs.     LOCATION:  [facility]      Dictated by (CST): Lior Donnelly MD on 7/04/2024 at 10:25 PM     Finalized by (CST): Lior Donnelly MD on 7/04/2024 at 10:27 PM       XR CHEST AP PORTABLE  (CPT=71045)    Result Date: 7/3/2024  CONCLUSION:  No significant change since prior examination.   LOCATION:  [facility]      Dictated by (CST): Lior Donnelly MD on 7/03/2024 at 11:00 AM     Finalized by (CST): Lior Donnelly MD on 7/03/2024 at 11:01 AM        Assessment/Plan:  #1. Acute on chronic hypercapnic and hypoxic respiratory failure  Multifactorial due to afib with RVR, CHF exacerbation with underlying end stage COPD  7/1 CTA with fluid overload, no PE  Treat/control afib and CHF as per cardiology  Continue nebs   Continue on methylprednisolone today - change to just daily regimen for now  Wean o2 for goal saturations of 88-92% given hypercapnia. His previous o2 baseline was: 2L at rest, 4L on exertion; using and benefiting from portable oxygen concentrator (3 at rest, 6 on exertion)  Continue on AVAPS with naps/sleep and PRN    #2. Acute CHF exacerbation, afib with RVR  Rate control and diurese as per cardiology  Previous cardioversion 6/14     #3. COPD  Severe obstruction on previous PFTs 3/2021  off arformoterol given worsening afib/tachycardia  Continue Breo 200 in place of his home inhalers (home regimen: symbicort 160 BID, spiriva daily)  Resume his home neb regimen: continue scheduled ipratropium, hypertonic saline and budesonide; apparently hospital does not stock levalbuterol  He cannot tolerate albuterol due to palpitations, tremors and with afib with RVR; off duonebs  Continue on methylpred  Continue roflumilast  Previously reviewed BLVR - not candidate at this time, reassess as outpatient     #4. Pulmonary nodules  7/2017 CT with worsening peripheral lingular atelectasis. Nodules stable   5/2018 CT stable nodules  12/2020 CT stable  7/2021 CT stable but worsening mediastinal LAD suspect reactive to recent cardiac  surgery  1/2022 CT with stable nodules, improved LAD  3/2023 CT chest with several small nodules  9/2023 CT chest with stable findings   7/1/2024 CT chest with worsening ALYSON lesion, possible scarring, rounded atelectasis vs neoplasm  Will need outpatient follow up including PET/CT    #5. Tobacco abuse  30+ pack years, active pipe smoker  Hold on LDCT given recent CT with worsening ALYSON lesion, will need PET/CT as above    Dispo:  Floor; not ready for discharge yet    Stefano Randle MD

## 2024-07-05 NOTE — PHYSICAL THERAPY NOTE
PHYSICAL THERAPY TREATMENT NOTE - INPATIENT    Room Number: 8611/8611-A     Session: 1     Number of Visits to Meet Established Goals: 3    Presenting Problem: A-Fib with Rapid ventricular response, Hypoxia, Acute CHF  Co-Morbidities : AVR, COPD, seizure, DM, renal cell carcinoa, nephrectomy, rotator cuff repair    ASSESSMENT   Patient demonstrates good  progress this session, goals  progressing with 2/3 met this session.    Patient continues to function below baseline with stair negotiation, standing prolonged periods, and performing household tasks.  Contributing factors to remaining limitations include decreased endurance/aerobic capacity, impaired standing balance, and increased O2 needs from baseline.  Next session anticipate patient to progress gait, stair negotiation, and performing household tasks.  Physical Therapy will continue to follow patient for duration of hospitalization.    Patient continues to benefit from continued skilled PT services: at discharge to promote functional independence in home.  Anticipate patient will return home with home health PT.    PLAN  PT Treatment Plan: Bed mobility;Body mechanics;Endurance;Energy conservation;Patient education;Family education;Gait training;Range of motion;Strengthening;Stair training;Transfer training;Balance training  Rehab Potential : Good  Frequency (Obs): 3-5x/week    CURRENT GOALS      Goal #1 Patient is able to demonstrate supine - sit EOB @ level: modified independent      Goal #2 Patient is able to demonstrate transfers EOB to/from BSC at assistance level: modified independent      Goal #3 Patient is able to ambulate 120 feet with assist device: walker - rolling at assistance level: modified independent      Goal #4  new 7/5/2024  Asc/desc 1 flight of stairs with mod I and good breathing technique and saturation     Goal #5     Goal #6     Goal Comments: Goals established on 7/2/2024 7/5/2024 all goals  achieved 2/3    SUBJECTIVE  I dont know  if I can walk that much    OBJECTIVE  Precautions: None    WEIGHT BEARING RESTRICTION  Weight Bearing Restriction: None                PAIN ASSESSMENT   Ratin  Location: Pt reports no pain       BALANCE                                                                                                                       Static Sitting: Good  Dynamic Sitting: Good           Static Standing: Good  Dynamic Standing: Fair    ACTIVITY TOLERANCE; fair        O2 WALK  Oxygen Therapy  At rest oxygen flow (liters per minute): 6  Ambulation oxygen flow (liters per minute): 6      AM-PAC '6-Clicks' INPATIENT SHORT FORM - BASIC MOBILITY  How much difficulty does the patient currently have...  Patient Difficulty: Turning over in bed (including adjusting bedclothes, sheets and blankets)?: None   Patient Difficulty: Sitting down on and standing up from a chair with arms (e.g., wheelchair, bedside commode, etc.): None   Patient Difficulty: Moving from lying on back to sitting on the side of the bed?: None   How much help from another person does the patient currently need...   Help from Another: Moving to and from a bed to a chair (including a wheelchair)?: None   Help from Another: Need to walk in hospital room?: None   Help from Another: Climbing 3-5 steps with a railing?: A Little       AM-PAC Score:  Raw Score: 23   Approx Degree of Impairment: 11.2%   Standardized Score (AM-PAC Scale): 56.93   CMS Modifier (G-Code): CI    FUNCTIONAL ABILITY STATUS  Gait Assessment   Functional Mobility/Gait Assessment  Gait Assistance: Supervision  Distance (ft): 200  Assistive Device: Rolling walker  Pattern: Within Functional Limits    Skilled Therapy Provided    Bed Mobility:  Rolling: mod I   Supine<>Sit: mod I   Sit<>Supine: mod I     Transfer Mobility:  Sit<>Stand: mod I   Stand<>Sit: mod I   Gait: SBA with RW as support on 6L/nc with good steady gt s archie c/o significant SOB , HR at low 130's    Therapist's Comments:   HR at rest at  mid 120'  Per nursing OK to walk as he is medicated  Gt training done on plain surface 200' with RW as support with good steady gt  No c/o SOB although noted HR at low 130's  Left back in bed and instructed to cont mobilizing with staff while in hosp  Instructed to cont AROM on B LE while seated      Patient End of Session: Up in chair;Needs met;Call light within reach;RN aware of session/findings;All patient questions and concerns addressed;Alarm set    PT Session Time: 15 minutes  Gait Training: 10 minutes    Therapeutic Exercise: 2 minutes

## 2024-07-06 LAB
ANION GAP SERPL CALC-SCNC: <0 MMOL/L (ref 0–18)
BASOPHILS # BLD AUTO: 0 X10(3) UL (ref 0–0.2)
BASOPHILS NFR BLD AUTO: 0 %
BUN BLD-MCNC: 39 MG/DL (ref 9–23)
CALCIUM BLD-MCNC: 8.7 MG/DL (ref 8.5–10.1)
CHLORIDE SERPL-SCNC: 93 MMOL/L (ref 98–112)
CO2 SERPL-SCNC: 45 MMOL/L (ref 21–32)
CREAT BLD-MCNC: 0.76 MG/DL
EGFRCR SERPLBLD CKD-EPI 2021: 100 ML/MIN/1.73M2 (ref 60–?)
EOSINOPHIL # BLD AUTO: 0 X10(3) UL (ref 0–0.7)
EOSINOPHIL NFR BLD AUTO: 0 %
ERYTHROCYTE [DISTWIDTH] IN BLOOD BY AUTOMATED COUNT: 13.5 %
GLUCOSE BLD-MCNC: 143 MG/DL (ref 70–99)
GLUCOSE BLD-MCNC: 143 MG/DL (ref 70–99)
GLUCOSE BLD-MCNC: 152 MG/DL (ref 70–99)
GLUCOSE BLD-MCNC: 184 MG/DL (ref 70–99)
GLUCOSE BLD-MCNC: 187 MG/DL (ref 70–99)
HCT VFR BLD AUTO: 37.8 %
HGB BLD-MCNC: 11.6 G/DL
IMM GRANULOCYTES # BLD AUTO: 0.03 X10(3) UL (ref 0–1)
IMM GRANULOCYTES NFR BLD: 0.4 %
LYMPHOCYTES # BLD AUTO: 0.41 X10(3) UL (ref 1–4)
LYMPHOCYTES NFR BLD AUTO: 4.8 %
MAGNESIUM SERPL-MCNC: 2.3 MG/DL (ref 1.6–2.6)
MCH RBC QN AUTO: 30.4 PG (ref 26–34)
MCHC RBC AUTO-ENTMCNC: 30.7 G/DL (ref 31–37)
MCV RBC AUTO: 99 FL
MONOCYTES # BLD AUTO: 0.46 X10(3) UL (ref 0.1–1)
MONOCYTES NFR BLD AUTO: 5.4 %
NEUTROPHILS # BLD AUTO: 7.67 X10 (3) UL (ref 1.5–7.7)
NEUTROPHILS # BLD AUTO: 7.67 X10(3) UL (ref 1.5–7.7)
NEUTROPHILS NFR BLD AUTO: 89.4 %
OSMOLALITY SERPL CALC.SUM OF ELEC: 296 MOSM/KG (ref 275–295)
PLATELET # BLD AUTO: 215 10(3)UL (ref 150–450)
POTASSIUM SERPL-SCNC: 4.5 MMOL/L (ref 3.5–5.1)
RBC # BLD AUTO: 3.82 X10(6)UL
SODIUM SERPL-SCNC: 137 MMOL/L (ref 136–145)
WBC # BLD AUTO: 8.6 X10(3) UL (ref 4–11)

## 2024-07-06 PROCEDURE — 99233 SBSQ HOSP IP/OBS HIGH 50: CPT | Performed by: INTERNAL MEDICINE

## 2024-07-06 PROCEDURE — 99232 SBSQ HOSP IP/OBS MODERATE 35: CPT | Performed by: HOSPITALIST

## 2024-07-06 RX ORDER — FUROSEMIDE 10 MG/ML
40 INJECTION INTRAMUSCULAR; INTRAVENOUS DAILY
Status: DISCONTINUED | OUTPATIENT
Start: 2024-07-07 | End: 2024-07-07

## 2024-07-06 NOTE — PROGRESS NOTES
[facility]   part of [facility]     Hospitalist Progress Note     Keny Marshall Patient Status:  Inpatient    1960 MRN [id number]   Location [facility] 6NE-A Attending Alfredo Garcia MD   Hosp Day # 7 PCP Dustin Avina MD     Chief Complaint: SOB, palpitations     Subjective:     Patient feels better today.  His sob has improved compared to yesterday.  He is frustrated and thinks he is on too many medications.  No other acute complaints at this time.      Objective:    Review of Systems:   A comprehensive review of systems was completed; pertinent positive and negatives stated in subjective.    Vital signs:  Temp:  [96.6 °F (35.9 °C)-98.4 °F (36.9 °C)] 97.1 °F (36.2 °C)  Pulse:  [] 87  Resp:  [15-35] 30  BP: (120-129)/(75-97) 122/75  SpO2:  [89 %-99 %] 99 %    Physical Exam:    General: No acute distress, pleasant   Respiratory: Diminished BS b/l   Cardiovascular: S1, S2, irregularly irregular, tachycardic   Abdomen: Soft, Non-tender, non-distended, + bowel sounds  Extremities: + edema    Diagnostic Data:    Labs:  Recent Labs   Lab 24  1106 24  0937 24  0610   WBC 13.6* 10.1 8.6   HGB 13.0 11.5* 11.6*   .5* 99.5 99.0   .0 160.0 215.0   INR 1.59*  --   --        Recent Labs   Lab 24  1106 24  0522 24  1132 24  0831 24  0620   *   < > 185* 166* 143*   BUN 22   < > 16 23 39*   CREATSERUM 0.84   < > 0.58* 0.61* 0.76   CA 9.2   < > 8.7 8.6 8.7   ALB 3.0*  --   --   --   --    *   < > 135* 135* 137   K 5.5*   < > 4.4 4.6 4.5   CL 89*   < > 91* 94* 93*   CO2 40.0*   < > 42.0* 41.0* 45.0*   ALKPHO 60  --   --   --   --    AST 56*  --   --   --   --    ALT 39  --   --   --   --    BILT 0.9  --   --   --   --    TP 7.3  --   --   --   --     < > = values in this interval not displayed.       Estimated Creatinine Clearance: 101.4 mL/min (based on SCr of 0.76 mg/dL).    Recent Labs   Lab 24  1106   TROPHS 63        Recent Labs   Lab 06/29/24  1106   PTP 19.1*   INR 1.59*                  Microbiology    No results found for this visit on 06/29/24.      Imaging: Reviewed in Epic.    Medications:    digoxin  125 mcg Oral Daily    methylPREDNISolone  40 mg Intravenous Daily    insulin aspart  1-20 Units Subcutaneous TID CC and HS    ipratropium  0.5 mg Nebulization QID    sodium chloride (hypertonic)  3 mL Nebulization 2 times daily    furosemide  40 mg Intravenous TID    aspirin  81 mg Oral Daily    atorvastatin  20 mg Oral Nightly    guaiFENesin ER  1,200 mg Oral BID    levETIRAcetam  500 mg Oral BID    Roflumilast  500 mcg Oral Daily    rivaroxaban  20 mg Oral Daily with food    insulin aspart  1-10 Units Subcutaneous TID AC and HS    fluticasone furoate-vilanterol  1 puff Inhalation Daily       Assessment & Plan:      #Acute on chronic hypercapnic and hypoxic respiratory failure  Multifactorial - see below      #End stage COPD  Wean oxygen  Budesonide, Roflumilast, Nebs, steroids  Plan to transition to PO steroids tomorrow  Hold breo  Pulm following    #HFpEF with acute exacerbation  #Metabolic alkalosis   Echo in June with EF 55%  Net neg 3.1L out since admission  Lasix 40mg daily now  Cards    #Rapid a. Flutter   Rate at 130s, patient feels his rate inc after neb treatments   Dilt drip, Digoxin  Cards following  Xarelto for AC    #Aortic stenosis s/p AVR  #HLD - statin   #Hyponatremia - na 135  #Left lung mass - follows with Pulm, outpatient PET/CT  #Seizure d/o - Keppra   #DM Type 2- ISS  #Hx of RCC s/p nephrectomy   #ABIGAIL  #Hx of VTE - Xarelto       Alfredo Garcia MD    Supplementary Documentation:     Quality:  DVT Mechanical Prophylaxis:     Early ambuation  DVT Pharmacologic Prophylaxis   Medication    rivaroxaban (Xarelto) tab 20 mg         DVT Pharmacologic prophylaxis: Aspirin 81 mg      Code Status: Full Code  Peters: No urinary catheter in place  Peters Duration (in days):   Central line:    ARUN:     Discharge is  dependent on: weaning oxygen   At this point Mr. Marshall is expected to be discharge to: Home    The 21st Century Cures Act makes medical notes like these available to patients in the interest of transparency. Please be advised this is a medical document. Medical documents are intended to carry relevant information, facts as evident, and the clinical opinion of the practitioner. The medical note is intended as peer to peer communication and may appear blunt or direct. It is written in medical language and may contain abbreviations or verbiage that are unfamiliar.                    Consent (Scalp)/Introductory Paragraph: The rationale for Mohs was explained to the patient and consent was obtained. The risks, benefits and alternatives to therapy were discussed in detail. Specifically, the risks of changes in hair growth pattern secondary to repair, infection, scarring, bleeding, prolonged wound healing, incomplete removal, allergy to anesthesia, nerve injury and recurrence were addressed. Prior to the procedure, the treatment site was clearly identified and confirmed by the patient. All components of Universal Protocol/PAUSE Rule completed.

## 2024-07-06 NOTE — PROGRESS NOTES
[facility] Progress Note     SUBJECTIVE/Interval history:  Noted worsening HR control overnight, returned on diltiazem gtt with improvement.  He feels well today, thinks breathing is at baseline, but does admit he feels dyspneic when heart rate is higher ~130s  No chest pain. No fevers  Tolerating AVAPS overnight  Weight up 5# from yesterday    Review of Systems:   A comprehensive 14 point review of systems was completed.   Pertinent positives and negatives noted in the HPI.    Medications  Reviewed personally   digoxin  125 mcg Oral Daily    methylPREDNISolone  40 mg Intravenous Daily    insulin aspart  1-20 Units Subcutaneous TID CC and HS    ipratropium  0.5 mg Nebulization QID    sodium chloride (hypertonic)  3 mL Nebulization 2 times daily    furosemide  40 mg Intravenous TID    aspirin  81 mg Oral Daily    atorvastatin  20 mg Oral Nightly    guaiFENesin ER  1,200 mg Oral BID    levETIRAcetam  500 mg Oral BID    Roflumilast  500 mcg Oral Daily    rivaroxaban  20 mg Oral Daily with food    insulin aspart  1-10 Units Subcutaneous TID AC and HS    fluticasone furoate-vilanterol  1 puff Inhalation Daily       metoprolol    selenium sulfide    ipratropium    glucose **OR** glucose **OR** glucose-vitamin C **OR** dextrose **OR** glucose **OR** glucose **OR** glucose-vitamin C    melatonin    acetaminophen    polyethylene glycol (PEG 3350)    sennosides    bisacodyl    fleet enema    prochlorperazine    OBJECTIVE:  Vitals:    07/05/24 1945 07/05/24 2224 07/05/24 2345 07/06/24 0545   BP: 121/85  (!) 129/92 122/75   BP Location: Right arm  Right arm Right arm   Pulse: (!) 126 (!) 125 (!) 124 87   Resp: (!) 29 26 (!) 35 (!) 30   Temp: 97.9 °F (36.6 °C)  96.6 °F (35.9 °C) 97.1 °F (36.2 °C)   TempSrc: Oral  Axillary Axillary   SpO2: (!) 89% 94% 96% 99%   Weight:    165 lb 2 oz (74.9 kg)       Vital signs in last 24 hours:  Blood pressure 122/75, pulse 87, temperature  97.1 °F (36.2 °C), temperature source Axillary, resp. rate (!) 30, weight 165 lb 2 oz (74.9 kg), SpO2 99%.     Intake/Output:  I/O last 3 completed shifts:  In: 360 [P.O.:360]  Out: 2125 [Urine:2125]       Physical Exam:  General: Appears alert, comfortable at rest on 5L  Neurologic:No focal deficits.  Alert.  Oriented.  Lungs:diminished but clear breath sounds today. No wheeze. No distress  Heart:irreg RR. No m heard  Abdomen:Soft, non-tender.  No masses.  No guarding.  No rebound.  Extremities:edema    Lab Data Review:   Recent Labs   Lab 07/03/24  1132 07/04/24  0831 07/06/24  0620   * 166* 143*   BUN 16 23 39*   CREATSERUM 0.58* 0.61* 0.76   CA 8.7 8.6 8.7   * 135* 137   K 4.4 4.6 4.5   CL 91* 94* 93*   CO2 42.0* 41.0* 45.0*     Recent Labs   Lab 06/29/24  1106 07/01/24  0937 07/06/24  0610   RBC 4.22* 3.71* 3.82*   HGB 13.0 11.5* 11.6*   HCT 42.4 36.9* 37.8*   .5* 99.5 99.0   MCH 30.8 31.0 30.4   MCHC 30.7* 31.2 30.7*   RDW 14.0 13.8 13.5   NEPRELIM 11.76* 8.67* 7.67   WBC 13.6* 10.1 8.6   .0 160.0 215.0     No results for input(s): \"BNP\" in the last 168 hours.  No results for input(s): \"TROP\", \"CK\" in the last 168 hours.  Recent Labs   Lab 06/29/24  1106   INR 1.59*   PTT 36.1*     Recent Labs   Lab 07/02/24  0834   ABGPHT 7.35   JVXRYF4S 82*   KOPKV3Q 161*   ABGHCO3 37.6*   ABGBE 16.3*   TEMP 98.6   ROMARIO Positive   SITE Right Radial   DEV Nasal cannula   THGB 11.3*       Other Labs:  Interval Culture Data:   No results found for this visit on 06/29/24.  No results for input(s): \"COLORUR\", \"CLARITY\", \"SPECGRAVITY\", \"GLUUR\", \"BILUR\", \"KETUR\", \"BLOODURINE\", \"PHURINE\", \"PROUR\", \"UROBILINOGEN\", \"NITRITE\", \"LEUUR\", \"WBCUR\", \"RBCUR\", \"BACUR\", \"EPIUR\" in the last 168 hours.    Interval Radiology:   Reviewed personally  XR CHEST AP PORTABLE  (CPT=71045)    Result Date: 7/4/2024  CONCLUSION:  1. Interval improvement in interstitial and alveolar opacities since prior examination with residual  remaining. 2. Small bilateral pleural effusions with bilateral basilar atelectasis/infiltrates. 3. Hyperexpansion of the lungs.    LOCATION:  [facility]      Dictated by (CST): Lior Donnelly MD on 7/04/2024 at 10:25 PM     Finalized by (CST): Lior Donnelly MD on 7/04/2024 at 10:27 PM       XR CHEST AP PORTABLE  (CPT=71045)    Result Date: 7/3/2024  CONCLUSION:  No significant change since prior examination.   LOCATION:  [facility]      Dictated by (CST): Lior Donnelly MD on 7/03/2024 at 11:00 AM     Finalized by (CST): Lior Donnelly MD on 7/03/2024 at 11:01 AM        Assessment/Plan:  #1. Acute on chronic hypercapnic and hypoxic respiratory failure  Multifactorial due to afib with RVR, CHF exacerbation with underlying end stage COPD  7/1 CTA with fluid overload, no PE  Treat/control afib and CHF as per cardiology  Continue nebs   Last dose of methylpred today - plan to start PO pred tomorrow  Wean o2 for goal saturations of 88-92% given hypercapnia. His previous o2 baseline was: 2L at rest, 4L on exertion; using and benefiting from portable oxygen concentrator (3 at rest, 6 on exertion)  Continue on AVAPS with naps/sleep and PRN    #2. Acute CHF exacerbation, afib with RVR  Rate control and diurese as per cardiology  Previous cardioversion 6/14     #3. COPD  Severe obstruction on previous PFTs 3/2021  off arformoterol given worsening afib/tachycardia  Will hold breo for now given afib/rates, although low suspicion this is related as he has had elevated rates when breo was off previously  Will continue on ipratropium and hypertonic saline- these are not expected to affect his afib/rates  He can use levalbuterol as outpatient as hospital does not stock it  He cannot tolerate albuterol due to palpitations, tremors and with afib with RVR; off duonebs  Finish methylpred today; plan PO pred tomorrow  Continue roflumilast  Previously reviewed BLVR - not candidate at this time, reassess as outpatient     #4. Pulmonary  nodules  7/2017 CT with worsening peripheral lingular atelectasis. Nodules stable   5/2018 CT stable nodules  12/2020 CT stable  7/2021 CT stable but worsening mediastinal LAD suspect reactive to recent cardiac surgery  1/2022 CT with stable nodules, improved LAD  3/2023 CT chest with several small nodules  9/2023 CT chest with stable findings   7/1/2024 CT chest with worsening ALYSON lesion, possible scarring, rounded atelectasis vs neoplasm  Will need outpatient follow up including PET/CT    #5. Tobacco abuse  30+ pack years, active pipe smoker  Hold on LDCT given recent CT with worsening ALYSON lesion, will need PET/CT as above    Dispo:  Floor; not ready for discharge yet    Stefano Randle MD

## 2024-07-06 NOTE — PLAN OF CARE
Pt is A&O x4. Reports no pain.  Maintaining O2 on 5L NC. AVAPS while sleeping. Aflutter on tele.   Bowel sounds active in all quadrants. Continent of bowel and bladder.  Pt updated on plan of care. Bed in lowest position. Bed alarm on. Call light within reach.     2145: HR sustaining in the 120s-130s. Dr. Larkin notified. Cardizem 5mg IVP ordered.    2315: HR still sustaining 120s-130s. Dr. Larkin notified again. Cardizem gtt ordered. DC amiodarone gtt & PO Cardizem.    Problem: RESPIRATORY - ADULT  Goal: Achieves optimal ventilation and oxygenation  Description: INTERVENTIONS:  - Assess for changes in respiratory status  - Assess for changes in mentation and behavior  - Position to facilitate oxygenation and minimize respiratory effort  - Oxygen supplementation based on oxygen saturation or ABGs  - Provide Smoking Cessation handout, if applicable  - Encourage broncho-pulmonary hygiene including cough, deep breathe, Incentive Spirometry  - Assess the need for suctioning and perform as needed  - Assess and instruct to report SOB or any respiratory difficulty  - Respiratory Therapy support as indicated  - Manage/alleviate anxiety  - Monitor for signs/symptoms of CO2 retention  Outcome: Progressing     Problem: CARDIOVASCULAR - ADULT  Goal: Maintains optimal cardiac output and hemodynamic stability  Description: INTERVENTIONS:  - Monitor vital signs, rhythm, and trends  - Monitor for bleeding, hypotension and signs of decreased cardiac output  - Evaluate effectiveness of vasoactive medications to optimize hemodynamic stability  - Monitor arterial and/or venous puncture sites for bleeding and/or hematoma  - Assess quality of pulses, skin color and temperature  - Assess for signs of decreased coronary artery perfusion - ex. Angina  - Evaluate fluid balance, assess for edema, trend weights  Outcome: Progressing  Goal: Absence of cardiac arrhythmias or at baseline  Description: INTERVENTIONS:  - Continuous cardiac  monitoring, monitor vital signs, obtain 12 lead EKG if indicated  - Evaluate effectiveness of antiarrhythmic and heart rate control medications as ordered  - Initiate emergency measures for life threatening arrhythmias  - Monitor electrolytes and administer replacement therapy as ordered  Outcome: Progressing

## 2024-07-06 NOTE — PLAN OF CARE
Patient laying in bed, denies chest pain and dizziness. Patient alert and oriented on nasal canula. Cardiac monitor with aflutter rates increased to 130's at times, patient states he's short of breath. Cardizem gtt infusing, Dr. Larkin at the bedside, verbal orders for cardizem bolus and increase cardizem gtt for heart rate control. Plan for cardizem gtt and ambulation. Plan reviewed with patient, verbalized understanding. Call light with in reach, fall precautions reviewed, all questions answered.     Problem: CARDIOVASCULAR - ADULT  Goal: Maintains optimal cardiac output and hemodynamic stability  Description: INTERVENTIONS:  - Monitor vital signs, rhythm, and trends  - Monitor for bleeding, hypotension and signs of decreased cardiac output  - Evaluate effectiveness of vasoactive medications to optimize hemodynamic stability  - Monitor arterial and/or venous puncture sites for bleeding and/or hematoma  - Assess quality of pulses, skin color and temperature  - Assess for signs of decreased coronary artery perfusion - ex. Angina  - Evaluate fluid balance, assess for edema, trend weights  7/6/2024 1435 by Kim Quinn, RN  Outcome: Progressing  7/6/2024 1435 by Kim Quinn, RN  Outcome: Progressing

## 2024-07-06 NOTE — PROGRESS NOTES
[facility] Cardiology  Progress Note    Keny Marshall Patient Status:  Inpatient    1960 MRN [id number]   [facility] 8NE-A Attending Alfredo Garcia MD   Hosp Day # 7 PCP Dustin Avina MD       Impression;  Aflutter - s/p failed recent CV. Pt on AC. Now on CCB IV.  Rate increased after Neb.  SOB - 2/2 acute dCHF and COPD exac  Acute diastolic CHF - improved  S/p bio AVR  TTE (24): LVEF 55%, +DD, 23mm SAVR 3.3m/s, mean 22mmHg.     Plan:  diuresis: Reduce lasix 40mg IV every day  Minimize ;/ eliminate Neb if reasonable from pulmonary perspective.  IV CCB until demonstrates further respiratory Improvement  L lung mass- outpatient PET/CT per pulmonology  severe COPD- on steroids, inhaler therapy.  Advanced/end-stage emphysema. Inpatient course will likely be protracted over several days.  Pt insight into his condition appears limited.  Explained rate relation with pulmonary issues.  He is becoming more frustrated with hospitalization.      Subjective:  Feels breathing is at baseline.  No CP.    Objective:  /72 (BP Location: Right arm)   Pulse 113   Temp 97.3 °F (36.3 °C) (Oral)   Resp 26   Wt 159 lb 2.8 oz (72.2 kg)   SpO2 93%   BMI 22.84 kg/m²   Temp (24hrs), Av.6 °F (36.4 °C), Min:96.6 °F (35.9 °C), Max:98.4 °F (36.9 °C)      Intake/Output Summary (Last 24 hours) at 2024 0852  Last data filed at 2024 0815  Gross per 24 hour   Intake 240 ml   Output 1615 ml   Net -1375 ml     Wt Readings from Last 3 Encounters:   24 159 lb 2.8 oz (72.2 kg)   24 159 lb 6.3 oz (72.3 kg)   23 172 lb (78 kg)       General: Awake and alert; in no acute distress  Cardiac: IRRR tachycardia; no murmurs/rubs/gallops are appreciated  Lungs: Coarse on auscultation bilaterally; no accessory muscle use  Abdomen: Soft, non-tender; bowel sounds are normoactive  Extremities: No clubbing/cyanosis; moves all 4 extremities normally    Current Facility-Administered Medications    Medication Dose Route Frequency    metoprolol (Lopressor) 5 mg/5mL injection 5 mg  5 mg Intravenous Q6H PRN    digoxin (Lanoxin) tab 125 mcg  125 mcg Oral Daily    methylPREDNISolone sodium succinate (Solu-MEDROL) injection 40 mg  40 mg Intravenous Daily    dilTIAZem (cardIZEM) 100 mg in sodium chloride 0.9% 100 mL IVPB-ADDV  2.5-20 mg/hr Intravenous Continuous    selenium sulfide (Selsun) 2.5 % shampoo   Topical Daily PRN    insulin aspart (NovoLOG) 100 Units/mL FlexPen 1-20 Units  1-20 Units Subcutaneous TID CC and HS    ipratropium (Atrovent) 0.02 % nebulizer solution 0.5 mg  0.5 mg Nebulization QID    sodium chloride (hypertonic) 3 % nebulizer solution 3 mL  3 mL Nebulization 2 times daily    furosemide (Lasix) 10 mg/mL injection 40 mg  40 mg Intravenous TID    aspirin DR tab 81 mg  81 mg Oral Daily    atorvastatin (Lipitor) tab 20 mg  20 mg Oral Nightly    guaiFENesin ER (Mucinex) 12 hr tab 1,200 mg  1,200 mg Oral BID    ipratropium (Atrovent) 0.02 % nebulizer solution 500 mcg  500 mcg Nebulization Q6H PRN    levETIRAcetam (Keppra) tab 500 mg  500 mg Oral BID    Roflumilast TABS 500 mcg  500 mcg Oral Daily    rivaroxaban (Xarelto) tab 20 mg  20 mg Oral Daily with food    dilTIAZem (cardIZEM) 100 mg in sodium chloride 0.9% 100 mL IVPB-ADDV  2.5-20 mg/hr Intravenous Continuous    glucose (Dex4) 15 GM/59ML oral liquid 15 g  15 g Oral Q15 Min PRN    Or    glucose (Glutose) 40% oral gel 15 g  15 g Oral Q15 Min PRN    Or    glucose-vitamin C (Dex-4) chewable tab 4 tablet  4 tablet Oral Q15 Min PRN    Or    dextrose 50% injection 50 mL  50 mL Intravenous Q15 Min PRN    Or    glucose (Dex4) 15 GM/59ML oral liquid 30 g  30 g Oral Q15 Min PRN    Or    glucose (Glutose) 40% oral gel 30 g  30 g Oral Q15 Min PRN    Or    glucose-vitamin C (Dex-4) chewable tab 8 tablet  8 tablet Oral Q15 Min PRN    insulin aspart (NovoLOG) 100 Units/mL FlexPen 1-10 Units  1-10 Units Subcutaneous TID AC and HS    melatonin tab 3 mg  3 mg  Oral Nightly PRN    acetaminophen (Tylenol Extra Strength) tab 500 mg  500 mg Oral Q4H PRN    polyethylene glycol (PEG 3350) (Miralax) 17 g oral packet 17 g  17 g Oral Daily PRN    sennosides (Senokot) tab 17.2 mg  17.2 mg Oral Nightly PRN    bisacodyl (Dulcolax) 10 MG rectal suppository 10 mg  10 mg Rectal Daily PRN    fleet enema (Fleet) 7-19 GM/118ML rectal enema 133 mL  1 enema Rectal Once PRN    prochlorperazine (Compazine) 10 MG/2ML injection 5 mg  5 mg Intravenous Q8H PRN    fluticasone furoate-vilanterol (Breo Ellipta) 200-25 MCG/ACT inhaler 1 puff  1 puff Inhalation Daily       Laboratory Data:  Lab Results   Component Value Date    WBC 8.6 07/06/2024    HGB 11.6 07/06/2024    HCT 37.8 07/06/2024    .0 07/06/2024     Lab Results   Component Value Date    INR 1.59 (H) 06/29/2024    INR 1.06 06/11/2024    INR 2.26 (H) 09/22/2021     Lab Results   Component Value Date     07/06/2024    K 4.5 07/06/2024    CL 93 07/06/2024    CO2 45.0 07/06/2024    BUN 39 07/06/2024    CREATSERUM 0.76 07/06/2024     07/06/2024    CA 8.7 07/06/2024    MG 2.3 07/06/2024       Telemetry: AFlutter with RVR      Thank you for allowing our practice to participate in the care of your patient. Please do not hesitate to contact me if you have any questions.

## 2024-07-07 LAB
DIGOXIN SERPL-MCNC: 0.46 NG/ML (ref 0.8–2)
GLUCOSE BLD-MCNC: 107 MG/DL (ref 70–99)
GLUCOSE BLD-MCNC: 131 MG/DL (ref 70–99)
GLUCOSE BLD-MCNC: 155 MG/DL (ref 70–99)
GLUCOSE BLD-MCNC: 157 MG/DL (ref 70–99)

## 2024-07-07 PROCEDURE — 99233 SBSQ HOSP IP/OBS HIGH 50: CPT | Performed by: INTERNAL MEDICINE

## 2024-07-07 PROCEDURE — 99232 SBSQ HOSP IP/OBS MODERATE 35: CPT | Performed by: HOSPITALIST

## 2024-07-07 RX ORDER — FUROSEMIDE 20 MG/1
20 TABLET ORAL DAILY
Status: DISCONTINUED | OUTPATIENT
Start: 2024-07-07 | End: 2024-07-20

## 2024-07-07 RX ORDER — DILTIAZEM HYDROCHLORIDE 240 MG/1
240 CAPSULE, COATED, EXTENDED RELEASE ORAL DAILY
Status: DISCONTINUED | OUTPATIENT
Start: 2024-07-07 | End: 2024-07-08

## 2024-07-07 NOTE — PLAN OF CARE
Patient laying in bed, denies chest pain, shortness of breath and dizziness. Patient alert and oriented on high flow, up with stand by assist. Cardizem gtt infusing. Aflutter on cardiac monitor. During bedside report HR's in the 60's aflutter, patient requested AVAPS off and to be put on canula. Cardizem gtt discontinued and PO started. Plan to monitor heart rate and ambulation. Plan reviewed with patient, verbalized understanding. Call light with in reach, fall precautions reviewed, all questions answered.     's aflutter on cardiac monitor. Patient asymptomatic.     Problem: CARDIOVASCULAR - ADULT  Goal: Maintains optimal cardiac output and hemodynamic stability  Description: INTERVENTIONS:  - Monitor vital signs, rhythm, and trends  - Monitor for bleeding, hypotension and signs of decreased cardiac output  - Evaluate effectiveness of vasoactive medications to optimize hemodynamic stability  - Monitor arterial and/or venous puncture sites for bleeding and/or hematoma  - Assess quality of pulses, skin color and temperature  - Assess for signs of decreased coronary artery perfusion - ex. Angina  - Evaluate fluid balance, assess for edema, trend weights  Outcome: Progressing

## 2024-07-07 NOTE — PROGRESS NOTES
[facility]   part of [facility]     Hospitalist Progress Note     Keny Marshall Patient Status:  Inpatient    1960 MRN [id number]   Location [facility] 6NE-A Attending Alfredo Garcia MD   Hosp Day # 8 PCP Dustin Avina MD     Chief Complaint: SOB, palpitations     Subjective:     Patient feels better today, sob improved.  Frustrated about his HR.  Denies fever, chills, no cp.  No other acute complaints.      Objective:    Review of Systems:   A comprehensive review of systems was completed; pertinent positive and negatives stated in subjective.    Vital signs:  Temp:  [96.6 °F (35.9 °C)-98.5 °F (36.9 °C)] 97.9 °F (36.6 °C)  Pulse:  [] 96  Resp:  [19-32] 20  BP: (120-141)/() 123/70  SpO2:  [85 %-100 %] 96 %  FiO2 (%):  [40 %] 40 %    Physical Exam:    General: No acute distress, pleasant   Respiratory: Diminished BS b/l   Cardiovascular: S1, S2, irregularly irregular, tachycardic   Abdomen: Soft, Non-tender, non-distended, + bowel sounds  Extremities: + edema    Diagnostic Data:    Labs:  Recent Labs   Lab 24  0937 24  0610   WBC 10.1 8.6   HGB 11.5* 11.6*   MCV 99.5 99.0   .0 215.0       Recent Labs   Lab 24  1132 24  0831 24  0620   * 166* 143*   BUN 16 23 39*   CREATSERUM 0.58* 0.61* 0.76   CA 8.7 8.6 8.7   * 135* 137   K 4.4 4.6 4.5   CL 91* 94* 93*   CO2 42.0* 41.0* 45.0*       Estimated Creatinine Clearance: 101.4 mL/min (based on SCr of 0.76 mg/dL).    No results for input(s): \"TROP\", \"TROPHS\", \"CK\" in the last 168 hours.      No results for input(s): \"PTP\", \"INR\" in the last 168 hours.                 Microbiology    No results found for this visit on 24.      Imaging: Reviewed in Epic.    Medications:    dilTIAZem ER  240 mg Oral Daily    furosemide  20 mg Oral Daily    predniSONE  30 mg Oral Daily with breakfast    digoxin  125 mcg Oral Daily    insulin aspart  1-20 Units Subcutaneous TID CC and HS    aspirin  81 mg  Oral Daily    atorvastatin  20 mg Oral Nightly    guaiFENesin ER  1,200 mg Oral BID    levETIRAcetam  500 mg Oral BID    Roflumilast  500 mcg Oral Daily    rivaroxaban  20 mg Oral Daily with food    insulin aspart  1-10 Units Subcutaneous TID AC and HS    [Held by provider] fluticasone furoate-vilanterol  1 puff Inhalation Daily       Assessment & Plan:      #Acute on chronic hypercapnic and hypoxic respiratory failure  Multifactorial - see below      #End stage COPD  Wean oxygen  Budesonide, Roflumilast, Nebs, steroids  Plan to transition to PO steroids tomorrow  Hold breo  Pulm following    #HFpEF with acute exacerbation  #Metabolic alkalosis   Echo in June with EF 55%  Net neg 4.5L out since admission  Lasix 40mg daily now  Check bmp/mag in am   Cards    #Rapid a. Flutter   Rate at 130s, patient feels his rate inc after neb treatments   Oral dilt now, Digoxin   Cards following  Xarelto for AC    #Aortic stenosis s/p AVR  #HLD - statin   #Hyponatremia - na 135  #Left lung mass - follows with Pulm, outpatient PET/CT  #Seizure d/o - Keppra   #DM Type 2- ISS  #Hx of RCC s/p nephrectomy   #ABIGAIL  #Hx of VTE - Xarelto       Alfredo Garcia MD    Supplementary Documentation:     Quality:  DVT Mechanical Prophylaxis:     Early ambuation  DVT Pharmacologic Prophylaxis   Medication    rivaroxaban (Xarelto) tab 20 mg         DVT Pharmacologic prophylaxis: Aspirin 81 mg      Code Status: Full Code  Peters: No urinary catheter in place  Peters Duration (in days):   Central line:    ARUN:     Discharge is dependent on: weaning oxygen   At this point Mr. Marshall is expected to be discharge to: Home    The 21st Century Cures Act makes medical notes like these available to patients in the interest of transparency. Please be advised this is a medical document. Medical documents are intended to carry relevant information, facts as evident, and the clinical opinion of the practitioner. The medical note is intended as peer to peer communication  and may appear blunt or direct. It is written in medical language and may contain abbreviations or verbiage that are unfamiliar.

## 2024-07-07 NOTE — PLAN OF CARE
Pt admitted to unit for afib RVR. Received pt in chair. A&Ox 4. 5 LNC . Rhythm aflutter. GI/ WNL. Minor complaint of pain in lower coccyx. All medications given as ordered. Pt resting in bed w/ all safety measures in place and call light within reach.     Care completed over shift:Cardizem gtt titrated per protocol. Tylenol x1 PRN for pain. Mepilex on coccyx changed; skin appears red but blanchable. AVAPS overnight per RT.     Plan is to transition to oral cardizem per cardiology.    Problem: RESPIRATORY - ADULT  Goal: Achieves optimal ventilation and oxygenation  Description: INTERVENTIONS:  - Assess for changes in respiratory status  - Assess for changes in mentation and behavior  - Position to facilitate oxygenation and minimize respiratory effort  - Oxygen supplementation based on oxygen saturation or ABGs  - Provide Smoking Cessation handout, if applicable  - Encourage broncho-pulmonary hygiene including cough, deep breathe, Incentive Spirometry  - Assess the need for suctioning and perform as needed  - Assess and instruct to report SOB or any respiratory difficulty  - Respiratory Therapy support as indicated  - Manage/alleviate anxiety  - Monitor for signs/symptoms of CO2 retention  Outcome: Progressing     Problem: CARDIOVASCULAR - ADULT  Goal: Maintains optimal cardiac output and hemodynamic stability  Description: INTERVENTIONS:  - Monitor vital signs, rhythm, and trends  - Monitor for bleeding, hypotension and signs of decreased cardiac output  - Evaluate effectiveness of vasoactive medications to optimize hemodynamic stability  - Monitor arterial and/or venous puncture sites for bleeding and/or hematoma  - Assess quality of pulses, skin color and temperature  - Assess for signs of decreased coronary artery perfusion - ex. Angina  - Evaluate fluid balance, assess for edema, trend weights  Outcome: Progressing  Goal: Absence of cardiac arrhythmias or at baseline  Description: INTERVENTIONS:  - Continuous  cardiac monitoring, monitor vital signs, obtain 12 lead EKG if indicated  - Evaluate effectiveness of antiarrhythmic and heart rate control medications as ordered  - Initiate emergency measures for life threatening arrhythmias  - Monitor electrolytes and administer replacement therapy as ordered  Outcome: Progressing

## 2024-07-07 NOTE — PROGRESS NOTES
[facility] Cardiology  Progress Note    Keny Marshall Patient Status:  Inpatient    1960 MRN [id number]   [facility] 8NE-A Attending Alfredo Garcia MD   Hosp Day # 8 PCP Dustin Avina MD       Impression;  Aflutter - s/p failed recent CV. Pt on AC. Now on CCB IV.  Rate increased after Neb. Controlled at present on IV CCB Rx.  SOB - 2/2 acute dCHF and COPD exac  Acute diastolic CHF - improved  S/p bio AVR  TTE (24): LVEF 55%, +DD, 23mm SAVR 3.3m/s, mean 22mmHg.     Plan:  diuresis: Transition Lasix to PO  Minimize ;/ eliminate Neb if reasonable from pulmonary perspective.  Transition IV CCB Emily this AM  L lung mass- outpatient PET/CT per pulmonology  severe COPD- on steroids, inhaler therapy.  Advanced/end-stage emphysema. Inpatient course will likely be protracted over several days.  Pt insight into his condition appears limited.  Explained rate relation with pulmonary issues.  He is becoming more frustrated with hospitalization.  F/U BMP / Dig level in AM      Subjective:  Feels breathing is at baseline.  No CP.  No palpitations despite AF.    Objective:  /74 (BP Location: Right arm)   Pulse 75   Temp 96.6 °F (35.9 °C) (Oral)   Resp 19   Wt 170 lb 3.1 oz (77.2 kg)   SpO2 97%   BMI 24.42 kg/m²   Temp (24hrs), Av.8 °F (36.6 °C), Min:96.6 °F (35.9 °C), Max:98.5 °F (36.9 °C)      Intake/Output Summary (Last 24 hours) at 2024 0832  Last data filed at 2024 0722  Gross per 24 hour   Intake 640 ml   Output 2025 ml   Net -1385 ml     Wt Readings from Last 3 Encounters:   24 170 lb 3.1 oz (77.2 kg)   24 159 lb 6.3 oz (72.3 kg)   23 172 lb (78 kg)       General: Awake and alert; in no acute distress  Cardiac: IRRR tachycardia; no murmurs/rubs/gallops are appreciated  Lungs: Coarse on auscultation bilaterally; no accessory muscle use  Abdomen: Soft, non-tender; bowel sounds are normoactive  Extremities: No clubbing/cyanosis; moves all 4  extremities normally    Current Facility-Administered Medications   Medication Dose Route Frequency    furosemide (Lasix) 10 mg/mL injection 40 mg  40 mg Intravenous Daily    predniSONE (Deltasone) tab 30 mg  30 mg Oral Daily with breakfast    metoprolol (Lopressor) 5 mg/5mL injection 5 mg  5 mg Intravenous Q6H PRN    digoxin (Lanoxin) tab 125 mcg  125 mcg Oral Daily    dilTIAZem (cardIZEM) 100 mg in sodium chloride 0.9% 100 mL IVPB-ADDV  2.5-20 mg/hr Intravenous Continuous    selenium sulfide (Selsun) 2.5 % shampoo   Topical Daily PRN    insulin aspart (NovoLOG) 100 Units/mL FlexPen 1-20 Units  1-20 Units Subcutaneous TID CC and HS    sodium chloride (hypertonic) 3 % nebulizer solution 3 mL  3 mL Nebulization 2 times daily    aspirin DR tab 81 mg  81 mg Oral Daily    atorvastatin (Lipitor) tab 20 mg  20 mg Oral Nightly    guaiFENesin ER (Mucinex) 12 hr tab 1,200 mg  1,200 mg Oral BID    ipratropium (Atrovent) 0.02 % nebulizer solution 500 mcg  500 mcg Nebulization Q6H PRN    levETIRAcetam (Keppra) tab 500 mg  500 mg Oral BID    Roflumilast TABS 500 mcg  500 mcg Oral Daily    rivaroxaban (Xarelto) tab 20 mg  20 mg Oral Daily with food    dilTIAZem (cardIZEM) 100 mg in sodium chloride 0.9% 100 mL IVPB-ADDV  2.5-20 mg/hr Intravenous Continuous    glucose (Dex4) 15 GM/59ML oral liquid 15 g  15 g Oral Q15 Min PRN    Or    glucose (Glutose) 40% oral gel 15 g  15 g Oral Q15 Min PRN    Or    glucose-vitamin C (Dex-4) chewable tab 4 tablet  4 tablet Oral Q15 Min PRN    Or    dextrose 50% injection 50 mL  50 mL Intravenous Q15 Min PRN    Or    glucose (Dex4) 15 GM/59ML oral liquid 30 g  30 g Oral Q15 Min PRN    Or    glucose (Glutose) 40% oral gel 30 g  30 g Oral Q15 Min PRN    Or    glucose-vitamin C (Dex-4) chewable tab 8 tablet  8 tablet Oral Q15 Min PRN    insulin aspart (NovoLOG) 100 Units/mL FlexPen 1-10 Units  1-10 Units Subcutaneous TID AC and HS    melatonin tab 3 mg  3 mg Oral Nightly PRN    acetaminophen (Tylenol  Extra Strength) tab 500 mg  500 mg Oral Q4H PRN    polyethylene glycol (PEG 3350) (Miralax) 17 g oral packet 17 g  17 g Oral Daily PRN    sennosides (Senokot) tab 17.2 mg  17.2 mg Oral Nightly PRN    bisacodyl (Dulcolax) 10 MG rectal suppository 10 mg  10 mg Rectal Daily PRN    fleet enema (Fleet) 7-19 GM/118ML rectal enema 133 mL  1 enema Rectal Once PRN    prochlorperazine (Compazine) 10 MG/2ML injection 5 mg  5 mg Intravenous Q8H PRN    [Held by provider] fluticasone furoate-vilanterol (Breo Ellipta) 200-25 MCG/ACT inhaler 1 puff  1 puff Inhalation Daily       Laboratory Data:       Lab Results   Component Value Date    INR 1.59 (H) 06/29/2024    INR 1.06 06/11/2024    INR 2.26 (H) 09/22/2021            Telemetry: AFlutter with RVR      Thank you for allowing our practice to participate in the care of your patient. Please do not hesitate to contact me if you have any questions.

## 2024-07-07 NOTE — PROGRESS NOTES
[facility] Progress Note     SUBJECTIVE/Interval history:  No acute issues overnight, he feels 'good' today. Remains on diltiazem and heart rates controlled.  No chest pain. No fevers.  Tolerating AVAPS overnight. Noted inconsistencies with weights- reportedly up to 170# today    Review of Systems:   A comprehensive 14 point review of systems was completed.   Pertinent positives and negatives noted in the HPI.    Medications  Reviewed personally   furosemide  40 mg Intravenous Daily    ipratropium  0.5 mg Nebulization TID    predniSONE  30 mg Oral Daily with breakfast    digoxin  125 mcg Oral Daily    insulin aspart  1-20 Units Subcutaneous TID CC and HS    sodium chloride (hypertonic)  3 mL Nebulization 2 times daily    aspirin  81 mg Oral Daily    atorvastatin  20 mg Oral Nightly    guaiFENesin ER  1,200 mg Oral BID    levETIRAcetam  500 mg Oral BID    Roflumilast  500 mcg Oral Daily    rivaroxaban  20 mg Oral Daily with food    insulin aspart  1-10 Units Subcutaneous TID AC and HS    [Held by provider] fluticasone furoate-vilanterol  1 puff Inhalation Daily       metoprolol    selenium sulfide    ipratropium    glucose **OR** glucose **OR** glucose-vitamin C **OR** dextrose **OR** glucose **OR** glucose **OR** glucose-vitamin C    melatonin    acetaminophen    polyethylene glycol (PEG 3350)    sennosides    bisacodyl    fleet enema    prochlorperazine    OBJECTIVE:  Vitals:    07/06/24 2325 07/06/24 2330 07/07/24 0530 07/07/24 0722   BP:  (!) 141/107 120/74    BP Location:  Right arm Right arm    Pulse: 86 87 67 75   Resp: (!) 32 20 19    Temp:  97.2 °F (36.2 °C) 96.6 °F (35.9 °C)    TempSrc:  Axillary Oral    SpO2: 99% 98% 100% 97%   Weight:   170 lb 3.1 oz (77.2 kg)        Vital signs in last 24 hours:  Blood pressure 120/74, pulse 75, temperature 96.6 °F (35.9 °C), temperature source Oral, resp. rate 19, weight 170 lb 3.1 oz (77.2 kg), SpO2 97%.      Intake/Output:  I/O last 3 completed shifts:  In: 880 [P.O.:880]  Out: 3265 [Urine:3265]  FiO2 (%):  [40 %] 40 %    Physical Exam:  General: Appears alert, comfortable at rest on 5L  Neurologic:No focal deficits.  Alert.  Oriented.  Lungs:diminished but clear breath sounds today. No wheeze. No distress  Heart:irreg RR. No m heard  Abdomen:Soft, non-tender.  No masses.  No guarding.  No rebound.  Extremities:edema    Lab Data Review:   Recent Labs   Lab 07/03/24  1132 07/04/24  0831 07/06/24  0620   * 166* 143*   BUN 16 23 39*   CREATSERUM 0.58* 0.61* 0.76   CA 8.7 8.6 8.7   * 135* 137   K 4.4 4.6 4.5   CL 91* 94* 93*   CO2 42.0* 41.0* 45.0*     Recent Labs   Lab 07/01/24  0937 07/06/24  0610   RBC 3.71* 3.82*   HGB 11.5* 11.6*   HCT 36.9* 37.8*   MCV 99.5 99.0   MCH 31.0 30.4   MCHC 31.2 30.7*   RDW 13.8 13.5   NEPRELIM 8.67* 7.67   WBC 10.1 8.6   .0 215.0     No results for input(s): \"BNP\" in the last 168 hours.  No results for input(s): \"TROP\", \"CK\" in the last 168 hours.  No results for input(s): \"PT\", \"INR\", \"PTT\" in the last 168 hours.    Recent Labs   Lab 07/02/24  0834   ABGPHT 7.35   ILPIOL3F 82*   TMWDX5P 161*   ABGHCO3 37.6*   ABGBE 16.3*   TEMP 98.6   ROMARIO Positive   SITE Right Radial   DEV Nasal cannula   THGB 11.3*       Other Labs:  Interval Culture Data:   No results found for this visit on 06/29/24.  No results for input(s): \"COLORUR\", \"CLARITY\", \"SPECGRAVITY\", \"GLUUR\", \"BILUR\", \"KETUR\", \"BLOODURINE\", \"PHURINE\", \"PROUR\", \"UROBILINOGEN\", \"NITRITE\", \"LEUUR\", \"WBCUR\", \"RBCUR\", \"BACUR\", \"EPIUR\" in the last 168 hours.    Interval Radiology:   Reviewed personally  XR CHEST AP PORTABLE  (CPT=71045)    Result Date: 7/4/2024  CONCLUSION:  1. Interval improvement in interstitial and alveolar opacities since prior examination with residual remaining. 2. Small bilateral pleural effusions with bilateral basilar atelectasis/infiltrates. 3. Hyperexpansion of the lungs.    LOCATION:  [facility]       Dictated by (CST): Lior Donnelly MD on 7/04/2024 at 10:25 PM     Finalized by (CST): Lior Donnelly MD on 7/04/2024 at 10:27 PM        Assessment/Plan:  #1. Acute on chronic hypercapnic and hypoxic respiratory failure  Multifactorial due to afib with RVR, CHF exacerbation with underlying end stage COPD  7/1 CTA with fluid overload, no PE  Treat/control afib and CHF as per cardiology  He has been on scheduled atrovent which should not affect his heart rates, however reportedly post nebs, heart rate control is worse.  His COPD is controlled at present - will trial on atrovent nebs PRN only for now  Continue on pred oral taper  Wean o2 for goal saturations of 88-92% given hypercapnia. His previous o2 baseline was: 2L at rest, 4L on exertion; using and benefiting from portable oxygen concentrator (3 at rest, 6 on exertion)  Continue on AVAPS with naps/sleep and PRN    #2. Acute CHF exacerbation, afib with RVR  Rate control and diurese as per cardiology  Previous cardioversion 6/14     #3. COPD  Severe obstruction on previous PFTs 3/2021  off arformoterol given worsening afib/tachycardia  Will continue to hold breo and scheduled atrovent nebs for now given afib/rates.  Use atrovent nebs PRN  He can use levalbuterol as outpatient as hospital does not stock it  He cannot tolerate albuterol due to palpitations, tremors and with afib with RVR; off duonebs  Continue oral pred taper  Continue roflumilast  Previously reviewed BLVR - not candidate at this time, reassess as outpatient     #4. Pulmonary nodules  7/2017 CT with worsening peripheral lingular atelectasis. Nodules stable   5/2018 CT stable nodules  12/2020 CT stable  7/2021 CT stable but worsening mediastinal LAD suspect reactive to recent cardiac surgery  1/2022 CT with stable nodules, improved LAD  3/2023 CT chest with several small nodules  9/2023 CT chest with stable findings   7/1/2024 CT chest with worsening ALYSON lesion, possible scarring, rounded  atelectasis vs neoplasm  Will need outpatient follow up including PET/CT    #5. Tobacco abuse  30+ pack years, active pipe smoker  Hold on LDCT given recent CT with worsening ALYSON lesion, will need PET/CT as above    Dispo:  Floor    Stefano Randle MD

## 2024-07-08 LAB
ANION GAP SERPL CALC-SCNC: <0 MMOL/L (ref 0–18)
BUN BLD-MCNC: 26 MG/DL (ref 9–23)
CALCIUM BLD-MCNC: 8.4 MG/DL (ref 8.5–10.1)
CHLORIDE SERPL-SCNC: 98 MMOL/L (ref 98–112)
CO2 SERPL-SCNC: 43 MMOL/L (ref 21–32)
CREAT BLD-MCNC: 0.66 MG/DL
EGFRCR SERPLBLD CKD-EPI 2021: 105 ML/MIN/1.73M2 (ref 60–?)
GLUCOSE BLD-MCNC: 107 MG/DL (ref 70–99)
GLUCOSE BLD-MCNC: 136 MG/DL (ref 70–99)
GLUCOSE BLD-MCNC: 138 MG/DL (ref 70–99)
GLUCOSE BLD-MCNC: 170 MG/DL (ref 70–99)
GLUCOSE BLD-MCNC: 263 MG/DL (ref 70–99)
GLUCOSE BLD-MCNC: 80 MG/DL (ref 70–99)
MAGNESIUM SERPL-MCNC: 2.4 MG/DL (ref 1.6–2.6)
OSMOLALITY SERPL CALC.SUM OF ELEC: 294 MOSM/KG (ref 275–295)
POTASSIUM SERPL-SCNC: 4.6 MMOL/L (ref 3.5–5.1)
SODIUM SERPL-SCNC: 140 MMOL/L (ref 136–145)

## 2024-07-08 PROCEDURE — 99232 SBSQ HOSP IP/OBS MODERATE 35: CPT | Performed by: INTERNAL MEDICINE

## 2024-07-08 PROCEDURE — 99232 SBSQ HOSP IP/OBS MODERATE 35: CPT | Performed by: HOSPITALIST

## 2024-07-08 RX ORDER — AMIODARONE HYDROCHLORIDE 200 MG/1
200 TABLET ORAL 2 TIMES DAILY WITH MEALS
Status: DISCONTINUED | OUTPATIENT
Start: 2024-07-08 | End: 2024-07-12

## 2024-07-08 RX ORDER — DIGOXIN 0.25 MG/ML
250 INJECTION INTRAMUSCULAR; INTRAVENOUS ONCE
Status: COMPLETED | OUTPATIENT
Start: 2024-07-08 | End: 2024-07-08

## 2024-07-08 RX ORDER — DILTIAZEM HYDROCHLORIDE 180 MG/1
360 CAPSULE, EXTENDED RELEASE ORAL DAILY
Status: DISCONTINUED | OUTPATIENT
Start: 2024-07-09 | End: 2024-07-12

## 2024-07-08 NOTE — PLAN OF CARE
Pt received at 1930. A&Ox4. 5L o2 when awake, avaps with sleep as tolerated, dyspnea on exertion noted. Afib/flutter on tele, rates in 100s-110s at rest, up to 130s with exertion, on Xarelto. Continent, voiding in urinal without difficulty. PRN tylenol given for pt c/o pain and tenderness to right inner/upper thigh, pt states pain onset began a few days ago, pt stating he thinks he pulled a muscle while ambulating. Some pain relief with prn tylenol. Up with standby assist. Seizure precautions remain in place. Pt updated and agrees with plan of care.        Problem: CARDIOVASCULAR - ADULT  Goal: Maintains optimal cardiac output and hemodynamic stability  Description: INTERVENTIONS:  - Monitor vital signs, rhythm, and trends  - Monitor for bleeding, hypotension and signs of decreased cardiac output  - Evaluate effectiveness of vasoactive medications to optimize hemodynamic stability  - Monitor arterial and/or venous puncture sites for bleeding and/or hematoma  - Assess quality of pulses, skin color and temperature  - Assess for signs of decreased coronary artery perfusion - ex. Angina  - Evaluate fluid balance, assess for edema, trend weights  Outcome: Progressing  Goal: Absence of cardiac arrhythmias or at baseline  Description: INTERVENTIONS:  - Continuous cardiac monitoring, monitor vital signs, obtain 12 lead EKG if indicated  - Evaluate effectiveness of antiarrhythmic and heart rate control medications as ordered  - Initiate emergency measures for life threatening arrhythmias  - Monitor electrolytes and administer replacement therapy as ordered  Outcome: Progressing

## 2024-07-08 NOTE — CM/SW NOTE
ROSALINDA sent updates to [facility] via [employer].     Sherrie Dean MSW, LSW  Discharge Planner

## 2024-07-08 NOTE — PAYOR COMM NOTE
7/3-7/5  CONTINUED STAY REVIEW    Payor: [employer] MED[employer] ADV PPO  Subscriber #:  [id number]  Authorization Number: [id number]    Admit date: 6/29/24  Admit time:  2:08 PM      MEDICATIONS ADMINISTERED IN LAST 1 DAY:  acetaminophen (Tylenol Extra Strength) tab 500 mg       Date Action Dose Route User    7/7/2024 2138 Given 500 mg Oral Karla Calderón RN          aspirin DR tab 81 mg       Date Action Dose Route User    7/8/2024 0807 Given 81 mg Oral Mora Brush RN          atorvastatin (Lipitor) tab 20 mg       Date Action Dose Route User    7/7/2024 2138 Given 20 mg Oral Karla Calderón RN          digoxin (Lanoxin) tab 125 mcg       Date Action Dose Route User    7/8/2024 0806 Given 125 mcg Oral Mora Brush RN          digoxin (Lanoxin) 250 MCG/ML injection 250 mcg       Date Action Dose Route User    7/8/2024 1007 Given 250 mcg Intravenous Mora Brush RN          dilTIAZem ER (CardIZEM CD) 24 hr cap 240 mg       Date Action Dose Route User    7/8/2024 0806 Given 240 mg Oral Mora Brush RN          furosemide (Lasix) tab 20 mg       Date Action Dose Route User    7/8/2024 0806 Given 20 mg Oral Mora Brush RN          guaiFENesin ER (Mucinex) 12 hr tab 1,200 mg       Date Action Dose Route User    7/8/2024 0809 Given 1,200 mg Oral Mora Brush RN    7/7/2024 2138 Given 1,200 mg Oral Karla Calderón RN          insulin aspart (NovoLOG) 100 Units/mL FlexPen 1-20 Units       Date Action Dose Route User    7/8/2024 0840 Given 6 Units Subcutaneous (Right Upper Arm) Mora Brush RN    7/7/2024 1806 Given 6 Units Subcutaneous (Left Upper Arm) Kim Quinn RN          ipratropium (Atrovent) 0.02 % nebulizer solution 500 mcg       Date Action Dose Route User    7/8/2024 1321 Given 500 mcg Nebulization Keyonna Car RCP          levETIRAcetam (Keppra) tab 500 mg       Date Action Dose Route User    7/8/2024 0806 Given 500 mg Oral Mora Brush RN    7/7/2024 2131 Given 500 mg Oral Karla Calderón,  RN          lidocaine-menthol 4-1 % patch 1 patch       Date Action Dose Route User    7/8/2024 0404 Patch Applied 1 patch Transdermal (Left Leg) Karla Calderón, RADHA          rivaroxaban (Xarelto) tab 20 mg       Date Action Dose Route User    7/8/2024 0806 Given 20 mg Oral Mora Brush RN          Roflumilast TABS 500 mcg       Date Action Dose Route User    7/8/2024 0806 Given 500 mcg Oral Mora Brush RN          predniSONE (Deltasone) tab 30 mg       Date Action Dose Route User    7/8/2024 0806 Given 30 mg Oral Mora Brush RN            Vitals (last day)       Date/Time Temp Pulse Resp BP SpO2 Weight O2 Device O2 Flow Rate (L/min) [facility]    07/08/24 1634 98.5 °F (36.9 °C) 115 22 128/97 94 % -- High flow nasal cannula 4.5 L/min     07/08/24 1321 -- -- -- -- -- -- High flow nasal cannula 5 L/min     07/08/24 1229 -- 93 -- -- 96 % -- -- --     07/08/24 1152 98.8 °F (37.1 °C) 98 20 120/76 95 % -- High flow nasal cannula 5.5 L/min     07/08/24 1030 -- 136 -- -- 87 % -- -- --     07/08/24 1007 -- 135 -- -- -- -- -- --     07/08/24 0806 98.4 °F (36.9 °C) 133 22 118/89 100 % -- High flow nasal cannula 5.5 L/min     07/08/24 0605 -- 108 -- -- 99 % -- -- --     07/08/24 0435 -- 119 -- -- 97 % 161 lb 9.6 oz (73.3 kg) -- --     07/08/24 0407 97.7 °F (36.5 °C) 109 28 121/88 96 % -- Other (Comment) --     07/08/24 0100 -- -- -- -- -- -- Other (Comment) --     07/07/24 2348 97.1 °F (36.2 °C) 99 22 141/68 95 % -- High flow nasal cannula 5 L/min     07/07/24 2228 -- -- 20 -- -- -- -- -- EM    07/07/24 2205 -- 131 -- -- 98 % -- -- --     07/07/24 1946 98.3 °F (36.8 °C) 114 20 112/75 97 % -- High flow nasal cannula 4.5 L/min     07/07/24 1813 -- 119 -- -- 94 % -- -- --     07/07/24 1732 -- 105 -- -- 97 % -- -- --     07/07/24 1555 98.9 °F (37.2 °C) 113 19 102/80 87 % -- High flow nasal cannula 4.5 L/min     07/07/24 1200 98.1 °F (36.7 °C) 134 20 123/85 93 % -- High flow nasal cannula 4.5 L/min  AP    07/07/24 1054 -- 133 -- -- 97 % 161 lb 13.1 oz (73.4 kg) -- -- AP    07/07/24 0922 -- 96 -- -- 96 % -- -- -- AP    07/07/24 0903 -- 115 -- -- -- -- -- --     07/07/24 0849 97.9 °F (36.6 °C) 89 20 123/70 95 % -- High flow nasal cannula 4.5 L/min AP    07/07/24 0722 -- 75 -- -- 97 % -- -- -- AP    07/07/24 0530 96.6 °F (35.9 °C) -- 19 -- -- 170 lb 3.1 oz (77.2 kg) CPAP 5 L/min AM    07/07/24 0530 -- 67 -- 120/74 100 % -- -- -- YOLANDA           7/3 PULMONARY NOTE    SUBJECTIVE/Interval history:  No acute events overnight, used AVAPS overnight, ~7 hours. Feels more dyspneic this am. Having cough/congestion as well, given nebs earlier with some improvement. No pain  No fevers     Review of Systems:   A comprehensive 14 point review of systems was completed.   Pertinent positives and negatives noted in the HPI.     Medications  Reviewed personally  Scheduled Medications    ipratropium  0.5 mg Nebulization QID    sodium chloride (hypertonic)  3 mL Nebulization 2 times daily    furosemide  40 mg Intravenous TID    aspirin  81 mg Oral Daily    atorvastatin  20 mg Oral Nightly    guaiFENesin ER  1,200 mg Oral BID    levETIRAcetam  500 mg Oral BID    Roflumilast  500 mcg Oral Daily    rivaroxaban  20 mg Oral Daily with food    insulin aspart  1-10 Units Subcutaneous TID AC and HS    fluticasone furoate-vilanterol  1 puff Inhalation Daily          Vital signs in last 24 hours:  Blood pressure 131/69, pulse 100, temperature 98.2 °F (36.8 °C), temperature source Temporal, resp. rate 23, weight 160 lb 7.9 oz (72.8 kg), SpO2 95%.      Intake/Output:  I/O last 3 completed shifts:  In: 2133 [P.O.:1918; I.V.:215]  Out: 2450 [Urine:2450]  FiO2 (%):  [40 %-50 %] 40 %     Physical Exam:  General: Appears alert, mild tachypnea at rest on 8L  Neurologic:No focal deficits.  Alert.  Oriented.  Lungs:diminished bs bilaterally. Wheeze noted today  Heart:irreg RR. No m heard  Abdomen:Soft, non-tender.  No masses.  No guarding.  No  rebound.  Extremities:edema      Assessment/Plan:  #1. Acute on chronic hypercapnic and hypoxic respiratory failure  Multifactorial due to afib with RVR, CHF exacerbation with underlying end stage COPD  7/1 CTA with fluid overload, no PE  Treat/control afib and CHF as per cardiology  Continue nebs   Start methylprednisolone  Wean o2 for goal saturations of 88-92% given hypercapnia. His previous o2 baseline was: 2L at rest, 4L on exertion; using and benefiting from portable oxygen concentrator (3 at rest, 6 on exertion)  Continue on AVAPS with naps/sleep and PRN     #2. Acute CHF exacerbation, afib with RVR  Rate control and diurese as per cardiology  Previous cardioversion 6/14     #3. COPD  Severe obstruction on previous PFTs 3/2021  off arformoterol given worsening afib/tachycardia  Continue Breo 200 in place of his home inhalers (home regimen: symbicort 160 BID, spiriva daily)  Resume his home neb regimen: continue scheduled ipratropium, hypertonic saline and budesonide; apparently hospital does not stock levalbuterol  He cannot tolerate albuterol due to palpitations, tremors and with afib with RVR; off duonebs  Start methylpred  Continue roflumilast  Previously reviewed BLVR - not candidate at this time, reassess as outpatient     #4. Pulmonary nodules  7/2017 CT with worsening peripheral lingular atelectasis. Nodules stable   5/2018 CT stable nodules  12/2020 CT stable  7/2021 CT stable but worsening mediastinal LAD suspect reactive to recent cardiac surgery  1/2022 CT with stable nodules, improved LAD  3/2023 CT chest with several small nodules  9/2023 CT chest with stable findings   7/1/2024 CT chest with worsening ALYSON lesion, possible scarring, rounded atelectasis vs neoplasm  Will need outpatient follow up including PET/CT     #5. Tobacco abuse  30+ pack years, active pipe smoker  Hold on LDCT given recent CT with worsening ALYSON lesion, will need PET/CT as above    7/4 PULMONARY NOTE  SUBJECTIVE/Interval  history:  No acute events overnight, using AVAPS with sleep overnight. He feels better today, improved dyspnea and less cough. No pain. No fevers  Weaned to 5L today      Medications  Reviewed personally  Scheduled Medications    methylPREDNISolone  40 mg Intravenous Q8H    insulin aspart  1-20 Units Subcutaneous TID CC and HS    ipratropium  0.5 mg Nebulization QID    sodium chloride (hypertonic)  3 mL Nebulization 2 times daily    furosemide  40 mg Intravenous TID    aspirin  81 mg Oral Daily    atorvastatin  20 mg Oral Nightly    guaiFENesin ER  1,200 mg Oral BID    levETIRAcetam  500 mg Oral BID    Roflumilast  500 mcg Oral Daily    rivaroxaban  20 mg Oral Daily with food    insulin aspart  1-10 Units Subcutaneous TID AC and HS    fluticasone furoate-vilanterol  1 puff Inhalation Daily           Vital signs in last 24 hours:  Blood pressure 129/63, pulse 84, temperature 98.8 °F (37.1 °C), temperature source Temporal, resp. rate 21, weight 160 lb 11.2 oz (72.9 kg), SpO2 100%.      Intake/Output:  I/O last 3 completed shifts:  In: 3007.9 [P.O.:2500; I.V.:507.9]  Out: 2875 [Urine:2875]  FiO2 (%):  [40 %] 40 %     Physical Exam:  General: Appears alert, comfortable at rest on 5L  Neurologic:No focal deficits.  Alert.  Oriented.  Lungs:improved air entry bilaterally today. There is no wheeze today  Heart:irreg RR. No m heard  Abdomen:Soft, non-tender.  No masses.  No guarding.  No rebound.  Extremities:edema      Assessment/Plan:  #1. Acute on chronic hypercapnic and hypoxic respiratory failure  Multifactorial due to afib with RVR, CHF exacerbation with underlying end stage COPD  7/1 CTA with fluid overload, no PE  Treat/control afib and CHF as per cardiology  Continue nebs   Continue on methylprednisolone today  Wean o2 for goal saturations of 88-92% given hypercapnia. His previous o2 baseline was: 2L at rest, 4L on exertion; using and benefiting from portable oxygen concentrator (3 at rest, 6 on exertion)  Continue  on AVAPS with naps/sleep and PRN     #2. Acute CHF exacerbation, afib with RVR  Rate control and diurese as per cardiology  Previous cardioversion 6/14     #3. COPD  Severe obstruction on previous PFTs 3/2021  off arformoterol given worsening afib/tachycardia  Continue Breo 200 in place of his home inhalers (home regimen: symbicort 160 BID, spiriva daily)  Resume his home neb regimen: continue scheduled ipratropium, hypertonic saline and budesonide; apparently hospital does not stock levalbuterol  He cannot tolerate albuterol due to palpitations, tremors and with afib with RVR; off duonebs  Continue on methylpred  Continue roflumilast  Previously reviewed BLVR - not candidate at this time, reassess as outpatient     #4. Pulmonary nodules  7/2017 CT with worsening peripheral lingular atelectasis. Nodules stable   5/2018 CT stable nodules  12/2020 CT stable  7/2021 CT stable but worsening mediastinal LAD suspect reactive to recent cardiac surgery  1/2022 CT with stable nodules, improved LAD  3/2023 CT chest with several small nodules  9/2023 CT chest with stable findings   7/1/2024 CT chest with worsening ALYSON lesion, possible scarring, rounded atelectasis vs neoplasm  Will need outpatient follow up including PET/CT     #5. Tobacco abuse  30+ pack years, active pipe smoker  Hold on LDCT given recent CT with worsening ALYSON lesion, will need PET/CT as above     Dispo:  CCU; possible tx to floor pending other services input     7/5 PULMONARY NOTE  SUBJECTIVE/Interval history:  No acute events overnight, using AVAPS with sleep overnight. He feels good today, denies much dyspnea. Cough controlled. No pain. Noted elevated HR today and started on amio          Vital signs in last 24 hours:  Blood pressure 124/86, pulse (!) 131, temperature 98.4 °F (36.9 °C), temperature source Oral, resp. rate 15, weight 160 lb 7.9 oz (72.8 kg), SpO2 98%.      Intake/Output:  I/O last 3 completed shifts:  In: 839.9 [P.O.:720; I.V.:119.9]  Out:  2400 [Urine:2400]     Physical Exam:  General: Appears alert, comfortable at rest on 5L  Neurologic:No focal deficits.  Alert.  Oriented.  Lungs:diminished but clear breath sounds today. No wheeze. No distress  Heart:irreg RR. No m heard  Abdomen:Soft, non-tender.  No masses.  No guarding.  No rebound.  Extremities:edema     Lab Data Review:          Recent Labs   Lab 07/01/24  0937 07/01/24  1813 07/03/24  1132 07/04/24  0831   *  --  185* 166*   BUN 18  --  16 23   CREATSERUM 0.46*  --  0.58* 0.61*   CA 8.9  --  8.7 8.6   *  --  135* 135*   K 3.5 4.4 4.4 4.6   CL 90*  --  91* 94*   CO2 41.0*  --  42.0* 41.0*        Assessment/Plan:  #1. Acute on chronic hypercapnic and hypoxic respiratory failure  Multifactorial due to afib with RVR, CHF exacerbation with underlying end stage COPD  7/1 CTA with fluid overload, no PE  Treat/control afib and CHF as per cardiology  Continue nebs   Continue on methylprednisolone today - change to just daily regimen for now  Wean o2 for goal saturations of 88-92% given hypercapnia. His previous o2 baseline was: 2L at rest, 4L on exertion; using and benefiting from portable oxygen concentrator (3 at rest, 6 on exertion)  Continue on AVAPS with naps/sleep and PRN     #2. Acute CHF exacerbation, afib with RVR  Rate control and diurese as per cardiology  Previous cardioversion 6/14     #3. COPD  Severe obstruction on previous PFTs 3/2021  off arformoterol given worsening afib/tachycardia  Continue Breo 200 in place of his home inhalers (home regimen: symbicort 160 BID, spiriva daily)  Resume his home neb regimen: continue scheduled ipratropium, hypertonic saline and budesonide; apparently hospital does not stock levalbuterol  He cannot tolerate albuterol due to palpitations, tremors and with afib with RVR; off duonebs  Continue on methylpred  Continue roflumilast  Previously reviewed BLVR - not candidate at this time, reassess as outpatient     #4. Pulmonary nodules  7/2017 CT  with worsening peripheral lingular atelectasis. Nodules stable   5/2018 CT stable nodules  12/2020 CT stable  7/2021 CT stable but worsening mediastinal LAD suspect reactive to recent cardiac surgery  1/2022 CT with stable nodules, improved LAD  3/2023 CT chest with several small nodules  9/2023 CT chest with stable findings   7/1/2024 CT chest with worsening ALYSON lesion, possible scarring, rounded atelectasis vs neoplasm  Will need outpatient follow up including PET/CT     #5. Tobacco abuse  30+ pack years, active pipe smoker  Hold on LDCT given recent CT with worsening ALYSON lesion, will need PET/CT as above     Dispo:  Floor; not ready for discharge yet

## 2024-07-08 NOTE — PLAN OF CARE
Assumed care of pt around 0730  AxO x4, 5 L NC -per baseline, up SBA  A fib/flutter on tele, HR ranging 90s-130s  Pt denies any pain  Continent of bowel and bladder  Fall precautions in place  Pt updated on plan of care, all needs met at this time            Problem: RESPIRATORY - ADULT  Goal: Achieves optimal ventilation and oxygenation  Description: INTERVENTIONS:  - Assess for changes in respiratory status  - Assess for changes in mentation and behavior  - Position to facilitate oxygenation and minimize respiratory effort  - Oxygen supplementation based on oxygen saturation or ABGs  - Provide Smoking Cessation handout, if applicable  - Encourage broncho-pulmonary hygiene including cough, deep breathe, Incentive Spirometry  - Assess the need for suctioning and perform as needed  - Assess and instruct to report SOB or any respiratory difficulty  - Respiratory Therapy support as indicated  - Manage/alleviate anxiety  - Monitor for signs/symptoms of CO2 retention  Outcome: Progressing     Problem: CARDIOVASCULAR - ADULT  Goal: Maintains optimal cardiac output and hemodynamic stability  Description: INTERVENTIONS:  - Monitor vital signs, rhythm, and trends  - Monitor for bleeding, hypotension and signs of decreased cardiac output  - Evaluate effectiveness of vasoactive medications to optimize hemodynamic stability  - Monitor arterial and/or venous puncture sites for bleeding and/or hematoma  - Assess quality of pulses, skin color and temperature  - Assess for signs of decreased coronary artery perfusion - ex. Angina  - Evaluate fluid balance, assess for edema, trend weights  Outcome: Progressing  Goal: Absence of cardiac arrhythmias or at baseline  Description: INTERVENTIONS:  - Continuous cardiac monitoring, monitor vital signs, obtain 12 lead EKG if indicated  - Evaluate effectiveness of antiarrhythmic and heart rate control medications as ordered  - Initiate emergency measures for life threatening arrhythmias  -  Monitor electrolytes and administer replacement therapy as ordered  Outcome: Progressing

## 2024-07-08 NOTE — PHYSICAL THERAPY NOTE
PHYSICAL THERAPY TREATMENT NOTE - INPATIENT    Room Number: 8611/8611-A     Session: 2     Number of Visits to Meet Established Goals: 3    Presenting Problem: A-Fib with Rapid ventricular response, Hypoxia, Acute CHF  Co-Morbidities : AVR, COPD, seizure, DM, renal cell carcinoa, nephrectomy, rotator cuff repair    ASSESSMENT   Patient demonstrates good  progress this session, goals  progressing with 2/3 met this session. Limited by decreased endurance, desaturated with exertion on supplement O2.     Patient continues to function below baseline with stair negotiation, standing prolonged periods, and performing household tasks.  Contributing factors to remaining limitations include decreased endurance/aerobic capacity, impaired standing balance, and increased O2 needs from baseline.  Next session anticipate patient to progress gait, stair negotiation, and performing household tasks.  Physical Therapy will continue to follow patient for duration of hospitalization.    Patient continues to benefit from continued skilled PT services: at discharge to promote functional independence in home.  Anticipate patient will return home with home health PT.    PLAN  PT Treatment Plan: Bed mobility;Body mechanics;Endurance;Energy conservation;Patient education;Family education;Gait training;Range of motion;Strengthening;Stair training;Transfer training;Balance training  Rehab Potential : Good  Frequency (Obs): 3-5x/week    CURRENT GOALS      Goal #1 Patient is able to demonstrate supine - sit EOB @ level: modified independent      Goal #2 Patient is able to demonstrate transfers EOB to/from BSC at assistance level: modified independent      Goal #3 Patient is able to ambulate 120 feet with assist device: walker - rolling at assistance level: modified independent      Goal #4  new 7/5/2024  Asc/desc 1 flight of stairs with mod I and good breathing technique and saturation     Goal #5     Goal #6     Goal Comments: Goals  established on 2024 goals  achieved 2/3, goal 3 and 4 ongoing.     SUBJECTIVE  \" I don't want to sit in the chair.\"  OBJECTIVE  Precautions: None    WEIGHT BEARING RESTRICTION  Weight Bearing Restriction: None                PAIN ASSESSMENT   Ratin  Location: Pt reports no pain       BALANCE                                                                                                                       Static Sitting: Good  Dynamic Sitting: Good           Static Standing: Good  Dynamic Standing: Fair    ACTIVITY TOLERANCE; fair        O2 WALK         AM-PAC '6-Clicks' INPATIENT SHORT FORM - BASIC MOBILITY  How much difficulty does the patient currently have...  Patient Difficulty: Turning over in bed (including adjusting bedclothes, sheets and blankets)?: None   Patient Difficulty: Sitting down on and standing up from a chair with arms (e.g., wheelchair, bedside commode, etc.): None   Patient Difficulty: Moving from lying on back to sitting on the side of the bed?: None   How much help from another person does the patient currently need...   Help from Another: Moving to and from a bed to a chair (including a wheelchair)?: None   Help from Another: Need to walk in hospital room?: None   Help from Another: Climbing 3-5 steps with a railing?: A Little       AM-PAC Score:  Raw Score: 23   Approx Degree of Impairment: 11.2%   Standardized Score (AM-PAC Scale): 56.93   CMS Modifier (G-Code): CI    FUNCTIONAL ABILITY STATUS  Gait Assessment   Functional Mobility/Gait Assessment  Gait Assistance: Supervision  Distance (ft): 170  Assistive Device: Rolling walker  Pattern: Within Functional Limits    Skilled Therapy Provided    Bed Mobility:  Rolling: mod I   Supine<>Sit: mod I   Sit<>Supine: mod I     Transfer Mobility:  Sit<>Stand: mod I   Stand<>Sit: mod I   Gait: SBA with RW. Step through pattern with good sohan. Educated on energy conservation techniques, declined standing rest and declined to  answer to the question to rate on LOTTIE scale.         Therapist's Comments:   Patient was given HEP, exercises reviewed and only performed 2-3 reps . Patient declined to perform more.  Patient needed frequent cues for purse lip breathing upon sob and desaturation. The patient  was received on 5 L O2, during exertion of gait desaturated to 76% on 6 L, 3 min to return to 93% on 8 to 9 L. Later, able to gradually turn down the rate to 5 L at rest. Patient was educated on benefits of sitting upright but he declined to remain in the chair after 4 min. Pt returned to bed.     Patient End of Session: In bed;Needs met;Call light within reach;RN aware of session/findings;All patient questions and concerns addressed;Family present;Alarm set    PT Session Time: 15 minutes  Gait Training: 10 minutes  Therapeutic activity 8    Therapeutic Exercise: 5 minutes

## 2024-07-08 NOTE — PROGRESS NOTES
[facility]  Progress Note    Keny Marshall Patient Status:  Inpatient    1960 MRN [id number]   Location [facility] 8NE-A Attending Alfredo Garcia MD   Hosp Day # 9 PCP Dustin Avina MD     Subjective:  Keny Marshall is a(n) 63 year old male remains afebrile  Tolerates AVAPS well once he gets to sleep felt better this a.m. now with increasing shortness of breath  Denies any chest pain minimal coughing  Heart rate remains 135    Objective:  /89 (BP Location: Right arm)   Pulse (!) 135   Temp 98.4 °F (36.9 °C) (Oral)   Resp 22   Wt 161 lb 9.6 oz (73.3 kg)   SpO2 100%   BMI 23.19 kg/m² currently on 5-1/2 L 93%      Temp (24hrs), Av.1 °F (36.7 °C), Min:97.1 °F (36.2 °C), Max:98.9 °F (37.2 °C)      Intake/Output:    Intake/Output Summary (Last 24 hours) at 2024 1011  Last data filed at 2024 0806  Gross per 24 hour   Intake 240 ml   Output 2190 ml   Net -1950 ml       Physical Exam:   General: alert, cooperative, oriented.  Tachypneic at rest   Head: Normocephalic, without obvious abnormality, atraumatic.   Throat: Lips, mucosa, and tongue normal.  No thrush noted.  Poor dentition   Neck: trachea midline, no adenopathy, no thyromegaly. No JVD.  90 degrees   Lungs: Diminished tones bilaterally rare sense of rhonchi   Chest wall: No tenderness or deformity.   Heart: Tachycardic 135 blood pressure stable   Abdomen: soft, non-distended, no masses, no guarding, no     Rebound.  Normal stool this morning   Extremity: +1 dependent edema bilaterally   Skin: No rashes or lesions.   Neurological: Alert, interactive, no focal deficits    Lab Data Review:  Recent Labs     24  0610   WBC 8.6   HGB 11.6*   .0     Recent Labs     24  0620 24  0558    140   K 4.5 4.6   CL 93* 98   CO2 45.0* 43.0*   BUN 39* 26*   CREATSERUM 0.76 0.66*     No results for input(s): \"PTP\", \"INR\", \"PTT\" in the last 168 hours.    Cultures: Negative MRSA no new data    Radiology:  No  results found.  Chest x-rays reviewed last on 7/4 with improvement in interstitial and alveolar infiltrates  Small effusion noted seems about the same on the right      Medications reviewed     Assessment and Plan:   Patient Active Problem List   Diagnosis    Anemia    HTN (hypertension)    DM2 (diabetes mellitus, type 2) ([facility])    COPD (chronic obstructive pulmonary disease) ([facility])    Clear cell renal cell carcinoma of left kidney ([facility])    Medial meniscus tear, right, initial encounter    Primary osteoarthritis of right knee              GLOBAL EXP 6-25-16 / RIGHT KNEE / TRK     Acute medial meniscus tear of right knee            GLOBAL EXP 6-25-16 / RIGHT KNEE / TRK     Acute lateral meniscus tear of left knee               GLOBAL EXP 6-25-16 / LEFT KNEE / TRK     Bursitis of left shoulder             GLOBAL EXP 6-25-16 / LEFT SHOULDER / TRK     Moderate aortic stenosis    Stage 3 severe COPD by GOLD classification ([facility])    Hyperglycemia    Hypervolemia    Hypervolemia, unspecified hypervolemia type    Dyspnea, unspecified type    Hypoxemia    Atrial fibrillation with RVR ([facility])    Acute heart failure with preserved ejection fraction (HFpEF) ([facility])    Atrial flutter with rapid ventricular response ([facility])    Acute on chronic congestive heart failure ([facility])    Acute on chronic congestive heart failure, unspecified heart failure type ([facility])    Hypoxia    Atrial fibrillation with rapid ventricular response ([facility])    Pleural effusion    Hypokalemia    Acute respiratory failure with hypoxia ([facility])    Normocytic anemia    Chronic heart failure with preserved ejection fraction ([facility])    Leukocytosis (leucocytosis)    Aortic valve regurgitation    HFrEF (heart failure with reduced ejection fraction) ([facility])    Chronic hypoxemic respiratory failure ([facility])    ABIGAIL (obstructive sleep apnea)    COPD exacerbation ([facility])    Acute on chronic respiratory failure with hypoxia ([facility])    Acute on chronic respiratory failure with hypoxia and  hypercapnia ([facility])    Chronic hypercapnic respiratory failure ([facility])    Smoking greater than 20 pack years    Hyponatremia    Hyperkalemia    Chronic obstructive pulmonary disease, unspecified COPD type ([facility])    Supplemental oxygen dependent       Assessment:  #1. Acute on chronic hypercapnic and hypoxic respiratory failure  Multifactorial due to afib with RVR, CHF exacerbation with underlying end stage COPD  7/1 CTA with fluid overload, no PE  Treat/control afib and CHF as per cardiology  Cardiac sensitivity to beta agonist anticholinergics --has been off yet heart rate continues high with plans to resume for his shortness of breath -continue on pred oral taper  Wean o2 for goal saturations of 88-92% given hypercapnia. His previous o2 baseline was: 2L at rest, 4L on exertion; using and benefiting from portable oxygen concentrator (3 at rest, 6 on exertion)  Continue on AVAPS with naps/sleep and PRN     #2. Acute CHF exacerbation, afib with RVR  Rate control and diurese as per cardiology  Previous cardioversion 6/14  Ongoing cardiac input appreciated     #3. COPD  Severe obstruction on previous PFTs 3/2021  off arformoterol given worsening afib/tachycardia  Plan to resume MDIs as heart rate remains  Use atrovent nebs PRN  He can use levalbuterol as outpatient as hospital does not stock it  He cannot tolerate albuterol due to palpitations, tremors and with afib with RVR; off duonebs  Continue oral pred taper  Continue roflumilast  Previously reviewed BLVR - not candidate at this time, reassess as outpatient     #4. Pulmonary nodules  7/2017 CT with worsening peripheral lingular atelectasis. Nodules stable   5/2018 CT stable nodules  12/2020 CT stable  7/2021 CT stable but worsening mediastinal LAD suspect reactive to recent cardiac surgery  1/2022 CT with stable nodules, improved LAD  3/2023 CT chest with several small nodules  9/2023 CT chest with stable findings   7/1/2024 CT chest with worsening ALYSON lesion, possible  scarring, rounded atelectasis vs neoplasm  Will need outpatient follow up including PET/CT     #5. Tobacco abuse  30+ pack years, active pipe smoker  Hold on LDCT given recent CT with worsening ALYSON lesion, will need PET/CT as above    Plan:  ResumeICS/LABA and Atrovent nebs as patient notes increasing dyspnea without change in heart rate off of those medications.  Cardiac input appreciated with plans for EP to assess patient  Plan to continue AVAPS for hypercapnic respiratory failure  Reviewed at length with patient    CC     Pat Crum MD  7/8/2024  10:11 AM

## 2024-07-08 NOTE — CM/SW NOTE
Received order for home health. Pt set up with HH already. Order acknowledged.     Sherrie Dean, MSW, LSW  Discharge Planner

## 2024-07-08 NOTE — DIETARY NOTE
[facility]   part of [facility]   CLINICAL NUTRITION    Keny Marshall     Admitting diagnosis:  Hyperkalemia [E87.5]  Hypoxia [R09.02]  Supplemental oxygen dependent [Z99.81]  Atrial flutter with rapid ventricular response (HCC) [I48.92]  Chronic obstructive pulmonary disease, unspecified COPD type (HCC) [J44.9]    Ht:  5'10\"  Wt: 73.3 kg (161 lb 9.6 oz).   Body mass index is 23.19 kg/m².  IBW: 75.5 kg    Wt Readings from Last 6 Encounters:   07/08/24 73.3 kg (161 lb 9.6 oz)   06/13/24 72.3 kg (159 lb 6.3 oz)   09/26/23 78 kg (172 lb)   04/24/23 76.2 kg (168 lb)   03/22/23 79.8 kg (176 lb)   01/05/23 88.9 kg (196 lb)        Labs/Meds reviewed    Diet:       Procedures    Cardiac diet Cardiac; Sodium Restriction: 2 GM NA; Fluid Consistency: Thin Liquids ; Texture Consistency: Regular; Is Patient on Accuchecks? Yes; Is Patient on Suicide Precautions? No     Percent Meals Eaten (last 3 days)       Date/Time Percent Meals Eaten (%)    07/06/24 0900 100 %    07/06/24 2100 100 %    07/07/24 0922 100 %    07/07/24 1813 100 %          PO intake 100% over the last 2 days.  No changes in wt noted.  +BM 7/8 without n/v/d. Will continue to monitor and support as able.  7/1-Pt chart reviewed d/t HF.  Patient reports good appetite at this time.  Nursing notes reports Percent Meals Eaten (%): 100 % intake for last meal.  Tolerating po diet without diarrhea, emesis, or constipation.   No significant weight changes noted.     PMH includes HTN, GERD, RCC with Nephrectomy, COPD, PreDM.  Pt p/w hypoerkalemia, Afib with RVR.  PT seen for HF Ed Consult.  Pt lying in bed, just finished breakfast. (Yogurt parfait, fruit, OJ).  Pt states his appetite is fair, usually eats 2x per day.  Pt denies any significant wt loss.  States he understands his diet and tries to follow Low NA at home. Wife does cooking and he reports \"She does her best\".  He has no questions re: diet at this time.    Patient is at low nutrition risk at this  time.    Please consult if patient status changes or nutrition issues arise.    Cira Mckeon RD, LDN  Clinical Nutrition  [phone]

## 2024-07-08 NOTE — CONSULTS
[facility] Electrophysiology Consult      Keny Marshall Patient Status:  Inpatient    1960 MRN [id number]   Formerly [facility] 8NE-A Attending Alfredo Garcia MD   Hosp Day # 9 PCP Dustin Avina MD       Keny Marshall is a 63 year old year old male    He has a bio AVR septal myectomy MAZE and SHALONDA occlusion in , atrial flutter s/p cardioversion last 2024, HFpEF, severe COPD with chronic hypoxic and hypercapnic respiratory failure on home O2, seizure disorder, diabetes, renal cell CA s/p nephrectomy. obstructive sleep apnea     Admitted  with tachypalpitations and shortness of breath and found to be in atrial flutter again, as well as COPD exac treated with steroids, and HF exacerbation treated with diuretics, as well as BiPAP or AVAPS  Also has left lung mass which needs outpt PET/CT    Amiodarone gtt started  but now off again    HR in the 100-130s    He says he feels fine but seems to minimize  Family at bedside    Denies chest pain, shortness of breath, palpitations, lightheadedness, syncope, orthopnea, paroxysmal nocturnal dyspnea, or edema.    ROS: All other systems were reviewed and were negative.    Past Medical History:  Past Medical History:    Aortic stenosis    Arrhythmia    hx of a-fib    Arthritis    Cancer (HCC)    LEFT RENAL     Chronic hypoxemic respiratory failure (HCC)    On 4 LPM/NC at rest 24 hours/ day but 6 LPM/NC on exertion    COPD    Pulmonary follow up with Dr. Crum -no O2    Depression    Difficult intubation    patient states history of difficult intubation   due to body weight.States this no longer issue due to sugnificant weight loss    Esophageal reflux    Eye disease    Fatigue    Fever, unknown origin    Heart murmur    Heart palpitations    High blood pressure    High cholesterol    Loss of appetite    Other and unspecified hyperlipidemia    Paroxysmal atrial fibrillation (HCC)    Pneumonia, organism unspecified(486)    Prediabetes    Renal  cell carcinoma (HCC)    s/p left nephrectomy    Shortness of breath    uses oxygen 3-4 L/NC all the time    Unspecified essential hypertension    Unspecified sleep apnea    He was unable to tolerate CPAP/BiPAP    Visual impairment    reading glasses       Allergies:  Allergies   Allergen Reactions    Bees ANAPHYLAXIS    Other ANAPHYLAXIS     Bee stings       Outpatient Medications:  Current Facility-Administered Medications on File Prior to Encounter   Medication Dose Route Frequency Provider Last Rate Last Admin    [COMPLETED] methohexital (BrevITAL) 100 mg/10mL IV syringe 100 mg  100 mg Intravenous Once Keny Salazar MD   30 mg at 24 0909    [COMPLETED] dilTIAZem (cardIZEM) 25 mg/5mL injection 5 mg  5 mg Intravenous Once Audi Larkin MD   5 mg at 24 0812    [COMPLETED] methylPREDNISolone sodium succinate (Solu-MEDROL) injection 60 mg  60 mg Intravenous Once Tomasz Lyon DO   60 mg at 24 0134    [COMPLETED] sodium chloride 0.9 % IV bolus 500 mL  500 mL Intravenous Once Morales Yeh DO   Stopped at 24 1609    [] dilTIAZem 5 mg BOLUS FROM BAG infusion  5 mg Intravenous Q1H PRN Morales Yeh DO   5 mg at 24 1539    [COMPLETED] dilTIAZem (cardIZEM) 100 MG injection              Current Outpatient Medications on File Prior to Encounter   Medication Sig Dispense Refill    Budesonide-Formoterol Fumarate 160-4.5 MCG/ACT Inhalation Aerosol Inhale 2 puffs into the lungs 2 (two) times daily.      predniSONE 10 MG Oral Tab take 4 tablets daily for 3 days then 3 tablets daily for 3 days then 2 tablets daily for 3 days then 1 tablets daily for 3 days then stop. 30 tablet 0    aspirin 81 MG Oral Tab EC Take 1 tablet (81 mg total) by mouth daily.      Roflumilast 500 MCG Oral Tab Take 500 mcg by mouth daily. 90 tablet 0    guaiFENesin  MG Oral Tablet 12 Hr Take 2 tablets (1,200 mg total) by mouth 2 (two) times daily.      [] levETIRAcetam 500 MG Oral Tab Take 1  tablet (500 mg total) by mouth 2 (two) times daily.      [] KLOR-CON 10 10 MEQ Oral Tab CR Take 2 tablets (20 mEq total) by mouth daily.      [] torsemide 20 MG Oral Tab Take 1 tablet (20 mg total) by mouth 2 (two) times daily.      dilTIAZem  MG Oral Capsule SR 24 Hr Take 1 capsule (120 mg total) by mouth daily.      Ferrous Sulfate 324 (65 Fe) MG Oral Tab EC Take 65 mg by mouth daily.      tiotropium 18 MCG Inhalation Cap Inhale 1 capsule (18 mcg total) into the lungs daily.      Levalbuterol HCl 0.63 MG/3ML Inhalation Nebu Soln Take 3 mL (0.63 mg total) by nebulization every 6 (six) hours as needed for Shortness of Breath or Wheezing. 3 each 3    JARDIANCE 10 MG Oral Tab       atorvastatin 20 MG Oral Tab Take 1 tablet (20 mg total) by mouth nightly.      XARELTO 20 MG Oral Tab Take 1 tablet by mouth daily with food 30 tablet 3    Ipratropium Bromide 0.02 % Inhalation Solution Take 2.5 mL (500 mcg total) by nebulization every 6 (six) hours as needed for Wheezing.      ketoconazole 2 % External Cream Apply topically as needed.      Multiple Vitamin (TAB-A-CUCA) Oral Tab Take 1 tablet by mouth daily.      B Complex-Biotin-FA (B COMPLETE) Oral Tab Take 1 tablet by mouth daily.      magnesium 250 MG Oral Tab Take 1 tablet (250 mg total) by mouth every evening.          Scheduled Meds:   [START ON 2024] dilTIAZem ER  360 mg Oral Daily    ipratropium  500 mcg Nebulization Q6H WA    furosemide  20 mg Oral Daily    predniSONE  30 mg Oral Daily with breakfast    digoxin  125 mcg Oral Daily    insulin aspart  1-20 Units Subcutaneous TID CC and HS    aspirin  81 mg Oral Daily    atorvastatin  20 mg Oral Nightly    guaiFENesin ER  1,200 mg Oral BID    levETIRAcetam  500 mg Oral BID    Roflumilast  500 mcg Oral Daily    rivaroxaban  20 mg Oral Daily with food    insulin aspart  1-10 Units Subcutaneous TID AC and HS    fluticasone furoate-vilanterol  1 puff Inhalation Daily       Infusion  Meds:      Social:   reports that he quit smoking about 16 months ago. His smoking use included cigarettes. He started smoking about 41 years ago. He has a 60 pack-year smoking history. He has been exposed to tobacco smoke. He has never used smokeless tobacco. He reports that he does not currently use alcohol after a past usage of about 14.0 standard drinks of alcohol per week. He reports that he does not use drugs.    Family History:  family history includes Atrial fibrillation in his father; Heart Disorder in his mother; Lung cancer in his maternal aunt; Pacemaker in his mother.    Physical:  /76 (BP Location: Left arm)   Pulse 93   Temp 98.8 °F (37.1 °C) (Oral)   Resp 20   Wt 161 lb 9.6 oz (73.3 kg)   SpO2 96%   BMI 23.19 kg/m²      Intake/Output Summary (Last 24 hours) at 7/8/2024 1635  Last data filed at 7/8/2024 1229  Gross per 24 hour   Intake 240 ml   Output 2040 ml   Net -1800 ml     Wt Readings from Last 4 Encounters:   07/08/24 161 lb 9.6 oz (73.3 kg)   06/13/24 159 lb 6.3 oz (72.3 kg)   09/26/23 172 lb (78 kg)   04/24/23 168 lb (76.2 kg)     GENERAL: well developed, well nourished, in no apparent distress  EYES: sclera anicteric  HEENT: normocephalic  NECK: no JVD, no carotid bruits, no thyromegaly  RESPIRATORY: clear to auscultation  CARDIOVASCULAR: S1, S2 normal, RRR; no S3, no S4; no click; no murmur  ABDOMEN: normal active BS, soft, nondistended; nontender  EXTREMITIES: no cyanosis, clubbing or edema, peripheral pulses intact  NEURO: no sensorimotor deficits  PSYCHIATRIC: alert and oriented x 3, affect normal  SKIN: no rashes    Data:  ECG: atrial flutter CCW  Tele: atrial flutter   Echo: 6/2024: EF 55, bio AVR, mild-mod MR and TR, RVSP 45-50, mod LAE    Labs:  Recent Labs   Lab 07/06/24  0610   WBC 8.6   HGB 11.6*   .0       Recent Labs   Lab 07/01/24  1813 07/03/24  1132 07/04/24  0831 07/06/24  0620 07/08/24  0558   NA  --  135* 135* 137 140   K 4.4 4.4 4.6 4.5 4.6   CL  --  91*  94* 93* 98   CO2  --  42.0* 41.0* 45.0* 43.0*   BUN  --  16 23 39* 26*   CREATSERUM  --  0.58* 0.61* 0.76 0.66*   CA  --  8.7 8.6 8.7 8.4*   GLU  --  185* 166* 143* 80       No results for input(s): \"INR\" in the last 168 hours.    No results for input(s): \"TROP\" in the last 168 hours.    TSH   Date Value Ref Range Status   06/11/2024 1.800 0.358 - 3.740 mIU/mL Final     Comment:     This test may exhibit interference when a sample is collected from a person who is consuming high dose of biotin (a.k.a., vitamin B7, vitamin H, coenzyme R) supplements resulting in serum concentrations >100 ng/mL.  Intake of the recommended daily allowance (RDA) for biotin (0.03 mg) has not been shown to typically cause significant interference; however, high dose daily dietary supplements may contain biotin concentrations greater than 150 times (5-10 mg) the RDA.  It is recommended that physicians ask all patients who may be on biotin supplementation to stop biotin consumption at least 72 hours prior to collection of a new sample.       Impression:  Persistent atrial flutter   H/o atrial fibrillation   End stage COPD  HFpEF  S/p bio AVR, septal myectomy, MAZE and SHALONDA occlusion 2021  Lung mass    Plan:  Anticoagulation. Has not missed doses  Discussed with Dr Salazar  Discussed options amiodarone and cardioversion. (after several days of amiodarone). I am not worried about long term pulmonary toxicity at the moment given his extensive list of comorbidities OR ablation  He wants to proceed with an ablation. If he is optimized, could consider this for Friday. Need to check availability. Would do without JOSE (provided hasn't missed AC doses and RN is making sure of this) and with minimal sedation (precedex?)  Discussed that he could have other arrhythmias in future even with ablation      Chidi Fatima MD  Cardiology / Clinical Cardiac Electrophysiology  [facility]

## 2024-07-08 NOTE — PROGRESS NOTES
[facility] Cardiology  Progress Note    Keny Marshall Patient Status:  Inpatient    1960 MRN [id number]   [facility] 8NE-A Attending Alfredo Garcia MD   Hosp Day # 9 PCP Dustin Avina MD       Impression;  Aflutter - s/p failed recent CV , in setting of adv COPD/exacerbation.  remains in RVR, on/off amiodarone- concerns for potential pulmonary toxicity.  currently on CCB and digoxin.  SOB - 2/2 acute dCHF and COPD exac  Acute diastolic CHF - improved  S/p bio AVR  TTE (24): LVEF 55%, +DD, 23mm SAVR 3.3m/s, mean 22mmHg.     Plan:  diuresis: lasix 20mg po daily  AF/flutter: persistent 133bpm despite CCB and digoxin and CHF/COPD management.  on/off amiodarone- concern for long-term pulmonary toxicity.  Dig level low, give 250mcg x 1.  Discussed with Dr. Fatima/EP- to see later today.    AC: rivaroxaban  diuresis: lasix 20mg po daily.  severe COPD- on steroids, inhaler therapy. HR responsive to duonebs- minimizing. Advanced/end-stage emphysema, per Dr. Randle/pulmonary.  Pt insight into his condition appears limited.  Explained rate relation with pulmonary issues.  He is becoming more frustrated with hospitalization.      Subjective:  Feels breathing is at baseline.  No CP.  No palpitations despite AF. rates steady 133bpm.    Objective:  /89 (BP Location: Right arm)   Pulse (!) 133   Temp 98.4 °F (36.9 °C) (Oral)   Resp 22   Wt 161 lb 9.6 oz (73.3 kg)   SpO2 100%   BMI 23.19 kg/m²   Temp (24hrs), Av.1 °F (36.7 °C), Min:97.1 °F (36.2 °C), Max:98.9 °F (37.2 °C)      Intake/Output Summary (Last 24 hours) at 2024 0938  Last data filed at 2024 0806  Gross per 24 hour   Intake 240 ml   Output 2190 ml   Net -1950 ml     Wt Readings from Last 3 Encounters:   24 161 lb 9.6 oz (73.3 kg)   24 159 lb 6.3 oz (72.3 kg)   23 172 lb (78 kg)       General: Awake and alert; in no acute distress  Cardiac: IRRR tachycardia; no murmurs/rubs/gallops are  appreciated  Lungs: Coarse on auscultation bilaterally; no accessory muscle use  Abdomen: Soft, non-tender; bowel sounds are normoactive  Extremities: No clubbing/cyanosis; moves all 4 extremities normally    Current Facility-Administered Medications   Medication Dose Route Frequency    lidocaine-menthol 4-1 % patch 1 patch  1 patch Transdermal Daily PRN    dilTIAZem ER (CardIZEM CD) 24 hr cap 240 mg  240 mg Oral Daily    furosemide (Lasix) tab 20 mg  20 mg Oral Daily    ipratropium (Atrovent) 0.02 % nebulizer solution 500 mcg  500 mcg Nebulization Q6H PRN    predniSONE (Deltasone) tab 30 mg  30 mg Oral Daily with breakfast    metoprolol (Lopressor) 5 mg/5mL injection 5 mg  5 mg Intravenous Q6H PRN    digoxin (Lanoxin) tab 125 mcg  125 mcg Oral Daily    selenium sulfide (Selsun) 2.5 % shampoo   Topical Daily PRN    insulin aspart (NovoLOG) 100 Units/mL FlexPen 1-20 Units  1-20 Units Subcutaneous TID CC and HS    aspirin DR tab 81 mg  81 mg Oral Daily    atorvastatin (Lipitor) tab 20 mg  20 mg Oral Nightly    guaiFENesin ER (Mucinex) 12 hr tab 1,200 mg  1,200 mg Oral BID    levETIRAcetam (Keppra) tab 500 mg  500 mg Oral BID    Roflumilast TABS 500 mcg  500 mcg Oral Daily    rivaroxaban (Xarelto) tab 20 mg  20 mg Oral Daily with food    glucose (Dex4) 15 GM/59ML oral liquid 15 g  15 g Oral Q15 Min PRN    Or    glucose (Glutose) 40% oral gel 15 g  15 g Oral Q15 Min PRN    Or    glucose-vitamin C (Dex-4) chewable tab 4 tablet  4 tablet Oral Q15 Min PRN    Or    dextrose 50% injection 50 mL  50 mL Intravenous Q15 Min PRN    Or    glucose (Dex4) 15 GM/59ML oral liquid 30 g  30 g Oral Q15 Min PRN    Or    glucose (Glutose) 40% oral gel 30 g  30 g Oral Q15 Min PRN    Or    glucose-vitamin C (Dex-4) chewable tab 8 tablet  8 tablet Oral Q15 Min PRN    insulin aspart (NovoLOG) 100 Units/mL FlexPen 1-10 Units  1-10 Units Subcutaneous TID AC and HS    melatonin tab 3 mg  3 mg Oral Nightly PRN    acetaminophen (Tylenol Extra  Strength) tab 500 mg  500 mg Oral Q4H PRN    polyethylene glycol (PEG 3350) (Miralax) 17 g oral packet 17 g  17 g Oral Daily PRN    sennosides (Senokot) tab 17.2 mg  17.2 mg Oral Nightly PRN    bisacodyl (Dulcolax) 10 MG rectal suppository 10 mg  10 mg Rectal Daily PRN    fleet enema (Fleet) 7-19 GM/118ML rectal enema 133 mL  1 enema Rectal Once PRN    prochlorperazine (Compazine) 10 MG/2ML injection 5 mg  5 mg Intravenous Q8H PRN    [Held by provider] fluticasone furoate-vilanterol (Breo Ellipta) 200-25 MCG/ACT inhaler 1 puff  1 puff Inhalation Daily       Laboratory Data:       Lab Results   Component Value Date    INR 1.59 (H) 06/29/2024    INR 1.06 06/11/2024    INR 2.26 (H) 09/22/2021     Lab Results   Component Value Date     07/08/2024    K 4.6 07/08/2024    CL 98 07/08/2024    CO2 43.0 07/08/2024    BUN 26 07/08/2024    CREATSERUM 0.66 07/08/2024    GLU 80 07/08/2024    CA 8.4 07/08/2024    MG 2.4 07/08/2024         Telemetry: AFlutter with RVR      Thank you for allowing our practice to participate in the care of your patient. Please do not hesitate to contact me if you have any questions.

## 2024-07-08 NOTE — OCCUPATIONAL THERAPY NOTE
IP OT attempted. RN requesting pt to be on therapy hold due to high HR. Will follow-up later today as schedule permits.

## 2024-07-08 NOTE — PROGRESS NOTES
Addendum:    Agree with above note except as documented below.      General: No acute distress, pleasant   Respiratory: Diminished BS b/l   Cardiovascular: S1, S2, irregularly irregular, tachycardic   Abdomen: Soft, Non-tender, non-distended, + bowel sounds  Extremities: + edema    #Acute on chronic hypercapnic and hypoxic respiratory failure  #End stage COPD with acute exacerbation  #HFpEF with acute exacerbation  #Metabolic alkalosis   #Rapid a. Flutter   #Aortic stenosis s/p AVR  #HLD   #Hyponatremia   #Left lung mass  #Seizure d/o   #DM Type 2  #Hx of RCC s/p nephrectomy   #ABIGAIL  #Hx of VTE     Patient with continued RVR.  Patient on Digoxin and CCB.  No amio for potential pulm toxicity.  Plan to consult EP today.  Monitor on tele, continue xarelto.  Nebs likely not contributing, he can resume if he is agreeable.  Continue lasix 20mg daily.  Cards, pulm following.        Pt seen and examined independently. Chart reviewed. Labs and imaging over the last 24 hours have been personally reviewed.  I personally made/approved 100% of the management plan for this patient and take full responsibility for the patient management.   Note has been reviewed by me and modified as needed.  Exam and Impression/ Recs as noted above.  D/w staff.    Alfredo Garcia MD          [facility]   [facility]     Keny Marshall Patient Status:  Inpatient    1960 MRN [id number]   Location [facility] 6NE-A Attending Dr. Garcia   Hosp Day # 9 PCP Dustin Avina MD     Chief Complaint: SOB    Subjective:  pt reports he feels fine and asymptomatic w/ HR currently 130s.  He saw cards this am and pending EP eval.  Pt repeated reports he is eager to get out of the hospital.      Objective:    Review of Systems:   A comprehensive review of systems was completed; pertinent positive and negatives stated in subjective.    Vital signs:  Temp:  [97.1 °F (36.2 °C)-98.9 °F (37.2 °C)] 98.4 °F (36.9  °C)  Pulse:  [] 133  Resp:  [19-28] 22  BP: (102-141)/(68-89) 118/89  SpO2:  [87 %-100 %] 100 %  FiO2 (%):  [40 %] 40 %    Physical Exam:    General: No acute distress 63 year old male   Respiratory: Diminished bilaterally. No wheezing  Cardiovascular: Irregular rhythm, tachy. 130s  Abdomen: Soft, Non-tender, non-distended  Neuro: No new focal deficits.   Extremities: No edema    Diagnostic Data:    Labs:  Recent Labs   Lab 07/01/24  0937 07/06/24  0610   WBC 10.1 8.6   HGB 11.5* 11.6*   MCV 99.5 99.0   .0 215.0       Recent Labs   Lab 07/04/24  0831 07/06/24  0620 07/08/24  0558   * 143* 80   BUN 23 39* 26*   CREATSERUM 0.61* 0.76 0.66*   CA 8.6 8.7 8.4*   * 137 140   K 4.6 4.5 4.6   CL 94* 93* 98   CO2 41.0* 45.0* 43.0*       Estimated Creatinine Clearance: 116.8 mL/min (A) (based on SCr of 0.66 mg/dL (L)).    No results for input(s): \"TROP\", \"TROPHS\", \"CK\" in the last 168 hours.      No results for input(s): \"PTP\", \"INR\" in the last 168 hours.                 Microbiology    No results found for this visit on 06/29/24.      Imaging: Reviewed in Epic.    Medications:    dilTIAZem ER  240 mg Oral Daily    furosemide  20 mg Oral Daily    predniSONE  30 mg Oral Daily with breakfast    digoxin  125 mcg Oral Daily    insulin aspart  1-20 Units Subcutaneous TID CC and HS    aspirin  81 mg Oral Daily    atorvastatin  20 mg Oral Nightly    guaiFENesin ER  1,200 mg Oral BID    levETIRAcetam  500 mg Oral BID    Roflumilast  500 mcg Oral Daily    rivaroxaban  20 mg Oral Daily with food    insulin aspart  1-10 Units Subcutaneous TID AC and HS    [Held by provider] fluticasone furoate-vilanterol  1 puff Inhalation Daily       Assessment & Plan:      #Acute on chronic hypoxic/hypercapnic respiratory failure 2/2 CHF/COPD, improving    #Acute on chronic diastolic heart failure   -IV diuresis to po per cards  -CXR small effusions    #End stage COPD w/ acute exacerbation  -bipap prn/sleep, O2 NC  -steroids  po, nebs, mucinex per pulm  -baseline 2L per records    #Left Lung mass, Smoker  -pulmonary recommending OP PET-CT    #Rapid aflutter  -Cardizem gtt off yesterday, amio stopped per cards  -cardizem CD and digoxin po  -pending EP eval  -Continue DOAC  -Tele  -Recently underwent DCCV 6/14    #Aortic stenosis s/p AVR   #Hyponatremia, resolved  #Prior VTE on xarelto  #Seizure disorder-Keppra  #Type 2 diabetes, 5.6%  #Steroid hyperglycemia-correctional/nutritional scale, hyperglycemia protocol  #History of RCC s/p nephrectomy  #ABIGAIL-CPAP protocol  #Seborrheic dermatitis, improved-topicals/shampoos.      Case d/w pt, RN at bedside.  F/u on cards/EP recs.  Dc planning once HR controlled.     LAM Teague  9:11 AM     Supplementary Documentation:     Quality:  DVT Mechanical Prophylaxis:     Early ambuation  DVT Pharmacologic Prophylaxis   Medication    rivaroxaban (Xarelto) tab 20 mg         DVT Pharmacologic prophylaxis: Aspirin 81 mg      Code Status: Full Code  Peters: No urinary catheter in place    The 21st Century Cures Act makes medical notes like these available to patients in the interest of transparency. Please be advised this is a medical document. Medical documents are intended to carry relevant information, facts as evident, and the clinical opinion of the practitioner. The medical note is intended as peer to peer communication and may appear blunt or direct. It is written in medical language and may contain abbreviations or verbiage that are unfamiliar.

## 2024-07-09 LAB
ANION GAP SERPL CALC-SCNC: 2 MMOL/L (ref 0–18)
BUN BLD-MCNC: 22 MG/DL (ref 9–23)
CALCIUM BLD-MCNC: 8.3 MG/DL (ref 8.5–10.1)
CHLORIDE SERPL-SCNC: 98 MMOL/L (ref 98–112)
CO2 SERPL-SCNC: 42 MMOL/L (ref 21–32)
CREAT BLD-MCNC: 0.74 MG/DL
EGFRCR SERPLBLD CKD-EPI 2021: 101 ML/MIN/1.73M2 (ref 60–?)
GLUCOSE BLD-MCNC: 116 MG/DL (ref 70–99)
GLUCOSE BLD-MCNC: 116 MG/DL (ref 70–99)
GLUCOSE BLD-MCNC: 154 MG/DL (ref 70–99)
GLUCOSE BLD-MCNC: 224 MG/DL (ref 70–99)
GLUCOSE BLD-MCNC: 89 MG/DL (ref 70–99)
OSMOLALITY SERPL CALC.SUM OF ELEC: 298 MOSM/KG (ref 275–295)
POTASSIUM SERPL-SCNC: 4.4 MMOL/L (ref 3.5–5.1)
SODIUM SERPL-SCNC: 142 MMOL/L (ref 136–145)

## 2024-07-09 PROCEDURE — 99232 SBSQ HOSP IP/OBS MODERATE 35: CPT | Performed by: HOSPITALIST

## 2024-07-09 PROCEDURE — 99232 SBSQ HOSP IP/OBS MODERATE 35: CPT | Performed by: INTERNAL MEDICINE

## 2024-07-09 NOTE — PROGRESS NOTES
Addendum:    Agree with above note except as documented below.      General: No acute distress, pleasant   Respiratory: Diminished BS b/l   Cardiovascular: S1, S2, irregularly irregular, tachycardic   Abdomen: Soft, Non-tender, non-distended, + bowel sounds  Extremities: + edema     #Acute on chronic hypercapnic and hypoxic respiratory failure  #End stage COPD with acute exacerbation  #HFpEF with acute exacerbation  #Metabolic alkalosis   #Rapid a. Flutter   #Aortic stenosis s/p AVR  #HLD   #Hyponatremia   #Left lung mass  #Seizure d/o   #DM Type 2  #Hx of RCC s/p nephrectomy   #ABIGAIL  #Hx of VTE     Patient remains frustrated regarding his a. Fib  EP consult yesterday.  Plan for ablation on Friday.  Resume amiodarone.  Oxygen weaning, avaps at night.      Pt seen and examined independently. Chart reviewed. Labs and imaging over the last 24 hours have been personally reviewed.  I personally made/approved 100% of the management plan for this patient and take full responsibility for the patient management.   Note has been reviewed by me and modified as needed.  Exam and Impression/ Recs as noted above.  D/w staff.    Alfredo Garcia MD            [facility]   part of [facility] Hospitalist Progress Note     Keny Marshall Patient Status:  Inpatient    1960 MRN [id number]   Location [facility] 6NE-A Attending Dr. Garcia   Hosp Day # 10 PCP Dustin Avina MD     Chief Complaint: SOB    Subjective:  pt doing well up in chair.  HR remains 130s today and he spoke with EP yesterday about options.  RN at bedside.  Reports he wants his diet restrictions lifted.     Objective:    Review of Systems:   A comprehensive review of systems was completed; pertinent positive and negatives stated in subjective.    Vital signs:  Temp:  [97.7 °F (36.5 °C)-98.8 °F (37.1 °C)] 97.9 °F (36.6 °C)  Pulse:  [] 136  Resp:  [18-28] 18  BP: (100-128)/(67-97) 100/78  SpO2:  [87 %-99 %] 89 %  FiO2 (%):  [40 %] 40  %    Physical Exam:    General: No acute distress 63 year old male   Respiratory: Diminished bilaterally. No wheezing on o2 NC  Cardiovascular: Irregular rhythm, tachy. 130s  Abdomen: Soft, Non-tender, non-distended  Neuro: No new focal deficits.   Extremities: minimal pedal edema  Skin: facial dermatitis improved    Diagnostic Data:    Labs:  Recent Labs   Lab 07/06/24  0610   WBC 8.6   HGB 11.6*   MCV 99.0   .0       Recent Labs   Lab 07/06/24  0620 07/08/24  0558 07/09/24  0539   * 80 116*   BUN 39* 26* 22   CREATSERUM 0.76 0.66* 0.74   CA 8.7 8.4* 8.3*    140 142   K 4.5 4.6 4.4   CL 93* 98 98   CO2 45.0* 43.0* 42.0*       Estimated Creatinine Clearance: 97.4 mL/min (based on SCr of 0.74 mg/dL).    No results for input(s): \"TROP\", \"TROPHS\", \"CK\" in the last 168 hours.      No results for input(s): \"PTP\", \"INR\" in the last 168 hours.                 Microbiology    No results found for this visit on 06/29/24.      Imaging: Reviewed in Epic.    Medications:    dilTIAZem ER  360 mg Oral Daily    ipratropium  500 mcg Nebulization Q6H WA    amiodarone  200 mg Oral BID with meals    furosemide  20 mg Oral Daily    predniSONE  30 mg Oral Daily with breakfast    digoxin  125 mcg Oral Daily    insulin aspart  1-20 Units Subcutaneous TID CC and HS    aspirin  81 mg Oral Daily    atorvastatin  20 mg Oral Nightly    guaiFENesin ER  1,200 mg Oral BID    levETIRAcetam  500 mg Oral BID    Roflumilast  500 mcg Oral Daily    rivaroxaban  20 mg Oral Daily with food    insulin aspart  1-10 Units Subcutaneous TID AC and HS    fluticasone furoate-vilanterol  1 puff Inhalation Daily       Assessment & Plan:      #Acute on chronic hypoxic/hypercapnic respiratory failure 2/2 CHF/COPD, improving    #Acute on chronic diastolic heart failure improving  -IV diuresis to po per cards  -CXR small effusions    #End stage COPD w/ acute exacerbation improving  -bipap prn/sleep, O2 NC  -steroids po, nebs, mucinex per  pulm  -baseline 2L per records, more recently needing 4-5L    #Left Lung mass, Smoker  -pulmonary recommending OP PET-CT    #Rapid aflutter, remains 130s today  -amiodarone, cardizem CD and digoxin po w/ additional IV yesterday  -ablation timing per EP   -Continue DOAC  -Tele  -Recently underwent DCCV 6/14    #Aortic stenosis s/p AVR   #Hyponatremia, resolved  #Prior VTE on xarelto  #Seizure disorder-Keppra  #Type 2 diabetes, 5.6%  #Steroid hyperglycemia-correctional/nutritional scale, hyperglycemia protocol  #History of RCC s/p nephrectomy  #ABIGAIL-CPAP protocol  #Seborrheic dermatitis, improved-topicals/shampoos.      Case d/w pt, RN at bedside. Appreciate consultants.   HHC/HHPT per SW.  Await ablation.     LAM Teague  8:43 AM     Supplementary Documentation:     Quality:  DVT Mechanical Prophylaxis:     Early ambuation  DVT Pharmacologic Prophylaxis   Medication    rivaroxaban (Xarelto) tab 20 mg         DVT Pharmacologic prophylaxis: Aspirin 81 mg      Code Status: Full Code  Peters: No urinary catheter in place    The 21st Century Cures Act makes medical notes like these available to patients in the interest of transparency. Please be advised this is a medical document. Medical documents are intended to carry relevant information, facts as evident, and the clinical opinion of the practitioner. The medical note is intended as peer to peer communication and may appear blunt or direct. It is written in medical language and may contain abbreviations or verbiage that are unfamiliar.

## 2024-07-09 NOTE — PLAN OF CARE
Assumed care for pt at 19:30 on 7/8    A&Ox. Tylenol given for headache. HR up to 130s until 22:00, then HR controlled 60-90 during sleep. Other VSS on 5L HFNC. Aflutter. On Xarelto. Wore AVAPs for sleep. Voids in urinal. Up with standby assist/ad anuel in room.     Plan: Daily weight, I&O, monitor HR, on po prednisone, ablation Friday, seizure precautions.    Pt refused bed alarm. Call light in reach. Able to make needs known.

## 2024-07-09 NOTE — OCCUPATIONAL THERAPY NOTE
OCCUPATIONAL THERAPY TREATMENT NOTE - INPATIENT     Room Number: 8611/8611-A  Session: 1   Number of Visits to Meet Established Goals: 3    Presenting Problem: Racing heart and shortness of breath x 2 days, admitted for Rapid aflutter,HF, respiratory failure    Prior Level of Function: independent with ADL. Uses 4.5 liters oxygen at all times. Wife assists with bathing. 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     ASSESSMENT   Patient demonstrates good  progress this session, goals updated to reflect patient performance.    Patient continues to function near baseline with ADLs and activity tolerance.   Contributing factors to remaining limitations include decreased endurance. Pt lives at home with supportive spouse who reports being willing and able to assist patient at current required level of assist. Will defer to PT for further mobility training as needed.    Patient is cleared for discharge from acute OT services viewpoint. OT will sign off.          OT Device Recommendations: None    History: Patient is a 63 year old male admitted on 6/29/2024 with Presenting Problem: Racing heart and shortness of breath x 2 days, admitted for Rapid aflutter,HF, respiratory failure. Co-Morbidities : AVR, COPD, seizure, DM, renal cell carcinoa, nephrectomy, rotator cuff repair     Recent hospital admission:  6/11 to 6/15/24 for respiratory failure, COPD exacerbation, cardioversion on 6/14/24 -> home PT  5/27 to 6/3/24 at another hospital for respiratory failure,grand mal seizure        WEIGHT BEARING RESTRICTION  Weight Bearing Restriction: None                Recommendations for nursing staff:   Transfers: MOD I with RW  Toileting location: toilet    TREATMENT SESSION:  Patient Start of Session: semi-supine in bed  FUNCTIONAL TRANSFER ASSESSMENT  Sit to Stand: Edge of Bed  Edge of Bed: Modified Independent  Toilet Transfer: Not Tested    BED MOBILITY  Supine to Sit : Independent    BALANCE  ASSESSMENT  Static Sitting: Independent  Sitting Unilateral: Independent  Sitting Bilateral: Independent  Static Standing: Modified Independent  Standing Unilateral: Modified Independent    FUNCTIONAL ADL ASSESSMENT  LB Dressing Seated: Independent (donning socks)      ACTIVITY TOLERANCE: tolerated functional mobility approx 300 feet x2 trials, requiring 1 standing rest break for recovery between trials. Pt reported 4-5/10 on gaurav scale of exertion. MODIFIED GAURAV DYSPNEA SCALE - (SHORTNESS OF BREATH SCALE)    SCORE DESCRIPTION   0 Nothing at all   1 Very slight   2 Slight   3 Moderate   4 Somewhat severe   5 Strong or hard breathing   6    7 Very hard breathing   8    9 Very, Very Severe   10 MAX - You need to stop     Patient Education provided:    Use this scale to rate the difficulty of your breathing:  - As you would rate your pain from 0-10, you can rate your SOB from 0-10  - Goal is to stay below 7/10 with activity  - If you reach beyond 7/10, it is important to rest; stop activity and if you need to sit down to recover.    Pulse: (!) 133 (after mobility 300ft x2)  Heart Rate Source: Monitor                   O2 SATURATIONS  Oxygen Therapy  SPO2% on Oxygen at Rest: 96  At rest oxygen flow (liters per minute): 4.5  SPO2% Ambulation on Oxygen: 86  Ambulation oxygen flow (liters per minute): 5    EDUCATION PROVIDED  Patient: Role of Occupational Therapy; Plan of Care; Functional Transfer Techniques; Energy Conservation  Patient's Response to Education: Verbalized Understanding; Returned Demonstration      Equipment used: RW  Demonstrates functional use.    Therapist comments: Pt educated pursed lip breathing and diaphragmatic breathing techniques during seated recovery after functional mobility. Pt provided good return demonstration and required min verbal and visual cues.   Patient End of Session: In bed;Needs met;RN aware of session/findings;Call light within reach;All patient questions and concerns  addressed;Family present    SUBJECTIVE  \"I can go for another walk.\"    PAIN ASSESSMENT  Ratin  Location: denies        OBJECTIVE  Precautions: None    AM-PAC ‘6-Clicks’ Inpatient Daily Activity Short Form  -   Putting on and taking off regular lower body clothing?: None  -   Bathing (including washing, rinsing, drying)?: None  -   Toileting, which includes using toilet, bedpan or urinal? : None  -   Putting on and taking off regular upper body clothing?: None  -   Taking care of personal grooming such as brushing teeth?: None  -   Eating meals?: None    AM-PAC Score:  Score: 24  Approx Degree of Impairment: 0%  Standardized Score (AM-PAC Scale): 57.54    PLAN  OT Treatment Plan: ADL training;Energy conservation/work simplification techniques;Functional transfer training;Patient/Family education;Compensatory technique education  Rehab Potential : Fair  Frequency: 3x/week    OT Goals:   ADL Goals   Patient will perform grooming: with supervision  Patient will perform lower body dressing:  with supervision - goal met      Additional Goals  Pt will incorporate 2 energy conservation techniques into ADL.    OT Session Time: 18 minutes  Self-Care Home Management: 4 minutes  Therapeutic Exercise: 12 minutes

## 2024-07-09 NOTE — PROGRESS NOTES
[facility]  Progress Note    Keny Marshall Patient Status:  Inpatient    1960 MRN [id number]   Location [facility] 8NE-A Attending Alfredo Garcia MD   Hosp Day # 10 PCP Dustin Avina MD     Subjective:  Keny Marshall is a(n) 63 year old male remains afeb   States shortness of breath has resolved this morning following Breo  Heart rate to the 80s at night    Objective:  /78 (BP Location: Left arm)   Pulse (!) 136   Temp 97.9 °F (36.6 °C) (Oral)   Resp 18   Wt 150 lb 9.2 oz (68.3 kg)   SpO2 (!) 89%   BMI 21.61 kg/m² remains on 5 L      Temp (24hrs), Av.3 °F (36.8 °C), Min:97.7 °F (36.5 °C), Max:98.8 °F (37.1 °C)      Intake/Output:    Intake/Output Summary (Last 24 hours) at 2024 0840  Last data filed at 2024 0519  Gross per 24 hour   Intake --   Output 1750 ml   Net -1750 ml       Physical Exam:   General: alert, cooperative, oriented.     Head: Normocephalic, without obvious abnormality, atraumatic.   Throat: Lips, mucosa, and tongue normal.  No thrush noted.  Poor dentition   Neck: trachea midline, no adenopathy, no thyromegaly. No JVD.  90 degrees   Lungs: Markedly diminished sounds bilaterally crackles at the bases no auscultated wheeze   Chest wall: No tenderness or deformity.   Heart: Remains in flutter 133 bpm murmur noted   Abdomen: soft, non-distended, no masses, no guarding, no     Rebound.No diarrhea   Extremity: +1 to trace edema bilaterally   Skin: No rashes or lesions.   Neurological: Alert, interactive, no focal deficits    Lab Data Review:  No results for input(s): \"WBC\", \"HGB\", \"PLT\" in the last 72 hours.  Recent Labs     24  0558 24  0539    142   K 4.6 4.4   CL 98 98   CO2 43.0* 42.0*   BUN 26* 22   CREATSERUM 0.66* 0.74     No results for input(s): \"PTP\", \"INR\", \"PTT\" in the last 168 hours.    Cultures: reviewed     Radiology:  No results found.  Reviewed       Medications reviewed     Assessment and Plan:   Patient Active Problem List    Diagnosis    Anemia    HTN (hypertension)    DM2 (diabetes mellitus, type 2) (HCC)    COPD (chronic obstructive pulmonary disease) ([facility])    Clear cell renal cell carcinoma of left kidney ([facility])    Medial meniscus tear, right, initial encounter    Primary osteoarthritis of right knee              GLOBAL EXP 6-25-16 / RIGHT KNEE / TRK     Acute medial meniscus tear of right knee            GLOBAL EXP 6-25-16 / RIGHT KNEE / TRK     Acute lateral meniscus tear of left knee               GLOBAL EXP 6-25-16 / LEFT KNEE / TRK     Bursitis of left shoulder             GLOBAL EXP 6-25-16 / LEFT SHOULDER / TRK     Moderate aortic stenosis    Stage 3 severe COPD by GOLD classification ([facility])    Hyperglycemia    Hypervolemia    Hypervolemia, unspecified hypervolemia type    Dyspnea, unspecified type    Hypoxemia    Atrial fibrillation with RVR ([facility])    Acute heart failure with preserved ejection fraction (HFpEF) ([facility])    Atrial flutter with rapid ventricular response ([facility])    Acute on chronic congestive heart failure (HCC)    Acute on chronic congestive heart failure, unspecified heart failure type (HCC)    Hypoxia    Atrial fibrillation with rapid ventricular response ([facility])    Pleural effusion    Hypokalemia    Acute respiratory failure with hypoxia ([facility])    Normocytic anemia    Chronic heart failure with preserved ejection fraction ([facility])    Leukocytosis (leucocytosis)    Aortic valve regurgitation    HFrEF (heart failure with reduced ejection fraction) ([facility])    Chronic hypoxemic respiratory failure ([facility])    ABIGAIL (obstructive sleep apnea)    COPD exacerbation ([facility])    Acute on chronic respiratory failure with hypoxia (HCC)    Acute on chronic respiratory failure with hypoxia and hypercapnia ([facility])    Chronic hypercapnic respiratory failure ([facility])    Smoking greater than 20 pack years    Hyponatremia    Hyperkalemia    Chronic obstructive pulmonary disease, unspecified COPD type ([facility])    Supplemental oxygen dependent        Assessment:  #1. Acute on chronic hypercapnic and hypoxic respiratory failure  Multifactorial due to afib with RVR, CHF exacerbation with underlying end stage COPD  7/1 CTA with fluid overload, no PE  Treat/control afib and CHF as per cardiology  Cardiac sensitivity to beta agonist anticholinergics --has been off yet heart rate continues high with plans to resume for his shortness of breath -continue on pred oral taper  Wean o2 for goal saturations of 88-92% given hypercapnia-remains on 4 to 5 L  Continue on AVAPS with naps/sleep and PRN     #2. Acute CHF exacerbation, aflutter with RVR  Rate control and diurese as per cardiology  Previous cardioversion 6/14  Plans in place to resume amiodarone and proceed with ablation  Remains on anticoagulation     #3. COPD  Severe obstruction on previous PFTs 3/2021  off arformoterol given worsening afib/tachycardia  Plan to resume MDIs as heart rate remains  Use atrovent nebs PRN  He can use levalbuterol as outpatient as hospital does not stock it  He cannot tolerate albuterol due to palpitations, tremors and with afib with RVR; off duonebs  Continue oral pred taper  Continue roflumilast  Previously reviewed BLVR - not candidate at this time, reassess as outpatient     #4. Pulmonary nodules   7/1/2024 CT chest with worsening ALYSON lesion, possible scarring, rounded atelectasis vs neoplasm  Will need outpatient follow up including PET/CT     #5. Tobacco abuse  30+ pack years, active pipe smoker  Hold on LDCT given recent CT with worsening ALYSON lesion, will need PET/CT as above      Plan:   continue ICS/LABA and Atrovent nebs as patient notes increasing dyspnea without change in heart rate off of those medications.  EP note appreciated plans to resume amnio and proceed with ablation-agree with focus on minimal sedation and consider AVAPS during procedure as needed  Plan to continue AVAPS for hypercapnic respiratory failure  Reviewed at length with patient    CC     Pat  MD Skyla  7/9/2024  8:40 AM

## 2024-07-09 NOTE — PLAN OF CARE
Patient alert and oriented x 4. Up with standby assist. Patient refused bed alarm. Educated patient on the importance of calling for assistance. On oxygen per nasal cannula, patient reports that he uses oxygen at home. A flutter on tele. Continent of bowel and bladder. No complaints of pain, shortness of breath, or chest pain/discomfort. POC discussed with patient. Call light within reach.     Problem: RESPIRATORY - ADULT  Goal: Achieves optimal ventilation and oxygenation  Description: INTERVENTIONS:  - Assess for changes in respiratory status  - Assess for changes in mentation and behavior  - Position to facilitate oxygenation and minimize respiratory effort  - Oxygen supplementation based on oxygen saturation or ABGs  - Provide Smoking Cessation handout, if applicable  - Encourage broncho-pulmonary hygiene including cough, deep breathe, Incentive Spirometry  - Assess the need for suctioning and perform as needed  - Assess and instruct to report SOB or any respiratory difficulty  - Respiratory Therapy support as indicated  - Manage/alleviate anxiety  - Monitor for signs/symptoms of CO2 retention  Outcome: Progressing     Problem: CARDIOVASCULAR - ADULT  Goal: Maintains optimal cardiac output and hemodynamic stability  Description: INTERVENTIONS:  - Monitor vital signs, rhythm, and trends  - Monitor for bleeding, hypotension and signs of decreased cardiac output  - Evaluate effectiveness of vasoactive medications to optimize hemodynamic stability  - Monitor arterial and/or venous puncture sites for bleeding and/or hematoma  - Assess quality of pulses, skin color and temperature  - Assess for signs of decreased coronary artery perfusion - ex. Angina  - Evaluate fluid balance, assess for edema, trend weights  Outcome: Progressing  Goal: Absence of cardiac arrhythmias or at baseline  Description: INTERVENTIONS:  - Continuous cardiac monitoring, monitor vital signs, obtain 12 lead EKG if indicated  - Evaluate  effectiveness of antiarrhythmic and heart rate control medications as ordered  - Initiate emergency measures for life threatening arrhythmias  - Monitor electrolytes and administer replacement therapy as ordered  Outcome: Progressing

## 2024-07-09 NOTE — PROGRESS NOTES
[facility] Cardiology  Progress Note    Keny Marshall Patient Status:  Inpatient    1960 MRN [id number]   Location [facility] 8NE-A Attending Alfredo Garcia MD   Hosp Day # 10 PCP Dustin Avina MD       Impression;  Aflutter - s/p failed recent CV , in setting of adv COPD/exacerbation.  remains in RVR.  restarted amiodarone, plan for flutter ablation 24 ([place]).  CCB and digoxin.  SOB - 2/2 acute dCHF and COPD exac  Acute diastolic CHF - improved  S/p bio AVR  TTE (24): LVEF 55%, +DD, 23mm SAVR 3.3m/s, mean 22mmHg.     Plan:  AF/flutter: variable conduction, back on amiodarone as well as digoxin, diltiazem.  Appreciate EP input.  Plan for AFl ablation Friday. Pt is on board with plan  AC: rivaroxaban  diuresis: lasix 20mg po daily. Food has been terrible, requesting liberalization of diet, ok by me.  severe COPD- on steroids, inhaler therapy. HR responsive to duonebs- minimizing. Advanced/end-stage emphysema, per Dr. Randle/pulmonary.      Subjective:  Feels breathing is at baseline.  No CP.  No palpitations despite AF. reviewed plan for ablation Friday. Requesting a better diet, food has been terrible.    Objective:  /78 (BP Location: Left arm)   Pulse (!) 136   Temp 98.6 °F (37 °C) (Oral)   Resp 18   Wt 150 lb 9.2 oz (68.3 kg)   SpO2 (!) 89%   BMI 21.61 kg/m²   Temp (24hrs), Av.4 °F (36.9 °C), Min:97.7 °F (36.5 °C), Max:98.8 °F (37.1 °C)      Intake/Output Summary (Last 24 hours) at 2024 1033  Last data filed at 2024 0831  Gross per 24 hour   Intake 240 ml   Output 1750 ml   Net -1510 ml     Wt Readings from Last 3 Encounters:   24 150 lb 9.2 oz (68.3 kg)   24 159 lb 6.3 oz (72.3 kg)   23 172 lb (78 kg)       General: Awake and alert; in no acute distress  Cardiac: regular tachycardia; no murmurs/rubs/gallops are appreciated  Lungs: Coarse on auscultation bilaterally; no accessory muscle use  Abdomen: Soft, non-tender; bowel  sounds are normoactive  Extremities: No clubbing/cyanosis; moves all 4 extremities normally    Current Facility-Administered Medications   Medication Dose Route Frequency    lidocaine-menthol 4-1 % patch 1 patch  1 patch Transdermal Daily PRN    dilTIAZem ER (CardIZEM CD) 24 hr cap 360 mg  360 mg Oral Daily    ipratropium (Atrovent) 0.02 % nebulizer solution 500 mcg  500 mcg Nebulization Q6H WA    amiodarone (Pacerone) tab 200 mg  200 mg Oral BID with meals    furosemide (Lasix) tab 20 mg  20 mg Oral Daily    predniSONE (Deltasone) tab 30 mg  30 mg Oral Daily with breakfast    metoprolol (Lopressor) 5 mg/5mL injection 5 mg  5 mg Intravenous Q6H PRN    digoxin (Lanoxin) tab 125 mcg  125 mcg Oral Daily    selenium sulfide (Selsun) 2.5 % shampoo   Topical Daily PRN    insulin aspart (NovoLOG) 100 Units/mL FlexPen 1-20 Units  1-20 Units Subcutaneous TID CC and HS    aspirin DR tab 81 mg  81 mg Oral Daily    atorvastatin (Lipitor) tab 20 mg  20 mg Oral Nightly    guaiFENesin ER (Mucinex) 12 hr tab 1,200 mg  1,200 mg Oral BID    levETIRAcetam (Keppra) tab 500 mg  500 mg Oral BID    Roflumilast TABS 500 mcg  500 mcg Oral Daily    rivaroxaban (Xarelto) tab 20 mg  20 mg Oral Daily with food    glucose (Dex4) 15 GM/59ML oral liquid 15 g  15 g Oral Q15 Min PRN    Or    glucose (Glutose) 40% oral gel 15 g  15 g Oral Q15 Min PRN    Or    glucose-vitamin C (Dex-4) chewable tab 4 tablet  4 tablet Oral Q15 Min PRN    Or    dextrose 50% injection 50 mL  50 mL Intravenous Q15 Min PRN    Or    glucose (Dex4) 15 GM/59ML oral liquid 30 g  30 g Oral Q15 Min PRN    Or    glucose (Glutose) 40% oral gel 30 g  30 g Oral Q15 Min PRN    Or    glucose-vitamin C (Dex-4) chewable tab 8 tablet  8 tablet Oral Q15 Min PRN    insulin aspart (NovoLOG) 100 Units/mL FlexPen 1-10 Units  1-10 Units Subcutaneous TID AC and HS    melatonin tab 3 mg  3 mg Oral Nightly PRN    acetaminophen (Tylenol Extra Strength) tab 500 mg  500 mg Oral Q4H PRN     polyethylene glycol (PEG 3350) (Miralax) 17 g oral packet 17 g  17 g Oral Daily PRN    sennosides (Senokot) tab 17.2 mg  17.2 mg Oral Nightly PRN    bisacodyl (Dulcolax) 10 MG rectal suppository 10 mg  10 mg Rectal Daily PRN    fleet enema (Fleet) 7-19 GM/118ML rectal enema 133 mL  1 enema Rectal Once PRN    prochlorperazine (Compazine) 10 MG/2ML injection 5 mg  5 mg Intravenous Q8H PRN    fluticasone furoate-vilanterol (Breo Ellipta) 200-25 MCG/ACT inhaler 1 puff  1 puff Inhalation Daily       Laboratory Data:       Lab Results   Component Value Date    INR 1.59 (H) 06/29/2024    INR 1.06 06/11/2024    INR 2.26 (H) 09/22/2021     Lab Results   Component Value Date     07/09/2024    K 4.4 07/09/2024    CL 98 07/09/2024    CO2 42.0 07/09/2024    BUN 22 07/09/2024    CREATSERUM 0.74 07/09/2024     07/09/2024    CA 8.3 07/09/2024         Telemetry: AFlutter with RVR      Thank you for allowing our practice to participate in the care of your patient. Please do not hesitate to contact me if you have any questions.

## 2024-07-09 NOTE — PAYOR COMM NOTE
7/6 THRU 7/8  CONTINUED STAY REVIEW    Payor: [employer] MEDICARE ADV PPO  Subscriber #:  [id number]  Authorization Number: [id number]    Admit date: 6/29/24  Admit time:  2:08 PM      MEDICATIONS ADMINISTERED IN LAST 1 DAY:  acetaminophen (Tylenol Extra Strength) tab 500 mg       Date Action Dose Route User    7/8/2024 2221 Given 500 mg Oral Heather Cohn RN          amiodarone (Pacerone) tab 200 mg       Date Action Dose Route User    7/9/2024 0822 Given 200 mg Oral Annalise García RN    7/8/2024 1832 Given 200 mg Oral Mora Brush RN          aspirin DR tab 81 mg       Date Action Dose Route User    7/9/2024 0824 Given 81 mg Oral Annalise García RN          atorvastatin (Lipitor) tab 20 mg       Date Action Dose Route User    7/8/2024 2037 Given 20 mg Oral Heather Cohn RN          digoxin (Lanoxin) tab 125 mcg       Date Action Dose Route User    7/9/2024 0821 Given 125 mcg Oral Annalise García RN          dilTIAZem ER (CardIZEM CD) 24 hr cap 360 mg       Date Action Dose Route User    7/9/2024 0823 Given 360 mg Oral Annalise García RN          fluticasone furoate-vilanterol (Breo Ellipta) 200-25 MCG/ACT inhaler 1 puff       Date Action Dose Route User    7/9/2024 0820 Given 1 puff Inhalation Annalise García RN          furosemide (Lasix) tab 20 mg       Date Action Dose Route User    7/9/2024 0822 Given 20 mg Oral Annalise García RN          guaiFENesin ER (Mucinex) 12 hr tab 1,200 mg       Date Action Dose Route User    7/9/2024 0823 Given 1,200 mg Oral Annalise García RN    7/8/2024 2037 Given 1,200 mg Oral Heather Cohn RN          insulin aspart (NovoLOG) 100 Units/mL FlexPen 1-10 Units       Date Action Dose Route User    7/8/2024 2207 Given 6 Units Subcutaneous (Right Upper Arm) Heather Cohn RN          insulin aspart (NovoLOG) 100 Units/mL FlexPen 1-20 Units       Date Action Dose Route User    7/9/2024 1231 Given 4 Units Subcutaneous (Right Upper Arm) Annalise García, RN    7/9/2024  0825 Given 5 Units Subcutaneous (Left Lower Abdomen) Annalise García RN    7/8/2024 1901 Given 5 Units Subcutaneous (Left Upper Arm) Mora Brush RN          ipratropium (Atrovent) 0.02 % nebulizer solution 500 mcg       Date Action Dose Route User    7/9/2024 1313 Given 500 mcg Nebulization Mirlande Crisostomo RCP    7/9/2024 0756 Given 500 mcg Nebulization Mirlande Crisostomo CM    7/8/2024 1950 Given 500 mcg Nebulization Keyonna Car, [facility]          levETIRAcetam (Keppra) tab 500 mg       Date Action Dose Route User    7/9/2024 0824 Given 500 mg Oral Annalise García RN    7/8/2024 2037 Given 500 mg Oral Heather Cohn RN          rivaroxaban (Xarelto) tab 20 mg       Date Action Dose Route User    7/9/2024 0823 Given 20 mg Oral Annalise García RN          Roflumilast TABS 500 mcg       Date Action Dose Route User    7/9/2024 0821 Given 500 mcg Oral Annalise García RN          predniSONE (Deltasone) tab 30 mg       Date Action Dose Route User    7/9/2024 0823 Given 30 mg Oral Annalise García RN            Vitals (last day)       Date/Time Temp Pulse Resp BP SpO2 Weight O2 Device O2 Flow Rate (L/min) [facility]    07/09/24 1235 97.9 °F (36.6 °C) 92 18 123/53 93 % -- Nasal cannula 5 L/min DJ    07/09/24 0831 -- 136 18 100/78 89 % -- Nasal cannula -- DJ    07/09/24 0831 98.6 °F (37 °C) -- -- -- -- -- -- 4.5 L/min BP    07/09/24 0821 -- 135 -- -- -- -- -- -- DJ    07/09/24 0519 -- 111 -- -- 97 % 150 lb 9.2 oz (68.3 kg) -- -- PM    07/09/24 0500 -- 67 -- -- -- -- -- -- JG    07/09/24 0442 -- 66 -- -- -- -- -- --     07/09/24 0441 -- 66 -- -- -- -- -- --     07/09/24 0440 -- 66 -- -- -- -- -- --     07/09/24 0439 -- 88 -- -- -- -- -- --     07/09/24 0345 97.9 °F (36.6 °C) 90 18 121/77 95 % -- -- -- PM    07/09/24 0300 -- 66 -- -- -- -- -- --     07/09/24 0100 -- 72 -- -- -- -- -- --     07/08/24 2314 -- -- -- -- -- -- Bi-PAP --     07/08/24 2314 97.7 °F (36.5 °C) 92 20 124/67 99 % -- -- -- PM    07/08/24 2212  -- 105 -- -- 98 % -- -- -- PM    07/08/24 2203 -- 112 -- -- -- -- -- --     07/08/24 2203 -- -- 28 -- -- -- -- --     07/08/24 2202 -- 126 -- -- -- -- -- --     07/08/24 2201 -- 120 -- -- -- -- -- --     07/08/24 2200 -- 130 -- -- -- -- -- --     07/08/24 2159 -- 104 -- -- -- -- -- --     07/08/24 2158 -- 97 -- -- -- -- -- --     07/08/24 2157 -- 97 -- -- -- -- -- --     07/08/24 1950 -- -- -- -- -- -- High flow nasal cannula 4.5 L/min     07/08/24 1939 98.8 °F (37.1 °C) 132 20 113/84 98 % -- -- -- PM    07/08/24 1634 98.5 °F (36.9 °C) 115 22 128/97 94 % -- High flow nasal cannula 4.5 L/min     07/08/24 1321 -- -- -- -- -- -- High flow nasal cannula 5 L/min     07/08/24 1229 -- 93 -- -- 96 % -- -- --     07/08/24 1152 98.8 °F (37.1 °C) 98 20 120/76 95 % -- High flow nasal cannula 5.5 L/min     07/08/24 1030 -- 136 -- -- 87 % -- -- --     07/08/24 1007 -- 135 -- -- -- -- -- --     07/08/24 0806 98.4 °F (36.9 °C) 133 22 118/89 100 % -- High flow nasal cannula 5.5 L/min     07/08/24 0605 -- 108 -- -- 99 % -- -- --     07/08/24 0435 -- 119 -- -- 97 % 161 lb 9.6 oz (73.3 kg) -- -- ZK    07/08/24 0407 97.7 °F (36.5 °C) 109 28 121/88 96 % -- Other (Comment) -- RH    07/08/24 0100 -- -- -- -- -- -- Other (Comment) --          7/6 PULMONARY NOTE    SUBJECTIVE/Interval history:  Noted worsening HR control overnight, returned on diltiazem gtt with improvement.  He feels well today, thinks breathing is at baseline, but does admit he feels dyspneic when heart rate is higher ~130s  No chest pain. No fevers  Tolerating AVAPS overnight  Weight up 5# from yesterday    Vitals:     07/05/24 1945  07/05/24 2224  07/05/24 2345  07/06/24 0545  BP:  121/85     (!) 129/92  122/75  BP Location:  Right arm     Right arm  Right arm  Pulse:  (!) 126  (!) 125  (!) 124  87  Resp:  (!) 29  26  (!) 35  (!) 30  Temp:  97.9 °F (36.6 °C)     96.6 °F (35.9 °C)  97.1 °F (36.2 °C)  TempSrc:  Oral      Axillary  Axillary  SpO2:  (!) 89%  94%  96%  99%  Weight:        165 lb 2 oz (74.9 kg)    Lab Data Review:         Recent Labs   Lab 07/03/24  1132 07/04/24  0831 07/06/24  0620   * 166* 143*   BUN 16 23 39*   CREATSERUM 0.58* 0.61* 0.76   CA 8.7 8.6 8.7   * 135* 137   K 4.4 4.6 4.5   CL 91* 94* 93*   CO2 42.0* 41.0* 45.0*            Recent Labs   Lab 06/29/24  1106 07/01/24  0937 07/06/24  0610   RBC 4.22* 3.71* 3.82*   HGB 13.0 11.5* 11.6*   HCT 42.4 36.9* 37.8*   .5* 99.5 99.0   MCH 30.8 31.0 30.4   MCHC 30.7* 31.2 30.7*   RDW 14.0 13.8 13.5   NEPRELIM 11.76* 8.67* 7.67   WBC 13.6* 10.1 8.6   .0 160.0 215.0       Assessment/Plan:  #1. Acute on chronic hypercapnic and hypoxic respiratory failure  Multifactorial due to afib with RVR, CHF exacerbation with underlying end stage COPD  7/1 CTA with fluid overload, no PE  Treat/control afib and CHF as per cardiology  Continue nebs   Last dose of methylpred today - plan to start PO pred tomorrow  Wean o2 for goal saturations of 88-92% given hypercapnia. His previous o2 baseline was: 2L at rest, 4L on exertion; using and benefiting from portable oxygen concentrator (3 at rest, 6 on exertion)  Continue on AVAPS with naps/sleep and PRN     #2. Acute CHF exacerbation, afib with RVR  Rate control and diurese as per cardiology  Previous cardioversion 6/14     #3. COPD  Severe obstruction on previous PFTs 3/2021  off arformoterol given worsening afib/tachycardia  Will hold breo for now given afib/rates, although low suspicion this is related as he has had elevated rates when breo was off previously  Will continue on ipratropium and hypertonic saline- these are not expected to affect his afib/rates  He can use levalbuterol as outpatient as hospital does not stock it  He cannot tolerate albuterol due to palpitations, tremors and with afib with RVR; off duonebs  Finish methylpred today; plan PO pred tomorrow  Continue roflumilast  Previously reviewed  BLVR - not candidate at this time, reassess as outpatient     #4. Pulmonary nodules  2017 CT with worsening peripheral lingular atelectasis. Nodules stable   2018 CT stable nodules  2020 CT stable  2021 CT stable but worsening mediastinal LAD suspect reactive to recent cardiac surgery  2022 CT with stable nodules, improved LAD  3/2023 CT chest with several small nodules  2023 CT chest with stable findings   2024 CT chest with worsening ALYSON lesion, possible scarring, rounded atelectasis vs neoplasm  Will need outpatient follow up including PET/CT     #5. Tobacco abuse  30+ pack years, active pipe smoker  Hold on LDCT given recent CT with worsening ALYSON lesion, will need PET/CT as above     Dispo:  Floor; not ready for discharge yet     CARDIOLOGY NOTE  osp Day # 7 PCP Dustin Avina MD        Impression;  Aflutter - s/p failed recent CV. Pt on AC. Now on CCB IV.  Rate increased after Neb.  SOB - 2/2 acute dCHF and COPD exac  Acute diastolic CHF - improved  S/p bio AVR  TTE (24): LVEF 55%, +DD, 23mm SAVR 3.3m/s, mean 22mmHg.     Plan:  diuresis: Reduce lasix 40mg IV every day  Minimize ;/ eliminate Neb if reasonable from pulmonary perspective.  IV CCB until demonstrates further respiratory Improvement  L lung mass- outpatient PET/CT per pulmonology  severe COPD- on steroids, inhaler therapy.  Advanced/end-stage emphysema. Inpatient course will likely be protracted over several days.  Pt insight into his condition appears limited.  Explained rate relation with pulmonary issues.  He is becoming more frustrated with hospitalization.        Subjective:  Feels breathing is at baseline.  No CP.     Objective:  /72 (BP Location: Right arm)   Pulse 113   Temp 97.3 °F (36.3 °C) (Oral)   Resp 26   Wt 159 lb 2.8 oz (72.2 kg)   SpO2 93%   BMI 22.84 kg/m²   Temp (24hrs), Av.6 °F (36.4 °C), Min:96.6 °F (35.9 °C), Max:98.4 °F (36.9 °C)    Telemetry: AFlutter with RVR     PULMONARY NOTE      SUBJECTIVE/Interval history:  No acute issues overnight, he feels 'good' today. Remains on diltiazem and heart rates controlled.  No chest pain. No fevers.  Tolerating AVAPS overnight. Noted inconsistencies with weights- reportedly up to 170# today    Medications  Reviewed personally  Scheduled Medications    furosemide  40 mg Intravenous Daily    ipratropium  0.5 mg Nebulization TID    predniSONE  30 mg Oral Daily with breakfast    digoxin  125 mcg Oral Daily    insulin aspart  1-20 Units Subcutaneous TID CC and HS    sodium chloride (hypertonic)  3 mL Nebulization 2 times daily    aspirin  81 mg Oral Daily    atorvastatin  20 mg Oral Nightly    guaiFENesin ER  1,200 mg Oral BID    levETIRAcetam  500 mg Oral BID    Roflumilast  500 mcg Oral Daily    rivaroxaban  20 mg Oral Daily with food    insulin aspart  1-10 Units Subcutaneous TID AC and HS    [Held by provider] fluticasone furoate-vilanterol  1 puff Inhalation Daily           OBJECTIVE:  Vitals          Vitals:     07/06/24 2325 07/06/24 2330 07/07/24 0530 07/07/24 0722   BP:   (!) 141/107 120/74     BP Location:   Right arm Right arm     Pulse: 86 87 67 75   Resp: (!) 32 20 19     Temp:   97.2 °F (36.2 °C) 96.6 °F (35.9 °C)     TempSrc:   Axillary Oral     SpO2: 99% 98% 100% 97%   Weight:     170 lb 3.1 oz (77.2 kg)              Vital signs in last 24 hours:  Blood pressure 120/74, pulse 75, temperature 96.6 °F (35.9 °C), temperature source Oral, resp. rate 19, weight 170 lb 3.1 oz (77.2 kg), SpO2 97%.    Assessment/Plan:  #1. Acute on chronic hypercapnic and hypoxic respiratory failure  Multifactorial due to afib with RVR, CHF exacerbation with underlying end stage COPD  7/1 CTA with fluid overload, no PE  Treat/control afib and CHF as per cardiology  He has been on scheduled atrovent which should not affect his heart rates, however reportedly post nebs, heart rate control is worse.  His COPD is controlled at present - will trial on atrovent nebs PRN only  for now  Continue on pred oral taper  Wean o2 for goal saturations of 88-92% given hypercapnia. His previous o2 baseline was: 2L at rest, 4L on exertion; using and benefiting from portable oxygen concentrator (3 at rest, 6 on exertion)  Continue on AVAPS with naps/sleep and PRN     #2. Acute CHF exacerbation, afib with RVR  Rate control and diurese as per cardiology  Previous cardioversion 6/14     #3. COPD  Severe obstruction on previous PFTs 3/2021  off arformoterol given worsening afib/tachycardia  Will continue to hold breo and scheduled atrovent nebs for now given afib/rates.  Use atrovent nebs PRN  He can use levalbuterol as outpatient as hospital does not stock it  He cannot tolerate albuterol due to palpitations, tremors and with afib with RVR; off duonebs  Continue oral pred taper  Continue roflumilast  Previously reviewed BLVR - not candidate at this time, reassess as outpatient     #4. Pulmonary nodules  7/2017 CT with worsening peripheral lingular atelectasis. Nodules stable   5/2018 CT stable nodules  12/2020 CT stable  7/2021 CT stable but worsening mediastinal LAD suspect reactive to recent cardiac surgery  1/2022 CT with stable nodules, improved LAD  3/2023 CT chest with several small nodules  9/2023 CT chest with stable findings   7/1/2024 CT chest with worsening ALYSON lesion, possible scarring, rounded atelectasis vs neoplasm  Will need outpatient follow up including PET/CT     #5. Tobacco abuse  30+ pack years, active pipe smoker  Hold on LDCT given recent CT with worsening ALYSON lesion, will need PET/CT as above     Dispo:  Floor     7/7 CARDIOLOGY NOTE    Impression;  Aflutter - s/p failed recent CV. Pt on AC. Now on CCB IV.  Rate increased after Neb. Controlled at present on IV CCB Rx.  SOB - 2/2 acute dCHF and COPD exac  Acute diastolic CHF - improved  S/p bio AVR  TTE (6/12/24): LVEF 55%, +DD, 23mm SAVR 3.3m/s, mean 22mmHg.     Plan:  diuresis: Transition Lasix to PO  Minimize ;/ eliminate Neb if  reasonable from pulmonary perspective.  Transition IV CCB Emily this AM  L lung mass- outpatient PET/CT per pulmonology  severe COPD- on steroids, inhaler therapy.  Advanced/end-stage emphysema. Inpatient course will likely be protracted over several days.  Pt insight into his condition appears limited.  Explained rate relation with pulmonary issues.  He is becoming more frustrated with hospitalization.  F/U BMP / Dig level in AM        Subjective:  Feels breathing is at baseline.  No CP.  No palpitations despite AF.     Objective:  /74 (BP Location: Right arm)   Pulse 75   Temp 96.6 °F (35.9 °C) (Oral)   Resp 19   Wt 170 lb 3.1 oz (77.2 kg)   SpO2 97%   BMI 24.42 kg/m²   Temp (24hrs), Av.8 °F (36.6 °C), Min:96.6 °F (35.9 °C), Max:98.5 °F (36.9 °C)     8 HOSPITALIST NOTE  General: No acute distress, pleasant   Respiratory: Diminished BS b/l   Cardiovascular: S1, S2, irregularly irregular, tachycardic   Abdomen: Soft, Non-tender, non-distended, + bowel sounds  Extremities: + edema     #Acute on chronic hypercapnic and hypoxic respiratory failure  #End stage COPD with acute exacerbation  #HFpEF with acute exacerbation  #Metabolic alkalosis   #Rapid a. Flutter   #Aortic stenosis s/p AVR  #HLD   #Hyponatremia   #Left lung mass  #Seizure d/o   #DM Type 2  #Hx of RCC s/p nephrectomy   #ABIGAIL  #Hx of VTE      Patient with continued RVR.  Patient on Digoxin and CCB.  No amio for potential pulm toxicity.  Plan to consult EP today.  Monitor on tele, continue xarelto.  Nebs likely not contributing, he can resume if he is agreeable.  Continue lasix 20mg daily.  Cards, pulm following.          Pt seen and examined independently. Chart reviewed. Labs and imaging over the last 24 hours have been personally reviewed.  I personally made/approved 100% of the management plan for this patient and take full responsibility for the patient management.   Note has been reviewed by me and modified as needed.  Exam and  Impression/ Recs as noted above.  D/w staff.     Alfredo Garcia MD     Subjective:  pt reports he feels fine and asymptomatic w/ HR currently 130s.  He saw cards this am and pending EP eval.  Pt repeated reports he is eager to get out of the hospital.         Vital signs:  Temp:  [97.1 °F (36.2 °C)-98.9 °F (37.2 °C)] 98.4 °F (36.9 °C)  Pulse:  [] 133  Resp:  [19-28] 22  BP: (102-141)/(68-89) 118/89  SpO2:  [87 %-100 %] 100 %  FiO2 (%):  [40 %] 40 %     Physical Exam:    General: No acute distress 63 year old male   Respiratory: Diminished bilaterally. No wheezing  Cardiovascular: Irregular rhythm, tachy. 130s  Abdomen: Soft, Non-tender, non-distended  Neuro: No new focal deficits.   Extremities: No edema          Recent Labs   Lab 07/04/24  0831 07/06/24  0620 07/08/24  0558   * 143* 80   BUN 23 39* 26*   CREATSERUM 0.61* 0.76 0.66*   CA 8.6 8.7 8.4*   * 137 140   K 4.6 4.5 4.6   CL 94* 93* 98   CO2 41.0* 45.0* 43.0*       Assessment & Plan:  #Acute on chronic hypoxic/hypercapnic respiratory failure 2/2 CHF/COPD, improving     #Acute on chronic diastolic heart failure   -IV diuresis to po per cards  -CXR small effusions     #End stage COPD w/ acute exacerbation  -bipap prn/sleep, O2 NC  -steroids po, nebs, mucinex per pulm  -baseline 2L per records     #Left Lung mass, Smoker  -pulmonary recommending OP PET-CT     #Rapid aflutter  -Cardizem gtt off yesterday, amio stopped per cards  -cardizem CD and digoxin po  -pending EP eval  -Continue DOAC  -Tele  -Recently underwent DCCV 6/14     #Aortic stenosis s/p AVR   #Hyponatremia, resolved  #Prior VTE on xarelto  #Seizure disorder-Keppra  #Type 2 diabetes, 5.6%  #Steroid hyperglycemia-correctional/nutritional scale, hyperglycemia protocol  #History of RCC s/p nephrectomy  #ABIGAIL-CPAP protocol  #Seborrheic dermatitis, improved-topicals/shampoos.       Case d/w pt, RN at bedside.  F/u on cards/EP recs.  Dc planning once HR controlled.     7/8 CARDIOLOGY  NOTE    Impression;  Aflutter - s/p failed recent CV , in setting of adv COPD/exacerbation.  remains in RVR, on/off amiodarone- concerns for potential pulmonary toxicity.  currently on CCB and digoxin.  SOB - 2/2 acute dCHF and COPD exac  Acute diastolic CHF - improved  S/p bio AVR  TTE (24): LVEF 55%, +DD, 23mm SAVR 3.3m/s, mean 22mmHg.     Plan:  diuresis: lasix 20mg po daily  AF/flutter: persistent 133bpm despite CCB and digoxin and CHF/COPD management.  on/off amiodarone- concern for long-term pulmonary toxicity.  Dig level low, give 250mcg x 1.  Discussed with Dr. Fatima/EP- to see later today.    AC: rivaroxaban  diuresis: lasix 20mg po daily.  severe COPD- on steroids, inhaler therapy. HR responsive to duonebs- minimizing. Advanced/end-stage emphysema, per Dr. Randle/pulmonary.  Pt insight into his condition appears limited.  Explained rate relation with pulmonary issues.  He is becoming more frustrated with hospitalization.        Subjective:  Feels breathing is at baseline.  No CP.  No palpitations despite AF. rates steady 133bpm.     Objective:  /89 (BP Location: Right arm)   Pulse (!) 133   Temp 98.4 °F (36.9 °C) (Oral)   Resp 22   Wt 161 lb 9.6 oz (73.3 kg)   SpO2 100%   BMI 23.19 kg/m²   Temp (24hrs), Av.1 °F (36.7 °C), Min:97.1 °F (36.2 °C), Max:98.9 °F (37.2 °C)      /8 PULMONARY NOTE    Subjective:  Keny Marshall is a(n) 63 year old male remains afebrile  Tolerates AVAPS well once he gets to sleep felt better this a.m. now with increasing shortness of breath  Denies any chest pain minimal coughing  Heart rate remains 135     Objective:  /89 (BP Location: Right arm)   Pulse (!) 135   Temp 98.4 °F (36.9 °C) (Oral)   Resp 22   Wt 161 lb 9.6 oz (73.3 kg)   SpO2 100%   BMI 23.19 kg/m² currently on 5-1/2 L 93%        Temp (24hrs), Av.1 °F (36.7 °C), Min:97.1 °F (36.2 °C), Max:98.9 °F (37.2 °C)        Intake/Output:     Intake/Output Summary (Last 24 hours) at  7/8/2024 1011  Last data filed at 7/8/2024 0806      Gross per 24 hour   Intake 240 ml   Output 2190 ml   Net -1950 ml          Assessment:  #1. Acute on chronic hypercapnic and hypoxic respiratory failure  Multifactorial due to afib with RVR, CHF exacerbation with underlying end stage COPD  7/1 CTA with fluid overload, no PE  Treat/control afib and CHF as per cardiology  Cardiac sensitivity to beta agonist anticholinergics --has been off yet heart rate continues high with plans to resume for his shortness of breath -continue on pred oral taper  Wean o2 for goal saturations of 88-92% given hypercapnia. His previous o2 baseline was: 2L at rest, 4L on exertion; using and benefiting from portable oxygen concentrator (3 at rest, 6 on exertion)  Continue on AVAPS with naps/sleep and PRN     #2. Acute CHF exacerbation, afib with RVR  Rate control and diurese as per cardiology  Previous cardioversion 6/14  Ongoing cardiac input appreciated     #3. COPD  Severe obstruction on previous PFTs 3/2021  off arformoterol given worsening afib/tachycardia  Plan to resume MDIs as heart rate remains  Use atrovent nebs PRN  He can use levalbuterol as outpatient as hospital does not stock it  He cannot tolerate albuterol due to palpitations, tremors and with afib with RVR; off duonebs  Continue oral pred taper  Continue roflumilast  Previously reviewed BLVR - not candidate at this time, reassess as outpatient     #4. Pulmonary nodules  7/2017 CT with worsening peripheral lingular atelectasis. Nodules stable   5/2018 CT stable nodules  12/2020 CT stable  7/2021 CT stable but worsening mediastinal LAD suspect reactive to recent cardiac surgery  1/2022 CT with stable nodules, improved LAD  3/2023 CT chest with several small nodules  9/2023 CT chest with stable findings   7/1/2024 CT chest with worsening ALYSON lesion, possible scarring, rounded atelectasis vs neoplasm  Will need outpatient follow up including PET/CT     #5. Tobacco abuse  30+  pack years, active pipe smoker  Hold on LDCT given recent CT with worsening ALYSON lesion, will need PET/CT as above     Plan:  ResumeICS/LABA and Atrovent nebs as patient notes increasing dyspnea without change in heart rate off of those medications.  Cardiac input appreciated with plans for EP to assess patient  Plan to continue AVAPS for hypercapnic respiratory failure  Reviewed at length with patient      CARDIOLOGY EP CONSULT NOTE  [facility] Electrophysiology Consult              Keny Marshall Patient Status:  Inpatient    1960 MRN [id number]   [facility] 8NE-A Attending Alfredo Garcia MD   Hosp Day # 9 PCP MD Keny Monroe FARIDA Marshall is a 63 year old year old male     He has a bio AVR septal myectomy MAZE and SHALONDA occlusion in , atrial flutter s/p cardioversion last 2024, HFpEF, severe COPD with chronic hypoxic and hypercapnic respiratory failure on home O2, seizure disorder, diabetes, renal cell CA s/p nephrectomy. obstructive sleep apnea      Admitted  with tachypalpitations and shortness of breath and found to be in atrial flutter again, as well as COPD exac treated with steroids, and HF exacerbation treated with diuretics, as well as BiPAP or AVAPS  Also has left lung mass which needs outpt PET/CT     Amiodarone gtt started  but now off again     HR in the 100-130s     He says he feels fine but seems to minimize  Family at bedside     Denies chest pain, shortness of breath, palpitations, lightheadedness, syncope, orthopnea, paroxysmal nocturnal dyspnea, or edema.     ROS: All other systems were reviewed and were negative.      Impression:  Persistent atrial flutter   H/o atrial fibrillation   End stage COPD  HFpEF  S/p bio AVR, septal myectomy, MAZE and SHALONDA occlusion   Lung mass     Plan:  Anticoagulation. Has not missed doses  Discussed with Dr Salazar  Discussed options amiodarone and cardioversion. (after several days of amiodarone). I am not  worried about long term pulmonary toxicity at the moment given his extensive list of comorbidities OR ablation  He wants to proceed with an ablation. If he is optimized, could consider this for Friday. Need to check availability. Would do without JOSE (provided hasn't missed AC doses and RN is making sure of this) and with minimal sedation (precedex?)  Discussed that he could have other arrhythmias in future even with ablation

## 2024-07-10 LAB
GLUCOSE BLD-MCNC: 133 MG/DL (ref 70–99)
GLUCOSE BLD-MCNC: 171 MG/DL (ref 70–99)
GLUCOSE BLD-MCNC: 198 MG/DL (ref 70–99)
GLUCOSE BLD-MCNC: 317 MG/DL (ref 70–99)
GLUCOSE BLD-MCNC: 92 MG/DL (ref 70–99)

## 2024-07-10 PROCEDURE — 99232 SBSQ HOSP IP/OBS MODERATE 35: CPT | Performed by: INTERNAL MEDICINE

## 2024-07-10 PROCEDURE — 99233 SBSQ HOSP IP/OBS HIGH 50: CPT | Performed by: INTERNAL MEDICINE

## 2024-07-10 NOTE — CM/SW NOTE
Spoke with Dr Crum regarding AVAPS. Pt informed Dr Crum that pt is paying $250 a month for his AVAPS and it is not financially feasible for pt. ROSALINDA spoke with Ramana at [facility] to confirm this - Ramana stated that with Medicare patient's, if pt's do not have a copay, then that is typically the copay. Pt could possibly receive a waiver for the copay. The form may be based on financial means.  Ramana will send the paperwork. Pt interested in trialing a BIPAP. Dr Crum stated that pt would have to trial the BIPAP tonight for settings and to order the BIPAP for discharge.     Await form.     SW did send message to Dr Crum and requested to plan pt to wear BIPAP tonight to be proactive in case pt does have to discharge with BIPAP. Will also have to verify if pt has a copay with the BIPAP.     Sherrie Dean, MSW, LSW  Discharge Planner

## 2024-07-10 NOTE — PHYSICAL THERAPY NOTE
Attempted to see Pt this PM - RN aware of attempt.  Pt reports no mobility concerns - up at anuel in room.   Will continue to follow.

## 2024-07-10 NOTE — PLAN OF CARE
A&Ox4. O2 sat WNL on 4.5L. AVAPs when sleeping. Aflutter on tele. HR in the 60/70. Continent of bladder and bowel. Declining bed alarm. Up with walker.       Problem: RESPIRATORY - ADULT  Goal: Achieves optimal ventilation and oxygenation  Description: INTERVENTIONS:  - Assess for changes in respiratory status  - Assess for changes in mentation and behavior  - Position to facilitate oxygenation and minimize respiratory effort  - Oxygen supplementation based on oxygen saturation or ABGs  - Provide Smoking Cessation handout, if applicable  - Encourage broncho-pulmonary hygiene including cough, deep breathe, Incentive Spirometry  - Assess the need for suctioning and perform as needed  - Assess and instruct to report SOB or any respiratory difficulty  - Respiratory Therapy support as indicated  - Manage/alleviate anxiety  - Monitor for signs/symptoms of CO2 retention  Outcome: Progressing     Problem: CARDIOVASCULAR - ADULT  Goal: Maintains optimal cardiac output and hemodynamic stability  Description: INTERVENTIONS:  - Monitor vital signs, rhythm, and trends  - Monitor for bleeding, hypotension and signs of decreased cardiac output  - Evaluate effectiveness of vasoactive medications to optimize hemodynamic stability  - Monitor arterial and/or venous puncture sites for bleeding and/or hematoma  - Assess quality of pulses, skin color and temperature  - Assess for signs of decreased coronary artery perfusion - ex. Angina  - Evaluate fluid balance, assess for edema, trend weights  Outcome: Progressing  Goal: Absence of cardiac arrhythmias or at baseline  Description: INTERVENTIONS:  - Continuous cardiac monitoring, monitor vital signs, obtain 12 lead EKG if indicated  - Evaluate effectiveness of antiarrhythmic and heart rate control medications as ordered  - Initiate emergency measures for life threatening arrhythmias  - Monitor electrolytes and administer replacement therapy as ordered  Outcome: Progressing

## 2024-07-10 NOTE — PLAN OF CARE
Problem: CARDIOVASCULAR - ADULT  Goal: Maintains optimal cardiac output and hemodynamic stability  Description: INTERVENTIONS:  - Monitor vital signs, rhythm, and trends  - Monitor for bleeding, hypotension and signs of decreased cardiac output  - Evaluate effectiveness of vasoactive medications to optimize hemodynamic stability  - Monitor arterial and/or venous puncture sites for bleeding and/or hematoma  - Assess quality of pulses, skin color and temperature  - Assess for signs of decreased coronary artery perfusion - ex. Angina  - Evaluate fluid balance, assess for edema, trend weights  Outcome: Progressing  Goal: Absence of cardiac arrhythmias or at baseline  Description: INTERVENTIONS:  - Continuous cardiac monitoring, monitor vital signs, obtain 12 lead EKG if indicated  - Evaluate effectiveness of antiarrhythmic and heart rate control medications as ordered  - Initiate emergency measures for life threatening arrhythmias  - Monitor electrolytes and administer replacement therapy as ordered  Outcome: Progressing  Received sitting in chair. Vss, resps unlabored at rest on RA. Bilateral lung sounds clear. BLE nonpitting edema noted. Pt A.flutter on tele, HR 88.   Will continue to monitor. Labs and meds as ordered. Up to chair tid for meals. Accuchecks qid. Po lasix and prednisone as ordered. Continue digoxin po and cardizem po daily, amiodarone po bid. Plan for ablation this Friday.

## 2024-07-10 NOTE — PROGRESS NOTES
[facility]  Progress Note    Keny Marshall Patient Status:  Inpatient    1960 MRN [id number]   Location [facility] 8NE-A Attending Devorah Allen MD   Hosp Day # 11 PCP Dustin Avina MD     Subjective:  Keny Marshall is a(n) 63 year old male remains afeb   Tolerating AVAPS well overall feels good shortness of breath is improved  Remains in a flutter currently 133    Objective:  /73 (BP Location: Right arm)   Pulse 116   Temp 98.3 °F (36.8 °C) (Oral)   Resp 18   Wt 156 lb 4.9 oz (70.9 kg)   SpO2 92%   BMI 22.43 kg/m² 88% on 4 L after moving      Temp (24hrs), Av °F (36.7 °C), Min:96.9 °F (36.1 °C), Max:98.7 °F (37.1 °C)      Intake/Output:    Intake/Output Summary (Last 24 hours) at 7/10/2024 0819  Last data filed at 7/10/2024 0814  Gross per 24 hour   Intake 1090 ml   Output 2125 ml   Net -1035 ml       Physical Exam:   General: alert, cooperative, oriented.  No respiratory distress.  Tachypneic at rest without change   Head: Normocephalic, without obvious abnormality, atraumatic.   Throat: Lips, mucosa, and tongue normal.  No thrush noted.  Poor dentition   Neck: trachea midline, no adenopathy, no thyromegaly. No JVD.   Lungs: Diminished tones bilaterally no wheezes rales or rhonchi   Chest wall: No tenderness or deformity.   Heart: Flutter more irregular rapid   Abdomen: soft, non-distended, no masses, no guarding, no     Rebound.  No diarrhea   Extremity: Increasing edema bilaterally   Skin: No rashes or lesions.   Neurological: Alert, interactive, no focal deficits    Lab Data Review:  No results for input(s): \"WBC\", \"HGB\", \"PLT\" in the last 72 hours.  Recent Labs     24  0558 24  0539    142   K 4.6 4.4   CL 98 98   CO2 43.0* 42.0*   BUN 26* 22   CREATSERUM 0.66* 0.74     No results for input(s): \"PTP\", \"INR\", \"PTT\" in the last 168 hours.    Cultures: no new data     Radiology:  No results found.  Reviewed       Medications reviewed     Assessment and  Plan:   Patient Active Problem List   Diagnosis    Anemia    HTN (hypertension)    DM2 (diabetes mellitus, type 2) ([facility])    COPD (chronic obstructive pulmonary disease) ([facility])    Clear cell renal cell carcinoma of left kidney ([facility])    Medial meniscus tear, right, initial encounter    Primary osteoarthritis of right knee              GLOBAL EXP 6-25-16 / RIGHT KNEE / TRK     Acute medial meniscus tear of right knee            GLOBAL EXP 6-25-16 / RIGHT KNEE / TRK     Acute lateral meniscus tear of left knee               GLOBAL EXP 6-25-16 / LEFT KNEE / TRK     Bursitis of left shoulder             GLOBAL EXP 6-25-16 / LEFT SHOULDER / TRK     Moderate aortic stenosis    Stage 3 severe COPD by GOLD classification ([facility])    Hyperglycemia    Hypervolemia    Hypervolemia, unspecified hypervolemia type    Dyspnea, unspecified type    Hypoxemia    Atrial fibrillation with RVR ([facility])    Acute heart failure with preserved ejection fraction (HFpEF) ([facility])    Atrial flutter with rapid ventricular response ([facility])    Acute on chronic congestive heart failure (HCC)    Acute on chronic congestive heart failure, unspecified heart failure type ([facility])    Hypoxia    Atrial fibrillation with rapid ventricular response ([facility])    Pleural effusion    Hypokalemia    Acute respiratory failure with hypoxia ([facility])    Normocytic anemia    Chronic heart failure with preserved ejection fraction ([facility])    Leukocytosis (leucocytosis)    Aortic valve regurgitation    HFrEF (heart failure with reduced ejection fraction) ([facility])    Chronic hypoxemic respiratory failure ([facility])    ABIGAIL (obstructive sleep apnea)    COPD exacerbation (HCC)    Acute on chronic respiratory failure with hypoxia (HCC)    Acute on chronic respiratory failure with hypoxia and hypercapnia (HCC)    Chronic hypercapnic respiratory failure ([facility])    Smoking greater than 20 pack years    Hyponatremia    Hyperkalemia    Chronic obstructive pulmonary disease, unspecified COPD type ([facility])     Supplemental oxygen dependent       Assessment:  #1. Acute on chronic hypercapnic and hypoxic respiratory failure  Multifactorial due to afib/flutter with RVR, CHF exacerbation with underlying end stage COPD  7/1 CTA with fluid overload, no PE  Cardiac sensitivity to beta agonist anticholinergics --has been off yet heart rate continues high with plans to resume for his shortness of breath -continue on pred oral taper  Wean o2 for goal saturations of 88-92% given hypercapnia-remains on 4 to 5 L  Continue on AVAPS with naps/sleep and PRN     #2. Acute CHF exacerbation, aflutter with RVR  Rate control and diurese as per cardiology  Previous cardioversion 6/14  Plans in place to resume amiodarone and proceed with ablation  Remains on anticoagulation     #3. COPD  Severe obstruction on previous PFTs 3/2021  off arformoterol given worsening afib/tachycardia  Plan to resume MDIs as heart rate remains  Use atrovent nebs PRN  He can use levalbuterol as outpatient as hospital does not stock it  He cannot tolerate albuterol due to palpitations, tremors and with afib with RVR; off duonebs  Continue oral pred taper  Continue roflumilast  Previously reviewed BLVR - not candidate at this time, reassess as outpatient     #4. Pulmonary nodules   7/1/2024 CT chest with worsening ALYSON lesion, possible scarring, rounded atelectasis vs neoplasm  Will need outpatient follow up including PET/CT     #5. Tobacco abuse  30+ pack years, active pipe smoker  Hold on LDCT given recent CT with worsening ALYSON lesion, will need PET/CT as above      Plan:  Patient states he is unable to afford the co-pay for AVAPS at home plan for trial of BiPAP tonight and follow his respiratory status carefully  Continue current level of ICS/LABA and nebulized Atrovent    CC     Pat Crum MD  7/10/2024  8:19 AM

## 2024-07-10 NOTE — PLAN OF CARE
Patient alert and oriented x 4. Up with standby assist. Patient declined bed/chair alarm. On oxygen per nasal cannula, per patient does use oxygen at home. A flutter on tele. Continent of bowel and bladder. No complaints of pain, shortness of breath, or chest pain/discomfort. POC discussed with patient. Fall precautions in place. Call light within reach.     Problem: RESPIRATORY - ADULT  Goal: Achieves optimal ventilation and oxygenation  Description: INTERVENTIONS:  - Assess for changes in respiratory status  - Assess for changes in mentation and behavior  - Position to facilitate oxygenation and minimize respiratory effort  - Oxygen supplementation based on oxygen saturation or ABGs  - Provide Smoking Cessation handout, if applicable  - Encourage broncho-pulmonary hygiene including cough, deep breathe, Incentive Spirometry  - Assess the need for suctioning and perform as needed  - Assess and instruct to report SOB or any respiratory difficulty  - Respiratory Therapy support as indicated  - Manage/alleviate anxiety  - Monitor for signs/symptoms of CO2 retention  Outcome: Progressing     Problem: CARDIOVASCULAR - ADULT  Goal: Maintains optimal cardiac output and hemodynamic stability  Description: INTERVENTIONS:  - Monitor vital signs, rhythm, and trends  - Monitor for bleeding, hypotension and signs of decreased cardiac output  - Evaluate effectiveness of vasoactive medications to optimize hemodynamic stability  - Monitor arterial and/or venous puncture sites for bleeding and/or hematoma  - Assess quality of pulses, skin color and temperature  - Assess for signs of decreased coronary artery perfusion - ex. Angina  - Evaluate fluid balance, assess for edema, trend weights  Outcome: Progressing  Goal: Absence of cardiac arrhythmias or at baseline  Description: INTERVENTIONS:  - Continuous cardiac monitoring, monitor vital signs, obtain 12 lead EKG if indicated  - Evaluate effectiveness of antiarrhythmic and heart  rate control medications as ordered  - Initiate emergency measures for life threatening arrhythmias  - Monitor electrolytes and administer replacement therapy as ordered  Outcome: Progressing

## 2024-07-10 NOTE — CM/SW NOTE
CM spoke to patient for follow up regarding home AVAPS machine; patient stated he was provided AVAPS machine supplied by \"[facility]\" after recent discharge from [facility].  Patient voiced concerns regarding cost of AVAPS machine rental ($250/month) long term.  CM informed patient [employer] (DME) preferred vendor is [facility] [employer] [facility].  CM offered to provide patient with [facility] ([facility]) Financial Assistance Application to assist with cost of AVAPS.  Patient will consider, but stated he cannot provide any financial statements at this time.  Patient inquired about home bipap machine cost; CM to follow up with Pulmonology for ABG and Overnight Oximetry Study requested by E to complete Bipap referral.     Update:  1500: CM spoke to Pulmonologist for update; CM informed ABG is ordered for tomorrow morning and patient has diagnosis of ABIGAIL with OP Sleep Study completed in 2013.  CM sent referral for home Bipap machine in aidin system, will need to attach ABG results and Overnight Oximetry study if Nursing is able to complete.      Lou Santana RN Case Manager [phone]

## 2024-07-10 NOTE — PROGRESS NOTES
[facility]   part of [facility]     Hospitalist Progress Note     Keny Marshall Patient Status:  Inpatient    1960 MRN [id number]   Location [facility] 6NE-A Attending Dr. Allen   Hosp Day # 11 PCP Dustin Avina MD     Chief Complaint: SOB    Subjective:  pt reports no new complaints.  He is concerned about his spouse.  HR remains 130s today while sitting on EOB however controlled overnight per tele.     Objective:    Review of Systems:   A comprehensive review of systems was completed; pertinent positive and negatives stated in subjective.    Vital signs:  Temp:  [96.9 °F (36.1 °C)-98.7 °F (37.1 °C)] 98.3 °F (36.8 °C)  Pulse:  [] 116  Resp:  [18-24] 18  BP: (103-130)/(53-85) 103/73  SpO2:  [92 %-100 %] 94 %  FiO2 (%):  [40 %] 40 %    Physical Exam:    General: No acute distress 63 year old male   Respiratory: Diminished bilaterally. No wheezing on o2 NC 4-5L  Cardiovascular: Irregular rhythm, tachy. 130s  Abdomen: Soft, Non-tender, non-distended  Neuro: No new focal deficits.   Extremities: minimal pedal edema  Skin: facial dermatitis improved    Diagnostic Data:    Labs:  Recent Labs   Lab 24  0610   WBC 8.6   HGB 11.6*   MCV 99.0   .0       Recent Labs   Lab 24  0620 24  0558 24  0539   * 80 116*   BUN 39* 26* 22   CREATSERUM 0.76 0.66* 0.74   CA 8.7 8.4* 8.3*    140 142   K 4.5 4.6 4.4   CL 93* 98 98   CO2 45.0* 43.0* 42.0*       Estimated Creatinine Clearance: 101.1 mL/min (based on SCr of 0.74 mg/dL).    No results for input(s): \"TROP\", \"TROPHS\", \"CK\" in the last 168 hours.      No results for input(s): \"PTP\", \"INR\" in the last 168 hours.           Microbiology    No results found for this visit on 24.      Imaging: Reviewed in Epic.    Medications:    dilTIAZem ER  360 mg Oral Daily    ipratropium  500 mcg Nebulization Q6H WA    amiodarone  200 mg Oral BID with meals    furosemide  20 mg Oral Daily    predniSONE  30 mg Oral  Daily with breakfast    digoxin  125 mcg Oral Daily    insulin aspart  1-20 Units Subcutaneous TID CC and HS    aspirin  81 mg Oral Daily    atorvastatin  20 mg Oral Nightly    guaiFENesin ER  1,200 mg Oral BID    levETIRAcetam  500 mg Oral BID    Roflumilast  500 mcg Oral Daily    rivaroxaban  20 mg Oral Daily with food    insulin aspart  1-10 Units Subcutaneous TID AC and HS    fluticasone furoate-vilanterol  1 puff Inhalation Daily       Assessment & Plan:      #Acute on chronic hypoxic/hypercapnic respiratory failure 2/2 CHF/COPD, improving    #Acute on chronic diastolic heart failure improving  -IV diuresis to po per cards  -CXR small effusions    #End stage COPD w/ acute exacerbation improving  -bipap prn/sleep, O2 NC  -steroids po, nebs, mucinex per pulm  -baseline 2L per records, more recently needing 4-5L    #Left Lung mass, Smoker  -pulmonary recommending OP PET-CT    #Rapid aflutter  -amiodarone, cardizem CD and digoxin po   -Pending ALO ablation per EP   -Continue DOAC  -Tele  -Recently underwent DCCV 6/14    #Aortic stenosis s/p AVR   #Hyponatremia, resolved  #Prior VTE on xarelto  #Seizure disorder-Keppra  #Type 2 diabetes, 5.6%  #Steroid hyperglycemia-correctional/nutritional scale, hyperglycemia protocol  #History of RCC s/p nephrectomy  #ABIGAIL-CPAP protocol  #Seborrheic dermatitis, improved-topicals/shampoos.      Case d/w pt, RN. Appreciate consultants.   HHC/HHPT per SW.  Await ablation.      LAM Teague  9:17 AM     Supplementary Documentation:     Quality:  DVT Mechanical Prophylaxis:     Early ambuation  DVT Pharmacologic Prophylaxis   Medication    rivaroxaban (Xarelto) tab 20 mg         DVT Pharmacologic prophylaxis: Aspirin 81 mg      Code Status: Full Code  Peters: No urinary catheter in place    The 21st Century Cures Act makes medical notes like these available to patients in the interest of transparency. Please be advised this is a medical document. Medical documents are intended to  carry relevant information, facts as evident, and the clinical opinion of the practitioner. The medical note is intended as peer to peer communication and may appear blunt or direct. It is written in medical language and may contain abbreviations or verbiage that are unfamiliar.     Addendum:    Agree with above note except as documented below.     Hrs remain uncontrolled. Denies chest pain.      Gen: NAD  CVS: s1s2. Irregular. Tachycardic.   Resp: CTA  Abd: soft    #Acute on chronic hypoxic/hypercapnic respiratory failure 2/2 CHF/COPD  #Acute on chronic HFpEF  #End stage COPD with acute exacerbation  #Alutter with RVR   #Left lung nodules     Plan for ALO ablation on Friday.   Continue PO steroids, nebs     Pt seen and examined independently. Chart reviewed. Labs and imaging over the last 24 hours have been personally reviewed.  I personally made/approved 100% of the management plan for this patient and take full responsibility for the patient management.   Note has been reviewed by me and modified as needed.  Exam and Impression/ Recs as noted above.  D/w staff.    Devorah Allen MD

## 2024-07-10 NOTE — PROGRESS NOTES
[facility] Cardiology  Progress Note    Keny Marshall Patient Status:  Inpatient    1960 MRN [id number]   [facility] 8NE-A Attending Alfredo Garcia MD   Hosp Day # 11 PCP Dustin Avina MD       Impression;  Aflutter - s/p failed recent CV , in setting of adv COPD/exacerbation.  remains in RVR.  restarted amiodarone, plan for flutter ablation 24 ([place]).  CCB and digoxin.  SOB - 2/2 acute dCHF and COPD exac  Acute diastolic CHF - improved  S/p bio AVR  TTE (24): LVEF 55%, +DD, 23mm SAVR 3.3m/s, mean 22mmHg.     Plan:  AF/flutter: variable conduction, back on amiodarone as well as digoxin, diltiazem.  Appreciate EP input.  Plan for AFl ablation Friday. Pt is on board with plan. no change in meds.  AC: rivaroxaban  diuresis: lasix 20mg po daily.   severe COPD/end-stage emphysema- prednisone inhaler therapy.       Subjective:  no issues overnight. no significant dyspnea complaints this AM.  tele- A flutter 90s intermittently into 120-130s.   Had caffeine this AM.    Objective:  /73 (BP Location: Right arm)   Pulse 116   Temp 98.3 °F (36.8 °C) (Oral)   Resp 18   Wt 156 lb 4.9 oz (70.9 kg)   SpO2 94%   BMI 22.43 kg/m²   Temp (24hrs), Av.9 °F (36.6 °C), Min:96.9 °F (36.1 °C), Max:98.7 °F (37.1 °C)      Intake/Output Summary (Last 24 hours) at 7/10/2024 1107  Last data filed at 7/10/2024 1016  Gross per 24 hour   Intake 850 ml   Output 2125 ml   Net -1275 ml     Wt Readings from Last 3 Encounters:   07/10/24 156 lb 4.9 oz (70.9 kg)   24 159 lb 6.3 oz (72.3 kg)   23 172 lb (78 kg)       General: Awake and alert; in no acute distress  Cardiac: regular tachycardia; no murmurs/rubs/gallops are appreciated  Lungs: Coarse on auscultation bilaterally; no accessory muscle use  Abdomen: Soft, non-tender; bowel sounds are normoactive  Extremities: No clubbing/cyanosis; moves all 4 extremities normally    Current Facility-Administered Medications    Medication Dose Route Frequency    lidocaine-menthol 4-1 % patch 1 patch  1 patch Transdermal Daily PRN    dilTIAZem ER (CardIZEM CD) 24 hr cap 360 mg  360 mg Oral Daily    ipratropium (Atrovent) 0.02 % nebulizer solution 500 mcg  500 mcg Nebulization Q6H WA    amiodarone (Pacerone) tab 200 mg  200 mg Oral BID with meals    furosemide (Lasix) tab 20 mg  20 mg Oral Daily    predniSONE (Deltasone) tab 30 mg  30 mg Oral Daily with breakfast    metoprolol (Lopressor) 5 mg/5mL injection 5 mg  5 mg Intravenous Q6H PRN    digoxin (Lanoxin) tab 125 mcg  125 mcg Oral Daily    selenium sulfide (Selsun) 2.5 % shampoo   Topical Daily PRN    insulin aspart (NovoLOG) 100 Units/mL FlexPen 1-20 Units  1-20 Units Subcutaneous TID CC and HS    aspirin DR tab 81 mg  81 mg Oral Daily    atorvastatin (Lipitor) tab 20 mg  20 mg Oral Nightly    guaiFENesin ER (Mucinex) 12 hr tab 1,200 mg  1,200 mg Oral BID    levETIRAcetam (Keppra) tab 500 mg  500 mg Oral BID    Roflumilast TABS 500 mcg  500 mcg Oral Daily    rivaroxaban (Xarelto) tab 20 mg  20 mg Oral Daily with food    glucose (Dex4) 15 GM/59ML oral liquid 15 g  15 g Oral Q15 Min PRN    Or    glucose (Glutose) 40% oral gel 15 g  15 g Oral Q15 Min PRN    Or    glucose-vitamin C (Dex-4) chewable tab 4 tablet  4 tablet Oral Q15 Min PRN    Or    dextrose 50% injection 50 mL  50 mL Intravenous Q15 Min PRN    Or    glucose (Dex4) 15 GM/59ML oral liquid 30 g  30 g Oral Q15 Min PRN    Or    glucose (Glutose) 40% oral gel 30 g  30 g Oral Q15 Min PRN    Or    glucose-vitamin C (Dex-4) chewable tab 8 tablet  8 tablet Oral Q15 Min PRN    insulin aspart (NovoLOG) 100 Units/mL FlexPen 1-10 Units  1-10 Units Subcutaneous TID AC and HS    melatonin tab 3 mg  3 mg Oral Nightly PRN    acetaminophen (Tylenol Extra Strength) tab 500 mg  500 mg Oral Q4H PRN    polyethylene glycol (PEG 3350) (Miralax) 17 g oral packet 17 g  17 g Oral Daily PRN    sennosides (Senokot) tab 17.2 mg  17.2 mg Oral Nightly PRN     bisacodyl (Dulcolax) 10 MG rectal suppository 10 mg  10 mg Rectal Daily PRN    fleet enema (Fleet) 7-19 GM/118ML rectal enema 133 mL  1 enema Rectal Once PRN    prochlorperazine (Compazine) 10 MG/2ML injection 5 mg  5 mg Intravenous Q8H PRN    fluticasone furoate-vilanterol (Breo Ellipta) 200-25 MCG/ACT inhaler 1 puff  1 puff Inhalation Daily       Laboratory Data:       Lab Results   Component Value Date    INR 1.59 (H) 06/29/2024    INR 1.06 06/11/2024    INR 2.26 (H) 09/22/2021              Telemetry: AFlutter with RVR      Thank you for allowing our practice to participate in the care of your patient. Please do not hesitate to contact me if you have any questions.

## 2024-07-11 ENCOUNTER — ANESTHESIA EVENT (OUTPATIENT)
Dept: INTERVENTIONAL RADIOLOGY/VASCULAR | Facility: HOSPITAL | Age: 64
End: 2024-07-11
Payer: MEDICARE

## 2024-07-11 LAB
ARTERIAL PATENCY WRIST A: POSITIVE
BASE EXCESS BLDA CALC-SCNC: 14.3 MMOL/L (ref ?–2)
BODY TEMPERATURE: 98.6 F
CA-I BLD-SCNC: 1.13 MMOL/L (ref 0.95–1.32)
COHGB MFR BLD: 3.5 % SAT (ref 0–3)
GLUCOSE BLD-MCNC: 100 MG/DL (ref 70–99)
GLUCOSE BLD-MCNC: 151 MG/DL (ref 70–99)
GLUCOSE BLD-MCNC: 156 MG/DL (ref 70–99)
GLUCOSE BLD-MCNC: 190 MG/DL (ref 70–99)
HCO3 BLDA-SCNC: 36 MEQ/L (ref 21–27)
HGB BLD-MCNC: 10.2 G/DL
L/M: 5 L/MIN
LACTATE BLD-SCNC: 0.6 MMOL/L (ref 0.5–2)
METHGB MFR BLD: 0.9 % SAT (ref 0.4–1.5)
OXYHGB MFR BLDA: 93.4 % (ref 92–100)
PCO2 BLDA: 67 MM HG (ref 35–45)
PH BLDA: 7.4 [PH] (ref 7.35–7.45)
PO2 BLDA: 67 MM HG (ref 80–100)
POTASSIUM BLD-SCNC: 4.6 MMOL/L (ref 3.6–5.1)
SODIUM BLD-SCNC: 137 MMOL/L (ref 135–145)

## 2024-07-11 PROCEDURE — 99232 SBSQ HOSP IP/OBS MODERATE 35: CPT | Performed by: INTERNAL MEDICINE

## 2024-07-11 RX ORDER — CHLORHEXIDINE GLUCONATE 40 MG/ML
SOLUTION TOPICAL
Status: COMPLETED | OUTPATIENT
Start: 2024-07-11 | End: 2024-07-11

## 2024-07-11 RX ORDER — LEVETIRACETAM 500 MG/1
500 TABLET ORAL 2 TIMES DAILY
Status: SHIPPED | COMMUNITY
Start: 2024-07-11 | End: 2024-07-20

## 2024-07-11 NOTE — PROGRESS NOTES
[facility] Cardiology  Progress Note    Keny Marshall Patient Status:  Inpatient    1960 MRN [id number]   [facility] 8NE-A Attending Alfredo Garcia MD   Hosp Day # 12 PCP Dustin Avina MD       Impression;  Aflutter-  adv COPD/exacerbation. s/p failed recent CV , remains in intermittent RVR.  restarted amiodarone, on CCB and digoxin. plan for flutter ablation 24 ([facility]).  .  SOB - 2/2 acute dCHF and COPD exac  Acute diastolic CHF - improved  S/p bio AVR  TTE (24): LVEF 55%, +DD, 23mm SAVR 3.3m/s, mean 22mmHg.  Plan:  AF/flutter: variable conduction, back on amiodarone as well as digoxin, diltiazem.  Appreciate EP input.  Plan for AFl ablation tomorrow, npo p mn. Pt is on board with plan. no change in meds.  AC: rivaroxaban, continue.  diuresis: lasix 20mg po daily.   severe COPD/end-stage emphysema- prednisone inhaler therapy.       Subjective:  no issues overnight. rates 130s with ambulation. Denies sig SOB.    Objective:  /86 (BP Location: Right arm)   Pulse 90   Temp 97.7 °F (36.5 °C) (Oral)   Resp 22   Wt 162 lb 7.7 oz (73.7 kg)   SpO2 95%   BMI 23.31 kg/m²   Temp (24hrs), Av.9 °F (36.6 °C), Min:97.4 °F (36.3 °C), Max:98.5 °F (36.9 °C)      Intake/Output Summary (Last 24 hours) at 2024 1023  Last data filed at 2024 0831  Gross per 24 hour   Intake 480 ml   Output 2780 ml   Net -2300 ml     Wt Readings from Last 3 Encounters:   24 162 lb 7.7 oz (73.7 kg)   24 159 lb 6.3 oz (72.3 kg)   23 172 lb (78 kg)       General: Awake and alert; in no acute distress  Cardiac: regular tachycardia; no murmurs/rubs/gallops are appreciated  Lungs: Coarse on auscultation bilaterally; no accessory muscle use  Abdomen: Soft, non-tender; bowel sounds are normoactive  Extremities: No clubbing/cyanosis; moves all 4 extremities normally    Current Facility-Administered Medications   Medication Dose Route Frequency    chlorhexidine (Hibiclens)  4 % external liquid   Topical On Call    lidocaine-menthol 4-1 % patch 1 patch  1 patch Transdermal Daily PRN    dilTIAZem ER (CardIZEM CD) 24 hr cap 360 mg  360 mg Oral Daily    ipratropium (Atrovent) 0.02 % nebulizer solution 500 mcg  500 mcg Nebulization Q6H WA    amiodarone (Pacerone) tab 200 mg  200 mg Oral BID with meals    furosemide (Lasix) tab 20 mg  20 mg Oral Daily    predniSONE (Deltasone) tab 30 mg  30 mg Oral Daily with breakfast    metoprolol (Lopressor) 5 mg/5mL injection 5 mg  5 mg Intravenous Q6H PRN    digoxin (Lanoxin) tab 125 mcg  125 mcg Oral Daily    selenium sulfide (Selsun) 2.5 % shampoo   Topical Daily PRN    insulin aspart (NovoLOG) 100 Units/mL FlexPen 1-20 Units  1-20 Units Subcutaneous TID CC and HS    aspirin DR tab 81 mg  81 mg Oral Daily    atorvastatin (Lipitor) tab 20 mg  20 mg Oral Nightly    guaiFENesin ER (Mucinex) 12 hr tab 1,200 mg  1,200 mg Oral BID    levETIRAcetam (Keppra) tab 500 mg  500 mg Oral BID    Roflumilast TABS 500 mcg  500 mcg Oral Daily    rivaroxaban (Xarelto) tab 20 mg  20 mg Oral Daily with food    glucose (Dex4) 15 GM/59ML oral liquid 15 g  15 g Oral Q15 Min PRN    Or    glucose (Glutose) 40% oral gel 15 g  15 g Oral Q15 Min PRN    Or    glucose-vitamin C (Dex-4) chewable tab 4 tablet  4 tablet Oral Q15 Min PRN    Or    dextrose 50% injection 50 mL  50 mL Intravenous Q15 Min PRN    Or    glucose (Dex4) 15 GM/59ML oral liquid 30 g  30 g Oral Q15 Min PRN    Or    glucose (Glutose) 40% oral gel 30 g  30 g Oral Q15 Min PRN    Or    glucose-vitamin C (Dex-4) chewable tab 8 tablet  8 tablet Oral Q15 Min PRN    insulin aspart (NovoLOG) 100 Units/mL FlexPen 1-10 Units  1-10 Units Subcutaneous TID AC and HS    melatonin tab 3 mg  3 mg Oral Nightly PRN    acetaminophen (Tylenol Extra Strength) tab 500 mg  500 mg Oral Q4H PRN    polyethylene glycol (PEG 3350) (Miralax) 17 g oral packet 17 g  17 g Oral Daily PRN    sennosides (Senokot) tab 17.2 mg  17.2 mg Oral Nightly  PRN    bisacodyl (Dulcolax) 10 MG rectal suppository 10 mg  10 mg Rectal Daily PRN    fleet enema (Fleet) 7-19 GM/118ML rectal enema 133 mL  1 enema Rectal Once PRN    prochlorperazine (Compazine) 10 MG/2ML injection 5 mg  5 mg Intravenous Q8H PRN    fluticasone furoate-vilanterol (Breo Ellipta) 200-25 MCG/ACT inhaler 1 puff  1 puff Inhalation Daily       Laboratory Data:       Lab Results   Component Value Date    INR 1.59 (H) 06/29/2024    INR 1.06 06/11/2024    INR 2.26 (H) 09/22/2021              Telemetry: AFlutter with RVR      Thank you for allowing our practice to participate in the care of your patient. Please do not hesitate to contact me if you have any questions.

## 2024-07-11 NOTE — ANESTHESIA PREPROCEDURE EVALUATION
PRE-OP EVALUATION    Patient Name: Keny Marshall    Admit Diagnosis: Hyperkalemia [E87.5]  Hypoxia [R09.02]  Supplemental oxygen dependent [Z99.81]  Atrial flutter with rapid ventricular response (HCC) [I48.92]  Chronic obstructive pulmonary disease, unspecified COPD type (HCC) [J44.9]    Pre-op Diagnosis: * No surgery found *    atrial flutter, planned cryoablation        Anesthesia Procedure: CATH EP  cryoablation    * Surgery not found *    Pre-op vitals reviewed.  Temp: 97.8 °F (36.6 °C)  Pulse: 88  Resp: 20  BP: 136/72  SpO2: 94 %  Body mass index is 23.31 kg/m².    Current medications reviewed.  Hospital Medications:   chlorhexidine (Hibiclens) 4 % external liquid   Topical On Call    lidocaine-menthol 4-1 % patch 1 patch  1 patch Transdermal Daily PRN    [COMPLETED] digoxin (Lanoxin) 250 MCG/ML injection 250 mcg  250 mcg Intravenous Once    dilTIAZem ER (CardIZEM CD) 24 hr cap 360 mg  360 mg Oral Daily    ipratropium (Atrovent) 0.02 % nebulizer solution 500 mcg  500 mcg Nebulization Q6H WA    amiodarone (Pacerone) tab 200 mg  200 mg Oral BID with meals    furosemide (Lasix) tab 20 mg  20 mg Oral Daily    [COMPLETED] dilTIAZem 10 mg BOLUS FROM BAG infusion  10 mg Intravenous Once    predniSONE (Deltasone) tab 30 mg  30 mg Oral Daily with breakfast    metoprolol (Lopressor) 5 mg/5mL injection 5 mg  5 mg Intravenous Q6H PRN    digoxin (Lanoxin) tab 125 mcg  125 mcg Oral Daily    [COMPLETED] dilTIAZem (cardIZEM) 25 mg/5mL injection 5 mg  5 mg Intravenous Once    [COMPLETED] dilTIAZem (cardIZEM) 25 mg/5mL injection 5 mg  5 mg Intravenous Once    [COMPLETED] dilTIAZem 10 mg BOLUS FROM BAG infusion  10 mg Intravenous Once    [COMPLETED] metoprolol (Lopressor) 5 mg/5mL injection 5 mg  5 mg Intravenous Once    selenium sulfide (Selsun) 2.5 % shampoo   Topical Daily PRN    insulin aspart (NovoLOG) 100 Units/mL FlexPen 1-20 Units  1-20 Units Subcutaneous TID CC and HS    [COMPLETED] iopamidol 76% (ISOVUE-370) injection  for power injector  75 mL Intravenous ONCE PRN    [COMPLETED] potassium chloride (Klor-Con M20) tab 40 mEq  40 mEq Oral Q4H    [COMPLETED] digoxin (Lanoxin) 250 MCG/ML injection 500 mcg  500 mcg Intravenous Once    [] dilTIAZem 10 mg BOLUS FROM BAG infusion  10 mg Intravenous Q1H PRN    aspirin DR tab 81 mg  81 mg Oral Daily    atorvastatin (Lipitor) tab 20 mg  20 mg Oral Nightly    guaiFENesin ER (Mucinex) 12 hr tab 1,200 mg  1,200 mg Oral BID    levETIRAcetam (Keppra) tab 500 mg  500 mg Oral BID    Roflumilast TABS 500 mcg  500 mcg Oral Daily    rivaroxaban (Xarelto) tab 20 mg  20 mg Oral Daily with food    glucose (Dex4) 15 GM/59ML oral liquid 15 g  15 g Oral Q15 Min PRN    Or    glucose (Glutose) 40% oral gel 15 g  15 g Oral Q15 Min PRN    Or    glucose-vitamin C (Dex-4) chewable tab 4 tablet  4 tablet Oral Q15 Min PRN    Or    dextrose 50% injection 50 mL  50 mL Intravenous Q15 Min PRN    Or    glucose (Dex4) 15 GM/59ML oral liquid 30 g  30 g Oral Q15 Min PRN    Or    glucose (Glutose) 40% oral gel 30 g  30 g Oral Q15 Min PRN    Or    glucose-vitamin C (Dex-4) chewable tab 8 tablet  8 tablet Oral Q15 Min PRN    insulin aspart (NovoLOG) 100 Units/mL FlexPen 1-10 Units  1-10 Units Subcutaneous TID AC and HS    melatonin tab 3 mg  3 mg Oral Nightly PRN    acetaminophen (Tylenol Extra Strength) tab 500 mg  500 mg Oral Q4H PRN    polyethylene glycol (PEG 3350) (Miralax) 17 g oral packet 17 g  17 g Oral Daily PRN    sennosides (Senokot) tab 17.2 mg  17.2 mg Oral Nightly PRN    bisacodyl (Dulcolax) 10 MG rectal suppository 10 mg  10 mg Rectal Daily PRN    fleet enema (Fleet) 7-19 GM/118ML rectal enema 133 mL  1 enema Rectal Once PRN    prochlorperazine (Compazine) 10 MG/2ML injection 5 mg  5 mg Intravenous Q8H PRN    fluticasone furoate-vilanterol (Breo Ellipta) 200-25 MCG/ACT inhaler 1 puff  1 puff Inhalation Daily       Outpatient Medications:     Medications Prior to Admission   Medication Sig Dispense  Refill Last Dose    Budesonide-Formoterol Fumarate 160-4.5 MCG/ACT Inhalation Aerosol Inhale 2 puffs into the lungs 2 (two) times daily.       predniSONE 10 MG Oral Tab take 4 tablets daily for 3 days then 3 tablets daily for 3 days then 2 tablets daily for 3 days then 1 tablets daily for 3 days then stop. 30 tablet 0 Past Week    aspirin 81 MG Oral Tab EC Take 1 tablet (81 mg total) by mouth daily.   2024    Roflumilast 500 MCG Oral Tab Take 500 mcg by mouth daily. 90 tablet 0     guaiFENesin  MG Oral Tablet 12 Hr Take 2 tablets (1,200 mg total) by mouth 2 (two) times daily.       [DISCONTINUED] levETIRAcetam 500 MG Oral Tab Take 1 tablet (500 mg total) by mouth 2 (two) times daily.       [] KLOR-CON 10 10 MEQ Oral Tab CR Take 2 tablets (20 mEq total) by mouth daily.       [] torsemide 20 MG Oral Tab Take 1 tablet (20 mg total) by mouth 2 (two) times daily.       dilTIAZem  MG Oral Capsule SR 24 Hr Take 1 capsule (120 mg total) by mouth daily.       Ferrous Sulfate 324 (65 Fe) MG Oral Tab EC Take 65 mg by mouth daily.       tiotropium 18 MCG Inhalation Cap Inhale 1 capsule (18 mcg total) into the lungs daily.       Levalbuterol HCl 0.63 MG/3ML Inhalation Nebu Soln Take 3 mL (0.63 mg total) by nebulization every 6 (six) hours as needed for Shortness of Breath or Wheezing. 3 each 3     JARDIANCE 10 MG Oral Tab        atorvastatin 20 MG Oral Tab Take 1 tablet (20 mg total) by mouth nightly.       XARELTO 20 MG Oral Tab Take 1 tablet by mouth daily with food 30 tablet 3     Ipratropium Bromide 0.02 % Inhalation Solution Take 2.5 mL (500 mcg total) by nebulization every 6 (six) hours as needed for Wheezing.       ketoconazole 2 % External Cream Apply topically as needed.       Multiple Vitamin (TAB-A-CUCA) Oral Tab Take 1 tablet by mouth daily.       B Complex-Biotin-FA (B COMPLETE) Oral Tab Take 1 tablet by mouth daily.       magnesium 250 MG Oral Tab Take 1 tablet (250 mg total) by mouth  every evening.          Allergies: Bees and Other      Anesthesia Evaluation    Patient summary reviewed.    Anesthetic Complications  (-) history of anesthetic complications         GI/Hepatic/Renal      (+) GERD and well controlled                          Cardiovascular    Negative cardiovascular ROS.    Exercise tolerance: good     MET: >4      (+) hypertension and well controlled            (+) valvular problems/murmurs and AS       (+) CHF                Endo/Other      (+) diabetes and well controlled, type 2,                          Pulmonary        (+) COPD and severe  COPD requiring home oxygen.    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 (formerly [facility])     Medial meniscus tear, right, initial encounter     Primary osteoarthritis of right knee              GLOBAL EXP 6-25-16 / RIGHT KNEE / TRK      Acute medial meniscus tear of right knee            GLOBAL EXP 6-25-16 / RIGHT KNEE / TRK      Acute lateral meniscus tear of left knee               GLOBAL EXP 6-25-16 / LEFT KNEE / TRK      Bursitis of left shoulder             GLOBAL EXP 6-25-16 / LEFT SHOULDER / TRK      Moderate aortic stenosis     Stage 3 severe COPD by GOLD classification (formerly [facility])     Hyperglycemia     Hypervolemia     Hypervolemia, unspecified hypervolemia type     Dyspnea, unspecified type     Hypoxemia     Atrial fibrillation with RVR (formerly [facility])     Acute heart failure with preserved ejection fraction (HFpEF) (formerly [facility])     Atrial flutter with rapid ventricular response (formerly [facility])     Acute on chronic congestive heart failure (formerly [facility])     Acute on chronic congestive heart failure, unspecified heart failure type (formerly [facility])     Hypoxia     Atrial fibrillation with  rapid ventricular response (HCC)     Pleural effusion     Hypokalemia     Acute respiratory failure with hypoxia (HCC)     Normocytic anemia     Chronic heart failure with preserved ejection fraction (HCC)     Leukocytosis (leucocytosis)     Aortic valve regurgitation     HFrEF (heart failure with reduced ejection fraction) (HCC)     Chronic hypoxemic respiratory failure (HCC)     ABIGAIL (obstructive sleep apnea)     COPD exacerbation (HCC)     Acute on chronic respiratory failure with hypoxia (HCC)     Acute on chronic respiratory failure with hypoxia and hypercapnia (HCC)     Chronic hypercapnic respiratory failure (HCC)     Smoking greater than 20 pack years     Hyponatremia     Hyperkalemia     Chronic obstructive pulmonary disease, unspecified COPD type (HCC)     Supplemental oxygen dependent        Narrative  Performed by: MUSE  Atrial flutter with variable A-V block  Left bundle branch block  Abnormal ECG  When compared with ECG of 29-JUN-2024 11:06,    Vent. rate has decreased BY  52 BPM  Confirmed by HARDEEP HORTON CHRIS [id number]) on 7/5/2024 2:20:56 PM     Past Surgical History:   Procedure Laterality Date    Adenoidectomy      Angiogram      Appendectomy      Appendectomy      Colonoscopy N/A 03/21/2016    Procedure: COLONOSCOPY;  Surgeon: Atrur Okeefe MD;  Location:  ENDOSCOPY    Colonoscopy      Colonoscopy N/A 1/5/2023    Procedure: .;  Surgeon: Artur Okeefe MD;  Location:  ENDOSCOPY    Endovas repair, infrarenl abdom aortic aneurysm/dissect      Laparo radical nephrectomy Left 05/06/2014    Nephrectomy Left 2014    Other  meatus of urethra    Other Right     foreign body removal-arm    Other surgical history Right 03/25/2016    Procedure: KNEE ARTHROSCOPY;  Surgeon: Madhu Anaya MD;  Location:  MAIN OR    Repair rotator cuff,acute Right     Upper gi endoscopy,exam      Valve repair  2020     Social History     Socioeconomic History    Marital status:    Occupational  History    Occupation:      Comment: [employer]   Tobacco Use    Smoking status: Former     Current packs/day: 0.00     Average packs/day: 1.5 packs/day for 40.0 years (60.0 ttl pk-yrs)     Types: Cigarettes     Start date: 3/8/1983     Quit date: 3/8/2023     Years since quittin.3     Passive exposure: Past    Smokeless tobacco: Never   Vaping Use    Vaping status: Never Used   Substance and Sexual Activity    Alcohol use: Not Currently     Alcohol/week: 14.0 standard drinks of alcohol     Types: 12 Cans of beer, 2 Shots of liquor per week     Comment: 14/week    Drug use: Never    Sexual activity: Yes     Partners: Female     History   Drug Use Unknown     Available pre-op labs reviewed.  Lab Results   Component Value Date    WBC 8.6 2024    RBC 3.82 (L) 2024    HGB 11.6 (L) 2024    HCT 37.8 (L) 2024    MCV 99.0 2024    MCH 30.4 2024    MCHC 30.7 (L) 2024    RDW 13.5 2024    .0 2024     Lab Results   Component Value Date     2024    K 4.4 2024    CL 98 2024    CO2 42.0 (HH) 2024    BUN 22 2024    CREATSERUM 0.74 2024     (H) 2024    CA 8.3 (L) 2024     Lab Results   Component Value Date    INR 1.59 (H) 2024         Airway      Mallampati: III  Mouth opening: >3 FB  TM distance: > 6 cm  Neck ROM: full Cardiovascular    Cardiovascular exam normal.  Rhythm: irregular  Rate: normal  (-) murmur   Dental  Comment: Dentition is in grossly substandard condition; inspection demonstrates physical characteristics that increase vulnerability to dental damage during necessary airway management.           Pulmonary    Pulmonary exam normal.  Breath sounds clear to auscultation bilaterally.      (+) decreased breath sounds         Other findings  Slightly labored ventilatory effort.            ASA: 4   Plan: general and MAC  NPO status verified and         Comment: I explained intrinsic  risks of general anesthesia, including nausea, dental damage, sore throat, mouth injury,and hoarseness from airway management.  All questions were answered and understanding was demonstrated of risks.  Informed permission was obtained to proceed as documented in the signed consent form.  Pt w CO2 67 on AVAPS. Tolerating AVAPS. On arrival sats 88-89 pn 5L NC. Pt based on bipap. Have discussed case w cardiologist; cardiologist amenable to trying minimal sedation w precedex to avoid GA. If pt unable to tolerate minimal sedation we will have to proceed under GA. Need for postop ventilation addressed. Pt agrees w plan.     Patient overall has increased anesthesia risks secondary to current medical conditions that qualify for ASA status Iv.  As documented in the informed consent, risks have been accepted.    Blood gas results noted.  Chronic CO2 retension, severe but compensated.  Verbalized risk may need intubation and even post op ventilation.   Accepts these specific risks.    Plan/risks discussed with: patient  Use of blood product(s) discussed with: patient    Consented to blood products.          Present on Admission:   Acute on chronic respiratory failure with hypoxia and hypercapnia (HCC)

## 2024-07-11 NOTE — RESPIRATORY THERAPY NOTE
ABIGAIL - Equipment Use Daily Summary:                  . Set Mode: BI-FLEX                . Usage in hours: 8.1                . 90% Pressure (EPAP) level: 11                . 90% Insp.Pressure (IPAP): 20                . AHI: 0.2                . Supplemental Oxygen:  4  LPM                . Comments: ps 4

## 2024-07-11 NOTE — PROGRESS NOTES
[facility]   part of [facility]     Hospitalist Progress Note     Keny Marshall Patient Status:  Inpatient    1960 MRN [id number]   [facility] 6NE-A Attending Dr. Allen   Hosp Day # 12 PCP Dustin Avina MD     Chief Complaint: SOB    Subjective:  pt reports no new complaints.      Objective:    Review of Systems:   A comprehensive review of systems was completed; pertinent positive and negatives stated in subjective.    Vital signs:  Temp:  [97.4 °F (36.3 °C)-98.5 °F (36.9 °C)] 97.7 °F (36.5 °C)  Pulse:  [] 102  Resp:  [18-22] 22  BP: (106-140)/(59-86) 116/86  SpO2:  [89 %-97 %] 95 %    Physical Exam:    General: No acute distress 63 year old male oriented x3  Respiratory: Diminished bilaterally. No wheezing on o2 NC 4-5L  Cardiovascular: Irregular rhythm, tachy. 110s  Abdomen: Soft, Non-tender, non-distended  Neuro: No new focal deficits.   Extremities: minimal pedal edema  Skin: facial dermatitis improved    Diagnostic Data:    Labs:  Recent Labs   Lab 24  0610   WBC 8.6   HGB 11.6*   MCV 99.0   .0       Recent Labs   Lab 24  0620 24  0558 24  0539   * 80 116*   BUN 39* 26* 22   CREATSERUM 0.76 0.66* 0.74   CA 8.7 8.4* 8.3*    140 142   K 4.5 4.6 4.4   CL 93* 98 98   CO2 45.0* 43.0* 42.0*       Estimated Creatinine Clearance: 104.1 mL/min (based on SCr of 0.74 mg/dL).    No results for input(s): \"TROP\", \"TROPHS\", \"CK\" in the last 168 hours.      No results for input(s): \"PTP\", \"INR\" in the last 168 hours.           Microbiology    No results found for this visit on 24.      Imaging: Reviewed in Epic.    Medications:    dilTIAZem ER  360 mg Oral Daily    ipratropium  500 mcg Nebulization Q6H WA    amiodarone  200 mg Oral BID with meals    furosemide  20 mg Oral Daily    predniSONE  30 mg Oral Daily with breakfast    digoxin  125 mcg Oral Daily    insulin aspart  1-20 Units Subcutaneous TID CC and HS    aspirin  81 mg Oral  Daily    atorvastatin  20 mg Oral Nightly    guaiFENesin ER  1,200 mg Oral BID    levETIRAcetam  500 mg Oral BID    Roflumilast  500 mcg Oral Daily    rivaroxaban  20 mg Oral Daily with food    insulin aspart  1-10 Units Subcutaneous TID AC and HS    fluticasone furoate-vilanterol  1 puff Inhalation Daily       Assessment & Plan:      #Acute on chronic hypoxic/hypercapnic respiratory failure 2/2 CHF/COPD, improved    #Acute on chronic diastolic heart failure improving  -IV diuresis to po per cards  -CXR small effusions    #End stage COPD w/ acute exacerbation improving  -bipap prn/sleep, O2 NC  -steroids po, atrovent nebs, breo inhaler, mucinex per pulm, no albuterol due to tachycardia  -remains on 4-5L -discharged on this last admission also.    #Left Lung mass, Smoker  -pulmonary recommending OP PET-CT    #Rapid aflutter  -amiodarone, cardizem CD and digoxin po   -Pending ALO ablation per EP   -Continue DOAC  -Tele  -Recently underwent DCCV 6/14    #Aortic stenosis s/p AVR   #Hyponatremia, resolved  #Prior VTE on xarelto  #Seizure disorder-Keppra  #Type 2 diabetes, 5.6%  #Steroid hyperglycemia-correctional/nutritional scale, hyperglycemia protocol  #History of RCC s/p nephrectomy  #ABIGAIL-CPAP protocol  #Seborrheic dermatitis, improved-topicals/shampoos.      Case d/w pt, RN. HHC/HHPT per SW.  Await ablation.  OP PET-CT. Dc planning.     LAM Teague  8:54 AM     Supplementary Documentation:     Quality:  DVT Mechanical Prophylaxis:     Early ambuation  DVT Pharmacologic Prophylaxis   Medication    rivaroxaban (Xarelto) tab 20 mg         DVT Pharmacologic prophylaxis: Aspirin 81 mg      Code Status: Full Code  Peters: No urinary catheter in place    The 21st Century Cures Act makes medical notes like these available to patients in the interest of transparency. Please be advised this is a medical document. Medical documents are intended to carry relevant information, facts as evident, and the clinical opinion of  the practitioner. The medical note is intended as peer to peer communication and may appear blunt or direct. It is written in medical language and may contain abbreviations or verbiage that are unfamiliar.     Addendum:     Agree with above note except as documented below.      HR elevated. No acute complaints.      Gen: NAD  CVS: s1s2. Irregular. Tachycardic.   Resp: CTA  Abd: soft     #Acute on chronic hypoxic/hypercapnic respiratory failure 2/2 CHF/COPD  #Acute on chronic HFpEF  #End stage COPD with acute exacerbation  #Alutter with RVR   #Left lung nodules      Plan for ALO ablation tomorrow   Continue PO steroids, nebs      Pt seen and examined independently. Chart reviewed. Labs and imaging over the last 24 hours have been personally reviewed.  I personally made/approved 100% of the management plan for this patient and take full responsibility for the patient management.   Note has been reviewed by me and modified as needed.  Exam and Impression/ Recs as noted above.  D/w staff.     Devorah Allen MD

## 2024-07-11 NOTE — PROGRESS NOTES
[facility]  Progress Note    Keny Marshall Patient Status:  Inpatient    1960 MRN [id number]   Location [facility] 8NE-A Attending Devorah Allen MD   Hosp Day # 12 PCP Dustin Avina MD     Subjective:  Keny Marshall is a(n) 63 year old male remains afeb   Continues to feel that breathing is good   Rare cough   Stable O2 levels  Remains with elevated HR as his concern   Slpet well with bipap last PM     Objective:  /86 (BP Location: Right arm)   Pulse 102   Temp 97.7 °F (36.5 °C) (Oral)   Resp 22   Wt 162 lb 7.7 oz (73.7 kg)   SpO2 95%   BMI 23.31 kg/m² 4.5 l       Temp (24hrs), Av.9 °F (36.6 °C), Min:97.4 °F (36.3 °C), Max:98.5 °F (36.9 °C)      Intake/Output:    Intake/Output Summary (Last 24 hours) at 2024 0924  Last data filed at 2024 0831  Gross per 24 hour   Intake 480 ml   Output 2980 ml   Net -2500 ml       Physical Exam:   General: alert, cooperative, oriented.  No respiratory distress.   Head: Normocephalic, without obvious abnormality, atraumatic.   Throat: Lips, mucosa, and tongue normal.  No thrush noted.   Neck: trachea midline, no adenopathy, no thyromegaly. No JVD.   Lungs: markedly diminished    Chest wall: No tenderness or deformity.   Heart: flutter ireg- 119 currently    Abdomen: soft, non-distended, no masses, no guarding, no     Rebound.no diaarhea    Extremity: edema +1 stble    Skin: No rashes or lesions.   Neurological: Alert, interactive, no focal deficits    Lab Data Review:  No results for input(s): \"WBC\", \"HGB\", \"PLT\" in the last 72 hours.  Recent Labs     24  0539      K 4.4   CL 98   CO2 42.0*   BUN 22   CREATSERUM 0.74     No results for input(s): \"PTP\", \"INR\", \"PTT\" in the last 168 hours.    Cultures: no new data   Radiology:  No results found.  Reviewed       Medications reviewed     Assessment and Plan:   Patient Active Problem List   Diagnosis    Anemia    HTN (hypertension)    DM2 (diabetes mellitus, type 2) (HCC)     COPD (chronic obstructive pulmonary disease) (HCC)    Clear cell renal cell carcinoma of left kidney (HCC)    Medial meniscus tear, right, initial encounter    Primary osteoarthritis of right knee              GLOBAL EXP 6-25-16 / RIGHT KNEE / TRK     Acute medial meniscus tear of right knee            GLOBAL EXP 6-25-16 / RIGHT KNEE / TRK     Acute lateral meniscus tear of left knee               GLOBAL EXP 6-25-16 / LEFT KNEE / TRK     Bursitis of left shoulder             GLOBAL EXP 6-25-16 / LEFT SHOULDER / TRK     Moderate aortic stenosis    Stage 3 severe COPD by GOLD classification (Formerly [facility])    Hyperglycemia    Hypervolemia    Hypervolemia, unspecified hypervolemia type    Dyspnea, unspecified type    Hypoxemia    Atrial fibrillation with RVR (HCC)    Acute heart failure with preserved ejection fraction (HFpEF) (HCC)    Atrial flutter with rapid ventricular response (HCC)    Acute on chronic congestive heart failure (HCC)    Acute on chronic congestive heart failure, unspecified heart failure type (HCC)    Hypoxia    Atrial fibrillation with rapid ventricular response (HCC)    Pleural effusion    Hypokalemia    Acute respiratory failure with hypoxia ([facility])    Normocytic anemia    Chronic heart failure with preserved ejection fraction (HCC)    Leukocytosis (leucocytosis)    Aortic valve regurgitation    HFrEF (heart failure with reduced ejection fraction) (HCC)    Chronic hypoxemic respiratory failure (HCC)    ABIGAIL (obstructive sleep apnea)    COPD exacerbation (HCC)    Acute on chronic respiratory failure with hypoxia (HCC)    Acute on chronic respiratory failure with hypoxia and hypercapnia (HCC)    Chronic hypercapnic respiratory failure (HCC)    Smoking greater than 20 pack years    Hyponatremia    Hyperkalemia    Chronic obstructive pulmonary disease, unspecified COPD type (HCC)    Supplemental oxygen dependent       Assessment:  #1. Acute on chronic hypercapnic and hypoxic respiratory failure  Multifactorial  due to afib/flutter with RVR, CHF exacerbation with underlying end stage COPD  7/1 CTA with fluid overload, no PE  Cardiac sensitivity to beta agonist anticholinergics --has been off yet heart rate continues high with plans to resume for his shortness of breath -continue on pred oral taper  Wean o2 for goal saturations of 88-92% given hypercapnia-remains on 4 to 5 L  Now on trial of BIPAP related to cost      #2. Acute CHF exacerbation, aflutter with RVR  Rate control and diurese as per cardiology  Previous cardioversion 6/14  Plans in place to resume amiodarone and proceed with ablation  Remains on anticoagulation     #3. COPD  Severe obstruction on previous PFTs 3/2021  off arformoterol given worsening afib/tachycardia  Plan to resume MDIs as heart rate remains  Use atrovent nebs PRN  He can use levalbuterol as outpatient as hospital does not stock it  He cannot tolerate albuterol due to palpitations, tremors and with afib with RVR; off duonebs  Continue oral pred taper  Continue roflumilast  Previously reviewed BLVR - not candidate at this time, reassess as outpatient     #4. Pulmonary nodules   7/1/2024 CT chest with worsening ALYSON lesion, possible scarring, rounded atelectasis vs neoplasm  Will need outpatient follow up including PET/CT     #5. Tobacco abuse  30+ pack years, active pipe smoker  Hold on LDCT given recent CT with worsening ALYSON lesion, will need PET/CT as above       Plan:  Plan remains for ablation in am   Continue BIPAP and check ABG   May benefit from use of BiPAP periprocedure--discussed with Dr. Gayle from anesthesia at length-risk of intubation given severity of COPD    CC     Pat Crum MD  7/11/2024  9:24 AM

## 2024-07-11 NOTE — PLAN OF CARE
Assumed care of patient at 2030. Patient is alert and oriented x4. Patient is on 4.5 L during day, Bipap at night. Aflutter on tele. No pain currently reported. Denied CP, Denied SOB. Breath sounds diminished. Productive cough reported, small, tan, thick sputum.   Up stand by  Discussed plan of care with patient. All of patient's questions answered at this time.  Call light within reach, pt refused Bed alarm    POC:   Plan for Ablation Friday  Bipap over night O2 Study    Problem: RESPIRATORY - ADULT  Goal: Achieves optimal ventilation and oxygenation  Description: INTERVENTIONS:  - Assess for changes in respiratory status  - Assess for changes in mentation and behavior  - Position to facilitate oxygenation and minimize respiratory effort  - Oxygen supplementation based on oxygen saturation or ABGs  - Provide Smoking Cessation handout, if applicable  - Encourage broncho-pulmonary hygiene including cough, deep breathe, Incentive Spirometry  - Assess the need for suctioning and perform as needed  - Assess and instruct to report SOB or any respiratory difficulty  - Respiratory Therapy support as indicated  - Manage/alleviate anxiety  - Monitor for signs/symptoms of CO2 retention  Outcome: Progressing     Problem: CARDIOVASCULAR - ADULT  Goal: Maintains optimal cardiac output and hemodynamic stability  Description: INTERVENTIONS:  - Monitor vital signs, rhythm, and trends  - Monitor for bleeding, hypotension and signs of decreased cardiac output  - Evaluate effectiveness of vasoactive medications to optimize hemodynamic stability  - Monitor arterial and/or venous puncture sites for bleeding and/or hematoma  - Assess quality of pulses, skin color and temperature  - Assess for signs of decreased coronary artery perfusion - ex. Angina  - Evaluate fluid balance, assess for edema, trend weights  Outcome: Progressing  Goal: Absence of cardiac arrhythmias or at baseline  Description: INTERVENTIONS:  - Continuous cardiac  monitoring, monitor vital signs, obtain 12 lead EKG if indicated  - Evaluate effectiveness of antiarrhythmic and heart rate control medications as ordered  - Initiate emergency measures for life threatening arrhythmias  - Monitor electrolytes and administer replacement therapy as ordered  Outcome: Progressing     Problem: SAFETY ADULT - FALL  Goal: Free from fall injury  Description: INTERVENTIONS:  - Assess pt frequently for physical needs  - Identify cognitive and physical deficits and behaviors that affect risk of falls.  - [place] fall precautions as indicated by assessment.  - Educate pt/family on patient safety including physical limitations  - Instruct pt to call for assistance with activity based on assessment  - Modify environment to reduce risk of injury  - Provide assistive devices as appropriate  - Consider OT/PT consult to assist with strengthening/mobility  - Encourage toileting schedule  Outcome: Progressing     Problem: PAIN - ADULT  Goal: Verbalizes/displays adequate comfort level or patient's stated pain goal  Description: INTERVENTIONS:  - Encourage pt to monitor pain and request assistance  - Assess pain using appropriate pain scale  - Administer analgesics based on type and severity of pain and evaluate response  - Implement non-pharmacological measures as appropriate and evaluate response  - Consider cultural and social influences on pain and pain management  - Manage/alleviate anxiety  - Utilize distraction and/or relaxation techniques  - Monitor for opioid side effects  - Notify MD/LIP if interventions unsuccessful or patient reports new pain  - Anticipate increased pain with activity and pre-medicate as appropriate  Outcome: Progressing

## 2024-07-11 NOTE — CM/SW NOTE
E bipap order from is on pt's chart.  Dr Avilez aware of need to complete form.  Overnight oximetry study sent to [facility] this am.    Laney Turner LCSW  /Discharge Planner

## 2024-07-11 NOTE — PHYSICAL THERAPY NOTE
PHYSICAL THERAPY TREATMENT NOTE - INPATIENT    Room Number: 8611/8611-A     Session: 3    Number of Visits to Meet Established Goals: 3    Presenting Problem: A-Fib with Rapid ventricular response, Hypoxia, Acute CHF  Co-Morbidities : AVR, COPD, seizure, DM, renal cell carcinoa, nephrectomy, rotator cuff repair    ASSESSMENT     Patient is currently functioning at baseline with bed mobility, transfers, and gait.    Patient currently does not meet criteria for skilled inpatient physical therapy services, however patient will continue to benefit from QID ambulation with nursing staff.  Pt is discharged from IP PT services.  RN aware to re-order if there is a decline in mobility status.         at discharge to promote functional independence in home.  Anticipate patient will return home with home health PT.    PLAN  PT Treatment Plan: Bed mobility;Body mechanics;Endurance;Energy conservation;Patient education;Family education;Gait training;Range of motion;Strengthening;Stair training;Transfer training;Balance training  Rehab Potential : Good  Frequency (Obs): 3-5x/week    CURRENT GOALS      Goal #1 Patient is able to demonstrate supine - sit EOB @ level: modified independent      Goal #2 Patient is able to demonstrate transfers EOB to/from BSC at assistance level: modified independent      Goal #3 Patient is able to ambulate 120 feet with assist device: walker - rolling at assistance level: modified independent      Goal #4  new 2024  Asc/desc 1 flight of stairs with mod I and good breathing technique and saturation     Goal #5     Goal #6     Goal Comments: Goals established on 2024  All goals achieved at Mod I level     SUBJECTIVE    \"I am doing fine\"    OBJECTIVE  Precautions: None    WEIGHT BEARING RESTRICTION  Weight Bearing Restriction: None                PAIN ASSESSMENT   Ratin  Location: denied       BALANCE                                                                                                                        Static Sitting: Good  Dynamic Sitting: Good           Static Standing: Good  Dynamic Standing: Fair    ACTIVITY TOLERANCE; fair        O2 WALK  Oxygen Therapy  SPO2% on Oxygen at Rest: 94  At rest oxygen flow (liters per minute): 4  Ambulation oxygen flow (liters per minute): 6      AM-PAC '6-Clicks' INPATIENT SHORT FORM - BASIC MOBILITY  How much difficulty does the patient currently have...  Patient Difficulty: Turning over in bed (including adjusting bedclothes, sheets and blankets)?: None   Patient Difficulty: Sitting down on and standing up from a chair with arms (e.g., wheelchair, bedside commode, etc.): None   Patient Difficulty: Moving from lying on back to sitting on the side of the bed?: None   How much help from another person does the patient currently need...   Help from Another: Moving to and from a bed to a chair (including a wheelchair)?: None   Help from Another: Need to walk in hospital room?: None   Help from Another: Climbing 3-5 steps with a railing?: A Little       AM-PAC Score:  Raw Score: 23   Approx Degree of Impairment: 11.2%   Standardized Score (AM-PAC Scale): 56.93   CMS Modifier (G-Code): CI    FUNCTIONAL ABILITY STATUS  Gait Assessment   Functional Mobility/Gait Assessment  Gait Assistance: Supervision  Distance (ft): 200  Assistive Device: Rolling walker (Pt prefers to walk with open gown, and no gait belt)  Pattern: Within Functional Limits  Stairs:  (denied concerns - planned ablation tomorrow.  Pt appreciative)    Skilled Therapy Provided    Bed Mobility:  Rolling: mod I   Supine<>Sit: mod I   Sit<>Supine: mod I     Transfer Mobility:  Sit<>Stand: mod I   Stand<>Sit: mod I   Gait: SBA with RW    Therapist's Comments:   Patient was given HEP - reviewed 7/11/2024     Patient End of Session: In bed;Needs met;Call light within reach;RN aware of session/findings;All patient questions and concerns addressed    PT Session Time: 10 minutes  Gait Training: 10  minutes

## 2024-07-11 NOTE — PLAN OF CARE
Patient AXOX4.On 5L/NC.BiPAP at night,A flutter on tele.No c/o pain.Continent of bowel and bladder.Patient up in chair.Plan for ablation tomorrow,consent signed and in chart.Plan of care updated.Call light within reach.  Problem: RESPIRATORY - ADULT  Goal: Achieves optimal ventilation and oxygenation  Description: INTERVENTIONS:  - Assess for changes in respiratory status  - Assess for changes in mentation and behavior  - Position to facilitate oxygenation and minimize respiratory effort  - Oxygen supplementation based on oxygen saturation or ABGs  - Provide Smoking Cessation handout, if applicable  - Encourage broncho-pulmonary hygiene including cough, deep breathe, Incentive Spirometry  - Assess the need for suctioning and perform as needed  - Assess and instruct to report SOB or any respiratory difficulty  - Respiratory Therapy support as indicated  - Manage/alleviate anxiety  - Monitor for signs/symptoms of CO2 retention  Outcome: Progressing     Problem: CARDIOVASCULAR - ADULT  Goal: Maintains optimal cardiac output and hemodynamic stability  Description: INTERVENTIONS:  - Monitor vital signs, rhythm, and trends  - Monitor for bleeding, hypotension and signs of decreased cardiac output  - Evaluate effectiveness of vasoactive medications to optimize hemodynamic stability  - Monitor arterial and/or venous puncture sites for bleeding and/or hematoma  - Assess quality of pulses, skin color and temperature  - Assess for signs of decreased coronary artery perfusion - ex. Angina  - Evaluate fluid balance, assess for edema, trend weights  Outcome: Progressing  Goal: Absence of cardiac arrhythmias or at baseline  Description: INTERVENTIONS:  - Continuous cardiac monitoring, monitor vital signs, obtain 12 lead EKG if indicated  - Evaluate effectiveness of antiarrhythmic and heart rate control medications as ordered  - Initiate emergency measures for life threatening arrhythmias  - Monitor electrolytes and administer  replacement therapy as ordered  Outcome: Progressing     Problem: SAFETY ADULT - FALL  Goal: Free from fall injury  Description: INTERVENTIONS:  - Assess pt frequently for physical needs  - Identify cognitive and physical deficits and behaviors that affect risk of falls.  - [place] fall precautions as indicated by assessment.  - Educate pt/family on patient safety including physical limitations  - Instruct pt to call for assistance with activity based on assessment  - Modify environment to reduce risk of injury  - Provide assistive devices as appropriate  - Consider OT/PT consult to assist with strengthening/mobility  - Encourage toileting schedule  Outcome: Progressing     Problem: PAIN - ADULT  Goal: Verbalizes/displays adequate comfort level or patient's stated pain goal  Description: INTERVENTIONS:  - Encourage pt to monitor pain and request assistance  - Assess pain using appropriate pain scale  - Administer analgesics based on type and severity of pain and evaluate response  - Implement non-pharmacological measures as appropriate and evaluate response  - Consider cultural and social influences on pain and pain management  - Manage/alleviate anxiety  - Utilize distraction and/or relaxation techniques  - Monitor for opioid side effects  - Notify MD/LIP if interventions unsuccessful or patient reports new pain  - Anticipate increased pain with activity and pre-medicate as appropriate  Outcome: Progressing

## 2024-07-12 ENCOUNTER — ANESTHESIA (OUTPATIENT)
Dept: INTERVENTIONAL RADIOLOGY/VASCULAR | Facility: HOSPITAL | Age: 64
End: 2024-07-12
Payer: MEDICARE

## 2024-07-12 ENCOUNTER — APPOINTMENT (OUTPATIENT)
Dept: INTERVENTIONAL RADIOLOGY/VASCULAR | Facility: HOSPITAL | Age: 64
End: 2024-07-12
Attending: INTERNAL MEDICINE
Payer: MEDICARE

## 2024-07-12 LAB
ANION GAP SERPL CALC-SCNC: 0 MMOL/L (ref 0–18)
ATRIAL RATE: 122 BPM
ATRIAL RATE: 241 BPM
BASOPHILS # BLD AUTO: 0.02 X10(3) UL (ref 0–0.2)
BASOPHILS NFR BLD AUTO: 0.1 %
BUN BLD-MCNC: 20 MG/DL (ref 9–23)
CALCIUM BLD-MCNC: 8.6 MG/DL (ref 8.5–10.1)
CHLORIDE SERPL-SCNC: 99 MMOL/L (ref 98–112)
CO2 SERPL-SCNC: 42 MMOL/L (ref 21–32)
CREAT BLD-MCNC: 0.63 MG/DL
EGFRCR SERPLBLD CKD-EPI 2021: 106 ML/MIN/1.73M2 (ref 60–?)
EOSINOPHIL # BLD AUTO: 0.16 X10(3) UL (ref 0–0.7)
EOSINOPHIL NFR BLD AUTO: 1.1 %
ERYTHROCYTE [DISTWIDTH] IN BLOOD BY AUTOMATED COUNT: 13.7 %
GLUCOSE BLD-MCNC: 115 MG/DL (ref 70–99)
GLUCOSE BLD-MCNC: 127 MG/DL (ref 70–99)
GLUCOSE BLD-MCNC: 140 MG/DL (ref 70–99)
GLUCOSE BLD-MCNC: 161 MG/DL (ref 70–99)
GLUCOSE BLD-MCNC: 176 MG/DL (ref 70–99)
HCT VFR BLD AUTO: 33.9 %
HGB BLD-MCNC: 10.3 G/DL
IMM GRANULOCYTES # BLD AUTO: 0.14 X10(3) UL (ref 0–1)
IMM GRANULOCYTES NFR BLD: 1 %
LYMPHOCYTES # BLD AUTO: 0.64 X10(3) UL (ref 1–4)
LYMPHOCYTES NFR BLD AUTO: 4.4 %
MCH RBC QN AUTO: 30.2 PG (ref 26–34)
MCHC RBC AUTO-ENTMCNC: 30.4 G/DL (ref 31–37)
MCV RBC AUTO: 99.4 FL
MONOCYTES # BLD AUTO: 1.02 X10(3) UL (ref 0.1–1)
MONOCYTES NFR BLD AUTO: 7 %
NEUTROPHILS # BLD AUTO: 12.57 X10 (3) UL (ref 1.5–7.7)
NEUTROPHILS # BLD AUTO: 12.57 X10(3) UL (ref 1.5–7.7)
NEUTROPHILS NFR BLD AUTO: 86.4 %
OSMOLALITY SERPL CALC.SUM OF ELEC: 296 MOSM/KG (ref 275–295)
P AXIS: 268 DEGREES
PLATELET # BLD AUTO: 279 10(3)UL (ref 150–450)
POTASSIUM SERPL-SCNC: 4.2 MMOL/L (ref 3.5–5.1)
Q-T INTERVAL: 388 MS
Q-T INTERVAL: 430 MS
QRS DURATION: 162 MS
QRS DURATION: 162 MS
QTC CALCULATION (BEZET): 499 MS
QTC CALCULATION (BEZET): 555 MS
R AXIS: 102 DEGREES
R AXIS: 85 DEGREES
RBC # BLD AUTO: 3.41 X10(6)UL
SODIUM SERPL-SCNC: 141 MMOL/L (ref 136–145)
T AXIS: -56 DEGREES
T AXIS: 209 DEGREES
VENTRICULAR RATE: 123 BPM
VENTRICULAR RATE: 81 BPM
WBC # BLD AUTO: 14.6 X10(3) UL (ref 4–11)

## 2024-07-12 PROCEDURE — 4A023FZ MEASUREMENT OF CARDIAC RHYTHM, PERCUTANEOUS APPROACH: ICD-10-PCS | Performed by: INTERNAL MEDICINE

## 2024-07-12 PROCEDURE — 99232 SBSQ HOSP IP/OBS MODERATE 35: CPT | Performed by: INTERNAL MEDICINE

## 2024-07-12 PROCEDURE — 4A0234Z MEASUREMENT OF CARDIAC ELECTRICAL ACTIVITY, PERCUTANEOUS APPROACH: ICD-10-PCS | Performed by: INTERNAL MEDICINE

## 2024-07-12 PROCEDURE — 02583ZZ DESTRUCTION OF CONDUCTION MECHANISM, PERCUTANEOUS APPROACH: ICD-10-PCS | Performed by: INTERNAL MEDICINE

## 2024-07-12 PROCEDURE — 02K83ZZ MAP CONDUCTION MECHANISM, PERCUTANEOUS APPROACH: ICD-10-PCS | Performed by: INTERNAL MEDICINE

## 2024-07-12 PROCEDURE — B245ZZ3 ULTRASONOGRAPHY OF LEFT HEART, INTRAVASCULAR: ICD-10-PCS | Performed by: INTERNAL MEDICINE

## 2024-07-12 RX ORDER — ACETAMINOPHEN 500 MG
1000 TABLET ORAL ONCE AS NEEDED
Status: DISCONTINUED | OUTPATIENT
Start: 2024-07-12 | End: 2024-07-12 | Stop reason: HOSPADM

## 2024-07-12 RX ORDER — LIDOCAINE HYDROCHLORIDE 10 MG/ML
INJECTION, SOLUTION EPIDURAL; INFILTRATION; INTRACAUDAL; PERINEURAL
Status: COMPLETED
Start: 2024-07-12 | End: 2024-07-12

## 2024-07-12 RX ORDER — SODIUM CHLORIDE 9 MG/ML
INJECTION, SOLUTION INTRAVENOUS CONTINUOUS PRN
Status: DISCONTINUED | OUTPATIENT
Start: 2024-07-12 | End: 2024-07-12 | Stop reason: SURG

## 2024-07-12 RX ORDER — DIPHENHYDRAMINE HYDROCHLORIDE 50 MG/ML
12.5 INJECTION INTRAMUSCULAR; INTRAVENOUS AS NEEDED
Status: DISCONTINUED | OUTPATIENT
Start: 2024-07-12 | End: 2024-07-12 | Stop reason: HOSPADM

## 2024-07-12 RX ORDER — PROCHLORPERAZINE EDISYLATE 5 MG/ML
5 INJECTION INTRAMUSCULAR; INTRAVENOUS EVERY 8 HOURS PRN
Status: DISCONTINUED | OUTPATIENT
Start: 2024-07-12 | End: 2024-07-12 | Stop reason: HOSPADM

## 2024-07-12 RX ORDER — SODIUM CHLORIDE 9 MG/ML
INJECTION, SOLUTION INTRAVENOUS CONTINUOUS
Status: DISCONTINUED | OUTPATIENT
Start: 2024-07-12 | End: 2024-07-12 | Stop reason: HOSPADM

## 2024-07-12 RX ORDER — DEXMEDETOMIDINE HYDROCHLORIDE 4 UG/ML
INJECTION, SOLUTION INTRAVENOUS CONTINUOUS PRN
Status: DISCONTINUED | OUTPATIENT
Start: 2024-07-12 | End: 2024-07-12 | Stop reason: SURG

## 2024-07-12 RX ORDER — INSULIN ASPART 100 [IU]/ML
INJECTION, SOLUTION INTRAVENOUS; SUBCUTANEOUS ONCE
Status: DISCONTINUED | OUTPATIENT
Start: 2024-07-12 | End: 2024-07-12 | Stop reason: HOSPADM

## 2024-07-12 RX ORDER — PREDNISONE 20 MG/1
20 TABLET ORAL
Status: DISCONTINUED | OUTPATIENT
Start: 2024-07-13 | End: 2024-07-13

## 2024-07-12 RX ORDER — ACETAMINOPHEN 325 MG/1
650 TABLET ORAL EVERY 6 HOURS PRN
Status: DISCONTINUED | OUTPATIENT
Start: 2024-07-12 | End: 2024-07-20

## 2024-07-12 RX ORDER — HEPARIN SODIUM 5000 [USP'U]/ML
INJECTION, SOLUTION INTRAVENOUS; SUBCUTANEOUS
Status: COMPLETED
Start: 2024-07-12 | End: 2024-07-12

## 2024-07-12 RX ORDER — PHENYLEPHRINE HCL 10 MG/ML
VIAL (ML) INJECTION AS NEEDED
Status: DISCONTINUED | OUTPATIENT
Start: 2024-07-12 | End: 2024-07-12 | Stop reason: SURG

## 2024-07-12 RX ORDER — ONDANSETRON 2 MG/ML
4 INJECTION INTRAMUSCULAR; INTRAVENOUS EVERY 6 HOURS PRN
Status: DISCONTINUED | OUTPATIENT
Start: 2024-07-12 | End: 2024-07-12 | Stop reason: HOSPADM

## 2024-07-12 RX ADMIN — SODIUM CHLORIDE: 9 INJECTION, SOLUTION INTRAVENOUS at 17:04:00

## 2024-07-12 RX ADMIN — DEXMEDETOMIDINE HYDROCHLORIDE 0.8 MCG/KG/HR: 4 INJECTION, SOLUTION INTRAVENOUS at 15:35:00

## 2024-07-12 RX ADMIN — PHENYLEPHRINE HCL 100 MCG: 10 MG/ML VIAL (ML) INJECTION at 16:06:00

## 2024-07-12 RX ADMIN — SODIUM CHLORIDE: 9 INJECTION, SOLUTION INTRAVENOUS at 15:29:00

## 2024-07-12 NOTE — PROCEDURES
Electrophysiology Study with Ablation      History:  Keny Marshall is a 63 year old year old male with persistent atrial flutter who presents for an ablation. The risks (including, but not limited to, hematoma, vascular damage, pneumothorax, tamponade, need for a pacemaker, phrenic nerve injury, myocardial infarction, stroke, and death), benefits (no atrial flutter), and alternatives (rate control, anti-arrhythmic drugs) of the procedure were discussed. The patient understands and agrees to proceed.      Procedure:  The patient was brought to the electrophysiology study in the fasting and nonsedated state. The left and right groins were prepped and draped in the usual sterile fashion. Precedex was administered by anesthesia service. 1% lidocaine was used for skin anesthesia. 7, 8, and 10 Fr sheaths were placed in the right femoral vein using ultrasound guidance. Catheters were placed in the appropriate positions under intracardiac echo guidance.    Procedures performed:  Comprehensive EP study  Pacing and recording of the LA from the CS  Attempted induction of arrhythmia  3D mapping of tachycardia with CARTO  Radiofrequency ablation  Intracardiac echo    After programmed stimulation and ablation, the catheters and sheaths were removed and hemostasis was achieved with Vascade.    Results:  Baseline intervals: Atrial flutter cycle length was 240.    Mapping and Ablation:  The cavotricuspid isthmus was targeted for ablation. Mapping and ablation was performed during CS pacing. An irrigated force sensing ablation catheter was used through a steerable sheath. CARTO and ICE was used for mapping and catheter placement. During the lesion set, the tachycardia terminated and bidirectional block was achieved.    Post ablation findings:  Baseline intervals: SCL: 1150, HI: 200, QRS: 145    No pericardial effusion was seen on ICE.    Bidirectional block across the cavotricuspid isthmus was confirmed by pacing.      Conclusions:    Radiofrequency ablation of the cavotricuspid isthmus for atrial flutter       Chidi Fatima MD  Clinical Cardiac Electrophysiology  [facility]

## 2024-07-12 NOTE — PROGRESS NOTES
[facility]   part of [facility]     Hospitalist Progress Note     Keny Marshall Patient Status:  Inpatient    1960 MRN [id number]   Location [facility] 6NE-A Attending Dr. Allen   Hosp Day # 13 PCP Dustin Avina MD     Chief Complaint: SOB    Subjective:  pt reports no new complaints.  Pending ablation.       Objective:    Review of Systems:   A comprehensive review of systems was completed; pertinent positive and negatives stated in subjective.    Vital signs:  Temp:  [97.8 °F (36.6 °C)-98.3 °F (36.8 °C)] 98.3 °F (36.8 °C)  Pulse:  [] 100  Resp:  [18-22] 20  BP: (117-142)/(60-89) 117/87  SpO2:  [90 %-100 %] 100 %    Physical Exam:    General: No acute distress 63 year old male oriented x3  Respiratory: Diminished bilaterally. No wheezing on o2 NC 4-5L  Cardiovascular: Irregular rhythm, tachy. 100s  Abdomen: Soft, Non-tender, non-distended  Neuro: No new focal deficits.   Extremities: minimal pedal edema  Skin: facial dermatitis improved    Diagnostic Data:    Labs:  Recent Labs   Lab 24  0610   WBC 8.6   HGB 11.6*   MCV 99.0   .0       Recent Labs   Lab 24  0620 24  0558 24  0539   * 80 116*   BUN 39* 26* 22   CREATSERUM 0.76 0.66* 0.74   CA 8.7 8.4* 8.3*    140 142   K 4.5 4.6 4.4   CL 93* 98 98   CO2 45.0* 43.0* 42.0*       Estimated Creatinine Clearance: 104.1 mL/min (based on SCr of 0.74 mg/dL).    No results for input(s): \"TROP\", \"TROPHS\", \"CK\" in the last 168 hours.      No results for input(s): \"PTP\", \"INR\" in the last 168 hours.           Microbiology    No results found for this visit on 24.      Imaging: Reviewed in Epic.    Medications:    dilTIAZem ER  360 mg Oral Daily    ipratropium  500 mcg Nebulization Q6H WA    amiodarone  200 mg Oral BID with meals    furosemide  20 mg Oral Daily    predniSONE  30 mg Oral Daily with breakfast    digoxin  125 mcg Oral Daily    insulin aspart  1-20 Units Subcutaneous TID CC and HS     aspirin  81 mg Oral Daily    atorvastatin  20 mg Oral Nightly    guaiFENesin ER  1,200 mg Oral BID    levETIRAcetam  500 mg Oral BID    Roflumilast  500 mcg Oral Daily    rivaroxaban  20 mg Oral Daily with food    insulin aspart  1-10 Units Subcutaneous TID AC and HS    fluticasone furoate-vilanterol  1 puff Inhalation Daily       Assessment & Plan:      #Acute on chronic hypoxic/hypercapnic respiratory failure 2/2 CHF/COPD, improved    #Acute on chronic diastolic heart failure improving  - increase lasix po?  - mgmt per cards, recheck weight.    #End stage COPD w/ acute exacerbation improving  -bipap prn/sleep, O2 NC  -steroids po, atrovent nebs, breo inhaler, mucinex per pulm, no albuterol due to tachycardia  -remains on 4-5L -discharged on this last admission also.    #Left Lung mass, Smoker  -pulmonary recommending OP PET-CT    #Rapid aflutter  -amiodarone, cardizem CD and digoxin po   -Pending ALO ablation per EP   -Continue DOAC  -Tele  -Recently underwent DCCV 6/14    #Aortic stenosis s/p AVR   #Hyponatremia, resolved  #Prior VTE on xarelto  #Seizure disorder-Keppra  #Type 2 diabetes, 5.6%  #Steroid hyperglycemia-correctional/nutritional scale, hyperglycemia protocol  #History of RCC s/p nephrectomy  #ABIGAIL-CPAP protocol  #Seborrheic dermatitis, improved-topicals/shampoos.      Case d/w pt, RN. HHC/HHPT per SW.  Await ablation.  OP PET-CT. Dc planning.  F/u on weight/labs.     LAM Teague  8:46 AM     Supplementary Documentation:     Quality:  DVT Mechanical Prophylaxis:     Early ambuation  DVT Pharmacologic Prophylaxis   Medication    rivaroxaban (Xarelto) tab 20 mg         DVT Pharmacologic prophylaxis: Aspirin 81 mg      Code Status: Full Code  Peters: No urinary catheter in place    The 21st Century Cures Act makes medical notes like these available to patients in the interest of transparency. Please be advised this is a medical document. Medical documents are intended to carry relevant information,  facts as evident, and the clinical opinion of the practitioner. The medical note is intended as peer to peer communication and may appear blunt or direct. It is written in medical language and may contain abbreviations or verbiage that are unfamiliar.     Addendum:     Agree with above note except as documented below.      HR elevated. No acute complaints.      Gen: NAD  CVS: s1s2. Irregular. Tachycardic.   Resp: CTA  Abd: soft     #Acute on chronic hypoxic/hypercapnic respiratory failure 2/2 CHF/COPD  #Acute on chronic HFpEF  #End stage COPD with acute exacerbation  #Alutter with RVR   #Left lung nodules      Plan for ALO ablation today   Continue PO steroids, nebs      Pt seen and examined independently. Chart reviewed. Labs and imaging over the last 24 hours have been personally reviewed.  I personally made/approved 100% of the management plan for this patient and take full responsibility for the patient management.   Note has been reviewed by me and modified as needed.  Exam and Impression/ Recs as noted above.  D/w staff.     Devorah Allen MD

## 2024-07-12 NOTE — PLAN OF CARE
Assumed care of patient at 0730.  Alert and oriented x4.  On home baseline of 4.5 L O2. Adequate saturations.  Aflutter-Sinus tachy at times on tele.  Per cardiology okay to give all rate control meds this AM.  Continent of bowel and bladder.  Up ad anuel.  Patient updated on plan of care, verbalized understanding.      Pt received back from cath lab around 1830. R groin site clean, dry, intact. Soft with no hematoma. Voided and sat up. Paper EKG on chart, did not cross over.    Problem: RESPIRATORY - ADULT  Goal: Achieves optimal ventilation and oxygenation  Description: INTERVENTIONS:  - Assess for changes in respiratory status  - Assess for changes in mentation and behavior  - Position to facilitate oxygenation and minimize respiratory effort  - Oxygen supplementation based on oxygen saturation or ABGs  - Provide Smoking Cessation handout, if applicable  - Encourage broncho-pulmonary hygiene including cough, deep breathe, Incentive Spirometry  - Assess the need for suctioning and perform as needed  - Assess and instruct to report SOB or any respiratory difficulty  - Respiratory Therapy support as indicated  - Manage/alleviate anxiety  - Monitor for signs/symptoms of CO2 retention  Outcome: Progressing     Problem: CARDIOVASCULAR - ADULT  Goal: Maintains optimal cardiac output and hemodynamic stability  Description: INTERVENTIONS:  - Monitor vital signs, rhythm, and trends  - Monitor for bleeding, hypotension and signs of decreased cardiac output  - Evaluate effectiveness of vasoactive medications to optimize hemodynamic stability  - Monitor arterial and/or venous puncture sites for bleeding and/or hematoma  - Assess quality of pulses, skin color and temperature  - Assess for signs of decreased coronary artery perfusion - ex. Angina  - Evaluate fluid balance, assess for edema, trend weights  Outcome: Progressing  Goal: Absence of cardiac arrhythmias or at baseline  Description: INTERVENTIONS:  - Continuous cardiac  monitoring, monitor vital signs, obtain 12 lead EKG if indicated  - Evaluate effectiveness of antiarrhythmic and heart rate control medications as ordered  - Initiate emergency measures for life threatening arrhythmias  - Monitor electrolytes and administer replacement therapy as ordered  Outcome: Progressing     Problem: SAFETY ADULT - FALL  Goal: Free from fall injury  Description: INTERVENTIONS:  - Assess pt frequently for physical needs  - Identify cognitive and physical deficits and behaviors that affect risk of falls.  - [place] fall precautions as indicated by assessment.  - Educate pt/family on patient safety including physical limitations  - Instruct pt to call for assistance with activity based on assessment  - Modify environment to reduce risk of injury  - Provide assistive devices as appropriate  - Consider OT/PT consult to assist with strengthening/mobility  - Encourage toileting schedule  Outcome: Progressing     Problem: PAIN - ADULT  Goal: Verbalizes/displays adequate comfort level or patient's stated pain goal  Description: INTERVENTIONS:  - Encourage pt to monitor pain and request assistance  - Assess pain using appropriate pain scale  - Administer analgesics based on type and severity of pain and evaluate response  - Implement non-pharmacological measures as appropriate and evaluate response  - Consider cultural and social influences on pain and pain management  - Manage/alleviate anxiety  - Utilize distraction and/or relaxation techniques  - Monitor for opioid side effects  - Notify MD/LIP if interventions unsuccessful or patient reports new pain  - Anticipate increased pain with activity and pre-medicate as appropriate  Outcome: Progressing

## 2024-07-12 NOTE — PROGRESS NOTES
[facility]  Progress Note    Keny Marshall Patient Status:  Inpatient    1960 MRN [id number]   Location [facility] 8NE-A Attending Devorah Allen MD   Hosp Day # 13 PCP Dustin Avina MD     Subjective:  Keny Marshall is a(n) 63 year old male remains afebrile  Feels good this a.m. minimal coughing  Did well on BiPAP overnight with download pending  Awaiting ablation  Download with AHI of 1    Objective:  /87 (BP Location: Left arm)   Pulse 92   Temp 98.3 °F (36.8 °C) (Oral)   Resp 20   Wt 162 lb 7.7 oz (73.7 kg)   SpO2 100%   BMI 23.31 kg/m² remains on 4-1/2 to 5 L      Temp (24hrs), Av °F (36.7 °C), Min:97.8 °F (36.6 °C), Max:98.3 °F (36.8 °C)      Intake/Output:    Intake/Output Summary (Last 24 hours) at 2024 1005  Last data filed at 2024 0740  Gross per 24 hour   Intake 240 ml   Output 1552 ml   Net -1312 ml       Physical Exam:   General: alert, cooperative, oriented.  No respiratory distress.   Head: Normocephalic, without obvious abnormality, atraumatic.   Throat: Lips, mucosa, and tongue normal.  No thrush noted.   Neck: trachea midline, no adenopathy, no thyromegaly. No JVD.  At 90 degrees   Lungs: Markedly diminished tones bilaterally no evidence of wheezes slight crackles at the bases   Chest wall: No tenderness or deformity.   Heart: Irregular with flutter waves rate 99   Abdomen: soft, non-distended, no masses, no guarding, no     Rebound.  No obvious hepatosplenomegaly or ascites   Extremity: Remains with +1 edema nontender   Skin: No rashes or lesions.   Neurological: Alert, interactive, no focal deficits    Lab Data Review:  No results for input(s): \"WBC\", \"HGB\", \"PLT\" in the last 72 hours.  No results for input(s): \"NA\", \"K\", \"CL\", \"CO2\", \"BUN\", \"CREATSERUM\", \"COMP\" in the last 72 hours.    Invalid input(s): \"ABG\"  No results for input(s): \"PTP\", \"INR\", \"PTT\" in the last 168 hours.    Cultures: Reviewed    Radiology:  No results found.  Reviewed no new  Overall how are you feeling? good    Compliant with routine OB appointments? yes    Have you completed your 3rd trimester lab work? yes    Have you reviewed the contents of 3rd trimester folder from office?    Have you decided on a pediatrician?     Already has one   Questions on paperwork to go back to office? no   Questions on the baby birth certificate forms? no    EPDS [employer] 3       data      Medications reviewed     Assessment and Plan:   Patient Active Problem List   Diagnosis    Anemia    HTN (hypertension)    DM2 (diabetes mellitus, type 2) (formerly [facility])    COPD (chronic obstructive pulmonary disease) (formerly [facility])    Clear cell renal cell carcinoma of left kidney (formerly [facility])    Medial meniscus tear, right, initial encounter    Primary osteoarthritis of right knee              GLOBAL EXP 6-25-16 / RIGHT KNEE / TRK     Acute medial meniscus tear of right knee            GLOBAL EXP 6-25-16 / RIGHT KNEE / TRK     Acute lateral meniscus tear of left knee               GLOBAL EXP 6-25-16 / LEFT KNEE / TRK     Bursitis of left shoulder             GLOBAL EXP 6-25-16 / LEFT SHOULDER / TRK     Moderate aortic stenosis    Stage 3 severe COPD by GOLD classification (formerly [facility])    Hyperglycemia    Hypervolemia    Hypervolemia, unspecified hypervolemia type    Dyspnea, unspecified type    Hypoxemia    Atrial fibrillation with RVR (formerly [facility])    Acute heart failure with preserved ejection fraction (HFpEF) (formerly [facility])    Atrial flutter with rapid ventricular response (formerly [facility])    Acute on chronic congestive heart failure (HCC)    Acute on chronic congestive heart failure, unspecified heart failure type (formerly [facility])    Hypoxia    Atrial fibrillation with rapid ventricular response (formerly [facility])    Pleural effusion    Hypokalemia    Acute respiratory failure with hypoxia (formerly [facility])    Normocytic anemia    Chronic heart failure with preserved ejection fraction (formerly [facility])    Leukocytosis (leucocytosis)    Aortic valve regurgitation    HFrEF (heart failure with reduced ejection fraction) (formerly [facility])    Chronic hypoxemic respiratory failure (formerly [facility])    ABIGAIL (obstructive sleep apnea)    COPD exacerbation (formerly [facility])    Acute on chronic respiratory failure with hypoxia (HCC)    Acute on chronic respiratory failure with hypoxia and hypercapnia (formerly [facility])    Chronic hypercapnic respiratory failure (formerly [facility])    Smoking greater than 20 pack years    Hyponatremia    Hyperkalemia    Chronic obstructive  pulmonary disease, unspecified COPD type (HCC)    Supplemental oxygen dependent       Assessment:  #1. Acute on chronic hypercapnic and hypoxic respiratory failure  Multifactorial due to afib/flutter with RVR, CHF exacerbation with underlying end stage COPD  7/1 CTA with fluid overload, no PE  Cardiac sensitivity to beta agonist anticholinergics --has been off yet heart rate continues high with plans to resume for his shortness of breath -continue on pred oral taper  Wean o2 for goal saturations of 88-92% given hypercapnia-remains on 4 to 5 L  ABG now with significant improvement doing well on BIPAP (related to cost )     #2. Acute CHF exacerbation, aflutter with RVR  Rate control and diurese as per cardiology  Previous cardioversion 6/14  Plans in place to resume amiodarone and proceed with ablation  Remains on anticoagulation     #3. COPD  Severe obstruction on previous PFTs 3/2021  off arformoterol given worsening afib/tachycardia  Plan to resume MDIs as heart rate remains  Use atrovent nebs PRN  He can use levalbuterol as outpatient as hospital does not stock it  He cannot tolerate albuterol due to palpitations, tremors and with afib with RVR; off duonebs  Continue oral pred taper  Continue roflumilast  Previously reviewed BLVR - not candidate at this time, reassess as outpatient     #4. Pulmonary nodules   7/1/2024 CT chest with worsening ALYSON lesion, possible scarring, rounded atelectasis vs neoplasm  Will need outpatient follow up including PET/CT     #5. Tobacco abuse  30+ pack years, active pipe smoker  Hold on LDCT given recent CT with worsening ALYSON lesion, will need PET/CT as above       Plan:  Discussed at length with Dr. Gayle from anesthesia as well as the patient at length.-Patient is aware that this procedure requires intubation and mechanical ventilation-plans for AVAPS postop following extubation-discussed with respiratory therapy they will have AVAPS available.  Discussed at length with   Fredy from anesthesia plan to avoid super oxygenation as well--Patient is maximized medically for the proposed procedure and therefore cleared  Ongoing discharge planning with BiPAP to be ordered  CC     Pat Crum MD  7/12/2024  10:05 AM

## 2024-07-12 NOTE — CM/SW NOTE
Sw met with pt and his wife. E has Bipap approved for pt.  [facility] will need notification of dc date to set up delivery of bipap.  Pt still has AVAPS at home through [employer] ( [employer] and [employer] have merged as companies). When [facility] delivers bipap- they can also pick the AVAPS.  [facility] gave estimate of cost ( $85.00 first month and $34.00 each month after.    Laney Turner, ROSEANNW  /Discharge Planner

## 2024-07-12 NOTE — RESPIRATORY THERAPY NOTE
ABIGAIL - Equipment Use Daily Summary:                   . Set Mode: BI-FLEX                . Usage in hours: 7.3                . 90% Pressure (EPAP) level: 11                . 90% Insp.Pressure (IPAP): 17                . AHI: 1                . Supplemental Oxygen:  4  LPM                . Comments: ps 4

## 2024-07-12 NOTE — PLAN OF CARE
ssumed care of patient at 2030. Patient is alert and oriented x4. Patient is on 4.5 L during day, Bipap at night. Aflutter on tele. No pain currently reported. Denied CP, Denied SOB. Breath sounds diminished. Productive cough reported, small, tan, thick sputum.   Up stand by  Discussed plan of care with patient. All of patient's questions answered at this time.  Call light within reach, pt refused Bed alarm     POC:   NPO, CHG bath-->Ablation Friday      Problem: RESPIRATORY - ADULT  Goal: Achieves optimal ventilation and oxygenation  Description: INTERVENTIONS:  - Assess for changes in respiratory status  - Assess for changes in mentation and behavior  - Position to facilitate oxygenation and minimize respiratory effort  - Oxygen supplementation based on oxygen saturation or ABGs  - Provide Smoking Cessation handout, if applicable  - Encourage broncho-pulmonary hygiene including cough, deep breathe, Incentive Spirometry  - Assess the need for suctioning and perform as needed  - Assess and instruct to report SOB or any respiratory difficulty  - Respiratory Therapy support as indicated  - Manage/alleviate anxiety  - Monitor for signs/symptoms of CO2 retention  Outcome: Progressing     Problem: CARDIOVASCULAR - ADULT  Goal: Maintains optimal cardiac output and hemodynamic stability  Description: INTERVENTIONS:  - Monitor vital signs, rhythm, and trends  - Monitor for bleeding, hypotension and signs of decreased cardiac output  - Evaluate effectiveness of vasoactive medications to optimize hemodynamic stability  - Monitor arterial and/or venous puncture sites for bleeding and/or hematoma  - Assess quality of pulses, skin color and temperature  - Assess for signs of decreased coronary artery perfusion - ex. Angina  - Evaluate fluid balance, assess for edema, trend weights  Outcome: Progressing  Goal: Absence of cardiac arrhythmias or at baseline  Description: INTERVENTIONS:  - Continuous cardiac monitoring, monitor  vital signs, obtain 12 lead EKG if indicated  - Evaluate effectiveness of antiarrhythmic and heart rate control medications as ordered  - Initiate emergency measures for life threatening arrhythmias  - Monitor electrolytes and administer replacement therapy as ordered  Outcome: Progressing     Problem: SAFETY ADULT - FALL  Goal: Free from fall injury  Description: INTERVENTIONS:  - Assess pt frequently for physical needs  - Identify cognitive and physical deficits and behaviors that affect risk of falls.  - [place] fall precautions as indicated by assessment.  - Educate pt/family on patient safety including physical limitations  - Instruct pt to call for assistance with activity based on assessment  - Modify environment to reduce risk of injury  - Provide assistive devices as appropriate  - Consider OT/PT consult to assist with strengthening/mobility  - Encourage toileting schedule  Outcome: Progressing     Problem: PAIN - ADULT  Goal: Verbalizes/displays adequate comfort level or patient's stated pain goal  Description: INTERVENTIONS:  - Encourage pt to monitor pain and request assistance  - Assess pain using appropriate pain scale  - Administer analgesics based on type and severity of pain and evaluate response  - Implement non-pharmacological measures as appropriate and evaluate response  - Consider cultural and social influences on pain and pain management  - Manage/alleviate anxiety  - Utilize distraction and/or relaxation techniques  - Monitor for opioid side effects  - Notify MD/LIP if interventions unsuccessful or patient reports new pain  - Anticipate increased pain with activity and pre-medicate as appropriate  Outcome: Progressing

## 2024-07-12 NOTE — PROGRESS NOTES
[facility] Cardiology  Progress Note    Keny Marshall Patient Status:  Inpatient    1960 MRN [id number]   [facility] 8NE-A Attending Alfredo Garcia MD   Hosp Day # 13 PCP Dustin Avina MD       Impression;  Aflutter-  adv COPD/exacerbation. s/p failed recent CV , remains in intermittent RVR.  restarted amiodarone, on CCB and digoxin. plan for flutter ablation 24 ([place]).  .  SOB - 2/2 acute dCHF and COPD exac  Acute diastolic CHF - improved  S/p bio AVR  TTE (24): LVEF 55%, +DD, 23mm SAVR 3.3m/s, mean 22mmHg.  Plan:  AF/flutter: variable conduction, back on amiodarone as well as digoxin, diltiazem.  Plan for AFl ablation this afternoon with Dr. Fatima, npo.  AC: rivaroxaban, continue.  diuresis: lasix 20mg po daily.   severe COPD/end-stage emphysema- prednisone inhaler therapy.       Subjective:  no issues overnight. remains in A flutter, rates mostly controlled. Denies sig SOB.    Objective:  /87 (BP Location: Left arm)   Pulse 100   Temp 98.3 °F (36.8 °C) (Oral)   Resp 20   Wt 162 lb 7.7 oz (73.7 kg)   SpO2 100%   BMI 23.31 kg/m²   Temp (24hrs), Av °F (36.7 °C), Min:97.7 °F (36.5 °C), Max:98.3 °F (36.8 °C)      Intake/Output Summary (Last 24 hours) at 2024 0758  Last data filed at 2024 0740  Gross per 24 hour   Intake 240 ml   Output 1652 ml   Net -1412 ml     Wt Readings from Last 3 Encounters:   24 162 lb 7.7 oz (73.7 kg)   24 159 lb 6.3 oz (72.3 kg)   23 172 lb (78 kg)       General: Awake and alert; in no acute distress  Cardiac: regular tachycardia; no murmurs/rubs/gallops are appreciated  Lungs: Coarse on auscultation bilaterally; no accessory muscle use  Abdomen: Soft, non-tender; bowel sounds are normoactive  Extremities: No clubbing/cyanosis; moves all 4 extremities normally    Current Facility-Administered Medications   Medication Dose Route Frequency    lidocaine-menthol 4-1 % patch 1 patch  1 patch  Transdermal Daily PRN    dilTIAZem ER (CardIZEM CD) 24 hr cap 360 mg  360 mg Oral Daily    ipratropium (Atrovent) 0.02 % nebulizer solution 500 mcg  500 mcg Nebulization Q6H WA    amiodarone (Pacerone) tab 200 mg  200 mg Oral BID with meals    furosemide (Lasix) tab 20 mg  20 mg Oral Daily    predniSONE (Deltasone) tab 30 mg  30 mg Oral Daily with breakfast    metoprolol (Lopressor) 5 mg/5mL injection 5 mg  5 mg Intravenous Q6H PRN    digoxin (Lanoxin) tab 125 mcg  125 mcg Oral Daily    selenium sulfide (Selsun) 2.5 % shampoo   Topical Daily PRN    insulin aspart (NovoLOG) 100 Units/mL FlexPen 1-20 Units  1-20 Units Subcutaneous TID CC and HS    aspirin DR tab 81 mg  81 mg Oral Daily    atorvastatin (Lipitor) tab 20 mg  20 mg Oral Nightly    guaiFENesin ER (Mucinex) 12 hr tab 1,200 mg  1,200 mg Oral BID    levETIRAcetam (Keppra) tab 500 mg  500 mg Oral BID    Roflumilast TABS 500 mcg  500 mcg Oral Daily    rivaroxaban (Xarelto) tab 20 mg  20 mg Oral Daily with food    glucose (Dex4) 15 GM/59ML oral liquid 15 g  15 g Oral Q15 Min PRN    Or    glucose (Glutose) 40% oral gel 15 g  15 g Oral Q15 Min PRN    Or    glucose-vitamin C (Dex-4) chewable tab 4 tablet  4 tablet Oral Q15 Min PRN    Or    dextrose 50% injection 50 mL  50 mL Intravenous Q15 Min PRN    Or    glucose (Dex4) 15 GM/59ML oral liquid 30 g  30 g Oral Q15 Min PRN    Or    glucose (Glutose) 40% oral gel 30 g  30 g Oral Q15 Min PRN    Or    glucose-vitamin C (Dex-4) chewable tab 8 tablet  8 tablet Oral Q15 Min PRN    insulin aspart (NovoLOG) 100 Units/mL FlexPen 1-10 Units  1-10 Units Subcutaneous TID AC and HS    melatonin tab 3 mg  3 mg Oral Nightly PRN    acetaminophen (Tylenol Extra Strength) tab 500 mg  500 mg Oral Q4H PRN    polyethylene glycol (PEG 3350) (Miralax) 17 g oral packet 17 g  17 g Oral Daily PRN    sennosides (Senokot) tab 17.2 mg  17.2 mg Oral Nightly PRN    bisacodyl (Dulcolax) 10 MG rectal suppository 10 mg  10 mg Rectal Daily PRN     fleet enema (Fleet) 7-19 GM/118ML rectal enema 133 mL  1 enema Rectal Once PRN    prochlorperazine (Compazine) 10 MG/2ML injection 5 mg  5 mg Intravenous Q8H PRN    fluticasone furoate-vilanterol (Breo Ellipta) 200-25 MCG/ACT inhaler 1 puff  1 puff Inhalation Daily       Laboratory Data:       Lab Results   Component Value Date    INR 1.59 (H) 06/29/2024    INR 1.06 06/11/2024    INR 2.26 (H) 09/22/2021              Telemetry: AFlutter with RVR      Thank you for allowing our practice to participate in the care of your patient. Please do not hesitate to contact me if you have any questions.

## 2024-07-12 NOTE — ANESTHESIA POSTPROCEDURE EVALUATION
[facility]    Keny Marshall Patient Status:  Inpatient   Age/Gender 63 year old male MRN [id number]   Location [facility] POST ANESTHESIA CARE UNIT Attending Devorah Allen MD   Hosp Day # 13 PCP Dustin Avina MD       Anesthesia Post-op Note        Procedure Summary       Date: 07/12/24 Room / Location: [facility] Interventional Suites    Anesthesia Start: 1524 Anesthesia Stop:     Procedure: CATH EP Diagnosis: (aflutter)    Scheduled Providers: Meghan Daniel MD Anesthesiologist: Roel Forrest DO    Anesthesia Type: MAC ASA Status: 4            Anesthesia Type: MAC    Vitals Value Taken Time   BP 80/55 07/12/24 1704   Temp 97.7 °F (36.5 °C) 07/12/24 1704   Pulse 56 07/12/24 1704   Resp 22 07/12/24 1704   SpO2 99 % 07/12/24 1704       Patient Location: PACU    Anesthesia Type: MAC    Airway Patency: patent    Postop Pain Control: adequate    Mental Status: mildly sedated but able to meaningfully participate in the post-anesthesia evaluation    Nausea/Vomiting: none    Cardiopulmonary/Hydration status: stable euvolemic    Complications: no apparent anesthesia related complications    Postop vital signs: stable    Dental Exam: Unchanged from Preop    Patient to be discharged from PACU when criteria met.

## 2024-07-12 NOTE — PAYOR COMM NOTE
7/9 THRU 7/12  CONTINUED STAY REVIEW    Payor: [employer] MEDICARE ADV PPO  Subscriber #:  [id number]  Authorization Number: [id number]    Admit date: 6/29/24  Admit time:  2:08 PM      MEDICATIONS ADMINISTERED IN LAST 1 DAY:  acetaminophen (Tylenol Extra Strength) tab 500 mg       Date Action Dose Route User    7/12/2024 0358 Given 500 mg Oral Nayla Mcbride RN          amiodarone (Pacerone) tab 200 mg       Date Action Dose Route User    7/12/2024 0916 Given 200 mg Oral Kassidy Chicas RN    7/11/2024 1727 Given 200 mg Oral Mario Ojeda RN          aspirin DR tab 81 mg       Date Action Dose Route User    7/12/2024 0917 Given 81 mg Oral Kassidy Chicas RN          atorvastatin (Lipitor) tab 20 mg       Date Action Dose Route User    7/11/2024 1953 Given 20 mg Oral Nayla Mcbride RN          chlorhexidine (Hibiclens) 4 % external liquid       Date Action Dose Route User    7/11/2024 2100 Given (none) Topical Nayla Mcbride RN          digoxin (Lanoxin) tab 125 mcg       Date Action Dose Route User    7/12/2024 0916 Given 125 mcg Oral Kassidy Chicas RN          dilTIAZem ER (CardIZEM CD) 24 hr cap 360 mg       Date Action Dose Route User    7/12/2024 0930 Given 360 mg Oral Kassidy Chicas RN          fluticasone furoate-vilanterol (Breo Ellipta) 200-25 MCG/ACT inhaler 1 puff       Date Action Dose Route User    7/12/2024 0930 Given 1 puff Inhalation Kassidy Chicas RN          furosemide (Lasix) tab 20 mg       Date Action Dose Route User    7/12/2024 0916 Given 20 mg Oral Kassidy Chicas RN          guaiFENesin ER (Mucinex) 12 hr tab 1,200 mg       Date Action Dose Route User    7/12/2024 0917 Given 600 mg Oral Kassidy Chicas RN    7/11/2024 1954 Given 1,200 mg Oral Nayla Mcbride RN          insulin aspart (NovoLOG) 100 Units/mL FlexPen 1-10 Units       Date Action Dose Route User    7/12/2024 0558 Given 1 Units Subcutaneous (Right Upper Arm) Nayla Mcbride, RN    7/11/2024 1727 Given 2 Units Subcutaneous (Left Lower Abdomen) Mario Ojeda, RN           insulin aspart (NovoLOG) 100 Units/mL FlexPen 1-20 Units       Date Action Dose Route User    7/11/2024 1728 Given 5 Units Subcutaneous (Left Lower Abdomen) Mario Ojeda RN          ipratropium (Atrovent) 0.02 % nebulizer solution 500 mcg       Date Action Dose Route User    7/12/2024 1358 Given 500 mcg Nebulization Nancy Keenan, [facility]    7/12/2024 0742 Given 500 mcg Nebulization Nancy Keenan, [facility]    7/11/2024 1922 Given 500 mcg Nebulization Maira Padilla, [facility]    7/11/2024 1559 Given 500 mcg Nebulization Fercho Alexander, [facility]          levETIRAcetam (Keppra) tab 500 mg       Date Action Dose Route User    7/12/2024 0916 Given 500 mg Oral Kassidy Chicas RN    7/11/2024 1954 Given 500 mg Oral Nayla Mcbride RN          rivaroxaban (Xarelto) tab 20 mg       Date Action Dose Route User    7/12/2024 0916 Given 20 mg Oral Kassidy Chicas RN          Roflumilast TABS 500 mcg       Date Action Dose Route User    7/12/2024 0915 Given 500 mcg Oral Kassidy Chicas RN          predniSONE (Deltasone) tab 30 mg       Date Action Dose Route User    7/12/2024 0916 Given 30 mg Oral Kassidy Chicas RN            Vitals (last day)       Date/Time Temp Pulse Resp BP SpO2 Weight O2 Device O2 Flow Rate (L/min) [facility]    07/12/24 1147 98 °F (36.7 °C) 105 20 120/80 94 % -- High flow/High humidity 6 L/min NG    07/12/24 1108 -- 123 -- -- 96 % -- -- -- NG    07/12/24 0916 -- 92 -- -- -- -- -- -- DU    07/12/24 0741 -- -- -- -- 100 % -- High flow/High humidity 6 L/min HL    07/12/24 0740 98.3 °F (36.8 °C) 100 20 117/87 100 % -- High flow nasal cannula 6 L/min NG    07/12/24 0400 98.3 °F (36.8 °C) 81 -- 142/60 99 % -- -- -- TN    07/12/24 0010 97.8 °F (36.6 °C) 98 -- 140/89 90 % -- CPAP -- TN    07/11/24 1944 98.1 °F (36.7 °C) 100 18 125/85 93 % -- High flow nasal cannula 5 L/min MM    07/11/24 1635 97.8 °F (36.6 °C) 88 20 136/72 94 % -- High flow nasal cannula 5 L/min AL    07/11/24 1158 97.8 °F (36.6 °C) 127 22 120/84 97 % -- High flow nasal cannula 5  L/min AL    07/11/24 0932 -- 90 -- -- -- -- -- -- MJ    07/11/24 0900 -- -- -- -- -- -- High flow nasal cannula 5 L/min DL    07/11/24 0831 97.7 °F (36.5 °C) 102 22 116/86 95 % -- High flow nasal cannula 5 L/min AL    07/11/24 0800 -- -- -- -- -- -- High flow nasal cannula 5 L/min DL    07/11/24 0603 97.4 °F (36.3 °C) 84 22 106/69 97 % 162 lb 7.7 oz (73.7 kg) Bi-PAP -- SS    07/11/24 0425 -- 84 21 -- 89 % -- Bi-PAP 5 L/min TK         7/9 HOSPITALIST NOTE    Subjective:  pt doing well up in chair.  HR remains 130s today and he spoke with EP yesterday about options.  RN at bedside.  Reports he wants his diet restrictions lifted.      Vital signs:  Temp:  [97.7 °F (36.5 °C)-98.8 °F (37.1 °C)] 97.9 °F (36.6 °C)  Pulse:  [] 136  Resp:  [18-28] 18  BP: (100-128)/(67-97) 100/78  SpO2:  [87 %-99 %] 89 %  FiO2 (%):  [40 %] 40 %         Assessment & Plan:  #Acute on chronic hypoxic/hypercapnic respiratory failure 2/2 CHF/COPD, improving     #Acute on chronic diastolic heart failure improving  -IV diuresis to po per cards  -CXR small effusions     #End stage COPD w/ acute exacerbation improving  -bipap prn/sleep, O2 NC  -steroids po, nebs, mucinex per pulm  -baseline 2L per records, more recently needing 4-5L     #Left Lung mass, Smoker  -pulmonary recommending OP PET-CT     #Rapid aflutter, remains 130s today  -amiodarone, cardizem CD and digoxin po w/ additional IV yesterday  -ablation timing per EP   -Continue DOAC  -Tele  -Recently underwent DCCV 6/14     #Aortic stenosis s/p AVR   #Hyponatremia, resolved  #Prior VTE on xarelto  #Seizure disorder-Keppra  #Type 2 diabetes, 5.6%  #Steroid hyperglycemia-correctional/nutritional scale, hyperglycemia protocol  #History of RCC s/p nephrectomy  #ABIGAIL-CPAP protocol  #Seborrheic dermatitis, improved-topicals/shampoos.       Case d/w pt, RN at bedside. Appreciate consultants.   HHC/HHPT per SW.  Await ablation.      7/9 CARDIOLOGY NOTE      Impression;  Aflutter - s/p failed  recent CV , in setting of adv COPD/exacerbation.  remains in RVR.  restarted amiodarone, plan for flutter ablation 24 ([facility]).  CCB and digoxin.  SOB - 2/2 acute dCHF and COPD exac  Acute diastolic CHF - improved  S/p bio AVR  TTE (24): LVEF 55%, +DD, 23mm SAVR 3.3m/s, mean 22mmHg.     Plan:  AF/flutter: variable conduction, back on amiodarone as well as digoxin, diltiazem.  Appreciate EP input.  Plan for AFl ablation Friday. Pt is on board with plan  AC: rivaroxaban  diuresis: lasix 20mg po daily. Food has been terrible, requesting liberalization of diet, ok by me.  severe COPD- on steroids, inhaler therapy. HR responsive to duonebs- minimizing. Advanced/end-stage emphysema, per Dr. Randle/pulmonary.        Subjective:  Feels breathing is at baseline.  No CP.  No palpitations despite AF. reviewed plan for ablation Friday. Requesting a better diet, food has been terrible.    7/10 PULMONARY NOTE    Subjective:  Keny Marshall is a(n) 63 year old male remains afeb   Tolerating AVAPS well overall feels good shortness of breath is improved  Remains in a flutter currently 133     Objective:  /73 (BP Location: Right arm)   Pulse 116   Temp 98.3 °F (36.8 °C) (Oral)   Resp 18   Wt 156 lb 4.9 oz (70.9 kg)   SpO2 92%   BMI 22.43 kg/m² 88% on 4 L after moving        Temp (24hrs), Av °F (36.7 °C), Min:96.9 °F (36.1 °C), Max:98.7 °F (37.1 °C)        Intake/Output:     Intake/Output Summary (Last 24 hours) at 7/10/2024 0819  Last data filed at 7/10/2024 0814      Gross per 24 hour   Intake 1090 ml   Output 2125 ml   Net -1035 ml        Assessment:  #1. Acute on chronic hypercapnic and hypoxic respiratory failure  Multifactorial due to afib/flutter with RVR, CHF exacerbation with underlying end stage COPD   CTA with fluid overload, no PE  Cardiac sensitivity to beta agonist anticholinergics --has been off yet heart rate continues high with plans to resume for his shortness of breath -continue on  pred oral taper  Wean o2 for goal saturations of 88-92% given hypercapnia-remains on 4 to 5 L  Continue on AVAPS with naps/sleep and PRN     #2. Acute CHF exacerbation, aflutter with RVR  Rate control and diurese as per cardiology  Previous cardioversion 6/14  Plans in place to resume amiodarone and proceed with ablation  Remains on anticoagulation     #3. COPD  Severe obstruction on previous PFTs 3/2021  off arformoterol given worsening afib/tachycardia  Plan to resume MDIs as heart rate remains  Use atrovent nebs PRN  He can use levalbuterol as outpatient as hospital does not stock it  He cannot tolerate albuterol due to palpitations, tremors and with afib with RVR; off duonebs  Continue oral pred taper  Continue roflumilast  Previously reviewed BLVR - not candidate at this time, reassess as outpatient     #4. Pulmonary nodules   7/1/2024 CT chest with worsening ALYSON lesion, possible scarring, rounded atelectasis vs neoplasm  Will need outpatient follow up including PET/CT     #5. Tobacco abuse  30+ pack years, active pipe smoker  Hold on LDCT given recent CT with worsening ALYSON lesion, will need PET/CT as above        Plan:  Patient states he is unable to afford the co-pay for AVAPS at home plan for trial of BiPAP tonight and follow his respiratory status carefully  Continue current level of ICS/LABA and nebulized Atrovent    7/10 CARDIOLOGY NOTE    Impression;  Aflutter - s/p failed recent CV 6/24, in setting of adv COPD/exacerbation.  remains in RVR.  restarted amiodarone, plan for flutter ablation 7/12/24 ([facility]).  CCB and digoxin.  SOB - 2/2 acute dCHF and COPD exac  Acute diastolic CHF - improved  S/p bio AVR  TTE (6/12/24): LVEF 55%, +DD, 23mm SAVR 3.3m/s, mean 22mmHg.     Plan:  AF/flutter: variable conduction, back on amiodarone as well as digoxin, diltiazem.  Appreciate EP input.  Plan for AFl ablation Friday. Pt is on board with plan. no change in meds.  AC: rivaroxaban  diuresis: lasix 20mg po daily.    severe COPD/end-stage emphysema- prednisone inhaler therapy.         Subjective:  no issues overnight. no significant dyspnea complaints this AM.  tele- A flutter 90s intermittently into 120-130s.   Had caffeine this AM.     Objective:  /73 (BP Location: Right arm)   Pulse 116   Temp 98.3 °F (36.8 °C) (Oral)   Resp 18   Wt 156 lb 4.9 oz (70.9 kg)   SpO2 94%   BMI 22.43 kg/m²   Temp (24hrs), Av.9 °F (36.6 °C), Min:96.9 °F (36.1 °C), Max:98.7 °F (37.1 °C)        Intake/Output Summary (Last 24 hours) at 7/10/2024 1107  Last data filed at 7/10/2024 1016      Gross per 24 hour   Intake 850 ml   Output 2125 ml   Net -1275 ml      PULMONARY NOTE    Subjective:  Keny Marshall is a(n) 63 year old male remains afeb   Continues to feel that breathing is good   Rare cough   Stable O2 levels  Remains with elevated HR as his concern   Slpet well with bipap last PM      Objective:  /86 (BP Location: Right arm)   Pulse 102   Temp 97.7 °F (36.5 °C) (Oral)   Resp 22   Wt 162 lb 7.7 oz (73.7 kg)   SpO2 95%   BMI 23.31 kg/m² 4.5 l         Temp (24hrs), Av.9 °F (36.6 °C), Min:97.4 °F (36.3 °C), Max:98.5 °F (36.9 °C)        Intake/Output:     Intake/Output Summary (Last 24 hours) at 2024 0924  Last data filed at 2024 0831      Gross per 24 hour   Intake 480 ml   Output 2980 ml   Net -2500 ml        Assessment:  #1. Acute on chronic hypercapnic and hypoxic respiratory failure  Multifactorial due to afib/flutter with RVR, CHF exacerbation with underlying end stage COPD   CTA with fluid overload, no PE  Cardiac sensitivity to beta agonist anticholinergics --has been off yet heart rate continues high with plans to resume for his shortness of breath -continue on pred oral taper  Wean o2 for goal saturations of 88-92% given hypercapnia-remains on 4 to 5 L  Now on trial of BIPAP related to cost      #2. Acute CHF exacerbation, aflutter with RVR  Rate control and diurese as per  cardiology  Previous cardioversion 6/14  Plans in place to resume amiodarone and proceed with ablation  Remains on anticoagulation     #3. COPD  Severe obstruction on previous PFTs 3/2021  off arformoterol given worsening afib/tachycardia  Plan to resume MDIs as heart rate remains  Use atrovent nebs PRN  He can use levalbuterol as outpatient as hospital does not stock it  He cannot tolerate albuterol due to palpitations, tremors and with afib with RVR; off duonebs  Continue oral pred taper  Continue roflumilast  Previously reviewed BLVR - not candidate at this time, reassess as outpatient     #4. Pulmonary nodules   7/1/2024 CT chest with worsening ALYSON lesion, possible scarring, rounded atelectasis vs neoplasm  Will need outpatient follow up including PET/CT     #5. Tobacco abuse  30+ pack years, active pipe smoker  Hold on LDCT given recent CT with worsening ALYSON lesion, will need PET/CT as above        Plan:  Plan remains for ablation in am   Continue BIPAP and check ABG   May benefit from use of BiPAP periprocedure--discussed with Dr. Gayle from anesthesia at length-risk of intubation given severity of COPD     7/11 HOSPITALIST NOTE    Vital signs:  Temp:  [97.4 °F (36.3 °C)-98.5 °F (36.9 °C)] 97.7 °F (36.5 °C)  Pulse:  [] 102  Resp:  [18-22] 22  BP: (106-140)/(59-86) 116/86  SpO2:  [89 %-97 %] 95 %     Physical Exam:    General: No acute distress 63 year old male oriented x3  Respiratory: Diminished bilaterally. No wheezing on o2 NC 4-5L  Cardiovascular: Irregular rhythm, tachy. 110s  Abdomen: Soft, Non-tender, non-distended  Neuro: No new focal deficits.   Extremities: minimal pedal edema  Skin: facial dermatitis improved            Assessment & Plan:  #Acute on chronic hypoxic/hypercapnic respiratory failure 2/2 CHF/COPD, improved     #Acute on chronic diastolic heart failure improving  -IV diuresis to po per cards  -CXR small effusions     #End stage COPD w/ acute exacerbation improving  -bipap  prn/sleep, O2 NC  -steroids po, atrovent nebs, breo inhaler, mucinex per pulm, no albuterol due to tachycardia  -remains on 4-5L -discharged on this last admission also.     #Left Lung mass, Smoker  -pulmonary recommending OP PET-CT     #Rapid aflutter  -amiodarone, cardizem CD and digoxin po   -Pending ALO ablation per EP   -Continue DOAC  -Tele  -Recently underwent DCCV      #Aortic stenosis s/p AVR   #Hyponatremia, resolved  #Prior VTE on xarelto  #Seizure disorder-Keppra  #Type 2 diabetes, 5.6%  #Steroid hyperglycemia-correctional/nutritional scale, hyperglycemia protocol  #History of RCC s/p nephrectomy  #ABIGAIL-CPAP protocol  #Seborrheic dermatitis, improved-topicals/shampoos.       Case d/w pt, RN. HHC/HHPT per SW.  Await ablation.  OP PET-CT. Dc planning.      CARDIOLOGY NOTE      Impression;  Aflutter-  adv COPD/exacerbation. s/p failed recent CV , remains in intermittent RVR.  restarted amiodarone, on CCB and digoxin. plan for flutter ablation 24 ([facility]).  .  SOB - 2/2 acute dCHF and COPD exac  Acute diastolic CHF - improved  S/p bio AVR  TTE (24): LVEF 55%, +DD, 23mm SAVR 3.3m/s, mean 22mmHg.  Plan:  AF/flutter: variable conduction, back on amiodarone as well as digoxin, diltiazem.  Appreciate EP input.  Plan for AFl ablation tomorrow, npo p mn. Pt is on board with plan. no change in meds.  AC: rivaroxaban, continue.  diuresis: lasix 20mg po daily.   severe COPD/end-stage emphysema- prednisone inhaler therapy.         Subjective:  no issues overnight. rates 130s with ambulation. Denies sig SOB.     Objective:  /86 (BP Location: Right arm)   Pulse 90   Temp 97.7 °F (36.5 °C) (Oral)   Resp 22   Wt 162 lb 7.7 oz (73.7 kg)   SpO2 95%   BMI 23.31 kg/m²   Temp (24hrs), Av.9 °F (36.6 °C), Min:97.4 °F (36.3 °C), Max:98.5 °F (36.9 °C)        Intake/Output Summary (Last 24 hours) at 2024 1023  Last data filed at 2024 0831      Gross per 24 hour   Intake 480 ml    Output 2780 ml   Net -2300 ml       CARDIOLOGY NOTE      Impression;  Aflutter-  adv COPD/exacerbation. s/p failed recent CV , remains in intermittent RVR.  restarted amiodarone, on CCB and digoxin. plan for flutter ablation 24 ([facility]).  .  SOB - 2/2 acute dCHF and COPD exac  Acute diastolic CHF - improved  S/p bio AVR  TTE (24): LVEF 55%, +DD, 23mm SAVR 3.3m/s, mean 22mmHg.  Plan:  AF/flutter: variable conduction, back on amiodarone as well as digoxin, diltiazem.  Plan for AFl ablation this afternoon with Dr. Fatima, npo.  AC: rivaroxaban, continue.  diuresis: lasix 20mg po daily.   severe COPD/end-stage emphysema- prednisone inhaler therapy.         Subjective:  no issues overnight. remains in A flutter, rates mostly controlled. Denies sig SOB.       Objective:  /87 (BP Location: Left arm)   Pulse 100   Temp 98.3 °F (36.8 °C) (Oral)   Resp 20   Wt 162 lb 7.7 oz (73.7 kg)   SpO2 100%   BMI 23.31 kg/m²   Temp (24hrs), Av °F (36.7 °C), Min:97.7 °F (36.5 °C), Max:98.3 °F (36.8 °C)        Intake/Output Summary (Last 24 hours) at 2024 0758  Last data filed at 2024 0740      Gross per 24 hour   Intake 240 ml   Output 1652 ml   Net -1412 ml      HOSPITALIST  NOTE    Vital signs:  Temp:  [97.8 °F (36.6 °C)-98.3 °F (36.8 °C)] 98.3 °F (36.8 °C)  Pulse:  [] 100  Resp:  [18-22] 20  BP: (117-142)/(60-89) 117/87  SpO2:  [90 %-100 %] 100 %         Assessment & Plan:  #Acute on chronic hypoxic/hypercapnic respiratory failure 2/2 CHF/COPD, improved     #Acute on chronic diastolic heart failure improving  - increase lasix po?  - mgmt per cards, recheck weight.     #End stage COPD w/ acute exacerbation improving  -bipap prn/sleep, O2 NC  -steroids po, atrovent nebs, breo inhaler, mucinex per pulm, no albuterol due to tachycardia  -remains on 4-5L -discharged on this last admission also.     #Left Lung mass, Smoker  -pulmonary recommending OP PET-CT     #Rapid  aflutter  -amiodarone, cardizem CD and digoxin po   -Pending ALO ablation per EP   -Continue DOAC  -Tele  -Recently underwent DCCV      #Aortic stenosis s/p AVR   #Hyponatremia, resolved  #Prior VTE on xarelto  #Seizure disorder-Keppra  #Type 2 diabetes, 5.6%  #Steroid hyperglycemia-correctional/nutritional scale, hyperglycemia protocol  #History of RCC s/p nephrectomy  #ABIGAIL-CPAP protocol  #Seborrheic dermatitis, improved-topicals/shampoos.       Case d/w pt, RN. HHC/HHPT per SW.  Await ablation.  OP PET-CT. Dc planning.  F/u on weight/labs.     PULMONARY NOTE    Subjective:  Keny Marshall is a(n) 63 year old male remains afebrile  Feels good this a.m. minimal coughing  Did well on BiPAP overnight with download pending  Awaiting ablation  Download with AHI of 1     Objective:  /87 (BP Location: Left arm)   Pulse 92   Temp 98.3 °F (36.8 °C) (Oral)   Resp 20   Wt 162 lb 7.7 oz (73.7 kg)   SpO2 100%   BMI 23.31 kg/m² remains on 4-1/2 to 5 L        Temp (24hrs), Av °F (36.7 °C), Min:97.8 °F (36.6 °C), Max:98.3 °F (36.8 °C)        Intake/Output:     Intake/Output Summary (Last 24 hours) at 2024 1005  Last data filed at 2024 0740      Gross per 24 hour   Intake 240 ml   Output 1552 ml   Net -1312 ml          Assessment:  #1. Acute on chronic hypercapnic and hypoxic respiratory failure  Multifactorial due to afib/flutter with RVR, CHF exacerbation with underlying end stage COPD   CTA with fluid overload, no PE  Cardiac sensitivity to beta agonist anticholinergics --has been off yet heart rate continues high with plans to resume for his shortness of breath -continue on pred oral taper  Wean o2 for goal saturations of 88-92% given hypercapnia-remains on 4 to 5 L  ABG now with significant improvement doing well on BIPAP (related to cost )     #2. Acute CHF exacerbation, aflutter with RVR  Rate control and diurese as per cardiology  Previous cardioversion 6/14  Plans in place to resume  amiodarone and proceed with ablation  Remains on anticoagulation     #3. COPD  Severe obstruction on previous PFTs 3/2021  off arformoterol given worsening afib/tachycardia  Plan to resume MDIs as heart rate remains  Use atrovent nebs PRN  He can use levalbuterol as outpatient as hospital does not stock it  He cannot tolerate albuterol due to palpitations, tremors and with afib with RVR; off duonebs  Continue oral pred taper  Continue roflumilast  Previously reviewed BLVR - not candidate at this time, reassess as outpatient     #4. Pulmonary nodules   7/1/2024 CT chest with worsening ALYSON lesion, possible scarring, rounded atelectasis vs neoplasm  Will need outpatient follow up including PET/CT     #5. Tobacco abuse  30+ pack years, active pipe smoker  Hold on LDCT given recent CT with worsening ALYSON lesion, will need PET/CT as above        Plan:  Discussed at length with Dr. Gayle from anesthesia as well as the patient at length.-Patient is aware that this procedure requires intubation and mechanical ventilation-plans for AVAPS postop following extubation-discussed with respiratory therapy they will have AVAPS available.  Discussed at length with Dr. Daniel from anesthesia plan to avoid super oxygenation as well--Patient is maximized medically for the proposed procedure and therefore cleared  Ongoing discharge planning with BiPAP to be ordered  CC

## 2024-07-13 LAB
ADENOVIRUS PCR:: NOT DETECTED
ANION GAP SERPL CALC-SCNC: <0 MMOL/L (ref 0–18)
ATRIAL RATE: 60 BPM
B PARAPERT DNA SPEC QL NAA+PROBE: NOT DETECTED
B PERT DNA SPEC QL NAA+PROBE: NOT DETECTED
BUN BLD-MCNC: 27 MG/DL (ref 9–23)
C PNEUM DNA SPEC QL NAA+PROBE: NOT DETECTED
CALCIUM BLD-MCNC: 8.4 MG/DL (ref 8.5–10.1)
CHLORIDE SERPL-SCNC: 97 MMOL/L (ref 98–112)
CO2 SERPL-SCNC: 43 MMOL/L (ref 21–32)
CORONAVIRUS 229E PCR:: NOT DETECTED
CORONAVIRUS HKU1 PCR:: NOT DETECTED
CORONAVIRUS NL63 PCR:: NOT DETECTED
CORONAVIRUS OC43 PCR:: NOT DETECTED
CREAT BLD-MCNC: 0.73 MG/DL
EGFRCR SERPLBLD CKD-EPI 2021: 102 ML/MIN/1.73M2 (ref 60–?)
FLUAV RNA SPEC QL NAA+PROBE: NOT DETECTED
FLUBV RNA SPEC QL NAA+PROBE: NOT DETECTED
GLUCOSE BLD-MCNC: 134 MG/DL (ref 70–99)
GLUCOSE BLD-MCNC: 179 MG/DL (ref 70–99)
GLUCOSE BLD-MCNC: 205 MG/DL (ref 70–99)
GLUCOSE BLD-MCNC: 216 MG/DL (ref 70–99)
GLUCOSE BLD-MCNC: 247 MG/DL (ref 70–99)
METAPNEUMOVIRUS PCR:: NOT DETECTED
MYCOPLASMA PNEUMONIA PCR:: NOT DETECTED
OSMOLALITY SERPL CALC.SUM OF ELEC: 296 MOSM/KG (ref 275–295)
P AXIS: 63 DEGREES
P-R INTERVAL: 190 MS
PARAINFLUENZA 1 PCR:: NOT DETECTED
PARAINFLUENZA 2 PCR:: NOT DETECTED
PARAINFLUENZA 3 PCR:: NOT DETECTED
PARAINFLUENZA 4 PCR:: NOT DETECTED
POTASSIUM SERPL-SCNC: 4.7 MMOL/L (ref 3.5–5.1)
Q-T INTERVAL: 480 MS
QRS DURATION: 170 MS
QTC CALCULATION (BEZET): 480 MS
R AXIS: 76 DEGREES
RHINOVIRUS/ENTERO PCR:: NOT DETECTED
RSV RNA SPEC QL NAA+PROBE: NOT DETECTED
SARS-COV-2 RNA NPH QL NAA+NON-PROBE: NOT DETECTED
SODIUM SERPL-SCNC: 138 MMOL/L (ref 136–145)
T AXIS: 126 DEGREES
VENTRICULAR RATE: 60 BPM

## 2024-07-13 PROCEDURE — 99232 SBSQ HOSP IP/OBS MODERATE 35: CPT | Performed by: INTERNAL MEDICINE

## 2024-07-13 PROCEDURE — 99233 SBSQ HOSP IP/OBS HIGH 50: CPT | Performed by: INTERNAL MEDICINE

## 2024-07-13 RX ORDER — FUROSEMIDE 20 MG/1
20 TABLET ORAL DAILY
Qty: 30 TABLET | Refills: 1 | Status: SHIPPED | OUTPATIENT
Start: 2024-07-14 | End: 2024-07-20

## 2024-07-13 RX ORDER — FUROSEMIDE 10 MG/ML
40 INJECTION INTRAMUSCULAR; INTRAVENOUS ONCE
Status: COMPLETED | OUTPATIENT
Start: 2024-07-13 | End: 2024-07-13

## 2024-07-13 RX ORDER — PREDNISONE 20 MG/1
20 TABLET ORAL ONCE
Status: COMPLETED | OUTPATIENT
Start: 2024-07-13 | End: 2024-07-13

## 2024-07-13 RX ORDER — METHYLPREDNISOLONE SODIUM SUCCINATE 40 MG/ML
40 INJECTION, POWDER, LYOPHILIZED, FOR SOLUTION INTRAMUSCULAR; INTRAVENOUS EVERY 8 HOURS
Status: DISCONTINUED | OUTPATIENT
Start: 2024-07-13 | End: 2024-07-16

## 2024-07-13 NOTE — PROGRESS NOTES
[facility]   part of [facility]     Hospitalist Progress Note     Keny Marshall Patient Status:  Inpatient    1960 MRN [id number]   Location [facility] 8NE-A Attending Devorah Allen MD   Hosp Day # 14 PCP Dustin Avina MD     Chief Complaint: SOB    Subjective:     Per nursing, patient with intermittent hypoxia overnight on BiPAP. Today afternoon, he reports chest congestion. Pulm ordered ABG.    Objective:    Review of Systems:   A comprehensive review of systems was completed; pertinent positive and negatives stated in subjective.    Vital signs:  Temp:  [97.5 °F (36.4 °C)-98.6 °F (37 °C)] 98.4 °F (36.9 °C)  Pulse:  [59-88] 84  Resp:  [17-23] 23  BP: (107-149)/(59-81) 147/78  SpO2:  [88 %-100 %] 100 %    Physical Exam:    General: Thin, chronically ill appearing  Respiratory: Diminished at bases  Cardiovascular: S1, S2, regular rate and rhythm  Abdomen: Soft, Non-tender, non-distended, positive bowel sounds  Neuro: No new focal deficits.   Extremities: No edema    Diagnostic Data:    Labs:  Recent Labs   Lab 24  1136   WBC 14.6*   HGB 10.3*   MCV 99.4   .0       Recent Labs   Lab 24  0539 24  1136 24  0627   * 115* 179*   BUN 22 20 27*   CREATSERUM 0.74 0.63* 0.73   CA 8.3* 8.6 8.4*    141 138   K 4.4 4.2 4.7   CL 98 99 97*   CO2 42.0* 42.0* 43.0*       Estimated Creatinine Clearance: 105.6 mL/min (based on SCr of 0.73 mg/dL).    No results for input(s): \"TROP\", \"TROPHS\", \"CK\" in the last 168 hours.    No results for input(s): \"PTP\", \"INR\" in the last 168 hours.               Microbiology    No results found for this visit on 24.      Imaging: Reviewed in Epic.    Medications:    predniSONE  20 mg Oral Daily with breakfast    ipratropium  500 mcg Nebulization Q6H WA    furosemide  20 mg Oral Daily    insulin aspart  1-20 Units Subcutaneous TID CC and HS    aspirin  81 mg Oral Daily    atorvastatin  20 mg Oral Nightly    guaiFENesin ER   1,200 mg Oral BID    levETIRAcetam  500 mg Oral BID    Roflumilast  500 mcg Oral Daily    rivaroxaban  20 mg Oral Daily with food    insulin aspart  1-10 Units Subcutaneous TID AC and HS    fluticasone furoate-vilanterol  1 puff Inhalation Daily       Assessment & Plan:      #Acute on chronic hypoxic/hypercapnic respiratory failure 2/2 CHF/COPD     #Acute on chronic diastolic heart failure improving  -Lasix 40 mg IV x 1 dose today     #End stage COPD w/ acute exacerbation improving  -bipap prn/sleep, O2 NC  -steroids po, atrovent nebs, breo inhaler, mucinex per pulm, no albuterol due to tachycardia  -remains on 4-5L -discharged on this last admission also.     #Left Lung mass, Smoker  -pulmonary recommending OP PET-CT     #Rapid aflutter - improved  -amiodarone, cardizem CD and digoxin po   -s/p ALO ablation per EP   -Continue DOAC  -Tele  -Recently underwent DCCV 6/14     #Aortic stenosis s/p AVR   #Hyponatremia, resolved  #Prior VTE on xarelto  #Seizure disorder-Keppra  #Type 2 diabetes, 5.6%  #Steroid hyperglycemia-correctional/nutritional scale, hyperglycemia protocol  #History of RCC s/p nephrectomy  #ABIGAIL-CPAP protocol  #Seborrheic dermatitis, improved-topicals/shampoos.        Devorah Allen MD    Supplementary Documentation:     Quality:  DVT Mechanical Prophylaxis:     Early ambuation  DVT Pharmacologic Prophylaxis   Medication    rivaroxaban (Xarelto) tab 20 mg         DVT Pharmacologic prophylaxis: Aspirin 81 mg      Code Status: Full Code  Peters: No urinary catheter in place  Peters Duration (in days):   Central line:    ARUN:     Discharge is dependent on: course  At this point Mr. Marshall is expected to be discharge to: home    The 21st Century Cures Act makes medical notes like these available to patients in the interest of transparency. Please be advised this is a medical document. Medical documents are intended to carry relevant information, facts as evident, and the clinical opinion of the practitioner.  The medical note is intended as peer to peer communication and may appear blunt or direct. It is written in medical language and may contain abbreviations or verbiage that are unfamiliar.

## 2024-07-13 NOTE — RESPIRATORY THERAPY NOTE
ABIGAIL - Equipment Use Daily Summary:                  . Set Mode: AUTOBIPAP                . Usage in hours: 6.3                . 90% Pressure (EPAP) level: 11                . 90% Insp.Pressure (IPAP): 20                . AHI: 0.5                . Supplemental Oxygen:  4  LPM                . Comments: PS 4-8

## 2024-07-13 NOTE — PROGRESS NOTES
[facility] Cardiology  Progress Note    Keny Marshall Patient Status:  Inpatient    1960 MRN [id number]   formerly [facility] 8NE-A Attending Alfredo Garcia MD   Hosp Day # 14 PCP Dustin Avina MD       Impression;  Aflutter-  adv COPD/exacerbation. s/p failed recent CV --> A flutter ablation 24 ([place])   SOB - 2/2 acute dCHF and COPD exac  Acute diastolic CHF - improved  S/p bio AVR  TTE (24): LVEF 55%, +DD, 23mm SAVR 3.3m/s, mean 22mmHg.  Plan:  AF/flutter s/p ablation yesterday. now off amiodarone, digoxin, diltiazem.     AC: rivaroxaban, continue.  diuresis: lasix 20mg po daily.   severe COPD/end-stage emphysema- prednisone inhaler therapy.   ok for dc from cv standpoint, f/u in office as scheduled.  cv meds reconciled.      Subjective:  no issues overnight. remains in SR after ablation yesterday.  hoping to go home.      Objective:  /61 (BP Location: Left arm)   Pulse 65   Temp 97.6 °F (36.4 °C) (Oral)   Resp 20   Wt 168 lb 8 oz (76.4 kg)   SpO2 92%   BMI 24.18 kg/m²   Temp (24hrs), Av.8 °F (36.6 °C), Min:97.5 °F (36.4 °C), Max:98.3 °F (36.8 °C)      Intake/Output Summary (Last 24 hours) at 2024 0705  Last data filed at 2024 0659  Gross per 24 hour   Intake 1050 ml   Output 500 ml   Net 550 ml     Wt Readings from Last 3 Encounters:   24 168 lb 8 oz (76.4 kg)   24 159 lb 6.3 oz (72.3 kg)   23 172 lb (78 kg)       General: Awake and alert; in no acute distress  Cardiac: regular rate and rhythm ; no murmurs/rubs/gallops are appreciated  Lungs: Coarse on auscultation bilaterally; no accessory muscle use  Abdomen: Soft, non-tender; bowel sounds are normoactive  Extremities: No clubbing/cyanosis; moves all 4 extremities normally    Current Facility-Administered Medications   Medication Dose Route Frequency    predniSONE (Deltasone) tab 20 mg  20 mg Oral Daily with breakfast    acetaminophen (Tylenol) tab 650 mg  650 mg Oral Q6H PRN     lidocaine-menthol 4-1 % patch 1 patch  1 patch Transdermal Daily PRN    ipratropium (Atrovent) 0.02 % nebulizer solution 500 mcg  500 mcg Nebulization Q6H WA    furosemide (Lasix) tab 20 mg  20 mg Oral Daily    metoprolol (Lopressor) 5 mg/5mL injection 5 mg  5 mg Intravenous Q6H PRN    selenium sulfide (Selsun) 2.5 % shampoo   Topical Daily PRN    insulin aspart (NovoLOG) 100 Units/mL FlexPen 1-20 Units  1-20 Units Subcutaneous TID CC and HS    aspirin DR tab 81 mg  81 mg Oral Daily    atorvastatin (Lipitor) tab 20 mg  20 mg Oral Nightly    guaiFENesin ER (Mucinex) 12 hr tab 1,200 mg  1,200 mg Oral BID    levETIRAcetam (Keppra) tab 500 mg  500 mg Oral BID    Roflumilast TABS 500 mcg  500 mcg Oral Daily    rivaroxaban (Xarelto) tab 20 mg  20 mg Oral Daily with food    glucose (Dex4) 15 GM/59ML oral liquid 15 g  15 g Oral Q15 Min PRN    Or    glucose (Glutose) 40% oral gel 15 g  15 g Oral Q15 Min PRN    Or    glucose-vitamin C (Dex-4) chewable tab 4 tablet  4 tablet Oral Q15 Min PRN    Or    dextrose 50% injection 50 mL  50 mL Intravenous Q15 Min PRN    Or    glucose (Dex4) 15 GM/59ML oral liquid 30 g  30 g Oral Q15 Min PRN    Or    glucose (Glutose) 40% oral gel 30 g  30 g Oral Q15 Min PRN    Or    glucose-vitamin C (Dex-4) chewable tab 8 tablet  8 tablet Oral Q15 Min PRN    insulin aspart (NovoLOG) 100 Units/mL FlexPen 1-10 Units  1-10 Units Subcutaneous TID AC and HS    melatonin tab 3 mg  3 mg Oral Nightly PRN    acetaminophen (Tylenol Extra Strength) tab 500 mg  500 mg Oral Q4H PRN    polyethylene glycol (PEG 3350) (Miralax) 17 g oral packet 17 g  17 g Oral Daily PRN    sennosides (Senokot) tab 17.2 mg  17.2 mg Oral Nightly PRN    bisacodyl (Dulcolax) 10 MG rectal suppository 10 mg  10 mg Rectal Daily PRN    fleet enema (Fleet) 7-19 GM/118ML rectal enema 133 mL  1 enema Rectal Once PRN    prochlorperazine (Compazine) 10 MG/2ML injection 5 mg  5 mg Intravenous Q8H PRN    fluticasone furoate-vilanterol (Breo  Ellipta) 200-25 MCG/ACT inhaler 1 puff  1 puff Inhalation Daily       Laboratory Data:  Lab Results   Component Value Date    WBC 14.6 07/12/2024    HGB 10.3 07/12/2024    HCT 33.9 07/12/2024    .0 07/12/2024       Lab Results   Component Value Date    INR 1.59 (H) 06/29/2024    INR 1.06 06/11/2024    INR 2.26 (H) 09/22/2021     Lab Results   Component Value Date     07/12/2024    K 4.2 07/12/2024    CL 99 07/12/2024    CO2 42.0 07/12/2024    BUN 20 07/12/2024    CREATSERUM 0.63 07/12/2024     07/12/2024    CA 8.6 07/12/2024           Telemetry:       Thank you for allowing our practice to participate in the care of your patient. Please do not hesitate to contact me if you have any questions.

## 2024-07-13 NOTE — PLAN OF CARE
Patient is alert, oriented x4. On 4.5L highflow during the day and bipap at night. Patient is SBA. Noted bruises in BUE and BLE. Edema on bilateral feet. Insulin given per meal coverage. Right groin remains soft and nice. Tylenol given for headache-effective. Tele showing SR. Vitally stable. All concern addressed. Kept monitored.       Problem: RESPIRATORY - ADULT  Goal: Achieves optimal ventilation and oxygenation  Description: INTERVENTIONS:  - Assess for changes in respiratory status  - Assess for changes in mentation and behavior  - Position to facilitate oxygenation and minimize respiratory effort  - Oxygen supplementation based on oxygen saturation or ABGs  - Provide Smoking Cessation handout, if applicable  - Encourage broncho-pulmonary hygiene including cough, deep breathe, Incentive Spirometry  - Assess the need for suctioning and perform as needed  - Assess and instruct to report SOB or any respiratory difficulty  - Respiratory Therapy support as indicated  - Manage/alleviate anxiety  - Monitor for signs/symptoms of CO2 retention  Outcome: Progressing     Problem: CARDIOVASCULAR - ADULT  Goal: Maintains optimal cardiac output and hemodynamic stability  Description: INTERVENTIONS:  - Monitor vital signs, rhythm, and trends  - Monitor for bleeding, hypotension and signs of decreased cardiac output  - Evaluate effectiveness of vasoactive medications to optimize hemodynamic stability  - Monitor arterial and/or venous puncture sites for bleeding and/or hematoma  - Assess quality of pulses, skin color and temperature  - Assess for signs of decreased coronary artery perfusion - ex. Angina  - Evaluate fluid balance, assess for edema, trend weights  Outcome: Progressing     Problem: SAFETY ADULT - FALL  Goal: Free from fall injury  Description: INTERVENTIONS:  - Assess pt frequently for physical needs  - Identify cognitive and physical deficits and behaviors that affect risk of falls.  - [place] fall precautions  as indicated by assessment.  - Educate pt/family on patient safety including physical limitations  - Instruct pt to call for assistance with activity based on assessment  - Modify environment to reduce risk of injury  - Provide assistive devices as appropriate  - Consider OT/PT consult to assist with strengthening/mobility  - Encourage toileting schedule  Outcome: Progressing     Problem: PAIN - ADULT  Goal: Verbalizes/displays adequate comfort level or patient's stated pain goal  Description: INTERVENTIONS:  - Encourage pt to monitor pain and request assistance  - Assess pain using appropriate pain scale  - Administer analgesics based on type and severity of pain and evaluate response  - Implement non-pharmacological measures as appropriate and evaluate response  - Consider cultural and social influences on pain and pain management  - Manage/alleviate anxiety  - Utilize distraction and/or relaxation techniques  - Monitor for opioid side effects  - Notify MD/LIP if interventions unsuccessful or patient reports new pain  - Anticipate increased pain with activity and pre-medicate as appropriate  Outcome: Progressing

## 2024-07-13 NOTE — PROGRESS NOTES
Received patient in bed with family at bedside at 1930 Alert and oriented. Vitals stable. Patient urinated. Right groin, soft, no hematoma, no bleeding, palpable pulse. Up in chair. Report given to incoming nurse.

## 2024-07-13 NOTE — PLAN OF CARE
Assumed care of patient at 0730.  Alert and oriented x4.  On home O2 baseline of 4.5L, adequate O2 saturations.  NSR on tele. No complaints of chest pain.  Continent of bowel and bladder.  Up ad anuel.   Pt updated on plan of care, verbalized understanding.    Addendum:   1535: Respiratory therapy unable to obtain ABG after 4 attempts by 2 different therapists, Dr. Crum notified, okay to hold off on ABG per her.    ~1845: Pt c/o CHRISTI, O2 sats in 70s in chair on Bipap- respiratory called, see their note. Put on AVAPs and improved. Dr. Crum notified face to face.    Problem: RESPIRATORY - ADULT  Goal: Achieves optimal ventilation and oxygenation  Description: INTERVENTIONS:  - Assess for changes in respiratory status  - Assess for changes in mentation and behavior  - Position to facilitate oxygenation and minimize respiratory effort  - Oxygen supplementation based on oxygen saturation or ABGs  - Provide Smoking Cessation handout, if applicable  - Encourage broncho-pulmonary hygiene including cough, deep breathe, Incentive Spirometry  - Assess the need for suctioning and perform as needed  - Assess and instruct to report SOB or any respiratory difficulty  - Respiratory Therapy support as indicated  - Manage/alleviate anxiety  - Monitor for signs/symptoms of CO2 retention  Outcome: Progressing     Problem: CARDIOVASCULAR - ADULT  Goal: Maintains optimal cardiac output and hemodynamic stability  Description: INTERVENTIONS:  - Monitor vital signs, rhythm, and trends  - Monitor for bleeding, hypotension and signs of decreased cardiac output  - Evaluate effectiveness of vasoactive medications to optimize hemodynamic stability  - Monitor arterial and/or venous puncture sites for bleeding and/or hematoma  - Assess quality of pulses, skin color and temperature  - Assess for signs of decreased coronary artery perfusion - ex. Angina  - Evaluate fluid balance, assess for edema, trend weights  Outcome: Progressing  Goal: Absence of  cardiac arrhythmias or at baseline  Description: INTERVENTIONS:  - Continuous cardiac monitoring, monitor vital signs, obtain 12 lead EKG if indicated  - Evaluate effectiveness of antiarrhythmic and heart rate control medications as ordered  - Initiate emergency measures for life threatening arrhythmias  - Monitor electrolytes and administer replacement therapy as ordered  Outcome: Progressing     Problem: SAFETY ADULT - FALL  Goal: Free from fall injury  Description: INTERVENTIONS:  - Assess pt frequently for physical needs  - Identify cognitive and physical deficits and behaviors that affect risk of falls.  - [place] fall precautions as indicated by assessment.  - Educate pt/family on patient safety including physical limitations  - Instruct pt to call for assistance with activity based on assessment  - Modify environment to reduce risk of injury  - Provide assistive devices as appropriate  - Consider OT/PT consult to assist with strengthening/mobility  - Encourage toileting schedule  Outcome: Progressing     Problem: PAIN - ADULT  Goal: Verbalizes/displays adequate comfort level or patient's stated pain goal  Description: INTERVENTIONS:  - Encourage pt to monitor pain and request assistance  - Assess pain using appropriate pain scale  - Administer analgesics based on type and severity of pain and evaluate response  - Implement non-pharmacological measures as appropriate and evaluate response  - Consider cultural and social influences on pain and pain management  - Manage/alleviate anxiety  - Utilize distraction and/or relaxation techniques  - Monitor for opioid side effects  - Notify MD/LIP if interventions unsuccessful or patient reports new pain  - Anticipate increased pain with activity and pre-medicate as appropriate  Outcome: Progressing

## 2024-07-13 NOTE — PROGRESS NOTES
[facility]  Progress Note    Keny Marshall Patient Status:  Inpatient    1960 MRN [id number]   Location [facility] 8NE-A Attending Devorah Allen MD   Hosp Day # 14 PCP Dustin Avina MD     Subjective:  Keny Marshall is a(n) 63 year old male patient is anxious for discharge  Nursing however reported sats intermittently low on BiPAP overnight    Objective:  /81 (BP Location: Right arm)   Pulse 88   Temp 98.6 °F (37 °C) (Oral)   Resp 17   Wt 168 lb 8 oz (76.4 kg)   SpO2 (!) 88%   BMI 24.18 kg/m² desats to 89% on 4-1/2 L      Temp (24hrs), Av.8 °F (36.6 °C), Min:97.5 °F (36.4 °C), Max:98.6 °F (37 °C)      Intake/Output:    Intake/Output Summary (Last 24 hours) at 2024 1416  Last data filed at 2024 1200  Gross per 24 hour   Intake 1050 ml   Output 550 ml   Net 500 ml       Physical Exam:   General: alert, cooperative, oriented.  Bit more tachypneic at the moment   Head: Normocephalic, without obvious abnormality, atraumatic.   Throat: Lips, mucosa, and tongue normal.  No thrush noted.   Neck: trachea midline, no adenopathy, no thyromegaly. No JVD.   Lungs: Slight otherwise unchanged   Chest wall: No tenderness or deformity.   Heart: Heart rate now 88   Abdomen: soft, non-distended, no masses, no guarding, no     Rebound.  No diarrhea   Extremity: Increasing edema   Skin: No rashes or lesions.   Neurological: Alert, interactive, no focal deficits    Lab Data Review:  Recent Labs     24  1136   WBC 14.6*   HGB 10.3*   .0     Recent Labs     24  1136 24  0627    138   K 4.2 4.7   CL 99 97*   CO2 42.0* 43.0*   BUN 20 27*   CREATSERUM 0.63* 0.73     No results for input(s): \"PTP\", \"INR\", \"PTT\" in the last 168 hours.    Cultures: Reviewed    Radiology:  No results found.  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 (HCC)    Medial meniscus tear, right, initial encounter    Primary osteoarthritis of right knee              GLOBAL EXP 6-25-16 / RIGHT KNEE / TRK     Acute medial meniscus tear of right knee            GLOBAL EXP 6-25-16 / RIGHT KNEE / TRK     Acute lateral meniscus tear of left knee               GLOBAL EXP 6-25-16 / LEFT KNEE / TRK     Bursitis of left shoulder             GLOBAL EXP 6-25-16 / LEFT SHOULDER / TRK     Moderate aortic stenosis    Stage 3 severe COPD by GOLD classification ([facility])    Hyperglycemia    Hypervolemia    Hypervolemia, unspecified hypervolemia type    Dyspnea, unspecified type    Hypoxemia    Atrial fibrillation with RVR (HCC)    Acute heart failure with preserved ejection fraction (HFpEF) (HCC)    Atrial flutter with rapid ventricular response (HCC)    Acute on chronic congestive heart failure (HCC)    Acute on chronic congestive heart failure, unspecified heart failure type (HCC)    Hypoxia    Atrial fibrillation with rapid ventricular response (HCC)    Pleural effusion    Hypokalemia    Acute respiratory failure with hypoxia ([facility])    Normocytic anemia    Chronic heart failure with preserved ejection fraction (HCC)    Leukocytosis (leucocytosis)    Aortic valve regurgitation    HFrEF (heart failure with reduced ejection fraction) (HCC)    Chronic hypoxemic respiratory failure (HCC)    ABIGAIL (obstructive sleep apnea)    COPD exacerbation (HCC)    Acute on chronic respiratory failure with hypoxia (HCC)    Acute on chronic respiratory failure with hypoxia and hypercapnia (HCC)    Chronic hypercapnic respiratory failure (HCC)    Smoking greater than 20 pack years    Hyponatremia    Hyperkalemia    Chronic obstructive pulmonary disease, unspecified COPD type (HCC)    Supplemental oxygen dependent       Assessment:    #1. Acute on chronic hypercapnic and hypoxic respiratory failure  Multifactorial  due to afib/flutter with RVR, CHF exacerbation with underlying end stage COPD  7/1 CTA with fluid overload, no PE  Cardiac sensitivity to beta agonist anticholinergics --has been off yet heart rate continues high with plans to resume for his shortness of breath -continue on pred oral taper  Wean o2 for goal saturations of 88-92% given hypercapnia-remains on 4 to 5 L  ABG now with significant improvement doing well on BIPAP (related to cost )  Now with increasing hypoxia while on BiPAP overnight-check ABG and download     #2. Acute CHF exacerbation, aflutter with RVR  Rate control and diurese as per cardiology  Previous cardioversion 6/14  Plans in place to resume amiodarone and proceed with ablation  Remains on anticoagulation  Tolerated ablation with Zia up BiPAP     #3. COPD  Severe obstruction on previous PFTs 3/2021  off arformoterol given worsening afib/tachycardia  Plan to resume MDIs as heart rate remains  Use atrovent nebs PRN  He can use levalbuterol as outpatient as hospital does not stock it  He cannot tolerate albuterol due to palpitations, tremors and with afib with RVR; off duonebs  Continue oral pred taper  Continue roflumilast  Previously reviewed BLVR - not candidate at this time, reassess as outpatient     #4. Pulmonary nodules   7/1/2024 CT chest with worsening ALYSON lesion, possible scarring, rounded atelectasis vs neoplasm  Will need outpatient follow up including PET/CT     #5. Tobacco abuse  30+ pack years, active pipe smoker  Hold on LDCT given recent CT with worsening ALYSON lesion, will need PET/CT as above     Plan:  Increase in steroid dosing  Check ABG  Will get download  Discussed at length patient anxious for discharge-awaiting ABG    CC     Pat Crum MD  7/13/2024  2:16 PM

## 2024-07-14 ENCOUNTER — APPOINTMENT (OUTPATIENT)
Dept: GENERAL RADIOLOGY | Facility: HOSPITAL | Age: 64
End: 2024-07-14
Attending: INTERNAL MEDICINE
Payer: MEDICARE

## 2024-07-14 LAB
ANION GAP SERPL CALC-SCNC: 0 MMOL/L (ref 0–18)
BUN BLD-MCNC: 22 MG/DL (ref 9–23)
CALCIUM BLD-MCNC: 8.6 MG/DL (ref 8.5–10.1)
CHLORIDE SERPL-SCNC: 95 MMOL/L (ref 98–112)
CO2 SERPL-SCNC: 41 MMOL/L (ref 21–32)
CREAT BLD-MCNC: 0.54 MG/DL
EGFRCR SERPLBLD CKD-EPI 2021: 111 ML/MIN/1.73M2 (ref 60–?)
GLUCOSE BLD-MCNC: 109 MG/DL (ref 70–99)
GLUCOSE BLD-MCNC: 141 MG/DL (ref 70–99)
GLUCOSE BLD-MCNC: 174 MG/DL (ref 70–99)
GLUCOSE BLD-MCNC: 253 MG/DL (ref 70–99)
OSMOLALITY SERPL CALC.SUM OF ELEC: 288 MOSM/KG (ref 275–295)
POTASSIUM SERPL-SCNC: 4.9 MMOL/L (ref 3.5–5.1)
PROCALCITONIN SERPL-MCNC: <0.05 NG/ML (ref ?–0.16)
SODIUM SERPL-SCNC: 136 MMOL/L (ref 136–145)

## 2024-07-14 PROCEDURE — 71045 X-RAY EXAM CHEST 1 VIEW: CPT | Performed by: INTERNAL MEDICINE

## 2024-07-14 PROCEDURE — 99233 SBSQ HOSP IP/OBS HIGH 50: CPT | Performed by: INTERNAL MEDICINE

## 2024-07-14 RX ORDER — FUROSEMIDE 10 MG/ML
40 INJECTION INTRAMUSCULAR; INTRAVENOUS
Status: DISCONTINUED | OUTPATIENT
Start: 2024-07-14 | End: 2024-07-20

## 2024-07-14 RX ORDER — DILTIAZEM HYDROCHLORIDE 5 MG/ML
10 INJECTION INTRAVENOUS ONCE
Status: COMPLETED | OUTPATIENT
Start: 2024-07-14 | End: 2024-07-14

## 2024-07-14 NOTE — PLAN OF CARE
Received patient at 0730. Alert and Oriented x4. Tele Rhythm SR with BBB. O2 saturation 96% On 4.5 L O2, which is his baseline at home. Breath sounds diminished with crackles in bases, shortness of breath noted with exertion. Productive cough.  Bed is locked and in low position. Call light and personal items within reach. Pt voiding with no issue. Pt ambulating with SBA. Right groin site CDI, dressing removed. Pt has BLE swelling noted and scattered bruising. On IV lasix and IV solumedrol.  called at patient's request for support for pt and wife as they had to put down their cat today. Reviewed plan of care and patient and wife verbalize understanding.      Problem: RESPIRATORY - ADULT  Goal: Achieves optimal ventilation and oxygenation  Description: INTERVENTIONS:  - Assess for changes in respiratory status  - Assess for changes in mentation and behavior  - Position to facilitate oxygenation and minimize respiratory effort  - Oxygen supplementation based on oxygen saturation or ABGs  - Provide Smoking Cessation handout, if applicable  - Encourage broncho-pulmonary hygiene including cough, deep breathe, Incentive Spirometry  - Assess the need for suctioning and perform as needed  - Assess and instruct to report SOB or any respiratory difficulty  - Respiratory Therapy support as indicated  - Manage/alleviate anxiety  - Monitor for signs/symptoms of CO2 retention  Outcome: Progressing     Problem: CARDIOVASCULAR - ADULT  Goal: Maintains optimal cardiac output and hemodynamic stability  Description: INTERVENTIONS:  - Monitor vital signs, rhythm, and trends  - Monitor for bleeding, hypotension and signs of decreased cardiac output  - Evaluate effectiveness of vasoactive medications to optimize hemodynamic stability  - Monitor arterial and/or venous puncture sites for bleeding and/or hematoma  - Assess quality of pulses, skin color and temperature  - Assess for signs of decreased coronary artery perfusion - ex.  Angina  - Evaluate fluid balance, assess for edema, trend weights  Outcome: Progressing  Goal: Absence of cardiac arrhythmias or at baseline  Description: INTERVENTIONS:  - Continuous cardiac monitoring, monitor vital signs, obtain 12 lead EKG if indicated  - Evaluate effectiveness of antiarrhythmic and heart rate control medications as ordered  - Initiate emergency measures for life threatening arrhythmias  - Monitor electrolytes and administer replacement therapy as ordered  Outcome: Progressing     Problem: SAFETY ADULT - FALL  Goal: Free from fall injury  Description: INTERVENTIONS:  - Assess pt frequently for physical needs  - Identify cognitive and physical deficits and behaviors that affect risk of falls.  - [place] fall precautions as indicated by assessment.  - Educate pt/family on patient safety including physical limitations  - Instruct pt to call for assistance with activity based on assessment  - Modify environment to reduce risk of injury  - Provide assistive devices as appropriate  - Consider OT/PT consult to assist with strengthening/mobility  - Encourage toileting schedule  Outcome: Progressing     Problem: PAIN - ADULT  Goal: Verbalizes/displays adequate comfort level or patient's stated pain goal  Description: INTERVENTIONS:  - Encourage pt to monitor pain and request assistance  - Assess pain using appropriate pain scale  - Administer analgesics based on type and severity of pain and evaluate response  - Implement non-pharmacological measures as appropriate and evaluate response  - Consider cultural and social influences on pain and pain management  - Manage/alleviate anxiety  - Utilize distraction and/or relaxation techniques  - Monitor for opioid side effects  - Notify MD/LIP if interventions unsuccessful or patient reports new pain  - Anticipate increased pain with activity and pre-medicate as appropriate  Outcome: Progressing     Problem: Patient/Family Goals  Goal: Patient/Family Long Term  Goal  Description: Patient's Long Term Goal: to go home    Interventions:  - O2, bipap, IV steroids, diuretics, labs, increase activity    - See additional Care Plan goals for specific interventions  Outcome: Progressing  Goal: Patient/Family Short Term Goal  Description: Patient's Short Term Goal: feel better    Interventions:   - - O2, bipap, IV steroids, diuretics, labs, increase activity    - See additional Care Plan goals for specific interventions  Outcome: Progressing

## 2024-07-14 NOTE — PROGRESS NOTES
07/14/24 1300   Mobility   Activity Ambulate in [place]   Level of Assistance Standby assist, set-up cues, supervision of patient - no hands on   Assistive Device Front wheel walker   Distance (ft) 250   Ambulation Response Tolerated fairly well  (short of breath with exertion)   Repositioned Up in chair   SPO2% on Oxygen at Rest 95   At rest oxygen flow (liters per minute) 4.5   SPO2% Ambulation on Oxygen 90   Ambulation oxygen flow (liters per minute) 6

## 2024-07-14 NOTE — PROGRESS NOTES
[facility]  Progress Note    Keny Marshall Patient Status:  Inpatient    1960 MRN [id number]   Location [facility] 8NE-A Attending Devorah Allen MD   Hosp Day # 15 PCP Dustin Avina MD     Subjective:  Keny Marshall is a(n) 63 year old male remains afebrile  Feels better today but concerned about coughing up some green secretions  Notes to desat when up to the bathroom on 4-1/2 L currently 88% in recovery    Objective:  /76 (BP Location: Right arm)   Pulse 92   Temp 98.8 °F (37.1 °C) (Oral)   Resp 20   Wt 163 lb 14.4 oz (74.3 kg)   SpO2 99%   BMI 23.52 kg/m² remains on 4-1/2 L      Temp (24hrs), Av °F (36.7 °C), Min:97 °F (36.1 °C), Max:98.8 °F (37.1 °C)      Intake/Output:    Intake/Output Summary (Last 24 hours) at 2024 1502  Last data filed at 2024 1341  Gross per 24 hour   Intake 400 ml   Output 2845 ml   Net -2445 ml       Physical Exam:   General: alert, cooperative, oriented.  No respiratory distress.   Head: Normocephalic, without obvious abnormality, atraumatic.   Throat: Lips, mucosa, and tongue normal.  No thrush noted.  Poor dentition   Neck: trachea midline, no adenopathy, no thyromegaly. No JVD.   Lungs: Diminished tones bilaterally rare sense of rhonchi ongoing basilar rales   Chest wall: No tenderness or deformity.   Heart: Regular rate and rhythm rate currently 90   Abdomen: soft, non-distended, no masses, no guarding, no     Rebound.  No diarrhea   Extremity: Increasing edema bilaterally   Skin: No rashes or lesions.   Neurological: Alert, interactive, no focal deficits    Lab Data Review:  Recent Labs     24  1136   WBC 14.6*   HGB 10.3*   .0     Recent Labs     24  1136 24  0627 24  0530    138 136   K 4.2 4.7 4.9   CL 99 97* 95*   CO2 42.0* 43.0* 41.0*   BUN 20 27* 22   CREATSERUM 0.63* 0.73 0.54*     No results for input(s): \"PTP\", \"INR\", \"PTT\" in the last 168 hours.    Cultures: Sputum cultures pending +1  WBCs    Radiology:  XR CHEST AP PORTABLE  (CPT=71045)    Result Date: 7/14/2024  CONCLUSION:   Postoperative changes of cardiothoracic surgery with stable cardiac and mediastinal contours.  Recent CT of 07/01/2024 better demonstrates upper lobe predominant emphysema.  Bilateral calcified pleural plaques with persistent diffuse interstitial and bronchial wall thickening.  This may reflect some combination of interstitial pulmonary edema, bronchitis, or reactive airway disease.  Suspected parenchymal scarring of the peripheral left mid lung adjacent to a densely calcified pleural plaque.  Small bilateral pleural effusions persist.  No pneumothorax.  Overall, findings are not substantially changed compared to 07/04/2024.   LOCATION:  [facility]      Dictated by (CST): Jenn Guerra MD on 7/14/2024 at 7:34 AM     Finalized by (CST): Jenn Guerra MD on 7/14/2024 at 7:38 AM      Chest x-ray reviewed no evidence of significant change evolving effusion or evolving infiltrate when compared to 7/4      Medications reviewed     Assessment and Plan:   Patient Active Problem List   Diagnosis    Anemia    HTN (hypertension)    DM2 (diabetes mellitus, type 2) ([facility])    COPD (chronic obstructive pulmonary disease) ([facility])    Clear cell renal cell carcinoma of left kidney ([facility])    Medial meniscus tear, right, initial encounter    Primary osteoarthritis of right knee              GLOBAL EXP 6-25-16 / RIGHT KNEE / TRK     Acute medial meniscus tear of right knee            GLOBAL EXP 6-25-16 / RIGHT KNEE / TRK     Acute lateral meniscus tear of left knee               GLOBAL EXP 6-25-16 / LEFT KNEE / TRK     Bursitis of left shoulder             GLOBAL EXP 6-25-16 / LEFT SHOULDER / TRK     Moderate aortic stenosis    Stage 3 severe COPD by GOLD classification ([facility])    Hyperglycemia    Hypervolemia    Hypervolemia, unspecified hypervolemia type    Dyspnea, unspecified type    Hypoxemia    Atrial fibrillation with RVR ([facility])    Acute heart failure  with preserved ejection fraction (HFpEF) (HCC)    Atrial flutter with rapid ventricular response (HCC)    Acute on chronic congestive heart failure (HCC)    Acute on chronic congestive heart failure, unspecified heart failure type (HCC)    Hypoxia    Atrial fibrillation with rapid ventricular response (HCC)    Pleural effusion    Hypokalemia    Acute respiratory failure with hypoxia (HCC)    Normocytic anemia    Chronic heart failure with preserved ejection fraction (HCC)    Leukocytosis (leucocytosis)    Aortic valve regurgitation    HFrEF (heart failure with reduced ejection fraction) (HCC)    Chronic hypoxemic respiratory failure (HCC)    ABIGAIL (obstructive sleep apnea)    COPD exacerbation (HCC)    Acute on chronic respiratory failure with hypoxia (HCC)    Acute on chronic respiratory failure with hypoxia and hypercapnia (HCC)    Chronic hypercapnic respiratory failure (HCC)    Smoking greater than 20 pack years    Hyponatremia    Hyperkalemia    Chronic obstructive pulmonary disease, unspecified COPD type (HCC)    Supplemental oxygen dependent       Assessment:  #1. Acute on chronic hypercapnic and hypoxic respiratory failure  Multifactorial due to afib/flutter with RVR, CHF exacerbation with underlying end stage COPD  7/1 CTA with fluid overload, no PE  Cardiac sensitivity to beta agonist anticholinergics --has been off yet heart rate continues high with plans to resume for his shortness of breath -continue on pred oral taper  Wean o2 for goal saturations of 88-92% given hypercapnia-remains on 4 to 5 L  ABG now with significant improvement doing well on BIPAP (related to cost )  Remains with increased O2 needs post procedure     #2. Acute CHF exacerbation, aflutter with RVR  Rate control and diurese as per cardiology  Previous cardioversion 6/14  Plans in place to resume amiodarone and proceed with ablation  Remains on anticoagulation  Tolerated ablation with BiPAP     #3. COPD  Severe obstruction on previous  PFTs 3/2021  off arformoterol given worsening afib/tachycardia  Plan to resume MDIs as heart rate remains  Use atrovent nebs PRN  He can use levalbuterol as outpatient as hospital does not stock it  He cannot tolerate albuterol due to palpitations, tremors and with afib with RVR; off duonebs  Continue oral pred taper  Continue roflumilast  Previously reviewed BLVR - not candidate at this time, reassess as outpatient  Now with increasing flare post procedure rule out bronchitis versus pneumonia-chest x-ray without significant new infiltrate --empirically add antibiotics       #4. Pulmonary nodules   7/1/2024 CT chest with worsening ALYSON lesion, possible scarring, rounded atelectasis vs neoplasm  Will need outpatient follow up including PET/CT     #5. Tobacco abuse  30+ pack years, active pipe smoker  Hold on LDCT given recent CT with worsening ALYSON lesion, will need PET/CT as above       Plan:  Empirically add antibiotics with sputum cultures pending for component of bronchitis  Slight decrease in prednisone dosing  Ongoing diuresis-with twice daily Lasix  Continuing BiPAP at this time    CC     Pat Crum MD  7/14/2024  3:02 PM

## 2024-07-14 NOTE — RESPIRATORY THERAPY NOTE
Patient was found in the chair and in respiratory distress and SpO2 was in the 70's. Patient placed on AVAPS and give a breathing neb tx. Patient gave me a sputum culture and respiratory panel

## 2024-07-14 NOTE — PLAN OF CARE
Assumed care at 1900. Patient is on AVAPS satting >90%. Patient is alert, oriented x4. Pt requested to remove his bipap and on high flow O2. RT and RN educated the pt not to eat since his oxygen is dropping and may need to put back on bipap and he is high risk for aspiration. pt not compliant and still ate. Insulin given per parameter. Pt is up with SBA. Tele showing NSR. Denies shortness of breath and chest pain. POC ongoing.     Problem: CARDIOVASCULAR - ADULT  Goal: Maintains optimal cardiac output and hemodynamic stability  Description: INTERVENTIONS:  - Monitor vital signs, rhythm, and trends  - Monitor for bleeding, hypotension and signs of decreased cardiac output  - Evaluate effectiveness of vasoactive medications to optimize hemodynamic stability  - Monitor arterial and/or venous puncture sites for bleeding and/or hematoma  - Assess quality of pulses, skin color and temperature  - Assess for signs of decreased coronary artery perfusion - ex. Angina  - Evaluate fluid balance, assess for edema, trend weights  Outcome: Progressing     Problem: SAFETY ADULT - FALL  Goal: Free from fall injury  Description: INTERVENTIONS:  - Assess pt frequently for physical needs  - Identify cognitive and physical deficits and behaviors that affect risk of falls.  - [place] fall precautions as indicated by assessment.  - Educate pt/family on patient safety including physical limitations  - Instruct pt to call for assistance with activity based on assessment  - Modify environment to reduce risk of injury  - Provide assistive devices as appropriate  - Consider OT/PT consult to assist with strengthening/mobility  - Encourage toileting schedule  Outcome: Progressing

## 2024-07-14 NOTE — PROGRESS NOTES
07/14/24 0326   BiPAP   $ RT Standby Charge (per 15 min) 1   Device V60   BiPAP bacteria filter Yes   BIPAP plugged into main power? Yes   Mode AVAPS   Interface Full face mask   Mask Size Large   Control Settings   Set Rate 12 breaths/min   Set EPAP 5   Oxygen Percent 40 %   Inspiratory time 0.9   Insp rise time 3   Max P 30   Min P 15      AVAPS   Min IPAP 15   Max IPAP 30   EPAP Level 5   Set rate 12   Tidal volume 550   BiPAP/CPAP Alarm Settings   Hi Rate 50   Low Rate 8   Hi VT 1200   Low    Hi Pressure 40   Low Pressure 10   Low Pressure Delay 20   Low MV 2   BiPAP/CPAP Monitored Parameters   PIP 15   Total Rate 12 breaths/min   Minute Volume 12   Tidal Volume 793   Total Leak 5   Trigger % 94   Ti/Ttot % 32   EPAP 5   Toleration Well     Received patient on the above AVAPS settings.Patient wore AVAPS throughout the night, and seemed totolerate well. Will continue to monitor.

## 2024-07-14 NOTE — PROGRESS NOTES
[facility]   part of [facility]     Hospitalist Progress Note     Keny Marshall Patient Status:  Inpatient    1960 MRN [id number]   Location [facility] 8NE-A Attending Devorah Allen MD   Hosp Day # 15 PCP Dustin Avina MD     Chief Complaint: SOB    Subjective:     Lost 5 lbs since yesterday. Slightly improvement in SOB. Reports thick white sputum production, more than his normal with cough.     Objective:    Review of Systems:   A comprehensive review of systems was completed; pertinent positive and negatives stated in subjective.    Vital signs:  Temp:  [97 °F (36.1 °C)-98.6 °F (37 °C)] 97.9 °F (36.6 °C)  Pulse:  [71-92] 92  Resp:  [17-23] 20  BP: (127-159)/(65-81) 137/75  SpO2:  [88 %-100 %] 95 %  FiO2 (%):  [65 %] 65 %    Physical Exam:    General: Thin, chronically ill appearing  Respiratory: Diminished at bases  Cardiovascular: S1, S2, regular rate and rhythm  Abdomen: Soft, Non-tender, non-distended, positive bowel sounds  Neuro: No new focal deficits.   Extremities: No edema    Diagnostic Data:    Labs:  Recent Labs   Lab 24  1136   WBC 14.6*   HGB 10.3*   MCV 99.4   .0       Recent Labs   Lab 24  1136 24  0627 24  0530   * 179* 141*   BUN 20 27* 22   CREATSERUM 0.63* 0.73 0.54*   CA 8.6 8.4* 8.6    138 136   K 4.2 4.7 4.9   CL 99 97* 95*   CO2 42.0* 43.0* 41.0*       Estimated Creatinine Clearance: 142.7 mL/min (A) (based on SCr of 0.54 mg/dL (L)).    No results for input(s): \"TROP\", \"TROPHS\", \"CK\" in the last 168 hours.    No results for input(s): \"PTP\", \"INR\" in the last 168 hours.               Microbiology    No results found for this visit on 24.      Imaging: Reviewed in Epic.    Medications:    furosemide  40 mg Intravenous BID (Diuretic)    methylPREDNISolone  40 mg Intravenous Q8H    ipratropium  500 mcg Nebulization Q6H WA    [Held by provider] furosemide  20 mg Oral Daily    insulin aspart  1-20 Units Subcutaneous TID  CC and HS    aspirin  81 mg Oral Daily    atorvastatin  20 mg Oral Nightly    guaiFENesin ER  1,200 mg Oral BID    levETIRAcetam  500 mg Oral BID    Roflumilast  500 mcg Oral Daily    rivaroxaban  20 mg Oral Daily with food    insulin aspart  1-10 Units Subcutaneous TID AC and HS    fluticasone furoate-vilanterol  1 puff Inhalation Daily       Assessment & Plan:      #Acute on chronic hypoxic/hypercapnic respiratory failure 2/2 CHF/COPD     #Acute on chronic diastolic heart failure   -Lasix 40 mg IV x BID     #End stage COPD w/ acute exacerbation improving  -bipap prn/sleep, O2 NC  -steroids po, atrovent nebs, breo inhaler, mucinex per pulm, no albuterol due to tachycardia  -remains on 4-5L -discharged on this last admission  -Pulm following  -Follow sputum culture     #Left Lung mass, Smoker  -pulmonary recommending OP PET-CT     #Rapid aflutter - improved  -amiodarone, cardizem CD and digoxin po   -s/p ALO ablation per EP   -Continue DOAC  -Tele  -Recently underwent DCCV 6/14     #Aortic stenosis s/p AVR   #Hyponatremia, resolved  #Prior VTE on xarelto  #Seizure disorder-Keppra  #Type 2 diabetes, 5.6%  #Steroid hyperglycemia-correctional/nutritional scale, hyperglycemia protocol  #History of RCC s/p nephrectomy  #ABIGAIL-CPAP protocol  #Seborrheic dermatitis, improved-topicals/shampoos.      Devorah Allen MD    Supplementary Documentation:     Quality:  DVT Mechanical Prophylaxis:     Early ambuation  DVT Pharmacologic Prophylaxis   Medication    rivaroxaban (Xarelto) tab 20 mg         DVT Pharmacologic prophylaxis: Aspirin 81 mg      Code Status: Full Code  Peters: No urinary catheter in place  Peters Duration (in days):   Central line:    ARUN:     Discharge is dependent on: course  At this point Mr. Marshall is expected to be discharge to: home    The 21st Century Cures Act makes medical notes like these available to patients in the interest of transparency. Please be advised this is a medical document. Medical  documents are intended to carry relevant information, facts as evident, and the clinical opinion of the practitioner. The medical note is intended as peer to peer communication and may appear blunt or direct. It is written in medical language and may contain abbreviations or verbiage that are unfamiliar.

## 2024-07-14 NOTE — SPIRITUAL CARE NOTE
Spiritual Care Visit Note    Patient Name: Keny Marshall Date of Spiritual Care Visit: 24   : 1960 Primary Dx: Atrial flutter with rapid ventricular response (HCC)       Referred By: Referral From: Nurse    Spiritual Care Taxonomy:    Intended Effects: Journeying with someone in the grief process    Methods: Collaborate with care team member;Offer emotional support;Offer spiritual/Sabianist support    Interventions: Acknowledge current situation;Active listening;Ask guided questions about the nature and presence of God;Ask guided questions;Explain  role;Discuss spirituality/Advent with someone;Identify supportive relationship(s);Invite someone to reminisce;Prayer for healing;Provde spiritual/Sabianist resources    Visit Type/Summary:  Responded to call from RN as patient is grieving the loss of a pet.  Provided a compassionate, listening presence for Keny and his wife.  They expressed concerns/situations that have added to compounded grief.  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  Both Keny and Yolis believe in God.  Keny asked for prayer and this was provided.     - Spiritual Care: Responded to a request for spiritual care and assessed the patient for spiritual care needs. Responded to a request via the on call phone Consulted with RN prior to visit. Offered empathic listening and emotional support. Provided resources related to grief and grieving. Patient and family expressed appreciation for  visit. Provided information regarding how to contact Spiritual Care and left a Spiritual Care information card. Provided support for Patient's spiritual/Sabianist requests. Offered prayer.  remains available for follow up.    Spiritual Care support can be requested via an Epic consult. For urgent/immediate needs, please contact the On Call  at: [facility]: ext [phone]

## 2024-07-14 NOTE — PROGRESS NOTES
[facility] Cardiology  Progress Note    Keny Marshall Patient Status:  Inpatient    1960 MRN [id number]   Location [facility] 8NE-A Attending Alfredo Garcia MD   Hosp Day # 15 PCP Dustin Avina MD       Impression;  Aflutter-  adv COPD/exacerbation. s/p failed recent CV --> A flutter ablation 24 ([place])   SOB - 2/2 acute dCHF and COPD exac  Acute diastolic CHF - improved  S/p bio AVR  TTE (24): LVEF 55%, +DD, 23mm SAVR 3.3m/s, mean 22mmHg.  Plan:  remains SR after A flutter ablation. now off amiodarone, digoxin, diltiazem.     AC: rivaroxaban, continue.  diuresis: increase lasix to 40mg IV BID (persistent hypoxia, increased O2 support, LE edema).    severe COPD/end-stage emphysema- prednisone, inhaler therapy.         Subjective:  no issues overnight, but O2 needs increased, legs swollen.  tele- SR.    Objective:  /65 (BP Location: Right arm)   Pulse 71   Temp 97.9 °F (36.6 °C) (Axillary)   Resp 20   Wt 163 lb 14.4 oz (74.3 kg)   SpO2 98%   BMI 23.52 kg/m²   Temp (24hrs), Av.8 °F (36.6 °C), Min:97 °F (36.1 °C), Max:98.6 °F (37 °C)      Intake/Output Summary (Last 24 hours) at 2024 0814  Last data filed at 2024 0657  Gross per 24 hour   Intake --   Output 1720 ml   Net -1720 ml     Wt Readings from Last 3 Encounters:   24 163 lb 14.4 oz (74.3 kg)   24 159 lb 6.3 oz (72.3 kg)   23 172 lb (78 kg)       General: Awake and alert; in no acute distress  Cardiac: regular rate and rhythm ; no murmurs/rubs/gallops are appreciated  Lungs: Coarse on auscultation bilaterally; no accessory muscle use  Abdomen: Soft, non-tender; bowel sounds are normoactive  Extremities: No clubbing/cyanosis; moves all 4 extremities normally    Current Facility-Administered Medications   Medication Dose Route Frequency    methylPREDNISolone sodium succinate (Solu-MEDROL) injection 40 mg  40 mg Intravenous Q8H    acetaminophen (Tylenol) tab 650 mg  650 mg Oral Q6H  PRN    lidocaine-menthol 4-1 % patch 1 patch  1 patch Transdermal Daily PRN    ipratropium (Atrovent) 0.02 % nebulizer solution 500 mcg  500 mcg Nebulization Q6H WA    furosemide (Lasix) tab 20 mg  20 mg Oral Daily    metoprolol (Lopressor) 5 mg/5mL injection 5 mg  5 mg Intravenous Q6H PRN    selenium sulfide (Selsun) 2.5 % shampoo   Topical Daily PRN    insulin aspart (NovoLOG) 100 Units/mL FlexPen 1-20 Units  1-20 Units Subcutaneous TID CC and HS    aspirin DR tab 81 mg  81 mg Oral Daily    atorvastatin (Lipitor) tab 20 mg  20 mg Oral Nightly    guaiFENesin ER (Mucinex) 12 hr tab 1,200 mg  1,200 mg Oral BID    levETIRAcetam (Keppra) tab 500 mg  500 mg Oral BID    Roflumilast TABS 500 mcg  500 mcg Oral Daily    rivaroxaban (Xarelto) tab 20 mg  20 mg Oral Daily with food    glucose (Dex4) 15 GM/59ML oral liquid 15 g  15 g Oral Q15 Min PRN    Or    glucose (Glutose) 40% oral gel 15 g  15 g Oral Q15 Min PRN    Or    glucose-vitamin C (Dex-4) chewable tab 4 tablet  4 tablet Oral Q15 Min PRN    Or    dextrose 50% injection 50 mL  50 mL Intravenous Q15 Min PRN    Or    glucose (Dex4) 15 GM/59ML oral liquid 30 g  30 g Oral Q15 Min PRN    Or    glucose (Glutose) 40% oral gel 30 g  30 g Oral Q15 Min PRN    Or    glucose-vitamin C (Dex-4) chewable tab 8 tablet  8 tablet Oral Q15 Min PRN    insulin aspart (NovoLOG) 100 Units/mL FlexPen 1-10 Units  1-10 Units Subcutaneous TID AC and HS    melatonin tab 3 mg  3 mg Oral Nightly PRN    acetaminophen (Tylenol Extra Strength) tab 500 mg  500 mg Oral Q4H PRN    polyethylene glycol (PEG 3350) (Miralax) 17 g oral packet 17 g  17 g Oral Daily PRN    sennosides (Senokot) tab 17.2 mg  17.2 mg Oral Nightly PRN    bisacodyl (Dulcolax) 10 MG rectal suppository 10 mg  10 mg Rectal Daily PRN    fleet enema (Fleet) 7-19 GM/118ML rectal enema 133 mL  1 enema Rectal Once PRN    prochlorperazine (Compazine) 10 MG/2ML injection 5 mg  5 mg Intravenous Q8H PRN    fluticasone furoate-vilanterol (Breo  Ellipta) 200-25 MCG/ACT inhaler 1 puff  1 puff Inhalation Daily       Laboratory Data:         Lab Results   Component Value Date    INR 1.59 (H) 06/29/2024    INR 1.06 06/11/2024    INR 2.26 (H) 09/22/2021     Lab Results   Component Value Date     07/14/2024    K 4.9 07/14/2024    CL 95 07/14/2024    CO2 41.0 07/14/2024    BUN 22 07/14/2024    CREATSERUM 0.54 07/14/2024     07/14/2024    CA 8.6 07/14/2024           Telemetry: SR      Thank you for allowing our practice to participate in the care of your patient. Please do not hesitate to contact me if you have any questions.

## 2024-07-15 ENCOUNTER — APPOINTMENT (OUTPATIENT)
Dept: GENERAL RADIOLOGY | Facility: HOSPITAL | Age: 64
End: 2024-07-15
Attending: INTERNAL MEDICINE
Payer: MEDICARE

## 2024-07-15 LAB
ANION GAP SERPL CALC-SCNC: 4 MMOL/L (ref 0–18)
ATRIAL RATE: 113 BPM
BASOPHILS # BLD AUTO: 0.01 X10(3) UL (ref 0–0.2)
BASOPHILS NFR BLD AUTO: 0.1 %
BUN BLD-MCNC: 25 MG/DL (ref 9–23)
CALCIUM BLD-MCNC: 8.9 MG/DL (ref 8.5–10.1)
CHLORIDE SERPL-SCNC: 96 MMOL/L (ref 98–112)
CO2 SERPL-SCNC: 42 MMOL/L (ref 21–32)
CREAT BLD-MCNC: 0.72 MG/DL
EGFRCR SERPLBLD CKD-EPI 2021: 102 ML/MIN/1.73M2 (ref 60–?)
EOSINOPHIL # BLD AUTO: 0 X10(3) UL (ref 0–0.7)
EOSINOPHIL NFR BLD AUTO: 0 %
ERYTHROCYTE [DISTWIDTH] IN BLOOD BY AUTOMATED COUNT: 13.8 %
GLUCOSE BLD-MCNC: 124 MG/DL (ref 70–99)
GLUCOSE BLD-MCNC: 136 MG/DL (ref 70–99)
GLUCOSE BLD-MCNC: 268 MG/DL (ref 70–99)
GLUCOSE BLD-MCNC: 277 MG/DL (ref 70–99)
GLUCOSE BLD-MCNC: 333 MG/DL (ref 70–99)
GLUCOSE BLD-MCNC: 388 MG/DL (ref 70–99)
GLUCOSE BLD-MCNC: 90 MG/DL (ref 70–99)
HCT VFR BLD AUTO: 39.9 %
HGB BLD-MCNC: 12.1 G/DL
IMM GRANULOCYTES # BLD AUTO: 0.06 X10(3) UL (ref 0–1)
IMM GRANULOCYTES NFR BLD: 0.6 %
LYMPHOCYTES # BLD AUTO: 0.43 X10(3) UL (ref 1–4)
LYMPHOCYTES NFR BLD AUTO: 4.3 %
MCH RBC QN AUTO: 30.3 PG (ref 26–34)
MCHC RBC AUTO-ENTMCNC: 30.3 G/DL (ref 31–37)
MCV RBC AUTO: 100 FL
MONOCYTES # BLD AUTO: 0.26 X10(3) UL (ref 0.1–1)
MONOCYTES NFR BLD AUTO: 2.6 %
NEUTROPHILS # BLD AUTO: 9.24 X10 (3) UL (ref 1.5–7.7)
NEUTROPHILS # BLD AUTO: 9.24 X10(3) UL (ref 1.5–7.7)
NEUTROPHILS NFR BLD AUTO: 92.4 %
OSMOLALITY SERPL CALC.SUM OF ELEC: 300 MOSM/KG (ref 275–295)
PLATELET # BLD AUTO: 307 10(3)UL (ref 150–450)
POTASSIUM SERPL-SCNC: 5.4 MMOL/L (ref 3.5–5.1)
Q-T INTERVAL: 332 MS
QRS DURATION: 160 MS
QTC CALCULATION (BEZET): 449 MS
R AXIS: 76 DEGREES
RBC # BLD AUTO: 3.99 X10(6)UL
SODIUM SERPL-SCNC: 142 MMOL/L (ref 136–145)
T AXIS: 218 DEGREES
VENTRICULAR RATE: 110 BPM
WBC # BLD AUTO: 10 X10(3) UL (ref 4–11)

## 2024-07-15 PROCEDURE — 71045 X-RAY EXAM CHEST 1 VIEW: CPT | Performed by: INTERNAL MEDICINE

## 2024-07-15 PROCEDURE — 99233 SBSQ HOSP IP/OBS HIGH 50: CPT | Performed by: INTERNAL MEDICINE

## 2024-07-15 RX ORDER — AMIODARONE HYDROCHLORIDE 200 MG/1
400 TABLET ORAL 3 TIMES DAILY
Status: DISCONTINUED | OUTPATIENT
Start: 2024-07-15 | End: 2024-07-20

## 2024-07-15 RX ORDER — INSULIN DEGLUDEC 100 U/ML
10 INJECTION, SOLUTION SUBCUTANEOUS DAILY
Status: DISCONTINUED | OUTPATIENT
Start: 2024-07-15 | End: 2024-07-15

## 2024-07-15 RX ORDER — INSULIN DEGLUDEC 100 U/ML
10 INJECTION, SOLUTION SUBCUTANEOUS NIGHTLY
Status: DISCONTINUED | OUTPATIENT
Start: 2024-07-15 | End: 2024-07-18

## 2024-07-15 NOTE — PROGRESS NOTES
[facility] Cardiology  Progress Note    Keny Marshall Patient Status:  Inpatient    1960 MRN [id number]   [facility] 8NE-A Attending Alfredo Garcia MD   Hosp Day # 16 PCP Dustin Avina MD       Impression;  Aflutter-  adv COPD/exacerbation. s/p failed recent CV --> A flutter ablation 24 ([place]). Unfortunately, flipped into AF (24)  SOB - 2/2 acute dCHF and COPD exac  Acute diastolic CHF - improved  S/p bio AVR  TTE (24): LVEF 55%, +DD, 23mm SAVR 3.3m/s, mean 22mmHg.  Plan:  AF: cardizem gtt, rates <110bm.  Restart amiodarone 400mg bid.  Discuss with EP.  AC: rivaroxaban, continue.  diuresis: continue lasix to 40mg IV BID    severe COPD/end-stage emphysema- prednisone, inhaler therapy.       Subjective:  back in AF overnight, disappointing.  restarted cardizem gtt.      Objective:  /77 (BP Location: Right arm)   Pulse 104   Temp 98.6 °F (37 °C) (Oral)   Resp 18   Wt 162 lb 4.1 oz (73.6 kg)   SpO2 94%   BMI 23.28 kg/m²   Temp (24hrs), Av.4 °F (36.9 °C), Min:97.7 °F (36.5 °C), Max:98.9 °F (37.2 °C)      Intake/Output Summary (Last 24 hours) at 7/15/2024 1210  Last data filed at 7/15/2024 1201  Gross per 24 hour   Intake 1510 ml   Output 3995 ml   Net -2485 ml     Wt Readings from Last 3 Encounters:   07/15/24 162 lb 4.1 oz (73.6 kg)   24 159 lb 6.3 oz (72.3 kg)   23 172 lb (78 kg)       General: Awake and alert; in no acute distress  Cardiac: irregularly irregular ; no murmurs/rubs/gallops are appreciated  Lungs: Coarse on auscultation bilaterally; no accessory muscle use  Abdomen: Soft, non-tender; bowel sounds are normoactive  Extremities: No clubbing/cyanosis; moves all 4 extremities normally    Current Facility-Administered Medications   Medication Dose Route Frequency    amiodarone (Pacerone) tab 400 mg  400 mg Oral TID    furosemide (Lasix) 10 mg/mL injection 40 mg  40 mg Intravenous BID (Diuretic)    cefTRIAXone (Rocephin) 1 g in  sodium chloride 0.9% 100 mL IVPB-ADDV  1 g Intravenous Q24H    dilTIAZem (cardIZEM) 100 mg in sodium chloride 0.9% 100 mL IVPB-ADDV  2.5-20 mg/hr Intravenous Continuous    methylPREDNISolone sodium succinate (Solu-MEDROL) injection 40 mg  40 mg Intravenous Q8H    acetaminophen (Tylenol) tab 650 mg  650 mg Oral Q6H PRN    lidocaine-menthol 4-1 % patch 1 patch  1 patch Transdermal Daily PRN    ipratropium (Atrovent) 0.02 % nebulizer solution 500 mcg  500 mcg Nebulization Q6H WA    [Held by provider] furosemide (Lasix) tab 20 mg  20 mg Oral Daily    metoprolol (Lopressor) 5 mg/5mL injection 5 mg  5 mg Intravenous Q6H PRN    selenium sulfide (Selsun) 2.5 % shampoo   Topical Daily PRN    insulin aspart (NovoLOG) 100 Units/mL FlexPen 1-20 Units  1-20 Units Subcutaneous TID CC and HS    aspirin DR tab 81 mg  81 mg Oral Daily    atorvastatin (Lipitor) tab 20 mg  20 mg Oral Nightly    guaiFENesin ER (Mucinex) 12 hr tab 1,200 mg  1,200 mg Oral BID    levETIRAcetam (Keppra) tab 500 mg  500 mg Oral BID    Roflumilast TABS 500 mcg  500 mcg Oral Daily    rivaroxaban (Xarelto) tab 20 mg  20 mg Oral Daily with food    glucose (Dex4) 15 GM/59ML oral liquid 15 g  15 g Oral Q15 Min PRN    Or    glucose (Glutose) 40% oral gel 15 g  15 g Oral Q15 Min PRN    Or    glucose-vitamin C (Dex-4) chewable tab 4 tablet  4 tablet Oral Q15 Min PRN    Or    dextrose 50% injection 50 mL  50 mL Intravenous Q15 Min PRN    Or    glucose (Dex4) 15 GM/59ML oral liquid 30 g  30 g Oral Q15 Min PRN    Or    glucose (Glutose) 40% oral gel 30 g  30 g Oral Q15 Min PRN    Or    glucose-vitamin C (Dex-4) chewable tab 8 tablet  8 tablet Oral Q15 Min PRN    insulin aspart (NovoLOG) 100 Units/mL FlexPen 1-10 Units  1-10 Units Subcutaneous TID AC and HS    melatonin tab 3 mg  3 mg Oral Nightly PRN    acetaminophen (Tylenol Extra Strength) tab 500 mg  500 mg Oral Q4H PRN    polyethylene glycol (PEG 3350) (Miralax) 17 g oral packet 17 g  17 g Oral Daily PRN     sennosides (Senokot) tab 17.2 mg  17.2 mg Oral Nightly PRN    bisacodyl (Dulcolax) 10 MG rectal suppository 10 mg  10 mg Rectal Daily PRN    fleet enema (Fleet) 7-19 GM/118ML rectal enema 133 mL  1 enema Rectal Once PRN    prochlorperazine (Compazine) 10 MG/2ML injection 5 mg  5 mg Intravenous Q8H PRN    fluticasone furoate-vilanterol (Breo Ellipta) 200-25 MCG/ACT inhaler 1 puff  1 puff Inhalation Daily       Laboratory Data:  Lab Results   Component Value Date    WBC 10.0 07/15/2024    HGB 12.1 07/15/2024    HCT 39.9 07/15/2024    .0 07/15/2024         Lab Results   Component Value Date    INR 1.59 (H) 06/29/2024    INR 1.06 06/11/2024    INR 2.26 (H) 09/22/2021     Lab Results   Component Value Date     07/15/2024    K 5.4 07/15/2024    CL 96 07/15/2024    CO2 42.0 07/15/2024    BUN 25 07/15/2024    CREATSERUM 0.72 07/15/2024     07/15/2024    CA 8.9 07/15/2024           Telemetry: AF      Thank you for allowing our practice to participate in the care of your patient. Please do not hesitate to contact me if you have any questions.

## 2024-07-15 NOTE — DIETARY NOTE
[facility]   part of [facility]   CLINICAL NUTRITION    Keny Marshall     Admitting diagnosis:  Hyperkalemia [E87.5]  Hypoxia [R09.02]  Supplemental oxygen dependent [Z99.81]  Atrial flutter with rapid ventricular response (HCC) [I48.92]  Chronic obstructive pulmonary disease, unspecified COPD type (HCC) [J44.9]    Ht:  5'10\"  Wt: 73.6 kg (162 lb 4.1 oz).   Body mass index is 23.28 kg/m².  IBW: 75.5 kg    Wt Readings from Last 6 Encounters:   07/15/24 73.6 kg (162 lb 4.1 oz)   06/13/24 72.3 kg (159 lb 6.3 oz)   09/26/23 78 kg (172 lb)   04/24/23 76.2 kg (168 lb)   03/22/23 79.8 kg (176 lb)   01/05/23 88.9 kg (196 lb)      Labs/Meds reviewed    Diet:       Procedures    Regular/General diet Fluid Consistency: Thin Liquids ; Texture Consistency: Regular; Is Patient on Accuchecks? Yes; Is Patient on Suicide Precautions? No     Percent Meals Eaten (last 3 days)       Date/Time Percent Meals Eaten (%)    07/12/24 0740 0 %     Percent Meals Eaten (%): NPO at 07/12/24 0740    07/12/24 1108 0 %     Percent Meals Eaten (%): NPO at 07/12/24 1108    07/12/24 1147 0 %     Percent Meals Eaten (%): NPO at 07/12/24 1147    07/12/24 1836 0 %    07/14/24 0900 100 %    07/14/24 1850 100 %    07/15/24 0824 100 %     Percent Meals Eaten (%): 192g at 07/15/24 0824    07/15/24 0845 100 %          7/15-Pt states appetite is good, just getting tired of food. On general diet. Wt trend down due to diuresis. Denies n/v/d/c. No nutrition issues at this time.   7/8- PO intake 100% over the last 2 days.  No changes in wt noted.  +BM 7/8 without n/v/d. Will continue to monitor and support as able.  7/1-Pt chart reviewed d/t HF.  Patient reports good appetite at this time.  Nursing notes reports Percent Meals Eaten (%): 100 % intake for last meal.  Tolerating po diet without diarrhea, emesis, or constipation.   No significant weight changes noted.     PMH includes HTN, GERD, RCC with Nephrectomy, COPD, PreDM.  Pt p/w hypoerkalemia, Afib  with RVR.  PT seen for HF Ed Consult.  Pt lying in bed, just finished breakfast. (Yogurt parfait, fruit, OJ).  Pt states his appetite is fair, usually eats 2x per day.  Pt denies any significant wt loss.  States he understands his diet and tries to follow Low NA at home. Wife does cooking and he reports \"She does her best\".  He has no questions re: diet at this time.    Patient is at low nutrition risk at this time.    Please consult if patient status changes or nutrition issues arise.    Ariela Cheung RD, LDN  Clinical Dietitian

## 2024-07-15 NOTE — PROGRESS NOTES
[facility] Progress Note     SUBJECTIVE/Interval history:  Developed afib overnight, restarted on diltiazem. He still feels congested today with thick phlegm. Chronic dyspnea is stable. No wheeze. No pain  Was on AVAPS overnight    Review of Systems:   A comprehensive 14 point review of systems was completed.   Pertinent positives and negatives noted in the HPI.    Medications  Reviewed personally   furosemide  40 mg Intravenous BID (Diuretic)    cefTRIAXone  1 g Intravenous Q24H    methylPREDNISolone  40 mg Intravenous Q8H    ipratropium  500 mcg Nebulization Q6H WA    [Held by provider] furosemide  20 mg Oral Daily    insulin aspart  1-20 Units Subcutaneous TID CC and HS    aspirin  81 mg Oral Daily    atorvastatin  20 mg Oral Nightly    guaiFENesin ER  1,200 mg Oral BID    levETIRAcetam  500 mg Oral BID    Roflumilast  500 mcg Oral Daily    rivaroxaban  20 mg Oral Daily with food    insulin aspart  1-10 Units Subcutaneous TID AC and HS    fluticasone furoate-vilanterol  1 puff Inhalation Daily       acetaminophen    lidocaine-menthol    metoprolol    selenium sulfide    glucose **OR** glucose **OR** glucose-vitamin C **OR** dextrose **OR** glucose **OR** glucose **OR** glucose-vitamin C    melatonin    acetaminophen    polyethylene glycol (PEG 3350)    sennosides    bisacodyl    fleet enema    prochlorperazine    OBJECTIVE:  Vitals:    07/14/24 1902 07/14/24 2100 07/15/24 0022 07/15/24 0524   BP: 125/80  120/71 119/85   BP Location: Left arm  Left arm Left arm   Pulse: 112 82 78 82   Resp: 14 19 16   Temp: 98.8 °F (37.1 °C)  97.9 °F (36.6 °C) 97.7 °F (36.5 °C)   TempSrc: Oral  Oral Oral   SpO2: 92%  97% 95%   Weight:    162 lb 4.1 oz (73.6 kg)       Vital signs in last 24 hours:  Blood pressure 119/85, pulse 82, temperature 97.7 °F (36.5 °C), temperature source Oral, resp. rate 16, weight 162 lb 4.1 oz (73.6 kg), SpO2 95%.     Intake/Output:  I/O last 3  completed shifts:  In: 960 [P.O.:860; IV PIGGYBACK:100]  Out: 4940 [Urine:4940]       Physical Exam:  General: Appears alert, comfortable at rest on 4L  Neurologic:No focal deficits.  Alert.  Oriented.  Lungs:diminished with rare rhonchi today. No wheeze. No distress  Heart:irreg RR. +ADRIAN  Abdomen:Soft, non-tender.  No masses.  No guarding.  No rebound.  Extremities:edema    Lab Data Review:   Recent Labs   Lab 07/13/24  0627 07/14/24  0530 07/15/24  0535   * 141* 124*   BUN 27* 22 25*   CREATSERUM 0.73 0.54* 0.72   CA 8.4* 8.6 8.9    136 142   K 4.7 4.9 5.4*   CL 97* 95* 96*   CO2 43.0* 41.0* 42.0*     Recent Labs   Lab 07/12/24  1136 07/15/24  0535   RBC 3.41* 3.99*   HGB 10.3* 12.1*   HCT 33.9* 39.9   MCV 99.4 100.0   MCH 30.2 30.3   MCHC 30.4* 30.3*   RDW 13.7 13.8   NEPRELIM 12.57* 9.24*   WBC 14.6* 10.0   .0 307.0     No results for input(s): \"BNP\" in the last 168 hours.  No results for input(s): \"TROP\", \"CK\" in the last 168 hours.  No results for input(s): \"PT\", \"INR\", \"PTT\" in the last 168 hours.    Recent Labs   Lab 07/11/24  1602   ABGPHT 7.40   TGVXQP2D 67*   UZGCG7M 67*   ABGHCO3 36.0*   ABGBE 14.3*   TEMP 98.6   ROMARIO Positive   SITE Right Radial   DEV Nasal cannula   THGB 10.2*       Other Labs:  Interval Culture Data:   No results found for this visit on 06/29/24.  No results for input(s): \"COLORUR\", \"CLARITY\", \"SPECGRAVITY\", \"GLUUR\", \"BILUR\", \"KETUR\", \"BLOODURINE\", \"PHURINE\", \"PROUR\", \"UROBILINOGEN\", \"NITRITE\", \"LEUUR\", \"WBCUR\", \"RBCUR\", \"BACUR\", \"EPIUR\" in the last 168 hours.    Interval Radiology:   Reviewed personally  XR CHEST AP PORTABLE  (CPT=71045)    Result Date: 7/15/2024  CONCLUSION:   Stable significant emphysematous and COPD changes.   LOCATION:  [facility]      Dictated by (CST): Yunior James MD on 7/15/2024 at 7:51 AM     Finalized by (CST): Yunior James MD on 7/15/2024 at 7:54 AM       XR CHEST AP PORTABLE  (CPT=71045)    Result Date: 7/14/2024  CONCLUSION:    Postoperative changes of cardiothoracic surgery with stable cardiac and mediastinal contours.  Recent CT of 07/01/2024 better demonstrates upper lobe predominant emphysema.  Bilateral calcified pleural plaques with persistent diffuse interstitial and bronchial wall thickening.  This may reflect some combination of interstitial pulmonary edema, bronchitis, or reactive airway disease.  Suspected parenchymal scarring of the peripheral left mid lung adjacent to a densely calcified pleural plaque.  Small bilateral pleural effusions persist.  No pneumothorax.  Overall, findings are not substantially changed compared to 07/04/2024.   LOCATION:  [facility]      Dictated by (CST): Jenn Guerra MD on 7/14/2024 at 7:34 AM     Finalized by (CST): Jenn Guerra MD on 7/14/2024 at 7:38 AM        Assessment/Plan:  #1. Acute on chronic hypercapnic and hypoxic respiratory failure  Multifactorial due to afib with RVR, CHF exacerbation with underlying end stage COPD  7/1 CTA with fluid overload, no PE  Treat/control afib and CHF as per cardiology  He has been on scheduled atrovent which should not affect his heart rates, however reportedly post nebs, heart rate control is worse.  His COPD is controlled at present - will trial on atrovent nebs PRN only for now  Continue on pred oral taper  Wean o2 for goal saturations of 88-92% given hypercapnia. His previous o2 baseline was: 2L at rest, 4L on exertion; using and benefiting from portable oxygen concentrator (3 at rest, 6 on exertion)  Noted issues with affording AVAPS at home. Awaiting determination for financial assistance for AVAPS vs BIPAP    #2. Acute CHF exacerbation, afib with RVR  Rate control and diurese as per cardiology  Previous cardioversion 6/14/24 and ablation 7/12/2024  Now with recurrent afib 7/14 and on diltiazem     #3. COPD  Severe obstruction on previous PFTs 3/2021  off arformoterol given worsening afib/tachycardia  Will continue to hold breo   Continue on scheduled  atrovent nebs   He can use levalbuterol as outpatient as hospital does not stock it  He cannot tolerate albuterol due to palpitations, tremors and with afib with RVR; off duonebs  Continue on methylpred  Continue empiric abx for bronchitis - ceftriaxone day 2; await sputum cx result  Continue roflumilast  Previously reviewed BLVR - not candidate at this time, reassess as outpatient     #4. Pulmonary nodules  7/2017 CT with worsening peripheral lingular atelectasis. Nodules stable   5/2018 CT stable nodules  12/2020 CT stable  7/2021 CT stable but worsening mediastinal LAD suspect reactive to recent cardiac surgery  1/2022 CT with stable nodules, improved LAD  3/2023 CT chest with several small nodules  9/2023 CT chest with stable findings   7/1/2024 CT chest with worsening ALYSON lesion, possible scarring, rounded atelectasis vs neoplasm  Will need outpatient follow up including PET/CT    #5. Tobacco abuse  30+ pack years, active pipe smoker  Hold on LDCT given recent CT with worsening ALYSON lesion, will need PET/CT as above    Dispo:  Floor    Stefano Randle MD

## 2024-07-15 NOTE — PLAN OF CARE
Acquired patient around 0730. Alert and oriented x4. On 4L NC. SpO2 in low 80s% In creased to 6L NC. SpO2 within normal limits. Pt co SOB at times. Trial scheduled nebs. . Afib on tele. HR 110s-130s. Cardizem gtt. Pt denies cp. Continent of bowel and bladder. Call light within reach. Continue plan of care.       Problem: RESPIRATORY - ADULT  Goal: Achieves optimal ventilation and oxygenation  Description: INTERVENTIONS:  - Assess for changes in respiratory status  - Assess for changes in mentation and behavior  - Position to facilitate oxygenation and minimize respiratory effort  - Oxygen supplementation based on oxygen saturation or ABGs  - Provide Smoking Cessation handout, if applicable  - Encourage broncho-pulmonary hygiene including cough, deep breathe, Incentive Spirometry  - Assess the need for suctioning and perform as needed  - Assess and instruct to report SOB or any respiratory difficulty  - Respiratory Therapy support as indicated  - Manage/alleviate anxiety  - Monitor for signs/symptoms of CO2 retention  Outcome: Progressing     Problem: CARDIOVASCULAR - ADULT  Goal: Maintains optimal cardiac output and hemodynamic stability  Description: INTERVENTIONS:  - Monitor vital signs, rhythm, and trends  - Monitor for bleeding, hypotension and signs of decreased cardiac output  - Evaluate effectiveness of vasoactive medications to optimize hemodynamic stability  - Monitor arterial and/or venous puncture sites for bleeding and/or hematoma  - Assess quality of pulses, skin color and temperature  - Assess for signs of decreased coronary artery perfusion - ex. Angina  - Evaluate fluid balance, assess for edema, trend weights  Outcome: Progressing  Goal: Absence of cardiac arrhythmias or at baseline  Description: INTERVENTIONS:  - Continuous cardiac monitoring, monitor vital signs, obtain 12 lead EKG if indicated  - Evaluate effectiveness of antiarrhythmic and heart rate control medications as ordered  - Initiate  emergency measures for life threatening arrhythmias  - Monitor electrolytes and administer replacement therapy as ordered  Outcome: Progressing     Problem: SAFETY ADULT - FALL  Goal: Free from fall injury  Description: INTERVENTIONS:  - Assess pt frequently for physical needs  - Identify cognitive and physical deficits and behaviors that affect risk of falls.  - Christine fall precautions as indicated by assessment.  - Educate pt/family on patient safety including physical limitations  - Instruct pt to call for assistance with activity based on assessment  - Modify environment to reduce risk of injury  - Provide assistive devices as appropriate  - Consider OT/PT consult to assist with strengthening/mobility  - Encourage toileting schedule  Outcome: Progressing     Problem: PAIN - ADULT  Goal: Verbalizes/displays adequate comfort level or patient's stated pain goal  Description: INTERVENTIONS:  - Encourage pt to monitor pain and request assistance  - Assess pain using appropriate pain scale  - Administer analgesics based on type and severity of pain and evaluate response  - Implement non-pharmacological measures as appropriate and evaluate response  - Consider cultural and social influences on pain and pain management  - Manage/alleviate anxiety  - Utilize distraction and/or relaxation techniques  - Monitor for opioid side effects  - Notify MD/LIP if interventions unsuccessful or patient reports new pain  - Anticipate increased pain with activity and pre-medicate as appropriate  Outcome: Progressing     Problem: Patient/Family Goals  Goal: Patient/Family Long Term Goal  Description: Patient's Long Term Goal: to go home    Interventions:  - O2, bipap, IV steroids, diuretics, labs, increase activity    - See additional Care Plan goals for specific interventions  Outcome: Progressing  Goal: Patient/Family Short Term Goal  Description: Patient's Short Term Goal: feel better    Interventions:   - - O2, bipap, IV  steroids, diuretics, labs, increase activity    - See additional Care Plan goals for specific interventions  Outcome: Progressing

## 2024-07-15 NOTE — PLAN OF CARE
Pt back in afib at 1900. Rates 100-120s. EKG done. /80. Dr Stanislaw albright. Diltiazem drip to start per protocol.

## 2024-07-15 NOTE — PROGRESS NOTES
[facility]   part of [facility]     Hospitalist Progress Note     Keny Marshall Patient Status:  Inpatient    1960 MRN [id number]   Location [facility] 8NE-A Attending Devorah Allen MD   Hosp Day # 16 PCP Dustin Avina MD     Chief Complaint: SOB    Subjective:  pt reports he is wanting to go home.  No new complaints.  His cat passed away this weekend.     Objective:    Review of Systems:   A comprehensive review of systems was completed; pertinent positive and negatives stated in subjective.    Vital signs:  Temp:  [97.7 °F (36.5 °C)-98.9 °F (37.2 °C)] 98.3 °F (36.8 °C)  Pulse:  [] 110  Resp:  [14-20] 17  BP: (119-147)/() 130/79  SpO2:  [89 %-99 %] 89 %    Physical Exam:    General: Thin, chronically ill appearing 63 year old male   Respiratory: Diminished at bases on O2 NC  Cardiovascular: S1, S2, irregular 120s  Abdomen: Soft, Non-tender, non-distended, positive bowel sounds  Neuro: No new focal deficits.   Extremities: No edema    Diagnostic Data:    Labs:  Recent Labs   Lab 24  1136 07/15/24  0535   WBC 14.6* 10.0   HGB 10.3* 12.1*   MCV 99.4 100.0   .0 307.0       Recent Labs   Lab 24  0627 24  0530 07/15/24  0535   * 141* 124*   BUN 27* 22 25*   CREATSERUM 0.73 0.54* 0.72   CA 8.4* 8.6 8.9    136 142   K 4.7 4.9 5.4*   CL 97* 95* 96*   CO2 43.0* 41.0* 42.0*       Estimated Creatinine Clearance: 107 mL/min (based on SCr of 0.72 mg/dL).    No results for input(s): \"TROP\", \"TROPHS\", \"CK\" in the last 168 hours.    No results for input(s): \"PTP\", \"INR\" in the last 168 hours.       Microbiology    No results found for this visit on 24.      Imaging: Reviewed in Epic.    Medications:    furosemide  40 mg Intravenous BID (Diuretic)    cefTRIAXone  1 g Intravenous Q24H    methylPREDNISolone  40 mg Intravenous Q8H    ipratropium  500 mcg Nebulization Q6H WA    [Held by provider] furosemide  20 mg Oral Daily    insulin aspart  1-20 Units  Subcutaneous TID CC and HS    aspirin  81 mg Oral Daily    atorvastatin  20 mg Oral Nightly    guaiFENesin ER  1,200 mg Oral BID    levETIRAcetam  500 mg Oral BID    Roflumilast  500 mcg Oral Daily    rivaroxaban  20 mg Oral Daily with food    insulin aspart  1-10 Units Subcutaneous TID AC and HS    fluticasone furoate-vilanterol  1 puff Inhalation Daily       Assessment & Plan:      #Acute on chronic hypoxic/hypercapnic respiratory failure 2/2 CHF/COPD     #Acute on chronic diastolic heart failure   -Lasix 40 mg IV x BID, good UO     #End stage COPD w/ acute exacerbation improving  -bipap prn/sleep, O2 NC  -steroids po, atrovent nebs, breo inhaler, mucinex per pulm, no albuterol due to tachycardia  -remains on 4-5L -discharged on this last admission  -Follow sputum culture Rocephin added yesterday per pulm     #Left Lung mass, Smoker  -pulmonary recommending OP PET-CT     #Rapid aflutter - improved  -amiodarone, cardizem CD and digoxin po   -s/p ALO ablation per EP   -Continue DOAC  -Tele  -Recently underwent DCCV 6/14     #Aortic stenosis s/p AVR   #Hyponatremia, resolved  #Prior VTE on xarelto  #Seizure disorder-Keppra  #Type 2 diabetes, 5.6%  #Steroid hyperglycemia-correctional/nutritional scale, consider NPH, hyperglycemia protocol  #History of RCC s/p nephrectomy  #ABIGAIL-CPAP protocol  #Seborrheic dermatitis, improved-topicals/shampoos.      Case d/w pt, pulita, RN.   LAM Teague  10:37 AM     Supplementary Documentation:     Quality:  DVT Mechanical Prophylaxis:     Early ambuation  DVT Pharmacologic Prophylaxis   Medication    rivaroxaban (Xarelto) tab 20 mg         DVT Pharmacologic prophylaxis: Aspirin 81 mg      Code Status: Full Code  Peters: No urinary catheter in place    The 21st Century Cures Act makes medical notes like these available to patients in the interest of transparency. Please be advised this is a medical document. Medical documents are intended to carry relevant information, facts as  evident, and the clinical opinion of the practitioner. The medical note is intended as peer to peer communication and may appear blunt or direct. It is written in medical language and may contain abbreviations or verbiage that are unfamiliar.     Addendum:    Agree with above note except as documented below.      Patient flipped into atrial fib overnight. He reports some improvement in his cough and SOB. Edema improving.     Gen: NAD  CVS: s1s2. Tachycardic. Irregular.   Resp: diminished throughout  Abd: soft    Acute on chronic hypoxic/hypercapnic respiratory failure 2/2 CHF/COPD  Acute on chronic diastolic HF  End stage COPD with acute exacerbation   Left lung mass  Rapid aflutter s/p ALO ablation   Rapid Afib   Steroid induced hyperglycemia     -Continue IV lasix per cardiology   -Steroids, ceftriaxone   -Start tresiba, ISS    Pt seen and examined independently. Chart reviewed. Labs and imaging over the last 24 hours have been personally reviewed.  I personally made/approved 100% of the management plan for this patient and take full responsibility for the patient management.   Note has been reviewed by me and modified as needed.  Exam and Impression/ Recs as noted above.  D/w staff.    Devorah Allen MD      :

## 2024-07-15 NOTE — PLAN OF CARE
A&Ox4. Pt c/o headache and discomfort along feet from swelling. Requested PRN Tylenol. 4.5L while awake. (4.5L NC is patients baseline), AVAP at night. Afib on tele. Cardizem gtt running per protocol. Continent of bowel and bladder. Last BM 7/14/24.     Bed is locked and in low position. Call light and personal items within reach.  Pt updated with plan of care         Problem: RESPIRATORY - ADULT  Goal: Achieves optimal ventilation and oxygenation  Description: INTERVENTIONS:  - Assess for changes in respiratory status  - Assess for changes in mentation and behavior  - Position to facilitate oxygenation and minimize respiratory effort  - Oxygen supplementation based on oxygen saturation or ABGs  - Provide Smoking Cessation handout, if applicable  - Encourage broncho-pulmonary hygiene including cough, deep breathe, Incentive Spirometry  - Assess the need for suctioning and perform as needed  - Assess and instruct to report SOB or any respiratory difficulty  - Respiratory Therapy support as indicated  - Manage/alleviate anxiety  - Monitor for signs/symptoms of CO2 retention  Outcome: Progressing     Problem: CARDIOVASCULAR - ADULT  Goal: Maintains optimal cardiac output and hemodynamic stability  Description: INTERVENTIONS:  - Monitor vital signs, rhythm, and trends  - Monitor for bleeding, hypotension and signs of decreased cardiac output  - Evaluate effectiveness of vasoactive medications to optimize hemodynamic stability  - Monitor arterial and/or venous puncture sites for bleeding and/or hematoma  - Assess quality of pulses, skin color and temperature  - Assess for signs of decreased coronary artery perfusion - ex. Angina  - Evaluate fluid balance, assess for edema, trend weights  Outcome: Progressing  Goal: Absence of cardiac arrhythmias or at baseline  Description: INTERVENTIONS:  - Continuous cardiac monitoring, monitor vital signs, obtain 12 lead EKG if indicated  - Evaluate effectiveness of antiarrhythmic and  heart rate control medications as ordered  - Initiate emergency measures for life threatening arrhythmias  - Monitor electrolytes and administer replacement therapy as ordered  Outcome: Progressing     Problem: SAFETY ADULT - FALL  Goal: Free from fall injury  Description: INTERVENTIONS:  - Assess pt frequently for physical needs  - Identify cognitive and physical deficits and behaviors that affect risk of falls.  - [place] fall precautions as indicated by assessment.  - Educate pt/family on patient safety including physical limitations  - Instruct pt to call for assistance with activity based on assessment  - Modify environment to reduce risk of injury  - Provide assistive devices as appropriate  - Consider OT/PT consult to assist with strengthening/mobility  - Encourage toileting schedule  Outcome: Progressing     Problem: PAIN - ADULT  Goal: Verbalizes/displays adequate comfort level or patient's stated pain goal  Description: INTERVENTIONS:  - Encourage pt to monitor pain and request assistance  - Assess pain using appropriate pain scale  - Administer analgesics based on type and severity of pain and evaluate response  - Implement non-pharmacological measures as appropriate and evaluate response  - Consider cultural and social influences on pain and pain management  - Manage/alleviate anxiety  - Utilize distraction and/or relaxation techniques  - Monitor for opioid side effects  - Notify MD/LIP if interventions unsuccessful or patient reports new pain  - Anticipate increased pain with activity and pre-medicate as appropriate  Outcome: Progressing     Problem: Patient/Family Goals  Goal: Patient/Family Long Term Goal  Description: Patient's Long Term Goal: to go home    Interventions:  - O2, bipap, IV steroids, diuretics, labs, increase activity    - See additional Care Plan goals for specific interventions  Outcome: Progressing  Goal: Patient/Family Short Term Goal  Description: Patient's Short Term Goal: feel  better    Interventions:   - - O2, bipap, IV steroids, diuretics, labs, increase activity    - See additional Care Plan goals for specific interventions  Outcome: Progressing

## 2024-07-16 ENCOUNTER — TELEPHONE (OUTPATIENT)
Facility: CLINIC | Age: 64
End: 2024-07-16

## 2024-07-16 DIAGNOSIS — J96.12 CHRONIC HYPERCAPNIC RESPIRATORY FAILURE (HCC): ICD-10-CM

## 2024-07-16 DIAGNOSIS — J96.11 CHRONIC HYPOXEMIC RESPIRATORY FAILURE (HCC): ICD-10-CM

## 2024-07-16 DIAGNOSIS — J44.9 CHRONIC OBSTRUCTIVE PULMONARY DISEASE, UNSPECIFIED COPD TYPE (HCC): Primary | ICD-10-CM

## 2024-07-16 DIAGNOSIS — G47.33 OSA (OBSTRUCTIVE SLEEP APNEA): ICD-10-CM

## 2024-07-16 DIAGNOSIS — R09.02 HYPOXEMIA: ICD-10-CM

## 2024-07-16 LAB
BUN BLD-MCNC: 21 MG/DL (ref 9–23)
CALCIUM BLD-MCNC: 9.4 MG/DL (ref 8.7–10.4)
CHLORIDE SERPL-SCNC: 95 MMOL/L (ref 98–112)
CO2 SERPL-SCNC: >40 MMOL/L (ref 21–32)
CREAT BLD-MCNC: 0.72 MG/DL
EGFRCR SERPLBLD CKD-EPI 2021: 102 ML/MIN/1.73M2 (ref 60–?)
GLUCOSE BLD-MCNC: 166 MG/DL (ref 70–99)
GLUCOSE BLD-MCNC: 180 MG/DL (ref 70–99)
GLUCOSE BLD-MCNC: 210 MG/DL (ref 70–99)
GLUCOSE BLD-MCNC: 224 MG/DL (ref 70–99)
GLUCOSE BLD-MCNC: 347 MG/DL (ref 70–99)
OSMOLALITY SERPL CALC.SUM OF ELEC: 305 MOSM/KG (ref 275–295)
POTASSIUM SERPL-SCNC: 5 MMOL/L (ref 3.5–5.1)
SODIUM SERPL-SCNC: 143 MMOL/L (ref 136–145)

## 2024-07-16 PROCEDURE — 99233 SBSQ HOSP IP/OBS HIGH 50: CPT | Performed by: INTERNAL MEDICINE

## 2024-07-16 RX ORDER — PREDNISONE 20 MG/1
40 TABLET ORAL
Status: DISCONTINUED | OUTPATIENT
Start: 2024-07-17 | End: 2024-07-20

## 2024-07-16 RX ORDER — PREDNISONE 10 MG/1
TABLET ORAL
Qty: 30 TABLET | Refills: 0 | Status: SHIPPED | OUTPATIENT
Start: 2024-07-16 | End: 2024-07-20

## 2024-07-16 RX ORDER — LEVOFLOXACIN 750 MG/1
750 TABLET, FILM COATED ORAL DAILY
Qty: 6 TABLET | Refills: 0 | Status: SHIPPED | OUTPATIENT
Start: 2024-07-16 | End: 2024-07-20

## 2024-07-16 NOTE — PROGRESS NOTES
07/16/24 0413   BiPAP   Device V60   BiPAP / CPAP CE#    BiPAP bacteria filter Yes   BiPAP Pre-use check ok? Yes   BIPAP plugged into main power? Yes   Mode AVAPS   Interface Full face mask   Mask Size Large   Control Settings   Oxygen Percent 30 %   Inspiratory time 0.9   Insp rise time 3   AVAPS   Min IPAP 15   Max IPAP 30   EPAP Level 5   Set rate 12   Tidal volume 550   SIPAP   Resp 25   FiO2 (%) 30 %   BiPAP/CPAP Alarm Settings   Hi Rate 50   Low Rate 8   Hi VT 1200   Low    Hi Pressure 40   Low Pressure 10   Low Pressure Delay 20   Low MV 2   BiPAP/CPAP Monitored Parameters   Pulse 74   SpO2 100 %   PIP 15   Total Rate 25 breaths/min   Minute Volume 15   Tidal Volume 568   Total Leak 19   Trigger % 89   Ti/Ttot % 35   Toleration Well     Patient on AVAPS overnight with no issues. FIO2 weaned down to 30%. Will transition back to a NC this AM.

## 2024-07-16 NOTE — PLAN OF CARE
Acquired patient around 0730. Alert and oriented x4. 4L on NC. SpO2 within goal. Pt denies SOB but \"just feeling alittle congested\". Scheduled nebs. PO steroids today. Afib on tele. HR 110s-120s at this time. Cardizem gtt. PO amio. IV abx. Continent of bowel and bladder. Call light thin reach. Continue plan of care.       Problem: RESPIRATORY - ADULT  Goal: Achieves optimal ventilation and oxygenation  Description: INTERVENTIONS:  - Assess for changes in respiratory status  - Assess for changes in mentation and behavior  - Position to facilitate oxygenation and minimize respiratory effort  - Oxygen supplementation based on oxygen saturation or ABGs  - Provide Smoking Cessation handout, if applicable  - Encourage broncho-pulmonary hygiene including cough, deep breathe, Incentive Spirometry  - Assess the need for suctioning and perform as needed  - Assess and instruct to report SOB or any respiratory difficulty  - Respiratory Therapy support as indicated  - Manage/alleviate anxiety  - Monitor for signs/symptoms of CO2 retention  Outcome: Progressing     Problem: CARDIOVASCULAR - ADULT  Goal: Maintains optimal cardiac output and hemodynamic stability  Description: INTERVENTIONS:  - Monitor vital signs, rhythm, and trends  - Monitor for bleeding, hypotension and signs of decreased cardiac output  - Evaluate effectiveness of vasoactive medications to optimize hemodynamic stability  - Monitor arterial and/or venous puncture sites for bleeding and/or hematoma  - Assess quality of pulses, skin color and temperature  - Assess for signs of decreased coronary artery perfusion - ex. Angina  - Evaluate fluid balance, assess for edema, trend weights  Outcome: Progressing  Goal: Absence of cardiac arrhythmias or at baseline  Description: INTERVENTIONS:  - Continuous cardiac monitoring, monitor vital signs, obtain 12 lead EKG if indicated  - Evaluate effectiveness of antiarrhythmic and heart rate control medications as ordered  -  Initiate emergency measures for life threatening arrhythmias  - Monitor electrolytes and administer replacement therapy as ordered  Outcome: Progressing     Problem: SAFETY ADULT - FALL  Goal: Free from fall injury  Description: INTERVENTIONS:  - Assess pt frequently for physical needs  - Identify cognitive and physical deficits and behaviors that affect risk of falls.  - [place] fall precautions as indicated by assessment.  - Educate pt/family on patient safety including physical limitations  - Instruct pt to call for assistance with activity based on assessment  - Modify environment to reduce risk of injury  - Provide assistive devices as appropriate  - Consider OT/PT consult to assist with strengthening/mobility  - Encourage toileting schedule  Outcome: Progressing     Problem: PAIN - ADULT  Goal: Verbalizes/displays adequate comfort level or patient's stated pain goal  Description: INTERVENTIONS:  - Encourage pt to monitor pain and request assistance  - Assess pain using appropriate pain scale  - Administer analgesics based on type and severity of pain and evaluate response  - Implement non-pharmacological measures as appropriate and evaluate response  - Consider cultural and social influences on pain and pain management  - Manage/alleviate anxiety  - Utilize distraction and/or relaxation techniques  - Monitor for opioid side effects  - Notify MD/LIP if interventions unsuccessful or patient reports new pain  - Anticipate increased pain with activity and pre-medicate as appropriate  Outcome: Progressing     Problem: Patient/Family Goals  Goal: Patient/Family Long Term Goal  Description: Patient's Long Term Goal: to go home    Interventions:  - O2, bipap, IV steroids, diuretics, labs, increase activity    - See additional Care Plan goals for specific interventions  Outcome: Progressing  Goal: Patient/Family Short Term Goal  Description: Patient's Short Term Goal: feel better    Interventions:   - - O2, bipap, IV  steroids, diuretics, labs, increase activity    - See additional Care Plan goals for specific interventions  Outcome: Progressing

## 2024-07-16 NOTE — PROGRESS NOTES
[facility] Electrophysiology Progress Note      Keny Marshall Patient Status:  Inpatient    1960 MRN [id number]   [facility] 8NE-A Attending Devorah Allen MD   Hosp Day # 17 PCP Dustin Avina MD     Subjective:    Back in atrial fibrillation 2 days after atrial flutter ablation   Rates higher. Started on amiodarone yesterday      Scheduled Meds:   meropenem  500 mg Intravenous Q8H    [START ON 2024] predniSONE  40 mg Oral Daily with breakfast    amiodarone  400 mg Oral TID    insulin degludec  10 Units Subcutaneous Nightly    furosemide  40 mg Intravenous BID (Diuretic)    ipratropium  500 mcg Nebulization Q6H WA    [Held by provider] furosemide  20 mg Oral Daily    aspirin  81 mg Oral Daily    atorvastatin  20 mg Oral Nightly    guaiFENesin ER  1,200 mg Oral BID    levETIRAcetam  500 mg Oral BID    Roflumilast  500 mcg Oral Daily    rivaroxaban  20 mg Oral Daily with food    insulin aspart  1-10 Units Subcutaneous TID AC and HS    fluticasone furoate-vilanterol  1 puff Inhalation Daily       Infusion Meds:   dilTIAZem 2.5 mg/hr (24 1003)       Physical:  Vitals:    24 0540 24 0820 24 0900 24 1206   BP:  106/86  136/87   BP Location:  Left arm  Right arm   Pulse: 83 109 93 118   Resp:  20  21   Temp:  99 °F (37.2 °C)  97.7 °F (36.5 °C)   TempSrc:  Oral  Oral   SpO2:  97% 93% 97%   Weight: 160 lb 7.9 oz (72.8 kg)          Intake/Output Summary (Last 24 hours) at 2024 1211  Last data filed at 2024 1206  Gross per 24 hour   Intake 1432 ml   Output 2485 ml   Net -1053 ml     Wt Readings from Last 4 Encounters:   24 160 lb 7.9 oz (72.8 kg)   24 159 lb 6.3 oz (72.3 kg)   23 172 lb (78 kg)   23 168 lb (76.2 kg)     GENERAL: well developed, well nourished, in no apparent distress  EYES: sclera anicteric  HEENT: normocephalic  NECK: no JVD, no carotid bruits, no thyromegaly  RESPIRATORY: clear to  auscultation  CARDIOVASCULAR: S1, S2 normal, IRRR; no S3, no S4; no click; no murmur  ABDOMEN: normal active BS, soft, nondistended; nontender  EXTREMITIES: no cyanosis, clubbing or edema, peripheral pulses intact  SKIN: no rashes    Data:  EC2024: atrial fibrillation LBBB  ECG: atrial flutter CCW  Tele: atrial fibrillation >100  Echo: 2024: EF 55, bio AVR, mild-mod MR and TR, RVSP 45-50, mod LAE    Labs:  Recent Labs   Lab 24  1136 07/15/24  0535   WBC 14.6* 10.0   HGB 10.3* 12.1*   .0 307.0       Recent Labs   Lab 24  11324  0627 24  0530 07/15/24  0535    138 136 142   K 4.2 4.7 4.9 5.4*   CL 99 97* 95* 96*   CO2 42.0* 43.0* 41.0* 42.0*   BUN 20 27* 22 25*   CREATSERUM 0.63* 0.73 0.54* 0.72   CA 8.6 8.4* 8.6 8.9   * 179* 141* 124*       No results for input(s): \"INR\" in the last 168 hours.    No results for input(s): \"TROP\" in the last 168 hours.    TSH   Date Value Ref Range Status   2024 1.800 0.358 - 3.740 mIU/mL Final     Comment:     This test may exhibit interference when a sample is collected from a person who is consuming high dose of biotin (a.k.a., vitamin B7, vitamin H, coenzyme R) supplements resulting in serum concentrations >100 ng/mL.  Intake of the recommended daily allowance (RDA) for biotin (0.03 mg) has not been shown to typically cause significant interference; however, high dose daily dietary supplements may contain biotin concentrations greater than 150 times (5-10 mg) the RDA.  It is recommended that physicians ask all patients who may be on biotin supplementation to stop biotin consumption at least 72 hours prior to collection of a new sample.           Impression:  Persistent atrial flutter s/p CTI ablation 2024  persistent atrial fibrillation   End stage COPD  ESBL Klebsiella in sputum  HFpEF  S/p bio AVR, septal myectomy, MAZE and SHALONDA occlusion   Lung mass, worsening     Plan:  Anticoagulation.  Amiodarone 400 tid.  Risks discussed. Given his cardiac and pulmonary disease, do not think another AAD will have a chance at success at maintaining SR  CV Friday with anesthesia  If recurrent AF after cardiovesrion despite amiodarone, AVN ablation and pacemaker        Chidi Fatima MD  Cardiology / Clinical Cardiac Electrophysiology  [facility]

## 2024-07-16 NOTE — PLAN OF CARE
A&Ox4.Pt c/o headache and discomfort along feet from swelling. Requested PRN Tylenol. 4.5L while awake. (4.5L NC is patients baseline), AVAP at night. Afib on tele. Cardizem gtt running per protocol. Continent of bowel and bladder. Last BM 7/15/24.     Bed is locked and in low position. Call light and personal items within reach.  Pt updated with plan of care    Problem: RESPIRATORY - ADULT  Goal: Achieves optimal ventilation and oxygenation  Description: INTERVENTIONS:  - Assess for changes in respiratory status  - Assess for changes in mentation and behavior  - Position to facilitate oxygenation and minimize respiratory effort  - Oxygen supplementation based on oxygen saturation or ABGs  - Provide Smoking Cessation handout, if applicable  - Encourage broncho-pulmonary hygiene including cough, deep breathe, Incentive Spirometry  - Assess the need for suctioning and perform as needed  - Assess and instruct to report SOB or any respiratory difficulty  - Respiratory Therapy support as indicated  - Manage/alleviate anxiety  - Monitor for signs/symptoms of CO2 retention  Outcome: Progressing     Problem: CARDIOVASCULAR - ADULT  Goal: Maintains optimal cardiac output and hemodynamic stability  Description: INTERVENTIONS:  - Monitor vital signs, rhythm, and trends  - Monitor for bleeding, hypotension and signs of decreased cardiac output  - Evaluate effectiveness of vasoactive medications to optimize hemodynamic stability  - Monitor arterial and/or venous puncture sites for bleeding and/or hematoma  - Assess quality of pulses, skin color and temperature  - Assess for signs of decreased coronary artery perfusion - ex. Angina  - Evaluate fluid balance, assess for edema, trend weights  Outcome: Progressing  Goal: Absence of cardiac arrhythmias or at baseline  Description: INTERVENTIONS:  - Continuous cardiac monitoring, monitor vital signs, obtain 12 lead EKG if indicated  - Evaluate effectiveness of antiarrhythmic and heart  rate control medications as ordered  - Initiate emergency measures for life threatening arrhythmias  - Monitor electrolytes and administer replacement therapy as ordered  Outcome: Progressing     Problem: SAFETY ADULT - FALL  Goal: Free from fall injury  Description: INTERVENTIONS:  - Assess pt frequently for physical needs  - Identify cognitive and physical deficits and behaviors that affect risk of falls.  - [place] fall precautions as indicated by assessment.  - Educate pt/family on patient safety including physical limitations  - Instruct pt to call for assistance with activity based on assessment  - Modify environment to reduce risk of injury  - Provide assistive devices as appropriate  - Consider OT/PT consult to assist with strengthening/mobility  - Encourage toileting schedule  Outcome: Progressing     Problem: PAIN - ADULT  Goal: Verbalizes/displays adequate comfort level or patient's stated pain goal  Description: INTERVENTIONS:  - Encourage pt to monitor pain and request assistance  - Assess pain using appropriate pain scale  - Administer analgesics based on type and severity of pain and evaluate response  - Implement non-pharmacological measures as appropriate and evaluate response  - Consider cultural and social influences on pain and pain management  - Manage/alleviate anxiety  - Utilize distraction and/or relaxation techniques  - Monitor for opioid side effects  - Notify MD/LIP if interventions unsuccessful or patient reports new pain  - Anticipate increased pain with activity and pre-medicate as appropriate  Outcome: Progressing     Problem: Patient/Family Goals  Goal: Patient/Family Long Term Goal  Description: Patient's Long Term Goal: to go home    Interventions:  - O2, bipap, IV steroids, diuretics, labs, increase activity    - See additional Care Plan goals for specific interventions  Outcome: Progressing  Goal: Patient/Family Short Term Goal  Description: Patient's Short Term Goal: feel  better    Interventions:   - - O2, bipap, IV steroids, diuretics, labs, increase activity    - See additional Care Plan goals for specific interventions  Outcome: Progressing

## 2024-07-16 NOTE — PROGRESS NOTES
[facility] Pharmacy Dosing Service  Antibiotic Dosing    Keny Marshall is a 63 year old for whom pharmacy is dosing meropenem for treatment of  pneumonia. .  Other antibiotics (Not dosed by pharmacy): none    Allergies: is allergic to bees and other.    Vitals: /79 (BP Location: Right arm)   Pulse 83   Temp 98.2 °F (36.8 °C) (Oral)   Resp 25   Wt 72.8 kg (160 lb 7.9 oz)   SpO2 100%   BMI 23.03 kg/m²     Labs:   WBC   Date Value Ref Range Status   07/15/2024 10.0 4.0 - 11.0 x10(3) uL Final     Creatinine   Date Value Ref Range Status   07/15/2024 0.72 0.70 - 1.30 mg/dL Final     BUN   Date Value Ref Range Status   07/15/2024 25 (H) 9 - 23 mg/dL Final       CrCl: estimated creatinine clearance is 106.7 mL/min (based on SCr of 0.72 mg/dL).    Cultures: sputum cx positive for ESBL klebsiella pneumoniae    Radiology:     Assessment/Plan: meropenem 500mg q8hr (standard dosing) appropriately dosed based on renal dosing protocol    Pharmacy will continue to follow.  We appreciate the opportunity to assist in the care of this patient.    Thank you,   Estrellita DE LEON [facility]  7/16/2024  7:27 AM

## 2024-07-16 NOTE — PROGRESS NOTES
[facility]   part of [facility]     Hospitalist Progress Note     Keny Marshall Patient Status:  Inpatient    1960 MRN [id number]   Location [facility] 8NE-A Attending Devorah Allen MD   Hosp Day # 17 PCP Dustin Avina MD     Chief Complaint: SOB    Subjective:  pt reports he is wants to check out.  Agreeable to stay after reviewing abx/culture results.     Objective:    Review of Systems:   A comprehensive review of systems was completed; pertinent positive and negatives stated in subjective.    Vital signs:  Temp:  [97.6 °F (36.4 °C)-99 °F (37.2 °C)] 99 °F (37.2 °C)  Pulse:  [] 109  Resp:  [16-25] 20  BP: (104-130)/(73-86) 106/86  SpO2:  [87 %-100 %] 97 %  FiO2 (%):  [30 %-40 %] 30 %    Physical Exam:    General: Thin, chronically ill appearing 63 year old male   Respiratory: Diminished at bases on O2 NC  Cardiovascular: S1, S2, irregular 120s  Abdomen: Soft, Non-tender, non-distended, positive bowel sounds  Neuro: No new focal deficits.   Extremities: No edema    Diagnostic Data:    Labs:  Recent Labs   Lab 24  1136 07/15/24  0535   WBC 14.6* 10.0   HGB 10.3* 12.1*   MCV 99.4 100.0   .0 307.0       Recent Labs   Lab 24  0627 24  0530 07/15/24  0535   * 141* 124*   BUN 27* 22 25*   CREATSERUM 0.73 0.54* 0.72   CA 8.4* 8.6 8.9    136 142   K 4.7 4.9 5.4*   CL 97* 95* 96*   CO2 43.0* 41.0* 42.0*       Estimated Creatinine Clearance: 106.7 mL/min (based on SCr of 0.72 mg/dL).    No results for input(s): \"TROP\", \"TROPHS\", \"CK\" in the last 168 hours.    No results for input(s): \"PTP\", \"INR\" in the last 168 hours.       Microbiology    No results found for this visit on 24.      Imaging: Reviewed in Epic.    Medications:    meropenem  500 mg Intravenous Q8H    amiodarone  400 mg Oral TID    insulin degludec  10 Units Subcutaneous Nightly    furosemide  40 mg Intravenous BID (Diuretic)    methylPREDNISolone  40 mg Intravenous Q8H    ipratropium   500 mcg Nebulization Q6H WA    [Held by provider] furosemide  20 mg Oral Daily    aspirin  81 mg Oral Daily    atorvastatin  20 mg Oral Nightly    guaiFENesin ER  1,200 mg Oral BID    levETIRAcetam  500 mg Oral BID    Roflumilast  500 mcg Oral Daily    rivaroxaban  20 mg Oral Daily with food    insulin aspart  1-10 Units Subcutaneous TID AC and HS    fluticasone furoate-vilanterol  1 puff Inhalation Daily       Assessment & Plan:      #Acute on chronic hypoxic/hypercapnic respiratory failure 2/2 CHF/COPD/PNA     #Acute on chronic diastolic CHF  -Lasix 40 mg IV x BID, good UO     #End stage COPD w/ acute exacerbation improving  -bipap prn/sleep,   -remains on 4-5L -discharged on this last admission  -steroids IV, atrovent nebs, breo, mucinex per pulm  -no albuterol due to tachycardia     #Left Lung mass, Smoker  -pulm recs OP PET-CT     #ESBL Klebsiella PNA  -changed to Merrem - updated pulm    #Rapid aflutter/afib  -amiodarone, DOAC per cards  -s/p ALO ablation per EP on 7/12, back in afib on 7/14  -monitor Tele  -Recently underwent DCCV 6/14    #Type 2 DM, 5.6% Steroid hyperglycemia  -correctional/nutritional scale, Tresiba nightly, hyperglycemia protocol    #Aortic stenosis s/p AVR   #Hyponatremia, resolved  #Prior VTE on xarelto  #Seizure disorder-Keppra  #History of RCC s/p nephrectomy  #ABIGAIL-CPAP protocol  #Seborrheic dermatitis, improved-topicals/shampoos.      Case d/w pt, pulm, RN.   LAM Teague  8:20 AM     Supplementary Documentation:     Quality:  DVT Mechanical Prophylaxis:     Early ambuation  DVT Pharmacologic Prophylaxis   Medication    rivaroxaban (Xarelto) tab 20 mg         DVT Pharmacologic prophylaxis: Aspirin 81 mg      Code Status: Full Code  Peters: No urinary catheter in place    The 21st Century Cures Act makes medical notes like these available to patients in the interest of transparency. Please be advised this is a medical document. Medical documents are intended to carry relevant  information, facts as evident, and the clinical opinion of the practitioner. The medical note is intended as peer to peer communication and may appear blunt or direct. It is written in medical language and may contain abbreviations or verbiage that are unfamiliar.     Addendum:    Agree with above note except as documented below.     Patient appears to be breathing more comfortably since 3 days ago. His weights are improving. He expresses his frustration with prolonged hospitalization.      Gen: NAD  CVS: s1s2  Resp: Diminished   Abd: soft    #Acute on chronic HFpEF   #Aflutter s/p failed recent CV   #Afib with RVR   #Acute on chronic hypoxic respiratory failure 2/2 CHF/COPD  #Steroid induced hyperglycemia     -Continue meropenem for now, but pulm ok with discharge and patient reports SOB improved since Saturday and cough persists, but stable. Plan to switch to oral cipro/levo/bactrim at discharge   -Check BMP today   -Diuretics per cardiology   -Amiodarone per cards; rates improving on current management    Pt seen and examined independently. Chart reviewed. Labs and imaging over the last 24 hours have been personally reviewed.  I personally made/approved 100% of the management plan for this patient and take full responsibility for the patient management.   Note has been reviewed by me and modified as needed.  Exam and Impression/ Recs as noted above.  D/w staff.    Devorah Allen MD

## 2024-07-16 NOTE — TELEPHONE ENCOUNTER
HME Rep Ramana Anderson in office, states HME was able to get pt a BiPAP device, but need order to discontinue pt's NIV device. (Pt is currently in the hospital, once pt is release to go home, BiPAP will then be set up).    Please review order and sign.  Thanks.

## 2024-07-16 NOTE — PROGRESS NOTES
[facility] Cardiology  Progress Note    Keny Marshall Patient Status:  Inpatient    1960 MRN [id number]   [facility] 8NE-A Attending Alfredo Garcia MD   Hosp Day # 17 PCP Dustin Avina MD       Impression;  Aflutter-  adv COPD/exacerbation. s/p failed recent CV --> A flutter ablation 24 ([facility]). Unfortunately, flipped into AF (24)  SOB - 2/2 acute dCHF and COPD exac  Acute diastolic CHF - improved  S/p bio AVR  TTE (24): LVEF 55%, +DD, 23mm SAVR 3.3m/s, mean 22mmHg.  Plan:  AF: cardizem gtt, rates <110bm.  Restarted amiodarone 400mg bid.  Discussed with EP/[facility] re long-term AAD therapy and he brings up option of PM/AVN ablation.  Will see pt later today.    AC: rivaroxaban, continue.  diuresis: continue lasix 40mg IV BID    severe COPD/end-stage emphysema- prednisone, inhaler therapy.       Subjective:  remains in AF, rates 7090s at rest, but spikes to 130s with any activity.  He's understandly frustrated. Worried about long-term amiodarone, potential pulmonary toxicity.      Objective:  /86 (BP Location: Left arm)   Pulse 93   Temp 99 °F (37.2 °C) (Oral)   Resp 20   Wt 160 lb 7.9 oz (72.8 kg)   SpO2 93%   BMI 23.03 kg/m²   Temp (24hrs), Av.2 °F (36.8 °C), Min:97.6 °F (36.4 °C), Max:99 °F (37.2 °C)      Intake/Output Summary (Last 24 hours) at 2024 1153  Last data filed at 2024 1048  Gross per 24 hour   Intake 1432 ml   Output 2935 ml   Net -1503 ml     Wt Readings from Last 3 Encounters:   24 160 lb 7.9 oz (72.8 kg)   24 159 lb 6.3 oz (72.3 kg)   23 172 lb (78 kg)       General: Awake and alert; in no acute distress  Cardiac: irregularly irregular ; no murmurs/rubs/gallops are appreciated  Lungs: Coarse on auscultation bilaterally; no accessory muscle use  Abdomen: Soft, non-tender; bowel sounds are normoactive  Extremities: No clubbing/cyanosis; moves all 4 extremities normally    Current Facility-Administered  Medications   Medication Dose Route Frequency    meropenem (Merrem) 500 mg in sodium chloride 0.9% 100 mL IVPB-MBP  500 mg Intravenous Q8H    [START ON 7/17/2024] predniSONE (Deltasone) tab 40 mg  40 mg Oral Daily with breakfast    amiodarone (Pacerone) tab 400 mg  400 mg Oral TID    insulin degludec (Tresiba) 100 units/mL flextouch 10 Units  10 Units Subcutaneous Nightly    furosemide (Lasix) 10 mg/mL injection 40 mg  40 mg Intravenous BID (Diuretic)    dilTIAZem (cardIZEM) 100 mg in sodium chloride 0.9% 100 mL IVPB-ADDV  2.5-20 mg/hr Intravenous Continuous    acetaminophen (Tylenol) tab 650 mg  650 mg Oral Q6H PRN    lidocaine-menthol 4-1 % patch 1 patch  1 patch Transdermal Daily PRN    ipratropium (Atrovent) 0.02 % nebulizer solution 500 mcg  500 mcg Nebulization Q6H WA    [Held by provider] furosemide (Lasix) tab 20 mg  20 mg Oral Daily    metoprolol (Lopressor) 5 mg/5mL injection 5 mg  5 mg Intravenous Q6H PRN    selenium sulfide (Selsun) 2.5 % shampoo   Topical Daily PRN    aspirin DR tab 81 mg  81 mg Oral Daily    atorvastatin (Lipitor) tab 20 mg  20 mg Oral Nightly    guaiFENesin ER (Mucinex) 12 hr tab 1,200 mg  1,200 mg Oral BID    levETIRAcetam (Keppra) tab 500 mg  500 mg Oral BID    Roflumilast TABS 500 mcg  500 mcg Oral Daily    rivaroxaban (Xarelto) tab 20 mg  20 mg Oral Daily with food    glucose (Dex4) 15 GM/59ML oral liquid 15 g  15 g Oral Q15 Min PRN    Or    glucose (Glutose) 40% oral gel 15 g  15 g Oral Q15 Min PRN    Or    glucose-vitamin C (Dex-4) chewable tab 4 tablet  4 tablet Oral Q15 Min PRN    Or    dextrose 50% injection 50 mL  50 mL Intravenous Q15 Min PRN    Or    glucose (Dex4) 15 GM/59ML oral liquid 30 g  30 g Oral Q15 Min PRN    Or    glucose (Glutose) 40% oral gel 30 g  30 g Oral Q15 Min PRN    Or    glucose-vitamin C (Dex-4) chewable tab 8 tablet  8 tablet Oral Q15 Min PRN    insulin aspart (NovoLOG) 100 Units/mL FlexPen 1-10 Units  1-10 Units Subcutaneous TID AC and HS     melatonin tab 3 mg  3 mg Oral Nightly PRN    acetaminophen (Tylenol Extra Strength) tab 500 mg  500 mg Oral Q4H PRN    polyethylene glycol (PEG 3350) (Miralax) 17 g oral packet 17 g  17 g Oral Daily PRN    sennosides (Senokot) tab 17.2 mg  17.2 mg Oral Nightly PRN    bisacodyl (Dulcolax) 10 MG rectal suppository 10 mg  10 mg Rectal Daily PRN    fleet enema (Fleet) 7-19 GM/118ML rectal enema 133 mL  1 enema Rectal Once PRN    prochlorperazine (Compazine) 10 MG/2ML injection 5 mg  5 mg Intravenous Q8H PRN    fluticasone furoate-vilanterol (Breo Ellipta) 200-25 MCG/ACT inhaler 1 puff  1 puff Inhalation Daily       Laboratory Data:           Lab Results   Component Value Date    INR 1.59 (H) 06/29/2024    INR 1.06 06/11/2024    INR 2.26 (H) 09/22/2021            Telemetry: AF      Thank you for allowing our practice to participate in the care of your patient. Please do not hesitate to contact me if you have any questions.

## 2024-07-16 NOTE — PROGRESS NOTES
[facility] Progress Note     SUBJECTIVE/Interval history:  No acute events overnight, he remains frustrated with ongoing hospitalization. Still with cough, congestion. Dyspnea is stable at baseline. No fevers.     Review of Systems:   A comprehensive 14 point review of systems was completed.   Pertinent positives and negatives noted in the HPI.    Medications  Reviewed personally   meropenem  500 mg Intravenous Q8H    amiodarone  400 mg Oral TID    insulin degludec  10 Units Subcutaneous Nightly    furosemide  40 mg Intravenous BID (Diuretic)    methylPREDNISolone  40 mg Intravenous Q8H    ipratropium  500 mcg Nebulization Q6H WA    [Held by provider] furosemide  20 mg Oral Daily    aspirin  81 mg Oral Daily    atorvastatin  20 mg Oral Nightly    guaiFENesin ER  1,200 mg Oral BID    levETIRAcetam  500 mg Oral BID    Roflumilast  500 mcg Oral Daily    rivaroxaban  20 mg Oral Daily with food    insulin aspart  1-10 Units Subcutaneous TID AC and HS    fluticasone furoate-vilanterol  1 puff Inhalation Daily       acetaminophen    lidocaine-menthol    metoprolol    selenium sulfide    glucose **OR** glucose **OR** glucose-vitamin C **OR** dextrose **OR** glucose **OR** glucose **OR** glucose-vitamin C    melatonin    acetaminophen    polyethylene glycol (PEG 3350)    sennosides    bisacodyl    fleet enema    prochlorperazine    OBJECTIVE:  Vitals:    07/15/24 2339 07/16/24 0329 07/16/24 0413 07/16/24 0540   BP:  122/79     BP Location:  Right arm     Pulse: 79 88 74 83   Resp:  18 25    Temp:  98.2 °F (36.8 °C)     TempSrc:  Oral     SpO2: 99% 100% 100%    Weight:    160 lb 7.9 oz (72.8 kg)       Vital signs in last 24 hours:  Blood pressure 122/79, pulse 83, temperature 98.2 °F (36.8 °C), temperature source Oral, resp. rate 25, weight 160 lb 7.9 oz (72.8 kg), SpO2 100%.     Intake/Output:  I/O last 3 completed shifts:  In: 1690 [P.O.:1690]  Out: 3380  [Urine:4180]  FiO2 (%):  [30 %-40 %] 30 %    Physical Exam:  General: Appears alert, comfortable at rest on 4L  Neurologic:No focal deficits.  Alert.  Oriented.  Lungs:diminished and clear today. No wheeze. No distress  Heart:irreg RR. +ADRIAN  Abdomen:Soft, non-tender.  No masses.  No guarding.  No rebound.  Extremities:edema    Lab Data Review:   Recent Labs   Lab 07/13/24  0627 07/14/24  0530 07/15/24  0535   * 141* 124*   BUN 27* 22 25*   CREATSERUM 0.73 0.54* 0.72   CA 8.4* 8.6 8.9    136 142   K 4.7 4.9 5.4*   CL 97* 95* 96*   CO2 43.0* 41.0* 42.0*     Recent Labs   Lab 07/12/24  1136 07/15/24  0535   RBC 3.41* 3.99*   HGB 10.3* 12.1*   HCT 33.9* 39.9   MCV 99.4 100.0   MCH 30.2 30.3   MCHC 30.4* 30.3*   RDW 13.7 13.8   NEPRELIM 12.57* 9.24*   WBC 14.6* 10.0   .0 307.0     No results for input(s): \"BNP\" in the last 168 hours.  No results for input(s): \"TROP\", \"CK\" in the last 168 hours.  No results for input(s): \"PT\", \"INR\", \"PTT\" in the last 168 hours.    Recent Labs   Lab 07/11/24  1602   ABGPHT 7.40   AHSXTW1A 67*   XBCPL1Y 67*   ABGHCO3 36.0*   ABGBE 14.3*   TEMP 98.6   ROMARIO Positive   SITE Right Radial   DEV Nasal cannula   THGB 10.2*       Other Labs:  Interval Culture Data:   No results found for this visit on 06/29/24.  No results for input(s): \"COLORUR\", \"CLARITY\", \"SPECGRAVITY\", \"GLUUR\", \"BILUR\", \"KETUR\", \"BLOODURINE\", \"PHURINE\", \"PROUR\", \"UROBILINOGEN\", \"NITRITE\", \"LEUUR\", \"WBCUR\", \"RBCUR\", \"BACUR\", \"EPIUR\" in the last 168 hours.    Interval Radiology:   Reviewed personally  XR CHEST AP PORTABLE  (CPT=71045)    Result Date: 7/15/2024  CONCLUSION:   Stable significant emphysematous and COPD changes.   LOCATION:  [facility]      Dictated by (CST): Yunior James MD on 7/15/2024 at 7:51 AM     Finalized by (CST): Yunior aJmes MD on 7/15/2024 at 7:54 AM       XR CHEST AP PORTABLE  (CPT=71045)    Result Date: 7/14/2024  CONCLUSION:   Postoperative changes of cardiothoracic surgery with  stable cardiac and mediastinal contours.  Recent CT of 07/01/2024 better demonstrates upper lobe predominant emphysema.  Bilateral calcified pleural plaques with persistent diffuse interstitial and bronchial wall thickening.  This may reflect some combination of interstitial pulmonary edema, bronchitis, or reactive airway disease.  Suspected parenchymal scarring of the peripheral left mid lung adjacent to a densely calcified pleural plaque.  Small bilateral pleural effusions persist.  No pneumothorax.  Overall, findings are not substantially changed compared to 07/04/2024.   LOCATION:  [facility]      Dictated by (CST): Jenn Guerra MD on 7/14/2024 at 7:34 AM     Finalized by (CST): Jenn Guerra MD on 7/14/2024 at 7:38 AM        Assessment/Plan:  #1. Acute on chronic hypercapnic and hypoxic respiratory failure  Multifactorial due to afib with RVR, CHF exacerbation with underlying end stage COPD  7/1 CTA with fluid overload, no PE  Treat/control afib and CHF as per cardiology  Continue on scheduled atrovent nebs  Wean steroids to oral pred taper  Wean o2 for goal saturations of 88-92% given hypercapnia. His previous o2 baseline was: 2L at rest, 4L on exertion; using and benefiting from portable oxygen concentrator (3 at rest, 6 on exertion)  Noted issues with affording AVAPS at home. Awaiting determination for financial assistance for AVAPS vs BIPAP    #2. Acute CHF exacerbation, afib with RVR  Rate control and diurese as per cardiology  Previous cardioversion 6/14/24 and ablation 7/12/2024  Now with recurrent afib 7/14 and on diltiazem     #3. COPD  Severe obstruction on previous PFTs 3/2021  off arformoterol given worsening afib/tachycardia  Will continue to hold breo   Continue on scheduled atrovent nebs   He can use levalbuterol as outpatient as hospital does not stock it  He cannot tolerate albuterol due to palpitations, tremors and with afib with RVR; off duonebs  Wean  methylpred to oral pred taper  Sputum cx with  ESBL klebsiella with sensitivities to cipro, gent, levoflox, meropenem and trim/sulfa. Agree with meropenem while hospitalized, but can plan on PO cipro or levofloxacin on discharge  Continue roflumilast  Previously reviewed BLVR - not candidate at this time, reassess as outpatient     #4. Pulmonary nodules  7/2017 CT with worsening peripheral lingular atelectasis. Nodules stable   5/2018 CT stable nodules  12/2020 CT stable  7/2021 CT stable but worsening mediastinal LAD suspect reactive to recent cardiac surgery  1/2022 CT with stable nodules, improved LAD  3/2023 CT chest with several small nodules  9/2023 CT chest with stable findings   7/1/2024 CT chest with worsening ALYSON lesion, possible scarring, rounded atelectasis vs neoplasm  Will need outpatient follow up including PET/CT    #5. Tobacco abuse  30+ pack years, active pipe smoker  Hold on LDCT given recent CT with worsening ALYSON lesion, will need PET/CT as above    Dispo:  Floor, discharge planning. No objection to discharge from pulmonary standpoint. Await determination of status per cardiology    Stefano Randle MD

## 2024-07-17 LAB
BUN BLD-MCNC: 22 MG/DL (ref 9–23)
CALCIUM BLD-MCNC: 8.5 MG/DL (ref 8.7–10.4)
CHLORIDE SERPL-SCNC: 98 MMOL/L (ref 98–112)
CO2 SERPL-SCNC: >40 MMOL/L (ref 21–32)
CREAT BLD-MCNC: 0.58 MG/DL
EGFRCR SERPLBLD CKD-EPI 2021: 109 ML/MIN/1.73M2 (ref 60–?)
GLUCOSE BLD-MCNC: 118 MG/DL (ref 70–99)
GLUCOSE BLD-MCNC: 158 MG/DL (ref 70–99)
GLUCOSE BLD-MCNC: 188 MG/DL (ref 70–99)
GLUCOSE BLD-MCNC: 309 MG/DL (ref 70–99)
GLUCOSE BLD-MCNC: 82 MG/DL (ref 70–99)
OSMOLALITY SERPL CALC.SUM OF ELEC: 296 MOSM/KG (ref 275–295)
POTASSIUM SERPL-SCNC: 4.3 MMOL/L (ref 3.5–5.1)
SODIUM SERPL-SCNC: 141 MMOL/L (ref 136–145)

## 2024-07-17 PROCEDURE — 99232 SBSQ HOSP IP/OBS MODERATE 35: CPT | Performed by: HOSPITALIST

## 2024-07-17 PROCEDURE — 99233 SBSQ HOSP IP/OBS HIGH 50: CPT | Performed by: INTERNAL MEDICINE

## 2024-07-17 RX ORDER — METOLAZONE 2.5 MG/1
5 TABLET ORAL ONCE
Status: COMPLETED | OUTPATIENT
Start: 2024-07-17 | End: 2024-07-17

## 2024-07-17 RX ORDER — DILTIAZEM HYDROCHLORIDE 120 MG/1
120 CAPSULE, EXTENDED RELEASE ORAL DAILY
Status: DISCONTINUED | OUTPATIENT
Start: 2024-07-17 | End: 2024-07-18

## 2024-07-17 NOTE — PROGRESS NOTES
[facility] Cardiology  Progress Note    Keny Marshall Patient Status:  Inpatient    1960 MRN [id number]   Location [facility] 8NE-A Attending Alfredo Garcia MD   Hosp Day # 18 PCP Dustin Avina MD       Impression;  Aflutter-  adv COPD/exacerbation. s/p failed recent CV --> A flutter ablation 24 ([facility]). Unfortunately, flipped into AF (24)- rates up this AM, back on low dose diltiazem gtt.  SOB - 2/2 acute dCHF and COPD exac-   Acute diastolic CHF - appears decompensated on exam despite IV lasix -weights up today. Net negative 10L  S/p bio AVR  TTE (24): LVEF 55%, +DD, 23mm SAVR 3.3m/s, mean 22mmHg.  Plan:  AF: cardizem gtt rates up-  On amiodarone 400mg Tid.  Discussed with EP/[facility] - plan for CV Friday with anesthesia   - Resume home PO diltiazem, wean off gtt as able   AC: rivaroxaban, continue.  diuresis: continue lasix 40mg IV BID. Metolazone today. Monitor BMP tomorrow    severe COPD/end-stage emphysema- prednisone, inhaler therapy.       Subjective:  remains in AF, rates 7090s at rest, but spikes to 130s with any activity.  He's understandly frustrated. Worried about long-term amiodarone, potential pulmonary toxicity.      Objective:  /84 (BP Location: Right arm)   Pulse 103   Temp 97.7 °F (36.5 °C) (Oral)   Resp 20   Wt 162 lb 9.6 oz (73.8 kg)   SpO2 97%   BMI 23.33 kg/m²   Temp (24hrs), Av.9 °F (36.6 °C), Min:97.5 °F (36.4 °C), Max:98.8 °F (37.1 °C)      Intake/Output Summary (Last 24 hours) at 2024 0821  Last data filed at 2024 0739  Gross per 24 hour   Intake 892 ml   Output 1625 ml   Net -733 ml     Wt Readings from Last 3 Encounters:   24 162 lb 9.6 oz (73.8 kg)   24 159 lb 6.3 oz (72.3 kg)   23 172 lb (78 kg)       General: Awake and alert; in no acute distress  Cardiac: irregularly irregular ; no murmurs/rubs/gallops are appreciated  Lungs: Coarse on auscultation bilaterally; no accessory muscle use  Abdomen:  Soft, non-tender; bowel sounds are normoactive  Extremities: No clubbing/cyanosis; moves all 4 extremities normally 2+ LE edema    Current Facility-Administered Medications   Medication Dose Route Frequency    meropenem (Merrem) 500 mg in sodium chloride 0.9% 100 mL IVPB-MBP  500 mg Intravenous Q8H    predniSONE (Deltasone) tab 40 mg  40 mg Oral Daily with breakfast    amiodarone (Pacerone) tab 400 mg  400 mg Oral TID    insulin degludec (Tresiba) 100 units/mL flextouch 10 Units  10 Units Subcutaneous Nightly    furosemide (Lasix) 10 mg/mL injection 40 mg  40 mg Intravenous BID (Diuretic)    dilTIAZem (cardIZEM) 100 mg in sodium chloride 0.9% 100 mL IVPB-ADDV  2.5-20 mg/hr Intravenous Continuous    acetaminophen (Tylenol) tab 650 mg  650 mg Oral Q6H PRN    lidocaine-menthol 4-1 % patch 1 patch  1 patch Transdermal Daily PRN    ipratropium (Atrovent) 0.02 % nebulizer solution 500 mcg  500 mcg Nebulization Q6H WA    [Held by provider] furosemide (Lasix) tab 20 mg  20 mg Oral Daily    metoprolol (Lopressor) 5 mg/5mL injection 5 mg  5 mg Intravenous Q6H PRN    selenium sulfide (Selsun) 2.5 % shampoo   Topical Daily PRN    aspirin DR tab 81 mg  81 mg Oral Daily    atorvastatin (Lipitor) tab 20 mg  20 mg Oral Nightly    guaiFENesin ER (Mucinex) 12 hr tab 1,200 mg  1,200 mg Oral BID    levETIRAcetam (Keppra) tab 500 mg  500 mg Oral BID    Roflumilast TABS 500 mcg  500 mcg Oral Daily    rivaroxaban (Xarelto) tab 20 mg  20 mg Oral Daily with food    glucose (Dex4) 15 GM/59ML oral liquid 15 g  15 g Oral Q15 Min PRN    Or    glucose (Glutose) 40% oral gel 15 g  15 g Oral Q15 Min PRN    Or    glucose-vitamin C (Dex-4) chewable tab 4 tablet  4 tablet Oral Q15 Min PRN    Or    dextrose 50% injection 50 mL  50 mL Intravenous Q15 Min PRN    Or    glucose (Dex4) 15 GM/59ML oral liquid 30 g  30 g Oral Q15 Min PRN    Or    glucose (Glutose) 40% oral gel 30 g  30 g Oral Q15 Min PRN    Or    glucose-vitamin C (Dex-4) chewable tab 8  tablet  8 tablet Oral Q15 Min PRN    insulin aspart (NovoLOG) 100 Units/mL FlexPen 1-10 Units  1-10 Units Subcutaneous TID AC and HS    melatonin tab 3 mg  3 mg Oral Nightly PRN    acetaminophen (Tylenol Extra Strength) tab 500 mg  500 mg Oral Q4H PRN    polyethylene glycol (PEG 3350) (Miralax) 17 g oral packet 17 g  17 g Oral Daily PRN    sennosides (Senokot) tab 17.2 mg  17.2 mg Oral Nightly PRN    bisacodyl (Dulcolax) 10 MG rectal suppository 10 mg  10 mg Rectal Daily PRN    fleet enema (Fleet) 7-19 GM/118ML rectal enema 133 mL  1 enema Rectal Once PRN    prochlorperazine (Compazine) 10 MG/2ML injection 5 mg  5 mg Intravenous Q8H PRN    fluticasone furoate-vilanterol (Breo Ellipta) 200-25 MCG/ACT inhaler 1 puff  1 puff Inhalation Daily       Laboratory Data:           Lab Results   Component Value Date    INR 1.59 (H) 06/29/2024    INR 1.06 06/11/2024    INR 2.26 (H) 09/22/2021     Lab Results   Component Value Date     07/17/2024    K 4.3 07/17/2024    CL 98 07/17/2024    CO2 >40.0 07/17/2024    BUN 22 07/17/2024    CREATSERUM 0.58 07/17/2024     07/17/2024    CA 8.5 07/17/2024         Telemetry: AF      Thank you for allowing our practice to participate in the care of your patient. Please do not hesitate to contact me if you have any questions.

## 2024-07-17 NOTE — PROGRESS NOTES
Addendum:    Agree with above note except as documented below.      Gen: NAD, pleasant   CVS: s1s2, Irregularly irregular  Resp: CTAB  Abd: soft, NT, ND    #Acute on chronic HFpEF   #Aflutter s/p failed recent CV   #Afib with RVR   #Acute on chronic hypoxic respiratory failure 2/2 CHF/COPD  #Steroid induced hyperglycemia   #ESBL Klebsiella pneumonia    Continue steroids, nebs.  On amio. Plan for cardioversion this week.  Cards, pulm. Merrem for pneumonia.        Pt seen and examined independently. Chart reviewed. Labs and imaging over the last 24 hours have been personally reviewed.  I personally made/approved 100% of the management plan for this patient and take full responsibility for the patient management.   Note has been reviewed by me and modified as needed.  Exam and Impression/ Recs as noted above.  D/w staff.    Alfredo Garcia MD          [facility]   part of [facility] Hospitalist Progress Note     Keny Marshall Patient Status:  Inpatient    1960 MRN [id number]   Location [facility] 8NE-A Attending Dr. Garcia   Hosp Day # 18 PCP Dustin Avina MD     Chief Complaint: SOB    Subjective:  pt reports feeling more SOB today.  He is aware of cardioversion on Friday.   On Cardizem drip overnight and transitioning to oral.  RN reports also getting metalozone today.     Objective:    Review of Systems:   A comprehensive review of systems was completed; pertinent positive and negatives stated in subjective.    Vital signs:  Temp:  [97.5 °F (36.4 °C)-99 °F (37.2 °C)] 97.7 °F (36.5 °C)  Pulse:  [] 103  Resp:  [20-21] 20  BP: (106-136)/(71-92) 136/84  SpO2:  [93 %-99 %] 97 %    Physical Exam:    General: Thin, chronically ill appearing 63 year old male   Respiratory: Diminished at bases on O2 NC, no wheezing  Cardiovascular: S1, S2, irregularly irregular 120s  Abdomen: Soft, Non-tender, non-distended, positive bowel sounds  Neuro: No new focal deficits.   Extremities: No  edema    Diagnostic Data:    Labs:  Recent Labs   Lab 07/12/24  1136 07/15/24  0535   WBC 14.6* 10.0   HGB 10.3* 12.1*   MCV 99.4 100.0   .0 307.0       Recent Labs   Lab 07/15/24  0535 07/16/24  1036 07/17/24  0619   * 210* 118*   BUN 25* 21 22   CREATSERUM 0.72 0.72 0.58*   CA 8.9 9.4 8.5*    143 141   K 5.4* 5.0 4.3   CL 96* 95* 98   CO2 42.0* >40.0* >40.0*       Estimated Creatinine Clearance: 132.9 mL/min (A) (based on SCr of 0.58 mg/dL (L)).    No results for input(s): \"TROP\", \"TROPHS\", \"CK\" in the last 168 hours.    No results for input(s): \"PTP\", \"INR\" in the last 168 hours.       Microbiology    No results found for this visit on 06/29/24.      Imaging: Reviewed in Epic.    Medications:    meropenem  500 mg Intravenous Q8H    predniSONE  40 mg Oral Daily with breakfast    amiodarone  400 mg Oral TID    insulin degludec  10 Units Subcutaneous Nightly    furosemide  40 mg Intravenous BID (Diuretic)    ipratropium  500 mcg Nebulization Q6H WA    [Held by provider] furosemide  20 mg Oral Daily    aspirin  81 mg Oral Daily    atorvastatin  20 mg Oral Nightly    guaiFENesin ER  1,200 mg Oral BID    levETIRAcetam  500 mg Oral BID    Roflumilast  500 mcg Oral Daily    rivaroxaban  20 mg Oral Daily with food    insulin aspart  1-10 Units Subcutaneous TID AC and HS    fluticasone furoate-vilanterol  1 puff Inhalation Daily       Assessment & Plan:      #Acute on chronic hypoxic/hypercapnic respiratory failure 2/2 CHF/COPD/PNA     #Acute on chronic diastolic CHF  -Lasix 40 mg IV x BID, metolazone, good UO     #End stage COPD w/ acute exacerbation improving  -bipap prn/sleep  -remains on 4-5L -discharged on this last admission  -steroids, atrovent nebs, breo, mucinex per pulm  -no albuterol due to tachycardia     #Left Lung mass, Smoker  -pulm recs OP PET-CT     #ESBL Klebsiella PNA  -Merrem    #Rapid aflutter/afib  -cardizem drip to po, on amiodarone po, DOAC per cards  -monitor Tele  -s/p ALO  ablation per EP on 7/12, back in afib on 7/14  -Recently underwent DCCV 6/14, plans for repeat Cardioversion on Friday per EP and if fails then AVN ablation/ppm.    #Type 2 DM, 5.6% Steroid hyperglycemia  -correctional scale, Tresiba 10 nightly, hyperglycemia protocol    #Aortic stenosis s/p AVR   #Hyponatremia, resolved  #Prior VTE on xarelto  #Seizure disorder-Keppra  #History of RCC s/p nephrectomy  #ABIGAIL-CPAP protocol  #Seborrheic dermatitis, improved-topicals/shampoos.      Case d/w pt, pulm, RN. Cardioversion on Friday per EP.      LAM Teague  12:13 PM     Supplementary Documentation:     Quality:  DVT Mechanical Prophylaxis:     Early ambuation  DVT Pharmacologic Prophylaxis   Medication    rivaroxaban (Xarelto) tab 20 mg         DVT Pharmacologic prophylaxis: Aspirin 81 mg    Code Status: Full Code  Peters: No urinary catheter in place    The 21st Century Cures Act makes medical notes like these available to patients in the interest of transparency. Please be advised this is a medical document. Medical documents are intended to carry relevant information, facts as evident, and the clinical opinion of the practitioner. The medical note is intended as peer to peer communication and may appear blunt or direct. It is written in medical language and may contain abbreviations or verbiage that are unfamiliar.

## 2024-07-17 NOTE — PLAN OF CARE
Patient alert and oriented x 4. Up  SBA. On 4L via NC, Bipap when sleeping. Afib on tele, HR controlled on Cardizem drip. Continent of bowel and bladder. Reports of mild leg pain, prn pain medication administered. No complaints of  shortness of breath or chest pain/discomfort. Cardizem drip infusing. POC : Cardizem drip, IV abx, IV Lasix. Fall precautions in place. Call light within reach.    0235: Pt Cardizem drip was put on hold. HR in the low 70's. Will continue to monitor.    0600: Cardizem drip restarted at 2.5 mg/hr. HR in the upper 80's.    Problem: RESPIRATORY - ADULT  Goal: Achieves optimal ventilation and oxygenation  Description: INTERVENTIONS:  - Assess for changes in respiratory status  - Assess for changes in mentation and behavior  - Position to facilitate oxygenation and minimize respiratory effort  - Oxygen supplementation based on oxygen saturation or ABGs  - Provide Smoking Cessation handout, if applicable  - Encourage broncho-pulmonary hygiene including cough, deep breathe, Incentive Spirometry  - Assess the need for suctioning and perform as needed  - Assess and instruct to report SOB or any respiratory difficulty  - Respiratory Therapy support as indicated  - Manage/alleviate anxiety  - Monitor for signs/symptoms of CO2 retention  Outcome: Progressing     Problem: CARDIOVASCULAR - ADULT  Goal: Maintains optimal cardiac output and hemodynamic stability  Description: INTERVENTIONS:  - Monitor vital signs, rhythm, and trends  - Monitor for bleeding, hypotension and signs of decreased cardiac output  - Evaluate effectiveness of vasoactive medications to optimize hemodynamic stability  - Monitor arterial and/or venous puncture sites for bleeding and/or hematoma  - Assess quality of pulses, skin color and temperature  - Assess for signs of decreased coronary artery perfusion - ex. Angina  - Evaluate fluid balance, assess for edema, trend weights  Outcome: Progressing  Goal: Absence of cardiac  arrhythmias or at baseline  Description: INTERVENTIONS:  - Continuous cardiac monitoring, monitor vital signs, obtain 12 lead EKG if indicated  - Evaluate effectiveness of antiarrhythmic and heart rate control medications as ordered  - Initiate emergency measures for life threatening arrhythmias  - Monitor electrolytes and administer replacement therapy as ordered  Outcome: Progressing     Problem: SAFETY ADULT - FALL  Goal: Free from fall injury  Description: INTERVENTIONS:  - Assess pt frequently for physical needs  - Identify cognitive and physical deficits and behaviors that affect risk of falls.  - [place] fall precautions as indicated by assessment.  - Educate pt/family on patient safety including physical limitations  - Instruct pt to call for assistance with activity based on assessment  - Modify environment to reduce risk of injury  - Provide assistive devices as appropriate  - Consider OT/PT consult to assist with strengthening/mobility  - Encourage toileting schedule  Outcome: Progressing     Problem: PAIN - ADULT  Goal: Verbalizes/displays adequate comfort level or patient's stated pain goal  Description: INTERVENTIONS:  - Encourage pt to monitor pain and request assistance  - Assess pain using appropriate pain scale  - Administer analgesics based on type and severity of pain and evaluate response  - Implement non-pharmacological measures as appropriate and evaluate response  - Consider cultural and social influences on pain and pain management  - Manage/alleviate anxiety  - Utilize distraction and/or relaxation techniques  - Monitor for opioid side effects  - Notify MD/LIP if interventions unsuccessful or patient reports new pain  - Anticipate increased pain with activity and pre-medicate as appropriate  Outcome: Progressing     Problem: Patient/Family Goals  Goal: Patient/Family Long Term Goal  Description: Patient's Long Term Goal: to go home    Interventions:  - O2, bipap, IV steroids, diuretics,  labs, increase activity    - See additional Care Plan goals for specific interventions  Outcome: Progressing  Goal: Patient/Family Short Term Goal  Description: Patient's Short Term Goal: feel better    Interventions:   - - O2, bipap, IV steroids, diuretics, labs, increase activity    - See additional Care Plan goals for specific interventions  Outcome: Progressing

## 2024-07-17 NOTE — CM/SW NOTE
Received call from pt asking about a notary. SW met with pt at bedside to address. SW called phone # listed as notary but received update that the hospital does not do notaries anymore. SW informed pt of this. Pt was understanding. SW advised pt to see if he can call jung, etc to see if they know of someone that can come notarize paperwork that he needs completed.     Sherrie Dean, MSW, LSW  Discharge Planner

## 2024-07-17 NOTE — PLAN OF CARE
Assumed care of patient at 0700. Pt A/Ox 4. O2 sats maintained on 4L, baseline 4.5L, AVAPs at night. Afib on tele. Last BM 7/17. Voiding without difficulty. Pt reports no pain. Pt up standby assist. Pt updated on plan of care. Care needs met. Bed in lowest position, Call light within reach. Bed alarm on. Seizure and isolation precautions maintained. Cardizem drip infusing per orders.     POC: control HR, cardioversion Friday, wean cardizem drip, IV Lasix, IV antibiotic     Problem: RESPIRATORY - ADULT  Goal: Achieves optimal ventilation and oxygenation  Description: INTERVENTIONS:  - Assess for changes in respiratory status  - Assess for changes in mentation and behavior  - Position to facilitate oxygenation and minimize respiratory effort  - Oxygen supplementation based on oxygen saturation or ABGs  - Provide Smoking Cessation handout, if applicable  - Encourage broncho-pulmonary hygiene including cough, deep breathe, Incentive Spirometry  - Assess the need for suctioning and perform as needed  - Assess and instruct to report SOB or any respiratory difficulty  - Respiratory Therapy support as indicated  - Manage/alleviate anxiety  - Monitor for signs/symptoms of CO2 retention  Outcome: Progressing     Problem: CARDIOVASCULAR - ADULT  Goal: Maintains optimal cardiac output and hemodynamic stability  Description: INTERVENTIONS:  - Monitor vital signs, rhythm, and trends  - Monitor for bleeding, hypotension and signs of decreased cardiac output  - Evaluate effectiveness of vasoactive medications to optimize hemodynamic stability  - Monitor arterial and/or venous puncture sites for bleeding and/or hematoma  - Assess quality of pulses, skin color and temperature  - Assess for signs of decreased coronary artery perfusion - ex. Angina  - Evaluate fluid balance, assess for edema, trend weights  Outcome: Progressing  Goal: Absence of cardiac arrhythmias or at baseline  Description: INTERVENTIONS:  - Continuous cardiac  monitoring, monitor vital signs, obtain 12 lead EKG if indicated  - Evaluate effectiveness of antiarrhythmic and heart rate control medications as ordered  - Initiate emergency measures for life threatening arrhythmias  - Monitor electrolytes and administer replacement therapy as ordered  Outcome: Progressing     Problem: SAFETY ADULT - FALL  Goal: Free from fall injury  Description: INTERVENTIONS:  - Assess pt frequently for physical needs  - Identify cognitive and physical deficits and behaviors that affect risk of falls.  - [place] fall precautions as indicated by assessment.  - Educate pt/family on patient safety including physical limitations  - Instruct pt to call for assistance with activity based on assessment  - Modify environment to reduce risk of injury  - Provide assistive devices as appropriate  - Consider OT/PT consult to assist with strengthening/mobility  - Encourage toileting schedule  Outcome: Progressing     Problem: PAIN - ADULT  Goal: Verbalizes/displays adequate comfort level or patient's stated pain goal  Description: INTERVENTIONS:  - Encourage pt to monitor pain and request assistance  - Assess pain using appropriate pain scale  - Administer analgesics based on type and severity of pain and evaluate response  - Implement non-pharmacological measures as appropriate and evaluate response  - Consider cultural and social influences on pain and pain management  - Manage/alleviate anxiety  - Utilize distraction and/or relaxation techniques  - Monitor for opioid side effects  - Notify MD/LIP if interventions unsuccessful or patient reports new pain  - Anticipate increased pain with activity and pre-medicate as appropriate  Outcome: Progressing     Problem: Patient/Family Goals  Goal: Patient/Family Long Term Goal  Description: Patient's Long Term Goal: to go home    Interventions:  - O2, bipap, IV steroids, diuretics, labs, increase activity    - See additional Care Plan goals for specific  interventions  Outcome: Progressing  Goal: Patient/Family Short Term Goal  Description: Patient's Short Term Goal: feel better    Interventions:   - - O2, bipap, IV steroids, diuretics, labs, increase activity    - See additional Care Plan goals for specific interventions  Outcome: Progressing

## 2024-07-17 NOTE — PAYOR COMM NOTE
7/17   CONTINUED STAY REVIEW    Payor: [employer] MEDICARE ADV PPO  Subscriber #:  [id number]  Authorization Number: [id number]    Admit date: 6/29/24  Admit time:  2:08 PM         MEDICATIONS ADMINISTERED IN LAST 1 DAY:  acetaminophen (Tylenol) tab 650 mg       Date Action Dose Route User    7/16/2024 2346 Given 650 mg Oral Dorian Chavez RN          amiodarone (Pacerone) tab 400 mg       Date Action Dose Route User    7/17/2024 0929 Given 400 mg Oral Damián Andres RN    7/16/2024 2020 Given 400 mg Oral Dorian Chavez RN    7/16/2024 1528 Given 400 mg Oral Adelia Alcazar RN          aspirin DR tab 81 mg       Date Action Dose Route User    7/17/2024 0929 Given 81 mg Oral Damián Andres RN          atorvastatin (Lipitor) tab 20 mg       Date Action Dose Route User    7/16/2024 2021 Given 20 mg Oral Dorian Chavez RN          dilTIAZem (cardIZEM) 100 mg in sodium chloride 0.9% 100 mL IVPB-ADDV       Date Action Dose Route User    7/17/2024 0600 Restarted 2.5 mg/hr Intravenous Dorian Chavez RN    7/17/2024 0130 Rate/Dose Change 2.5 mg/hr Intravenous Dorian Chavez RN    7/17/2024 0000 Rate/Dose Change 5 mg/hr Intravenous Dorian Chavez RN    7/16/2024 2351 New Bag 7.5 mg/hr Intravenous Dorian Chavez RN          dilTIAZem ER (Dilacor XR) 24 hr cap 120 mg       Date Action Dose Route User    7/17/2024 1056 Given 120 mg Oral Damián Anders RN          fluticasone furoate-vilanterol (Breo Ellipta) 200-25 MCG/ACT inhaler 1 puff       Date Action Dose Route User    7/17/2024 0927 Given 1 puff Inhalation Damián Andres RN          furosemide (Lasix) 10 mg/mL injection 40 mg       Date Action Dose Route User    7/17/2024 0927 Given 40 mg Intravenous Damián Andres RN    7/16/2024 1729 Given 40 mg Intravenous Adelia Alcazar RN          guaiFENesin ER (Mucinex) 12 hr tab 1,200 mg       Date Action Dose Route User    7/17/2024 0927 Given 1,200 mg Oral Damián Andres, RN    7/16/2024 2021 Given 1,200 mg Oral  Dorian Chavez RN          insulin aspart (NovoLOG) 100 Units/mL FlexPen 1-10 Units       Date Action Dose Route User    7/17/2024 1307 Given 3 Units Subcutaneous (Left Upper Arm) Damián Andres RN    7/16/2024 2117 Given 2 Units Subcutaneous (Right Upper Arm) Dorian Chavez RN    7/16/2024 1729 Given 4 Units Subcutaneous (Left Upper Arm) Adelia Alcazar RN          insulin degludec (Tresiba) 100 units/mL flextouch 10 Units       Date Action Dose Route User    7/16/2024 2022 Given 10 Units Subcutaneous (Right Upper Arm) Dorian Chavez RN          insulin NPH-human isophane (Novolin N) 100 Units/mL injection 8 Units       Date Action Dose Route User    7/16/2024 1528 Given 8 Units Subcutaneous (Right Lower Abdomen) Adelia Alcazar RN          ipratropium (Atrovent) 0.02 % nebulizer solution 500 mcg       Date Action Dose Route User    7/17/2024 0740 Given 500 mcg Nebulization Penelope Carvalho RCP    7/16/2024 2000 Given 500 mcg Nebulization Zuleika Frazier, [facility]          levETIRAcetam (Keppra) tab 500 mg       Date Action Dose Route User    7/17/2024 0929 Given 500 mg Oral Damián Andres RN    7/16/2024 2021 Given 500 mg Oral Dorian Chavez RN          meropenem (Merrem) 500 mg in sodium chloride 0.9% 100 mL IVPB-MBP       Date Action Dose Route User    7/17/2024 0927 New Bag 500 mg Intravenous Damián Andres RN    7/16/2024 2347 New Bag 500 mg Intravenous Dorian Chavez RN    7/16/2024 1729 New Bag 500 mg Intravenous Adelia Alcazar RN          metOLazone (Zaroxolyn) tab 5 mg       Date Action Dose Route User    7/17/2024 1056 Given 5 mg Oral Damián Andres RN          predniSONE (Deltasone) tab 40 mg       Date Action Dose Route User    7/17/2024 0929 Given 40 mg Oral Damián Andres, RADHA          rivaroxaban (Xarelto) tab 20 mg       Date Action Dose Route User    7/17/2024 0929 Given 20 mg Oral Damián Andres, RN          Roflumilast TABS 500 mcg       Date Action Dose Route User     7/17/2024 0930 Given 500 mcg Oral [place] Damián Francis, RN            Vitals (last day)       Date/Time Temp Pulse Resp BP SpO2 Weight O2 Device O2 Flow Rate (L/min) [facility]    07/17/24 1223 98.3 °F (36.8 °C) 132 18 137/83 93 % -- Nasal cannula 4 L/min EL    07/17/24 1057 -- 129 -- 122/77 93 % -- Nasal cannula 4 L/min MV    07/17/24 1034 -- 122 -- -- 95 % -- -- -- EL    07/17/24 0751 -- -- -- -- -- -- Nasal cannula 4 L/min     07/17/24 0749 97.7 °F (36.5 °C) 103 20 136/84 97 % -- Nasal cannula 4 L/min     07/17/24 0740 -- 101 20 -- 99 % -- -- --     07/17/24 0739 -- 122 -- -- 98 % -- -- --     07/17/24 0500 97.5 °F (36.4 °C) 85 20 121/92 94 % 162 lb 9.6 oz (73.8 kg) Nasal cannula 4 L/min JR    07/16/24 2300 98 °F (36.7 °C) 77 20 122/71 99 % -- -- -- KJ    07/16/24 1943 98.8 °F (37.1 °C) 83 20 126/76 99 % -- -- -- KJ    07/16/24 1648 97.8 °F (36.6 °C) 92 20 126/73 98 % -- Nasal cannula 4.5 L/min PT    07/16/24 1541 -- 93 -- -- 99 % -- -- -- PT    07/16/24 1235 -- -- -- -- -- -- Nasal cannula 6 L/min DL    07/16/24 1206 97.7 °F (36.5 °C) 118 21 136/87 97 % -- Nasal cannula 4.5 L/min PT    07/16/24 0900 -- 93 -- -- 93 % -- -- -- PT    07/16/24 0820 99 °F (37.2 °C) 109 20 106/86 97 % -- Nasal cannula 4.5 L/min PT    07/16/24 0734 -- -- -- -- -- -- Nasal cannula 4.5 L/min DL    07/16/24 0540 -- 83 -- -- -- 160 lb 7.9 oz (72.8 kg) -- -- PM    07/16/24 0413 -- 74 25 -- 100 % -- Bi-PAP -- TK    07/16/24 0329 98.2 °F (36.8 °C) 88 18 122/79 100 % -- -- -- PM         7/ 17 HOSPITALIST NOTE    Subjective:  pt reports feeling more SOB today.  He is aware of cardioversion on Friday.   On Cardizem drip overnight and transitioning to oral.  RN reports also getting metalozone today.            Objective:  Review of Systems:   A comprehensive review of systems was completed; pertinent positive and negatives stated in subjective.     Vital signs:  Temp:  [97.5 °F (36.4 °C)-99 °F (37.2 °C)] 97.7 °F (36.5 °C)  Pulse:  []  103  Resp:  [20-21] 20  BP: (106-136)/(71-92) 136/84  SpO2:  [93 %-99 %] 97 %     Physical Exam:    General: Thin, chronically ill appearing 63 year old male   Respiratory: Diminished at bases on O2 NC, no wheezing  Cardiovascular: S1, S2, irregularly irregular 120s  Abdomen: Soft, Non-tender, non-distended, positive bowel sounds  Neuro: No new focal deficits.   Extremities: No edema     Diagnostic Data:    Labs:       Recent Labs   Lab 07/12/24  1136 07/15/24  0535   WBC 14.6* 10.0   HGB 10.3* 12.1*   MCV 99.4 100.0   .0 307.0               Recent Labs   Lab 07/15/24  0535 07/16/24  1036 07/17/24  0619   * 210* 118*   BUN 25* 21 22   CREATSERUM 0.72 0.72 0.58*   CA 8.9 9.4 8.5*    143 141   K 5.4* 5.0 4.3   CL 96* 95* 98   CO2 42.0* >40.0* >40.0*           Assessment & Plan:  #Acute on chronic hypoxic/hypercapnic respiratory failure 2/2 CHF/COPD/PNA     #Acute on chronic diastolic CHF  -Lasix 40 mg IV x BID, metolazone, good UO     #End stage COPD w/ acute exacerbation improving  -bipap prn/sleep  -remains on 4-5L -discharged on this last admission  -steroids, atrovent nebs, breo, mucinex per pulm  -no albuterol due to tachycardia     #Left Lung mass, Smoker  -pulm recs OP PET-CT     #ESBL Klebsiella PNA  -Merrem     #Rapid aflutter/afib  -cardizem drip to po, on amiodarone po, DOAC per cards  -monitor Tele  -s/p ALO ablation per EP on 7/12, back in afib on 7/14  -Recently underwent DCCV 6/14, plans for repeat Cardioversion on Friday per EP and if fails then AVN ablation/ppm.     #Type 2 DM, 5.6% Steroid hyperglycemia  -correctional scale, Tresiba 10 nightly, hyperglycemia protocol     #Aortic stenosis s/p AVR   #Hyponatremia, resolved  #Prior VTE on xarelto  #Seizure disorder-Keppra  #History of RCC s/p nephrectomy  #ABIGAIL-CPAP protocol  #Seborrheic dermatitis, improved-topicals/shampoos.       Case d/w pt, pulm, RN. Cardioversion on Friday per EP.       7/17 CARDIOLOGY  NOTE      Impression;  Aflutter-  adv COPD/exacerbation. s/p failed recent CV --> A flutter ablation 24 ([facility]). Unfortunately, flipped into AF (24)- rates up this AM, back on low dose diltiazem gtt.  SOB - 2/2 acute dCHF and COPD exac-   Acute diastolic CHF - appears decompensated on exam despite IV lasix -weights up today. Net negative 10L  S/p bio AVR  TTE (24): LVEF 55%, +DD, 23mm SAVR 3.3m/s, mean 22mmHg.  Plan:  AF: cardizem gtt rates up-  On amiodarone 400mg Tid.  Discussed with EP/[facility] - plan for CV Friday with anesthesia   - Resume home PO diltiazem, wean off gtt as able   AC: rivaroxaban, continue.  diuresis: continue lasix 40mg IV BID. Metolazone today. Monitor BMP tomorrow    severe COPD/end-stage emphysema- prednisone, inhaler therapy.         Subjective:  remains in AF, rates 7090s at rest, but spikes to 130s with any activity.  He's understandly frustrated. Worried about long-term amiodarone, potential pulmonary toxicity.       Objective:  /84 (BP Location: Right arm)   Pulse 103   Temp 97.7 °F (36.5 °C) (Oral)   Resp 20   Wt 162 lb 9.6 oz (73.8 kg)   SpO2 97%   BMI 23.33 kg/m²   Temp (24hrs), Av.9 °F (36.6 °C), Min:97.5 °F (36.4 °C), Max:98.8 °F (37.1 °C)    Cards planning for continued loading over next few days with amiodarone to maximize efficacy of cardioversion. Cardioversion planned for Friday.

## 2024-07-17 NOTE — PROGRESS NOTES
[facility] Progress Note     SUBJECTIVE/Interval history:  No acute events overnight, he feels more congested today with thick phlegm, but able to expectorate. No change in dyspnea. No pain. No fevers  Remains on 4L    Review of Systems:   A comprehensive 14 point review of systems was completed.   Pertinent positives and negatives noted in the HPI.    Medications  Reviewed personally   meropenem  500 mg Intravenous Q8H    predniSONE  40 mg Oral Daily with breakfast    amiodarone  400 mg Oral TID    insulin degludec  10 Units Subcutaneous Nightly    furosemide  40 mg Intravenous BID (Diuretic)    ipratropium  500 mcg Nebulization Q6H WA    [Held by provider] furosemide  20 mg Oral Daily    aspirin  81 mg Oral Daily    atorvastatin  20 mg Oral Nightly    guaiFENesin ER  1,200 mg Oral BID    levETIRAcetam  500 mg Oral BID    Roflumilast  500 mcg Oral Daily    rivaroxaban  20 mg Oral Daily with food    insulin aspart  1-10 Units Subcutaneous TID AC and HS    fluticasone furoate-vilanterol  1 puff Inhalation Daily       acetaminophen    lidocaine-menthol    metoprolol    selenium sulfide    glucose **OR** glucose **OR** glucose-vitamin C **OR** dextrose **OR** glucose **OR** glucose **OR** glucose-vitamin C    melatonin    acetaminophen    polyethylene glycol (PEG 3350)    sennosides    bisacodyl    fleet enema    prochlorperazine    OBJECTIVE:  Vitals:    07/17/24 0500 07/17/24 0739 07/17/24 0740 07/17/24 0749   BP: (!) 121/92   136/84   BP Location: Right arm   Right arm   Pulse: 85 (!) 122 101 103   Resp: 20  20 20   Temp: 97.5 °F (36.4 °C)   97.7 °F (36.5 °C)   TempSrc: Oral   Oral   SpO2: 94% 98% 99% 97%   Weight: 162 lb 9.6 oz (73.8 kg)          Vital signs in last 24 hours:  Blood pressure 136/84, pulse 103, temperature 97.7 °F (36.5 °C), temperature source Oral, resp. rate 20, weight 162 lb 9.6 oz (73.8 kg), SpO2 97%.     Intake/Output:  I/O last 3  completed shifts:  In: 1132 [P.O.:1132]  Out: 2925 [Urine:2925]       Physical Exam:  General: Appears alert, comfortable at rest on 4L  Neurologic:No focal deficits.  Alert.  Oriented.  Lungs:stable diminished bs today. No wheeze.   Heart:irreg RR. +ADRIAN  Abdomen:Soft, non-tender.  No masses.  No guarding.  No rebound.  Extremities:edema    Lab Data Review:   Recent Labs   Lab 07/15/24  0535 07/16/24  1036 07/17/24  0619   * 210* 118*   BUN 25* 21 22   CREATSERUM 0.72 0.72 0.58*   CA 8.9 9.4 8.5*    143 141   K 5.4* 5.0 4.3   CL 96* 95* 98   CO2 42.0* >40.0* >40.0*     Recent Labs   Lab 07/12/24  1136 07/15/24  0535   RBC 3.41* 3.99*   HGB 10.3* 12.1*   HCT 33.9* 39.9   MCV 99.4 100.0   MCH 30.2 30.3   MCHC 30.4* 30.3*   RDW 13.7 13.8   NEPRELIM 12.57* 9.24*   WBC 14.6* 10.0   .0 307.0     No results for input(s): \"BNP\" in the last 168 hours.  No results for input(s): \"TROP\", \"CK\" in the last 168 hours.  No results for input(s): \"PT\", \"INR\", \"PTT\" in the last 168 hours.    Recent Labs   Lab 07/11/24  1602   ABGPHT 7.40   LDRKIJ3L 67*   WZCVE6V 67*   ABGHCO3 36.0*   ABGBE 14.3*   TEMP 98.6   ROMARIO Positive   SITE Right Radial   DEV Nasal cannula   THGB 10.2*       Other Labs:  Interval Culture Data:   No results found for this visit on 06/29/24.  No results for input(s): \"COLORUR\", \"CLARITY\", \"SPECGRAVITY\", \"GLUUR\", \"BILUR\", \"KETUR\", \"BLOODURINE\", \"PHURINE\", \"PROUR\", \"UROBILINOGEN\", \"NITRITE\", \"LEUUR\", \"WBCUR\", \"RBCUR\", \"BACUR\", \"EPIUR\" in the last 168 hours.    Interval Radiology:   Reviewed personally  XR CHEST AP PORTABLE  (CPT=71045)    Result Date: 7/15/2024  CONCLUSION:   Stable significant emphysematous and COPD changes.   LOCATION:  [facility]      Dictated by (CST): Yunior James MD on 7/15/2024 at 7:51 AM     Finalized by (CST): Yunior James MD on 7/15/2024 at 7:54 AM        Assessment/Plan:  #1. Acute on chronic hypercapnic and hypoxic respiratory failure  Multifactorial due to afib with  RVR, CHF exacerbation with underlying end stage COPD  7/1 CTA with fluid overload, no PE  Treat/control afib and CHF as per cardiology  Continue on scheduled atrovent nebs  Continue on oral pred taper  Wean o2 for goal saturations of 88-92% given hypercapnia. His previous o2 baseline was: 2L at rest, 4L on exertion; using and benefiting from portable oxygen concentrator (3 at rest, 6 on exertion)  Noted issues with affording AVAPS at home. Awaiting determination for financial assistance for AVAPS vs BIPAP    #2. Acute CHF exacerbation, afib with RVR  Rate control and diurese as per cardiology  Previous cardioversion 6/14/24 and ablation 7/12/2024  Now with recurrent afib 7/14 and on diltiazem     #3. COPD  Severe obstruction on previous PFTs 3/2021  off arformoterol given worsening afib/tachycardia  Will continue to hold breo   Continue on scheduled atrovent nebs   He can use levalbuterol as outpatient as hospital does not stock it  He cannot tolerate albuterol due to palpitations, tremors and with afib with RVR; off duonebs  Continue on oral pred taper  Sputum cx with ESBL klebsiella with sensitivities to cipro, gent, levoflox, meropenem and trim/sulfa. Agree with meropenem day 2 while hospitalized, but can plan on PO cipro or levofloxacin on discharge  Continue roflumilast  Previously reviewed BLVR - not candidate at this time, reassess as outpatient     #4. Pulmonary nodules  7/2017 CT with worsening peripheral lingular atelectasis. Nodules stable   5/2018 CT stable nodules  12/2020 CT stable  7/2021 CT stable but worsening mediastinal LAD suspect reactive to recent cardiac surgery  1/2022 CT with stable nodules, improved LAD  3/2023 CT chest with several small nodules  9/2023 CT chest with stable findings   7/1/2024 CT chest with worsening ALYSON lesion, possible scarring, rounded atelectasis vs neoplasm  Will need outpatient follow up including PET/CT    #5. Tobacco abuse  30+ pack years, active pipe smoker  Hold  on LDCT given recent CT with worsening ALYSON lesion, will need PET/CT as above    Dispo:  Floor     Stefano Randle MD

## 2024-07-17 NOTE — CM/SW NOTE
Discussed case with cards APN. Inquired about reasoning for cardioversion Friday vs sooner. Cards planning for continued loading over next few days with amiodarone to maximize efficacy of cardioversion. Cardioversion planned for Friday.    CM/SW to remain available.    ISABEL DiggsN, RN-BC    [id number]

## 2024-07-18 ENCOUNTER — ANESTHESIA EVENT (OUTPATIENT)
Dept: INTERVENTIONAL RADIOLOGY/VASCULAR | Facility: HOSPITAL | Age: 64
End: 2024-07-18
Payer: MEDICARE

## 2024-07-18 LAB
BASOPHILS # BLD AUTO: 0.06 X10(3) UL (ref 0–0.2)
BASOPHILS NFR BLD AUTO: 0.2 %
BUN BLD-MCNC: 24 MG/DL (ref 9–23)
CALCIUM BLD-MCNC: 8.8 MG/DL (ref 8.7–10.4)
CHLORIDE SERPL-SCNC: 87 MMOL/L (ref 98–112)
CO2 SERPL-SCNC: >40 MMOL/L (ref 21–32)
CREAT BLD-MCNC: 0.69 MG/DL
EGFRCR SERPLBLD CKD-EPI 2021: 103 ML/MIN/1.73M2 (ref 60–?)
EOSINOPHIL # BLD AUTO: 0.03 X10(3) UL (ref 0–0.7)
EOSINOPHIL NFR BLD AUTO: 0.1 %
ERYTHROCYTE [DISTWIDTH] IN BLOOD BY AUTOMATED COUNT: 14.6 %
GLUCOSE BLD-MCNC: 194 MG/DL (ref 70–99)
GLUCOSE BLD-MCNC: 199 MG/DL (ref 70–99)
GLUCOSE BLD-MCNC: 205 MG/DL (ref 70–99)
GLUCOSE BLD-MCNC: 362 MG/DL (ref 70–99)
GLUCOSE BLD-MCNC: 77 MG/DL (ref 70–99)
HCT VFR BLD AUTO: 36.4 %
HGB BLD-MCNC: 11.9 G/DL
IMM GRANULOCYTES # BLD AUTO: 0.33 X10(3) UL (ref 0–1)
IMM GRANULOCYTES NFR BLD: 1.3 %
LYMPHOCYTES # BLD AUTO: 0.47 X10(3) UL (ref 1–4)
LYMPHOCYTES NFR BLD AUTO: 1.8 %
MCH RBC QN AUTO: 30.6 PG (ref 26–34)
MCHC RBC AUTO-ENTMCNC: 32.7 G/DL (ref 31–37)
MCV RBC AUTO: 93.6 FL
MONOCYTES # BLD AUTO: 0.8 X10(3) UL (ref 0.1–1)
MONOCYTES NFR BLD AUTO: 3 %
NEUTROPHILS # BLD AUTO: 24.62 X10 (3) UL (ref 1.5–7.7)
NEUTROPHILS # BLD AUTO: 24.62 X10(3) UL (ref 1.5–7.7)
NEUTROPHILS NFR BLD AUTO: 93.6 %
OSMOLALITY SERPL CALC.SUM OF ELEC: 290 MOSM/KG (ref 275–295)
PLATELET # BLD AUTO: 251 10(3)UL (ref 150–450)
POTASSIUM SERPL-SCNC: 3.8 MMOL/L (ref 3.5–5.1)
RBC # BLD AUTO: 3.89 X10(6)UL
SODIUM SERPL-SCNC: 135 MMOL/L (ref 136–145)
WBC # BLD AUTO: 26.3 X10(3) UL (ref 4–11)

## 2024-07-18 PROCEDURE — 99232 SBSQ HOSP IP/OBS MODERATE 35: CPT | Performed by: HOSPITALIST

## 2024-07-18 PROCEDURE — 99233 SBSQ HOSP IP/OBS HIGH 50: CPT | Performed by: INTERNAL MEDICINE

## 2024-07-18 RX ORDER — DILTIAZEM HYDROCHLORIDE 180 MG/1
180 CAPSULE, EXTENDED RELEASE ORAL DAILY
Status: DISCONTINUED | OUTPATIENT
Start: 2024-07-19 | End: 2024-07-19

## 2024-07-18 RX ORDER — POTASSIUM CHLORIDE 20 MEQ/1
40 TABLET, EXTENDED RELEASE ORAL ONCE
Status: COMPLETED | OUTPATIENT
Start: 2024-07-18 | End: 2024-07-18

## 2024-07-18 RX ORDER — INSULIN DEGLUDEC 100 U/ML
6 INJECTION, SOLUTION SUBCUTANEOUS NIGHTLY
Status: DISCONTINUED | OUTPATIENT
Start: 2024-07-18 | End: 2024-07-20

## 2024-07-18 NOTE — CM/SW NOTE
Care Progression Note:  Length of stay: 19  GMLOS: 3.5  Avoidable Delays:   Code Status: Full    Acute Medical Issue/Factors:   Atrial flutter with rapid ventricular response (HCC) awaiting DCCV tomorrow  Hx of afib, on xarelto, HTN, COPD   Acute on chronic HFpEF- Cardiology following rate control and diuresis, IV Lasix   Acute on chronic hypoxic respiratory failure- COPD, pulmonary nodules, Pulmonology following, O2 4L      Discharge Barriers: await cardioversion  Expected discharge date:    Expected next site of care: [facility] and new Bipap w/HME.     / available for discharge planning.     Chelsea Morrison MBA MSN, RN CTL/  [id number]

## 2024-07-18 NOTE — PROGRESS NOTES
[facility] Cardiology  Progress Note    Keny Marshall Patient Status:  Inpatient    1960 MRN [id number]   [facility] 8NE-A Attending Alfredo Garcia MD   Hosp Day # 19 PCP Dustin Avina MD       Impression;  Aflutter-  adv COPD/exacerbation. s/p failed recent CV --> A flutter ablation 24 ([facility]). AF began 24 - rate controlled on low dose diltiazem gtt.  SOB - 2/2 acute dCHF and COPD exac- on usual home O2 at 4.5 L  Acute diastolic CHF - mild volume overload on exam. Net negative 11L, wt 168->156 lbs.   S/p bio AVR  TTE (24): LVEF 55%, +DD, 23mm SAVR 3.3m/s, mean 22mmHg.  Plan:  AF: rate controlled on cardizem gtt -  On amiodarone 400mg Tid.  Discussed with EP/[facility] - plan for CV Friday with anesthesia   - Increase PO diltiazem to 180mg daily, wean off gtt as able   AC: rivaroxaban, continue.  Diuresis: continue lasix 40mg IV BID today, still some edema though improved overall. Monitor BMP tomorrow. Consider changing back to maintenance PO dose tomorrow.   severe COPD/end-stage emphysema- chronic CO2 retention; prednisone, inhaler therapy, O2 per pulmonary      Subjective:  No CP. SOB stable. Edema improved.     Objective:  /69 (BP Location: Right arm)   Pulse 86   Temp 97.7 °F (36.5 °C) (Oral)   Resp 20   Wt 156 lb 1.4 oz (70.8 kg)   SpO2 98%   BMI 22.40 kg/m²   Temp (24hrs), Av.8 °F (36.6 °C), Min:97.5 °F (36.4 °C), Max:98.4 °F (36.9 °C)      Intake/Output Summary (Last 24 hours) at 2024 1400  Last data filed at 2024 1225  Gross per 24 hour   Intake 1415 ml   Output 4200 ml   Net -2785 ml     Wt Readings from Last 3 Encounters:   24 156 lb 1.4 oz (70.8 kg)   24 159 lb 6.3 oz (72.3 kg)   23 172 lb (78 kg)       General: Awake and alert; in no acute distress  Cardiac: irregularly irregular ; no murmurs/rubs/gallops are appreciated  Lungs: Coarse on auscultation bilaterally; no accessory muscle use  Abdomen: Soft,  non-tender; bowel sounds are normoactive  Extremities: No clubbing/cyanosis; moves all 4 extremities, warm, 1+ LE edema    Current Facility-Administered Medications   Medication Dose Route Frequency    insulin degludec (Tresiba) 100 units/mL flextouch 6 Units  6 Units Subcutaneous Nightly    potassium chloride (Klor-Con M20) tab 40 mEq  40 mEq Oral Once    dilTIAZem ER (Dilacor XR) 24 hr cap 120 mg  120 mg Oral Daily    meropenem (Merrem) 500 mg in sodium chloride 0.9% 100 mL IVPB-MBP  500 mg Intravenous Q8H    predniSONE (Deltasone) tab 40 mg  40 mg Oral Daily with breakfast    amiodarone (Pacerone) tab 400 mg  400 mg Oral TID    furosemide (Lasix) 10 mg/mL injection 40 mg  40 mg Intravenous BID (Diuretic)    dilTIAZem (cardIZEM) 100 mg in sodium chloride 0.9% 100 mL IVPB-ADDV  2.5-20 mg/hr Intravenous Continuous    acetaminophen (Tylenol) tab 650 mg  650 mg Oral Q6H PRN    lidocaine-menthol 4-1 % patch 1 patch  1 patch Transdermal Daily PRN    ipratropium (Atrovent) 0.02 % nebulizer solution 500 mcg  500 mcg Nebulization Q6H WA    [Held by provider] furosemide (Lasix) tab 20 mg  20 mg Oral Daily    metoprolol (Lopressor) 5 mg/5mL injection 5 mg  5 mg Intravenous Q6H PRN    selenium sulfide (Selsun) 2.5 % shampoo   Topical Daily PRN    aspirin DR tab 81 mg  81 mg Oral Daily    atorvastatin (Lipitor) tab 20 mg  20 mg Oral Nightly    guaiFENesin ER (Mucinex) 12 hr tab 1,200 mg  1,200 mg Oral BID    levETIRAcetam (Keppra) tab 500 mg  500 mg Oral BID    Roflumilast TABS 500 mcg  500 mcg Oral Daily    rivaroxaban (Xarelto) tab 20 mg  20 mg Oral Daily with food    glucose (Dex4) 15 GM/59ML oral liquid 15 g  15 g Oral Q15 Min PRN    Or    glucose (Glutose) 40% oral gel 15 g  15 g Oral Q15 Min PRN    Or    glucose-vitamin C (Dex-4) chewable tab 4 tablet  4 tablet Oral Q15 Min PRN    Or    dextrose 50% injection 50 mL  50 mL Intravenous Q15 Min PRN    Or    glucose (Dex4) 15 GM/59ML oral liquid 30 g  30 g Oral Q15 Min PRN     Or    glucose (Glutose) 40% oral gel 30 g  30 g Oral Q15 Min PRN    Or    glucose-vitamin C (Dex-4) chewable tab 8 tablet  8 tablet Oral Q15 Min PRN    insulin aspart (NovoLOG) 100 Units/mL FlexPen 1-10 Units  1-10 Units Subcutaneous TID AC and HS    melatonin tab 3 mg  3 mg Oral Nightly PRN    acetaminophen (Tylenol Extra Strength) tab 500 mg  500 mg Oral Q4H PRN    polyethylene glycol (PEG 3350) (Miralax) 17 g oral packet 17 g  17 g Oral Daily PRN    sennosides (Senokot) tab 17.2 mg  17.2 mg Oral Nightly PRN    bisacodyl (Dulcolax) 10 MG rectal suppository 10 mg  10 mg Rectal Daily PRN    fleet enema (Fleet) 7-19 GM/118ML rectal enema 133 mL  1 enema Rectal Once PRN    prochlorperazine (Compazine) 10 MG/2ML injection 5 mg  5 mg Intravenous Q8H PRN    fluticasone furoate-vilanterol (Breo Ellipta) 200-25 MCG/ACT inhaler 1 puff  1 puff Inhalation Daily       Laboratory Data:  Lab Results   Component Value Date    WBC 26.3 07/18/2024    HGB 11.9 07/18/2024    HCT 36.4 07/18/2024    .0 07/18/2024           Lab Results   Component Value Date    INR 1.59 (H) 06/29/2024    INR 1.06 06/11/2024    INR 2.26 (H) 09/22/2021     Lab Results   Component Value Date     07/18/2024    K 3.8 07/18/2024    CL 87 07/18/2024    CO2 >40.0 07/18/2024    BUN 24 07/18/2024    CREATSERUM 0.69 07/18/2024     07/18/2024    CA 8.8 07/18/2024         Telemetry: AF 90s      Thank you for allowing our practice to participate in the care of your patient. Please do not hesitate to contact me if you have any questions.

## 2024-07-18 NOTE — PROGRESS NOTES
Addendum:    Agree with above note except as documented below.      Gen: NAD  CVS: s1s2, ADRIAN, irregularly irregular   Resp: CTAB  Abd: soft, NT, ND    #Acute on chronic HFpEF   #Aflutter s/p failed recent CV   #Afib with RVR   #Acute on chronic hypoxic respiratory failure 2/2 CHF/COPD  #Steroid induced hyperglycemia   #ESBL Klebsiella pneumonia    Patients HR is better today.  Cough is better.  Plan for DCCV tomorrow.    Elevated WBC likely due to steroids.  Patient is afebrile and on appropriate antibiotics.    Monitor on telemetry.  Cards and pulm following.      Pt seen and examined independently. Chart reviewed. Labs and imaging over the last 24 hours have been personally reviewed.  I personally made/approved 100% of the management plan for this patient and take full responsibility for the patient management.   Note has been reviewed by me and modified as needed.  Exam and Impression/ Recs as noted above.  D/w staff.    Alfredo Garcia MD        [facility]   [facility]     Keny Marshall Patient Status:  Inpatient    1960 MRN [id number]   Location [facility] 8NE-A Attending Dr. Garcia   Hosp Day # 19 PCP Dustin Avina MD     Chief Complaint: SOB    Subjective:  pt reports SOB improved.  HR 90s after getting up to bathroom.   Reports abdominal bloating and has been passing stools.      Objective:    Review of Systems:   A comprehensive review of systems was completed; pertinent positive and negatives stated in subjective.    Vital signs:  Temp:  [97.5 °F (36.4 °C)-98.4 °F (36.9 °C)] 97.7 °F (36.5 °C)  Pulse:  [] 97  Resp:  [18-32] 20  BP: (115-137)/(66-86) 120/66  SpO2:  [85 %-98 %] 98 %  FiO2 (%):  [30 %] 30 %    Physical Exam:    General: Thin, chronically ill appearing 63 year old male   Respiratory: Diminished at bases on O2 NC, no wheezing  Cardiovascular: S1, S2, irregularly irregular 90s  Abdomen: Soft, Non-tender, non-distended, positive bowel  sounds  Neuro: No new focal deficits.   Extremities: No edema, BLE ecchymoses    Diagnostic Data:    Labs:  Recent Labs   Lab 07/12/24  1136 07/15/24  0535   WBC 14.6* 10.0   HGB 10.3* 12.1*   MCV 99.4 100.0   .0 307.0       Recent Labs   Lab 07/15/24  0535 07/16/24  1036 07/17/24  0619   * 210* 118*   BUN 25* 21 22   CREATSERUM 0.72 0.72 0.58*   CA 8.9 9.4 8.5*    143 141   K 5.4* 5.0 4.3   CL 96* 95* 98   CO2 42.0* >40.0* >40.0*       Estimated Creatinine Clearance: 128.9 mL/min (A) (based on SCr of 0.58 mg/dL (L)).    No results for input(s): \"TROP\", \"TROPHS\", \"CK\" in the last 168 hours.    No results for input(s): \"PTP\", \"INR\" in the last 168 hours.       Microbiology    No results found for this visit on 06/29/24.      Imaging: Reviewed in Epic.    Medications:    dilTIAZem ER  120 mg Oral Daily    meropenem  500 mg Intravenous Q8H    predniSONE  40 mg Oral Daily with breakfast    amiodarone  400 mg Oral TID    insulin degludec  10 Units Subcutaneous Nightly    furosemide  40 mg Intravenous BID (Diuretic)    ipratropium  500 mcg Nebulization Q6H WA    [Held by provider] furosemide  20 mg Oral Daily    aspirin  81 mg Oral Daily    atorvastatin  20 mg Oral Nightly    guaiFENesin ER  1,200 mg Oral BID    levETIRAcetam  500 mg Oral BID    Roflumilast  500 mcg Oral Daily    rivaroxaban  20 mg Oral Daily with food    insulin aspart  1-10 Units Subcutaneous TID AC and HS    fluticasone furoate-vilanterol  1 puff Inhalation Daily       Assessment & Plan:      #Acute on chronic hypoxic/hypercapnic respiratory failure 2/2 CHF/COPD/PNA, improving     #Acute on chronic diastolic CHF  -Lasix 40 mg IV x BID, metolazone given, good UO >5L yesterday     #End stage COPD w/ acute exacerbation improving  -bipap prn/sleep  -remains on 4-5L -discharged on this last admission  -steroids, atrovent nebs, breo, mucinex per pulm  -no albuterol due to tachycardia     #Left Lung mass, Smoker  -pulm recs OP PET-CT      #ESBL Klebsiella PNA  -Merrem Day 3    #Rapid aflutter/afib improving  -cardizem/amiodarone po, DOAC per cards  -monitor Tele  -s/p ALO ablation per EP on 7/12, back in afib on 7/14  -Recently underwent DCCV 6/14, plans for repeat Cardioversion on Friday per EP and if fails then AVN ablation/ppm.    #Type 2 DM, 5.6% Steroid hyperglycemia  -correctional scale, Tresiba reduced as likely npo tomorrow for CV, hyperglycemia protocol    #Ecchymoses BLE R>L, recheck hgb  #Aortic stenosis s/p AVR   #Hyponatremia, resolved  #Prior VTE on xarelto  #Seizure disorder-Keppra  #History of RCC s/p nephrectomy  #ABIGAIL-CPAP protocol  #Seborrheic dermatitis, improved-topicals/shampoos.      Case d/w pt, RN. Cardioversion on Friday per EP.   Transition to lasix po when ok with cards, volume improved.  Repeat hgb.    LAM Teague  10:29 AM     Supplementary Documentation:     Quality:  DVT Mechanical Prophylaxis:     Early ambuation  DVT Pharmacologic Prophylaxis   Medication    rivaroxaban (Xarelto) tab 20 mg         DVT Pharmacologic prophylaxis: Aspirin 81 mg    Code Status: Full Code  Peters: No urinary catheter in place    The 21st Century Cures Act makes medical notes like these available to patients in the interest of transparency. Please be advised this is a medical document. Medical documents are intended to carry relevant information, facts as evident, and the clinical opinion of the practitioner. The medical note is intended as peer to peer communication and may appear blunt or direct. It is written in medical language and may contain abbreviations or verbiage that are unfamiliar.

## 2024-07-18 NOTE — ANESTHESIA PREPROCEDURE EVALUATION
PRE-OP EVALUATION    Patient Name: Keny Marshall    Admit Diagnosis: Hyperkalemia [E87.5]  Hypoxia [R09.02]  Supplemental oxygen dependent [Z99.81]  Atrial flutter with rapid ventricular response (HCC) [I48.92]  Chronic obstructive pulmonary disease, unspecified COPD type (HCC) [J44.9]    Pre-op Diagnosis: * No surgery found *    atrial flutter, planned cryoablation        Anesthesia Procedure: CATH CARDIOVERSION  cryoablation    * Surgery not found *    Pre-op vitals reviewed.  Temp: 97.9 °F (36.6 °C)  Pulse: 87  Resp: 20  BP: 121/75  FiO2 (%): 30 %  SpO2: 92 %  Body mass index is 22.4 kg/m².    Current medications reviewed.  Hospital Medications:   insulin degludec (Tresiba) 100 units/mL flextouch 6 Units  6 Units Subcutaneous Nightly    [COMPLETED] potassium chloride (Klor-Con M20) tab 40 mEq  40 mEq Oral Once    [START ON 2024] dilTIAZem ER (CardIZEM CD) 24 hr cap 180 mg  180 mg Oral Daily    [COMPLETED] metOLazone (Zaroxolyn) tab 5 mg  5 mg Oral Once    [] ipratropium (Atrovent) 0.02 % nebulizer solution        meropenem (Merrem) 500 mg in sodium chloride 0.9% 100 mL IVPB-MBP  500 mg Intravenous Q8H    predniSONE (Deltasone) tab 40 mg  40 mg Oral Daily with breakfast    [COMPLETED] insulin NPH-human isophane (Novolin N) 100 Units/mL injection 8 Units  8 Units Subcutaneous Once    amiodarone (Pacerone) tab 400 mg  400 mg Oral TID    [COMPLETED] insulin NPH-human isophane (Novolin N) 100 Units/mL injection 8 Units  8 Units Subcutaneous Once    furosemide (Lasix) 10 mg/mL injection 40 mg  40 mg Intravenous BID (Diuretic)    [COMPLETED] dilTIAZem (cardIZEM) 25 mg/5mL injection 10 mg  10 mg Intravenous Once    dilTIAZem (cardIZEM) 100 mg in sodium chloride 0.9% 100 mL IVPB-ADDV  2.5-20 mg/hr Intravenous Continuous    [COMPLETED] predniSONE (Deltasone) tab 20 mg  20 mg Oral Once    [COMPLETED] furosemide (Lasix) 10 mg/mL injection 40 mg  40 mg Intravenous Once    [COMPLETED] heparin (Porcine) 5000 UNIT/ML  injection        [COMPLETED] lidocaine PF (Xylocaine-MPF) 1 % injection        acetaminophen (Tylenol) tab 650 mg  650 mg Oral Q6H PRN    [COMPLETED] chlorhexidine (Hibiclens) 4 % external liquid   Topical On Call    lidocaine-menthol 4-1 % patch 1 patch  1 patch Transdermal Daily PRN    [COMPLETED] digoxin (Lanoxin) 250 MCG/ML injection 250 mcg  250 mcg Intravenous Once    ipratropium (Atrovent) 0.02 % nebulizer solution 500 mcg  500 mcg Nebulization Q6H WA    [Held by provider] furosemide (Lasix) tab 20 mg  20 mg Oral Daily    [COMPLETED] dilTIAZem 10 mg BOLUS FROM BAG infusion  10 mg Intravenous Once    metoprolol (Lopressor) 5 mg/5mL injection 5 mg  5 mg Intravenous Q6H PRN    [COMPLETED] dilTIAZem (cardIZEM) 25 mg/5mL injection 5 mg  5 mg Intravenous Once    [COMPLETED] dilTIAZem (cardIZEM) 25 mg/5mL injection 5 mg  5 mg Intravenous Once    [COMPLETED] dilTIAZem 10 mg BOLUS FROM BAG infusion  10 mg Intravenous Once    [COMPLETED] metoprolol (Lopressor) 5 mg/5mL injection 5 mg  5 mg Intravenous Once    selenium sulfide (Selsun) 2.5 % shampoo   Topical Daily PRN    [COMPLETED] iopamidol 76% (ISOVUE-370) injection for power injector  75 mL Intravenous ONCE PRN    [COMPLETED] potassium chloride (Klor-Con M20) tab 40 mEq  40 mEq Oral Q4H    [COMPLETED] digoxin (Lanoxin) 250 MCG/ML injection 500 mcg  500 mcg Intravenous Once    [] dilTIAZem 10 mg BOLUS FROM BAG infusion  10 mg Intravenous Q1H PRN    aspirin DR tab 81 mg  81 mg Oral Daily    atorvastatin (Lipitor) tab 20 mg  20 mg Oral Nightly    guaiFENesin ER (Mucinex) 12 hr tab 1,200 mg  1,200 mg Oral BID    levETIRAcetam (Keppra) tab 500 mg  500 mg Oral BID    Roflumilast TABS 500 mcg  500 mcg Oral Daily    rivaroxaban (Xarelto) tab 20 mg  20 mg Oral Daily with food    glucose (Dex4) 15 GM/59ML oral liquid 15 g  15 g Oral Q15 Min PRN    Or    glucose (Glutose) 40% oral gel 15 g  15 g Oral Q15 Min PRN    Or    glucose-vitamin C (Dex-4) chewable tab 4 tablet   4 tablet Oral Q15 Min PRN    Or    dextrose 50% injection 50 mL  50 mL Intravenous Q15 Min PRN    Or    glucose (Dex4) 15 GM/59ML oral liquid 30 g  30 g Oral Q15 Min PRN    Or    glucose (Glutose) 40% oral gel 30 g  30 g Oral Q15 Min PRN    Or    glucose-vitamin C (Dex-4) chewable tab 8 tablet  8 tablet Oral Q15 Min PRN    insulin aspart (NovoLOG) 100 Units/mL FlexPen 1-10 Units  1-10 Units Subcutaneous TID AC and HS    melatonin tab 3 mg  3 mg Oral Nightly PRN    acetaminophen (Tylenol Extra Strength) tab 500 mg  500 mg Oral Q4H PRN    polyethylene glycol (PEG 3350) (Miralax) 17 g oral packet 17 g  17 g Oral Daily PRN    sennosides (Senokot) tab 17.2 mg  17.2 mg Oral Nightly PRN    bisacodyl (Dulcolax) 10 MG rectal suppository 10 mg  10 mg Rectal Daily PRN    fleet enema (Fleet) 7-19 GM/118ML rectal enema 133 mL  1 enema Rectal Once PRN    prochlorperazine (Compazine) 10 MG/2ML injection 5 mg  5 mg Intravenous Q8H PRN    fluticasone furoate-vilanterol (Breo Ellipta) 200-25 MCG/ACT inhaler 1 puff  1 puff Inhalation Daily       Outpatient Medications:     Medications Prior to Admission   Medication Sig Dispense Refill Last Dose    Budesonide-Formoterol Fumarate 160-4.5 MCG/ACT Inhalation Aerosol Inhale 2 puffs into the lungs 2 (two) times daily.       predniSONE 10 MG Oral Tab take 4 tablets daily for 3 days then 3 tablets daily for 3 days then 2 tablets daily for 3 days then 1 tablets daily for 3 days then stop. 30 tablet 0 Past Week    aspirin 81 MG Oral Tab EC Take 1 tablet (81 mg total) by mouth daily.   2024    Roflumilast 500 MCG Oral Tab Take 500 mcg by mouth daily. 90 tablet 0     guaiFENesin  MG Oral Tablet 12 Hr Take 2 tablets (1,200 mg total) by mouth 2 (two) times daily.       [DISCONTINUED] levETIRAcetam 500 MG Oral Tab Take 1 tablet (500 mg total) by mouth 2 (two) times daily.       [] KLOR-CON 10 10 MEQ Oral Tab CR Take 2 tablets (20 mEq total) by mouth daily.       []  torsemide 20 MG Oral Tab Take 1 tablet (20 mg total) by mouth 2 (two) times daily.       dilTIAZem  MG Oral Capsule SR 24 Hr Take 1 capsule (120 mg total) by mouth daily.       Ferrous Sulfate 324 (65 Fe) MG Oral Tab EC Take 65 mg by mouth daily.       tiotropium 18 MCG Inhalation Cap Inhale 1 capsule (18 mcg total) into the lungs daily.       Levalbuterol HCl 0.63 MG/3ML Inhalation Nebu Soln Take 3 mL (0.63 mg total) by nebulization every 6 (six) hours as needed for Shortness of Breath or Wheezing. 3 each 3     JARDIANCE 10 MG Oral Tab        atorvastatin 20 MG Oral Tab Take 1 tablet (20 mg total) by mouth nightly.       XARELTO 20 MG Oral Tab Take 1 tablet by mouth daily with food 30 tablet 3     Ipratropium Bromide 0.02 % Inhalation Solution Take 2.5 mL (500 mcg total) by nebulization every 6 (six) hours as needed for Wheezing.       ketoconazole 2 % External Cream Apply topically as needed.       Multiple Vitamin (TAB-A-CUCA) Oral Tab Take 1 tablet by mouth daily.       B Complex-Biotin-FA (B COMPLETE) Oral Tab Take 1 tablet by mouth daily.       magnesium 250 MG Oral Tab Take 1 tablet (250 mg total) by mouth every evening.          Allergies: Bees and Other      Anesthesia Evaluation    Patient summary reviewed.    Anesthetic Complications  (-) history of anesthetic complications         GI/Hepatic/Renal      (+) GERD and well controlled                          Cardiovascular  Comment: Afib with RVR    TTE 2024  Conclusions:     1. Left ventricle: The cavity size was normal. Wall thickness was mildly      increased. Systolic function was normal. The estimated ejection fraction      was 55%, by visual assessment. Doppler parameters are consistent with      restrictive physiology, indicative of decreased left ventricular      diastolic compliance and/or increased left atrial pressure.   2. Left atrium: The left atrial volume was moderately increased.   3. Aortic valve: A 23mm [employer] Inspiris  Resilia is present. The      prosthesis had a normal range of motion. The sewing ring appeared normal,      had no rocking motion, and showed no evidence of dehiscence. The peak      systolic velocity was 3.27m/sec. The mean systolic gradient was 22mm Hg.      The valve area (VTI) was 1.31cm^2. The valve area (VTI) index was      0.69cm^2/m^2.   4. Mitral valve: The annulus was moderately calcified. The leaflets were      mildly thickened. Transvalvular velocity was increased. The findings were      consistent with mild stenosis. There was mild to moderate regurgitation.      The mean diastolic gradient was 6mm Hg at a heart rate of 77bpm.   5. Tricuspid valve: There was mild-moderate regurgitation.   6. Pulmonary arteries: Systolic pressure was moderately increased, in the      range of 45mm Hg to 50mm Hg.   Impressions:  This study is compared with previous dated 09/23/2021:     Negative cardiovascular ROS.    Exercise tolerance: good     MET: >4      (+) hypertension and well controlled            (+) valvular problems/murmurs and AS       (+) CHF                Endo/Other      (+) diabetes and well controlled, type 2,                          Pulmonary        (+) COPD and severe  COPD requiring home oxygen.    (+) shortness of breath     (+) sleep apnea and CPAP      Neuro/Psych      (+) depression         (+) seizures               As per Epic:  Patient Active Problem List:     Anemia     HTN (hypertension)     DM2 (diabetes mellitus, type 2) (HCC)     COPD (chronic obstructive pulmonary disease) (HCC)     Clear cell renal cell carcinoma of left kidney (HCC)     Medial meniscus tear, right, initial encounter     Primary osteoarthritis of right knee              GLOBAL EXP 6-25-16 / RIGHT KNEE / TRK      Acute medial meniscus tear of right knee            GLOBAL EXP 6-25-16 / RIGHT KNEE / TRK      Acute lateral meniscus tear of left knee               GLOBAL EXP 6-25-16 / LEFT KNEE / TRK      Bursitis of left  shoulder             GLOBAL EXP 6-25-16 / LEFT SHOULDER / TRK      Moderate aortic stenosis     Stage 3 severe COPD by GOLD classification (HCC)     Hyperglycemia     Hypervolemia     Hypervolemia, unspecified hypervolemia type     Dyspnea, unspecified type     Hypoxemia     Atrial fibrillation with RVR (HCC)     Acute heart failure with preserved ejection fraction (HFpEF) (HCC)     Atrial flutter with rapid ventricular response (HCC)     Acute on chronic congestive heart failure (HCC)     Acute on chronic congestive heart failure, unspecified heart failure type (HCC)     Hypoxia     Atrial fibrillation with rapid ventricular response (HCC)     Pleural effusion     Hypokalemia     Acute respiratory failure with hypoxia (HCC)     Normocytic anemia     Chronic heart failure with preserved ejection fraction (HCC)     Leukocytosis (leucocytosis)     Aortic valve regurgitation     HFrEF (heart failure with reduced ejection fraction) (HCC)     Chronic hypoxemic respiratory failure (HCC)     ABIGAIL (obstructive sleep apnea)     COPD exacerbation (HCC)     Acute on chronic respiratory failure with hypoxia (HCC)     Acute on chronic respiratory failure with hypoxia and hypercapnia (HCC)     Chronic hypercapnic respiratory failure (HCC)     Smoking greater than 20 pack years     Hyponatremia     Hyperkalemia     Chronic obstructive pulmonary disease, unspecified COPD type (HCC)     Supplemental oxygen dependent        Narrative  Performed by: MUSE  Atrial flutter with variable A-V block  Left bundle branch block  Abnormal ECG  When compared with ECG of 29-JUN-2024 11:06,    Vent. rate has decreased BY  52 BPM  Confirmed by HARDEEP HORTON, JUANJOSE ([id number]) on 7/5/2024 2:20:56 PM     Past Surgical History:   Procedure Laterality Date    Adenoidectomy      Angiogram      Appendectomy      Appendectomy      Colonoscopy N/A 03/21/2016    Procedure: COLONOSCOPY;  Surgeon: Artur Okeefe MD;  Location:  ENDOSCOPY    Colonoscopy       Colonoscopy N/A 2023    Procedure: .;  Surgeon: Artur Okeefe MD;  Location:  ENDOSCOPY    Endovas repair, infrarenl abdom aortic aneurysm/dissect      Laparo radical nephrectomy Left 2014    Nephrectomy Left     Other  meatus of urethra    Other Right     foreign body removal-arm    Other surgical history Right 2016    Procedure: KNEE ARTHROSCOPY;  Surgeon: Madhu Anaya MD;  Location:  MAIN OR    Repair rotator cuff,acute Right     Upper gi endoscopy,exam      Valve repair       Social History     Socioeconomic History    Marital status:    Occupational History    Occupation:      Comment: [employer]   Tobacco Use    Smoking status: Former     Current packs/day: 0.00     Average packs/day: 1.5 packs/day for 40.0 years (60.0 ttl pk-yrs)     Types: Cigarettes     Start date: 3/8/1983     Quit date: 3/8/2023     Years since quittin.3     Passive exposure: Past    Smokeless tobacco: Never   Vaping Use    Vaping status: Never Used   Substance and Sexual Activity    Alcohol use: Not Currently     Alcohol/week: 14.0 standard drinks of alcohol     Types: 12 Cans of beer, 2 Shots of liquor per week     Comment: 14/week    Drug use: Never    Sexual activity: Yes     Partners: Female     History   Drug Use Unknown     Available pre-op labs reviewed.  Lab Results   Component Value Date    WBC 26.3 (H) 2024    RBC 3.89 (L) 2024    HGB 11.9 (L) 2024    HCT 36.4 (L) 2024    MCV 93.6 2024    MCH 30.6 2024    MCHC 32.7 2024    RDW 14.6 2024    .0 2024     Lab Results   Component Value Date     (L) 2024    K 3.8 2024    CL 87 (L) 2024    CO2 >40.0 (HH) 2024    BUN 24 (H) 2024    CREATSERUM 0.69 (L) 2024     (H) 2024    CA 8.8 2024     Lab Results   Component Value Date    INR 1.59 (H) 2024         Airway      Mallampati: III  Mouth opening:  >3 FB  TM distance: > 6 cm  Neck ROM: full Cardiovascular    Cardiovascular exam normal.  Rhythm: irregular  Rate: normal  (-) murmur   Dental  Comment: Dentition is in grossly substandard condition; inspection demonstrates physical characteristics that increase vulnerability to dental damage during necessary airway management.           Pulmonary    Pulmonary exam normal.  Breath sounds clear to auscultation bilaterally.      (+) decreased breath sounds         Other findings  Slightly labored ventilatory effort.            ASA: 4   Plan: general and MAC  NPO status verified and         Comment: I explained the intrinsic risks of MAC anesthesia to Keny Marshall including intraoperative awareness/recall, PONV, post-operative pain/discomfort, risk of aspiration, sore throat, airway management and, conversion to general anesthesia. Pt endorses understanding. All questions answered and concerns addressed.    Pt on home O2 4-5L, no distress at this time. Pt with no knowledge of \"difficult airway\" label in chart. On review, previously intubated [place] with 2A view.    Consent signed at bedside, placed in chart.      Plan/risks discussed with: patient  Use of blood product(s) discussed with: patient    Consented to blood products.          Present on Admission:   Acute on chronic respiratory failure with hypoxia and hypercapnia (HCC)

## 2024-07-18 NOTE — PLAN OF CARE
Assumed care of patient at 0700. Pt A/Ox 4. O2 sats maintained on 4L, baseline 4.5L, AVAPs at night. Afib on tele. Last BM 7/17. Voiding without difficulty. Pt reports no pain. Pt up standby assist. Pt updated on plan of care. Care needs met. Bed in lowest position, Call light within reach. Bed alarm on. Seizure and isolation precautions maintained. Cardizem drip infusing per orders. Sarcum red but blanchable, zinc cream applied and mepilex placed.     POC: control HR, cardioversion Friday, wean cardizem drip, IV Lasix, IV antibiotic     1615: Cardizem drip stopped. HR sustaining 80's after drip stopped.     Problem: RESPIRATORY - ADULT  Goal: Achieves optimal ventilation and oxygenation  Description: INTERVENTIONS:  - Assess for changes in respiratory status  - Assess for changes in mentation and behavior  - Position to facilitate oxygenation and minimize respiratory effort  - Oxygen supplementation based on oxygen saturation or ABGs  - Provide Smoking Cessation handout, if applicable  - Encourage broncho-pulmonary hygiene including cough, deep breathe, Incentive Spirometry  - Assess the need for suctioning and perform as needed  - Assess and instruct to report SOB or any respiratory difficulty  - Respiratory Therapy support as indicated  - Manage/alleviate anxiety  - Monitor for signs/symptoms of CO2 retention  Outcome: Progressing     Problem: CARDIOVASCULAR - ADULT  Goal: Maintains optimal cardiac output and hemodynamic stability  Description: INTERVENTIONS:  - Monitor vital signs, rhythm, and trends  - Monitor for bleeding, hypotension and signs of decreased cardiac output  - Evaluate effectiveness of vasoactive medications to optimize hemodynamic stability  - Monitor arterial and/or venous puncture sites for bleeding and/or hematoma  - Assess quality of pulses, skin color and temperature  - Assess for signs of decreased coronary artery perfusion - ex. Angina  - Evaluate fluid balance, assess for edema, trend  weights  Outcome: Progressing  Goal: Absence of cardiac arrhythmias or at baseline  Description: INTERVENTIONS:  - Continuous cardiac monitoring, monitor vital signs, obtain 12 lead EKG if indicated  - Evaluate effectiveness of antiarrhythmic and heart rate control medications as ordered  - Initiate emergency measures for life threatening arrhythmias  - Monitor electrolytes and administer replacement therapy as ordered  Outcome: Progressing     Problem: SAFETY ADULT - FALL  Goal: Free from fall injury  Description: INTERVENTIONS:  - Assess pt frequently for physical needs  - Identify cognitive and physical deficits and behaviors that affect risk of falls.  - [place] fall precautions as indicated by assessment.  - Educate pt/family on patient safety including physical limitations  - Instruct pt to call for assistance with activity based on assessment  - Modify environment to reduce risk of injury  - Provide assistive devices as appropriate  - Consider OT/PT consult to assist with strengthening/mobility  - Encourage toileting schedule  Outcome: Progressing     Problem: PAIN - ADULT  Goal: Verbalizes/displays adequate comfort level or patient's stated pain goal  Description: INTERVENTIONS:  - Encourage pt to monitor pain and request assistance  - Assess pain using appropriate pain scale  - Administer analgesics based on type and severity of pain and evaluate response  - Implement non-pharmacological measures as appropriate and evaluate response  - Consider cultural and social influences on pain and pain management  - Manage/alleviate anxiety  - Utilize distraction and/or relaxation techniques  - Monitor for opioid side effects  - Notify MD/LIP if interventions unsuccessful or patient reports new pain  - Anticipate increased pain with activity and pre-medicate as appropriate  Outcome: Progressing     Problem: Patient/Family Goals  Goal: Patient/Family Long Term Goal  Description: Patient's Long Term Goal: to go  home    Interventions:  - O2, bipap, IV steroids, diuretics, labs, increase activity    - See additional Care Plan goals for specific interventions  Outcome: Progressing  Goal: Patient/Family Short Term Goal  Description: Patient's Short Term Goal: feel better    Interventions:   - - O2, bipap, IV steroids, diuretics, labs, increase activity    - See additional Care Plan goals for specific interventions  Outcome: Progressing

## 2024-07-18 NOTE — CM/SW NOTE
Received duplicate HH order. Pt set up with HH already. Order acknowledged.    Sherrie Dean, MSW, LSW  Discharge Planner

## 2024-07-18 NOTE — PROGRESS NOTES
[facility] Progress Note     SUBJECTIVE/Interval history:  No acute events overnight, he feels the same today overall, thinks cough/congestion is better. No pain. No fevers  Remains on 4L    Review of Systems:   A comprehensive 14 point review of systems was completed.   Pertinent positives and negatives noted in the HPI.    Medications  Reviewed personally   dilTIAZem ER  120 mg Oral Daily    meropenem  500 mg Intravenous Q8H    predniSONE  40 mg Oral Daily with breakfast    amiodarone  400 mg Oral TID    insulin degludec  10 Units Subcutaneous Nightly    furosemide  40 mg Intravenous BID (Diuretic)    ipratropium  500 mcg Nebulization Q6H WA    [Held by provider] furosemide  20 mg Oral Daily    aspirin  81 mg Oral Daily    atorvastatin  20 mg Oral Nightly    guaiFENesin ER  1,200 mg Oral BID    levETIRAcetam  500 mg Oral BID    Roflumilast  500 mcg Oral Daily    rivaroxaban  20 mg Oral Daily with food    insulin aspart  1-10 Units Subcutaneous TID AC and HS    fluticasone furoate-vilanterol  1 puff Inhalation Daily       acetaminophen    lidocaine-menthol    metoprolol    selenium sulfide    glucose **OR** glucose **OR** glucose-vitamin C **OR** dextrose **OR** glucose **OR** glucose **OR** glucose-vitamin C    melatonin    acetaminophen    polyethylene glycol (PEG 3350)    sennosides    bisacodyl    fleet enema    prochlorperazine    OBJECTIVE:  Vitals:    07/18/24 0007 07/18/24 0400 07/18/24 0408 07/18/24 0547   BP: 117/86  116/78    BP Location: Right arm  Right arm    Pulse: 90 91 81    Resp: (!) 32 24 (!) 31    Temp: 97.5 °F (36.4 °C)  97.9 °F (36.6 °C)    TempSrc: Axillary  Oral    SpO2: 97% 94%     Weight:    156 lb 1.4 oz (70.8 kg)       Vital signs in last 24 hours:  Blood pressure 116/78, pulse 81, temperature 97.9 °F (36.6 °C), temperature source Oral, resp. rate (!) 31, weight 156 lb 1.4 oz (70.8 kg), SpO2 94%.     Intake/Output:  I/O last 3  completed shifts:  In: 1100 [P.O.:1000; IV PIGGYBACK:100]  Out: 6350 [Urine:6350]  FiO2 (%):  [30 %] 30 %    Physical Exam:  General: Appears alert, comfortable at rest on 4L  Neurologic:No focal deficits.  Alert.  Oriented.  Lungs:stable diminished bs today. No wheeze.   Heart:irreg RR. +ADRIAN  Abdomen:Soft, non-tender.  No masses.  No guarding.  No rebound.  Extremities:edema    Lab Data Review:   Recent Labs   Lab 07/15/24  0535 07/16/24  1036 07/17/24  0619   * 210* 118*   BUN 25* 21 22   CREATSERUM 0.72 0.72 0.58*   CA 8.9 9.4 8.5*    143 141   K 5.4* 5.0 4.3   CL 96* 95* 98   CO2 42.0* >40.0* >40.0*     Recent Labs   Lab 07/12/24  1136 07/15/24  0535   RBC 3.41* 3.99*   HGB 10.3* 12.1*   HCT 33.9* 39.9   MCV 99.4 100.0   MCH 30.2 30.3   MCHC 30.4* 30.3*   RDW 13.7 13.8   NEPRELIM 12.57* 9.24*   WBC 14.6* 10.0   .0 307.0     No results for input(s): \"BNP\" in the last 168 hours.  No results for input(s): \"TROP\", \"CK\" in the last 168 hours.  No results for input(s): \"PT\", \"INR\", \"PTT\" in the last 168 hours.    Recent Labs   Lab 07/11/24  1602   ABGPHT 7.40   SKRHIF8K 67*   TCWWV8V 67*   ABGHCO3 36.0*   ABGBE 14.3*   TEMP 98.6   ROMARIO Positive   SITE Right Radial   DEV Nasal cannula   THGB 10.2*       Other Labs:  Interval Culture Data:   No results found for this visit on 06/29/24.  No results for input(s): \"COLORUR\", \"CLARITY\", \"SPECGRAVITY\", \"GLUUR\", \"BILUR\", \"KETUR\", \"BLOODURINE\", \"PHURINE\", \"PROUR\", \"UROBILINOGEN\", \"NITRITE\", \"LEUUR\", \"WBCUR\", \"RBCUR\", \"BACUR\", \"EPIUR\" in the last 168 hours.    Interval Radiology:   Reviewed personally  XR CHEST AP PORTABLE  (CPT=71045)    Result Date: 7/15/2024  CONCLUSION:   Stable significant emphysematous and COPD changes.   LOCATION:  [facility]      Dictated by (CST): Yunior James MD on 7/15/2024 at 7:51 AM     Finalized by (CST): Yunior James MD on 7/15/2024 at 7:54 AM       XR CHEST AP PORTABLE  (CPT=71045)    Result Date: 7/14/2024  CONCLUSION:    Postoperative changes of cardiothoracic surgery with stable cardiac and mediastinal contours.  Recent CT of 07/01/2024 better demonstrates upper lobe predominant emphysema.  Bilateral calcified pleural plaques with persistent diffuse interstitial and bronchial wall thickening.  This may reflect some combination of interstitial pulmonary edema, bronchitis, or reactive airway disease.  Suspected parenchymal scarring of the peripheral left mid lung adjacent to a densely calcified pleural plaque.  Small bilateral pleural effusions persist.  No pneumothorax.  Overall, findings are not substantially changed compared to 07/04/2024.   LOCATION:  [facility]      Dictated by (CST): Jenn Guerra MD on 7/14/2024 at 7:34 AM     Finalized by (CST): Jenn Guerra MD on 7/14/2024 at 7:38 AM       XR CHEST AP PORTABLE  (CPT=71045)    Result Date: 7/4/2024  CONCLUSION:  1. Interval improvement in interstitial and alveolar opacities since prior examination with residual remaining. 2. Small bilateral pleural effusions with bilateral basilar atelectasis/infiltrates. 3. Hyperexpansion of the lungs.    LOCATION:  [facility]      Dictated by (CST): Lior Donnelly MD on 7/04/2024 at 10:25 PM     Finalized by (CST): Lior Donnelly MD on 7/04/2024 at 10:27 PM       XR CHEST AP PORTABLE  (CPT=71045)    Result Date: 7/3/2024  CONCLUSION:  No significant change since prior examination.   LOCATION:  [facility]      Dictated by (CST): Lior Donnelly MD on 7/03/2024 at 11:00 AM     Finalized by (CST): Lior Donnelly MD on 7/03/2024 at 11:01 AM       US VENOUS DOPPLER ARM BILAT - DIAG IMG (CPT=93970)    Result Date: 7/1/2024  CONCLUSION:  Superficial venous thrombosis involving the distal left cephalic vein.  No evidence of deep venous thrombosis in the bilateral upper extremities.  LOCATION:  [id number]    Dictated by (CST): Von Toussaint MD on 7/01/2024 at 2:29 PM     Finalized by (CST): Von Toussaint MD on 7/01/2024 at 2:31 PM       CT CHEST(CONTRAST  ONLY) (CPT=71260)    Result Date: 7/1/2024  CONCLUSION:  There is now a small right pleural effusion and in the event smaller left pleural effusion.  These were not present on the prior CT.  Increase in the size of a masslike area of tissue density in the left midlung laterally adjacent to chronic  pleural calcification.  This could reflect a zone of increasing chronic rounded atelectasis, with neoplasm within the differential.  Redemonstration extensive severe chronic lung disease.  Nonspecific adenopathy right hilum and mediastinum.  Dilated pulmonary artery could reflect chronic pulmonary hypertension, particularly in the setting of extensive chronic severe lung disease.   LOCATION:  [id number]   Dictated by (CST): Severino Roque MD on 7/01/2024 at 10:31 AM     Finalized by (CST): Severino Roque MD on 7/01/2024 at 10:38 AM       XR CHEST AP PORTABLE  (CPT=71045)    Result Date: 6/29/2024  CONCLUSION:  Findings are unchanged relative to the previous study.  Please see further details above.   LOCATION:  [email or web address]      Dictated by (CST): Marielena Martines DO on 6/29/2024 at 11:32 AM     Finalized by (CST): Marielena Martines DO on 6/29/2024 at 11:38 AM       XR CHEST AP PORTABLE  (CPT=71045)    Result Date: 6/11/2024  CONCLUSION:  1. No significant interval changes, with stable diffuse emphysematous changes again noted. 2. Stable linear regions of scarring radiating from a focus of pleural thickening and calcified plaque along the mid aspect of the left lateral pleural margin. 3. No evidence of an acute process.   LOCATION:  [id number]      Dictated by (CST): Marielena Martines DO on 6/11/2024 at 4:43 PM     Finalized by (CST): Marielena Martines DO on 6/11/2024 at 4:46 PM          Assessment/Plan:  #1. Acute on chronic hypercapnic and hypoxic respiratory failure  Multifactorial due to afib with RVR, CHF exacerbation with underlying end stage COPD  7/1 CTA with fluid overload, no PE  Treat/control afib and CHF as per cardiology  Continue on  scheduled atrovent nebs  Continue on oral pred taper  Wean o2 for goal saturations of 88-92% given hypercapnia. His previous o2 baseline was: 2L at rest, 4L on exertion; using and benefiting from portable oxygen concentrator (3 at rest, 6 on exertion)  Noted issues with affording AVAPS at home. Awaiting determination for financial assistance for AVAPS vs BIPAP    #2. Acute CHF exacerbation, afib with RVR  Rate control and diurese as per cardiology  Previous cardioversion 6/14/24 and ablation 7/12/2024  Now with recurrent afib 7/14 and on diltiazem     #3. COPD  Severe obstruction on previous PFTs 3/2021  off arformoterol given worsening afib/tachycardia  Will continue to hold breo   Continue on scheduled atrovent nebs   He can use levalbuterol as outpatient as hospital does not stock it  He cannot tolerate albuterol due to palpitations, tremors and with afib with RVR; off duonebs  Continue on oral pred taper  Sputum cx with ESBL klebsiella with sensitivities to cipro, gent, levoflox, meropenem and trim/sulfa. Agree with meropenem day 2 while hospitalized, but can plan on PO cipro or levofloxacin on discharge  Continue roflumilast  Previously reviewed BLVR - not candidate at this time, reassess as outpatient     #4. Pulmonary nodules  7/2017 CT with worsening peripheral lingular atelectasis. Nodules stable   5/2018 CT stable nodules  12/2020 CT stable  7/2021 CT stable but worsening mediastinal LAD suspect reactive to recent cardiac surgery  1/2022 CT with stable nodules, improved LAD  3/2023 CT chest with several small nodules  9/2023 CT chest with stable findings   7/1/2024 CT chest with worsening ALYSON lesion, possible scarring, rounded atelectasis vs neoplasm  Will need outpatient follow up including PET/CT    #5. Tobacco abuse  30+ pack years, active pipe smoker  Hold on LDCT given recent CT with worsening ALYSON lesion, will need PET/CT as above    Dispo:  Floor     Stefano Randle MD

## 2024-07-18 NOTE — PLAN OF CARE
Pt chief complaint upon admission afib RVR. Pt s/p cardiac ablation on 7/12. Received pt in chair. Contact precautions active. A&Ox 4. AVAPS overnight. Rhythm afib, cardizem gtt. GI/ WNL. No complaint of pain. All medications given as ordered. Pt resting in bed w/ all safety measures in place and call light within reach.     Plan is eventually transition off cardizem gtt. CV planned for Friday. If persistent, afib pt may need ablation and pacemaker per cardiology.    Problem: RESPIRATORY - ADULT  Goal: Achieves optimal ventilation and oxygenation  Description: INTERVENTIONS:  - Assess for changes in respiratory status  - Assess for changes in mentation and behavior  - Position to facilitate oxygenation and minimize respiratory effort  - Oxygen supplementation based on oxygen saturation or ABGs  - Provide Smoking Cessation handout, if applicable  - Encourage broncho-pulmonary hygiene including cough, deep breathe, Incentive Spirometry  - Assess the need for suctioning and perform as needed  - Assess and instruct to report SOB or any respiratory difficulty  - Respiratory Therapy support as indicated  - Manage/alleviate anxiety  - Monitor for signs/symptoms of CO2 retention  Outcome: Progressing     Problem: CARDIOVASCULAR - ADULT  Goal: Maintains optimal cardiac output and hemodynamic stability  Description: INTERVENTIONS:  - Monitor vital signs, rhythm, and trends  - Monitor for bleeding, hypotension and signs of decreased cardiac output  - Evaluate effectiveness of vasoactive medications to optimize hemodynamic stability  - Monitor arterial and/or venous puncture sites for bleeding and/or hematoma  - Assess quality of pulses, skin color and temperature  - Assess for signs of decreased coronary artery perfusion - ex. Angina  - Evaluate fluid balance, assess for edema, trend weights  Outcome: Progressing  Goal: Absence of cardiac arrhythmias or at baseline  Description: INTERVENTIONS:  - Continuous cardiac  monitoring, monitor vital signs, obtain 12 lead EKG if indicated  - Evaluate effectiveness of antiarrhythmic and heart rate control medications as ordered  - Initiate emergency measures for life threatening arrhythmias  - Monitor electrolytes and administer replacement therapy as ordered  Outcome: Progressing     Problem: SAFETY ADULT - FALL  Goal: Free from fall injury  Description: INTERVENTIONS:  - Assess pt frequently for physical needs  - Identify cognitive and physical deficits and behaviors that affect risk of falls.  - [place] fall precautions as indicated by assessment.  - Educate pt/family on patient safety including physical limitations  - Instruct pt to call for assistance with activity based on assessment  - Modify environment to reduce risk of injury  - Provide assistive devices as appropriate  - Consider OT/PT consult to assist with strengthening/mobility  - Encourage toileting schedule  Outcome: Progressing     Problem: PAIN - ADULT  Goal: Verbalizes/displays adequate comfort level or patient's stated pain goal  Description: INTERVENTIONS:  - Encourage pt to monitor pain and request assistance  - Assess pain using appropriate pain scale  - Administer analgesics based on type and severity of pain and evaluate response  - Implement non-pharmacological measures as appropriate and evaluate response  - Consider cultural and social influences on pain and pain management  - Manage/alleviate anxiety  - Utilize distraction and/or relaxation techniques  - Monitor for opioid side effects  - Notify MD/LIP if interventions unsuccessful or patient reports new pain  - Anticipate increased pain with activity and pre-medicate as appropriate  Outcome: Progressing     Problem: Patient/Family Goals  Goal: Patient/Family Long Term Goal  Description: Patient's Long Term Goal: to go home    Interventions:  - O2, bipap, IV steroids, diuretics, labs, increase activity    - See additional Care Plan goals for specific  interventions  Outcome: Progressing  Goal: Patient/Family Short Term Goal  Description: Patient's Short Term Goal: feel better    Interventions:   - - O2, bipap, IV steroids, diuretics, labs, increase activity    - See additional Care Plan goals for specific interventions  Outcome: Progressing

## 2024-07-18 NOTE — PROGRESS NOTES
07/18/24 0400   BiPAP   Device V60   BiPAP / CPAP CE#    BiPAP bacteria filter Yes   BiPAP Pre-use check ok? Yes   BIPAP plugged into main power? Yes   Mode AVAPS   Interface Full face mask   Mask Size Large   Control Settings   Oxygen Percent 30 %   Inspiratory time 0.9   Insp rise time 3   AVAPS   Min IPAP 15   Max IPAP 30   EPAP Level 5   Set rate 12   Tidal volume 550   SIPAP   Resp 24   FiO2 (%) 30 %   BiPAP/CPAP Monitored Parameters   Pulse 91   SpO2 94 %   PIP 18   Total Rate 24 breaths/min   Minute Volume 16   Tidal Volume 609   Total Leak 15   Trigger % 92   Ti/Ttot % 27   Toleration Well     Patient received on AVAPS with the above settings. No changes overnight.

## 2024-07-19 ENCOUNTER — ANESTHESIA (OUTPATIENT)
Dept: INTERVENTIONAL RADIOLOGY/VASCULAR | Facility: HOSPITAL | Age: 64
End: 2024-07-19
Payer: MEDICARE

## 2024-07-19 ENCOUNTER — APPOINTMENT (OUTPATIENT)
Dept: INTERVENTIONAL RADIOLOGY/VASCULAR | Facility: HOSPITAL | Age: 64
End: 2024-07-19
Attending: INTERNAL MEDICINE
Payer: MEDICARE

## 2024-07-19 LAB
ATRIAL RATE: 63 BPM
GLUCOSE BLD-MCNC: 126 MG/DL (ref 70–99)
GLUCOSE BLD-MCNC: 138 MG/DL (ref 70–99)
GLUCOSE BLD-MCNC: 193 MG/DL (ref 70–99)
GLUCOSE BLD-MCNC: 387 MG/DL (ref 70–99)
GLUCOSE BLD-MCNC: 95 MG/DL (ref 70–99)
P AXIS: 49 DEGREES
P-R INTERVAL: 180 MS
POTASSIUM SERPL-SCNC: 4 MMOL/L (ref 3.5–5.1)
Q-T INTERVAL: 496 MS
QRS DURATION: 170 MS
QTC CALCULATION (BEZET): 507 MS
R AXIS: 71 DEGREES
T AXIS: 218 DEGREES
VENTRICULAR RATE: 63 BPM

## 2024-07-19 PROCEDURE — 5A2204Z RESTORATION OF CARDIAC RHYTHM, SINGLE: ICD-10-PCS | Performed by: INTERNAL MEDICINE

## 2024-07-19 PROCEDURE — 99232 SBSQ HOSP IP/OBS MODERATE 35: CPT | Performed by: HOSPITALIST

## 2024-07-19 PROCEDURE — 99233 SBSQ HOSP IP/OBS HIGH 50: CPT | Performed by: INTERNAL MEDICINE

## 2024-07-19 RX ORDER — LIDOCAINE HYDROCHLORIDE 10 MG/ML
INJECTION, SOLUTION EPIDURAL; INFILTRATION; INTRACAUDAL; PERINEURAL AS NEEDED
Status: DISCONTINUED | OUTPATIENT
Start: 2024-07-19 | End: 2024-07-19 | Stop reason: SURG

## 2024-07-19 RX ADMIN — LIDOCAINE HYDROCHLORIDE 100 MG: 10 INJECTION, SOLUTION EPIDURAL; INFILTRATION; INTRACAUDAL; PERINEURAL at 12:41:00

## 2024-07-19 NOTE — PLAN OF CARE
Patient AXOX4.On 4L/NC.AVAPS  at night.A fib on tele.No c/o pain.Remains on IV antibiotics and IV Diuretics.Plan for cardioversion today,NPO maintained.Consent signed.Plan of care updated.Call light within reach.  1400:Received patient back from cardioversion.NSR on tele.Vital signs are stable.  Problem: RESPIRATORY - ADULT  Goal: Achieves optimal ventilation and oxygenation  Description: INTERVENTIONS:  - Assess for changes in respiratory status  - Assess for changes in mentation and behavior  - Position to facilitate oxygenation and minimize respiratory effort  - Oxygen supplementation based on oxygen saturation or ABGs  - Provide Smoking Cessation handout, if applicable  - Encourage broncho-pulmonary hygiene including cough, deep breathe, Incentive Spirometry  - Assess the need for suctioning and perform as needed  - Assess and instruct to report SOB or any respiratory difficulty  - Respiratory Therapy support as indicated  - Manage/alleviate anxiety  - Monitor for signs/symptoms of CO2 retention  Outcome: Progressing     Problem: CARDIOVASCULAR - ADULT  Goal: Maintains optimal cardiac output and hemodynamic stability  Description: INTERVENTIONS:  - Monitor vital signs, rhythm, and trends  - Monitor for bleeding, hypotension and signs of decreased cardiac output  - Evaluate effectiveness of vasoactive medications to optimize hemodynamic stability  - Monitor arterial and/or venous puncture sites for bleeding and/or hematoma  - Assess quality of pulses, skin color and temperature  - Assess for signs of decreased coronary artery perfusion - ex. Angina  - Evaluate fluid balance, assess for edema, trend weights  Outcome: Progressing  Goal: Absence of cardiac arrhythmias or at baseline  Description: INTERVENTIONS:  - Continuous cardiac monitoring, monitor vital signs, obtain 12 lead EKG if indicated  - Evaluate effectiveness of antiarrhythmic and heart rate control medications as ordered  - Initiate emergency measures  for life threatening arrhythmias  - Monitor electrolytes and administer replacement therapy as ordered  Outcome: Progressing     Problem: SAFETY ADULT - FALL  Goal: Free from fall injury  Description: INTERVENTIONS:  - Assess pt frequently for physical needs  - Identify cognitive and physical deficits and behaviors that affect risk of falls.  - [place] fall precautions as indicated by assessment.  - Educate pt/family on patient safety including physical limitations  - Instruct pt to call for assistance with activity based on assessment  - Modify environment to reduce risk of injury  - Provide assistive devices as appropriate  - Consider OT/PT consult to assist with strengthening/mobility  - Encourage toileting schedule  Outcome: Progressing

## 2024-07-19 NOTE — ANESTHESIA POSTPROCEDURE EVALUATION
[facility]    Keny Marshall Patient Status:  Inpatient   Age/Gender 63 year old male MRN [id number]   Location [facility] 8NE-A Attending Alfredo Garcia MD   Hosp Day # 20 PCP Dustin Avina MD       Anesthesia Post-op Note        Procedure Summary       Date: 07/19/24 Room / Location: [facility] Interventional Suites    Anesthesia Start: 1240 Anesthesia Stop: 1255    Procedure: CATH CARDIOVERSION Diagnosis: (AF)    Scheduled Providers:  Anesthesiologist: Heydi Huertas MD    Anesthesia Type: MAC ASA Status: 4            Anesthesia Type: MAC    Vitals Value Taken Time   /71 07/19/24 1308   Temp 98 07/19/24 1308   Pulse 73 07/19/24 1308   Resp 16 07/19/24 1308   SpO2 100 % 07/19/24 1308   Vitals shown include unfiled device data.    Patient Location: PACU    Anesthesia Type: MAC    Airway Patency: patent    Postop Pain Control: adequate    Mental Status: mildly sedated but able to meaningfully participate in the post-anesthesia evaluation    Nausea/Vomiting: none    Cardiopulmonary/Hydration status: stable euvolemic    Complications: no apparent anesthesia related complications    Postop vital signs: stable    Dental Exam: Unchanged from Preop    Patient to be discharged from PACU when criteria met.

## 2024-07-19 NOTE — PAYOR COMM NOTE
7/18 & 7/19  CONTINUED STAY REVIEW    Payor: [employer] MEDICARE ADV PPO  Subscriber #:  [id number]  Authorization Number: [id number]    Admit date: 6/29/24  Admit time:  2:08 PM          MEDICATIONS ADMINISTERED IN LAST 1 DAY:  amiodarone (Pacerone) tab 400 mg       Date Action Dose Route User    7/19/2024 0942 Given 400 mg Oral Mario Ojeda RN    7/18/2024 2102 Given 400 mg Oral Shey La RN    7/18/2024 1600 Given 400 mg Oral Damián Andres RN          aspirin DR tab 81 mg       Date Action Dose Route User    7/19/2024 0943 Given 81 mg Oral Mario Ojeda RN          atorvastatin (Lipitor) tab 20 mg       Date Action Dose Route User    7/18/2024 2102 Given 20 mg Oral Shey La RN          dilTIAZem ER (CardIZEM CD) 24 hr cap 180 mg       Date Action Dose Route User    7/19/2024 0942 Given 180 mg Oral Mario Ojeda RN          fluticasone furoate-vilanterol (Breo Ellipta) 200-25 MCG/ACT inhaler 1 puff       Date Action Dose Route User    7/19/2024 0943 Given 1 puff Inhalation Mario Ojeda RN          furosemide (Lasix) 10 mg/mL injection 40 mg       Date Action Dose Route User    7/19/2024 0943 Given 40 mg Intravenous Mario Ojeda RN    7/18/2024 1609 Given 40 mg Intravenous Damián Andres RN          guaiFENesin ER (Mucinex) 12 hr tab 1,200 mg       Date Action Dose Route User    7/19/2024 0942 Given 1,200 mg Oral Mario Ojeda RN    7/18/2024 2101 Given 1,200 mg Oral Shey La RN          insulin aspart (NovoLOG) 100 Units/mL FlexPen 1-10 Units       Date Action Dose Route User    7/18/2024 2216 Given 11 Units Subcutaneous (Right Lower Abdomen) Shey La RN    7/18/2024 1815 Given 3 Units Subcutaneous (Left Upper Arm) Damián Andres RN    7/18/2024 1303 Given 2 Units Subcutaneous (Left Upper Arm) Damián Andres RN          insulin degludec (Tresiba) 100 units/mL flextouch 6 Units       Date Action Dose Route User    7/18/2024 2105 Given 6 Units Subcutaneous (Left Lower  Abdomen) Shey La RN          ipratropium (Atrovent) 0.02 % nebulizer solution 500 mcg       Date Action Dose Route User    7/19/2024 0811 Given 500 mcg Nebulization Jen Huerta, [facility]    7/18/2024 2132 Given 500 mcg Nebulization RhiannaDillan amaral, [facility]    7/18/2024 1216 Given 500 mcg Nebulization Mirlande Crisostomo, [facility]          levETIRAcetam (Keppra) tab 500 mg       Date Action Dose Route User    7/19/2024 0942 Given 500 mg Oral Mario Ojeda RN    7/18/2024 2102 Given 500 mg Oral Shey La RN          meropenem (Merrem) 500 mg in sodium chloride 0.9% 100 mL IVPB-MBP       Date Action Dose Route User    7/19/2024 0943 New Bag 500 mg Intravenous Mario Ojeda RN    7/19/2024 0014 New Bag 500 mg Intravenous Shey La RN    7/18/2024 1609 New Bag 500 mg Intravenous Damián Andres, [facility]          potassium chloride (Klor-Con M20) tab 40 mEq       Date Action Dose Route User    7/18/2024 1609 Given 40 mEq Oral Damián Andres, [facility]          predniSONE (Deltasone) tab 40 mg       Date Action Dose Route User    7/19/2024 0942 Given 40 mg Oral Mario Ojeda RN          rivaroxaban (Xarelto) tab 20 mg       Date Action Dose Route User    7/19/2024 0942 Given 20 mg Oral Mario Ojeda RN            Vitals (last day)       Date/Time Temp Pulse Resp BP SpO2 Weight O2 Device O2 Flow Rate (L/min) Who    07/19/24 0814 -- -- -- -- 97 % -- Nasal cannula 4.5 L/min DP    07/19/24 0811 98.3 °F (36.8 °C) 100 22 122/88 97 % -- Nasal cannula 4.5 L/min JULIANA    07/19/24 0430 97.4 °F (36.3 °C) 84 -- 130/80 99 % -- -- -- SM    07/19/24 0430 -- -- 26 -- -- -- Bi-PAP -- MR    07/18/24 2354 97.4 °F (36.3 °C) 90 29 117/74 92 % -- Bi-PAP --     07/18/24 2335 -- -- 17 -- -- -- -- --     07/18/24 1940 97.6 °F (36.4 °C) 87 21 107/70 94 % -- Nasal cannula 4 L/min     07/18/24 1804 -- 89 -- -- 94 % -- -- -- EL    07/18/24 1700 -- 92 -- -- 91 % -- -- -- EL    07/18/24 1542 97.9 °F (36.6 °C) 87 20 121/75 92 % -- Nasal cannula  4.5 L/min EL    07/18/24 1225 97.7 °F (36.5 °C) 86 20 108/69 98 % -- Nasal cannula 4.5 L/min EL    07/18/24 0846 97.7 °F (36.5 °C) 97 20 120/66 98 % -- Nasal cannula 4.5 L/min EL    07/18/24 0547 -- -- -- -- -- 156 lb 1.4 oz (70.8 kg) -- --     07/18/24 0408 97.9 °F (36.6 °C) 81 31 116/78 -- -- Bi-PAP --     07/18/24 0400 -- 91 24 -- 94 % -- Bi-PAP -- TK    07/18/24 0007 97.5 °F (36.4 °C) 90 32 117/86 97 % -- Bi-PAP --     07/18/24 0006 -- 91 30 -- 97 % -- Bi-PAP -- TK          CIWA Scores (since admission)       None          7/18 PULMONARY NOTE    SUBJECTIVE/Interval history:  No acute events overnight, he feels the same today overall, thinks cough/congestion is better. No pain. No fevers  Remains on 4L     Scheduled Medications    dilTIAZem ER  120 mg Oral Daily    meropenem  500 mg Intravenous Q8H    predniSONE  40 mg Oral Daily with breakfast    amiodarone  400 mg Oral TID    insulin degludec  10 Units Subcutaneous Nightly    furosemide  40 mg Intravenous BID (Diuretic)    ipratropium  500 mcg Nebulization Q6H WA    [Held by provider] furosemide  20 mg Oral Daily    aspirin  81 mg Oral Daily    atorvastatin  20 mg Oral Nightly    guaiFENesin ER  1,200 mg Oral BID    levETIRAcetam  500 mg Oral BID    Roflumilast  500 mcg Oral Daily    rivaroxaban  20 mg Oral Daily with food    insulin aspart  1-10 Units Subcutaneous TID AC and HS    fluticasone furoate-vilanterol  1 puff Inhalation Daily                  Vitals:     07/18/24 0007 07/18/24 0400 07/18/24 0408 07/18/24 0547   BP: 117/86   116/78     BP Location: Right arm   Right arm     Pulse: 90 91 81     Resp: (!) 32 24 (!) 31     Temp: 97.5 °F (36.4 °C)   97.9 °F (36.6 °C)     TempSrc: Axillary   Oral     SpO2: 97% 94%       Weight:       156 lb 1.4 oz (70.8 kg)            Vital signs in last 24 hours:  Blood pressure 116/78, pulse 81, temperature 97.9 °F (36.6 °C), temperature source Oral, resp. rate (!) 31, weight 156 lb 1.4 oz (70.8 kg), SpO2 94%.       Intake/Output:  I/O last 3 completed shifts:  In: 1100 [P.O.:1000; IV PIGGYBACK:100]  Out: 6350 [Urine:6350]  FiO2 (%):  [30 %] 30 %     Lungs:stable diminished bs today. No wheeze.   Heart:irreg RR. +ADRIAN  Abdomen:Soft, non-tender.  No masses.  No guarding.  No rebound.  Extremities:edema     Lab Data Review:         Recent Labs   Lab 07/15/24  0535 07/16/24  1036 07/17/24  0619   * 210* 118*   BUN 25* 21 22   CREATSERUM 0.72 0.72 0.58*   CA 8.9 9.4 8.5*    143 141   K 5.4* 5.0 4.3   CL 96* 95* 98   CO2 42.0* >40.0* >40.0*          Assessment/Plan:  #1. Acute on chronic hypercapnic and hypoxic respiratory failure  Multifactorial due to afib with RVR, CHF exacerbation with underlying end stage COPD  7/1 CTA with fluid overload, no PE  Treat/control afib and CHF as per cardiology  Continue on scheduled atrovent nebs  Continue on oral pred taper  Wean o2 for goal saturations of 88-92% given hypercapnia. His previous o2 baseline was: 2L at rest, 4L on exertion; using and benefiting from portable oxygen concentrator (3 at rest, 6 on exertion)  Noted issues with affording AVAPS at home. Awaiting determination for financial assistance for AVAPS vs BIPAP     #2. Acute CHF exacerbation, afib with RVR  Rate control and diurese as per cardiology  Previous cardioversion 6/14/24 and ablation 7/12/2024  Now with recurrent afib 7/14 and on diltiazem     #3. COPD  Severe obstruction on previous PFTs 3/2021  off arformoterol given worsening afib/tachycardia  Will continue to hold breo   Continue on scheduled atrovent nebs   He can use levalbuterol as outpatient as hospital does not stock it  He cannot tolerate albuterol due to palpitations, tremors and with afib with RVR; off duonebs  Continue on oral pred taper  Sputum cx with ESBL klebsiella with sensitivities to cipro, gent, levoflox, meropenem and trim/sulfa. Agree with meropenem day 2 while hospitalized, but can plan on PO cipro or levofloxacin on  discharge  Continue roflumilast  Previously reviewed BLVR - not candidate at this time, reassess as outpatient     #4. Pulmonary nodules  7/2017 CT with worsening peripheral lingular atelectasis. Nodules stable   5/2018 CT stable nodules  12/2020 CT stable  7/2021 CT stable but worsening mediastinal LAD suspect reactive to recent cardiac surgery  1/2022 CT with stable nodules, improved LAD  3/2023 CT chest with several small nodules  9/2023 CT chest with stable findings   7/1/2024 CT chest with worsening ALYSON lesion, possible scarring, rounded atelectasis vs neoplasm  Will need outpatient follow up including PET/CT     #5. Tobacco abuse  30+ pack years, active pipe smoker  Hold on LDCT given recent CT with worsening ALYSON lesion, will need PET/CT as above     Dispo:  Floor      7/18 CARDIOLOGY NOTE      Impression;  Aflutter-  adv COPD/exacerbation. s/p failed recent CV 6/24--> A flutter ablation 7/12/24 ([facility]). AF began 7/14/24 - rate controlled on low dose diltiazem gtt.  SOB - 2/2 acute dCHF and COPD exac- on usual home O2 at 4.5 L  Acute diastolic CHF - mild volume overload on exam. Net negative 11L, wt 168->156 lbs.   S/p bio AVR  TTE (6/12/24): LVEF 55%, +DD, 23mm SAVR 3.3m/s, mean 22mmHg.  Plan:  AF: rate controlled on cardizem gtt -  On amiodarone 400mg Tid.  Discussed with EP/[facility] - plan for CV Friday with anesthesia   - Increase PO diltiazem to 180mg daily, wean off gtt as able   AC: rivaroxaban, continue.  Diuresis: continue lasix 40mg IV BID today, still some edema though improved overall. Monitor BMP tomorrow. Consider changing back to maintenance PO dose tomorrow.   severe COPD/end-stage emphysema- chronic CO2 retention; prednisone, inhaler therapy, O2 per pulmonary     7/19 CARDIOLOGY NOTE    Impression;  Aflutter-  adv COPD/exacerbation. s/p failed recent CV 6/24--> A flutter ablation 7/12/24 ([facility]). AF began 7/14/24 - rate controlled on low dose diltiazem gtt.  SOB - 2/2 acute dCHF and COPD  exac- on usual home O2 at 4.5 L  Acute diastolic CHF - mild volume overload on exam. Net negative 11L, wt 168->156 lbs.   S/p bio AVR   TTE (24): LVEF 55%, +DD, 23mm SAVR 3.3m/s, mean 22mmHg.  Leukocytosis - steroid related      Plan:  AF: rate controlled on cardizem gtt -  On amiodarone 400mg Tid.  Discussed with EP/[facility] - plan for CV today with anesthesia   - Increased PO diltiazem to 180mg daily, wean off gtt as able   AC: rivaroxaban, continue.  Diuresis: continue lasix 40mg IV BID today, still some edema though improved overall. F/u BMP today. Consider changing back to maintenance PO dose soon.   severe COPD/end-stage emphysema- chronic CO2 retention; prednisone, inhaler therapy, O2 per pulmonary     Subjective:  No CP. SOB stable. Edema improved.      Objective:  /80 (BP Location: Right arm)   Pulse 84   Temp 97.4 °F (36.3 °C) (Axillary)   Resp 26   Wt 156 lb 1.4 oz (70.8 kg)   SpO2 99%   BMI 22.40 kg/m²   Temp (24hrs), Av.6 °F (36.4 °C), Min:97.4 °F (36.3 °C), Max:97.9 °F (36.6 °C)      HOSPITALIST NOTE       Subjective:  Patient reports no complaints.  Pending cardioversion today.           Objective:  Review of Systems:   A comprehensive review of systems was completed; pertinent positive and negatives stated in subjective.     Vital signs:  Temp:  [97.4 °F (36.3 °C)-98.3 °F (36.8 °C)] 98.3 °F (36.8 °C)  Pulse:  [] 100  Resp:  [17-29] 22  BP: (107-130)/(66-88) 122/88  SpO2:  [91 %-99 %] 97 %  FiO2 (%):  [30 %] 30 %     Physical Exam:    General: Thin, chronically ill appearing 63 year old male   Respiratory: Diminished at bases on O2 NC, no wheezing  Cardiovascular: S1, S2, irregularly irregular 90s  Abdomen: Soft, Non-tender, non-distended, positive bowel sounds  Neuro: No new focal deficits.   Extremities: No edema, BLE ecchymoses     Diagnostic Data:        Recent Labs   Lab 24  1136 07/15/24  0535 24  1101   WBC 14.6* 10.0 26.3*   HGB 10.3* 12.1* 11.9*    MCV 99.4 100.0 93.6   .0 307.0 251.0                Recent Labs   Lab 07/16/24  1036 07/17/24  0619 07/18/24  1101 07/19/24  0624   * 118* 199*  --    BUN 21 22 24*  --    CREATSERUM 0.72 0.58* 0.69*  --    CA 9.4 8.5* 8.8  --     141 135*  --    K 5.0 4.3 3.8 4.0   CL 95* 98 87*  --    CO2 >40.0* >40.0* >40.0*  --         Medications:   Scheduled Medications    insulin degludec  6 Units Subcutaneous Nightly    dilTIAZem ER  180 mg Oral Daily    meropenem  500 mg Intravenous Q8H    predniSONE  40 mg Oral Daily with breakfast    amiodarone  400 mg Oral TID    furosemide  40 mg Intravenous BID (Diuretic)    ipratropium  500 mcg Nebulization Q6H WA    [Held by provider] furosemide  20 mg Oral Daily    aspirin  81 mg Oral Daily    atorvastatin  20 mg Oral Nightly    guaiFENesin ER  1,200 mg Oral BID    levETIRAcetam  500 mg Oral BID    Roflumilast  500 mcg Oral Daily    rivaroxaban  20 mg Oral Daily with food    insulin aspart  1-10 Units Subcutaneous TID AC and HS    fluticasone furoate-vilanterol  1 puff Inhalation Daily                  Assessment & Plan:  #Acute on chronic hypoxic/hypercapnic respiratory failure 2/2 CHF/COPD/PNA, improving     #Acute on chronic diastolic CHF  -Lasix IV to po soon - mgmt per cards     #End stage COPD w/ acute exacerbation improving  -bipap prn/sleep  -remains on 4-5L -discharged on this last admission  -steroids, atrovent nebs, breo, mucinex per pulm  -no albuterol due to tachycardia     #Left Lung mass, Smoker  -pulm recs OP PET-CT     #ESBL Klebsiella PNA  -Merrem Day 4     #Rapid aflutter/afib improving  -cardizem/amiodarone po, DOAC per cards  -monitor Tele  -s/p ALO ablation per EP on 7/12, back in afib on 7/14  -Recently underwent DCCV 6/14, plans for repeat Cardioversion on Friday per EP and if fails then AVN ablation/ppm.     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       Case d/w pt, RN. Cardioversion on Friday per EP.   Pt NPO, follow glucose.  Dc planning.

## 2024-07-19 NOTE — PROGRESS NOTES
[facility] Progress Note     SUBJECTIVE/Interval history:  No acute events overnight, he feels the same today, stable dyspnea/cough. No pain. No fevers  Remains on 4L    Review of Systems:   A comprehensive 14 point review of systems was completed.   Pertinent positives and negatives noted in the HPI.    Medications  Reviewed personally   insulin degludec  6 Units Subcutaneous Nightly    dilTIAZem ER  180 mg Oral Daily    meropenem  500 mg Intravenous Q8H    predniSONE  40 mg Oral Daily with breakfast    amiodarone  400 mg Oral TID    furosemide  40 mg Intravenous BID (Diuretic)    ipratropium  500 mcg Nebulization Q6H WA    [Held by provider] furosemide  20 mg Oral Daily    aspirin  81 mg Oral Daily    atorvastatin  20 mg Oral Nightly    guaiFENesin ER  1,200 mg Oral BID    levETIRAcetam  500 mg Oral BID    Roflumilast  500 mcg Oral Daily    rivaroxaban  20 mg Oral Daily with food    insulin aspart  1-10 Units Subcutaneous TID AC and HS    fluticasone furoate-vilanterol  1 puff Inhalation Daily       acetaminophen    lidocaine-menthol    metoprolol    selenium sulfide    glucose **OR** glucose **OR** glucose-vitamin C **OR** dextrose **OR** glucose **OR** glucose **OR** glucose-vitamin C    melatonin    acetaminophen    polyethylene glycol (PEG 3350)    sennosides    bisacodyl    fleet enema    prochlorperazine    OBJECTIVE:  Vitals:    07/18/24 1940 07/18/24 2335 07/18/24 2354 07/19/24 0430   BP: 107/70  117/74 130/80   BP Location: Right arm  Right arm Right arm   Pulse: 87  90 84   Resp: 21 17 (!) 29    Temp: 97.6 °F (36.4 °C)  97.4 °F (36.3 °C) 97.4 °F (36.3 °C)   TempSrc: Oral  Axillary Axillary   SpO2: 94%  92% 99%   Weight:           Vital signs in last 24 hours:  Blood pressure 130/80, pulse 84, temperature 97.4 °F (36.3 °C), temperature source Axillary, resp. rate (!) 29, weight 156 lb 1.4 oz (70.8 kg), SpO2 99%.     Intake/Output:  I/O last 3  completed shifts:  In: 2245 [P.O.:2145; IV PIGGYBACK:100]  Out: 7700 [Urine:7700]  FiO2 (%):  [30 %] 30 %    Physical Exam:  General: Appears alert, comfortable at rest on 4L  Neurologic:No focal deficits.  Alert.  Oriented.  Lungs:stable diminished bs today. No wheeze.   Heart:irreg RR. +ADRIAN  Abdomen:Soft, non-tender.  No masses.  No guarding.  No rebound.  Extremities:edema    Lab Data Review:   Recent Labs   Lab 07/16/24  1036 07/17/24  0619 07/18/24  1101   * 118* 199*   BUN 21 22 24*   CREATSERUM 0.72 0.58* 0.69*   CA 9.4 8.5* 8.8    141 135*   K 5.0 4.3 3.8   CL 95* 98 87*   CO2 >40.0* >40.0* >40.0*     Recent Labs   Lab 07/12/24  1136 07/15/24  0535 07/18/24  1101   RBC 3.41* 3.99* 3.89*   HGB 10.3* 12.1* 11.9*   HCT 33.9* 39.9 36.4*   MCV 99.4 100.0 93.6   MCH 30.2 30.3 30.6   MCHC 30.4* 30.3* 32.7   RDW 13.7 13.8 14.6   NEPRELIM 12.57* 9.24* 24.62*   WBC 14.6* 10.0 26.3*   .0 307.0 251.0     No results for input(s): \"BNP\" in the last 168 hours.  No results for input(s): \"TROP\", \"CK\" in the last 168 hours.  No results for input(s): \"PT\", \"INR\", \"PTT\" in the last 168 hours.    No results for input(s): \"ABGPHT\", \"HCLTQP0C\", \"TBTFA0R\", \"ABGHCO3\", \"ABGBE\", \"TEMP\", \"ROMARIO\", \"SITE\", \"DEV\", \"THGB\" in the last 168 hours.    Invalid input(s): \"BYX46AHJ\", \"CHOB\"      Other Labs:  Interval Culture Data:   No results found for this visit on 06/29/24.  No results for input(s): \"COLORUR\", \"CLARITY\", \"SPECGRAVITY\", \"GLUUR\", \"BILUR\", \"KETUR\", \"BLOODURINE\", \"PHURINE\", \"PROUR\", \"UROBILINOGEN\", \"NITRITE\", \"LEUUR\", \"WBCUR\", \"RBCUR\", \"BACUR\", \"EPIUR\" in the last 168 hours.    Interval Radiology:   Reviewed personally  XR CHEST AP PORTABLE  (CPT=71045)    Result Date: 7/15/2024  CONCLUSION:   Stable significant emphysematous and COPD changes.   LOCATION:  [facility]      Dictated by (CST): Yunior James MD on 7/15/2024 at 7:51 AM     Finalized by (CST): Yunior James MD on 7/15/2024 at 7:54 AM       XR CHEST AP  PORTABLE  (CPT=71045)    Result Date: 7/14/2024  CONCLUSION:   Postoperative changes of cardiothoracic surgery with stable cardiac and mediastinal contours.  Recent CT of 07/01/2024 better demonstrates upper lobe predominant emphysema.  Bilateral calcified pleural plaques with persistent diffuse interstitial and bronchial wall thickening.  This may reflect some combination of interstitial pulmonary edema, bronchitis, or reactive airway disease.  Suspected parenchymal scarring of the peripheral left mid lung adjacent to a densely calcified pleural plaque.  Small bilateral pleural effusions persist.  No pneumothorax.  Overall, findings are not substantially changed compared to 07/04/2024.   LOCATION:  [facility]      Dictated by (CST): Jenn Guerra MD on 7/14/2024 at 7:34 AM     Finalized by (CST): Jenn Guerra MD on 7/14/2024 at 7:38 AM       XR CHEST AP PORTABLE  (CPT=71045)    Result Date: 7/4/2024  CONCLUSION:  1. Interval improvement in interstitial and alveolar opacities since prior examination with residual remaining. 2. Small bilateral pleural effusions with bilateral basilar atelectasis/infiltrates. 3. Hyperexpansion of the lungs.    LOCATION:  [facility]      Dictated by (CST): Lior Donnelly MD on 7/04/2024 at 10:25 PM     Finalized by (CST): Lior Donnelly MD on 7/04/2024 at 10:27 PM       XR CHEST AP PORTABLE  (CPT=71045)    Result Date: 7/3/2024  CONCLUSION:  No significant change since prior examination.   LOCATION:  [facility]      Dictated by (CST): Lior Donnelly MD on 7/03/2024 at 11:00 AM     Finalized by (CST): Lior Donnelly MD on 7/03/2024 at 11:01 AM       US VENOUS DOPPLER ARM BILAT - DIAG IMG (CPT=93970)    Result Date: 7/1/2024  CONCLUSION:  Superficial venous thrombosis involving the distal left cephalic vein.  No evidence of deep venous thrombosis in the bilateral upper extremities.  LOCATION:  [id number]    Dictated by (CST): Von Toussaint MD on 7/01/2024 at 2:29 PM     Finalized by (CST):  Von Toussaint MD on 7/01/2024 at 2:31 PM       CT CHEST(CONTRAST ONLY) (CPT=71260)    Result Date: 7/1/2024  CONCLUSION:  There is now a small right pleural effusion and in the event smaller left pleural effusion.  These were not present on the prior CT.  Increase in the size of a masslike area of tissue density in the left midlung laterally adjacent to chronic  pleural calcification.  This could reflect a zone of increasing chronic rounded atelectasis, with neoplasm within the differential.  Redemonstration extensive severe chronic lung disease.  Nonspecific adenopathy right hilum and mediastinum.  Dilated pulmonary artery could reflect chronic pulmonary hypertension, particularly in the setting of extensive chronic severe lung disease.   LOCATION:  [id number]   Dictated by (CST): Severino Roque MD on 7/01/2024 at 10:31 AM     Finalized by (CST): Severino Roque MD on 7/01/2024 at 10:38 AM       XR CHEST AP PORTABLE  (CPT=71045)    Result Date: 6/29/2024  CONCLUSION:  Findings are unchanged relative to the previous study.  Please see further details above.   LOCATION:  [facility]      Dictated by (CST): Marielena Martines DO on 6/29/2024 at 11:32 AM     Finalized by (CST): Marielena Martines DO on 6/29/2024 at 11:38 AM       XR CHEST AP PORTABLE  (CPT=71045)    Result Date: 6/11/2024  CONCLUSION:  1. No significant interval changes, with stable diffuse emphysematous changes again noted. 2. Stable linear regions of scarring radiating from a focus of pleural thickening and calcified plaque along the mid aspect of the left lateral pleural margin. 3. No evidence of an acute process.   LOCATION:  [id number]      Dictated by (CST): Marielena Martines DO on 6/11/2024 at 4:43 PM     Finalized by (CST): Marielena Martines DO on 6/11/2024 at 4:46 PM          Assessment/Plan:  #1. Acute on chronic hypercapnic and hypoxic respiratory failure  Multifactorial due to afib with RVR, CHF exacerbation with underlying end stage COPD  7/1 CTA with fluid overload,  no PE  Treat/control afib and CHF as per cardiology  Continue on scheduled atrovent nebs  Continue on oral pred taper  Wean o2 for goal saturations of 88-92% given hypercapnia. His previous o2 baseline was: 2L at rest, 4L on exertion; using and benefiting from portable oxygen concentrator (3 at rest, 6 on exertion)  Noted issues with affording AVAPS at home. Awaiting determination for financial assistance for AVAPS vs BIPAP    #2. Acute CHF exacerbation, afib with RVR  Rate control and diurese as per cardiology  Previous cardioversion 6/14/24 and ablation 7/12/2024  Now with recurrent afib 7/14 and on diltiazem     #3. COPD  Severe obstruction on previous PFTs 3/2021  off arformoterol given worsening afib/tachycardia  Will continue to hold breo   Continue on scheduled atrovent nebs   He can use levalbuterol as outpatient as hospital does not stock it  He cannot tolerate albuterol due to palpitations, tremors and with afib with RVR; off duonebs  Continue on oral pred taper  Sputum cx with ESBL klebsiella with sensitivities to cipro, gent, levoflox, meropenem and trim/sulfa. Agree with meropenem day 4 while hospitalized, but can plan on PO cipro or levofloxacin on discharge  Continue roflumilast  Previously reviewed BLVR - not candidate at this time, reassess as outpatient     #4. Pulmonary nodules  7/2017 CT with worsening peripheral lingular atelectasis. Nodules stable   5/2018 CT stable nodules  12/2020 CT stable  7/2021 CT stable but worsening mediastinal LAD suspect reactive to recent cardiac surgery  1/2022 CT with stable nodules, improved LAD  3/2023 CT chest with several small nodules  9/2023 CT chest with stable findings   7/1/2024 CT chest with worsening ALYSON lesion, possible scarring, rounded atelectasis vs neoplasm  Will need outpatient follow up including PET/CT    #5. Tobacco abuse  30+ pack years, active pipe smoker  Hold on LDCT given recent CT with worsening ALYSON lesion, will need PET/CT as  above    Dispo:  Floor     Stefano Randle MD

## 2024-07-19 NOTE — PLAN OF CARE
A&Ox4. Pt denies any pain. 4.5L NC while awake (which is pt's baseline), AVAP at night. Afib on tele. Continent of bowel and bladder, last bm 7/17/24.    Bed is locked and in low position. Call light and personal items within reach.  Pt updated with plan of care     Plan of care:    NPO @Midnight-Cardioversion   PO Steroids, PO Amiodarone, IV Lasix, IV Merrem Q8hrs.     Approx 2205 paged Dr. Aragon to report glucose of 362. Per his orders, instructed to give 11units of insulin.     Problem: RESPIRATORY - ADULT  Goal: Achieves optimal ventilation and oxygenation  Description: INTERVENTIONS:  - Assess for changes in respiratory status  - Assess for changes in mentation and behavior  - Position to facilitate oxygenation and minimize respiratory effort  - Oxygen supplementation based on oxygen saturation or ABGs  - Provide Smoking Cessation handout, if applicable  - Encourage broncho-pulmonary hygiene including cough, deep breathe, Incentive Spirometry  - Assess the need for suctioning and perform as needed  - Assess and instruct to report SOB or any respiratory difficulty  - Respiratory Therapy support as indicated  - Manage/alleviate anxiety  - Monitor for signs/symptoms of CO2 retention  Outcome: Progressing     Problem: CARDIOVASCULAR - ADULT  Goal: Maintains optimal cardiac output and hemodynamic stability  Description: INTERVENTIONS:  - Monitor vital signs, rhythm, and trends  - Monitor for bleeding, hypotension and signs of decreased cardiac output  - Evaluate effectiveness of vasoactive medications to optimize hemodynamic stability  - Monitor arterial and/or venous puncture sites for bleeding and/or hematoma  - Assess quality of pulses, skin color and temperature  - Assess for signs of decreased coronary artery perfusion - ex. Angina  - Evaluate fluid balance, assess for edema, trend weights  Outcome: Progressing  Goal: Absence of cardiac arrhythmias or at baseline  Description: INTERVENTIONS:  - Continuous  cardiac monitoring, monitor vital signs, obtain 12 lead EKG if indicated  - Evaluate effectiveness of antiarrhythmic and heart rate control medications as ordered  - Initiate emergency measures for life threatening arrhythmias  - Monitor electrolytes and administer replacement therapy as ordered  Outcome: Progressing     Problem: SAFETY ADULT - FALL  Goal: Free from fall injury  Description: INTERVENTIONS:  - Assess pt frequently for physical needs  - Identify cognitive and physical deficits and behaviors that affect risk of falls.  - [place] fall precautions as indicated by assessment.  - Educate pt/family on patient safety including physical limitations  - Instruct pt to call for assistance with activity based on assessment  - Modify environment to reduce risk of injury  - Provide assistive devices as appropriate  - Consider OT/PT consult to assist with strengthening/mobility  - Encourage toileting schedule  Outcome: Progressing     Problem: PAIN - ADULT  Goal: Verbalizes/displays adequate comfort level or patient's stated pain goal  Description: INTERVENTIONS:  - Encourage pt to monitor pain and request assistance  - Assess pain using appropriate pain scale  - Administer analgesics based on type and severity of pain and evaluate response  - Implement non-pharmacological measures as appropriate and evaluate response  - Consider cultural and social influences on pain and pain management  - Manage/alleviate anxiety  - Utilize distraction and/or relaxation techniques  - Monitor for opioid side effects  - Notify MD/LIP if interventions unsuccessful or patient reports new pain  - Anticipate increased pain with activity and pre-medicate as appropriate  Outcome: Progressing     Problem: Patient/Family Goals  Goal: Patient/Family Long Term Goal  Description: Patient's Long Term Goal: to go home    Interventions:  - O2, bipap, IV steroids, diuretics, labs, increase activity    - See additional Care Plan goals for  specific interventions  Outcome: Progressing  Goal: Patient/Family Short Term Goal  Description: Patient's Short Term Goal: feel better    Interventions:   - - O2, bipap, IV steroids, diuretics, labs, increase activity    - See additional Care Plan goals for specific interventions  Outcome: Progressing

## 2024-07-19 NOTE — PROGRESS NOTES
[facility] Card[facility]  Progress Note    Keny Marshall Patient Status:  Inpatient    1960 MRN [id number]   [facility] 8NE-A Attending Alfredo Garcia MD   Hosp Day # 20 PCP Dustin Aivna MD       Impression;  Aflutter-  adv COPD/exacerbation. s/p failed recent CV --> A flutter ablation 24 ([facility]). AF began 24 - rate controlled on low dose diltiazem gtt.  SOB - 2/2 acute dCHF and COPD exac- on usual home O2 at 4.5 L  Acute diastolic CHF - mild volume overload on exam. Net negative 11L, wt 168->156 lbs.   S/p bio AVR   TTE (24): LVEF 55%, +DD, 23mm SAVR 3.3m/s, mean 22mmHg.  Leukocytosis - steroid related     Plan:  AF: rate controlled on cardizem gtt -  On amiodarone 400mg Tid.  Discussed with EP/Akua - plan for CV today with anesthesia   - Increased PO diltiazem to 180mg daily, wean off gtt as able   AC: rivaroxaban, continue.  Diuresis: continue lasix 40mg IV BID today, still some edema though improved overall. F/u BMP today. Consider changing back to maintenance PO dose soon.   severe COPD/end-stage emphysema- chronic CO2 retention; prednisone, inhaler therapy, O2 per pulmonary    Subjective:  No CP. SOB stable. Edema improved.     Objective:  /80 (BP Location: Right arm)   Pulse 84   Temp 97.4 °F (36.3 °C) (Axillary)   Resp 26   Wt 156 lb 1.4 oz (70.8 kg)   SpO2 99%   BMI 22.40 kg/m²   Temp (24hrs), Av.6 °F (36.4 °C), Min:97.4 °F (36.3 °C), Max:97.9 °F (36.6 °C)      Intake/Output Summary (Last 24 hours) at 2024 0756  Last data filed at 2024 0430  Gross per 24 hour   Intake 1465 ml   Output 2740 ml   Net -1275 ml     Wt Readings from Last 3 Encounters:   24 156 lb 1.4 oz (70.8 kg)   24 159 lb 6.3 oz (72.3 kg)   23 172 lb (78 kg)       General: Awake and alert; in no acute distress  Cardiac: irregularly irregular ; no murmurs/rubs/gallops are appreciated  Lungs: Coarse on auscultation bilaterally; no accessory  muscle use  Abdomen: Soft, non-tender; bowel sounds are normoactive  Extremities: No clubbing/cyanosis; moves all 4 extremities, warm, 1+ LE edema    Current Facility-Administered Medications   Medication Dose Route Frequency    insulin degludec (Tresiba) 100 units/mL flextouch 6 Units  6 Units Subcutaneous Nightly    dilTIAZem ER (CardIZEM CD) 24 hr cap 180 mg  180 mg Oral Daily    meropenem (Merrem) 500 mg in sodium chloride 0.9% 100 mL IVPB-MBP  500 mg Intravenous Q8H    predniSONE (Deltasone) tab 40 mg  40 mg Oral Daily with breakfast    amiodarone (Pacerone) tab 400 mg  400 mg Oral TID    furosemide (Lasix) 10 mg/mL injection 40 mg  40 mg Intravenous BID (Diuretic)    dilTIAZem (cardIZEM) 100 mg in sodium chloride 0.9% 100 mL IVPB-ADDV  2.5-20 mg/hr Intravenous Continuous    acetaminophen (Tylenol) tab 650 mg  650 mg Oral Q6H PRN    lidocaine-menthol 4-1 % patch 1 patch  1 patch Transdermal Daily PRN    ipratropium (Atrovent) 0.02 % nebulizer solution 500 mcg  500 mcg Nebulization Q6H WA    [Held by provider] furosemide (Lasix) tab 20 mg  20 mg Oral Daily    metoprolol (Lopressor) 5 mg/5mL injection 5 mg  5 mg Intravenous Q6H PRN    selenium sulfide (Selsun) 2.5 % shampoo   Topical Daily PRN    aspirin DR tab 81 mg  81 mg Oral Daily    atorvastatin (Lipitor) tab 20 mg  20 mg Oral Nightly    guaiFENesin ER (Mucinex) 12 hr tab 1,200 mg  1,200 mg Oral BID    levETIRAcetam (Keppra) tab 500 mg  500 mg Oral BID    Roflumilast TABS 500 mcg  500 mcg Oral Daily    rivaroxaban (Xarelto) tab 20 mg  20 mg Oral Daily with food    glucose (Dex4) 15 GM/59ML oral liquid 15 g  15 g Oral Q15 Min PRN    Or    glucose (Glutose) 40% oral gel 15 g  15 g Oral Q15 Min PRN    Or    glucose-vitamin C (Dex-4) chewable tab 4 tablet  4 tablet Oral Q15 Min PRN    Or    dextrose 50% injection 50 mL  50 mL Intravenous Q15 Min PRN    Or    glucose (Dex4) 15 GM/59ML oral liquid 30 g  30 g Oral Q15 Min PRN    Or    glucose (Glutose) 40% oral gel  30 g  30 g Oral Q15 Min PRN    Or    glucose-vitamin C (Dex-4) chewable tab 8 tablet  8 tablet Oral Q15 Min PRN    insulin aspart (NovoLOG) 100 Units/mL FlexPen 1-10 Units  1-10 Units Subcutaneous TID AC and HS    melatonin tab 3 mg  3 mg Oral Nightly PRN    acetaminophen (Tylenol Extra Strength) tab 500 mg  500 mg Oral Q4H PRN    polyethylene glycol (PEG 3350) (Miralax) 17 g oral packet 17 g  17 g Oral Daily PRN    sennosides (Senokot) tab 17.2 mg  17.2 mg Oral Nightly PRN    bisacodyl (Dulcolax) 10 MG rectal suppository 10 mg  10 mg Rectal Daily PRN    fleet enema (Fleet) 7-19 GM/118ML rectal enema 133 mL  1 enema Rectal Once PRN    prochlorperazine (Compazine) 10 MG/2ML injection 5 mg  5 mg Intravenous Q8H PRN    fluticasone furoate-vilanterol (Breo Ellipta) 200-25 MCG/ACT inhaler 1 puff  1 puff Inhalation Daily       Laboratory Data:  Lab Results   Component Value Date    WBC 26.3 07/18/2024    HGB 11.9 07/18/2024    HCT 36.4 07/18/2024    .0 07/18/2024           Lab Results   Component Value Date    INR 1.59 (H) 06/29/2024    INR 1.06 06/11/2024    INR 2.26 (H) 09/22/2021     Lab Results   Component Value Date     07/18/2024    K 4.0 07/19/2024    CL 87 07/18/2024    CO2 >40.0 07/18/2024    BUN 24 07/18/2024    CREATSERUM 0.69 07/18/2024     07/18/2024    CA 8.8 07/18/2024         Telemetry: AF 80s      Thank you for allowing our practice to participate in the care of your patient. Please do not hesitate to contact me if you have any questions.

## 2024-07-19 NOTE — PROCEDURES
Cardioversion      History:  63 year old male with persistent atrial fibrillation now on amiodarone who presents for a cardioversion. The risks (including, but not limited to, respiratory failure, myocardial infarction, stroke, and death), benefits (restoration of sinus rhythm), and alternatives (rate control, ablation) of the procedure were discussed. The patient understands and agrees to proceed.    Procedure:  The patient was brought to the invasive lab holding room in a fasting and nonsedated state. MAC was administered by anesthesia.     atrial fibrillation --> 360J sync --> SR    The patient awoke spontaneously without any apparent periprocedural complications  ECG: SR 63 LBBB QTc 500    Conclusions:  Acutely successful cardioversion of atrial fibrillation to SR  Continue amiodarone at 400 tid  Tomorrow, decrease amiodarone to 200mg bid for 2 weeks then 200mg daily  Stop diltiazem      Chidi Fatima MD  Clinical Cardiac Electrophysiology  [facility]

## 2024-07-19 NOTE — PROGRESS NOTES
Addendum:    Agree with above note except as documented below.  Patient feeling well today    Gen: NAD  CVS: s1s2, ADRIAN, irregularly irregular   Resp: CTAB  Abd: soft, NT, ND     #Acute on chronic HFpEF   #Aflutter s/p failed recent CV   #Afib with RVR   #Acute on chronic hypoxic respiratory failure 2/2 CHF/COPD  #Steroid induced hyperglycemia   #ESBL Klebsiella pneumonia     Patients HR is better today.  Cough is better.  Plan for DCCV today.    Elevated WBC likely due to steroids.  Patient is afebrile and on appropriate antibiotics.    Monitor on telemetry.  Cards and pulm following.        Pt seen and examined independently. Chart reviewed. Labs and imaging over the last 24 hours have been personally reviewed.  I personally made/approved 100% of the management plan for this patient and take full responsibility for the patient management.   Note has been reviewed by me and modified as needed.  Exam and Impression/ Recs as noted above.  D/w staff.    Alfredo Garcia MD          [facility]   [facility]     Keny Marshall Patient Status:  Inpatient    1960 MRN [id number]   Location [facility] 8NE-A Attending Dr. Garcia   Hosp Day # 20 PCP Dustin Avina MD     Chief Complaint: SOB    Subjective:  Patient reports no complaints.  Pending cardioversion today.    Objective:    Review of Systems:   A comprehensive review of systems was completed; pertinent positive and negatives stated in subjective.    Vital signs:  Temp:  [97.4 °F (36.3 °C)-98.3 °F (36.8 °C)] 98.3 °F (36.8 °C)  Pulse:  [] 100  Resp:  [17-29] 22  BP: (107-130)/(66-88) 122/88  SpO2:  [91 %-99 %] 97 %  FiO2 (%):  [30 %] 30 %    Physical Exam:    General: Thin, chronically ill appearing 63 year old male   Respiratory: Diminished at bases on O2 NC, no wheezing  Cardiovascular: S1, S2, irregularly irregular 90s  Abdomen: Soft, Non-tender, non-distended, positive bowel sounds  Neuro: No new focal  deficits.   Extremities: No edema, BLE ecchymoses    Diagnostic Data:    Labs:  Recent Labs   Lab 07/12/24  1136 07/15/24  0535 07/18/24  1101   WBC 14.6* 10.0 26.3*   HGB 10.3* 12.1* 11.9*   MCV 99.4 100.0 93.6   .0 307.0 251.0       Recent Labs   Lab 07/16/24  1036 07/17/24  0619 07/18/24  1101 07/19/24  0624   * 118* 199*  --    BUN 21 22 24*  --    CREATSERUM 0.72 0.58* 0.69*  --    CA 9.4 8.5* 8.8  --     141 135*  --    K 5.0 4.3 3.8 4.0   CL 95* 98 87*  --    CO2 >40.0* >40.0* >40.0*  --        Estimated Creatinine Clearance: 108.3 mL/min (A) (based on SCr of 0.69 mg/dL (L)).    No results for input(s): \"TROP\", \"TROPHS\", \"CK\" in the last 168 hours.    No results for input(s): \"PTP\", \"INR\" in the last 168 hours.       Microbiology    No results found for this visit on 06/29/24.      Imaging: Reviewed in Epic.    Medications:    insulin degludec  6 Units Subcutaneous Nightly    dilTIAZem ER  180 mg Oral Daily    meropenem  500 mg Intravenous Q8H    predniSONE  40 mg Oral Daily with breakfast    amiodarone  400 mg Oral TID    furosemide  40 mg Intravenous BID (Diuretic)    ipratropium  500 mcg Nebulization Q6H WA    [Held by provider] furosemide  20 mg Oral Daily    aspirin  81 mg Oral Daily    atorvastatin  20 mg Oral Nightly    guaiFENesin ER  1,200 mg Oral BID    levETIRAcetam  500 mg Oral BID    Roflumilast  500 mcg Oral Daily    rivaroxaban  20 mg Oral Daily with food    insulin aspart  1-10 Units Subcutaneous TID AC and HS    fluticasone furoate-vilanterol  1 puff Inhalation Daily       Assessment & Plan:      #Acute on chronic hypoxic/hypercapnic respiratory failure 2/2 CHF/COPD/PNA, improving     #Acute on chronic diastolic CHF  -Lasix IV to po soon - mgmt per cards     #End stage COPD w/ acute exacerbation improving  -bipap prn/sleep  -remains on 4-5L -discharged on this last admission  -steroids, atrovent nebs, breo, mucinex per pulm  -no albuterol due to tachycardia     #Left Lung  mass, Smoker  -pulm recs OP PET-CT     #ESBL Klebsiella PNA  -Merrem Day 4    #Rapid aflutter/afib improving  -cardizem/amiodarone po, DOAC per cards  -monitor Tele  -s/p ALO ablation per EP on 7/12, back in afib on 7/14  -Recently underwent DCCV 6/14, plans for repeat Cardioversion on Friday per EP and if fails then AVN ablation/ppm.    #Type 2 DM, 5.6% Steroid hyperglycemia  -correctional scale, Tresiba reduced as npo, hyperglycemia protocol    #Leukocytosis 2/2 steroids/pna, recheck in am  #Ecchymoses BLE R>L, recheck hgb  #Aortic stenosis s/p AVR   #Hyponatremia, resolved  #Prior VTE on xarelto  #Seizure disorder-Keppra  #History of RCC s/p nephrectomy  #ABIGAIL-CPAP protocol  #Seborrheic dermatitis, improved-topicals/shampoos.      Case d/w pt, RN. Cardioversion on Friday per EP.   Pt NPO, follow glucose.  Dc planning.      LAM Teague  10:29 AM     Supplementary Documentation:     Quality:  DVT Mechanical Prophylaxis:     Early ambuation  DVT Pharmacologic Prophylaxis   Medication    rivaroxaban (Xarelto) tab 20 mg         DVT Pharmacologic prophylaxis: Aspirin 81 mg    Code Status: Full Code  Peters: No urinary catheter in place    The 21st Century Cures Act makes medical notes like these available to patients in the interest of transparency. Please be advised this is a medical document. Medical documents are intended to carry relevant information, facts as evident, and the clinical opinion of the practitioner. The medical note is intended as peer to peer communication and may appear blunt or direct. It is written in medical language and may contain abbreviations or verbiage that are unfamiliar.

## 2024-07-19 NOTE — PROGRESS NOTES
Pt post cardioversion.  TO at 1240- after adequate sedation per anesthesia. Pt cardioverted 360j x 1. Pt converted to NSR per EKG.

## 2024-07-20 VITALS
SYSTOLIC BLOOD PRESSURE: 141 MMHG | OXYGEN SATURATION: 94 % | WEIGHT: 151.88 LBS | TEMPERATURE: 98 F | RESPIRATION RATE: 20 BRPM | BODY MASS INDEX: 22 KG/M2 | HEART RATE: 77 BPM | DIASTOLIC BLOOD PRESSURE: 72 MMHG

## 2024-07-20 LAB
BASOPHILS # BLD AUTO: 0.03 X10(3) UL (ref 0–0.2)
BASOPHILS NFR BLD AUTO: 0.2 %
BUN BLD-MCNC: 16 MG/DL (ref 9–23)
CALCIUM BLD-MCNC: 8.7 MG/DL (ref 8.7–10.4)
CHLORIDE SERPL-SCNC: 90 MMOL/L (ref 98–112)
CO2 SERPL-SCNC: >40 MMOL/L (ref 21–32)
CREAT BLD-MCNC: 0.51 MG/DL
EGFRCR SERPLBLD CKD-EPI 2021: 113 ML/MIN/1.73M2 (ref 60–?)
EOSINOPHIL # BLD AUTO: 0.15 X10(3) UL (ref 0–0.7)
EOSINOPHIL NFR BLD AUTO: 1 %
ERYTHROCYTE [DISTWIDTH] IN BLOOD BY AUTOMATED COUNT: 14.8 %
GLUCOSE BLD-MCNC: 143 MG/DL (ref 70–99)
GLUCOSE BLD-MCNC: 83 MG/DL (ref 70–99)
GLUCOSE BLD-MCNC: 92 MG/DL (ref 70–99)
HCT VFR BLD AUTO: 34.6 %
HGB BLD-MCNC: 11.1 G/DL
IMM GRANULOCYTES # BLD AUTO: 0.16 X10(3) UL (ref 0–1)
IMM GRANULOCYTES NFR BLD: 1.1 %
LYMPHOCYTES # BLD AUTO: 1.35 X10(3) UL (ref 1–4)
LYMPHOCYTES NFR BLD AUTO: 9.4 %
MCH RBC QN AUTO: 30.8 PG (ref 26–34)
MCHC RBC AUTO-ENTMCNC: 32.1 G/DL (ref 31–37)
MCV RBC AUTO: 96.1 FL
MONOCYTES # BLD AUTO: 1.29 X10(3) UL (ref 0.1–1)
MONOCYTES NFR BLD AUTO: 9 %
NEUTROPHILS # BLD AUTO: 11.32 X10 (3) UL (ref 1.5–7.7)
NEUTROPHILS # BLD AUTO: 11.32 X10(3) UL (ref 1.5–7.7)
NEUTROPHILS NFR BLD AUTO: 79.3 %
OSMOLALITY SERPL CALC.SUM OF ELEC: 280 MOSM/KG (ref 275–295)
PLATELET # BLD AUTO: 159 10(3)UL (ref 150–450)
POTASSIUM SERPL-SCNC: 4.2 MMOL/L (ref 3.5–5.1)
RBC # BLD AUTO: 3.6 X10(6)UL
SODIUM SERPL-SCNC: 135 MMOL/L (ref 136–145)
WBC # BLD AUTO: 14.3 X10(3) UL (ref 4–11)

## 2024-07-20 PROCEDURE — 99233 SBSQ HOSP IP/OBS HIGH 50: CPT | Performed by: INTERNAL MEDICINE

## 2024-07-20 PROCEDURE — 99239 HOSP IP/OBS DSCHRG MGMT >30: CPT | Performed by: HOSPITALIST

## 2024-07-20 RX ORDER — LEVOFLOXACIN 750 MG/1
750 TABLET, FILM COATED ORAL DAILY
Qty: 9 TABLET | Refills: 0 | Status: SHIPPED | OUTPATIENT
Start: 2024-07-20 | End: 2024-07-20

## 2024-07-20 RX ORDER — PREDNISONE 10 MG/1
TABLET ORAL
Qty: 30 TABLET | Refills: 0 | Status: SHIPPED | OUTPATIENT
Start: 2024-07-20 | End: 2024-07-20

## 2024-07-20 RX ORDER — FUROSEMIDE 40 MG/1
40 TABLET ORAL DAILY
Status: DISCONTINUED | OUTPATIENT
Start: 2024-07-21 | End: 2024-07-20

## 2024-07-20 RX ORDER — AMIODARONE HYDROCHLORIDE 200 MG/1
TABLET ORAL
Qty: 90 TABLET | Refills: 0 | Status: SHIPPED | OUTPATIENT
Start: 2024-07-20 | End: 2024-07-20

## 2024-07-20 RX ORDER — FUROSEMIDE 40 MG/1
40 TABLET ORAL DAILY
Qty: 90 TABLET | Refills: 0 | Status: SHIPPED | OUTPATIENT
Start: 2024-07-21 | End: 2024-07-20

## 2024-07-20 RX ORDER — AMIODARONE HYDROCHLORIDE 200 MG/1
TABLET ORAL
Qty: 90 TABLET | Refills: 0 | Status: SHIPPED | OUTPATIENT
Start: 2024-07-20

## 2024-07-20 RX ORDER — SULFAMETHOXAZOLE AND TRIMETHOPRIM 800; 160 MG/1; MG/1
1 TABLET ORAL 2 TIMES DAILY
Qty: 10 TABLET | Refills: 0 | Status: SHIPPED | OUTPATIENT
Start: 2024-07-20 | End: 2024-07-20

## 2024-07-20 RX ORDER — SULFAMETHOXAZOLE AND TRIMETHOPRIM 800; 160 MG/1; MG/1
1 TABLET ORAL 2 TIMES DAILY
Qty: 10 TABLET | Refills: 0 | Status: SHIPPED | OUTPATIENT
Start: 2024-07-20 | End: 2024-07-25

## 2024-07-20 RX ORDER — SULFAMETHOXAZOLE AND TRIMETHOPRIM 800; 160 MG/1; MG/1
1 TABLET ORAL 2 TIMES DAILY
Qty: 6 TABLET | Refills: 0 | Status: SHIPPED | OUTPATIENT
Start: 2024-07-20 | End: 2024-07-20

## 2024-07-20 RX ORDER — PREDNISONE 10 MG/1
TABLET ORAL
Qty: 30 TABLET | Refills: 0 | Status: SHIPPED | OUTPATIENT
Start: 2024-07-20

## 2024-07-20 RX ORDER — FUROSEMIDE 10 MG/ML
40 INJECTION INTRAMUSCULAR; INTRAVENOUS ONCE
Status: COMPLETED | OUTPATIENT
Start: 2024-07-20 | End: 2024-07-20

## 2024-07-20 RX ORDER — LEVETIRACETAM 500 MG/1
500 TABLET ORAL 2 TIMES DAILY
Qty: 180 TABLET | Refills: 0 | Status: SHIPPED | OUTPATIENT
Start: 2024-07-20 | End: 2024-10-18

## 2024-07-20 RX ORDER — FUROSEMIDE 40 MG/1
40 TABLET ORAL DAILY
Qty: 90 TABLET | Refills: 0 | Status: SHIPPED | OUTPATIENT
Start: 2024-07-21

## 2024-07-20 RX ORDER — AMIODARONE HYDROCHLORIDE 200 MG/1
200 TABLET ORAL 2 TIMES DAILY WITH MEALS
Status: DISCONTINUED | OUTPATIENT
Start: 2024-07-20 | End: 2024-07-20

## 2024-07-20 NOTE — PROGRESS NOTES
[facility] Progress Note     SUBJECTIVE/Interval history:  No acute events overnight, he feels well today, respiratory status is at baseline. Stable dyspnea/cough. No pain. No fevers  Remains on 4L    Review of Systems:   A comprehensive 14 point review of systems was completed.   Pertinent positives and negatives noted in the HPI.    Medications  Reviewed personally   amiodarone  200 mg Oral BID with meals    insulin degludec  6 Units Subcutaneous Nightly    meropenem  500 mg Intravenous Q8H    predniSONE  40 mg Oral Daily with breakfast    furosemide  40 mg Intravenous BID (Diuretic)    ipratropium  500 mcg Nebulization Q6H WA    [Held by provider] furosemide  20 mg Oral Daily    aspirin  81 mg Oral Daily    atorvastatin  20 mg Oral Nightly    guaiFENesin ER  1,200 mg Oral BID    levETIRAcetam  500 mg Oral BID    Roflumilast  500 mcg Oral Daily    rivaroxaban  20 mg Oral Daily with food    insulin aspart  1-10 Units Subcutaneous TID AC and HS    fluticasone furoate-vilanterol  1 puff Inhalation Daily       acetaminophen    lidocaine-menthol    metoprolol    selenium sulfide    glucose **OR** glucose **OR** glucose-vitamin C **OR** dextrose **OR** glucose **OR** glucose **OR** glucose-vitamin C    melatonin    acetaminophen    polyethylene glycol (PEG 3350)    sennosides    bisacodyl    fleet enema    prochlorperazine    OBJECTIVE:  Vitals:    07/19/24 2010 07/19/24 2237 07/20/24 0433 07/20/24 0511   BP:  122/76  131/69   BP Location:  Left arm  Left arm   Pulse:  67 58 63   Resp:  18  16   Temp:  98.5 °F (36.9 °C)  98 °F (36.7 °C)   TempSrc:  Oral  Oral   SpO2: 93%  99% 100%   Weight:    151 lb 14.4 oz (68.9 kg)       Vital signs in last 24 hours:  Blood pressure 131/69, pulse 63, temperature 98 °F (36.7 °C), temperature source Oral, resp. rate 16, weight 151 lb 14.4 oz (68.9 kg), SpO2 100%.     Intake/Output:  I/O last 3 completed shifts:  In: 140  [P.O.:140]  Out: 2790 [Urine:2790]       Physical Exam:  General: Appears alert, comfortable at rest on 4L  Neurologic:No focal deficits.  Alert.  Oriented.  Lungs:stable diminished bs today. No wheeze.   Heart:irreg RR. +ADRIAN  Abdomen:Soft, non-tender.  No masses.  No guarding.  No rebound.  Extremities:edema    Lab Data Review:   Recent Labs   Lab 07/17/24  0619 07/18/24  1101 07/19/24  0624 07/20/24  0552   * 199*  --  83   BUN 22 24*  --  16   CREATSERUM 0.58* 0.69*  --  0.51*   CA 8.5* 8.8  --  8.7    135*  --  135*   K 4.3 3.8 4.0 4.2   CL 98 87*  --  90*   CO2 >40.0* >40.0*  --  >40.0*     Recent Labs   Lab 07/15/24  0535 07/18/24  1101 07/20/24  0551   RBC 3.99* 3.89* 3.60*   HGB 12.1* 11.9* 11.1*   HCT 39.9 36.4* 34.6*   .0 93.6 96.1   MCH 30.3 30.6 30.8   MCHC 30.3* 32.7 32.1   RDW 13.8 14.6 14.8   NEPRELIM 9.24* 24.62* 11.32*   WBC 10.0 26.3* 14.3*   .0 251.0 159.0     No results for input(s): \"BNP\" in the last 168 hours.  No results for input(s): \"TROP\", \"CK\" in the last 168 hours.  No results for input(s): \"PT\", \"INR\", \"PTT\" in the last 168 hours.    No results for input(s): \"ABGPHT\", \"GURDAY9V\", \"LNAXU5Y\", \"ABGHCO3\", \"ABGBE\", \"TEMP\", \"ROMARIO\", \"SITE\", \"DEV\", \"THGB\" in the last 168 hours.    Invalid input(s): \"GFD00VFL\", \"CHOB\"      Other Labs:  Interval Culture Data:   No results found for this visit on 06/29/24.  No results for input(s): \"COLORUR\", \"CLARITY\", \"SPECGRAVITY\", \"GLUUR\", \"BILUR\", \"KETUR\", \"BLOODURINE\", \"PHURINE\", \"PROUR\", \"UROBILINOGEN\", \"NITRITE\", \"LEUUR\", \"WBCUR\", \"RBCUR\", \"BACUR\", \"EPIUR\" in the last 168 hours.    Interval Radiology:   Reviewed personally  XR CHEST AP PORTABLE  (CPT=71045)    Result Date: 7/15/2024  CONCLUSION:   Stable significant emphysematous and COPD changes.   LOCATION:  [facility]      Dictated by (CST): Yunior James MD on 7/15/2024 at 7:51 AM     Finalized by (CST): Yunior James MD on 7/15/2024 at 7:54 AM       XR CHEST AP PORTABLE   (CPT=71045)    Result Date: 7/14/2024  CONCLUSION:   Postoperative changes of cardiothoracic surgery with stable cardiac and mediastinal contours.  Recent CT of 07/01/2024 better demonstrates upper lobe predominant emphysema.  Bilateral calcified pleural plaques with persistent diffuse interstitial and bronchial wall thickening.  This may reflect some combination of interstitial pulmonary edema, bronchitis, or reactive airway disease.  Suspected parenchymal scarring of the peripheral left mid lung adjacent to a densely calcified pleural plaque.  Small bilateral pleural effusions persist.  No pneumothorax.  Overall, findings are not substantially changed compared to 07/04/2024.   LOCATION:  [facility]      Dictated by (CST): Jenn Guerra MD on 7/14/2024 at 7:34 AM     Finalized by (CST): Jenn Guerra MD on 7/14/2024 at 7:38 AM       XR CHEST AP PORTABLE  (CPT=71045)    Result Date: 7/4/2024  CONCLUSION:  1. Interval improvement in interstitial and alveolar opacities since prior examination with residual remaining. 2. Small bilateral pleural effusions with bilateral basilar atelectasis/infiltrates. 3. Hyperexpansion of the lungs.    LOCATION:  [facility]      Dictated by (CST): Lior Donnelly MD on 7/04/2024 at 10:25 PM     Finalized by (CST): Lior Donnelly MD on 7/04/2024 at 10:27 PM       XR CHEST AP PORTABLE  (CPT=71045)    Result Date: 7/3/2024  CONCLUSION:  No significant change since prior examination.   LOCATION:  [facility]      Dictated by (CST): Lior Donnelly MD on 7/03/2024 at 11:00 AM     Finalized by (CST): Lior Donnelly MD on 7/03/2024 at 11:01 AM       US VENOUS DOPPLER ARM BILAT - DIAG IMG (CPT=93970)    Result Date: 7/1/2024  CONCLUSION:  Superficial venous thrombosis involving the distal left cephalic vein.  No evidence of deep venous thrombosis in the bilateral upper extremities.  LOCATION:  [id number]    Dictated by (CST): Von Toussaint MD on 7/01/2024 at 2:29 PM     Finalized by (CST): Von Toussaint  MD on 7/01/2024 at 2:31 PM       CT CHEST(CONTRAST ONLY) (CPT=71260)    Result Date: 7/1/2024  CONCLUSION:  There is now a small right pleural effusion and in the event smaller left pleural effusion.  These were not present on the prior CT.  Increase in the size of a masslike area of tissue density in the left midlung laterally adjacent to chronic  pleural calcification.  This could reflect a zone of increasing chronic rounded atelectasis, with neoplasm within the differential.  Redemonstration extensive severe chronic lung disease.  Nonspecific adenopathy right hilum and mediastinum.  Dilated pulmonary artery could reflect chronic pulmonary hypertension, particularly in the setting of extensive chronic severe lung disease.   LOCATION:  [id number]   Dictated by (CST): Severino Roque MD on 7/01/2024 at 10:31 AM     Finalized by (CST): Severino Roque MD on 7/01/2024 at 10:38 AM       XR CHEST AP PORTABLE  (CPT=71045)    Result Date: 6/29/2024  CONCLUSION:  Findings are unchanged relative to the previous study.  Please see further details above.   LOCATION:  [facility]      Dictated by (CST): Marielena Martines DO on 6/29/2024 at 11:32 AM     Finalized by (CST): Marielena Martines DO on 6/29/2024 at 11:38 AM       XR CHEST AP PORTABLE  (CPT=71045)    Result Date: 6/11/2024  CONCLUSION:  1. No significant interval changes, with stable diffuse emphysematous changes again noted. 2. Stable linear regions of scarring radiating from a focus of pleural thickening and calcified plaque along the mid aspect of the left lateral pleural margin. 3. No evidence of an acute process.   LOCATION:  [id number]      Dictated by (CST): Marielena Martines DO on 6/11/2024 at 4:43 PM     Finalized by (CST): Marielena Martines DO on 6/11/2024 at 4:46 PM          Assessment/Plan:  #1. Acute on chronic hypercapnic and hypoxic respiratory failure  Multifactorial due to afib with RVR, CHF exacerbation with underlying end stage COPD  7/1 CTA with fluid overload, no  PE  Treat/control afib and CHF as per cardiology  Continue on scheduled atrovent nebs  Continue on oral pred taper  Wean o2 for goal saturations of 88-92% given hypercapnia. His previous o2 baseline was: 2L at rest, 4L on exertion; using and benefiting from portable oxygen concentrator (3 at rest, 6 on exertion)  Noted issues with affording AVAPS at home. Now transitioning to BIPAP    #2. Acute CHF exacerbation, afib with RVR  Rate control and diurese as per cardiology  Previous cardioversion 6/14/24 and ablation 7/12/2024  Now with recurrent afib 7/14, s/p DCCV 7/19     #3. COPD  Severe obstruction on previous PFTs 3/2021  off arformoterol given worsening afib/tachycardia  Will continue to hold breo   Continue on scheduled atrovent nebs   He can resume levalbuterol as outpatient as hospital does not stock it  He cannot tolerate albuterol due to palpitations, tremors and with afib with RVR; off duonebs  Complete oral pred taper  Sputum cx with ESBL klebsiella with sensitivities to cipro, gent, levoflox, meropenem and trim/sulfa. Agree with meropenem day 5 while hospitalized, but can plan on PO cipro or levofloxacin on discharge  Continue roflumilast  Previously reviewed BLVR - not candidate at this time, reassess as outpatient     #4. Pulmonary nodules  7/2017 CT with worsening peripheral lingular atelectasis. Nodules stable   5/2018 CT stable nodules  12/2020 CT stable  7/2021 CT stable but worsening mediastinal LAD suspect reactive to recent cardiac surgery  1/2022 CT with stable nodules, improved LAD  3/2023 CT chest with several small nodules  9/2023 CT chest with stable findings   7/1/2024 CT chest with worsening ALYSON lesion, possible scarring, rounded atelectasis vs neoplasm  Will need outpatient follow up including PET/CT    #5. Tobacco abuse  30+ pack years, active pipe smoker  Hold on LDCT given recent CT with worsening ALYSON lesion, will need PET/CT as above    Dispo:  Floor   No objection to discharge from  pulmonary standpoint with outpatient follow up    Stefano Randle MD

## 2024-07-20 NOTE — PLAN OF CARE
Assumed patient at 1930. Alert and oriented x 4 on 4L nasal canula AVAPS at night. Lung sounds diminished bilaterally. Normal sinus on tele. Continent of bowel and bladder. Up standby assist. Patient updated on plan of care and verbalized understanding.     Plan of care: PO steroids, IV lasix BID, q8 IV merrem, PO amio.     Problem: RESPIRATORY - ADULT  Goal: Achieves optimal ventilation and oxygenation  Description: INTERVENTIONS:  - Assess for changes in respiratory status  - Assess for changes in mentation and behavior  - Position to facilitate oxygenation and minimize respiratory effort  - Oxygen supplementation based on oxygen saturation or ABGs  - Provide Smoking Cessation handout, if applicable  - Encourage broncho-pulmonary hygiene including cough, deep breathe, Incentive Spirometry  - Assess the need for suctioning and perform as needed  - Assess and instruct to report SOB or any respiratory difficulty  - Respiratory Therapy support as indicated  - Manage/alleviate anxiety  - Monitor for signs/symptoms of CO2 retention  Outcome: Progressing     Problem: CARDIOVASCULAR - ADULT  Goal: Maintains optimal cardiac output and hemodynamic stability  Description: INTERVENTIONS:  - Monitor vital signs, rhythm, and trends  - Monitor for bleeding, hypotension and signs of decreased cardiac output  - Evaluate effectiveness of vasoactive medications to optimize hemodynamic stability  - Monitor arterial and/or venous puncture sites for bleeding and/or hematoma  - Assess quality of pulses, skin color and temperature  - Assess for signs of decreased coronary artery perfusion - ex. Angina  - Evaluate fluid balance, assess for edema, trend weights  Outcome: Progressing  Goal: Absence of cardiac arrhythmias or at baseline  Description: INTERVENTIONS:  - Continuous cardiac monitoring, monitor vital signs, obtain 12 lead EKG if indicated  - Evaluate effectiveness of antiarrhythmic and heart rate control medications as ordered  -  Initiate emergency measures for life threatening arrhythmias  - Monitor electrolytes and administer replacement therapy as ordered  Outcome: Progressing     Problem: SAFETY ADULT - FALL  Goal: Free from fall injury  Description: INTERVENTIONS:  - Assess pt frequently for physical needs  - Identify cognitive and physical deficits and behaviors that affect risk of falls.  - [place] fall precautions as indicated by assessment.  - Educate pt/family on patient safety including physical limitations  - Instruct pt to call for assistance with activity based on assessment  - Modify environment to reduce risk of injury  - Provide assistive devices as appropriate  - Consider OT/PT consult to assist with strengthening/mobility  - Encourage toileting schedule  Outcome: Progressing     Problem: PAIN - ADULT  Goal: Verbalizes/displays adequate comfort level or patient's stated pain goal  Description: INTERVENTIONS:  - Encourage pt to monitor pain and request assistance  - Assess pain using appropriate pain scale  - Administer analgesics based on type and severity of pain and evaluate response  - Implement non-pharmacological measures as appropriate and evaluate response  - Consider cultural and social influences on pain and pain management  - Manage/alleviate anxiety  - Utilize distraction and/or relaxation techniques  - Monitor for opioid side effects  - Notify MD/LIP if interventions unsuccessful or patient reports new pain  - Anticipate increased pain with activity and pre-medicate as appropriate  Outcome: Progressing     Problem: Patient/Family Goals  Goal: Patient/Family Long Term Goal  Description: Patient's Long Term Goal: to go home    Interventions:  - O2, bipap, IV steroids, diuretics, labs, increase activity    - See additional Care Plan goals for specific interventions  Outcome: Progressing  Goal: Patient/Family Short Term Goal  Description: Patient's Short Term Goal: feel better    Interventions:   - - O2, bipap, IV  steroids, diuretics, labs, increase activity    - See additional Care Plan goals for specific interventions  Outcome: Progressing

## 2024-07-20 NOTE — PROGRESS NOTES
[facility]  Progress Note    Keny Marshall Patient Status:  Inpatient    1960 MRN [id number]   [facility] 8NE-A Attending Alfredo Garcia MD   Hosp Day # 21 PCP Dustin Avina MD       Impression;  Aflutter-  adv COPD/exacerbation. s/p failed recent CV --> A flutter ablation 24 ([place]). AF began 24 - rate controlled on low dose diltiazem gtt. Now s/p DCCV and back in NSR.  SOB - 2/2 acute dCHF and COPD exac- on usual home O2 at 4.5 L  Acute diastolic CHF - mild volume overload on exam. Net negative 11L, wt 168->156 lbs.   S/p bio AVR   TTE (24): LVEF 55%, +DD, 23mm SAVR 3.3m/s, mean 22mmHg.  Leukocytosis - steroid related     Plan:  AF: S/P DCCV yesterday.   - Decrease amio to 200mg BID x 2 weeks then 200mg daily   - AC: rivaroxaban, continue.  Diuresis: one more dose of IV diuretic this AM. resume home maintenance diuretics - increase to 40mg PO tomorrow.   severe COPD/end-stage emphysema- chronic CO2 retention; prednisone, inhaler therapy, O2 per pulmonary    Stable for discharge from CV standpoint with close outpatient follow-up.    Subjective:  No CP. SOB stable. Edema improved.     Objective:  /69 (BP Location: Left arm)   Pulse 63   Temp 98 °F (36.7 °C) (Oral)   Resp 16   Wt 151 lb 14.4 oz (68.9 kg)   SpO2 100%   BMI 21.79 kg/m²   Temp (24hrs), Av.3 °F (36.8 °C), Min:98 °F (36.7 °C), Max:98.6 °F (37 °C)      Intake/Output Summary (Last 24 hours) at 2024 0703  Last data filed at 2024 0433  Gross per 24 hour   Intake --   Output 2150 ml   Net -2150 ml     Wt Readings from Last 3 Encounters:   24 151 lb 14.4 oz (68.9 kg)   24 159 lb 6.3 oz (72.3 kg)   23 172 lb (78 kg)       General: Awake and alert; in no acute distress  Cardiac: irregularly irregular ; no murmurs/rubs/gallops are appreciated  Lungs: Coarse on auscultation bilaterally; no accessory muscle use  Abdomen: Soft, non-tender; bowel sounds are  normoactive  Extremities: No clubbing/cyanosis; moves all 4 extremities, warm, Trace LE edema    Current Facility-Administered Medications   Medication Dose Route Frequency    insulin degludec (Tresiba) 100 units/mL flextouch 6 Units  6 Units Subcutaneous Nightly    meropenem (Merrem) 500 mg in sodium chloride 0.9% 100 mL IVPB-MBP  500 mg Intravenous Q8H    predniSONE (Deltasone) tab 40 mg  40 mg Oral Daily with breakfast    amiodarone (Pacerone) tab 400 mg  400 mg Oral TID    furosemide (Lasix) 10 mg/mL injection 40 mg  40 mg Intravenous BID (Diuretic)    dilTIAZem (cardIZEM) 100 mg in sodium chloride 0.9% 100 mL IVPB-ADDV  2.5-20 mg/hr Intravenous Continuous    acetaminophen (Tylenol) tab 650 mg  650 mg Oral Q6H PRN    lidocaine-menthol 4-1 % patch 1 patch  1 patch Transdermal Daily PRN    ipratropium (Atrovent) 0.02 % nebulizer solution 500 mcg  500 mcg Nebulization Q6H WA    [Held by provider] furosemide (Lasix) tab 20 mg  20 mg Oral Daily    metoprolol (Lopressor) 5 mg/5mL injection 5 mg  5 mg Intravenous Q6H PRN    selenium sulfide (Selsun) 2.5 % shampoo   Topical Daily PRN    aspirin DR tab 81 mg  81 mg Oral Daily    atorvastatin (Lipitor) tab 20 mg  20 mg Oral Nightly    guaiFENesin ER (Mucinex) 12 hr tab 1,200 mg  1,200 mg Oral BID    levETIRAcetam (Keppra) tab 500 mg  500 mg Oral BID    Roflumilast TABS 500 mcg  500 mcg Oral Daily    rivaroxaban (Xarelto) tab 20 mg  20 mg Oral Daily with food    glucose (Dex4) 15 GM/59ML oral liquid 15 g  15 g Oral Q15 Min PRN    Or    glucose (Glutose) 40% oral gel 15 g  15 g Oral Q15 Min PRN    Or    glucose-vitamin C (Dex-4) chewable tab 4 tablet  4 tablet Oral Q15 Min PRN    Or    dextrose 50% injection 50 mL  50 mL Intravenous Q15 Min PRN    Or    glucose (Dex4) 15 GM/59ML oral liquid 30 g  30 g Oral Q15 Min PRN    Or    glucose (Glutose) 40% oral gel 30 g  30 g Oral Q15 Min PRN    Or    glucose-vitamin C (Dex-4) chewable tab 8 tablet  8 tablet Oral Q15 Min PRN     insulin aspart (NovoLOG) 100 Units/mL FlexPen 1-10 Units  1-10 Units Subcutaneous TID AC and HS    melatonin tab 3 mg  3 mg Oral Nightly PRN    acetaminophen (Tylenol Extra Strength) tab 500 mg  500 mg Oral Q4H PRN    polyethylene glycol (PEG 3350) (Miralax) 17 g oral packet 17 g  17 g Oral Daily PRN    sennosides (Senokot) tab 17.2 mg  17.2 mg Oral Nightly PRN    bisacodyl (Dulcolax) 10 MG rectal suppository 10 mg  10 mg Rectal Daily PRN    fleet enema (Fleet) 7-19 GM/118ML rectal enema 133 mL  1 enema Rectal Once PRN    prochlorperazine (Compazine) 10 MG/2ML injection 5 mg  5 mg Intravenous Q8H PRN    fluticasone furoate-vilanterol (Breo Ellipta) 200-25 MCG/ACT inhaler 1 puff  1 puff Inhalation Daily       Laboratory Data:  Lab Results   Component Value Date    WBC 14.3 07/20/2024    HGB 11.1 07/20/2024    HCT 34.6 07/20/2024    .0 07/20/2024           Lab Results   Component Value Date    INR 1.59 (H) 06/29/2024    INR 1.06 06/11/2024    INR 2.26 (H) 09/22/2021     Lab Results   Component Value Date     07/20/2024    K 4.2 07/20/2024    CL 90 07/20/2024    CO2 >40.0 07/20/2024    BUN 16 07/20/2024    CREATSERUM 0.51 07/20/2024    GLU 83 07/20/2024    CA 8.7 07/20/2024         Telemetry: AF 80s    Sunita Zepeda, APRN  7/20/2024  7:35 AM      Thank you for allowing our practice to participate in the care of your patient. Please do not hesitate to contact me if you have any questions.

## 2024-07-20 NOTE — PLAN OF CARE
NURSING DISCHARGE NOTE    Discharged Home via Wheelchair.  Accompanied by Support staff  Belongings Taken by patient/family.  Patient discharged home today.All discharge instructions reviewed with the patient.All follow up appointment reviwed.Patient and spouse verbalized understanding.

## 2024-07-20 NOTE — PLAN OF CARE
Patient AXOX4.On 4l/NC.NSR on tele.No c/o pain.Remains on IV antibiotics.IV diuretics given.Plan of care updated.Isolation precautions maintained.Call light within reach.  Problem: RESPIRATORY - ADULT  Goal: Achieves optimal ventilation and oxygenation  Description: INTERVENTIONS:  - Assess for changes in respiratory status  - Assess for changes in mentation and behavior  - Position to facilitate oxygenation and minimize respiratory effort  - Oxygen supplementation based on oxygen saturation or ABGs  - Provide Smoking Cessation handout, if applicable  - Encourage broncho-pulmonary hygiene including cough, deep breathe, Incentive Spirometry  - Assess the need for suctioning and perform as needed  - Assess and instruct to report SOB or any respiratory difficulty  - Respiratory Therapy support as indicated  - Manage/alleviate anxiety  - Monitor for signs/symptoms of CO2 retention  Outcome: Progressing     Problem: CARDIOVASCULAR - ADULT  Goal: Maintains optimal cardiac output and hemodynamic stability  Description: INTERVENTIONS:  - Monitor vital signs, rhythm, and trends  - Monitor for bleeding, hypotension and signs of decreased cardiac output  - Evaluate effectiveness of vasoactive medications to optimize hemodynamic stability  - Monitor arterial and/or venous puncture sites for bleeding and/or hematoma  - Assess quality of pulses, skin color and temperature  - Assess for signs of decreased coronary artery perfusion - ex. Angina  - Evaluate fluid balance, assess for edema, trend weights  Outcome: Progressing  Goal: Absence of cardiac arrhythmias or at baseline  Description: INTERVENTIONS:  - Continuous cardiac monitoring, monitor vital signs, obtain 12 lead EKG if indicated  - Evaluate effectiveness of antiarrhythmic and heart rate control medications as ordered  - Initiate emergency measures for life threatening arrhythmias  - Monitor electrolytes and administer replacement therapy as ordered  Outcome:  Progressing     Problem: Patient/Family Goals  Goal: Patient/Family Long Term Goal  Description: Patient's Long Term Goal: to go home    Interventions:  - O2, bipap, IV steroids, diuretics, labs, increase activity    - See additional Care Plan goals for specific interventions  Outcome: Progressing  Goal: Patient/Family Short Term Goal  Description: Patient's Short Term Goal: feel better    Interventions:   - - O2, bipap, IV steroids, diuretics, labs, increase activity    - See additional Care Plan goals for specific interventions  Outcome: Progressing     Problem: PAIN - ADULT  Goal: Verbalizes/displays adequate comfort level or patient's stated pain goal  Description: INTERVENTIONS:  - Encourage pt to monitor pain and request assistance  - Assess pain using appropriate pain scale  - Administer analgesics based on type and severity of pain and evaluate response  - Implement non-pharmacological measures as appropriate and evaluate response  - Consider cultural and social influences on pain and pain management  - Manage/alleviate anxiety  - Utilize distraction and/or relaxation techniques  - Monitor for opioid side effects  - Notify MD/LIP if interventions unsuccessful or patient reports new pain  - Anticipate increased pain with activity and pre-medicate as appropriate  Outcome: Progressing     Problem: Patient/Family Goals  Goal: Patient/Family Long Term Goal  Description: Patient's Long Term Goal: to go home    Interventions:  - O2, bipap, IV steroids, diuretics, labs, increase activity    - See additional Care Plan goals for specific interventions  Outcome: Progressing  Goal: Patient/Family Short Term Goal  Description: Patient's Short Term Goal: feel better    Interventions:   - - O2, bipap, IV steroids, diuretics, labs, increase activity    - See additional Care Plan goals for specific interventions  Outcome: Progressing

## 2024-07-20 NOTE — DISCHARGE SUMMARY
[facility]     Keny Marshall Patient Status:  Inpatient    1960 MRN [id number]   Location [facility] 8NE-A Attending Alfredo Garcia MD   Hosp Day # 21 PCP Dustin Avina MD     Date of Admission: 2024  Date of Discharge:   2024    Discharge Disposition: Home Health Care Services    Discharge Diagnosis:    #Acute on chronic HFpEF   #Aflutter s/p failed recent CV   #Afib with RVR   #Acute on chronic hypoxic respiratory failure 2/2 CHF/COPD  #Steroid induced hyperglycemia   #ESBL Klebsiella pneumonia    History of Present Illness: Keny Marshall is a 63 year old male with a PMH of chronic hypoxic and hypercapnic respiratory failure, aflutter with prior ablation, COPD, seizure disorder, and DMII presented to ED due to palpitations that have been worsening for the past 2 days. History obtained from family member at bedside as patient currently on BiPAP. Patient has been increasingly SOB over the past few days as well. No chest pain. Mild non-productive cough. Denies fever/chills.     Brief Synopsis:   Patient presented with shortness of breath.  He was found to have acute on chronic hypoxic respiratory failure due to multiple conditions.  He was treated for acute on chronic heart failure with preserved EF, acute severe COPD exacerbation, ESBL Klebsiella pneumonia, atrial fibrillation status post cardioversion, atrial flutter status post ablation.  Patient will be discharged home, continue course of antibiotics for Klebsiella, continue weaning from steroids, now on amiodarone for heart rate control.  Patient doing well today, all questions addressed prior to discharge, he is agreeable to plan of care as stated.  He is encouraged to return to the ER if symptoms come back or worsen.  Patient to follow-up with PCP after discharge.    Lace+ Score: 84  59-90 High Risk  29-58 Medium Risk  0-28   Low Risk       TCM Follow-Up Recommendation:  LACE > 58: High Risk of readmission  after discharge from the hospital.      Procedures during hospitalization:   Ablation 7.12  Cardioversion 7.19    Lab/Test results pending at Discharge:   SP in 1 week    Consultants:  Cardiology, EP, Pulmonology     Discharge Medication List:     Discharge Medications        START taking these medications        Instructions Prescription details   amiodarone 200 MG Tabs  Commonly known as: Pacerone      Take one table twice daily x 2 weeks then take one tablet daily thereafter   Quantity: 90 tablet  Refills: 0     furosemide 40 MG Tabs  Commonly known as: Lasix  Start taking on: July 21, 2024      Take 1 tablet (40 mg total) by mouth daily.   Quantity: 90 tablet  Refills: 0     sulfamethoxazole-trimethoprim -160 MG Tabs per tablet  Commonly known as: Bactrim DS      Take 1 tablet by mouth 2 (two) times daily for 5 days.   Stop taking on: July 25, 2024  Quantity: 10 tablet  Refills: 0            CHANGE how you take these medications        Instructions Prescription details   predniSONE 10 MG Tabs  Commonly known as: Deltasone  What changed: additional instructions      Take 3 tablets daily for 3 days then 2 tablets daily for 3 days then 1 tablets daily for 3 days then stop.   Quantity: 30 tablet  Refills: 0            CONTINUE taking these medications        Instructions Prescription details   aspirin 81 MG Tbec      Take 1 tablet (81 mg total) by mouth daily.   Refills: 0     atorvastatin 20 MG Tabs  Commonly known as: Lipitor      Take 1 tablet (20 mg total) by mouth nightly.   Refills: 0     B Complete Tabs      Take 1 tablet by mouth daily.   Refills: 0     Budesonide-Formoterol Fumarate 160-4.5 MCG/ACT Aero  Commonly known as: SYMBICORT      Inhale 2 puffs into the lungs 2 (two) times daily.   Refills: 0     Ferrous Sulfate 324 (65 Fe) MG Tbec      Take 65 mg by mouth daily.   Refills: 0     guaiFENesin  MG Tb12  Commonly known as: Mucinex      Take 2 tablets (1,200 mg total) by mouth 2 (two) times  daily.   Refills: 0     ipratropium 0.02 % Soln  Commonly known as: Atrovent      Take 2.5 mL (500 mcg total) by nebulization every 6 (six) hours as needed for Wheezing.   Refills: 0     Jardiance 10 MG Tabs  Generic drug: empagliflozin       Refills: 0     ketoconazole 2 % Crea  Commonly known as: Nizoral      Apply topically as needed.   Refills: 0     Levalbuterol HCl 0.63 MG/3ML Nebu  Commonly known as: XOPENEX      Take 3 mL (0.63 mg total) by nebulization every 6 (six) hours as needed for Shortness of Breath or Wheezing.   Quantity: 3 each  Refills: 3     levETIRAcetam 500 MG Tabs  Commonly known as: Keppra      Take 1 tablet (500 mg total) by mouth 2 (two) times daily.   Stop taking on: October 18, 2024  Quantity: 180 tablet  Refills: 0     magnesium 250 MG Tabs      Take 1 tablet (250 mg total) by mouth every evening.   Refills: 0     Roflumilast 500 MCG Tabs      Take 500 mcg by mouth daily.   Quantity: 90 tablet  Refills: 0     Tab-A-Arnold Tabs      Take 1 tablet by mouth daily.   Refills: 0     tiotropium 18 MCG Caps  Commonly known as: Spiriva Handihaler      Inhale 1 capsule (18 mcg total) into the lungs daily.   Refills: 0     Xarelto 20 MG Tabs  Generic drug: rivaroxaban      Take 1 tablet by mouth daily with food   Quantity: 30 tablet  Refills: 3            STOP taking these medications      dilTIAZem  MG Cp24  Commonly known as: Cardizem CD        Klor-Con 10 10 MEQ Tbcr  Generic drug: Potassium Chloride ER        torsemide 20 MG Tabs  Commonly known as: Demadex                  Where to Get Your Medications        These medications were sent to [facility] [address] [place] [address] - 200 [facility] [phone], [phone]  [address] [facility] [address]      Phone: [phone]   amiodarone 200 MG Tabs  furosemide 40 MG Tabs  levETIRAcetam 500 MG Tabs  predniSONE 10 MG Tabs  sulfamethoxazole-trimethoprim -160 MG Tabs per tablet         [place]  [place]    Follow-up appointment:   Dustin Avina MD  [address]  [facility] [address]  [phone]    Schedule an appointment as soon as possible for a visit in 1 week(s)      Keny Salazar MD  [address]  [address]  [facility] [address] [phone]    Go on 8/2/2024      Stefano Randle MD  [address]  [facility] [address]  [facility] [id number] [phone]    Schedule an appointment as soon as possible for a visit in 2 week(s)      Appointments for Next 30 Days 7/20/2024 - 8/19/2024        Date Arrival Time Visit Type Length Department Provider     7/29/2024 11:20 AM  [facility] AMB VIDEO VISIT [7500] [facility]    Patient Instructions:     Please verify your telehealth insurance benefits prior to your appointment.    You must be in the [facility] during the virtual visit.     Please use the "1,2,3 Listo" Mobile Kenneth and launch the video visit 10 minutes prior to your scheduled appointment time to ensure your camera and microphone are working properly. Once the video visit has started you will be placed in a waiting room until the provider begins the visit.     You will receive an email confirmation with instructions.  If you have questions, call your doctor's office directly.    If you are having issues or need to use a desktop/laptop, please follow the below steps:        1.       Close out all other open apps (could be competing for audio resources)  2.       Disable Bluetooth  3.       Reboot mobile device before joining the video  4.       Come off Wi-Fi and switch over to Data    Please see our Video Visit Tip Sheet if you need additional assistance.     If you believe this is an emergency, please dial 911 immediately.          Location Instructions:     Masks are optional for all patients and visitors, unless otherwise indicated.                      Vital signs:  Temp:  [97.9 °F (36.6 °C)-98.6 °F (37 °C)] 98.3 °F (36.8  °C)  Pulse:  [58-88] 77  Resp:  [16-23] 20  BP: (122-141)/(65-76) 141/72  SpO2:  [93 %-100 %] 94 %    Physical Exam:    General: No acute distress, pleasant    Lungs: diminished BS  Cardiovascular: S1, S2, RRR  Abdomen: Soft, NT, ND    -----------------------------------------------------------------------------------------------  PATIENT DISCHARGE INSTRUCTIONS: See electronic chart    Alfredo Garcia MD    Total time spent on discharge plannin minutes     The  Cures Act makes medical notes like these available to patients in the interest of transparency. Please be advised this is a medical document. Medical documents are intended to carry relevant information, facts as evident, and the clinical opinion of the practitioner. The medical note is intended as peer to peer communication and may appear blunt or direct. It is written in medical language and may contain abbreviations or verbiage that are unfamiliar.

## 2024-07-22 LAB
ATRIAL RATE: 61 BPM
P AXIS: 80 DEGREES
P-R INTERVAL: 190 MS
Q-T INTERVAL: 534 MS
QRS DURATION: 178 MS
QTC CALCULATION (BEZET): 537 MS
R AXIS: 97 DEGREES
T AXIS: 113 DEGREES
VENTRICULAR RATE: 61 BPM

## 2024-07-23 NOTE — PAYOR COMM NOTE
--------------  DISCHARGE REVIEW    Payor: [employer] MEDICARE ADV PPO  Subscriber #:  [id number]  Authorization Number: [id number]    Admit date: 24  Admit time:   2:08 PM  Discharge Date: 2024  6:27 PM     Admitting Physician: Perfecto Pace DO  Attending Physician:  No att. providers found  Primary Care Physician: Dustin Avina MD          Discharge Summary Notes        Discharge Summary signed by Alfredo Garcia MD at 2024  6:21 PM       Author: Alfredo Garcia MD Specialty: HOSPITALIST Author Type: Physician    Filed: 2024  6:21 PM Date of Service: 2024  6:02 PM Status: Signed    : Alfredo Garcia MD (Physician)           [facility]     Keny Marshall Patient Status:  Inpatient    1960 MRN [id number]   Location [address] Attending Alfredo Garcia MD   Hosp Day # 21 PCP Dustin Avina MD     Date of Admission: 2024  Date of Discharge:   2024    Discharge Disposition: Home Health Care Services    Discharge Diagnosis:    #Acute on chronic HFpEF   #Aflutter s/p failed recent CV   #Afib with RVR   #Acute on chronic hypoxic respiratory failure 2/2 CHF/COPD  #Steroid induced hyperglycemia   #ESBL Klebsiella pneumonia    History of Present Illness: Keny Marshall is a 63 year old male with a PMH of chronic hypoxic and hypercapnic respiratory failure, aflutter with prior ablation, COPD, seizure disorder, and DMII presented to ED due to palpitations that have been worsening for the past 2 days. History obtained from family member at bedside as patient currently on BiPAP. Patient has been increasingly SOB over the past few days as well. No chest pain. Mild non-productive cough. Denies fever/chills.     Brief Synopsis:   Patient presented with shortness of breath.  He was found to have acute on chronic hypoxic respiratory failure due to multiple conditions.  He was treated for acute on chronic heart failure with preserved EF, acute severe COPD  exacerbation, ESBL Klebsiella pneumonia, atrial fibrillation status post cardioversion, atrial flutter status post ablation.  Patient will be discharged home, continue course of antibiotics for Klebsiella, continue weaning from steroids, now on amiodarone for heart rate control.  Patient doing well today, all questions addressed prior to discharge, he is agreeable to plan of care as stated.  He is encouraged to return to the ER if symptoms come back or worsen.  Patient to follow-up with PCP after discharge.    Lace+ Score: 84  59-90 High Risk  29-58 Medium Risk  0-28   Low Risk       TCM Follow-Up Recommendation:  LACE > 58: High Risk of readmission after discharge from the hospital.      Procedures during hospitalization:   Ablation 7.12  Cardioversion 7.19    Lab/Test results pending at Discharge:   BMP in 1 week    Consultants:  Cardiology, EP, Pulmonology     Discharge Medication List:     Discharge Medications        START taking these medications        Instructions Prescription details   amiodarone 200 MG Tabs  Commonly known as: Pacerone      Take one table twice daily x 2 weeks then take one tablet daily thereafter   Quantity: 90 tablet  Refills: 0     furosemide 40 MG Tabs  Commonly known as: Lasix  Start taking on: July 21, 2024      Take 1 tablet (40 mg total) by mouth daily.   Quantity: 90 tablet  Refills: 0     sulfamethoxazole-trimethoprim -160 MG Tabs per tablet  Commonly known as: Bactrim DS      Take 1 tablet by mouth 2 (two) times daily for 5 days.   Stop taking on: July 25, 2024  Quantity: 10 tablet  Refills: 0            CHANGE how you take these medications        Instructions Prescription details   predniSONE 10 MG Tabs  Commonly known as: Deltasone  What changed: additional instructions      Take 3 tablets daily for 3 days then 2 tablets daily for 3 days then 1 tablets daily for 3 days then stop.   Quantity: 30 tablet  Refills: 0            CONTINUE taking these medications         Instructions Prescription details   aspirin 81 MG Tbec      Take 1 tablet (81 mg total) by mouth daily.   Refills: 0     atorvastatin 20 MG Tabs  Commonly known as: Lipitor      Take 1 tablet (20 mg total) by mouth nightly.   Refills: 0     B Complete Tabs      Take 1 tablet by mouth daily.   Refills: 0     Budesonide-Formoterol Fumarate 160-4.5 MCG/ACT Aero  Commonly known as: SYMBICORT      Inhale 2 puffs into the lungs 2 (two) times daily.   Refills: 0     Ferrous Sulfate 324 (65 Fe) MG Tbec      Take 65 mg by mouth daily.   Refills: 0     guaiFENesin  MG Tb12  Commonly known as: Mucinex      Take 2 tablets (1,200 mg total) by mouth 2 (two) times daily.   Refills: 0     ipratropium 0.02 % Soln  Commonly known as: Atrovent      Take 2.5 mL (500 mcg total) by nebulization every 6 (six) hours as needed for Wheezing.   Refills: 0     Jardiance 10 MG Tabs  Generic drug: empagliflozin       Refills: 0     ketoconazole 2 % Crea  Commonly known as: Nizoral      Apply topically as needed.   Refills: 0     Levalbuterol HCl 0.63 MG/3ML Nebu  Commonly known as: XOPENEX      Take 3 mL (0.63 mg total) by nebulization every 6 (six) hours as needed for Shortness of Breath or Wheezing.   Quantity: 3 each  Refills: 3     levETIRAcetam 500 MG Tabs  Commonly known as: Keppra      Take 1 tablet (500 mg total) by mouth 2 (two) times daily.   Stop taking on: October 18, 2024  Quantity: 180 tablet  Refills: 0     magnesium 250 MG Tabs      Take 1 tablet (250 mg total) by mouth every evening.   Refills: 0     Roflumilast 500 MCG Tabs      Take 500 mcg by mouth daily.   Quantity: 90 tablet  Refills: 0     Tab-A-Arnold Tabs      Take 1 tablet by mouth daily.   Refills: 0     tiotropium 18 MCG Caps  Commonly known as: Spiriva Handihaler      Inhale 1 capsule (18 mcg total) into the lungs daily.   Refills: 0     Xarelto 20 MG Tabs  Generic drug: rivaroxaban      Take 1 tablet by mouth daily with food   Quantity: 30 tablet  Refills: 3             STOP taking these medications      dilTIAZem  MG Cp24  Commonly known as: Cardizem CD        Klor-Con 10 10 MEQ Tbcr  Generic drug: Potassium Chloride ER        torsemide 20 MG Tabs  Commonly known as: Demadex                  Where to Get Your Medications        These medications were sent to [facility] [address], [place] - [address] [facility] [phone], [phone]  [address] [facility] [address]      Phone: [phone]   amiodarone 200 MG Tabs  furosemide 40 MG Tabs  levETIRAcetam 500 MG Tabs  predniSONE 10 MG Tabs  sulfamethoxazole-trimethoprim -160 MG Tabs per tablet         [place]    Follow-up appointment:   Dustin Avina MD  [address] [facility] [address] [phone]    Schedule an appointment as soon as possible for a visit in 1 week(s)      eKny Salazar MD  [address]  [facility] [address]  [facility] [address] [phone]    Go on 8/2/2024      Stefano Randle MD  [address]  [facility] [address]  [facility] [address] [phone]    Schedule an appointment as soon as possible for a visit in 2 week(s)      Appointments for Next 30 Days 7/20/2024 - 8/19/2024        Date Arrival Time Visit Type Length Department Provider     7/29/2024 11:20 AM  [facility] AMB VIDEO VISIT [7500] 20 min [facility] Stefano Randle MD    Patient Instructions:     Please verify your telehealth insurance benefits prior to your appointment.    You must be in the [facility] during the virtual visit.     Please use the [employer] Mobile Kenneth and launch the video visit 10 minutes prior to your scheduled appointment time to ensure your camera and microphone are working properly. Once the video visit has started you will be placed in a waiting room until the provider begins the visit.     You will receive an email confirmation with instructions.  If you have questions, call your doctor's office  directly.    If you are having issues or need to use a desktop/laptop, please follow the below steps:        1.       Close out all other open apps (could be competing for audio resources)  2.       Disable Bluetooth  3.       Reboot mobile device before joining the video  4.       Come off Wi-Fi and switch over to Data    Please see our Video Visit Tip Sheet if you need additional assistance.     If you believe this is an emergency, please dial 911 immediately.          Location Instructions:     Masks are optional for all patients and visitors, unless otherwise indicated.                      Vital signs:  Temp:  [97.9 °F (36.6 °C)-98.6 °F (37 °C)] 98.3 °F (36.8 °C)  Pulse:  [58-88] 77  Resp:  [16-23] 20  BP: (122-141)/(65-76) 141/72  SpO2:  [93 %-100 %] 94 %    Physical Exam:    General: No acute distress, pleasant    Lungs: diminished BS  Cardiovascular: S1, S2, RRR  Abdomen: Soft, NT, ND    -----------------------------------------------------------------------------------------------  PATIENT DISCHARGE INSTRUCTIONS: See electronic chart    Alfredo Garcia MD    Total time spent on discharge plannin minutes     The  [place] Cures Act makes medical notes like these available to patients in the interest of transparency. Please be advised this is a medical document. Medical documents are intended to carry relevant information, facts as evident, and the clinical opinion of the practitioner. The medical note is intended as peer to peer communication and may appear blunt or direct. It is written in medical language and may contain abbreviations or verbiage that are unfamiliar.       Electronically signed by Alfredo Garcia MD on 2024  6:21 PM         REVIEWER COMMENTS

## 2024-07-28 ENCOUNTER — PATIENT MESSAGE (OUTPATIENT)
Facility: CLINIC | Age: 64
End: 2024-07-28

## 2024-07-29 ENCOUNTER — TELEMEDICINE (OUTPATIENT)
Facility: CLINIC | Age: 64
End: 2024-07-29
Payer: MEDICARE

## 2024-07-29 DIAGNOSIS — J44.9 CHRONIC OBSTRUCTIVE PULMONARY DISEASE, UNSPECIFIED COPD TYPE (HCC): Primary | ICD-10-CM

## 2024-07-29 DIAGNOSIS — F17.210 SMOKING GREATER THAN 20 PACK YEARS: ICD-10-CM

## 2024-07-29 DIAGNOSIS — J43.2 CENTRILOBULAR EMPHYSEMA (HCC): ICD-10-CM

## 2024-07-29 DIAGNOSIS — J96.11 CHRONIC HYPOXEMIC RESPIRATORY FAILURE (HCC): ICD-10-CM

## 2024-07-29 DIAGNOSIS — J96.11 CHRONIC RESPIRATORY FAILURE WITH HYPOXIA (HCC): ICD-10-CM

## 2024-07-29 DIAGNOSIS — R91.8 MULTIPLE PULMONARY NODULES: ICD-10-CM

## 2024-07-29 DIAGNOSIS — J96.12 CHRONIC HYPERCAPNIC RESPIRATORY FAILURE (HCC): ICD-10-CM

## 2024-07-29 DIAGNOSIS — Z72.0 TOBACCO ABUSE: ICD-10-CM

## 2024-07-29 DIAGNOSIS — G47.33 OSA (OBSTRUCTIVE SLEEP APNEA): ICD-10-CM

## 2024-07-29 PROCEDURE — 99214 OFFICE O/P EST MOD 30 MIN: CPT | Performed by: INTERNAL MEDICINE

## 2024-07-29 RX ORDER — BUDESONIDE AND FORMOTEROL FUMARATE DIHYDRATE 160; 4.5 UG/1; UG/1
2 AEROSOL RESPIRATORY (INHALATION) 2 TIMES DAILY
Qty: 1 EACH | Refills: 0 | Status: SHIPPED | OUTPATIENT
Start: 2024-07-29 | End: 2024-07-29

## 2024-07-29 RX ORDER — BUDESONIDE AND FORMOTEROL FUMARATE DIHYDRATE 160; 4.5 UG/1; UG/1
2 AEROSOL RESPIRATORY (INHALATION) 2 TIMES DAILY
Qty: 3 EACH | Refills: 3 | Status: SHIPPED | OUTPATIENT
Start: 2024-07-29

## 2024-07-29 NOTE — TELEPHONE ENCOUNTER
Pt last seen by Dr. Randle on 9-26-23 and by Dr. Lorenz on 6-26-24.  .  Pt requesting refill of Symbicort to [facility] in  [place].  Pt has follow up visit scheduled for today.  Refill for Symbicort sent.  [facility] removed from pt's pharmacy per pt's request.

## 2024-07-29 NOTE — TELEPHONE ENCOUNTER
From: Keny Marshall  To: Stefano Randle  Sent: 7/28/2024 1:29 PM CDT  Subject: Pharmacy     Dr Randle please take [facility] off as a pharmacy, we no longer use them. All medications should be sent to [facility] pharmacy in [place]. I am also completely out of an Inhaler. Can i have a new prescription for Symbicort to [facility]?    Thank you  Keny Marshall

## 2024-07-29 NOTE — PATIENT INSTRUCTIONS
Continue the symbicort two puffs in the morning and again in evening. Rinse your mouth out  Continue to use ipratropium and levalbuterol nebulizers every 6 hours    Start budesonide nebulizer twice a day - rinse your mouth out after using  Use the BIPAP machine with sleep/naps  You can use the hypertonic saline nebulizer every 6 hours as needed for thick phlegm

## 2024-07-29 NOTE — PROGRESS NOTES
[facility] General Pulmonary Progress Note    History of Present Illness:  Keny Marshall is a 63 year old male  smoker with significant PMH severe COPD, chronic hypoxic respiratory failure on supplemental o2, severe aortic stenosis, CHF, hx RCCA s/p left nephrectomy 2014 who presents today for follow up. Since last visit he has been hospitalized several times in the last couples due to COPD exacerbation, afib with RVR and CHF exacerbation.   He was discharged 1.5 weeks from [facility] after cardioversion but then after 2-3 days was admitted to [facility] - noted elevated PCO2 of ~90 - improved after BIPAP.   He was discharged on 7/24.  Since his discharge he continues to have dyspnea on exertion and +productive coughing with thin clear phlegm.   Using atrovent TID, levalbuterol nebs at home    September 2023 previously  He denies acute concerns today, stable chronic ARCEO. Continued thick phlegm.no fevers. No pain     March 2023 previously  He denies new concerns, had his CT recently and concerned about results. Using inhaler and nebs, breathing is stable. Has thick phlegm, no blood. no fevers. No pain     previously  The patient has followed with me the last several years for his COPD and is on maximal therapy including inhalers and nebulizers. He does admit to continued smoking and is aware of recommendation for smoking cessation.   He denies change in chronic dyspnea on exertion. Has chronic cough and phlegm, no blood. No fevers. No pain  Has been monitoring his weight and following with [facility]  Hx ABIGAIL and intolerant of CPAP    Past Medical History:   Past Medical History:    Aortic stenosis    Arrhythmia    hx of a-fib    Arthritis    Cancer (HCC)    LEFT RENAL     Chronic hypoxemic respiratory failure (HCC)    On 4 LPM/NC at rest 24 hours/ day but 6 LPM/NC on exertion    COPD    Pulmonary follow up with Dr. Crum -no O2    Depression    Difficult intubation    patient states history of difficult intubation   due to  body weight.States this no longer issue due to sugnificant weight loss    Esophageal reflux    Eye disease    Fatigue    Fever, unknown origin    Heart murmur    Heart palpitations    High blood pressure    High cholesterol    Loss of appetite    Other and unspecified hyperlipidemia    Paroxysmal atrial fibrillation (HCC)    Pneumonia, organism unspecified(486)    Prediabetes    Renal cell carcinoma (HCC)    s/p left nephrectomy    Shortness of breath    uses oxygen 3-4 L/NC all the time    Unspecified essential hypertension    Unspecified sleep apnea    He was unable to tolerate CPAP/BiPAP    Visual impairment    reading glasses        Past Surgical History:   Past Surgical History:   Procedure Laterality Date    Adenoidectomy      Angiogram      Appendectomy      Appendectomy      Colonoscopy N/A 03/21/2016    Procedure: COLONOSCOPY;  Surgeon: Artur Okeefe MD;  Location:  ENDOSCOPY    Colonoscopy      Colonoscopy N/A 1/5/2023    Procedure: .;  Surgeon: Artur Okeefe MD;  Location:  ENDOSCOPY    Endovas repair, infrarenl abdom aortic aneurysm/dissect      Laparo radical nephrectomy Left 05/06/2014    Nephrectomy Left 2014    Other  meatus of urethra    Other Right     foreign body removal-arm    Other surgical history Right 03/25/2016    Procedure: KNEE ARTHROSCOPY;  Surgeon: Madhu Anaya MD;  Location:  MAIN OR    Repair rotator cuff,acute Right     Upper gi endoscopy,exam      Valve repair  2020         Family Medical History:   Family History   Problem Relation Age of Onset    Heart Disorder Mother     Pacemaker Mother     Other (Atrial fibrillation) Father     Other (Lung cancer) Maternal Aunt         Social History:   Social History     Socioeconomic History    Marital status:      Spouse name: Not on file    Number of children: Not on file    Years of education: Not on file    Highest education level: Not on file   Occupational History    Occupation:      Comment: Local  [employer]   Tobacco Use    Smoking status: Former     Current packs/day: 0.00     Average packs/day: 1.5 packs/day for 40.0 years (60.0 ttl pk-yrs)     Types: Cigarettes     Start date: 3/8/1983     Quit date: 3/8/2023     Years since quittin.3     Passive exposure: Past    Smokeless tobacco: Never   Vaping Use    Vaping status: Never Used   Substance and Sexual Activity    Alcohol use: Not Currently     Alcohol/week: 14.0 standard drinks of alcohol     Types: 12 Cans of beer, 2 Shots of liquor per week     Comment: 14/week    Drug use: Never    Sexual activity: Yes     Partners: Female   Other Topics Concern    Not on file   Social History Narrative    Not on file     Social Determinants of Health     Financial Resource Strain: Not on file   Food Insecurity: Low Risk  (2024)    [facility]     Within the past 12 months, the food you bought just didn't last and you didn't have money to get more.: 3     Within the past 12 months, you worried that your food would run out before you got money to buy more.: 3   Transportation Needs: Not At Risk (2024)    [facility] Transportation Needs     In the past 12 months, has lack of reliable transportation kept you from medical appointments, meetings, work or from getting things needed for daily living?: No   Physical Activity: Not on file   Stress: Not on file   Social Connections: Feeling Socially Integrated (2024)    Received from [facility]    OASIS : Social Isolation     Frequency of experiencing loneliness or isolation: Rarely   Housing Stability: Not At Risk (2024)    [facility] Housing     What is your living situation today?: I have a steady place to live     Think about the place you live. Do you have problems with any of the following?: None of the above        Medications:   Current Outpatient Medications   Medication Sig Dispense Refill     Budesonide-Formoterol Fumarate 160-4.5 MCG/ACT Inhalation Aerosol Inhale 2 puffs into the lungs 2 (two) times daily. 1 each 0    levETIRAcetam 500 MG Oral Tab Take 1 tablet (500 mg total) by mouth 2 (two) times daily. 180 tablet 0    predniSONE 10 MG Oral Tab Take 3 tablets daily for 3 days then 2 tablets daily for 3 days then 1 tablets daily for 3 days then stop. 30 tablet 0    amiodarone 200 MG Oral Tab Take one table twice daily x 2 weeks then take one tablet daily thereafter 90 tablet 0    furosemide 40 MG Oral Tab Take 1 tablet (40 mg total) by mouth daily. 90 tablet 0    Roflumilast 500 MCG Oral Tab Take 500 mcg by mouth daily. 90 tablet 0    guaiFENesin  MG Oral Tablet 12 Hr Take 2 tablets (1,200 mg total) by mouth 2 (two) times daily.      Ferrous Sulfate 324 (65 Fe) MG Oral Tab EC Take 65 mg by mouth daily.      tiotropium 18 MCG Inhalation Cap Inhale 1 capsule (18 mcg total) into the lungs daily.      Levalbuterol HCl 0.63 MG/3ML Inhalation Nebu Soln Take 3 mL (0.63 mg total) by nebulization every 6 (six) hours as needed for Shortness of Breath or Wheezing. 3 each 3    JARDIANCE 10 MG Oral Tab       atorvastatin 20 MG Oral Tab Take 1 tablet (20 mg total) by mouth nightly.      XARELTO 20 MG Oral Tab Take 1 tablet by mouth daily with food 30 tablet 3    Ipratropium Bromide 0.02 % Inhalation Solution Take 2.5 mL (500 mcg total) by nebulization every 6 (six) hours as needed for Wheezing.      ketoconazole 2 % External Cream Apply topically as needed.      Multiple Vitamin (TAB-A-CUCA) Oral Tab Take 1 tablet by mouth daily.      aspirin 81 MG Oral Tab EC Take 1 tablet (81 mg total) by mouth daily.      B Complex-Biotin-FA (B COMPLETE) Oral Tab Take 1 tablet by mouth daily.      magnesium 250 MG Oral Tab Take 1 tablet (250 mg total) by mouth every evening.         Review of Systems: Review of Systems   Constitutional: Negative.    HENT: Negative.     Respiratory:  Positive for cough and shortness of  breath. Negative for wheezing and stridor.    All other systems reviewed and are negative.       Physical Exam:  There were no vitals taken for this visit.     Constitutional: alert, cooperative. No acute distress.  HEENT: Head NC/AT.     Results:  Personally reviewed    WBC: 14.3, done on 7/20/2024.  HGB: 11.1, done on 7/20/2024.  PLT: 159, done on 7/20/2024.     Glucose: 83, done on 7/20/2024.  Cr: 0.51, done on 7/20/2024.  Last eGFR was 113 on 7/20/2024.  CA: 8.7, done on 7/20/2024.  Na: 135, done on 7/20/2024.  K: 4.2, done on 7/20/2024.  Cl: 90, done on 7/20/2024.  CO2: 44, done on 7/20/2024.  Last ALB was 3% done on 6/29/2024.     XR CHEST AP PORTABLE  (CPT=71045)    Result Date: 7/15/2024  CONCLUSION:   Stable significant emphysematous and COPD changes.   LOCATION:  [facility]      Dictated by (CST): Yunior James MD on 7/15/2024 at 7:51 AM     Finalized by (CST): Yunior James MD on 7/15/2024 at 7:54 AM       XR CHEST AP PORTABLE  (CPT=71045)    Result Date: 7/14/2024  CONCLUSION:   Postoperative changes of cardiothoracic surgery with stable cardiac and mediastinal contours.  Recent CT of 07/01/2024 better demonstrates upper lobe predominant emphysema.  Bilateral calcified pleural plaques with persistent diffuse interstitial and bronchial wall thickening.  This may reflect some combination of interstitial pulmonary edema, bronchitis, or reactive airway disease.  Suspected parenchymal scarring of the peripheral left mid lung adjacent to a densely calcified pleural plaque.  Small bilateral pleural effusions persist.  No pneumothorax.  Overall, findings are not substantially changed compared to 07/04/2024.   LOCATION:  [facility]      Dictated by (CST): Jenn Guerra MD on 7/14/2024 at 7:34 AM     Finalized by (CST): Jenn Guerra MD on 7/14/2024 at 7:38 AM       XR CHEST AP PORTABLE  (CPT=71045)    Result Date: 7/4/2024  CONCLUSION:  1. Interval improvement in interstitial and alveolar opacities since prior examination  with residual remaining. 2. Small bilateral pleural effusions with bilateral basilar atelectasis/infiltrates. 3. Hyperexpansion of the lungs.    LOCATION:  [facility]      Dictated by (CST): Lior Donnelly MD on 7/04/2024 at 10:25 PM     Finalized by (CST): Lior Donnelly MD on 7/04/2024 at 10:27 PM       XR CHEST AP PORTABLE  (CPT=71045)    Result Date: 7/3/2024  CONCLUSION:  No significant change since prior examination.   LOCATION:  [facility]      Dictated by (CST): Lior Donnelly MD on 7/03/2024 at 11:00 AM     Finalized by (CST): Lior Donnelly MD on 7/03/2024 at 11:01 AM       US VENOUS DOPPLER ARM BILAT - DIAG IMG (CPT=93970)    Result Date: 7/1/2024  CONCLUSION:  Superficial venous thrombosis involving the distal left cephalic vein.  No evidence of deep venous thrombosis in the bilateral upper extremities.  LOCATION:  [id number]    Dictated by (CST): Von Toussaint MD on 7/01/2024 at 2:29 PM     Finalized by (CST): Von Toussaint MD on 7/01/2024 at 2:31 PM       CT CHEST(CONTRAST ONLY) (CPT=71260)    Result Date: 7/1/2024  CONCLUSION:  There is now a small right pleural effusion and in the event smaller left pleural effusion.  These were not present on the prior CT.  Increase in the size of a masslike area of tissue density in the left midlung laterally adjacent to chronic  pleural calcification.  This could reflect a zone of increasing chronic rounded atelectasis, with neoplasm within the differential.  Redemonstration extensive severe chronic lung disease.  Nonspecific adenopathy right hilum and mediastinum.  Dilated pulmonary artery could reflect chronic pulmonary hypertension, particularly in the setting of extensive chronic severe lung disease.   LOCATION:  [id number]   Dictated by (CST): Severino Roque MD on 7/01/2024 at 10:31 AM     Finalized by (CST): Severino Roque MD on 7/01/2024 at 10:38 AM       XR CHEST AP PORTABLE  (CPT=71045)    Result Date: 6/29/2024  CONCLUSION:  Findings are unchanged relative  to the previous study.  Please see further details above.   LOCATION:  [id number]      Dictated by (CST): Marielena Martines DO on 6/29/2024 at 11:32 AM     Finalized by (CST): Marielena Martines DO on 6/29/2024 at 11:38 AM       XR CHEST AP PORTABLE  (CPT=71045)    Result Date: 6/11/2024  CONCLUSION:  1. No significant interval changes, with stable diffuse emphysematous changes again noted. 2. Stable linear regions of scarring radiating from a focus of pleural thickening and calcified plaque along the mid aspect of the left lateral pleural margin. 3. No evidence of an acute process.   LOCATION:  [facility]      Dictated by (CST): Marielena Martines DO on 6/11/2024 at 4:43 PM     Finalized by (CST): Marielena Martines DO on 6/11/2024 at 4:46 PM         Assessment/Plan:  #1. COPD  Severe obstruction on previous PFTs 3/2021  Recent exacerbations with CHF exacerbation/afib; last hospitalized at [facility] 7/24  Continue on symbicort BID  Cannot tolerate albuterol due to palpitations, tremors; continue with levalbuterol and ipratropium nebulizers every 6 hours.    Resume on budesonide nebs BID  Continue hypertonic saline nebs prn  Advised to consider using prednisone but he declines  Previously enrolled in pulmonary rehab but unable to consistently go due to difficulty with transportation  Continue roflumilast  Encouraged to exercise, lose excess weight and maintain vaccinations up to date  We also discussed EBV and BLVR today.  He presently is not a candidate due to his respiratory status and pulmonary HTN    #2. Chronic hypoxic respiratory failure  Due to COPD and CHF  He continues to use and benefit from supplemental oxygenation  Last o2 walk with: 2L at rest, 4L on exertion; using and benefiting from portable oxygen concentrator (3 at rest, 6 on exertion)Multifactorial due to afib with RVR, CHF exacerbation with underlying end stage COPD  Noted issues with affording AVAPS at home. Now transitioning to BIPAP     #3. Pulmonary nodules  7/2017  CT with worsening peripheral lingular atelectasis. Nodules stable   5/2018 CT stable nodules  12/2020 CT stable  7/2021 CT stable but worsening mediastinal LAD suspect reactive to recent cardiac surgery  1/2022 CT with stable nodules, improved LAD  3/2023 CT chest with several small nodules  9/2023 CT chest with stable findings   7/1/2024 CT chest with worsening ALYSON lesion, possible scarring, rounded atelectasis vs neoplasm  Will need outpatient follow up including PET/CT     #4. Tobacco abuse  30+ pack years, pipe smoker  Hold on LDCT given recent CT with worsening ALYSON lesion, will need PET/CT as above  Lung Cancer Counseling and Shared Decision Making Session in an Asymptomatic Smoker/Former Smoker   Keny Marshall is a 63 year old male without current symptoms of lung cancer.  History   Smoking Status    Former    Types: Cigarettes   Smokeless Tobacco    Never        Keny Marshall is not a surgical candidate due to other medical conditions and will not be screened with LDCT now, or in the future.    Stefano Randle MD  7/29/2024    This visit is conducted using Telemedicine with live, interactive video and audio.    Patient has been referred to the [facility] website at [email or web address] to review the yearly Consent to Treat document.    Patient understands and accepts financial responsibility for any deductible, co-insurance and/or co-pays associated with this service.

## 2024-07-31 ENCOUNTER — TELEPHONE (OUTPATIENT)
Facility: CLINIC | Age: 64
End: 2024-07-31

## 2024-07-31 NOTE — TELEPHONE ENCOUNTER
Spoke with Liliana from [facility].  Called to give an update on pt's condition.  Breath sounds diminished in all lobes.  Pt has increased shortness of breath and increased cough productive of yellow to green mucus.  Pt has bilateral pedal pitting edema 3+.   Temp is 99.9 and pt's body is radiating heat.  C/o weakness.  Not compliant with fluid restrictions, incentive spirometer, neb treatment.  On 4.5 LPM and SPO2 AT 89%.  Pt informed the home health nurse that per Thomas, is baseline is 88%.  Pt advised to go to ER but informed RN that he is not quite ready yet.    Call placed to pt.  Pt states he is doing fair.  Attempted to review treatment recommendations with him but he stated that he just had visit with Dr. Randle yesterday.  States that swelling in lower extremities is an ongoing issue for me.  Hasn't done neb treatment yet today but will do one.  Reiterated home health nurse's recommendation to go to ER if symptoms worsen.  Message forwarded to Dr. Randle.

## 2024-08-05 ENCOUNTER — TELEPHONE (OUTPATIENT)
Facility: CLINIC | Age: 64
End: 2024-08-05

## 2024-08-05 NOTE — TELEPHONE ENCOUNTER
Per Dr. Randle:  all i see is the respiratory emma with mold....not much to do with that. it's considered contaminant.    Dr. Randle also wants pt to follow fluid restriction guidelines.   Left message for pt to call back tomorrow when office is open.

## 2024-08-05 NOTE — TELEPHONE ENCOUNTER
Pt was at [facility] for 1 1/2 days around 7-23-24.  Sputum culture results as follows.   Pt states that he is coughing up yellow to green mucus and hears crackles and pops when he lays down.  Wants to know if there should be anything done based on results.  No longer on antibiotics.    Respiratory(Sputum) Culture  Specimen: Sputum - Sputum specimen (specimen)  Component 13 d ago Comments   Culture, Respiratory Heavy Growth Normal respiratory emma isolated    Culture, Respiratory Light Growth Mold Abnormal      The organism identification for this result has been updated. These results have been appended to the previously preliminary verified report.   Gram Stain Result Few White blood cells Abnormal     Gram Stain Result Rare Squamous epithelial cells Abnormal     Gram Stain Result Rare Gram positive cocci in pairs Abnormal     Gram Stain Result Rare Gram positive bacilli Abnormal     Resulting Agency [facility]    Specimen Collected: 07/23/24  5:41 PM    Performed by: [facility] Last Resulted: 07/27/24 11:22 AM   Received From: [facility]  Result Received: 07/29/24 11:17 AM    View Encounter    More Data from [facility]  Related to Respiratory(Sputum) Culture  Component 07/23/2024 07/23/2024 07/23/2024 07/23/2024          Culture, Respiratory Heavy Growth Normal respiratory emma isolated Light Growth Mold Abnormal      -- --   Culture, Respiratory Heavy Growth Normal respiratory emma isolated Light Growth Mold Abnormal      -- --   Gram Stain Result Few White blood cells Abnormal     Rare Squamous epithelial cells Abnormal     Rare Gram positive cocci in pairs Abnormal     Rare Gram positive bacilli Abnormal       Gram Stain Result Few White blood cells Abnormal     Rare Squamous epithelial cells Abnormal     Rare Gram positive cocci in pairs Abnormal     Rare Gram positive bacilli Abnormal       Gram Stain  Result Few White blood cells Abnormal     Rare Squamous epithelial cells Abnormal     Rare Gram positive cocci in pairs Abnormal     Rare Gram positive bacilli Abnormal       Gram Stain Result Few White blood cells Abnormal     Rare Squamous epithelial cells Abnormal     Rare Gram positive cocci in pairs Abnormal     Rare Gram positive bacilli Abnormal       Message forwarded to Dr. Randle.

## 2024-08-13 ENCOUNTER — APPOINTMENT (OUTPATIENT)
Dept: GENERAL RADIOLOGY | Facility: HOSPITAL | Age: 64
End: 2024-08-13
Attending: EMERGENCY MEDICINE
Payer: MEDICARE

## 2024-08-13 ENCOUNTER — HOSPITAL ENCOUNTER (INPATIENT)
Facility: HOSPITAL | Age: 64
LOS: 6 days | Discharge: HOME HEALTH | End: 2024-08-19
Attending: EMERGENCY MEDICINE | Admitting: HOSPITALIST
Payer: MEDICARE

## 2024-08-13 ENCOUNTER — TELEPHONE (OUTPATIENT)
Facility: CLINIC | Age: 64
End: 2024-08-13

## 2024-08-13 DIAGNOSIS — R06.02 SOB (SHORTNESS OF BREATH): Primary | ICD-10-CM

## 2024-08-13 DIAGNOSIS — J44.1 COPD EXACERBATION (HCC): ICD-10-CM

## 2024-08-13 DIAGNOSIS — I50.9 ACUTE ON CHRONIC CONGESTIVE HEART FAILURE, UNSPECIFIED HEART FAILURE TYPE (HCC): ICD-10-CM

## 2024-08-13 DIAGNOSIS — R09.02 HYPOXIA: Primary | ICD-10-CM

## 2024-08-13 PROBLEM — J96.21 ACUTE ON CHRONIC HYPOXIC RESPIRATORY FAILURE (HCC): Status: ACTIVE | Noted: 2024-08-13

## 2024-08-13 PROBLEM — E87.3 METABOLIC ALKALOSIS: Status: ACTIVE | Noted: 2024-08-13

## 2024-08-13 PROBLEM — R79.89 AZOTEMIA: Status: ACTIVE | Noted: 2024-08-13

## 2024-08-13 PROBLEM — J44.9 END STAGE COPD (HCC): Status: ACTIVE | Noted: 2024-08-13

## 2024-08-13 LAB
ALBUMIN SERPL-MCNC: 4.1 G/DL (ref 3.2–4.8)
ALBUMIN/GLOB SERPL: 1.5 {RATIO} (ref 1–2)
ALP LIVER SERPL-CCNC: 56 U/L
ALT SERPL-CCNC: 11 U/L
ANION GAP SERPL CALC-SCNC: 2 MMOL/L (ref 0–18)
AST SERPL-CCNC: 20 U/L (ref ?–34)
ATRIAL RATE: 83 BPM
BASOPHILS # BLD AUTO: 0.04 X10(3) UL (ref 0–0.2)
BASOPHILS NFR BLD AUTO: 0.5 %
BILIRUB SERPL-MCNC: 0.4 MG/DL (ref 0.2–1.1)
BUN BLD-MCNC: 17 MG/DL (ref 9–23)
CALCIUM BLD-MCNC: 9.4 MG/DL (ref 8.7–10.4)
CHLORIDE SERPL-SCNC: 100 MMOL/L (ref 98–112)
CO2 SERPL-SCNC: 37 MMOL/L (ref 21–32)
CREAT BLD-MCNC: 0.74 MG/DL
EGFRCR SERPLBLD CKD-EPI 2021: 101 ML/MIN/1.73M2 (ref 60–?)
EOSINOPHIL # BLD AUTO: 0.29 X10(3) UL (ref 0–0.7)
EOSINOPHIL NFR BLD AUTO: 3.4 %
ERYTHROCYTE [DISTWIDTH] IN BLOOD BY AUTOMATED COUNT: 17.3 %
FLUAV + FLUBV RNA SPEC NAA+PROBE: NEGATIVE
FLUAV + FLUBV RNA SPEC NAA+PROBE: NEGATIVE
GLOBULIN PLAS-MCNC: 2.7 G/DL (ref 2–3.5)
GLUCOSE BLD-MCNC: 182 MG/DL (ref 70–99)
HCT VFR BLD AUTO: 34.1 %
HGB BLD-MCNC: 10.6 G/DL
IMM GRANULOCYTES # BLD AUTO: 0.03 X10(3) UL (ref 0–1)
IMM GRANULOCYTES NFR BLD: 0.4 %
LYMPHOCYTES # BLD AUTO: 0.68 X10(3) UL (ref 1–4)
LYMPHOCYTES NFR BLD AUTO: 8 %
MCH RBC QN AUTO: 30.7 PG (ref 26–34)
MCHC RBC AUTO-ENTMCNC: 31.1 G/DL (ref 31–37)
MCV RBC AUTO: 98.8 FL
MONOCYTES # BLD AUTO: 0.8 X10(3) UL (ref 0.1–1)
MONOCYTES NFR BLD AUTO: 9.5 %
NEUTROPHILS # BLD AUTO: 6.62 X10 (3) UL (ref 1.5–7.7)
NEUTROPHILS # BLD AUTO: 6.62 X10(3) UL (ref 1.5–7.7)
NEUTROPHILS NFR BLD AUTO: 78.2 %
NT-PROBNP SERPL-MCNC: 4827 PG/ML (ref ?–125)
OSMOLALITY SERPL CALC.SUM OF ELEC: 294 MOSM/KG (ref 275–295)
P AXIS: 26 DEGREES
P-R INTERVAL: 188 MS
PLATELET # BLD AUTO: 257 10(3)UL (ref 150–450)
POTASSIUM SERPL-SCNC: 3.7 MMOL/L (ref 3.5–5.1)
PROT SERPL-MCNC: 6.8 G/DL (ref 5.7–8.2)
Q-T INTERVAL: 458 MS
QRS DURATION: 178 MS
QTC CALCULATION (BEZET): 538 MS
R AXIS: 67 DEGREES
RBC # BLD AUTO: 3.45 X10(6)UL
RSV RNA SPEC NAA+PROBE: NEGATIVE
SARS-COV-2 RNA RESP QL NAA+PROBE: NOT DETECTED
SODIUM SERPL-SCNC: 139 MMOL/L (ref 136–145)
T AXIS: 93 DEGREES
TROPONIN I SERPL HS-MCNC: 31 NG/L
VENTRICULAR RATE: 83 BPM
WBC # BLD AUTO: 8.5 X10(3) UL (ref 4–11)

## 2024-08-13 PROCEDURE — 99223 1ST HOSP IP/OBS HIGH 75: CPT | Performed by: INTERNAL MEDICINE

## 2024-08-13 PROCEDURE — 99223 1ST HOSP IP/OBS HIGH 75: CPT | Performed by: HOSPITALIST

## 2024-08-13 PROCEDURE — 71045 X-RAY EXAM CHEST 1 VIEW: CPT | Performed by: EMERGENCY MEDICINE

## 2024-08-13 RX ORDER — FUROSEMIDE 10 MG/ML
80 INJECTION INTRAMUSCULAR; INTRAVENOUS ONCE
Status: COMPLETED | OUTPATIENT
Start: 2024-08-13 | End: 2024-08-13

## 2024-08-13 RX ORDER — ONDANSETRON 2 MG/ML
4 INJECTION INTRAMUSCULAR; INTRAVENOUS EVERY 4 HOURS PRN
Status: DISCONTINUED | OUTPATIENT
Start: 2024-08-13 | End: 2024-08-13

## 2024-08-13 RX ORDER — LEVETIRACETAM 500 MG/1
500 TABLET ORAL 2 TIMES DAILY
Status: DISCONTINUED | OUTPATIENT
Start: 2024-08-13 | End: 2024-08-19

## 2024-08-13 RX ORDER — FUROSEMIDE 10 MG/ML
40 INJECTION INTRAMUSCULAR; INTRAVENOUS
Status: DISCONTINUED | OUTPATIENT
Start: 2024-08-13 | End: 2024-08-18

## 2024-08-13 RX ORDER — ASPIRIN 81 MG/1
81 TABLET ORAL DAILY
Status: DISCONTINUED | OUTPATIENT
Start: 2024-08-14 | End: 2024-08-19

## 2024-08-13 RX ORDER — SODIUM PHOSPHATE, DIBASIC AND SODIUM PHOSPHATE, MONOBASIC 7; 19 G/230ML; G/230ML
1 ENEMA RECTAL ONCE AS NEEDED
Status: DISCONTINUED | OUTPATIENT
Start: 2024-08-13 | End: 2024-08-19

## 2024-08-13 RX ORDER — SENNOSIDES 8.6 MG
17.2 TABLET ORAL NIGHTLY PRN
Status: DISCONTINUED | OUTPATIENT
Start: 2024-08-13 | End: 2024-08-19

## 2024-08-13 RX ORDER — ECHINACEA PURPUREA EXTRACT 125 MG
1 TABLET ORAL
Status: DISCONTINUED | OUTPATIENT
Start: 2024-08-13 | End: 2024-08-19

## 2024-08-13 RX ORDER — POTASSIUM CHLORIDE 1.5 G/1.58G
40 POWDER, FOR SOLUTION ORAL ONCE
Status: DISCONTINUED | OUTPATIENT
Start: 2024-08-13 | End: 2024-08-13

## 2024-08-13 RX ORDER — GUAIFENESIN 600 MG/1
1200 TABLET, EXTENDED RELEASE ORAL 2 TIMES DAILY
Status: DISCONTINUED | OUTPATIENT
Start: 2024-08-13 | End: 2024-08-19

## 2024-08-13 RX ORDER — FLUTICASONE PROPIONATE AND SALMETEROL 250; 50 UG/1; UG/1
1 POWDER RESPIRATORY (INHALATION) 2 TIMES DAILY
Status: DISCONTINUED | OUTPATIENT
Start: 2024-08-13 | End: 2024-08-19

## 2024-08-13 RX ORDER — MELATONIN
3 NIGHTLY PRN
Status: DISCONTINUED | OUTPATIENT
Start: 2024-08-13 | End: 2024-08-19

## 2024-08-13 RX ORDER — FERROUS SULFATE 325(65) MG
325 TABLET, DELAYED RELEASE (ENTERIC COATED) ORAL DAILY
Status: DISCONTINUED | OUTPATIENT
Start: 2024-08-14 | End: 2024-08-19

## 2024-08-13 RX ORDER — ROFLUMILAST 500 UG/1
500 TABLET ORAL DAILY
Status: DISCONTINUED | OUTPATIENT
Start: 2024-08-14 | End: 2024-08-14 | Stop reason: SDUPTHER

## 2024-08-13 RX ORDER — ATORVASTATIN CALCIUM 20 MG/1
20 TABLET, FILM COATED ORAL NIGHTLY
Status: DISCONTINUED | OUTPATIENT
Start: 2024-08-13 | End: 2024-08-19

## 2024-08-13 RX ORDER — AMIODARONE HYDROCHLORIDE 200 MG/1
200 TABLET ORAL DAILY
Status: DISCONTINUED | OUTPATIENT
Start: 2024-08-14 | End: 2024-08-19

## 2024-08-13 RX ORDER — ACETAMINOPHEN 500 MG
1000 TABLET ORAL EVERY 4 HOURS PRN
Status: DISCONTINUED | OUTPATIENT
Start: 2024-08-13 | End: 2024-08-19

## 2024-08-13 RX ORDER — IPRATROPIUM BROMIDE AND ALBUTEROL SULFATE 2.5; .5 MG/3ML; MG/3ML
3 SOLUTION RESPIRATORY (INHALATION) ONCE
Status: COMPLETED | OUTPATIENT
Start: 2024-08-13 | End: 2024-08-13

## 2024-08-13 RX ORDER — PROCHLORPERAZINE EDISYLATE 5 MG/ML
5 INJECTION INTRAMUSCULAR; INTRAVENOUS EVERY 8 HOURS PRN
Status: DISCONTINUED | OUTPATIENT
Start: 2024-08-13 | End: 2024-08-19

## 2024-08-13 RX ORDER — ALBUTEROL SULFATE 0.83 MG/ML
SOLUTION RESPIRATORY (INHALATION) EVERY 4 HOURS PRN
Status: DISCONTINUED | OUTPATIENT
Start: 2024-08-13 | End: 2024-08-19

## 2024-08-13 RX ORDER — ONDANSETRON 2 MG/ML
4 INJECTION INTRAMUSCULAR; INTRAVENOUS EVERY 6 HOURS PRN
Status: DISCONTINUED | OUTPATIENT
Start: 2024-08-13 | End: 2024-08-19

## 2024-08-13 RX ORDER — POLYETHYLENE GLYCOL 3350 17 G/17G
17 POWDER, FOR SOLUTION ORAL DAILY PRN
Status: DISCONTINUED | OUTPATIENT
Start: 2024-08-13 | End: 2024-08-19

## 2024-08-13 RX ORDER — BENZONATATE 200 MG/1
200 CAPSULE ORAL 3 TIMES DAILY PRN
Status: DISCONTINUED | OUTPATIENT
Start: 2024-08-13 | End: 2024-08-19

## 2024-08-13 RX ORDER — BISACODYL 10 MG
10 SUPPOSITORY, RECTAL RECTAL
Status: DISCONTINUED | OUTPATIENT
Start: 2024-08-13 | End: 2024-08-19

## 2024-08-13 RX ORDER — POTASSIUM CHLORIDE 1500 MG/1
40 TABLET, EXTENDED RELEASE ORAL ONCE
Status: COMPLETED | OUTPATIENT
Start: 2024-08-13 | End: 2024-08-13

## 2024-08-13 NOTE — ED QUICK NOTES
Orders for admission, patient is aware of plan and ready to go upstairs. Any questions, please call ED RN marcela at extension [phone].     Patient Covid vaccination status: Fully vaccinated     COVID Test Ordered in ED: SARS-CoV-2/Flu A and B/RSV by PCR ([facility]pert)    COVID Suspicion at Admission: N/A    Running Infusions:  None    Mental Status/LOC at time of transport: a/o x4    Other pertinent information:   CIWA score: N/A   NIH score:  N/A

## 2024-08-13 NOTE — TELEPHONE ENCOUNTER
Pt asked if   Pt's home nurse Emigdio Gutierrez called to the office regarding order in  home CXR ,his condition is getting worce ,low oxygen,SOB,plus he need to get stronger concentrator from [facility]  he already call them but they told the New order need to be issued and as of now he will call again to his nurce and have her call the officee

## 2024-08-13 NOTE — TELEPHONE ENCOUNTER
Spoke with Dr. Randle. Dr. Randle also recommends patient to go the emergency room but understands patient is refusing. Dr. Randle agreeable to order chest xray if patient is refusing to go to the emergency room. Chest xray 2 view order entered. [facility] called to ensure order was received. Per , order was received and technician tries to go out to do the imaging same day. Patient called to notify order was sent and should be expecting a technician to do the chest xray today. Patient states oxygen level is better now that he transitioned to portable tanks, patient states currently on 6 LPM on the portable oxygen tank saturating at 90-91%, patient requesting a 10 L concentrator. Patient advised he should still go to the emergency room. Patient states he doesn't want to go to [facility] emergency room. Patient made aware I will notify Dr. Randle of his request.

## 2024-08-13 NOTE — ED PROVIDER NOTES
Patient Seen in: [facility] Emergency Department      History     Chief Complaint   Patient presents with    Difficulty Breathing     Stated Complaint: CHRISTI    Subjective:   HPI    Patient is a 63-year-old male with history of COPD.  Patient is out of 4 and half liters of oxygen all the time.  Patient states for last 3 days has had slight cough.  Patient felt increased and short of breath.  Patient states his O2 sats will drop down to the 60s.  Patient denies chest pain, no pain with deep breaths.  Patient denies fevers.  Patient slight nasal congestion slight nonproductive cough.  No abdominal pain.  Patient has slight leg swelling but states it is not significantly worse than normal.  Remainder of review of systems negative.  Patient checks his weights occasionally does not feel he has gained a lot of weight.    Objective:   Past Medical History:    Aortic stenosis    Arrhythmia    hx of a-fib    Arthritis    Cancer (HCC)    LEFT RENAL     Chronic hypoxemic respiratory failure (HCC)    On 4 LPM/NC at rest 24 hours/ day but 6 LPM/NC on exertion    COPD    Pulmonary follow up with Dr. Crum -no O2    Depression    Difficult intubation    patient states history of difficult intubation   due to body weight.States this no longer issue due to sugnificant weight loss    Esophageal reflux    Eye disease    Fatigue    Fever, unknown origin    Heart murmur    Heart palpitations    High blood pressure    High cholesterol    Loss of appetite    Other and unspecified hyperlipidemia    Paroxysmal atrial fibrillation (HCC)    Pneumonia, organism unspecified(486)    Prediabetes    Renal cell carcinoma (HCC)    s/p left nephrectomy    Shortness of breath    uses oxygen 3-4 L/NC all the time    Unspecified essential hypertension    Unspecified sleep apnea    He was unable to tolerate CPAP/BiPAP    Visual impairment    reading glasses              Past Surgical History:   Procedure Laterality Date    Adenoidectomy       Angiogram      Appendectomy      Appendectomy      Colonoscopy N/A 2016    Procedure: COLONOSCOPY;  Surgeon: Artur Okeefe MD;  Location:  ENDOSCOPY    Colonoscopy      Colonoscopy N/A 2023    Procedure: .;  Surgeon: Artur Okeefe MD;  Location:  ENDOSCOPY    Endovas repair, infrarenl abdom aortic aneurysm/dissect      Laparo radical nephrectomy Left 2014    Nephrectomy Left     Other  meatus of urethra    Other Right     foreign body removal-arm    Other surgical history Right 2016    Procedure: KNEE ARTHROSCOPY;  Surgeon: Madhu Anaya MD;  Location:  MAIN OR    Repair rotator cuff,acute Right     Upper gi endoscopy,exam      Valve repair                  Social History     Socioeconomic History    Marital status:    Occupational History    Occupation:      Comment: [employer]   Tobacco Use    Smoking status: Former     Current packs/day: 0.00     Average packs/day: 1.5 packs/day for 40.0 years (60.0 ttl pk-yrs)     Types: Cigarettes     Start date: 3/8/1983     Quit date: 3/8/2023     Years since quittin.4     Passive exposure: Past    Smokeless tobacco: Never   Vaping Use    Vaping status: Never Used   Substance and Sexual Activity    Alcohol use: Not Currently     Alcohol/week: 14.0 standard drinks of alcohol     Types: 12 Cans of beer, 2 Shots of liquor per week     Comment: 14/week    Drug use: Never    Sexual activity: Yes     Partners: Female     Social Determinants of Health     Food Insecurity: Low Risk  (2024)    Received from [facility]     Within the past 12 months, the food you bought just didn't last and you didn't have money to get more.: 3     Within the past 12 months, you worried that your food would run out before you got money to buy more.: 3   Transportation Needs: Not At Risk (2024)    Received from [facility] Transportation Needs     In the past 12 months, has lack of  reliable transportation kept you from medical appointments, meetings, work or from getting things needed for daily living?: No   Social Connections: Feeling Socially Integrated (6/18/2024)    Received from [facility]    OASIS : Social Isolation     Frequency of experiencing loneliness or isolation: Rarely   Housing Stability: Not At Risk (7/24/2024)    Received from [facility]     What is your living situation today?: I have a steady place to live     Think about the place you live. Do you have problems with any of the following?: None of the above              Review of Systems    Positive for stated Chief Complaint: Difficulty Breathing    Other systems are as noted in HPI.  Constitutional and vital signs reviewed.      All other systems reviewed and negative except as noted above.    Physical Exam     ED Triage Vitals [08/13/24 1430]   /74   Pulse 87   Resp 26   Temp 98 °F (36.7 °C)   Temp src Oral   SpO2 95 %   O2 Device Simple mask       Current Vitals:   Vital Signs  BP: 119/65  Pulse: 76  Resp: 19  Temp: 98 °F (36.7 °C)  Temp src: Oral  MAP (mmHg): 79    Oxygen Therapy  SpO2: 91 %  O2 Device: Nasal cannula  O2 Flow Rate (L/min): 6 L/min            Physical Exam  GENERAL: Patient resting on the cart in no acute distress.  HEENT: Extraocular muscles intact, pupils equal round reactive to light.  Mouth normal, neck supple, no meningismus.  LUNGS: Lungs clear to auscultation bilaterally.  CARDIOVASCULAR: + S1-S2, regular rate and rhythm, no murmurs.  BACK: No CVA tenderness, no midline bony tenderness.  ABDOMEN: + Bowel sounds, soft, nontender, nondistended.  No rebound, no guarding, no hepatosplenomegaly.  EXTREMITIES: Full range of motion, no tenderness, good capillary refill.  1-2+ edema pretibial bilaterally  SKIN: No rash, good turgor.  NEURO: Patient answers questions appropriately.  No focal deficits appreciated.  Conversant.           ED Course     Labs Reviewed   COMP  METABOLIC PANEL (14) - Abnormal; Notable for the following components:       Result Value    Glucose 182 (*)     CO2 37.0 (*)     All other components within normal limits   CBC WITH DIFFERENTIAL WITH PLATELET - Abnormal; Notable for the following components:    RBC 3.45 (*)     HGB 10.6 (*)     HCT 34.1 (*)     Lymphocyte Absolute 0.68 (*)     All other components within normal limits   PRO BETA NATRIURETIC PEPTIDE - Abnormal; Notable for the following components:    Pro-Beta Natriuretic Peptide 4,827 (*)     All other components within normal limits   TROPONIN I HIGH SENSITIVITY - Normal   SARS-COV-2/FLU A AND B/RSV BY PCR (GENEXPERT) - Normal    Narrative:     This test is intended for the qualitative detection and differentiation of SARS-CoV-2, influenza A, influenza B, and respiratory syncytial virus (RSV) viral RNA in nasopharyngeal or nares swabs from individuals suspected of respiratory viral infection consistent with COVID-19 by their healthcare provider. Signs and symptoms of respiratory viral infection due to SARS-CoV-2, influenza, and RSV can be similar.    Test performed using the Xpert Xpress SARS-CoV-2/FLU/RSV (real time RT-PCR)  assay on the [employer] instrument, [place], [place] [address].   This test is being used under the Food and Drug Administration's Emergency Use Authorization.    The authorized Fact Sheet for Healthcare Providers for this assay is available upon request from the laboratory.   RAINBOW DRAW LAVENDER   RAINBOW DRAW LIGHT GREEN   RAINBOW DRAW BLUE     EKG    Rate, intervals and axes as noted on EKG Report.  Rate: 83  Rhythm: Sinus Rhythm  Reading: Normal sinus rhythm, left bundle branch block, no significant change compared with previous                 Chest x-ray1. Stable COPD changes.   2. Diffuse interstitial opacities are scattered throughout both lungs which was present previously.  Differential includes pulmonary fibrosis and other inflammatory interstitial lung disease.    3. Stable small bilateral pleural effusions.   4. Status post heart valve surgery.    Independent reviewed by myself, no pneumothorax         MDM      Patient was initially given a DuoNeb nebulizer treatment.  Patient's BNP was significantly elevated over 4800.  Patient does have leg swelling.  Patient x-ray had some interstitial opacities.  Patient was treated with 80 mg Lasix.  Patient with likely CHF.   Patient also has COPD and with his hypoxia and increased oxygen requirement will be admitted.  [facility]  I did consider COVID, pneumonia, CHF, COPD.  Patient was improved on subsequent reevaluations  Admission disposition: 8/13/2024  4:33 PM                                        Medical Decision Making      Disposition and Plan     Clinical Impression:  1. Hypoxia    2. Acute on chronic congestive heart failure, unspecified heart failure type (HCC)    3. COPD exacerbation (HCC)         Disposition:  Admit  8/13/2024  4:33 pm    Follow-up:  No follow-up provider specified.        Medications Prescribed:  Current Discharge Medication List                            Hospital Problems       Present on Admission  Date Reviewed: 7/29/2024            ICD-10-CM Noted POA    * (Principal) Hypoxia R09.02 9/22/2021 Unknown    Azotemia R79.89 8/13/2024 Yes    Metabolic alkalosis E87.3 8/13/2024 Yes

## 2024-08-13 NOTE — CONSULTS
[facility]  Report of Consultation    Keny Marshall Patient Status:  Inpatient    1960 MRN [id number]   [facility] 8NE-A Attending Kary Ley, DO   Hosp Day # 0 PCP Dustin Avina MD     Reason for Consultation:  Hypercapnia, hypoxia    History of Present Illness:  Keny Marshall is a a(n) 63 year old male smoker with PMH severe COPD, chronic hypoxic respiratory failure on supplemental o2, chronic hypercapnic respiratory failure previously managed on AVAPS, CHF, afib, RCCa s/p left nephrectomy  who is admitted today with worsening dyspnea and hypoxia.  Since our last telehealth visit last month he has been monitored with his home RN and noted to have worsening BLE edema and respiratory exam was told he sounded like he was overloaded. His RN called my office today after noting his oximetry was in the 80s at best and on exertion would decrease to 60-70s.  Eventually he was convinced to come to the ER. He was given a neb and lasix and now feels somewhat better while on 6L o2 (baseline is 2L at rest,4L on exertion).    2024 previously  Keny Marshall is a 63 year old male  smoker with significant PMH severe COPD, chronic hypoxic respiratory failure on supplemental o2, severe aortic stenosis, CHF, hx RCCA s/p left nephrectomy  who presents today for follow up. Since last visit he has been hospitalized several times in the last couples due to COPD exacerbation, afib with RVR and CHF exacerbation.   He was discharged 1.5 weeks from [facility] after cardioversion but then after 2-3 days was admitted to [facility] - noted elevated PCO2 of ~90 - improved after BIPAP.   He was discharged on .  Since his discharge he continues to have dyspnea on exertion and +productive coughing with thin clear phlegm.   Using atrovent TID, levalbuterol nebs at home    2024 previously  The patient was just recently hospitalized about two weeks ago at [facility] with  hypercapnic respiratory failure after being found unresponsive.  He was intubated at that time and subsequently able to be extubated and managed on BIPAP but refused to use. He was able to be discharged home however he developed worsening dyspnea over the past few days. He was seen in his cardiologist office and noted dyspneic and with BLE edema. He was again noted hypercapnic with PCO2 of 82. He was agreeable to BIPAP and just removed a short time ago and feels better. Still with dyspnea. No chest pain/pressure, pleurisy. No fevers.   Does complain of chronic cough/phlegm. No blood.     September 2023 previously  Keny Marshall is a 62 year old male smoker with significant PMH severe COPD, chronic hypoxic respiratory failure on supplemental o2, severe aortic stenosis, CHF, hx RCCA s/p left nephrectomy 2014 who presents today for follow up. He denies acute concerns today, stable chronic ARCEO. Continued thick phlegm.no fevers. No pain    March 2023 previously  He denies new concerns, had his CT recently and concerned about results. Using inhaler and nebs, breathing is stable. Has thick phlegm, no blood. no fevers. No pain    previously  The patient has followed with me the last several years for his COPD and is on maximal therapy including inhalers and nebulizers. He does admit to continued smoking and is aware of recommendation for smoking cessation.   He denies change in chronic dyspnea on exertion. Has chronic cough and phlegm, no blood. No fevers. No pain  Has been monitoring his weight and following with [facility]  Hx ABIGAIL and intolerant of CPAP  History:  Past Medical History:    Aortic stenosis    Arrhythmia    hx of a-fib    Arthritis    Cancer (HCC)    LEFT RENAL     Chronic hypoxemic respiratory failure (HCC)    On 4 LPM/NC at rest 24 hours/ day but 6 LPM/NC on exertion    COPD    Pulmonary follow up with Dr. Crum -no O2    Depression    Difficult intubation    patient states history of difficult  intubation   due to body weight.States this no longer issue due to sugnificant weight loss    Esophageal reflux    Eye disease    Fatigue    Fever, unknown origin    Heart murmur    Heart palpitations    High blood pressure    High cholesterol    Loss of appetite    Other and unspecified hyperlipidemia    Paroxysmal atrial fibrillation (HCC)    Pneumonia, organism unspecified(486)    Prediabetes    Renal cell carcinoma (HCC)    s/p left nephrectomy    Shortness of breath    uses oxygen 3-4 L/NC all the time    Unspecified essential hypertension    Unspecified sleep apnea    He was unable to tolerate CPAP/BiPAP    Visual impairment    reading glasses     Past Surgical History:   Procedure Laterality Date    Adenoidectomy      Angiogram      Appendectomy      Appendectomy      Colonoscopy N/A 03/21/2016    Procedure: COLONOSCOPY;  Surgeon: Artur Okeefe MD;  Location:  ENDOSCOPY    Colonoscopy      Colonoscopy N/A 1/5/2023    Procedure: .;  Surgeon: Artur Okeefe MD;  Location:  ENDOSCOPY    Endovas repair, infrarenl abdom aortic aneurysm/dissect      Laparo radical nephrectomy Left 05/06/2014    Nephrectomy Left 2014    Other  meatus of urethra    Other Right     foreign body removal-arm    Other surgical history Right 03/25/2016    Procedure: KNEE ARTHROSCOPY;  Surgeon: Madhu Anaya MD;  Location:  MAIN OR    Repair rotator cuff,acute Right     Upper gi endoscopy,exam      Valve repair  2020     Family History   Problem Relation Age of Onset    Heart Disorder Mother     Pacemaker Mother     Other (Atrial fibrillation) Father     Other (Lung cancer) Maternal Aunt       reports that he quit smoking about 17 months ago. His smoking use included cigarettes. He started smoking about 41 years ago. He has a 60 pack-year smoking history. He has been exposed to tobacco smoke. He has never used smokeless tobacco. He reports that he does not currently use alcohol after a past usage of about 14.0 standard  drinks of alcohol per week. He reports that he does not use drugs.    Allergies:  Allergies   Allergen Reactions    Bees ANAPHYLAXIS    Other ANAPHYLAXIS     Bee stings       Medications:  reviewed    Current Outpatient Medications:     Budesonide-Formoterol Fumarate 160-4.5 MCG/ACT Inhalation Aerosol, Inhale 2 puffs into the lungs 2 (two) times daily., Disp: 3 each, Rfl: 3    levETIRAcetam 500 MG Oral Tab, Take 1 tablet (500 mg total) by mouth 2 (two) times daily., Disp: 180 tablet, Rfl: 0    predniSONE 10 MG Oral Tab, Take 3 tablets daily for 3 days then 2 tablets daily for 3 days then 1 tablets daily for 3 days then stop., Disp: 30 tablet, Rfl: 0    amiodarone 200 MG Oral Tab, Take one table twice daily x 2 weeks then take one tablet daily thereafter, Disp: 90 tablet, Rfl: 0    furosemide 40 MG Oral Tab, Take 1 tablet (40 mg total) by mouth daily., Disp: 90 tablet, Rfl: 0    Roflumilast 500 MCG Oral Tab, Take 500 mcg by mouth daily., Disp: 90 tablet, Rfl: 0    guaiFENesin  MG Oral Tablet 12 Hr, Take 2 tablets (1,200 mg total) by mouth 2 (two) times daily., Disp: , Rfl:     Ferrous Sulfate 324 (65 Fe) MG Oral Tab EC, Take 65 mg by mouth daily., Disp: , Rfl:     tiotropium 18 MCG Inhalation Cap, Inhale 1 capsule (18 mcg total) into the lungs daily., Disp: , Rfl:     Levalbuterol HCl 0.63 MG/3ML Inhalation Nebu Soln, Take 3 mL (0.63 mg total) by nebulization every 6 (six) hours as needed for Shortness of Breath or Wheezing., Disp: 3 each, Rfl: 3    JARDIANCE 10 MG Oral Tab, , Disp: , Rfl:     atorvastatin 20 MG Oral Tab, Take 1 tablet (20 mg total) by mouth nightly., Disp: , Rfl:     XARELTO 20 MG Oral Tab, Take 1 tablet by mouth daily with food, Disp: 30 tablet, Rfl: 3    Ipratropium Bromide 0.02 % Inhalation Solution, Take 2.5 mL (500 mcg total) by nebulization every 6 (six) hours as needed for Wheezing., Disp: , Rfl:     ketoconazole 2 % External Cream, Apply topically as needed., Disp: , Rfl:     Multiple  Vitamin (TAB-A-CUCA) Oral Tab, Take 1 tablet by mouth daily., Disp: , Rfl:     aspirin 81 MG Oral Tab EC, Take 1 tablet (81 mg total) by mouth daily., Disp: , Rfl:     B Complex-Biotin-FA (B COMPLETE) Oral Tab, Take 1 tablet by mouth daily., Disp: , Rfl:     magnesium 250 MG Oral Tab, Take 1 tablet (250 mg total) by mouth every evening., Disp: , Rfl:           Review of Systems:   Constitutional: fatigue  Eyes: Negative for visual disturbance, irritation and redness.  Ears, nose, mouth, throat, and face: Negative for hearing loss, tinnitus, nasal congestion, snoring, sore throat, hoarseness and voice change.  Respiratory: see HPI  Cardiovascular: see HPI  Gastrointestinal: Negative for dysphagia, odynophagia, reflux symptoms, nausea, vomiting, change in bowel habits, diarrhea, constipation and abdominal pain.  Integument/breast: Negative for rash, skin lesions, and pruritus.  Hematologic/lymphatic: Negative for easy bruising, bleeding, and lymphadenopathy.  Musculoskeletal: Negative for myalgias, arthralgias, muscle weakness.  Neurological: Negative for headaches, dizziness, seizures, memory problems, trouble swallowing, speech problems, gait problems and weakness.  : Denies dysuria, hematuria, urinary retention.  Behavioral/Psych: Normal affect, mood, speech. No AVH, No SI/HI    All other review of systems are negative.    Vital signs in last 24 hours:  Patient Vitals for the past 24 hrs:   BP Temp Temp src Pulse Resp SpO2 Height Weight   06/12/24 1130 121/77 99 °F (37.2 °C) Oral 93 22 100 % -- --   06/12/24 0920 -- -- -- 103 -- 100 % -- --   06/12/24 0913 119/84 98.1 °F (36.7 °C) Oral 114 22 91 % -- --   06/12/24 0846 -- -- -- -- -- -- -- 161 lb (73 kg)   06/12/24 0554 132/82 -- -- -- -- -- -- --   06/12/24 0549 -- -- -- 120 -- (!) 85 % -- --   06/12/24 0548 -- -- -- (!) 125 -- (!) 87 % -- --   06/12/24 0547 -- -- -- (!) 125 -- (!) 87 % -- --   06/12/24 0545 -- -- -- (!) 127 -- (!) 82 % -- --   06/12/24 0543 --  -- -- (!) 127 -- (!) 82 % -- --   06/12/24 0430 -- -- -- 96 -- 98 % -- --   06/12/24 0415 -- -- -- 94 -- 99 % -- --   06/12/24 0345 125/75 -- -- 93 24 98 % -- --   06/12/24 0300 -- -- -- 104 -- 94 % -- --   06/12/24 0200 -- -- -- 76 -- 100 % -- --   06/12/24 0020 -- -- -- 79 -- 92 % -- --   06/11/24 2320 110/79 -- -- 82 22 90 % -- --   06/11/24 2027 -- -- -- 89 -- (!) 89 % -- --   06/11/24 2026 -- -- -- 103 -- (!) 85 % -- --   06/11/24 2025 -- -- -- 100 -- (!) 86 % -- --   06/11/24 2024 -- -- -- 100 -- (!) 88 % -- --   06/11/24 2023 -- -- -- 106 -- (!) 88 % -- --   06/11/24 2022 -- -- -- 95 -- (!) 89 % -- --   06/11/24 2021 -- -- -- 102 -- (!) 87 % -- --   06/11/24 1935 122/63 99 °F (37.2 °C) Oral 100 22 93 % -- --   06/11/24 1934 -- -- -- (!) 125 -- (!) 88 % -- --   06/11/24 1933 -- -- -- 108 -- (!) 82 % -- --   06/11/24 1905 114/85 -- -- 102 -- 91 % -- 159 lb 13.3 oz (72.5 kg)   06/11/24 1903 137/78 -- -- 100 -- 95 % -- --   06/11/24 1901 133/71 -- -- 84 -- -- -- --   06/11/24 1830 122/85 -- -- 86 21 92 % -- --   06/11/24 1815 144/82 -- -- 95 24 96 % -- --   06/11/24 1800 111/77 -- -- 86 23 100 % -- --   06/11/24 1745 110/85 -- -- 119 19 93 % -- --   06/11/24 1730 130/56 -- -- (!) 137 (!) 28 95 % -- --   06/11/24 1715 134/88 -- -- 104 21 94 % -- --   06/11/24 1700 141/68 -- -- 110 22 93 % -- --   06/11/24 1645 120/84 -- -- 93 24 94 % -- --   06/11/24 1630 118/54 -- -- 98 26 95 % -- --   06/11/24 1615 125/65 -- -- 92 (!) 28 93 % -- --   06/11/24 1600 112/71 -- -- 70 22 93 % -- --   06/11/24 1545 (!) 129/94 -- -- 99 24 93 % -- --   06/11/24 1530 120/85 -- -- (!) 134 21 98 % -- --   06/11/24 1522 120/85 98.3 °F (36.8 °C) Oral (!) 134 18 93 % 5' 10\" (1.778 m) 165 lb (74.8 kg)       Intake/Output:  No intake or output data in the 24 hours ending 08/13/24 1831       PHYSICAL EXAM  GEN: Appears alert, mild tachypnea at rest, but no distress  PSYCH: Normal mood and affect, AAOX3  NEURO: CN 2-12 grossly intact, no focal  deficits  HEENT: Atraumatic, normocephalic, EOMI, no icterus/hemorrhage, no conjunctival injection/discharge, nares normal  MOUTH: dry mucosa, poor dentition  NECK: Trachea midline, symmetric, no visible masses or scars, no crepitus, normal flexion/extension  CHEST: Normal chest excursion, no visible deformity or scars, no tenderness to palpation  PULMONARY:diminished breath sounds bilaterally. No wheeze heard. Mild tachypnea but no laboring  COR: tachycardia, RR. No murmur heard  GI: NABS X 4, S/NT/ND, No hernias or HSM  LYMPHATIC: No palpable or visible lymphadenopathy in neck, axillae, groin  MSK: Normal strength and sensation in all extremities  EXT: No overt deformities, +BLE edema    Lab Data Review:  Recent Labs   Lab 08/13/24  1428   *   BUN 17   CREATSERUM 0.74   CA 9.4      K 3.7      CO2 37.0*     Recent Labs   Lab 08/13/24  1428   RBC 3.45*   HGB 10.6*   HCT 34.1*   MCV 98.8   MCH 30.7   MCHC 31.1   RDW 17.3   NEPRELIM 6.62   WBC 8.5   .0     No results for input(s): \"BNP\" in the last 168 hours.  No results for input(s): \"TROP\", \"CK\" in the last 168 hours.  No results for input(s): \"PT\", \"INR\", \"PTT\" in the last 168 hours.      Other Labs:     ABG:  No results for input(s): \"ABGPHT\", \"BXJIXO6P\", \"JJHOX8Y\", \"ABGHCO3\", \"ABGBE\", \"TEMP\", \"ROMARIO\", \"SITE\", \"DEV\", \"THGB\" in the last 168 hours.    Invalid input(s): \"EDV36CQG\", \"CHOB\"      Cultures:   No results found for this visit on 08/13/24.  No results for input(s): \"COLORUR\", \"CLARITY\", \"SPECGRAVITY\", \"GLUUR\", \"BILUR\", \"KETUR\", \"BLOODURINE\", \"PHURINE\", \"PROUR\", \"UROBILINOGEN\", \"NITRITE\", \"LEUUR\", \"WBCUR\", \"RBCUR\", \"BACUR\", \"EPIUR\" in the last 168 hours.    Radiology:   Reviewed personally  XR CHEST AP PORTABLE  (CPT=71045)    Result Date: 8/13/2024  CONCLUSION:  1. Stable COPD changes. 2. Diffuse interstitial opacities are scattered throughout both lungs which was present previously.  Differential includes pulmonary fibrosis and  other inflammatory interstitial lung disease. 3. Stable small bilateral pleural effusions. 4. Status post heart valve surgery.    LOCATION:  [facility]      Dictated by (CST): Milad Khoury MD on 8/13/2024 at 3:25 PM     Finalized by (CST): Milad Khoury MD on 8/13/2024 at 3:28 PM        Assessment/Plan:  #1. Acute on chronic hypoxic respiratory failure; chronic hypercapnic respiratory failure  Multifactorial due to CHF exacerbation with underlying COPD   Continue nebs, steroids as below  Wean o2 for goal saturations of 88-92% given hypercapnia. His previous o2 baseline was: 2L at rest, 4L on exertion; using and benefiting from portable oxygen concentrator (3 at rest, 6 on exertion)  Will need repeat o2 walk prior to discharge  He was previously ordered and used AVAPS with Dr. Lorenz however he has been unable to afford and now is to use BIPAP    #2. Acute CHF exacerbation, with hx of afib with RVR  Diurese as per cardiology    #3. COPD   Severe obstruction on previous PFTs 3/2021  No clear exacerbation at present, suspect current issue is more fluid overload/ CHF exacerbation  Continue on symbicort BID --> advair while hospitalized  Cannot tolerate albuterol due to palpitations, tremors; continue with levalbuterol and ipratropium nebulizers every 6 hours.    Continue on budesonide nebs BID  Continue hypertonic saline nebs prn  Continue roflumilast  Previously reviewed BLVR - not a candidate     #4. Pulmonary nodules  7/2017 CT with worsening peripheral lingular atelectasis. Nodules stable   5/2018 CT stable nodules  12/2020 CT stable  7/2021 CT stable but worsening mediastinal LAD suspect reactive to recent cardiac surgery  1/2022 CT with stable nodules, improved LAD  3/2023 CT chest with several small nodules  9/2023 CT chest with stable findings   7/1/2024 CT chest with worsening ALYSON lesion, possible scarring, rounded atelectasis vs neoplasm  Will need outpatient follow up including PET/CT    #5. Tobacco  abuse  30+ pack years, pipe smoker  As above    Thank you for the consultation.  Will follow with you.    Stefano Randle MD

## 2024-08-13 NOTE — ED INITIAL ASSESSMENT (HPI)
Pt states low O2 sats at home for the last 3 days. Pts norm is 5LNC with sats of 88% at home. Increased cough.

## 2024-08-13 NOTE — TELEPHONE ENCOUNTER
Received call from [facility]. Per RN, patient has been having diminished lung sounds over her last few visits. RN states patient Spo2 ranges in the low 80's at times. Patient uses nebulizer treatment once a day. Per RN, patient not compliant with nebulizer treatments and fluid restriction. Patient currently on oxygen at 5 LPM. RN asking for a chest xray. RN requesting order be sent to [phone]. Patient's wife called, patient with her. Spoke with patient. Patient advised to go to the emergency room. Patient educated on importance of going to the emergency room for evaluation given his oxygen saturation and patient's concern for fluid in his lungs. Patient stated he ' practically lives in the hospital.' And doesn't want to go. States this has been his life. Patient states [facility] informed him she would call our office to obtain a chest xray order. Patient informed I would contact Dr. Randle about his request however recommended he goes to the emergency room due to not being safe at home and needs to be assessed and monitored. Patient refusing. Dr. Randle notified.

## 2024-08-14 LAB
ALBUMIN SERPL-MCNC: 3.7 G/DL (ref 3.2–4.8)
ALBUMIN/GLOB SERPL: 1.3 {RATIO} (ref 1–2)
ALP LIVER SERPL-CCNC: 53 U/L
ALT SERPL-CCNC: 10 U/L
AST SERPL-CCNC: 16 U/L (ref ?–34)
BASOPHILS # BLD AUTO: 0.05 X10(3) UL (ref 0–0.2)
BASOPHILS NFR BLD AUTO: 0.7 %
BILIRUB SERPL-MCNC: 0.4 MG/DL (ref 0.2–1.1)
BUN BLD-MCNC: 17 MG/DL (ref 9–23)
CALCIUM BLD-MCNC: 9.1 MG/DL (ref 8.7–10.4)
CHLORIDE SERPL-SCNC: 101 MMOL/L (ref 98–112)
CO2 SERPL-SCNC: >40 MMOL/L (ref 21–32)
CREAT BLD-MCNC: 0.83 MG/DL
EGFRCR SERPLBLD CKD-EPI 2021: 98 ML/MIN/1.73M2 (ref 60–?)
EOSINOPHIL # BLD AUTO: 0.34 X10(3) UL (ref 0–0.7)
EOSINOPHIL NFR BLD AUTO: 4.9 %
ERYTHROCYTE [DISTWIDTH] IN BLOOD BY AUTOMATED COUNT: 16.9 %
GLOBULIN PLAS-MCNC: 2.8 G/DL (ref 2–3.5)
GLUCOSE BLD-MCNC: 116 MG/DL (ref 70–99)
HCT VFR BLD AUTO: 34.6 %
HGB BLD-MCNC: 10.2 G/DL
IMM GRANULOCYTES # BLD AUTO: 0.02 X10(3) UL (ref 0–1)
IMM GRANULOCYTES NFR BLD: 0.3 %
LYMPHOCYTES # BLD AUTO: 0.84 X10(3) UL (ref 1–4)
LYMPHOCYTES NFR BLD AUTO: 12.1 %
MAGNESIUM SERPL-MCNC: 2.1 MG/DL (ref 1.6–2.6)
MCH RBC QN AUTO: 30.2 PG (ref 26–34)
MCHC RBC AUTO-ENTMCNC: 29.5 G/DL (ref 31–37)
MCV RBC AUTO: 102.4 FL
MONOCYTES # BLD AUTO: 0.84 X10(3) UL (ref 0.1–1)
MONOCYTES NFR BLD AUTO: 12.1 %
NEUTROPHILS # BLD AUTO: 4.88 X10 (3) UL (ref 1.5–7.7)
NEUTROPHILS # BLD AUTO: 4.88 X10(3) UL (ref 1.5–7.7)
NEUTROPHILS NFR BLD AUTO: 69.9 %
OSMOLALITY SERPL CALC.SUM OF ELEC: 297 MOSM/KG (ref 275–295)
PLATELET # BLD AUTO: 229 10(3)UL (ref 150–450)
POTASSIUM SERPL-SCNC: 4.4 MMOL/L (ref 3.5–5.1)
PROT SERPL-MCNC: 6.5 G/DL (ref 5.7–8.2)
RBC # BLD AUTO: 3.38 X10(6)UL
SODIUM SERPL-SCNC: 142 MMOL/L (ref 136–145)
WBC # BLD AUTO: 7 X10(3) UL (ref 4–11)

## 2024-08-14 PROCEDURE — 99233 SBSQ HOSP IP/OBS HIGH 50: CPT | Performed by: HOSPITALIST

## 2024-08-14 PROCEDURE — 99232 SBSQ HOSP IP/OBS MODERATE 35: CPT | Performed by: INTERNAL MEDICINE

## 2024-08-14 RX ORDER — ROFLUMILAST 500 UG/1
500 TABLET ORAL DAILY
Status: DISCONTINUED | OUTPATIENT
Start: 2024-08-15 | End: 2024-08-19

## 2024-08-14 NOTE — CM/SW NOTE
08/14/24 1100   CM/SW Referral Data   Referral Source    Reason for Referral Discharge planning;Readmission   Informant Patient   Readmission Assessment   Factors that patient feels contributed to this readmission Acute/Chronic Clinical Presentation   Pt's living situation prior to admission? Home with family   Pt's level of independence at discharge? No assist/independent (minimal)   Pt. received education on diagnoses at time of discharge? Yes   Did you know who and how to call someone if you felt worse? Yes   Was full assessment completed by SW/CM on prior admission? Yes   Was the recommended discharge plan achieved? Yes   Was pt. discharged w/out services? No   Medical Hx   Significant Past Medical/Mental Health Hx COPD, CHF   Patient Info   Patient's Current Mental Status at Time of Assessment Alert;Oriented   Patient's Home Environment House   Patient lives with Spouse/Significant other   Patient Status Prior to Admission   Independent with ADLs and Mobility Yes   Services in place prior to admission Home Health Care;DME/Supplies at home   Home Health Provider Info [facility]    DME Provider/Supplier HME   Type of DME/Supplies Home Oxygen;BiPAP   Discharge Needs   Anticipated D/C needs Home health care   Choice of Post-Acute Provider   Informed patient of right to choose their preferred provider Yes   List of appropriate post-acute services provided to patient/family with quality data No - Declined list   Patient/family choice [facility]      CM self referred to case for discharge planning and readmission.    Met w/pt at the bedside for eval    Pt is a 63 year old male admitted with hypoxia, CHF exacerbation, R foot cellulitis.    Pt was recently admitted here at  with shortness of breath, multifactorial, also with a fib and s/p cardioversion on 7/19/24.  Pt was discharged home, but later admitted to [facility] 7/23/24 for shortness of breath/COPD exacerbation.    He was  Size Of Lesion In Cm (Optional): 0.4 discharged home with     [facility] Health[facility] [place]  Received call from Marie from [facility] and he is current with them.   Updates and DONA sent via [employer]    Pt confirms he lives with his wife, is independent with ADLs, unable to drive due to seizures.   He confirms he is compliant with his Home O2 4.5 L during the day and consistently uses his Bipap at night.    / to remain available for support and/or discharge planning.     Chelsea CEJAA MSN, RN CTL/  [id number]     Morphology Per Location (Optional): dark brown macule with irregular border, even color X Size Of Lesion In Cm (Optional): 0.3 Detail Level: Simple Body Location Override (Optional - Billing Will Still Be Based On Selected Body Map Location If Applicable): R upper chest Morphology Per Location (Optional): even brown macule Body Location Override (Optional - Billing Will Still Be Based On Selected Body Map Location If Applicable): L lower chest Size Of Lesion In Cm (Optional): 0.6 Body Location Override (Optional - Billing Will Still Be Based On Selected Body Map Location If Applicable): R upper abdomen Morphology Per Location (Optional): even tan macule X Size Of Lesion In Cm (Optional): 0.2 Morphology Per Location (Optional): even dark brown macule Body Location Override (Optional - Billing Will Still Be Based On Selected Body Map Location If Applicable): L medial proximal thigh Body Location Override (Optional - Billing Will Still Be Based On Selected Body Map Location If Applicable): L lateral proximal thigh Body Location Override (Optional - Billing Will Still Be Based On Selected Body Map Location If Applicable): R post thigh Morphology Per Location (Optional): even bron macule Body Location Override (Optional - Billing Will Still Be Based On Selected Body Map Location If Applicable): R lateral back Morphology Per Location (Optional): dark brown macule Body Location Override (Optional - Billing Will Still Be Based On Selected Body Map Location If Applicable): L lateral lower chest Body Location Override (Optional - Billing Will Still Be Based On Selected Body Map Location If Applicable): L medial back Size Of Lesion In Cm (Optional): 0.5

## 2024-08-14 NOTE — OCCUPATIONAL THERAPY NOTE
OCCUPATIONAL THERAPY EVALUATION - INPATIENT    Room Number: 2615/2615-A  Evaluation Date: 8/14/2024     Type of Evaluation: Initial  Presenting Problem: hypoxia    Physician Order: IP Consult to Occupational Therapy  Reason for Therapy:  ADL/IADL Dysfunction and Discharge Planning      OCCUPATIONAL THERAPY ASSESSMENT   Patient is a 63 year old male admitted on 8/13/2024 with Presenting Problem: hypoxia. Co-Morbidities : COPD, chronic repsiratory failure, afib, HTN, renal cancer, nephrectomy, AAA repair, R rotator cuff repair  Patient is currently functioning at baseline with toileting, upper body dressing, lower body dressing, grooming, bed mobility, transfers, static sitting balance, dynamic sitting balance, static standing balance, dynamic standing balance, and maintaining seated position.  Prior to admission, patient's baseline is Mod I.  Patient met all OT goals at Sup level.  Patient reports no further questions/concerns at this time.         WEIGHT BEARING RESTRICTION  Weight Bearing Restriction: None                Recommendations for nursing staff:   Transfers: Sup  Toileting location: Toilet    EVALUATION SESSION:  Patient at start of session: supine in bed for session  FUNCTIONAL TRANSFER ASSESSMENT  Sit to Stand: Edge of Bed  Edge of Bed: Supervision    BED MOBILITY  Rolling: Supervision  Supine to Sit : Supervision  Scooting: Sup to EOB    BALANCE ASSESSMENT  Static Sitting: Supervision  Sitting Bilateral: Supervision  Static Standing: Supervision  Standing Bilateral: Supervision    FUNCTIONAL ADL ASSESSMENT  Eating: Supervision  UB Dressing Seated: Supervision (for robe)  LB Dressing Seated: Supervision (for shoes and socks)  Toileting Seated: Supervision (at [facility] toilet)      ACTIVITY TOLERANCE: drop in O2 with all activity, O2 level limiting activity                         O2 SATURATIONS  Oxygen Therapy  SPO2% on Oxygen at Rest: 90  At rest oxygen flow (liters per minute): 10  SPO2% Ambulation on  Oxygen: 85  Ambulation oxygen flow (liters per minute): 15    COGNITION  Overall Cognitive Status:  WFL - within functional limits  COGNITION ASSESSMENTS       Upper Extremity:   ROM: within functional limits   Strength: is within functional limits   Coordination:  Gross motor: WNl  Fine motor: WNL  Sensation: Light touch:  intact    EDUCATION PROVIDED  Patient: Role of Occupational Therapy; Plan of Care  Patient's Response to Education: Verbalized Understanding; Returned Demonstration      Therapist comments: Pt educated on pursed lip breathing and therex for pulm function to assist with deep breaths and opening lungs.     Patient End of Session: Up in chair;Needs met;Call light within reach;All patient questions and concerns addressed;SCDs in place;Alarm set;Family present    OCCUPATIONAL PROFILE    HOME SITUATION  Type of Home:  (Duplex)  Home Layout: Two level;Able to live on main level  Lives With: Spouse    Toilet and Equipment: Standard height toilet  Shower/Tub and Equipment: Walk-in shower  Other Equipment: None    Occupation/Status: retired  Hand Dominance: Right  Drives: No  Patient Regularly Uses: Home O2 (4.5 L)    Prior Level of Function: Pt typically independent with ADLs and mobility. Pt does not use AD.    SUBJECTIVE  Pt stated, \"I just cannot breathe.\"    PAIN ASSESSMENT  Ratin  Location: no pain at this time       OBJECTIVE  Precautions: Bed/chair alarm;Seizure  Fall Risk: High fall risk    WEIGHT BEARING RESTRICTION  Weight Bearing Restriction: None                AM-PAC ‘6-Clicks’ Inpatient Daily Activity Short Form  -   Putting on and taking off regular lower body clothing?: A Little  -   Bathing (including washing, rinsing, drying)?: A Little  -   Toileting, which includes using toilet, bedpan or urinal? : A Little  -   Putting on and taking off regular upper body clothing?: A Little  -   Taking care of personal grooming such as brushing teeth?: A Little  -   Eating meals?: A  Radha    AM-PAC Score:  Score: 18  Approx Degree of Impairment: 46.65%  Standardized Score (AM-PAC Scale): 38.66      ADDITIONAL TESTS     NEUROLOGICAL FINDINGS        PLAN   Patient has been evaluated and presents with no skilled Occupational Therapy needs at this time.  Patient discharged from Occupational Therapy services.  Please re-order if a new functional limitation presents during this admission.      Patient Evaluation Complexity Level:   Occupational Profile/Medical History LOW - Brief history including review of medical or therapy records    Specific performance deficits impacting engagement in ADL/IADL LOW  1 - 3 performance deficits    Client Assessment/Performance Deficits LOW - No comorbidities nor modifications of tasks    Clinical Decision Making LOW - Analysis of occupational profile, problem-focused assessments, limited treatment options    Overall Complexity LOW     OT Session Time: 25 minutes  Self-Care Home Management: 15 minutes  Therapeutic Activity: 0 minutes  Neuromuscular Re-education: 0 minutes  Therapeutic Exercise: 0 minutes  Cognitive Skills: 0 minutes  Sensory Integrative: 0 minutes  Orthotic Management and Trainin minutes  Can add/delete any of these

## 2024-08-14 NOTE — PROGRESS NOTES
[facility]  Progress Note    Keny Marshall Patient Status:  Inpatient    1960 MRN [id number]   Location [facility] 2NE-A Attending Megan De Leon MD   Hosp Day # 1 PCP Dustin Avina MD     Subjective:  Keny Marshall is a(n) 63 year old male remains afebrile  Feels better today though continues to require 8 L of O2-upwards of 15 L with increasing activity    Objective:  /73 (BP Location: Right arm)   Pulse 77   Temp 98.4 °F (36.9 °C) (Oral)   Resp 17   Wt 152 lb 1.9 oz (69 kg)   SpO2 95%   BMI 21.83 kg/m² currently sats 95% on 8 L      Temp (24hrs), Av.8 °F (36.6 °C), Min:97.4 °F (36.3 °C), Max:98.4 °F (36.9 °C)      Intake/Output:    Intake/Output Summary (Last 24 hours) at 2024 1759  Last data filed at 2024 1700  Gross per 24 hour   Intake 1330 ml   Output 3025 ml   Net -1695 ml       Physical Exam:   General: alert, cooperative, oriented.  No respiratory distress.   Head: Normocephalic, without obvious abnormality, atraumatic.   Throat: Lips, mucosa, and tongue normal.  No thrush noted.   Neck: trachea midline, no adenopathy, no thyromegaly. No JVD.   Lungs: Rales left base greater than right diminished sounds no auscultated wheeze   Chest wall: No tenderness or deformity.   Heart: Murmurs noted   Abdomen: soft, non-distended, no masses, no guarding, no     Rebound.  No obvious ascites   Extremity: Bilateral +1+2 edema   Skin: No rashes or lesions.   Neurological: Alert, interactive, no focal deficits    Lab Data Review:  Recent Labs     24  1428 24  0715   WBC 8.5 7.0   HGB 10.6* 10.2*   .0 229.0     Recent Labs     24  1428 24  0715    142   K 3.7 4.4    101   CO2 37.0* >40.0*   BUN 17 17   CREATSERUM 0.74 0.83     No results for input(s): \"PTP\", \"INR\", \"PTT\" in the last 168 hours.    Cultures: Negative COVID    Radiology:  No results found.  Chest x-ray with chronic bilateral interstitial infiltrates suspected small  effusions      Medications reviewed     Assessment and Plan:   Patient Active Problem List   Diagnosis    Anemia    HTN (hypertension)    DM2 (diabetes mellitus, type 2) ([facility])    COPD (chronic obstructive pulmonary disease) ([facility])    Clear cell renal cell carcinoma of left kidney ([facility])    Medial meniscus tear, right, initial encounter    Primary osteoarthritis of right knee              GLOBAL EXP 6-25-16 / RIGHT KNEE / TRK     Acute medial meniscus tear of right knee            GLOBAL EXP 6-25-16 / RIGHT KNEE / TRK     Acute lateral meniscus tear of left knee               GLOBAL EXP 6-25-16 / LEFT KNEE / TRK     Bursitis of left shoulder             GLOBAL EXP 6-25-16 / LEFT SHOULDER / TRK     Moderate aortic stenosis    Stage 3 severe COPD by GOLD classification ([facility])    Hyperglycemia    Hypervolemia    Hypervolemia, unspecified hypervolemia type    Dyspnea, unspecified type    Hypoxemia    Atrial fibrillation with RVR ([facility])    Acute heart failure with preserved ejection fraction (HFpEF) ([facility])    Atrial flutter with rapid ventricular response ([facility])    Acute on chronic congestive heart failure (HCC)    Acute on chronic congestive heart failure, unspecified heart failure type ([facility])    Hypoxia    Atrial fibrillation with rapid ventricular response ([facility])    Pleural effusion    Hypokalemia    Acute respiratory failure with hypoxia ([facility])    Normocytic anemia    Chronic heart failure with preserved ejection fraction ([facility])    Leukocytosis (leucocytosis)    Aortic valve regurgitation    HFrEF (heart failure with reduced ejection fraction) ([facility])    Chronic hypoxemic respiratory failure ([facility])    ABIGAIL (obstructive sleep apnea)    COPD exacerbation ([facility])    Acute on chronic respiratory failure with hypoxia (HCC)    Acute on chronic respiratory failure with hypoxia and hypercapnia ([facility])    Chronic hypercapnic respiratory failure ([facility])    Smoking greater than 20 pack years    Hyponatremia    Hyperkalemia    Chronic obstructive  pulmonary disease, unspecified COPD type (HCC)    Supplemental oxygen dependent    Azotemia    Metabolic alkalosis    End stage COPD (HCC)    Acute on chronic hypoxic respiratory failure (HCC)       Assessment:  #1. Acute on chronic hypoxic respiratory failure; chronic hypercapnic respiratory failure  Multifactorial due to CHF exacerbation with underlying COPD   Continue nebs, steroids as below  Wean o2 for goal saturations of 88-92% given hypercapnia. His previous o2 baseline was: 2L at rest, 4L on exertion; using and benefiting from portable oxygen concentrator (3 at rest, 6 on exertion)  Will need repeat o2 walk prior to discharge  He was previously ordered and used AVAPS with Dr. Lorenz however he has been unable to afford and now is to use BIPAP     #2. Acute CHF exacerbation, with hx of afib with RVR  Diurese as per cardiology     #3. COPD   Severe obstruction on previous PFTs 3/2021  No clear exacerbation at present, suspect current issue is more fluid overload/ CHF exacerbation  Continue on symbicort BID --> advair while hospitalized  Cannot tolerate albuterol due to palpitations, tremors; continue with levalbuterol and ipratropium nebulizers every 6 hours.    Continue on budesonide nebs BID  Continue hypertonic saline nebs prn  Continue roflumilast  Previously reviewed BLVR - not a candidate      #4. Pulmonary nodules  7/2017 CT with worsening peripheral lingular atelectasis. Nodules stable   5/2018 CT stable nodules  12/2020 CT stable  7/2021 CT stable but worsening mediastinal LAD suspect reactive to recent cardiac surgery  1/2022 CT with stable nodules, improved LAD  3/2023 CT chest with several small nodules  9/2023 CT chest with stable findings   7/1/2024 CT chest with worsening ALYSON lesion, possible scarring, rounded atelectasis vs neoplasm  Will need outpatient follow up including PET/CT-plan for repeat CT     #5. Tobacco abuse  30+ pack years, pipe smoker  As above       Plan:  Plan to continue  diuresis  Following fluid status carefully  Following      CC     Pat Crum MD  8/14/2024  5:59 PM

## 2024-08-14 NOTE — H&P
[facility]     Keny Marshall Patient Status:  Inpatient    1960 MRN [id number]   Location [facility] 2NE-A Attending Sidney Silver MD   Hosp Day # 0 PCP Dustin Avina MD     Chief Complaint: sob    Subjective:    History of Present Illness:     Keny Marshall is a 63 year old male with past medical history of aortic stenosis, A-fib, renal cancer, COPD, on 4 L home O2, GERD, hypertension, hyperlipidemia, ABIGAIL who presents with shortness of breath.  Has had 3 days of cough.  Has chronic bilateral lower extremity swelling.  Pretty significant but states this is about the same as it usually is.  Over last few days has noticed some erythema on his right foot which has been growing.  Unclear if he got a bug bite but there seems to be a dot in the middle.  His shortness of breath has gotten worse and his O2 sats have been dropping into the 60s at times now.  Denies any fever, chills.    History/Other:    Past Medical History:  Past Medical History:    Aortic stenosis    Arrhythmia    hx of a-fib    Arthritis    Cancer (HCC)    LEFT RENAL     Chronic hypoxemic respiratory failure (HCC)    On 4 LPM/NC at rest 24 hours/ day but 6 LPM/NC on exertion    COPD    Pulmonary follow up with Dr. Crum -no O2    Depression    Difficult intubation    patient states history of difficult intubation   due to body weight.States this no longer issue due to sugnificant weight loss    Esophageal reflux    Eye disease    Fatigue    Fever, unknown origin    Heart murmur    Heart palpitations    High blood pressure    High cholesterol    Loss of appetite    Other and unspecified hyperlipidemia    Paroxysmal atrial fibrillation (HCC)    Pneumonia, organism unspecified(486)    Prediabetes    Renal cell carcinoma (HCC)    s/p left nephrectomy    Shortness of breath    uses oxygen 3-4 L/NC all the time    Unspecified essential hypertension    Unspecified sleep apnea    He was unable to tolerate  CPAP/BiPAP    Visual impairment    reading glasses     Past Surgical History:   Past Surgical History:   Procedure Laterality Date    Adenoidectomy      Angiogram      Appendectomy      Appendectomy      Colonoscopy N/A 03/21/2016    Procedure: COLONOSCOPY;  Surgeon: Artur Okeefe MD;  Location:  ENDOSCOPY    Colonoscopy      Colonoscopy N/A 1/5/2023    Procedure: .;  Surgeon: Artur Okeefe MD;  Location:  ENDOSCOPY    Endovas repair, infrarenl abdom aortic aneurysm/dissect      Laparo radical nephrectomy Left 05/06/2014    Nephrectomy Left 2014    Other  meatus of urethra    Other Right     foreign body removal-arm    Other surgical history Right 03/25/2016    Procedure: KNEE ARTHROSCOPY;  Surgeon: Madhu Anaya MD;  Location:  MAIN OR    Repair rotator cuff,acute Right     Upper gi endoscopy,exam      Valve repair  2020      Family History:   Family History   Problem Relation Age of Onset    Heart Disorder Mother     Pacemaker Mother     Other (Atrial fibrillation) Father     Other (Lung cancer) Maternal Aunt      Social History:    reports that he quit smoking about 17 months ago. His smoking use included cigarettes. He started smoking about 41 years ago. He has a 60 pack-year smoking history. He has been exposed to tobacco smoke. He has never used smokeless tobacco. He reports that he does not currently use alcohol after a past usage of about 14.0 standard drinks of alcohol per week. He reports that he does not use drugs.     Allergies:   Allergies   Allergen Reactions    Bees ANAPHYLAXIS    Other ANAPHYLAXIS     Bee stings       Medications:    Current Facility-Administered Medications on File Prior to Encounter   Medication Dose Route Frequency Provider Last Rate Last Admin    [COMPLETED] furosemide (Lasix) 10 mg/mL injection 40 mg  40 mg Intravenous Once Sunita Zepeda APRN   40 mg at 07/20/24 0926    [COMPLETED] potassium chloride (Klor-Con M20) tab 40 mEq  40 mEq Oral Once Jose  Alfredo SAMS MD   40 mEq at 24 1609    [COMPLETED] metOLazone (Zaroxolyn) tab 5 mg  5 mg Oral Once Sunita Zepeda APRN   5 mg at 24 1056    [] ipratropium (Atrovent) 0.02 % nebulizer solution             [COMPLETED] insulin NPH-human isophane (Novolin N) 100 Units/mL injection 8 Units  8 Units Subcutaneous Once Devorah Allen MD   8 Units at 24 1528    [COMPLETED] insulin NPH-human isophane (Novolin N) 100 Units/mL injection 8 Units  8 Units Subcutaneous Once Devorah Allen MD   8 Units at 07/15/24 1351    [COMPLETED] dilTIAZem (cardIZEM) 25 mg/5mL injection 10 mg  10 mg Intravenous Once KenanathiveetiStanislaw vogt MD   10 mg at 24    [COMPLETED] predniSONE (Deltasone) tab 20 mg  20 mg Oral Once Pat Crum MD   20 mg at 24 1537    [COMPLETED] furosemide (Lasix) 10 mg/mL injection 40 mg  40 mg Intravenous Once Devorah Allen MD   40 mg at 24 1537    [COMPLETED] heparin (Porcine) 5000 UNIT/ML injection             [COMPLETED] lidocaine PF (Xylocaine-MPF) 1 % injection             [COMPLETED] chlorhexidine (Hibiclens) 4 % external liquid   Topical On Call Chidi Fatima MD   Given at 24 2100    [COMPLETED] digoxin (Lanoxin) 250 MCG/ML injection 250 mcg  250 mcg Intravenous Once Keny Salazar MD   250 mcg at 24 1007    [COMPLETED] dilTIAZem 10 mg BOLUS FROM BAG infusion  10 mg Intravenous Once Audi Larkin MD   10 mg at 24 0915    [COMPLETED] dilTIAZem (cardIZEM) 25 mg/5mL injection 5 mg  5 mg Intravenous Once Audi Larkin MD   5 mg at 24    [COMPLETED] dilTIAZem (cardIZEM) 25 mg/5mL injection 5 mg  5 mg Intravenous Once Audi Larkin MD   5 mg at 24    [COMPLETED] dilTIAZem 10 mg BOLUS FROM BAG infusion  10 mg Intravenous Once Audi Larkin MD   10 mg at 24 2330    [COMPLETED] metoprolol (Lopressor) 5 mg/5mL injection 5 mg  5 mg Intravenous Once InjDevorah whitt MD   5 mg at 24 2156    [COMPLETED]  iopamidol 76% (ISOVUE-370) injection for power injector  75 mL Intravenous ONCE PRN Perfecto Pace, DO   75 mL at 24 1023    [COMPLETED] potassium chloride (Klor-Con M20) tab 40 mEq  40 mEq Oral Q4H Perfecto Pace, DO   40 mEq at 24 1604    [COMPLETED] digoxin (Lanoxin) 250 MCG/ML injection 500 mcg  500 mcg Intravenous Once Stanislaw Case MD   500 mcg at 24 0911    [] dilTIAZem 10 mg BOLUS FROM BAG infusion  10 mg Intravenous Q1H PRN Yesenia Anderson MD        [COMPLETED] methohexital (BrevITAL) 100 mg/10mL IV syringe 100 mg  100 mg Intravenous Once Keny Salazar MD   30 mg at 24 0909    [COMPLETED] dilTIAZem (cardIZEM) 25 mg/5mL injection 5 mg  5 mg Intravenous Once Audi Larkin MD   5 mg at 24 0812    [COMPLETED] methylPREDNISolone sodium succinate (Solu-MEDROL) injection 60 mg  60 mg Intravenous Once Tomasz Lyon, DO   60 mg at 24 0134    [COMPLETED] sodium chloride 0.9 % IV bolus 500 mL  500 mL Intravenous Once Morales Yeh,    Stopped at 24 1609    [] dilTIAZem 5 mg BOLUS FROM BAG infusion  5 mg Intravenous Q1H PRN Moarles Yeh DO   5 mg at 24 1539    [COMPLETED] dilTIAZem (cardIZEM) 100 MG injection              Current Outpatient Medications on File Prior to Encounter   Medication Sig Dispense Refill    Budesonide-Formoterol Fumarate 160-4.5 MCG/ACT Inhalation Aerosol Inhale 2 puffs into the lungs 2 (two) times daily. 3 each 3    levETIRAcetam 500 MG Oral Tab Take 1 tablet (500 mg total) by mouth 2 (two) times daily. 180 tablet 0    amiodarone 200 MG Oral Tab Take one table twice daily x 2 weeks then take one tablet daily thereafter 90 tablet 0    furosemide 40 MG Oral Tab Take 1 tablet (40 mg total) by mouth daily. 90 tablet 0    Roflumilast 500 MCG Oral Tab Take 500 mcg by mouth daily. 90 tablet 0    guaiFENesin  MG Oral Tablet 12 Hr Take 2 tablets (1,200 mg total) by mouth 2 (two) times daily.      Ferrous  Sulfate 324 (65 Fe) MG Oral Tab EC Take 65 mg by mouth daily.      tiotropium 18 MCG Inhalation Cap Inhale 1 capsule (18 mcg total) into the lungs daily.      Levalbuterol HCl 0.63 MG/3ML Inhalation Nebu Soln Take 3 mL (0.63 mg total) by nebulization every 6 (six) hours as needed for Shortness of Breath or Wheezing. 3 each 3    atorvastatin 20 MG Oral Tab Take 1 tablet (20 mg total) by mouth nightly.      XARELTO 20 MG Oral Tab Take 1 tablet by mouth daily with food 30 tablet 3    Ipratropium Bromide 0.02 % Inhalation Solution Take 2.5 mL (500 mcg total) by nebulization every 6 (six) hours as needed for Wheezing.      Multiple Vitamin (TAB-A-CUCA) Oral Tab Take 1 tablet by mouth daily.      aspirin 81 MG Oral Tab EC Take 1 tablet (81 mg total) by mouth daily.      B Complex-Biotin-FA (B COMPLETE) Oral Tab Take 1 tablet by mouth daily.      magnesium 250 MG Oral Tab Take 1 tablet (250 mg total) by mouth every evening.      predniSONE 10 MG Oral Tab Take 3 tablets daily for 3 days then 2 tablets daily for 3 days then 1 tablets daily for 3 days then stop. 30 tablet 0    [] sulfamethoxazole-trimethoprim -160 MG Oral Tab per tablet Take 1 tablet by mouth 2 (two) times daily for 5 days. 10 tablet 0    JARDIANCE 10 MG Oral Tab       ketoconazole 2 % External Cream Apply topically as needed.         Review of Systems:   A comprehensive review of systems was completed.    Pertinent positives and negatives noted in the HPI.    Objective:   Physical Exam:    /75 (BP Location: Right arm)   Pulse 78   Temp 98 °F (36.7 °C) (Oral)   Resp 19   Wt 152 lb 12.8 oz (69.3 kg)   SpO2 93%   BMI 21.92 kg/m²   General: No acute distress, Alert  Respiratory: No rhonchi, no wheezes, diminished  Cardiovascular: S1, S2. Regular rate and rhythm  Abdomen: Soft, Non-tender, non-distended, positive bowel sounds  Neuro: No new focal deficits  Extremities: Bilateral lower extremity edema, right foot with some  erythema      Results:    Labs:      Labs Last 24 Hours:    Recent Labs   Lab 08/13/24  1428   RBC 3.45*   HGB 10.6*   HCT 34.1*   MCV 98.8   MCH 30.7   MCHC 31.1   RDW 17.3   NEPRELIM 6.62   WBC 8.5   .0       Recent Labs   Lab 08/13/24  1428   *   BUN 17   CREATSERUM 0.74   EGFRCR 101   CA 9.4   ALB 4.1      K 3.7      CO2 37.0*   ALKPHO 56   AST 20   ALT 11   BILT 0.4   TP 6.8       Lab Results   Component Value Date    PT 16.9 (H) 05/05/2014    PT 13.3 05/04/2014    INR 1.59 (H) 06/29/2024    INR 1.06 06/11/2024    INR 2.26 (H) 09/22/2021       Recent Labs   Lab 08/13/24  1428   TROPHS 31       Recent Labs   Lab 08/13/24  1428   PBNP 4,827*       No results for input(s): \"PCT\" in the last 168 hours.    Imaging: Imaging data reviewed in Epic.    Assessment & Plan:      # Acute on chronic hypoxic/hypercapnic respiratory failure  #Acute CHF exacerbation  #COPD, no wheezing  -Consult pulmonology  -no Steroids per pulm   -On Vapotherm, wean as able  -BiPAP  -IV diuretics  -DuoNebs  -recent Echo reviewed, pEF    #R foot cellulitis  -mild. May be 2/2 recent bug bite  -IV ancef for short course    #Pulmonary nodules  -Followed by pulm    #Previous smoker    #Paroxysmal A-fib - xarelto  #Aortic stenosis  #History of left renal cell cancer status post nephrectomy  #Hypertension  #Hyperlipidemia  #ABIGAIL        Plan of care discussed with patient, ED physician    Sidney Silver MD    Supplementary Documentation:     The 21st Century Cures Act makes medical notes like these available to patients in the interest of transparency. Please be advised this is a medical document. Medical documents are intended to carry relevant information, facts as evident, and the clinical opinion of the practitioner. The medical note is intended as peer to peer communication and may appear blunt or direct. It is written in medical language and may contain abbreviations or verbiage that are unfamiliar.                **Certification      PHYSICIAN Certification of Need for Inpatient Hospitalization - Initial Certification    Patient will require inpatient services that will reasonably be expected to span two midnight's based on the clinical documentation in H+P.   Based on patients current state of illness, I anticipate that, after discharge, patient will require TBD.

## 2024-08-14 NOTE — DISCHARGE INSTRUCTIONS
Home Health Provider  Sometimes managing your health at home requires assistance.  The [facility] team has recognized your preference to use [facility].  They can be reached by phone at [phone].  The fax number for your reference is [phone].. A representative from the home health agency will contact you or your family to schedule your first visit.        [facility]  [address] [place] [address]  Phone: [phone]  Fax: [phone]      Going Home    In this section you will find the tools which will guide you through the first few days after you leave the hospital. Continued use of these tools will help you develop the skills necessary to keep your heart failure under control.     Heart Failure Guidelines - place this worksheet on your refrigerator or somewhere you can refer to it daily to help you decide if your symptoms are under control, and what to do if they are not.     Home Care Instructions Following Heart Failure - the most important things to do every day include:     Weigh yourself  Take your medicines as prescribed  Limit your sodium (salt) and fluid intake  Know when to call your cardiologist, primary doctor, or nurse  Know when to seek emergency care    There is also a handy weight chart on which to record your weight every day, information on measuring fluids and limiting fluid intake, and a section for recording your thoughts or questions.     Things for You to Remember:   1. An appointment has been made for you to see your doctor or healthcare provider within 7 days of hospital discharge. It is important that you attend this appointment to make sure your symptoms are under control.     2. Your recommended sodium intake is [id number] mg daily    3. Limit your fluid intake to no more than 2 liters or 64 ounces per day    4. Some exercise and activity is important to help keep your heart functioning and strong. Unless instructed not to  exercise, you may walk at a slow to moderate pace for 10-15 minutes 2-3 days per week to start. Pace your activity to prevent shortness of breath or fatigue. Stop exercise if you develop chest pain, lightheadedness, or significant shortness of breath.       Call Your Cardiologist If:   You gain 2 pounds overnight or 3-4 pounds in 3-5 days  You have more difficulty breathing  You are getting more tired with normal activity  You are more short of breath lying down, or awaken at night short of breath  You have swelling of your feet or legs  You urinate less often during the day and more often at night  You have cramps in your legs  You have blurred vision or see yellowish-green halos around objects of lights    Go to the Emergency Room If:   You have pain or tightness in your chest  You are extremely short of breath  You are coughing up pink-frothy mucus  You are traveling and develop symptoms of worsening heart failure    Additional Resources:   If you have questions or concerns about heart failure management, call the [facility] Heart Failure Unit at [phone]

## 2024-08-14 NOTE — PROGRESS NOTES
[facility]   part of [facility]     Hospitalist Progress Note     Keny Marshall Patient Status:  Inpatient    1960 MRN [id number]   Location [facility] 2NE-A Attending Megan De Leon MD   Hosp Day # 1 PCP Dustin Avina MD     Chief Complaint: cough erythema rt foot    Subjective:     Patient denies sob cp    Objective:    Review of Systems:   A comprehensive review of systems was completed; pertinent positive and negatives stated in subjective.    Vital signs:  Temp:  [97.4 °F (36.3 °C)-98 °F (36.7 °C)] 97.4 °F (36.3 °C)  Pulse:  [73-87] 79  Resp:  [18-30] 20  BP: ()/(56-78) 123/56  SpO2:  [83 %-99 %] 88 %    Physical Exam:    General: No acute distress  Respiratory: No wheezes, no rhonchi  Cardiovascular: S1, S2, regular rate and rhythm  Abdomen: Soft, Non-tender, non-distended, positive bowel sounds  Neuro: No new focal deficits.   Extremities: No edema      Diagnostic Data:    Labs:  Recent Labs   Lab 24  1428 24  0715   WBC 8.5 7.0   HGB 10.6* 10.2*   MCV 98.8 102.4*   .0 229.0       Recent Labs   Lab 24  1428 24  0715   * 116*   BUN 17 17   CREATSERUM 0.74 0.83   CA 9.4 9.1   ALB 4.1 3.7    142   K 3.7 4.4    101   CO2 37.0* >40.0*   ALKPHO 56 53   AST 20 16   ALT 11 10   BILT 0.4 0.4   TP 6.8 6.5       Estimated Creatinine Clearance: 87.8 mL/min (based on SCr of 0.83 mg/dL).    Recent Labs   Lab 24  1428   TROPHS 31       No results for input(s): \"PTP\", \"INR\" in the last 168 hours.               Microbiology    No results found for this visit on 24.      Imaging: Reviewed in Epic.    Medications:    fluticasone-salmeterol  1 puff Inhalation BID    ipratropium  500 mcg Nebulization TID & HS    Roflumilast  500 mcg Oral Daily    amiodarone  200 mg Oral Daily    aspirin  81 mg Oral Daily    atorvastatin  20 mg Oral Nightly    ferrous sulfate  325 mg Oral Daily    guaiFENesin ER  1,200 mg Oral BID    levETIRAcetam  500 mg  Oral BID    rivaroxaban  20 mg Oral Daily with food    furosemide  40 mg Intravenous BID (Diuretic)    ceFAZolin  2 g Intravenous Q8H       Assessment & Plan:      # Acute on chronic hypoxic/hypercapnic respiratory failure  -4L NC at home  -not able to use avap at home  #Acute CHF exacerbation  -lasix iv bid  #COPD, no wheezing  -Consult pulmonology  -no Steroids per pulm   -On Vapotherm, wean as able  -IV diuretics  -DuoNebs  -recent Echo reviewed, pEF     #R foot cellulitis  -mild. May be 2/2 recent bug bite  -IV ancef for short course     #Pulmonary nodules  -Followed by pulm     #Previous smoker     #Paroxysmal A-fib - xarelto  #Aortic stenosis  #History of left renal cell cancer status post nephrectomy  #Hypertension  #Hyperlipidemia  #ABIGAIL      Megan De Leon MD    Supplementary Documentation:     Quality:  DVT Mechanical Prophylaxis:   SCDs,    DVT Pharmacologic Prophylaxis   Medication    rivaroxaban (Xarelto) tab 20 mg         DVT Pharmacologic prophylaxis: Aspirin 81 mg      Code Status: Full Code  Peters: No urinary catheter in place  Peters Duration (in days):   Central line:    ARUN:     Discharge is dependent on: progress  At this point Mr. Marshall is expected to be discharge to: home    The 21st Century Cures Act makes medical notes like these available to patients in the interest of transparency. Please be advised this is a medical document. Medical documents are intended to carry relevant information, facts as evident, and the clinical opinion of the practitioner. The medical note is intended as peer to peer communication and may appear blunt or direct. It is written in medical language and may contain abbreviations or verbiage that are unfamiliar.

## 2024-08-14 NOTE — PLAN OF CARE
BIPAP at night. 6L HF oxygen with SPO2> 88%, skin intact, skin assessment with nursing assistant. IV antibiotics. Vitals stable. SR with BBB. Kept clean and dry,   Problem: CARDIOVASCULAR - ADULT  Goal: Maintains optimal cardiac output and hemodynamic stability  Description: INTERVENTIONS:  - Monitor vital signs, rhythm, and trends  - Monitor for bleeding, hypotension and signs of decreased cardiac output  - Evaluate effectiveness of vasoactive medications to optimize hemodynamic stability  - Monitor arterial and/or venous puncture sites for bleeding and/or hematoma  - Assess quality of pulses, skin color and temperature  - Assess for signs of decreased coronary artery perfusion - ex. Angina  - Evaluate fluid balance, assess for edema, trend weights  Outcome: Progressing  Goal: Absence of cardiac arrhythmias or at baseline  Description: INTERVENTIONS:  - Continuous cardiac monitoring, monitor vital signs, obtain 12 lead EKG if indicated  - Evaluate effectiveness of antiarrhythmic and heart rate control medications as ordered  - Initiate emergency measures for life threatening arrhythmias  - Monitor electrolytes and administer replacement therapy as ordered  Outcome: Progressing     Problem: RESPIRATORY - ADULT  Goal: Achieves optimal ventilation and oxygenation  Description: INTERVENTIONS:  - Assess for changes in respiratory status  - Assess for changes in mentation and behavior  - Position to facilitate oxygenation and minimize respiratory effort  - Oxygen supplementation based on oxygen saturation or ABGs  - Provide Smoking Cessation handout, if applicable  - Encourage broncho-pulmonary hygiene including cough, deep breathe, Incentive Spirometry  - Assess the need for suctioning and perform as needed  - Assess and instruct to report SOB or any respiratory difficulty  - Respiratory Therapy support as indicated  - Manage/alleviate anxiety  - Monitor for signs/symptoms of CO2 retention  Outcome: Progressing

## 2024-08-14 NOTE — PLAN OF CARE
Received pt at shift change. AxOx4. 6L O2 this am, increased to 8L HFNC w stats maintaining 90%. Denies pain, some SOB on exertion. Vitals stable. NSR on tele.  See flowsheets for additional assessments.   Pt updated on POC. Call light within reach. All needs met at this time.     Plan:  -IV lasix 40mg BID  -Wean O2 as able  -Scheduled nebs  -DW // I&O // 2000FR  -IV Ancef q8  -PT/OT eval    Problem: Patient/Family Goals  Goal: Patient/Family Long Term Goal  Description: Patient's Long Term Goal: go home    Interventions:  - wean O2, scheduled nebs, IV lasix  - See additional Care Plan goals for specific interventions  Outcome: Progressing  Goal: Patient/Family Short Term Goal  Description: Patient's Short Term Goal: feel better    Interventions:   - wean O2, scheduled nebs, IV lasix  - See additional Care Plan goals for specific interventions  Outcome: Progressing     Problem: CARDIOVASCULAR - ADULT  Goal: Maintains optimal cardiac output and hemodynamic stability  Description: INTERVENTIONS:  - Monitor vital signs, rhythm, and trends  - Monitor for bleeding, hypotension and signs of decreased cardiac output  - Evaluate effectiveness of vasoactive medications to optimize hemodynamic stability  - Monitor arterial and/or venous puncture sites for bleeding and/or hematoma  - Assess quality of pulses, skin color and temperature  - Assess for signs of decreased coronary artery perfusion - ex. Angina  - Evaluate fluid balance, assess for edema, trend weights  Outcome: Progressing  Goal: Absence of cardiac arrhythmias or at baseline  Description: INTERVENTIONS:  - Continuous cardiac monitoring, monitor vital signs, obtain 12 lead EKG if indicated  - Evaluate effectiveness of antiarrhythmic and heart rate control medications as ordered  - Initiate emergency measures for life threatening arrhythmias  - Monitor electrolytes and administer replacement therapy as ordered  Outcome: Progressing     Problem: RESPIRATORY -  ADULT  Goal: Achieves optimal ventilation and oxygenation  Description: INTERVENTIONS:  - Assess for changes in respiratory status  - Assess for changes in mentation and behavior  - Position to facilitate oxygenation and minimize respiratory effort  - Oxygen supplementation based on oxygen saturation or ABGs  - Provide Smoking Cessation handout, if applicable  - Encourage broncho-pulmonary hygiene including cough, deep breathe, Incentive Spirometry  - Assess the need for suctioning and perform as needed  - Assess and instruct to report SOB or any respiratory difficulty  - Respiratory Therapy support as indicated  - Manage/alleviate anxiety  - Monitor for signs/symptoms of CO2 retention  Outcome: Progressing

## 2024-08-14 NOTE — PAYOR COMM NOTE
--------------  ADMISSION REVIEW     Payor: [employer] MEDICARE ADV PPO  Subscriber #:  [id number]  Authorization Number: [id number]    Admit date: 8/13/24  Admit time:  6:41 PM       REVIEW DOCUMENTATION:     ED Provider Notes          Stated Complaint: CHRISTI    Subjective:   HPI    Patient is a 63-year-old male with history of COPD.  Patient is out of 4 and half liters of oxygen all the time.  Patient states for last 3 days has had slight cough.  Patient felt increased and short of breath.  Patient states his O2 sats will drop down to the 60s.  Patient denies chest pain, no pain with deep breaths.  Patient denies fevers.  Patient slight nasal congestion slight nonproductive cough.  No abdominal pain.  Patient has slight leg swelling but states it is not significantly worse than normal.  Remainder of review of systems negative.  Patient checks his weights occasionally does not feel he has gained a lot of weight.      Physical Exam     ED Triage Vitals [08/13/24 1430]   /74   Pulse 87   Resp 26   Temp 98 °F (36.7 °C)   Temp src Oral   SpO2 95 %   O2 Device Simple mask       Current Vitals:   Vital Signs  BP: 119/65  Pulse: 76  Resp: 19  Temp: 98 °F (36.7 °C)  Temp src: Oral  MAP (mmHg): 79    Oxygen Therapy  SpO2: 91 %  O2 Device: Nasal cannula  O2 Flow Rate (L/min): 6 L/min            Physical Exam  GENERAL: Patient resting on the cart in no acute distress.  HEENT: Extraocular muscles intact, pupils equal round reactive to light.  Mouth normal, neck supple, no meningismus.  LUNGS: Lungs clear to auscultation bilaterally.  CARDIOVASCULAR: + S1-S2, regular rate and rhythm, no murmurs.  BACK: No CVA tenderness, no midline bony tenderness.  ABDOMEN: + Bowel sounds, soft, nontender, nondistended.  No rebound, no guarding, no hepatosplenomegaly.  EXTREMITIES: Full range of motion, no tenderness, good capillary refill.  1-2+ edema pretibial bilaterally  SKIN: No rash, good turgor.  NEURO: Patient answers questions  appropriately.  No focal deficits appreciated.  Conversant.           ED Course     Labs Reviewed   COMP METABOLIC PANEL (14) - Abnormal; Notable for the following components:       Result Value    Glucose 182 (*)     CO2 37.0 (*)     All other components within normal limits   CBC WITH DIFFERENTIAL WITH PLATELET - Abnormal; Notable for the following components:    RBC 3.45 (*)     HGB 10.6 (*)     HCT 34.1 (*)     Lymphocyte Absolute 0.68 (*)     All other components within normal limits   PRO BETA NATRIURETIC PEPTIDE - Abnormal; Notable for the following components:    Pro-Beta Natriuretic Peptide 4,827 (*)     All other components within normal limits   TROPONIN I HIGH SENSITIVITY - Normal   EKG    Rate, intervals and axes as noted on EKG Report.  Rate: 83  Rhythm: Sinus Rhythm  Reading: Normal sinus rhythm, left bundle branch block, no significant change compared with previous    Chest x-ray1. Stable COPD changes.   2. Diffuse interstitial opacities are scattered throughout both lungs which was present previously.  Differential includes pulmonary fibrosis and other inflammatory interstitial lung disease.   3. Stable small bilateral pleural effusions.   4. Status post heart valve surgery.    Independent reviewed by myself, no pneumothorax        MDM      Patient was initially given a DuoNeb nebulizer treatment.  Patient's BNP was significantly elevated over 4800.  Patient does have leg swelling.  Patient x-ray had some interstitial opacities.  Patient was treated with 80 mg Lasix.  Patient with likely CHF.   Patient also has COPD and with his hypoxia and increased oxygen requirement will be admitted.  I did speak with the [place] hospitalist I did speak with pulmonology.  I did consider COVID, pneumonia, CHF, COPD.  Patient was improved on subsequent reevaluations  Admission disposition: 8/13/2024  4:33 PM         Medical Decision Making      Disposition and Plan     Clinical Impression:  1. Hypoxia    2. Acute on  chronic congestive heart failure, unspecified heart failure type (HCC)    3. COPD exacerbation (HCC)         Disposition:  Admit  2024  4:33 pm         History and Physical            Keny Marshall Patient Status:  Inpatient    1960 MRN [id number]   Location [facility] 2NE-A Attending Sidney Silver MD   Hosp Day # 0 PCP Dustin Avina MD      Chief Complaint: sob        Subjective:  History of Present Illness:      Keny Marshall is a 63 year old male with past medical history of aortic stenosis, A-fib, renal cancer, COPD, on 4 L home O2, GERD, hypertension, hyperlipidemia, ABIGAIL who presents with shortness of breath.  Has had 3 days of cough.  Has chronic bilateral lower extremity swelling.  Pretty significant but states this is about the same as it usually is.  Over last few days has noticed some erythema on his right foot which has been growing.  Unclear if he got a bug bite but there seems to be a dot in the middle.  His shortness of breath has gotten worse and his O2 sats have been dropping into the 60s at times now.  Denies any fever, chills.      Assessment & Plan:  # Acute on chronic hypoxic/hypercapnic respiratory failure  #Acute CHF exacerbation  #COPD, no wheezing  -Consult pulmonology  -no Steroids per pulm   -On Vapotherm, wean as able  -BiPAP  -IV diuretics  -DuoNebs  -recent Echo reviewed, pEF     #R foot cellulitis  -mild. May be 2/2 recent bug bite  -IV ancef for short course     #Pulmonary nodules  -Followed by pulm     #Previous smoker     #Paroxysmal A-fib - xarelto  #Aortic stenosis  #History of left renal cell cancer status post nephrectomy  #Hypertension  #Hyperlipidemia  #ABIGAIL           Plan of care discussed with patient, ED physician     Sidney Silver MD     PULMONARY:      Reason for Consultation:  Hypercapnia, hypoxia     History of Present Illness:  Keny Marshall is a a(n) 63 year old male smoker with PMH severe COPD, chronic hypoxic respiratory failure on  supplemental o2, chronic hypercapnic respiratory failure previously managed on AVAPS, CHF, afib, RCCa s/p left nephrectomy 2024 who is admitted today with worsening dyspnea and hypoxia.  Since our last telehealth visit last month he has been monitored with his home RN and noted to have worsening BLE edema and respiratory exam was told he sounded like he was overloaded. His RN called my office today after noting his oximetry was in the 80s at best and on exertion would decrease to 60-70s.  Eventually he was convinced to come to the ER. He was given a neb and lasix and now feels somewhat better while on 6L o2 (baseline is 2L at rest,4L on exertion).     July 2024 previously  Keny Marshall is a 63 year old male  smoker with significant PMH severe COPD, chronic hypoxic respiratory failure on supplemental o2, severe aortic stenosis, CHF, hx RCCA s/p left nephrectomy 2014 who presents today for follow up. Since last visit he has been hospitalized several times in the last couples due to COPD exacerbation, afib with RVR and CHF exacerbation.   He was discharged 1.5 weeks from [facility] after cardioversion but then after 2-3 days was admitted to [facility] - noted elevated PCO2 of ~90 - improved after BIPAP.   He was discharged on 7/24.  Since his discharge he continues to have dyspnea on exertion and +productive coughing with thin clear phlegm.   Using atrovent TID, levalbuterol nebs at home     June 2024 previously  The patient was just recently hospitalized about two weeks ago at [place] with hypercapnic respiratory failure after being found unresponsive.  He was intubated at that time and subsequently able to be extubated and managed on BIPAP but refused to use. He was able to be discharged home however he developed worsening dyspnea over the past few days. He was seen in his cardiologist office and noted dyspneic and with BLE edema. He was again noted hypercapnic with PCO2 of 82. He was agreeable to BIPAP and just removed  a short time ago and feels better. Still with dyspnea. No chest pain/pressure, pleurisy. No fevers.   Does complain of chronic cough/phlegm. No blood.      September 2023 previously  Keny Marshall is a 62 year old male smoker with significant PMH severe COPD, chronic hypoxic respiratory failure on supplemental o2, severe aortic stenosis, CHF, hx RCCA s/p left nephrectomy 2014 who presents today for follow up. He denies acute concerns today, stable chronic ARCEO. Continued thick phlegm.no fevers. No pain     March 2023 previously  He denies new concerns, had his CT recently and concerned about results. Using inhaler and nebs, breathing is stable. Has thick phlegm, no blood. no fevers. No pain         Assessment/Plan:  #1. Acute on chronic hypoxic respiratory failure; chronic hypercapnic respiratory failure  Multifactorial due to CHF exacerbation with underlying COPD   Continue nebs, steroids as below  Wean o2 for goal saturations of 88-92% given hypercapnia. His previous o2 baseline was: 2L at rest, 4L on exertion; using and benefiting from portable oxygen concentrator (3 at rest, 6 on exertion)  Will need repeat o2 walk prior to discharge  He was previously ordered and used AVAPS with Dr. Lorenz however he has been unable to afford and now is to use BIPAP     #2. Acute CHF exacerbation, with hx of afib with RVR  Diurese as per cardiology     #3. COPD   Severe obstruction on previous PFTs 3/2021  No clear exacerbation at present, suspect current issue is more fluid overload/ CHF exacerbation  Continue on symbicort BID --> advair while hospitalized  Cannot tolerate albuterol due to palpitations, tremors; continue with levalbuterol and ipratropium nebulizers every 6 hours.    Continue on budesonide nebs BID  Continue hypertonic saline nebs prn  Continue roflumilast  Previously reviewed BLVR - not a candidate      #4. Pulmonary nodules  7/2017 CT with worsening peripheral lingular atelectasis. Nodules stable   5/2018 CT  stable nodules  12/2020 CT stable  7/2021 CT stable but worsening mediastinal LAD suspect reactive to recent cardiac surgery  1/2022 CT with stable nodules, improved LAD  3/2023 CT chest with several small nodules  9/2023 CT chest with stable findings   7/1/2024 CT chest with worsening ALYSON lesion, possible scarring, rounded atelectasis vs neoplasm  Will need outpatient follow up including PET/CT     #5. Tobacco abuse  30+ pack years, pipe smoker  As above         8/14  HOSPITALIST:    Chief Complaint: cough erythema rt foot        Subjective:  Patient denies sob cp    Assessment & Plan:  # Acute on chronic hypoxic/hypercapnic respiratory failure  -4L NC at home  -not able to use avap at home  #Acute CHF exacerbation  -lasix iv bid  #COPD, no wheezing  -Consult pulmonology  -no Steroids per pulm   -On Vapotherm, wean as able  -IV diuretics  -DuoNebs  -recent Echo reviewed, pEF     #R foot cellulitis  -mild. May be 2/2 recent bug bite  -IV ancef for short course     #Pulmonary nodules  -Followed by pulm     #Previous smoker     #Paroxysmal A-fib - xarelto  #Aortic stenosis  #History of left renal cell cancer status post nephrectomy  #Hypertension  #Hyperlipidemia  #ABIGAIL        Megan De Leon MD    MEDICATIONS ADMINISTERED IN LAST 1 DAY:  acetaminophen (Tylenol Extra Strength) tab 1,000 mg       Date Action Dose Route User    8/14/2024 0445 Given 1,000 mg Oral Mary Lou Huber RN          albuterol (Ventolin) (2.5 MG/3ML) 0.083% nebulizer solution       Date Action Dose Route User    8/14/2024 1158 Given 2.5 mg Nebulization Ed Song RCP          amiodarone (Pacerone) tab 200 mg       Date Action Dose Route User    8/14/2024 1036 Given 200 mg Oral Samira Hussein, RADHA          aspirin DR tab 81 mg       Date Action Dose Route User    8/14/2024 1036 Given 81 mg Oral Samira Hussein, RADHA          atorvastatin (Lipitor) tab 20 mg       Date Action Dose Route User    8/13/2024 2130 Given 20 mg Oral Mary Lou Huber, RADHA           ceFAZolin (Ancef) 2g in 10mL IV syringe premix       Date Action Dose Route User    8/14/2024 1338 New Bag 2 g Intravenous Samira Hussein RN    8/14/2024 0445 New Bag 2 g Intravenous Mary Lou Huber RN    8/13/2024 2139 New Bag 2 g Intravenous Mary Lou Huber RN          ferrous sulfate DR tab 325 mg       Date Action Dose Route User    8/14/2024 1036 Given 325 mg Oral Samira Hussein RN          fluticasone-salmeterol (Advair Diskus) 250-50 MCG/ACT inhaler 1 puff       Date Action Dose Route User    8/14/2024 1017 Given 1 puff Inhalation Isaura Tom RCP    8/13/2024 2115 Given 1 puff Inhalation LaraPradeep gillin          furosemide (Lasix) 10 mg/mL injection 80 mg       Date Action Dose Route User    8/13/2024 1548 Given 80 mg Intravenous Ana Dey RN          furosemide (Lasix) 10 mg/mL injection 40 mg       Date Action Dose Route User    8/14/2024 1036 Given 40 mg Intravenous Samira Hussein RN    8/13/2024 2130 Given 40 mg Intravenous Mary Lou Huber RN          guaiFENesin ER (Mucinex) 12 hr tab 1,200 mg       Date Action Dose Route User    8/14/2024 1036 Given 1,200 mg Oral Samira Hussein RN    8/13/2024 2129 Given 1,200 mg Oral Mary Lou Huber RN          ipratropium (Atrovent) 0.02 % nebulizer solution 500 mcg       Date Action Dose Route User    8/14/2024 1000 Given 500 mcg Nebulization Isaura Tom RCP    8/13/2024 2115 Given 500 mcg Nebulization Pradeep Larain          levETIRAcetam (Keppra) tab 500 mg       Date Action Dose Route User    8/14/2024 1036 Given 500 mg Oral Samira Hussein RN    8/13/2024 2129 Given 500 mg Oral Mary Lou Huber RN          potassium chloride (Klor-Con M20) tab 40 mEq       Date Action Dose Route User    8/13/2024 2348 Given 40 mEq Oral Mary Lou Huber RN          rivaroxaban (Xarelto) tab 20 mg       Date Action Dose Route User    8/14/2024 1036 Given 20 mg Oral Samira Hussein RN          Roflumilast TABS 500 mcg       Date Action Dose Route User    8/14/2024 1036 Given 500  mcg Samira Daley RN            Vitals (last day)       Date/Time Temp Pulse Resp BP SpO2 Weight O2 Device O2 Flow Rate (L/min) Who    08/14/24 1158 -- -- -- -- 95 % -- High flow nasal cannula 8 L/min  JT    08/14/24 1100 97.8 °F (36.6 °C) 77 17 103/57 99 % -- High flow nasal cannula 10 L/min DT    08/14/24 1000 -- -- -- -- 95 % -- High flow nasal cannula 12 L/min MA    08/14/24 0830 97.7 °F (36.5 °C) -- 17 -- 97 % -- High flow/High humidity 6 L/min DT    08/14/24 0830 -- 78 -- 116/61 -- -- -- -- SH    08/14/24 0752 -- -- -- -- -- 152 lb 1.9 oz (69 kg) -- -- ML    08/14/24 0413 97.4 °F (36.3 °C) 79 20 123/56 88 % -- High flow nasal cannula 6 L/min AA    08/14/24 0348 -- -- -- -- 93 % -- High flow nasal cannula 6 L/min TK    08/14/24 0345 -- 75 20 -- 97 % -- -- -- TK    08/13/24 2352 97.8 °F (36.6 °C) 76 20 111/72 93 % -- Bi-PAP -- ML    08/13/24 2351 -- 73 18 99/63 94 % -- Bi-PAP -- ML    08/13/24 2334 -- 81 18 -- 90 % -- Bi-PAP 6 L/min TK    08/13/24 2130 -- 75 18 122/75 92 % -- High flow nasal cannula 6 L/min ML    08/13/24 1950 -- 78 -- -- 93 % -- High flow nasal cannula 8 L/min ML    08/13/24 1900 -- 75 -- -- 96 % -- High flow nasal cannula 8 L/min ML    08/13/24 1854 -- 81 -- 124/75 87 % -- -- -- KR    08/13/24 1850 -- 80 -- 122/78 93 % 152 lb 12.8 oz (69.3 kg) High flow nasal cannula 8 L/min KR    08/13/24 1847 -- 79 -- 135/78 83 % -- -- -- KR    08/13/24 1600 -- 76 19 119/65 91 % -- Nasal cannula 6 L/min SV    08/13/24 1530 -- 79 30 119/72 99 % -- Other (Comment) -- SV    08/13/24 1514 -- -- -- -- 98 % -- High flow/High humidity 8 L/min EO    08/13/24 1430 98 °F (36.7 °C) 87 26 118/74 95 % 162 lb (73.5 kg) Simple mask 10 L/min SV

## 2024-08-14 NOTE — PHYSICAL THERAPY NOTE
PHYSICAL THERAPY EVALUATION - INPATIENT     Room Number: 2615/2615-A  Evaluation Date: 8/14/2024  Type of Evaluation: Initial  Physician Order: PT Eval and Treat    Presenting Problem: hypoxia, RLE cellulitis  Co-Morbidities : COPD, chronic repsiratory failure, afib, HTN, renal cancer, nephrectomy, AAA repair, R rotator cuff repair  Reason for Therapy: Mobility Dysfunction and Discharge Planning    PHYSICAL THERAPY ASSESSMENT   Patient is a 63 year old male admitted 8/13/2024 for hypercapnia, hypoxia, CHF, and RLE cellulitis.   Patient is currently functioning near baseline with bed mobility, transfers, and gait. Prior to admission, patient's baseline is independent no AD.     Patient will benefit from continued skilled PT Services at discharge to promote prior level of function.  Anticipate patient will return home with home health PT.    PLAN  Patient has been evaluated and presents with no skilled Physical Therapy needs at this time.  Patient discharged from Physical Therapy services.  Please re-order if a new functional limitation presents during this admission.    GOALS  Patient was able to achieve the following goals ...    Patient was able to transfer Safely and independently   Patient able to ambulate on level surfaces Safely and independently         HOME SITUATION  Type of Home:  (Duplex)   Home Layout: Two level;Able to live on main level                Lives With: Spouse  Drives: No  Patient Owned Equipment: Cane  Patient Regularly Uses: Home O2 (4.5 L)    Prior Level of [facility]: Pt reports he is typically independent with ADLs, ambulates with no AD, and does not drive. Has to do stairs if he takes out the trash.     SUBJECTIVE  \"I can move fine, I just can't breath\"       OBJECTIVE  Precautions: Bed/chair alarm;Seizure  Fall Risk: High fall risk    WEIGHT BEARING RESTRICTION  Weight Bearing Restriction: None                PAIN ASSESSMENT  Rating: Unable to rate  Location: R foot  Management  Techniques: Activity promotion;Relaxation;Breathing techniques;Repositioning    COGNITION  Overall Cognitive Status:  WFL - within functional limits    RANGE OF MOTION AND STRENGTH ASSESSMENT  Upper extremity ROM and strength are within functional limits     Lower extremity ROM is within functional limits     Lower extremity strength is within functional limits       BALANCE  Static Sitting: Good  Dynamic Sitting: Good  Static Standing: Good  Dynamic Standing: Good    ADDITIONAL TESTS                                    ACTIVITY TOLERANCE   Pt on 10L O2 at start of session with SPO2 in the low 80s. Required 15 L O2 to recover to 90%. With in room ambulation on 15 L O2 pt with SPO2 in low 80s and took a few minutes to recover to 93%. Pt weaned back down to 10 L O2 with SPO2 at 90%. RN notified.                       O2 WALK       NEUROLOGICAL FINDINGS                        AM-PAC '6-Clicks' INPATIENT SHORT FORM - BASIC MOBILITY  How much difficulty does the patient currently have...  Patient Difficulty: Turning over in bed (including adjusting bedclothes, sheets and blankets)?: None   Patient Difficulty: Sitting down on and standing up from a chair with arms (e.g., wheelchair, bedside commode, etc.): None   Patient Difficulty: Moving from lying on back to sitting on the side of the bed?: None   How much help from another person does the patient currently need...   Help from Another: Moving to and from a bed to a chair (including a wheelchair)?: None   Help from Another: Need to walk in hospital room?: None   Help from Another: Climbing 3-5 steps with a railing?: A Little       AM-PAC Score:  Raw Score: 23   Approx Degree of Impairment: 11.2%   Standardized Score (AM-PAC Scale): 56.93   CMS Modifier (G-Code): CI    FUNCTIONAL ABILITY STATUS  Gait Assessment   Functional Mobility/Gait Assessment  Gait Assistance: Supervision;Independent  Distance (ft): 10  Assistive Device: None  Pattern: Within Functional  Limits    Skilled Therapy Provided: Per RN okay to work with pt. Pt received in supine and was agreeable to PT session.     Bed Mobility:  Rolling: NT  Supine to sit: mod ind    Sit to supine: NT     Transfer Mobility:  Sit to stand: independent    Stand to sit: independent   Gait = pt ambulated in the room with no AD and supervision that quickly progressed to independent     Session limited by O2 needs.     Therapist's comments:Pt educated on role of therapy, goals for session, safety, fall prevention, pursed lip breathing, energy conservation, and activity recommendations.     Exercise/Education Provided:  Bed mobility  Energy conservation  Functional activity tolerated  Gait training  Posture  Strengthening  Transfer training    Patient End of Session: Up in chair;Needs met;Call light within reach;RN aware of session/findings;All patient questions and concerns addressed;Alarm set    Patient Evaluation Complexity Level:  History Moderate - 1 or 2 personal factors and/or co-morbidities   Examination of body systems Low -  addressing 1-2 elements   Clinical Presentation Low- Stable   Clinical Decision Making Low Complexity       PT Session Time: 23 minutes  Therapeutic Activity: 8 minutes

## 2024-08-15 LAB
ALBUMIN SERPL-MCNC: 3.7 G/DL (ref 3.2–4.8)
ALBUMIN/GLOB SERPL: 1.3 {RATIO} (ref 1–2)
ALP LIVER SERPL-CCNC: 45 U/L
ALT SERPL-CCNC: <7 U/L
ANION GAP SERPL CALC-SCNC: 7 MMOL/L (ref 0–18)
AST SERPL-CCNC: 37 U/L (ref ?–34)
BASOPHILS # BLD AUTO: 0.04 X10(3) UL (ref 0–0.2)
BASOPHILS NFR BLD AUTO: 0.7 %
BILIRUB SERPL-MCNC: 0.3 MG/DL (ref 0.2–1.1)
BUN BLD-MCNC: 15 MG/DL (ref 9–23)
CALCIUM BLD-MCNC: 9.1 MG/DL (ref 8.7–10.4)
CHLORIDE SERPL-SCNC: 100 MMOL/L (ref 98–112)
CO2 SERPL-SCNC: 33 MMOL/L (ref 21–32)
CREAT BLD-MCNC: 0.67 MG/DL
EGFRCR SERPLBLD CKD-EPI 2021: 104 ML/MIN/1.73M2 (ref 60–?)
EOSINOPHIL # BLD AUTO: 0.31 X10(3) UL (ref 0–0.7)
EOSINOPHIL NFR BLD AUTO: 5.2 %
ERYTHROCYTE [DISTWIDTH] IN BLOOD BY AUTOMATED COUNT: 16.2 %
GLOBULIN PLAS-MCNC: 2.8 G/DL (ref 2–3.5)
GLUCOSE BLD-MCNC: 88 MG/DL (ref 70–99)
HCT VFR BLD AUTO: 32.8 %
HGB BLD-MCNC: 9.9 G/DL
IMM GRANULOCYTES # BLD AUTO: 0.02 X10(3) UL (ref 0–1)
IMM GRANULOCYTES NFR BLD: 0.3 %
LYMPHOCYTES # BLD AUTO: 0.64 X10(3) UL (ref 1–4)
LYMPHOCYTES NFR BLD AUTO: 10.7 %
MCH RBC QN AUTO: 30.4 PG (ref 26–34)
MCHC RBC AUTO-ENTMCNC: 30.2 G/DL (ref 31–37)
MCV RBC AUTO: 100.6 FL
MONOCYTES # BLD AUTO: 0.72 X10(3) UL (ref 0.1–1)
MONOCYTES NFR BLD AUTO: 12 %
NEUTROPHILS # BLD AUTO: 4.25 X10 (3) UL (ref 1.5–7.7)
NEUTROPHILS # BLD AUTO: 4.25 X10(3) UL (ref 1.5–7.7)
NEUTROPHILS NFR BLD AUTO: 71.1 %
OSMOLALITY SERPL CALC.SUM OF ELEC: 290 MOSM/KG (ref 275–295)
PLATELET # BLD AUTO: 229 10(3)UL (ref 150–450)
POTASSIUM SERPL-SCNC: 4.3 MMOL/L (ref 3.5–5.1)
PROT SERPL-MCNC: 6.5 G/DL (ref 5.7–8.2)
RBC # BLD AUTO: 3.26 X10(6)UL
SODIUM SERPL-SCNC: 140 MMOL/L (ref 136–145)
WBC # BLD AUTO: 6 X10(3) UL (ref 4–11)

## 2024-08-15 PROCEDURE — 99233 SBSQ HOSP IP/OBS HIGH 50: CPT | Performed by: HOSPITALIST

## 2024-08-15 PROCEDURE — 99232 SBSQ HOSP IP/OBS MODERATE 35: CPT | Performed by: INTERNAL MEDICINE

## 2024-08-15 NOTE — PLAN OF CARE
Received pt at shift change. AxOx4. 8L O2 this am, increased to 10L HFNC on exertion w stats maintaining 90%. Denies pain, SOB on exertion. Vitals stable. NSR on tele.  See flowsheets for additional assessments.   Pt updated on POC. Call light within reach. All needs met at this time.     Plan:  -IV lasix 40mg BID  -Wean O2 as able  -Scheduled nebs  -DW // I&O  -IV Ancef q8    Problem: Patient/Family Goals  Goal: Patient/Family Long Term Goal  Description: Patient's Long Term Goal: go home    Interventions:  - wean O2, scheduled nebs, IV lasix  - See additional Care Plan goals for specific interventions  Outcome: Progressing  Goal: Patient/Family Short Term Goal  Description: Patient's Short Term Goal: feel better    Interventions:   - wean O2, scheduled nebs, IV lasix  - See additional Care Plan goals for specific interventions  Outcome: Progressing     Problem: CARDIOVASCULAR - ADULT  Goal: Maintains optimal cardiac output and hemodynamic stability  Description: INTERVENTIONS:  - Monitor vital signs, rhythm, and trends  - Monitor for bleeding, hypotension and signs of decreased cardiac output  - Evaluate effectiveness of vasoactive medications to optimize hemodynamic stability  - Monitor arterial and/or venous puncture sites for bleeding and/or hematoma  - Assess quality of pulses, skin color and temperature  - Assess for signs of decreased coronary artery perfusion - ex. Angina  - Evaluate fluid balance, assess for edema, trend weights  Outcome: Progressing  Goal: Absence of cardiac arrhythmias or at baseline  Description: INTERVENTIONS:  - Continuous cardiac monitoring, monitor vital signs, obtain 12 lead EKG if indicated  - Evaluate effectiveness of antiarrhythmic and heart rate control medications as ordered  - Initiate emergency measures for life threatening arrhythmias  - Monitor electrolytes and administer replacement therapy as ordered  Outcome: Progressing     Problem: RESPIRATORY - ADULT  Goal: Achieves  optimal ventilation and oxygenation  Description: INTERVENTIONS:  - Assess for changes in respiratory status  - Assess for changes in mentation and behavior  - Position to facilitate oxygenation and minimize respiratory effort  - Oxygen supplementation based on oxygen saturation or ABGs  - Provide Smoking Cessation handout, if applicable  - Encourage broncho-pulmonary hygiene including cough, deep breathe, Incentive Spirometry  - Assess the need for suctioning and perform as needed  - Assess and instruct to report SOB or any respiratory difficulty  - Respiratory Therapy support as indicated  - Manage/alleviate anxiety  - Monitor for signs/symptoms of CO2 retention  Outcome: Progressing

## 2024-08-15 NOTE — PLAN OF CARE
Patient is alert, oriented x4. On 6L O2, titraining as needed. Bipap at night.  Denies chest pain, shortness of breath. On 2L fluid restriction. PRN tylenol given for headache- effective. Noted bruises in BUE.  VSS. tele showing NSR. POC discussed. Call light within reach.       Problem: CARDIOVASCULAR - ADULT  Goal: Maintains optimal cardiac output and hemodynamic stability  Description: INTERVENTIONS:  - Monitor vital signs, rhythm, and trends  - Monitor for bleeding, hypotension and signs of decreased cardiac output  - Evaluate effectiveness of vasoactive medications to optimize hemodynamic stability  - Monitor arterial and/or venous puncture sites for bleeding and/or hematoma  - Assess quality of pulses, skin color and temperature  - Assess for signs of decreased coronary artery perfusion - ex. Angina  - Evaluate fluid balance, assess for edema, trend weights  Outcome: Progressing  Goal: Absence of cardiac arrhythmias or at baseline  Description: INTERVENTIONS:  - Continuous cardiac monitoring, monitor vital signs, obtain 12 lead EKG if indicated  - Evaluate effectiveness of antiarrhythmic and heart rate control medications as ordered  - Initiate emergency measures for life threatening arrhythmias  - Monitor electrolytes and administer replacement therapy as ordered  Outcome: Progressing     Problem: RESPIRATORY - ADULT  Goal: Achieves optimal ventilation and oxygenation  Description: INTERVENTIONS:  - Assess for changes in respiratory status  - Assess for changes in mentation and behavior  - Position to facilitate oxygenation and minimize respiratory effort  - Oxygen supplementation based on oxygen saturation or ABGs  - Provide Smoking Cessation handout, if applicable  - Encourage broncho-pulmonary hygiene including cough, deep breathe, Incentive Spirometry  - Assess the need for suctioning and perform as needed  - Assess and instruct to report SOB or any respiratory difficulty  - Respiratory Therapy support as  indicated  - Manage/alleviate anxiety  - Monitor for signs/symptoms of CO2 retention  Outcome: Progressing

## 2024-08-15 NOTE — PAYOR COMM NOTE
--------------  8/15 CONTINUED STAY REVIEW    Payor: [employer] MEDICARE ADV PPO  Subscriber #:  [id number]  Authorization Number: [id number]    CARDS:    Impression;  recurrent hypoxemic respiratory failure--> multifactorial: adv COPD/exacerbation, CHF, AF  hx AF/flutter s/p flutter ablation 7/12/24 ([facility])  SOB - 2/2 acute dCHF and COPD exac  Acute diastolic CHF - improved  S/p bio AVR  TTE (6/12/24): LVEF 55%, +DD, 23mm SAVR 3.3m/s, mean 22mmHg.  Plan:  diuresis: -3L UOP, -11#. Cr stable/baseline. continue lasix 40mg IV BID.  hx AF: remains in SR.  continue amiodarone 200mg po daily.    AC: rivaroxaban, continue.  diuresis: continue lasix 40mg IV BID    severe COPD/end-stage emphysema- prednisone, inhaler therapy, per pulmonary service.      History of Present Illness:  Keny Marshall is a 63 year old male who presented to [facility] on 8/13/2024.     Seen in cardiology consultation for CHF, diuretic management.  Well known/established pt; multiple admissions due to dyspnea/hypoxemia, due to severe underlying COPD, CHF as well as atrial arrhythmias.  Last EDW admission 7/24 at which time he underwent A flutter ablation, rhythm settled into AF.  He was rate controlled and discharged on amiodarone.  He subsequently got admitted to [facility]- amiodarone dose adjusted there.      Admitted 8/13/24 due to recurrent dyspnea/hypoxia- managed on scheduled nebs/steroids.  pBNP 4800, lasix 40mg IV BID  (same dosing as prior admission) resumed.  ECG/telemetry shows SR LBBB.       Physical Exam:  Blood pressure 113/67, pulse 79, temperature 98.1 °F (36.7 °C), temperature source Oral, resp. rate 20, weight 151 lb 7.3 oz (68.7 kg), SpO2 (!) 87%.    General: Awake and alert; in no acute distress  HEENT: Extraocular movements are intact; sclerae are anicteric; scalp is atrauamatic; no thyromegaly  Neck: Supple; no JVD; no carotid bruits  Cardiac: Regular rate and regular rhythm; no murmurs/rubs/gallops are  appreciated; PMI is non-displaced; there is no evidence of a sternal heave  Lungs: Clear to auscultation bilaterally; no accessory muscle use is noted  Abdomen: Soft, non-tender; bowel sounds are normoactive; no hepatosplenomegaly  Extremities: No clubbing or cyanosis; moves all 4 extremities normally  Psychiatric: Normal mood and affect; answers questions appropriately  Dermatologic: No rashes; normal skin turgor     Diagnostic testing:     EKG: Normal sinus rhythm      Lab Results   Component Value Date     WBC 6.0 08/15/2024     HGB 9.9 08/15/2024     HCT 32.8 08/15/2024     .0 08/15/2024     CREATSERUM 0.67 08/15/2024     BUN 15 08/15/2024      08/15/2024     K 4.3 08/15/2024      08/15/2024     CO2 33.0 08/15/2024     GLU 88 08/15/2024     CA 9.1 08/15/2024     ALB 3.7 08/15/2024     ALKPHO 45 08/15/2024     BILT 0.3 08/15/2024     TP 6.5 08/15/2024     AST 37 08/15/2024     ALT <7 08/15/2024        HOSPITALIST:    Scheduled Medications[]Expand by Default    Roflumilast  500 mcg Oral Daily    fluticasone-salmeterol  1 puff Inhalation BID    ipratropium  500 mcg Nebulization TID & HS    amiodarone  200 mg Oral Daily    aspirin  81 mg Oral Daily    atorvastatin  20 mg Oral Nightly    ferrous sulfate  325 mg Oral Daily    guaiFENesin ER  1,200 mg Oral BID    levETIRAcetam  500 mg Oral BID    rivaroxaban  20 mg Oral Daily with food    furosemide  40 mg Intravenous BID (Diuretic)    ceFAZolin  2 g Intravenous Q8H          Assessment & Plan:  # Acute on chronic hypoxic/hypercapnic respiratory failure  -4L NC at home  -not able to use avap at home  #Acute CHF exacerbation  -lasix iv bid  #COPD, no wheezing  -Consult pulmonology  -no Steroids per pulm   -On Vapotherm, wean as able  -IV diuretics  -DuoNebs  -recent Echo reviewed, pEF     #R foot cellulitis  -mild. May be 2/2 recent bug bite  -IV ancef for short course     #Pulmonary nodules  -Followed by pulm     #Previous smoker     #Paroxysmal A-fib  - xarelto  #Aortic stenosis  #History of left renal cell cancer status post nephrectomy  #Hypertension  #Hyperlipidemia  #ABIGAIL      MEDICATIONS ADMINISTERED IN LAST 1 DAY:  ceFAZolin (Ancef) 2g in 10mL IV syringe premix       Date Action Dose Route User    8/15/2024 1416 New Bag 2 g Intravenous Samira Hussein RN    8/15/2024 0443 New Bag 2 g Intravenous Reynaldo Hou RN    8/14/2024 2100 New Bag 2 g Intravenous Reynaldo Hou RN     furosemide (Lasix) 10 mg/mL injection 40 mg       Date Action Dose Route User    8/15/2024 0926 Given 40 mg Intravenous Samira Hussein RN    8/14/2024 1801 Given 40 mg Intravenous Samira Hussein RN     ipratropium (Atrovent) 0.02 % nebulizer solution 500 mcg       Date Action Dose Route User    8/15/2024 1307 Given 500 mcg Nebulization Alfredo Yeh RCP    8/15/2024 0746 Given 500 mcg Nebulization Alfredo Yeh RCP    8/14/2024 2337 Given 500 mcg Nebulization Siobhan Ng    8/14/2024 1710 Given 500 mcg Nebulization Rodolfo Coronado, CM     Vitals (last day)       Date/Time Temp Pulse Resp BP SpO2 Weight O2 Device O2 Flow Rate (L/min) Who    08/15/24 1540 -- 74 -- 109/68 96 % -- -- -- RY    08/15/24 1130 98.1 °F (36.7 °C) 79 20 113/67 87 % -- Nasal cannula 9 L/min RY    08/15/24 0831 -- 78 -- -- -- -- -- -- SH    08/15/24 0831 98 °F (36.7 °C) -- 23 -- -- -- Nasal cannula 6 L/min RY    08/15/24 0746 -- -- -- -- 90 % -- -- 6 L/min EM    08/15/24 0451 97.6 °F (36.4 °C) 76 23 100/67 91 % 151 lb 7.3 oz (68.7 kg) Bi-PAP -- AS    08/15/24 0414 -- 74 -- -- 98 % -- Bi-PAP 10 L/min CU    08/14/24 2358 97.9 °F (36.6 °C) 69 22 116/63 94 % -- Bi-PAP 10 L/min AA    08/14/24 2337 -- 73 -- -- 90 % -- Bi-PAP 10 L/min CU    08/14/24 1958 98.4 °F (36.9 °C) 79 20 114/63 87 % -- High flow nasal cannula 6 L/min AA    08/14/24 1939 -- -- -- -- -- -- High flow nasal cannula 6 L/min AG

## 2024-08-15 NOTE — PROGRESS NOTES
[facility]   part of [facility]     Hospitalist Progress Note     Keny Marshall Patient Status:  Inpatient    1960 MRN [id number]   Location [facility] 2NE-A Attending Megan De Leon MD   Hosp Day # 2 PCP Dustin Avina MD     Chief Complaint: cough erythema rt foot    Subjective:     Patient denies sob cp    Objective:    Review of Systems:   A comprehensive review of systems was completed; pertinent positive and negatives stated in subjective.    Vital signs:  Temp:  [97.6 °F (36.4 °C)-98.4 °F (36.9 °C)] 98.1 °F (36.7 °C)  Pulse:  [69-79] 79  Resp:  [17-23] 20  BP: (100-123)/(63-73) 113/67  SpO2:  [87 %-98 %] 87 %    Physical Exam:    General: No acute distress  Respiratory: No wheezes, no rhonchi  Cardiovascular: S1, S2, regular rate and rhythm  Abdomen: Soft, Non-tender, non-distended, positive bowel sounds  Neuro: No new focal deficits.   Extremities: No edema      Diagnostic Data:    Labs:  Recent Labs   Lab 24  1428 24  0715 08/15/24  0644   WBC 8.5 7.0 6.0   HGB 10.6* 10.2* 9.9*   MCV 98.8 102.4* 100.6*   .0 229.0 229.0       Recent Labs   Lab 248 24  0715 08/15/24  0644   * 116* 88   BUN 17 17 15   CREATSERUM 0.74 0.83 0.67*   CA 9.4 9.1 9.1   ALB 4.1 3.7 3.7    142 140   K 3.7 4.4 4.3    101 100   CO2 37.0* >40.0* 33.0*   ALKPHO 56 53 45   AST 20 16 37*   ALT 11 10 <7*   BILT 0.4 0.4 0.3   TP 6.8 6.5 6.5       Estimated Creatinine Clearance: 108.2 mL/min (A) (based on SCr of 0.67 mg/dL (L)).    Recent Labs   Lab 24  1428   TROPHS 31       No results for input(s): \"PTP\", \"INR\" in the last 168 hours.               Microbiology    No results found for this visit on 24.      Imaging: Reviewed in Epic.    Medications:    Roflumilast  500 mcg Oral Daily    fluticasone-salmeterol  1 puff Inhalation BID    ipratropium  500 mcg Nebulization TID & HS    amiodarone  200 mg Oral Daily    aspirin  81 mg Oral Daily    atorvastatin   20 mg Oral Nightly    ferrous sulfate  325 mg Oral Daily    guaiFENesin ER  1,200 mg Oral BID    levETIRAcetam  500 mg Oral BID    rivaroxaban  20 mg Oral Daily with food    furosemide  40 mg Intravenous BID (Diuretic)    ceFAZolin  2 g Intravenous Q8H       Assessment & Plan:      # Acute on chronic hypoxic/hypercapnic respiratory failure  -4L NC at home  -not able to use avap at home  #Acute CHF exacerbation  -lasix iv bid  #COPD, no wheezing  -Consult pulmonology  -no Steroids per pulm   -On Vapotherm, wean as able  -IV diuretics  -DuoNebs  -recent Echo reviewed, pEF     #R foot cellulitis  -mild. May be 2/2 recent bug bite  -IV ancef for short course     #Pulmonary nodules  -Followed by pulm     #Previous smoker     #Paroxysmal A-fib - xarelto  #Aortic stenosis  #History of left renal cell cancer status post nephrectomy  #Hypertension  #Hyperlipidemia  #ABIGAIL      Megan De Leon MD    Supplementary Documentation:     Quality:  DVT Mechanical Prophylaxis:   SCDs,    DVT Pharmacologic Prophylaxis   Medication    rivaroxaban (Xarelto) tab 20 mg         DVT Pharmacologic prophylaxis: Aspirin 81 mg      Code Status: Full Code  Peters: No urinary catheter in place  Peters Duration (in days):   Central line:    ARUN: 8/17/2024    Discharge is dependent on: progress  At this point Mr. Marshall is expected to be discharge to: home    The 21st Century Cures Act makes medical notes like these available to patients in the interest of transparency. Please be advised this is a medical document. Medical documents are intended to carry relevant information, facts as evident, and the clinical opinion of the practitioner. The medical note is intended as peer to peer communication and may appear blunt or direct. It is written in medical language and may contain abbreviations or verbiage that are unfamiliar.

## 2024-08-15 NOTE — CONSULTS
[facility]  Consultation Note      Keny Marshall Patient Status:  Inpatient    1960 MRN [id number]   Location [facility] 2NE-A Attending Megan De Leon MD   Hosp Day # 2 PCP Dustin Avina MD     Impression;  recurrent hypoxemic respiratory failure--> multifactorial: adv COPD/exacerbation, CHF, AF  hx AF/flutter s/p flutter ablation 24 ([facility])  SOB - 2/2 acute dCHF and COPD exac  Acute diastolic CHF - improved  S/p bio AVR  TTE (24): LVEF 55%, +DD, 23mm SAVR 3.3m/s, mean 22mmHg.  Plan:  diuresis: -3L UOP, -11#. Cr stable/baseline. continue lasix 40mg IV BID.  hx AF: remains in SR.  continue amiodarone 200mg po daily.    AC: rivaroxaban, continue.  diuresis: continue lasix 40mg IV BID    severe COPD/end-stage emphysema- prednisone, inhaler therapy, per pulmonary service.           History of Present Illness:  Keny Marshall is a 63 year old male who presented to [facility] on 2024.    Seen in cardiology consultation for CHF, diuretic management.  Well known/established pt; multiple admissions due to dyspnea/hypoxemia, due to severe underlying COPD, CHF as well as atrial arrhythmias.  Last EDW admission  at which time he underwent A flutter ablation, rhythm settled into AF.  He was rate controlled and discharged on amiodarone.  He subsequently got admitted to [facility]- amiodarone dose adjusted there.     Admitted 24 due to recurrent dyspnea/hypoxia- managed on scheduled nebs/steroids.  pBNP 4800, lasix 40mg IV BID  (same dosing as prior admission) resumed.  ECG/telemetry shows SR LBBB.      Medications:  Current Facility-Administered Medications   Medication Dose Route Frequency    Roflumilast TABS 500 mcg  500 mcg Oral Daily    fluticasone-salmeterol (Advair Diskus) 250-50 MCG/ACT inhaler 1 puff  1 puff Inhalation BID    ipratropium (Atrovent) 0.02 % nebulizer solution 500 mcg  500 mcg Nebulization TID & HS    albuterol (Ventolin) (2.5  MG/3ML) 0.083% nebulizer solution   Nebulization Q4H PRN    amiodarone (Pacerone) tab 200 mg  200 mg Oral Daily    aspirin DR tab 81 mg  81 mg Oral Daily    atorvastatin (Lipitor) tab 20 mg  20 mg Oral Nightly    ferrous sulfate DR tab 325 mg  325 mg Oral Daily    guaiFENesin ER (Mucinex) 12 hr tab 1,200 mg  1,200 mg Oral BID    levETIRAcetam (Keppra) tab 500 mg  500 mg Oral BID    rivaroxaban (Xarelto) tab 20 mg  20 mg Oral Daily with food    acetaminophen (Tylenol Extra Strength) tab 1,000 mg  1,000 mg Oral Q4H PRN    melatonin tab 3 mg  3 mg Oral Nightly PRN    polyethylene glycol (PEG 3350) (Miralax) 17 g oral packet 17 g  17 g Oral Daily PRN    sennosides (Senokot) tab 17.2 mg  17.2 mg Oral Nightly PRN    bisacodyl (Dulcolax) 10 MG rectal suppository 10 mg  10 mg Rectal Daily PRN    fleet enema (Fleet) 7-19 GM/118ML rectal enema 133 mL  1 enema Rectal Once PRN    benzonatate (Tessalon) cap 200 mg  200 mg Oral TID PRN    ondansetron (Zofran) 4 MG/2ML injection 4 mg  4 mg Intravenous Q6H PRN    prochlorperazine (Compazine) 10 MG/2ML injection 5 mg  5 mg Intravenous Q8H PRN    furosemide (Lasix) 10 mg/mL injection 40 mg  40 mg Intravenous BID (Diuretic)    glycerin-hypromellose- (Artificial Tears) 0.2-0.2-1 % ophthalmic solution 1 drop  1 drop Both Eyes QID PRN    sodium chloride (Saline Mist) 0.65 % nasal solution 1 spray  1 spray Each Nare Q3H PRN    ceFAZolin (Ancef) 2g in 10mL IV syringe premix  2 g Intravenous Q8H       Past Medical History:    Aortic stenosis    Arrhythmia    hx of a-fib    Arthritis    Cancer (HCC)    LEFT RENAL     Chronic hypoxemic respiratory failure (HCC)    On 4 LPM/NC at rest 24 hours/ day but 6 LPM/NC on exertion    COPD    Pulmonary follow up with Dr. Crum -no O2    Depression    Difficult intubation    patient states history of difficult intubation   due to body weight.States this no longer issue due to sugnificant weight loss    Esophageal reflux    Eye disease    Fatigue     Fever, unknown origin    Heart murmur    Heart palpitations    High blood pressure    High cholesterol    Loss of appetite    Other and unspecified hyperlipidemia    Paroxysmal atrial fibrillation (HCC)    Pneumonia, organism unspecified(486)    Prediabetes    Renal cell carcinoma (HCC)    s/p left nephrectomy    Shortness of breath    uses oxygen 3-4 L/NC all the time    Unspecified essential hypertension    Unspecified sleep apnea    He was unable to tolerate CPAP/BiPAP    Visual impairment    reading glasses       Past Surgical History:   Procedure Laterality Date    Adenoidectomy      Angiogram      Appendectomy      Appendectomy      Colonoscopy N/A 03/21/2016    Procedure: COLONOSCOPY;  Surgeon: Artur Okeefe MD;  Location:  ENDOSCOPY    Colonoscopy      Colonoscopy N/A 1/5/2023    Procedure: .;  Surgeon: Artur Okeefe MD;  Location:  ENDOSCOPY    Endovas repair, infrarenl abdom aortic aneurysm/dissect      Laparo radical nephrectomy Left 05/06/2014    Nephrectomy Left 2014    Other  meatus of urethra    Other Right     foreign body removal-arm    Other surgical history Right 03/25/2016    Procedure: KNEE ARTHROSCOPY;  Surgeon: Madhu Anaya MD;  Location:  MAIN OR    Repair rotator cuff,acute Right     Upper gi endoscopy,exam      Valve repair  2020       Family History  family history includes Atrial fibrillation in his father; Heart Disorder in his mother; Lung cancer in his maternal aunt; Pacemaker in his mother.    Social History   reports that he quit smoking about 17 months ago. His smoking use included cigarettes. He started smoking about 41 years ago. He has a 60 pack-year smoking history. He has been exposed to tobacco smoke. He has never used smokeless tobacco. He reports that he does not currently use alcohol after a past usage of about 14.0 standard drinks of alcohol per week. He reports that he does not use drugs.     Allergies  Allergies   Allergen Reactions    Bees  ANAPHYLAXIS    Other ANAPHYLAXIS     Bee stings         Review of Systems:  Constitutional: negative for fevers  Eyes: negative for visual disturbance  Ears, nose, mouth, throat, and face: negative for epistaxis  Respiratory: negative for dyspnea on exertion  Cardiovascular: negative for chest pain  Gastrointestinal: negative for melena  Genitourinary:negative for hematuria  Hematologic/lymphatic: negative for bleeding  Musculoskeletal:negative for myalgias  Neurological: negative for dizziness and headaches  Endocrine: negative for temperature intolerance      Physical Exam:  Blood pressure 113/67, pulse 79, temperature 98.1 °F (36.7 °C), temperature source Oral, resp. rate 20, weight 151 lb 7.3 oz (68.7 kg), SpO2 (!) 87%.  Temp (24hrs), Av.1 °F (36.7 °C), Min:97.6 °F (36.4 °C), Max:98.4 °F (36.9 °C)    Wt Readings from Last 3 Encounters:   08/15/24 151 lb 7.3 oz (68.7 kg)   24 151 lb 14.4 oz (68.9 kg)   24 159 lb 6.3 oz (72.3 kg)       General: Awake and alert; in no acute distress  HEENT: Extraocular movements are intact; sclerae are anicteric; scalp is atrauamatic; no thyromegaly  Neck: Supple; no JVD; no carotid bruits  Cardiac: Regular rate and regular rhythm; no murmurs/rubs/gallops are appreciated; PMI is non-displaced; there is no evidence of a sternal heave  Lungs: Clear to auscultation bilaterally; no accessory muscle use is noted  Abdomen: Soft, non-tender; bowel sounds are normoactive; no hepatosplenomegaly  Extremities: No clubbing or cyanosis; moves all 4 extremities normally  Psychiatric: Normal mood and affect; answers questions appropriately  Dermatologic: No rashes; normal skin turgor    Diagnostic testing:    EKG: Normal sinus rhythm    Labs:   Lab Results   Component Value Date    PT 16.9 (H) 2014    PT 13.3 2014    INR 1.59 (H) 2024    INR 1.06 2024        Lab Results   Component Value Date    WBC 6.0 08/15/2024    HGB 9.9 08/15/2024    HCT 32.8  08/15/2024    .0 08/15/2024    CREATSERUM 0.67 08/15/2024    BUN 15 08/15/2024     08/15/2024    K 4.3 08/15/2024     08/15/2024    CO2 33.0 08/15/2024    GLU 88 08/15/2024    CA 9.1 08/15/2024    ALB 3.7 08/15/2024    ALKPHO 45 08/15/2024    BILT 0.3 08/15/2024    TP 6.5 08/15/2024    AST 37 08/15/2024    ALT <7 08/15/2024         Thank you for allowing our practice to participate in the care of your patient. Please do not hesitate to contact me if you have any questions.

## 2024-08-16 ENCOUNTER — APPOINTMENT (OUTPATIENT)
Dept: CT IMAGING | Facility: HOSPITAL | Age: 64
End: 2024-08-16
Attending: INTERNAL MEDICINE
Payer: MEDICARE

## 2024-08-16 LAB
ARTERIAL PATENCY WRIST A: POSITIVE
BASE EXCESS BLDA CALC-SCNC: 16.4 MMOL/L (ref ?–2)
BASOPHILS # BLD AUTO: 0.04 X10(3) UL (ref 0–0.2)
BASOPHILS NFR BLD AUTO: 0.7 %
BODY TEMPERATURE: 98.6 F
BUN BLD-MCNC: 14 MG/DL (ref 9–23)
CA-I BLD-SCNC: 1.18 MMOL/L (ref 0.95–1.32)
CALCIUM BLD-MCNC: 9.1 MG/DL (ref 8.7–10.4)
CHLORIDE SERPL-SCNC: 99 MMOL/L (ref 98–112)
CO2 SERPL-SCNC: >40 MMOL/L (ref 21–32)
COHGB MFR BLD: 3.1 % SAT (ref 0–3)
CREAT BLD-MCNC: 0.66 MG/DL
EGFRCR SERPLBLD CKD-EPI 2021: 105 ML/MIN/1.73M2 (ref 60–?)
EOSINOPHIL # BLD AUTO: 0.3 X10(3) UL (ref 0–0.7)
EOSINOPHIL NFR BLD AUTO: 5.3 %
ERYTHROCYTE [DISTWIDTH] IN BLOOD BY AUTOMATED COUNT: 15.7 %
GLUCOSE BLD-MCNC: 90 MG/DL (ref 70–99)
HCO3 BLDA-SCNC: 37.6 MEQ/L (ref 21–27)
HCT VFR BLD AUTO: 32.4 %
HGB BLD-MCNC: 10.3 G/DL
HGB BLD-MCNC: 9.9 G/DL
IMM GRANULOCYTES # BLD AUTO: 0.02 X10(3) UL (ref 0–1)
IMM GRANULOCYTES NFR BLD: 0.4 %
L/M: 7 L/MIN
LACTATE BLD-SCNC: <0.4 MMOL/L (ref 0.5–2)
LYMPHOCYTES # BLD AUTO: 0.82 X10(3) UL (ref 1–4)
LYMPHOCYTES NFR BLD AUTO: 14.5 %
MCH RBC QN AUTO: 29.7 PG (ref 26–34)
MCHC RBC AUTO-ENTMCNC: 30.6 G/DL (ref 31–37)
MCV RBC AUTO: 97.3 FL
METHGB MFR BLD: 0.8 % SAT (ref 0.4–1.5)
MONOCYTES # BLD AUTO: 0.72 X10(3) UL (ref 0.1–1)
MONOCYTES NFR BLD AUTO: 12.8 %
NEUTROPHILS # BLD AUTO: 3.74 X10 (3) UL (ref 1.5–7.7)
NEUTROPHILS # BLD AUTO: 3.74 X10(3) UL (ref 1.5–7.7)
NEUTROPHILS NFR BLD AUTO: 66.3 %
OSMOLALITY SERPL CALC.SUM OF ELEC: 288 MOSM/KG (ref 275–295)
OXYHGB MFR BLDA: 91.8 % (ref 92–100)
PCO2 BLDA: 71 MM HG (ref 35–45)
PH BLDA: 7.4 [PH] (ref 7.35–7.45)
PLATELET # BLD AUTO: 239 10(3)UL (ref 150–450)
PO2 BLDA: 72 MM HG (ref 80–100)
POTASSIUM BLD-SCNC: 4 MMOL/L (ref 3.6–5.1)
POTASSIUM SERPL-SCNC: 3.8 MMOL/L (ref 3.5–5.1)
RBC # BLD AUTO: 3.33 X10(6)UL
SODIUM BLD-SCNC: 137 MMOL/L (ref 135–145)
SODIUM SERPL-SCNC: 139 MMOL/L (ref 136–145)
WBC # BLD AUTO: 5.6 X10(3) UL (ref 4–11)

## 2024-08-16 PROCEDURE — 71250 CT THORAX DX C-: CPT | Performed by: INTERNAL MEDICINE

## 2024-08-16 PROCEDURE — 99232 SBSQ HOSP IP/OBS MODERATE 35: CPT | Performed by: INTERNAL MEDICINE

## 2024-08-16 PROCEDURE — 99232 SBSQ HOSP IP/OBS MODERATE 35: CPT | Performed by: HOSPITALIST

## 2024-08-16 RX ORDER — POTASSIUM CHLORIDE 1500 MG/1
40 TABLET, EXTENDED RELEASE ORAL ONCE
Status: COMPLETED | OUTPATIENT
Start: 2024-08-16 | End: 2024-08-16

## 2024-08-16 RX ORDER — METHYLPREDNISOLONE SODIUM SUCCINATE 125 MG/2ML
60 INJECTION, POWDER, LYOPHILIZED, FOR SOLUTION INTRAMUSCULAR; INTRAVENOUS EVERY 12 HOURS
Status: DISCONTINUED | OUTPATIENT
Start: 2024-08-16 | End: 2024-08-18

## 2024-08-16 NOTE — PAYOR COMM NOTE
--------------   CONTINUED STAY REVIEW    Payor: [facility] MEDICARE ADV PPO  Subscriber #:  [id number]  Authorization Number: [id number]       CARDS:    Impression;  recurrent hypoxemic respiratory failure--> multifactorial: adv COPD/exacerbation, CHF, AF  hx AF/flutter s/p flutter ablation 24 ([place])  SOB - 2/2 acute dCHF and COPD exac  Acute diastolic CHF - improved  S/p bio AVR  TTE (24): LVEF 55%, +DD, 23mm SAVR 3.3m/s, mean 22mmHg.  Plan:  diuresis: -1.7L UOP, -12#. Cr stable/baseline. continue lasix 40mg IV BID- appropriate dosing.  hx AF: remains in SR.  continue amiodarone 200mg po daily.    AC: rivaroxaban, continue.  severe COPD/end-stage emphysema- prednisone, inhaler therapy, per pulmonary service.       Subjective:  no acute issues overnight.  continues to be heavily dependent on supplemental O2. Significant desaturations when oxygen removed.     Objective:  /67 (BP Location: Left arm)   Pulse 78   Temp 97.8 °F (36.6 °C) (Oral)   Resp 18   Wt 150 lb 12.7 oz (68.4 kg)   SpO2 90%   BMI 21.64 kg/m²   Temp (24hrs), Av.6 °F (36.4 °C), Min:96.8 °F (36 °C), Max:98.2 °F (36.8 °C)       General: Awake and alert; in no acute distress  Cardiac: Regular rate and regular rhythm; no murmurs/rubs/gallops are appreciated  Lungs: Clear to auscultation bilaterally; no accessory muscle use  Abdomen: Soft, non-tender; bowel sounds are normoactive  Extremities: No clubbing/cyanosis; moves all 4 extremities normally       Laboratory Data:        Lab Results   Component Value Date     WBC 5.6 2024     HGB 9.9 2024     HCT 32.4 2024     .0 2024         2024     K 3.8 2024     CL 99 2024     CO2 >40.0 2024     BUN 14 2024     CREATSERUM 0.66 2024     GLU 90 2024     CA 9.1 2024       Telemetry: No malignant tachyarrhythmias or bradyarrhythmias       HOSPITALIST:    Breathing better overall but still desating with  exertion     Medications:   Scheduled Medications[]Expand by Default    Roflumilast  500 mcg Oral Daily    fluticasone-salmeterol  1 puff Inhalation BID    ipratropium  500 mcg Nebulization TID & HS    amiodarone  200 mg Oral Daily    aspirin  81 mg Oral Daily    atorvastatin  20 mg Oral Nightly    ferrous sulfate  325 mg Oral Daily    guaiFENesin ER  1,200 mg Oral BID    levETIRAcetam  500 mg Oral BID    rivaroxaban  20 mg Oral Daily with food    furosemide  40 mg Intravenous BID (Diuretic)    ceFAZolin  2 g Intravenous Q8H          Assessment & Plan:  # Acute on chronic hypoxic/hypercapnic respiratory failure in context of severe baseline COPD (wtihout exacerbation) and acute on chronic diastolic CHF exacerbation; continue lasix     #S/p bio AVR      #R foot cellulitis  -mild. May be 2/2 recent bug bite  -IV ancef for short course (5 days planned)     #Pulmonary nodules  -Followed by pulm     #Previous smoker     #Paroxysmal A-fib/flutter s/p ablation 7/12/224- continue xarelto  #Aortic stenosis  #History of left renal cell cancer status post nephrectomy  #Hypertension  #Hyperlipidemia  #ABIGAIL     Dvt prophy; on xarelto already  MEDICATIONS ADMINISTERED IN LAST 1 DAY:    ceFAZolin (Ancef) 2g in 10mL IV syringe premix       Date Action Dose Route User    8/16/2024 1311 New Bag 2 g Intravenous Sofia Nelson RN    8/16/2024 0538 New Bag 2 g Intravenous Samira Trinidad RN    8/15/2024 2010 New Bag 2 g Intravenous Samira Trinidad RN       furosemide (Lasix) 10 mg/mL injection 40 mg       Date Action Dose Route User    8/16/2024 0823 Given 40 mg Intravenous Sofia Nelson RN    8/15/2024 1711 Given 40 mg Intravenous Samira Hussein RN       ipratropium (Atrovent) 0.02 % nebulizer solution 500 mcg       Date Action Dose Route User    8/16/2024 1234 Given 500 mcg Nebulization Sera Moffett RCP    8/16/2024 0740 Given 500 mcg Nebulization Sera Moffett RCP    8/15/2024 2105 Given 500 mcg Nebulization Charley  Kandace, [facility]    8/15/2024 1815 Given 500 mcg Nebulization Nancy Keenan, [facility]     Vitals (last day)       Date/Time Temp Pulse Resp BP SpO2 Weight O2 Device O2 Flow Rate (L/min) [facility]    08/16/24 1300 -- 78 -- -- 91 % -- -- --     08/16/24 1205 97.8 °F (36.6 °C) 73 18 118/68 97 % -- High flow nasal cannula 8 L/min     08/16/24 1008 -- 78 -- -- 90 % -- -- --     08/16/24 0900 -- 78 -- -- 94 % -- -- --     08/16/24 0804 97.8 °F (36.6 °C) 69 18 117/67 91 % -- High flow nasal cannula 8 L/min     08/16/24 0740 -- -- -- -- 90 % -- High flow nasal cannula 8 L/min     08/16/24 0540 96.8 °F (36 °C) 68 16 104/63 95 % 150 lb 12.7 oz (68.4 kg) CPAP 6 L/min     08/16/24 0437 -- -- -- -- -- -- CPAP 8 L/min     08/16/24 0421 -- -- -- -- -- -- CPAP 10 L/min     08/16/24 0353 -- 72 -- -- 86 % -- -- --     08/15/24 2345 97.5 °F (36.4 °C) 73 22 111/56 92 % -- -- -- HERRERA

## 2024-08-16 NOTE — PLAN OF CARE
Patient AOX4. Requiring 6-10L HFNC at rest, 15L HFNC with exertion, to maintain O2 sat > 90%. Bi-pap w/ 6-10L O2 bled in at night. NSR on tele. VSS. PRN Tylenol administered for headache with relief reported. Complains of chronic leg cramping. Continuing IV Lasix BID. Inhalers and nebs as ordered. Chest CT and ABGs ordered for this a.m per pulmonology. Patient updated on plan of care.     Problem: Patient/Family Goals  Goal: Patient/Family Long Term Goal  Description: Patient's Long Term Goal: go home    Interventions:  - wean O2, scheduled nebs, IV lasix  - See additional Care Plan goals for specific interventions  Outcome: Progressing  Goal: Patient/Family Short Term Goal  Description: Patient's Short Term Goal: feel better    Interventions:   - wean O2, scheduled nebs, IV lasix  - See additional Care Plan goals for specific interventions  Outcome: Progressing     Problem: CARDIOVASCULAR - ADULT  Goal: Maintains optimal cardiac output and hemodynamic stability  Description: INTERVENTIONS:  - Monitor vital signs, rhythm, and trends  - Monitor for bleeding, hypotension and signs of decreased cardiac output  - Evaluate effectiveness of vasoactive medications to optimize hemodynamic stability  - Monitor arterial and/or venous puncture sites for bleeding and/or hematoma  - Assess quality of pulses, skin color and temperature  - Assess for signs of decreased coronary artery perfusion - ex. Angina  - Evaluate fluid balance, assess for edema, trend weights  Outcome: Progressing  Goal: Absence of cardiac arrhythmias or at baseline  Description: INTERVENTIONS:  - Continuous cardiac monitoring, monitor vital signs, obtain 12 lead EKG if indicated  - Evaluate effectiveness of antiarrhythmic and heart rate control medications as ordered  - Initiate emergency measures for life threatening arrhythmias  - Monitor electrolytes and administer replacement therapy as ordered  Outcome: Progressing     Problem: RESPIRATORY -  ADULT  Goal: Achieves optimal ventilation and oxygenation  Description: INTERVENTIONS:  - Assess for changes in respiratory status  - Assess for changes in mentation and behavior  - Position to facilitate oxygenation and minimize respiratory effort  - Oxygen supplementation based on oxygen saturation or ABGs  - Provide Smoking Cessation handout, if applicable  - Encourage broncho-pulmonary hygiene including cough, deep breathe, Incentive Spirometry  - Assess the need for suctioning and perform as needed  - Assess and instruct to report SOB or any respiratory difficulty  - Respiratory Therapy support as indicated  - Manage/alleviate anxiety  - Monitor for signs/symptoms of CO2 retention  Outcome: Progressing

## 2024-08-16 NOTE — PROGRESS NOTES
[facility]   part of [facility]     Hospitalist Progress Note     Keny Marshall Patient Status:  Inpatient    1960 MRN [id number]   Location [facility] 2NE-A Attending Megan De Leon MD   Hosp Day # 3 PCP Dustin Avina MD     Chief Complaint: cough erythema rt foot    Subjective:     Breathing better overall but still desating with exertion    Objective:    Review of Systems:   A 6 point review of systems was completed; pertinent positive and negatives stated in subjective.    Vital signs:  Temp:  [96.8 °F (36 °C)-98.2 °F (36.8 °C)] 97.8 °F (36.6 °C)  Pulse:  [68-81] 78  Resp:  [16-22] 18  BP: (103-117)/(56-68) 117/67  SpO2:  [86 %-96 %] 90 %    Physical Exam:    General: No acute distress  Respiratory: No wheezes, no rhonchi; diminished however  Cardiovascular: S1, S2, regular rate and rhythm  Abdomen: Soft, Non-tender, non-distended  Neuro: No new focal deficits.   Extremities: No edema      Diagnostic Data:    Labs:  Recent Labs   Lab 24  1428 24  0715 08/15/24  0644 24  0800   WBC 8.5 7.0 6.0 5.6   HGB 10.6* 10.2* 9.9* 9.9*   MCV 98.8 102.4* 100.6* 97.3   .0 229.0 229.0 239.0       Recent Labs   Lab 24  1428 24  0715 08/15/24  0644 24  0800   * 116* 88 90   BUN 17 17 15 14   CREATSERUM 0.74 0.83 0.67* 0.66*   CA 9.4 9.1 9.1 9.1   ALB 4.1 3.7 3.7  --     142 140 139   K 3.7 4.4 4.3 3.8    101 100 99   CO2 37.0* >40.0* 33.0* >40.0*   ALKPHO 56 53 45  --    AST 20 16 37*  --    ALT 11 10 <7*  --    BILT 0.4 0.4 0.3  --    TP 6.8 6.5 6.5  --        Estimated Creatinine Clearance: 109.4 mL/min (A) (based on SCr of 0.66 mg/dL (L)).    Recent Labs   Lab 24  1428   TROPHS 31       No results for input(s): \"PTP\", \"INR\" in the last 168 hours.               Microbiology    No results found for this visit on 24.      Imaging: Reviewed in Epic.    Medications:    Roflumilast  500 mcg Oral Daily    fluticasone-salmeterol  1 puff  Inhalation BID    ipratropium  500 mcg Nebulization TID & HS    amiodarone  200 mg Oral Daily    aspirin  81 mg Oral Daily    atorvastatin  20 mg Oral Nightly    ferrous sulfate  325 mg Oral Daily    guaiFENesin ER  1,200 mg Oral BID    levETIRAcetam  500 mg Oral BID    rivaroxaban  20 mg Oral Daily with food    furosemide  40 mg Intravenous BID (Diuretic)    ceFAZolin  2 g Intravenous Q8H       Assessment & Plan:      # Acute on chronic hypoxic/hypercapnic respiratory failure in context of severe baseline COPD (wtihout exacerbation) and acute on chronic diastolic CHF exacerbation; continue lasix    #S/p bio AVR     #R foot cellulitis  -mild. May be 2/2 recent bug bite  -IV ancef for short course (5 days planned)     #Pulmonary nodules  -Followed by pulm     #Previous smoker     #Paroxysmal A-fib/flutter s/p ablation 7/12/224- continue xarelto  #Aortic stenosis  #History of left renal cell cancer status post nephrectomy  #Hypertension  #Hyperlipidemia  #ABIGAIL    Dvt prophy; on xarelto already    Rusty Stokes MD  [facility]  Internal Medicine Hospitalist

## 2024-08-16 NOTE — PLAN OF CARE
Assumed care at 0730. Pt alert, oriented x4. Oxygen saturation adequate on 6L nasal cannula at rest, requiring 10-15L with activity. Lung sounds diminished bilaterally. Tele: NSR. Continent. Denies pain. Pt declines bed alarm. Plan of care: IV lasix BID per orders, wean oxygen as tolerated, nebs scheduled per respiratory, daily weight, intake and output, IV antibiotics per order, BiPAP overnight. Pt updated on plan of care. Questions answered.     Problem: Patient/Family Goals  Goal: Patient/Family Long Term Goal  Description: Patient's Long Term Goal: go home    Interventions:  - wean O2, scheduled nebs, IV lasix  - See additional Care Plan goals for specific interventions  Outcome: Progressing  Goal: Patient/Family Short Term Goal  Description: Patient's Short Term Goal: feel better    Interventions:   - wean O2, scheduled nebs, IV lasix  - See additional Care Plan goals for specific interventions  Outcome: Progressing     Problem: CARDIOVASCULAR - ADULT  Goal: Maintains optimal cardiac output and hemodynamic stability  Description: INTERVENTIONS:  - Monitor vital signs, rhythm, and trends  - Monitor for bleeding, hypotension and signs of decreased cardiac output  - Evaluate effectiveness of vasoactive medications to optimize hemodynamic stability  - Monitor arterial and/or venous puncture sites for bleeding and/or hematoma  - Assess quality of pulses, skin color and temperature  - Assess for signs of decreased coronary artery perfusion - ex. Angina  - Evaluate fluid balance, assess for edema, trend weights  Outcome: Progressing  Goal: Absence of cardiac arrhythmias or at baseline  Description: INTERVENTIONS:  - Continuous cardiac monitoring, monitor vital signs, obtain 12 lead EKG if indicated  - Evaluate effectiveness of antiarrhythmic and heart rate control medications as ordered  - Initiate emergency measures for life threatening arrhythmias  - Monitor electrolytes and administer replacement therapy as  ordered  Outcome: Progressing     Problem: RESPIRATORY - ADULT  Goal: Achieves optimal ventilation and oxygenation  Description: INTERVENTIONS:  - Assess for changes in respiratory status  - Assess for changes in mentation and behavior  - Position to facilitate oxygenation and minimize respiratory effort  - Oxygen supplementation based on oxygen saturation or ABGs  - Provide Smoking Cessation handout, if applicable  - Encourage broncho-pulmonary hygiene including cough, deep breathe, Incentive Spirometry  - Assess the need for suctioning and perform as needed  - Assess and instruct to report SOB or any respiratory difficulty  - Respiratory Therapy support as indicated  - Manage/alleviate anxiety  - Monitor for signs/symptoms of CO2 retention  Outcome: Progressing

## 2024-08-16 NOTE — PROGRESS NOTES
[facility]  Progress Note    Keny Marshall Patient Status:  Inpatient    1960 MRN [id number]   Location [facility] 2NE-A Attending Rusty Stokes MD   Hosp Day # 3 PCP Dustin Avina MD     Subjective:  Keny Marshall is a(n) 63 year old male remains afebrile  6 L at rest  Requiring 15 went up walking    Objective:  /70 (BP Location: Left arm)   Pulse 70   Temp 97.9 °F (36.6 °C) (Oral)   Resp 18   Wt 150 lb 12.7 oz (68.4 kg)   SpO2 97%   BMI 21.64 kg/m² sats currently 88%      Temp (24hrs), Av.5 °F (36.4 °C), Min:96.8 °F (36 °C), Max:97.9 °F (36.6 °C)      Intake/Output:    Intake/Output Summary (Last 24 hours) at 2024 1752  Last data filed at 2024 1300  Gross per 24 hour   Intake 960 ml   Output 1670 ml   Net -710 ml       Physical Exam:   General: alert, cooperative, oriented.  No respiratory distress.   Head: Normocephalic, without obvious abnormality, atraumatic.   Throat: Lips, mucosa, and tongue normal.  No thrush noted.   Neck: trachea midline, no adenopathy, no thyromegaly. No JVD.   Lungs: Diminished sounds slight crackles   Chest wall: No tenderness or deformity.   Heart: Regular rate and rhythm with occasional ectopy   Abdomen: soft, non-distended, no masses, no guarding, no     Rebound.  Nontender   Extremity:    Skin: No rashes or lesio reviewed s.   Neurological: Alert, interactive, no focal deficits    Lab Data Review:  Recent Labs     24  0715 08/15/24  0644 24  0800   WBC 7.0 6.0 5.6   HGB 10.2* 9.9* 9.9*   .0 229.0 239.0     Recent Labs     24  0715 08/15/24  0644 24  0800    140 139   K 4.4 4.3 3.8    100 99   CO2 >40.0* 33.0* >40.0*   BUN 17 15 14   CREATSERUM 0.83 0.67* 0.66*     No results for input(s): \"PTP\", \"INR\", \"PTT\" in the last 168 hours.    Cultures: Reviewed    Radiology:  CT CHEST (CPT=71250)    Result Date: 2024  CONCLUSION:   1. Extensive, diffuse interlobular septal thickening with  intervening ground-glass opacities resulting in a crazy paving pattern.  This is favored to represent moderate pulmonary edema with infection and drug toxicity felt less likely.  2. Small right and trace left pleural effusions with extension of pleural fluid along the right and left major fissures.  3. Bilateral calcified pleural plaques indicating chronic asbestos exposure.  Areas of round atelectasis noted within portions of the left lower lobe and lingula.  4. Advanced, upper lobe predominant emphysema with dilation of the pulmonary trunk consistent with pulmonary arterial hypertension.   LOCATION:  [facility]   Dictated by (CST): Jenn Guerra MD on 8/16/2024 at 2:32 PM     Finalized by (CST): Jenn Guerra MD on 8/16/2024 at 2:41 PM      CT reviewed-areas of consolidation pleural-based right mid field posterior and left lateral seem very stable from July 1  Increasing pulmonary infiltrates groundglass opacity and remains with extensive septal thickening  Recent Labs   Lab 08/16/24  0723   ABGPHT 7.40   INQIGP5S 71*   YACHY4X 72*   ABGHCO3 37.6*   ABGBE 16.4*   TEMP 98.6   ROMARIO Positive   SITE Right Radial   DEV High flow nasal cannula   THGB 10.3*           Medications reviewed     Assessment and Plan:   Patient Active Problem List   Diagnosis    Anemia    HTN (hypertension)    DM2 (diabetes mellitus, type 2) ([facility])    COPD (chronic obstructive pulmonary disease) ([facility])    Clear cell renal cell carcinoma of left kidney ([facility])    Medial meniscus tear, right, initial encounter    Primary osteoarthritis of right knee              GLOBAL EXP 6-25-16 / RIGHT KNEE / TRK     Acute medial meniscus tear of right knee            GLOBAL EXP 6-25-16 / RIGHT KNEE / TRK     Acute lateral meniscus tear of left knee               GLOBAL EXP 6-25-16 / LEFT KNEE / TRK     Bursitis of left shoulder             GLOBAL EXP 6-25-16 / LEFT SHOULDER / TRK     Moderate aortic stenosis    Stage 3 severe COPD by GOLD classification ([facility])     Hyperglycemia    Hypervolemia    Hypervolemia, unspecified hypervolemia type    Dyspnea, unspecified type    Hypoxemia    Atrial fibrillation with RVR (HCC)    Acute heart failure with preserved ejection fraction (HFpEF) (HCC)    Atrial flutter with rapid ventricular response (HCC)    Acute on chronic congestive heart failure (HCC)    Acute on chronic congestive heart failure, unspecified heart failure type (HCC)    Hypoxia    Atrial fibrillation with rapid ventricular response (HCC)    Pleural effusion    Hypokalemia    Acute respiratory failure with hypoxia (HCC)    Normocytic anemia    Chronic heart failure with preserved ejection fraction (HCC)    Leukocytosis (leucocytosis)    Aortic valve regurgitation    HFrEF (heart failure with reduced ejection fraction) (HCC)    Chronic hypoxemic respiratory failure (HCC)    ABIGAIL (obstructive sleep apnea)    COPD exacerbation (HCC)    Acute on chronic respiratory failure with hypoxia (HCC)    Acute on chronic respiratory failure with hypoxia and hypercapnia ([facility])    Chronic hypercapnic respiratory failure (HCC)    Smoking greater than 20 pack years    Hyponatremia    Hyperkalemia    Chronic obstructive pulmonary disease, unspecified COPD type (HCC)    Supplemental oxygen dependent    Azotemia    Metabolic alkalosis    End stage COPD (HCC)    Acute on chronic hypoxic respiratory failure ([facility])       Assessment:  #1. Acute on chronic hypoxic respiratory failure; chronic hypercapnic respiratory failure  Multifactorial due to CHF exacerbation with underlying COPD   Continue nebs, steroids as below  Wean o2 for goal saturations of 88-92% given hypercapnia. His previous o2 baseline was: 2L at rest, 4L on exertion; using and benefiting from portable oxygen concentrator (3 at rest, 6 on exertion)  Will need repeat o2 walk prior to discharge  He was previously ordered and used AVAPS with Dr. Lorenz however he has been unable to afford and now is to use BIPAP-remains hypercapnic well  compensated on BiPAP     #2. Acute CHF exacerbation/chronic diastolic heart failure with  moderate pulmonary hypertension with hx of afib with RVR-  Diurese as per cardiology  Remains in sinus following cardioversion     #3. COPD   Severe obstruction on previous PFTs 3/2021  No clear exacerbation at present, suspect current issue is more fluid overload/ CHF exacerbation  Continue on symbicort BID --> advair while hospitalized  Cannot tolerate albuterol due to palpitations, tremors; continue with levalbuterol and ipratropium nebulizers every 6 hours.    Continue on budesonide nebs BID  Continue hypertonic saline nebs prn  Continue roflumilast  Previously reviewed BLVR - not a candidate      #4. Pulmonary nodules  7/2017 CT with worsening peripheral lingular atelectasis. Nodules stable   5/2018 CT stable nodules  12/2020 CT stable  7/2021 CT stable but worsening mediastinal LAD suspect reactive to recent cardiac surgery  1/2022 CT with stable nodules, improved LAD  3/2023 CT chest with several small nodules  9/2023 CT chest with stable findings   7/1/2024 CT chest with worsening ALYSON lesion, possible scarring, rounded atelectasis vs neoplasm  Will need outpatient follow up including PET/CT-plan for repeat CT  Now status post CT left upper lobe peripheral lesion seems stable plans to follow-up     #5. Tobacco abuse  30+ pack years, pipe smoker  As above      Plan:  Cardiology note appreciated ongoing diuresis no other changes made  Will need 10 L concentrator at home minimum  To trial modest doses of steroids in effort to decrease inflammation  Discussed at length    CC     Pat Crum MD  8/16/2024  5:52 PM

## 2024-08-16 NOTE — PROGRESS NOTES
[facility]  Progress Note    Keny Marshall Patient Status:  Inpatient    1960 MRN [id number]   [facility] 2NE-A Attending Rusty Stokes MD   Hosp Day # 3 PCP Dustin Avina MD       Impression;  recurrent hypoxemic respiratory failure--> multifactorial: adv COPD/exacerbation, CHF, AF  hx AF/flutter s/p flutter ablation 24 ([place])  SOB - 2/2 acute dCHF and COPD exac  Acute diastolic CHF - improved  S/p bio AVR  TTE (24): LVEF 55%, +DD, 23mm SAVR 3.3m/s, mean 22mmHg.  Plan:  diuresis: -1.7L UOP, -12#. Cr stable/baseline. continue lasix 40mg IV BID- appropriate dosing.  hx AF: remains in SR.  continue amiodarone 200mg po daily.    AC: rivaroxaban, continue.  severe COPD/end-stage emphysema- prednisone, inhaler therapy, per pulmonary service.         Subjective:  no acute issues overnight.  continues to be heavily dependent on supplemental O2. Significant desaturations when oxygen removed.    Objective:  /67 (BP Location: Left arm)   Pulse 78   Temp 97.8 °F (36.6 °C) (Oral)   Resp 18   Wt 150 lb 12.7 oz (68.4 kg)   SpO2 90%   BMI 21.64 kg/m²   Temp (24hrs), Av.6 °F (36.4 °C), Min:96.8 °F (36 °C), Max:98.2 °F (36.8 °C)      Intake/Output Summary (Last 24 hours) at 2024 1120  Last data filed at 2024 1008  Gross per 24 hour   Intake 1440 ml   Output 1570 ml   Net -130 ml     Wt Readings from Last 3 Encounters:   24 150 lb 12.7 oz (68.4 kg)   24 151 lb 14.4 oz (68.9 kg)   24 159 lb 6.3 oz (72.3 kg)       General: Awake and alert; in no acute distress  Cardiac: Regular rate and regular rhythm; no murmurs/rubs/gallops are appreciated  Lungs: Clear to auscultation bilaterally; no accessory muscle use  Abdomen: Soft, non-tender; bowel sounds are normoactive  Extremities: No clubbing/cyanosis; moves all 4 extremities normally    Current Facility-Administered Medications   Medication Dose Route Frequency    Roflumilast TABS 500  mcg  500 mcg Oral Daily    fluticasone-salmeterol (Advair Diskus) 250-50 MCG/ACT inhaler 1 puff  1 puff Inhalation BID    ipratropium (Atrovent) 0.02 % nebulizer solution 500 mcg  500 mcg Nebulization TID & HS    albuterol (Ventolin) (2.5 MG/3ML) 0.083% nebulizer solution   Nebulization Q4H PRN    amiodarone (Pacerone) tab 200 mg  200 mg Oral Daily    aspirin DR tab 81 mg  81 mg Oral Daily    atorvastatin (Lipitor) tab 20 mg  20 mg Oral Nightly    ferrous sulfate DR tab 325 mg  325 mg Oral Daily    guaiFENesin ER (Mucinex) 12 hr tab 1,200 mg  1,200 mg Oral BID    levETIRAcetam (Keppra) tab 500 mg  500 mg Oral BID    rivaroxaban (Xarelto) tab 20 mg  20 mg Oral Daily with food    acetaminophen (Tylenol Extra Strength) tab 1,000 mg  1,000 mg Oral Q4H PRN    melatonin tab 3 mg  3 mg Oral Nightly PRN    polyethylene glycol (PEG 3350) (Miralax) 17 g oral packet 17 g  17 g Oral Daily PRN    sennosides (Senokot) tab 17.2 mg  17.2 mg Oral Nightly PRN    bisacodyl (Dulcolax) 10 MG rectal suppository 10 mg  10 mg Rectal Daily PRN    fleet enema (Fleet) 7-19 GM/118ML rectal enema 133 mL  1 enema Rectal Once PRN    benzonatate (Tessalon) cap 200 mg  200 mg Oral TID PRN    ondansetron (Zofran) 4 MG/2ML injection 4 mg  4 mg Intravenous Q6H PRN    prochlorperazine (Compazine) 10 MG/2ML injection 5 mg  5 mg Intravenous Q8H PRN    furosemide (Lasix) 10 mg/mL injection 40 mg  40 mg Intravenous BID (Diuretic)    glycerin-hypromellose- (Artificial Tears) 0.2-0.2-1 % ophthalmic solution 1 drop  1 drop Both Eyes QID PRN    sodium chloride (Saline Mist) 0.65 % nasal solution 1 spray  1 spray Each Nare Q3H PRN    ceFAZolin (Ancef) 2g in 10mL IV syringe premix  2 g Intravenous Q8H       Laboratory Data:  Lab Results   Component Value Date    WBC 5.6 08/16/2024    HGB 9.9 08/16/2024    HCT 32.4 08/16/2024    .0 08/16/2024     Lab Results   Component Value Date    INR 1.59 (H) 06/29/2024    INR 1.06 06/11/2024    INR 2.26 (H)  09/22/2021     Lab Results   Component Value Date     08/16/2024    K 3.8 08/16/2024    CL 99 08/16/2024    CO2 >40.0 08/16/2024    BUN 14 08/16/2024    CREATSERUM 0.66 08/16/2024    GLU 90 08/16/2024    CA 9.1 08/16/2024       Telemetry: No malignant tachyarrhythmias or bradyarrhythmias      Thank you for allowing our practice to participate in the care of your patient. Please do not hesitate to contact me if you have any questions.

## 2024-08-17 LAB — POTASSIUM SERPL-SCNC: 4.7 MMOL/L (ref 3.5–5.1)

## 2024-08-17 PROCEDURE — 99232 SBSQ HOSP IP/OBS MODERATE 35: CPT | Performed by: INTERNAL MEDICINE

## 2024-08-17 PROCEDURE — 99232 SBSQ HOSP IP/OBS MODERATE 35: CPT | Performed by: HOSPITALIST

## 2024-08-17 RX ORDER — ACETAZOLAMIDE 250 MG/1
125 TABLET ORAL 3 TIMES DAILY
Status: COMPLETED | OUTPATIENT
Start: 2024-08-17 | End: 2024-08-18

## 2024-08-17 NOTE — PLAN OF CARE
Assumed care of patient at 1930  Awake and alert x 4, ambulating around room with supplemental oxygen 10 liters HF per nasal cannula.  Desaturates with activity quickly  to mid 70's when oxygen is off for any reason. Extension tubing in use  for trips to bathroom or ambulation around room.  Plan of care updated for evening medications,and fall prevention.  Reports leg cramps, stretching at bedside seems to help.  Telemetry monitoring in progress. NSR        Problem: Patient/Family Goals  Goal: Patient/Family Long Term Goal  Description: Patient's Long Term Goal: go home    Interventions:  - wean O2, scheduled nebs, IV lasix  - See additional Care Plan goals for specific interventions  Outcome: Progressing  Goal: Patient/Family Short Term Goal  Description: Patient's Short Term Goal: feel better    Interventions:   - wean O2, scheduled nebs, IV lasix  - See additional Care Plan goals for specific interventions  Outcome: Progressing     Problem: CARDIOVASCULAR - ADULT  Goal: Maintains optimal cardiac output and hemodynamic stability  Description: INTERVENTIONS:  - Monitor vital signs, rhythm, and trends  - Monitor for bleeding, hypotension and signs of decreased cardiac output  - Evaluate effectiveness of vasoactive medications to optimize hemodynamic stability  - Monitor arterial and/or venous puncture sites for bleeding and/or hematoma  - Assess quality of pulses, skin color and temperature  - Assess for signs of decreased coronary artery perfusion - ex. Angina  - Evaluate fluid balance, assess for edema, trend weights  Outcome: Progressing  Goal: Absence of cardiac arrhythmias or at baseline  Description: INTERVENTIONS:  - Continuous cardiac monitoring, monitor vital signs, obtain 12 lead EKG if indicated  - Evaluate effectiveness of antiarrhythmic and heart rate control medications as ordered  - Initiate emergency measures for life threatening arrhythmias  - Monitor electrolytes and administer replacement therapy  as ordered  Outcome: Progressing     Problem: RESPIRATORY - ADULT  Goal: Achieves optimal ventilation and oxygenation  Description: INTERVENTIONS:  - Assess for changes in respiratory status  - Assess for changes in mentation and behavior  - Position to facilitate oxygenation and minimize respiratory effort  - Oxygen supplementation based on oxygen saturation or ABGs  - Provide Smoking Cessation handout, if applicable  - Encourage broncho-pulmonary hygiene including cough, deep breathe, Incentive Spirometry  - Assess the need for suctioning and perform as needed  - Assess and instruct to report SOB or any respiratory difficulty  - Respiratory Therapy support as indicated  - Manage/alleviate anxiety  - Monitor for signs/symptoms of CO2 retention  Outcome: Progressing

## 2024-08-17 NOTE — PLAN OF CARE
RT adjusted supplemental oxygen 5 liters , keep sat's > 88 %.  At rest. in progress, will continue to monitr Currently 91% on the 5 liters per nasal cannula.

## 2024-08-17 NOTE — PROGRESS NOTES
08/17/24 1502   Mobility   O2 walk? Yes   SPO2% on Oxygen at Rest 96   At rest oxygen flow (liters per minute) 8   SPO2% Ambulation on Oxygen 89   Ambulation oxygen flow (liters per minute) 15     At rest, pt requires 8L of O2. Upon ambulation, pt required 15L O2 to maintain >89%.

## 2024-08-17 NOTE — PROGRESS NOTES
08/17/24 1502   Mobility   O2 walk? Yes   SPO2% on Oxygen at Rest 96   At rest oxygen flow (liters per minute) 6   SPO2% Ambulation on Oxygen 89   Ambulation oxygen flow (liters per minute) 15     At rest, pt requires 6L of O2. Upon ambulation, pt required 15L O2 to maintain >89%.

## 2024-08-17 NOTE — PLAN OF CARE
Received patient at 0730. Alert and Oriented x4. Tele Rhythm NSR. Pt on 10L per nasal cannula. CPAP overnight. Breath sounds diminished. Bed is locked and in low position. Call light and personal items within reach. Pt voiding with no issue. Pt ambulates with SBA. Reviewed plan of care and patient verbalizes understanding.     Plan:  IV lasix    Problem: Patient/Family Goals  Goal: Patient/Family Long Term Goal  Description: Patient's Long Term Goal: go home    Interventions:  - wean O2, scheduled nebs, IV lasix  - See additional Care Plan goals for specific interventions  Outcome: Progressing  Goal: Patient/Family Short Term Goal  Description: Patient's Short Term Goal: feel better    Interventions:   - wean O2, scheduled nebs, IV lasix  - See additional Care Plan goals for specific interventions  Outcome: Progressing     Problem: CARDIOVASCULAR - ADULT  Goal: Maintains optimal cardiac output and hemodynamic stability  Description: INTERVENTIONS:  - Monitor vital signs, rhythm, and trends  - Monitor for bleeding, hypotension and signs of decreased cardiac output  - Evaluate effectiveness of vasoactive medications to optimize hemodynamic stability  - Monitor arterial and/or venous puncture sites for bleeding and/or hematoma  - Assess quality of pulses, skin color and temperature  - Assess for signs of decreased coronary artery perfusion - ex. Angina  - Evaluate fluid balance, assess for edema, trend weights  Outcome: Progressing  Goal: Absence of cardiac arrhythmias or at baseline  Description: INTERVENTIONS:  - Continuous cardiac monitoring, monitor vital signs, obtain 12 lead EKG if indicated  - Evaluate effectiveness of antiarrhythmic and heart rate control medications as ordered  - Initiate emergency measures for life threatening arrhythmias  - Monitor electrolytes and administer replacement therapy as ordered  Outcome: Progressing     Problem: RESPIRATORY - ADULT  Goal: Achieves optimal ventilation and  oxygenation  Description: INTERVENTIONS:  - Assess for changes in respiratory status  - Assess for changes in mentation and behavior  - Position to facilitate oxygenation and minimize respiratory effort  - Oxygen supplementation based on oxygen saturation or ABGs  - Provide Smoking Cessation handout, if applicable  - Encourage broncho-pulmonary hygiene including cough, deep breathe, Incentive Spirometry  - Assess the need for suctioning and perform as needed  - Assess and instruct to report SOB or any respiratory difficulty  - Respiratory Therapy support as indicated  - Manage/alleviate anxiety  - Monitor for signs/symptoms of CO2 retention  Outcome: Progressing

## 2024-08-17 NOTE — PROGRESS NOTES
[facility]  Progress Note    Keny Marshall Patient Status:  Inpatient    1960 MRN [id number]   [facility] 2NE-A Attending Rusty Stokes MD   Hosp Day # 4 PCP Dustin Avina MD       Impression;  recurrent hypoxemic respiratory failure--> multifactorial: adv COPD/exacerbation, CHF, AF  hx AF/flutter s/p flutter ablation 24 ([place])  SOB - 2/2 acute dCHF and COPD exac  Acute diastolic CHF - improved  S/p bio AVR  TTE (24): LVEF 55%, +DD, 23mm SAVR 3.3m/s, mean 22mmHg.  Plan:  diuresis: -2.2L UOP, -12#. Cr stable/baseline. continue lasix 40mg IV BID  hx AF: remains in SR.  continue amiodarone 200mg po daily.    AC: rivaroxaban, continue.  severe COPD/end-stage emphysema- prednisone, inhaler therapy, per pulmonary service.       Subjective:  no acute issues overnight.  desats to 70s going to bathroom, persisetent high supplemental O2 needs up to 15L with activity. Denies cp/sob.  tele- SR     Objective:  /66 (BP Location: Left arm)   Pulse 79   Temp 97.7 °F (36.5 °C) (Oral)   Resp 20   Wt 150 lb 2.1 oz (68.1 kg)   SpO2 91%   BMI 21.54 kg/m²   Temp (24hrs), Av.8 °F (36.6 °C), Min:97.7 °F (36.5 °C), Max:98 °F (36.7 °C)      Intake/Output Summary (Last 24 hours) at 2024 0822  Last data filed at 2024 0757  Gross per 24 hour   Intake 480 ml   Output 2350 ml   Net -1870 ml     Wt Readings from Last 3 Encounters:   24 150 lb 2.1 oz (68.1 kg)   24 151 lb 14.4 oz (68.9 kg)   24 159 lb 6.3 oz (72.3 kg)       General: Awake and alert; in no acute distress  Cardiac: Regular rate and regular rhythm; no murmurs/rubs/gallops are appreciated  Lungs: Clear to auscultation bilaterally; no accessory muscle use  Abdomen: Soft, non-tender; bowel sounds are normoactive  Extremities: No clubbing/cyanosis; moves all 4 extremities normally    Current Facility-Administered Medications   Medication Dose Route Frequency    methylPREDNISolone  sodium succinate (Solu-MEDROL) injection 60 mg  60 mg Intravenous Q12H    Roflumilast TABS 500 mcg  500 mcg Oral Daily    fluticasone-salmeterol (Advair Diskus) 250-50 MCG/ACT inhaler 1 puff  1 puff Inhalation BID    ipratropium (Atrovent) 0.02 % nebulizer solution 500 mcg  500 mcg Nebulization TID & HS    albuterol (Ventolin) (2.5 MG/3ML) 0.083% nebulizer solution   Nebulization Q4H PRN    amiodarone (Pacerone) tab 200 mg  200 mg Oral Daily    aspirin DR tab 81 mg  81 mg Oral Daily    atorvastatin (Lipitor) tab 20 mg  20 mg Oral Nightly    ferrous sulfate DR tab 325 mg  325 mg Oral Daily    guaiFENesin ER (Mucinex) 12 hr tab 1,200 mg  1,200 mg Oral BID    levETIRAcetam (Keppra) tab 500 mg  500 mg Oral BID    rivaroxaban (Xarelto) tab 20 mg  20 mg Oral Daily with food    acetaminophen (Tylenol Extra Strength) tab 1,000 mg  1,000 mg Oral Q4H PRN    melatonin tab 3 mg  3 mg Oral Nightly PRN    polyethylene glycol (PEG 3350) (Miralax) 17 g oral packet 17 g  17 g Oral Daily PRN    sennosides (Senokot) tab 17.2 mg  17.2 mg Oral Nightly PRN    bisacodyl (Dulcolax) 10 MG rectal suppository 10 mg  10 mg Rectal Daily PRN    fleet enema (Fleet) 7-19 GM/118ML rectal enema 133 mL  1 enema Rectal Once PRN    benzonatate (Tessalon) cap 200 mg  200 mg Oral TID PRN    ondansetron (Zofran) 4 MG/2ML injection 4 mg  4 mg Intravenous Q6H PRN    prochlorperazine (Compazine) 10 MG/2ML injection 5 mg  5 mg Intravenous Q8H PRN    furosemide (Lasix) 10 mg/mL injection 40 mg  40 mg Intravenous BID (Diuretic)    glycerin-hypromellose- (Artificial Tears) 0.2-0.2-1 % ophthalmic solution 1 drop  1 drop Both Eyes QID PRN    sodium chloride (Saline Mist) 0.65 % nasal solution 1 spray  1 spray Each Nare Q3H PRN    ceFAZolin (Ancef) 2g in 10mL IV syringe premix  2 g Intravenous Q8H       Laboratory Data:       Lab Results   Component Value Date    INR 1.59 (H) 06/29/2024    INR 1.06 06/11/2024    INR 2.26 (H) 09/22/2021     Lab Results    Component Value Date    K 4.7 08/17/2024       Telemetry: No malignant tachyarrhythmias or bradyarrhythmias      Thank you for allowing our practice to participate in the care of your patient. Please do not hesitate to contact me if you have any questions.

## 2024-08-17 NOTE — PROGRESS NOTES
[facility]  Progress Note    Keny Marshall Patient Status:  Inpatient    1960 MRN [id number]   Location [facility] 2NE-A Attending Rusty Stokes MD   Hosp Day # 4 PCP Dustin Avina MD     Subjective:  Keny Marshall is a(n) 63 year old male remains afebrile  About the same states his cough is appearing slightly looser denies any chest pain remains in sinus    Objective:  /66 (BP Location: Left arm)   Pulse 79   Temp 97.7 °F (36.5 °C) (Oral)   Resp 20   Wt 150 lb 2.1 oz (68.1 kg)   SpO2 91%   BMI 21.54 kg/m² remains on 6 L      Temp (24hrs), Av.8 °F (36.6 °C), Min:97.7 °F (36.5 °C), Max:98 °F (36.7 °C)      Intake/Output:    Intake/Output Summary (Last 24 hours) at 2024 0802  Last data filed at 2024 0757  Gross per 24 hour   Intake 480 ml   Output 2350 ml   Net -1870 ml       Physical Exam:   General: alert, cooperative, oriented.  No respiratory distress.   Head: Normocephalic, without obvious abnormality, atraumatic.   Throat: Lips, mucosa, and tongue normal.  No thrush noted.   Neck: trachea midline, no adenopathy, no thyromegaly. No JVD.   Lungs: Diminished tone fine rales at the bases   Chest wall: No tenderness or deformity.   Heart: Regular rate and rhythm   Abdomen: soft, non-distended, no masses, no guarding, no     Rebound.  Nontender   Extremity: Decreasing edema   Skin: No rashes or lesions.   Neurological: Alert, interactive, no focal deficits    Lab Data Review:  Recent Labs     08/15/24  0644 24  0800   WBC 6.0 5.6   HGB 9.9* 9.9*   .0 239.0     Recent Labs     08/15/24  0644 24  0800 24  0640    139  --    K 4.3 3.8 4.7    99  --    CO2 33.0* >40.0*  --    BUN 15 14  --    CREATSERUM 0.67* 0.66*  --      No results for input(s): \"PTP\", \"INR\", \"PTT\" in the last 168 hours.    Cultures: No new data    Radiology:  CT CHEST (CPT=71250)    Result Date: 2024  CONCLUSION:   1. Extensive, diffuse interlobular septal  thickening with intervening ground-glass opacities resulting in a crazy paving pattern.  This is favored to represent moderate pulmonary edema with infection and drug toxicity felt less likely.  2. Small right and trace left pleural effusions with extension of pleural fluid along the right and left major fissures.  3. Bilateral calcified pleural plaques indicating chronic asbestos exposure.  Areas of round atelectasis noted within portions of the left lower lobe and lingula.  4. Advanced, upper lobe predominant emphysema with dilation of the pulmonary trunk consistent with pulmonary arterial hypertension.   LOCATION:  [facility]   Dictated by (CST): Jenn Guerra MD on 8/16/2024 at 2:32 PM     Finalized by (CST): Jenn Guerra MD on 8/16/2024 at 2:41 PM      Reviewed      Medications reviewed     Assessment and Plan:   Patient Active Problem List   Diagnosis    Anemia    HTN (hypertension)    DM2 (diabetes mellitus, type 2) ([facility])    COPD (chronic obstructive pulmonary disease) ([facility])    Clear cell renal cell carcinoma of left kidney ([facility])    Medial meniscus tear, right, initial encounter    Primary osteoarthritis of right knee              GLOBAL EXP 6-25-16 / RIGHT KNEE / TRK     Acute medial meniscus tear of right knee            GLOBAL EXP 6-25-16 / RIGHT KNEE / TRK     Acute lateral meniscus tear of left knee               GLOBAL EXP 6-25-16 / LEFT KNEE / TRK     Bursitis of left shoulder             GLOBAL EXP 6-25-16 / LEFT SHOULDER / TRK     Moderate aortic stenosis    Stage 3 severe COPD by GOLD classification ([facility])    Hyperglycemia    Hypervolemia    Hypervolemia, unspecified hypervolemia type    Dyspnea, unspecified type    Hypoxemia    Atrial fibrillation with RVR ([facility])    Acute heart failure with preserved ejection fraction (HFpEF) ([facility])    Atrial flutter with rapid ventricular response ([facility])    Acute on chronic congestive heart failure ([facility])    Acute on chronic congestive heart failure, unspecified heart failure  type ([facility])    Hypoxia    Atrial fibrillation with rapid ventricular response (H[facility])    Pleural effusion    Hypokalemia    Acute respiratory failure with hypoxia (H[facility])    Normocytic anemia    Chronic heart failure with preserved ejection fraction ([facility])    Leukocytosis (leucocytosis)    Aortic valve regurgitation    HFrEF (heart failure with reduced ejection fraction) (HCC)    Chronic hypoxemic respiratory failure (HCC)    ABIGAIL (obstructive sleep apnea)    COPD exacerbation (HCC)    Acute on chronic respiratory failure with hypoxia (HCC)    Acute on chronic respiratory failure with hypoxia and hypercapnia (HCC)    Chronic hypercapnic respiratory failure (HCC)    Smoking greater than 20 pack years    Hyponatremia    Hyperkalemia    Chronic obstructive pulmonary disease, unspecified COPD type (HCC)    Supplemental oxygen dependent    Azotemia    Metabolic alkalosis    End stage COPD (HCC)    Acute on chronic hypoxic respiratory failure (HCC)       Assessment:  #1. Acute on chronic hypoxic respiratory failure; chronic hypercapnic respiratory failure  Multifactorial due to CHF exacerbation with underlying COPD   Continue nebs, steroids as below  Wean o2 for goal saturations of 88-92% given hypercapnia. His previous o2 baseline was: 2L at rest, 4L on exertion; using and benefiting from portable oxygen concentrator (3 at rest, 6 on exertion)  Will need repeat o2 walk prior to discharge  He was previously ordered and used AVAPS with Dr. Lorenz however he has been unable to afford and now is to use BIPAP-remains hypercapnic well compensated on BiPAP     #2. Acute CHF exacerbation/chronic diastolic heart failure with  moderate pulmonary hypertension with hx of afib with RVR-  Diurese as per cardiology  Remains in sinus following cardioversion     #3. COPD   Severe obstruction on previous PFTs 3/2021  No clear exacerbation at present, suspect current issue is more fluid overload/ CHF exacerbation  Continue on symbicort BID -->  advair while hospitalized  Cannot tolerate albuterol due to palpitations, tremors; continue with levalbuterol and ipratropium nebulizers every 6 hours.    Continue on budesonide nebs BID  Continue hypertonic saline nebs prn  Continue roflumilast  Previously reviewed BLVR - not a candidate suspect component CPFE       #4. Pulmonary nodules  7/2017 CT with worsening peripheral lingular atelectasis. Nodules stable   5/2018 CT stable nodules  12/2020 CT stable  7/2021 CT stable but worsening mediastinal LAD suspect reactive to recent cardiac surgery  1/2022 CT with stable nodules, improved LAD  3/2023 CT chest with several small nodules  9/2023 CT chest with stable findings   7/1/2024 CT chest with worsening ALYSON lesion, possible scarring, rounded atelectasis vs neoplasm  Will need outpatient follow up including PET/CT-plan for repeat CT  Now status post CT left upper lobe peripheral lesion seems stable plans to follow-up     #5. Tobacco abuse  30+ pack years, pipe smoker  As above       Plan:  Resumed steroids  Continuing diuresis  To get 10 L concentrator  Increase activity hopefully for O2 walks twice daily    CC     Pat Crum MD  8/17/2024  8:02 AM

## 2024-08-17 NOTE — PROGRESS NOTES
[facility]   part of [facility]     Hospitalist Progress Note     Keny Marshall Patient Status:  Inpatient    1960 MRN [id number]   Location [facility] 2NE-A Attending Megan De Leon MD   Hosp Day # 4 PCP Dustin Avina MD     Chief Complaint: cough erythema rt foot    Subjective:     Breathing better overall but still desating with exertion    Objective:    Review of Systems:   A 6 point review of systems was completed; pertinent positive and negatives stated in subjective.    Vital signs:  Temp:  [97.7 °F (36.5 °C)-98 °F (36.7 °C)] 97.7 °F (36.5 °C)  Pulse:  [70-82] 82  Resp:  [18-20] 20  BP: (105-125)/(63-70) 125/66  SpO2:  [87 %-99 %] 98 %    Physical Exam:    General: No acute distress  Respiratory: No wheezes, no rhonchi; diminished however  Cardiovascular: S1, S2, regular rate and rhythm  Abdomen: Soft, Non-tender, non-distended  Neuro: No new focal deficits.   Extremities: trace LE edema with mild bilateral redness, possibly slightly worse on right      Diagnostic Data:    Labs:  Recent Labs   Lab 24  1428 24  0715 08/15/24  0644 24  0800   WBC 8.5 7.0 6.0 5.6   HGB 10.6* 10.2* 9.9* 9.9*   MCV 98.8 102.4* 100.6* 97.3   .0 229.0 229.0 239.0       Recent Labs   Lab 24  1428 24  0715 08/15/24  0644 24  0800 24  0640   * 116* 88 90  --    BUN 17 17 15 14  --    CREATSERUM 0.74 0.83 0.67* 0.66*  --    CA 9.4 9.1 9.1 9.1  --    ALB 4.1 3.7 3.7  --   --     142 140 139  --    K 3.7 4.4 4.3 3.8 4.7    101 100 99  --    CO2 37.0* >40.0* 33.0* >40.0*  --    ALKPHO 56 53 45  --   --    AST 20 16 37*  --   --    ALT 11 10 <7*  --   --    BILT 0.4 0.4 0.3  --   --    TP 6.8 6.5 6.5  --   --        Estimated Creatinine Clearance: 108.9 mL/min (A) (based on SCr of 0.66 mg/dL (L)).    Recent Labs   Lab 24  1428   TROPHS 31       No results for input(s): \"PTP\", \"INR\" in the last 168 hours.               Microbiology    No results  found for this visit on 08/13/24.      Imaging: Reviewed in Epic.    Medications:    methylPREDNISolone  60 mg Intravenous Q12H    Roflumilast  500 mcg Oral Daily    fluticasone-salmeterol  1 puff Inhalation BID    ipratropium  500 mcg Nebulization TID & HS    amiodarone  200 mg Oral Daily    aspirin  81 mg Oral Daily    atorvastatin  20 mg Oral Nightly    ferrous sulfate  325 mg Oral Daily    guaiFENesin ER  1,200 mg Oral BID    levETIRAcetam  500 mg Oral BID    rivaroxaban  20 mg Oral Daily with food    furosemide  40 mg Intravenous BID (Diuretic)    ceFAZolin  2 g Intravenous Q8H       Assessment & Plan:      # Acute on chronic hypoxic/hypercapnic respiratory failure in context of severe baseline COPD (with possible  exacerbation?) and acute on chronic diastolic CHF exacerbation; continue lasix; steroid trial per pulm    #S/p bio AVR     #R foot cellulitis ruled out.  Suspect stasis dermatitis; stopped abx     #Pulmonary nodules  -Followed by pulm     #Previous smoker     #Paroxysmal A-fib/flutter s/p ablation 7/12/224- continue xarelto  #Aortic stenosis  #History of left renal cell cancer status post nephrectomy  #Hypertension  #Hyperlipidemia  #ABIGAIL    Dvt prophy;  on xarelto already    Rusty Stokes MD  [facility]  Internal Medicine Hospitalist

## 2024-08-18 LAB
ANION GAP SERPL CALC-SCNC: 0 MMOL/L (ref 0–18)
BUN BLD-MCNC: 21 MG/DL (ref 9–23)
CALCIUM BLD-MCNC: 9.3 MG/DL (ref 8.7–10.4)
CHLORIDE SERPL-SCNC: 100 MMOL/L (ref 98–112)
CO2 SERPL-SCNC: 38 MMOL/L (ref 21–32)
CREAT BLD-MCNC: 0.73 MG/DL
EGFRCR SERPLBLD CKD-EPI 2021: 102 ML/MIN/1.73M2 (ref 60–?)
GLUCOSE BLD-MCNC: 163 MG/DL (ref 70–99)
OSMOLALITY SERPL CALC.SUM OF ELEC: 293 MOSM/KG (ref 275–295)
POTASSIUM SERPL-SCNC: 3.7 MMOL/L (ref 3.5–5.1)
SODIUM SERPL-SCNC: 138 MMOL/L (ref 136–145)

## 2024-08-18 PROCEDURE — 99232 SBSQ HOSP IP/OBS MODERATE 35: CPT | Performed by: HOSPITALIST

## 2024-08-18 PROCEDURE — 99232 SBSQ HOSP IP/OBS MODERATE 35: CPT | Performed by: INTERNAL MEDICINE

## 2024-08-18 RX ORDER — POTASSIUM CHLORIDE 1500 MG/1
40 TABLET, EXTENDED RELEASE ORAL ONCE
Status: COMPLETED | OUTPATIENT
Start: 2024-08-18 | End: 2024-08-18

## 2024-08-18 RX ORDER — FUROSEMIDE 40 MG
40 TABLET ORAL
Status: DISCONTINUED | OUTPATIENT
Start: 2024-08-18 | End: 2024-08-19

## 2024-08-18 RX ORDER — PREDNISONE 20 MG/1
40 TABLET ORAL
Status: DISCONTINUED | OUTPATIENT
Start: 2024-08-19 | End: 2024-08-19

## 2024-08-18 NOTE — PROGRESS NOTES
[facility]  Progress Note    Keny Marshall Patient Status:  Inpatient    1960 MRN [id number]   Location [facility] 2NE-A Attending Rusty Stokes MD   Hosp Day # 5 PCP Dustin Avina MD     Subjective:  Keny Marshall is a(n) 63 year old male feels better overall believes he is back to baseline with fairly rapid improvement following exertion-he suspects his degree of hypoxia is now at baseline    Objective:  BP (!) 115/98 (BP Location: Right arm)   Pulse 80   Temp 98 °F (36.7 °C) (Oral)   Resp 19   Wt 149 lb 6.4 oz (67.8 kg)   SpO2 92%   BMI 21.44 kg/m² currently 6 L      Temp (24hrs), Av.6 °F (36.4 °C), Min:97.3 °F (36.3 °C), Max:98 °F (36.7 °C)      Intake/Output:    Intake/Output Summary (Last 24 hours) at 2024 1418  Last data filed at 2024 1221  Gross per 24 hour   Intake 120 ml   Output 1700 ml   Net -1580 ml       Physical Exam:   General: alert, cooperative, oriented.  No respiratory distress.   Head: Normocephalic, without obvious abnormality, atraumatic.   Throat: Lips, mucosa, and tongue normal.  No thrush noted.   Neck: trachea midline, no adenopathy, no thyromegaly. No JVD.   Lungs: Diminished tones slight rales   Chest wall: No tenderness or deformity.   Heart: Regular rate and rhythm murmur noted   Abdomen: soft, non-distended, no masses, no guarding, no     Rebound.  No obvious ascites   Extremity: Decreasing edema   Skin: No rashes or lesions.   Neurological: Alert, interactive, no focal deficits    Lab Data Review:  Recent Labs     24  0800   WBC 5.6   HGB 9.9*   .0     Recent Labs     24  0800 24  0640 24  0738     --  138   K 3.8 4.7 3.7   CL 99  --  100   CO2 >40.0*  --  38.0*   BUN 14  --  21   CREATSERUM 0.66*  --  0.73     No results for input(s): \"PTP\", \"INR\", \"PTT\" in the last 168 hours.    Cultures:     Radiology:  No results found.  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 ([facility])    Medial meniscus tear, right, initial encounter    Primary osteoarthritis of right knee              GLOBAL EXP 6-25-16 / RIGHT KNEE / TRK     Acute medial meniscus tear of right knee            GLOBAL EXP 6-25-16 / RIGHT KNEE / TRK     Acute lateral meniscus tear of left knee               GLOBAL EXP 6-25-16 / LEFT KNEE / TRK     Bursitis of left shoulder             GLOBAL EXP 6-25-16 / LEFT SHOULDER / TRK     Moderate aortic stenosis    Stage 3 severe COPD by GOLD classification ([facility])    Hyperglycemia    Hypervolemia    Hypervolemia, unspecified hypervolemia type    Dyspnea, unspecified type    Hypoxemia    Atrial fibrillation with RVR ([facility])    Acute heart failure with preserved ejection fraction (HFpEF) ([facility])    Atrial flutter with rapid ventricular response ([facility])    Acute on chronic congestive heart failure (HCC)    Acute on chronic congestive heart failure, unspecified heart failure type (HCC)    Hypoxia    Atrial fibrillation with rapid ventricular response ([facility])    Pleural effusion    Hypokalemia    Acute respiratory failure with hypoxia ([facility])    Normocytic anemia    Chronic heart failure with preserved ejection fraction ([facility])    Leukocytosis (leucocytosis)    Aortic valve regurgitation    HFrEF (heart failure with reduced ejection fraction) ([facility])    Chronic hypoxemic respiratory failure ([facility])    ABIGAIL (obstructive sleep apnea)    COPD exacerbation ([facility])    Acute on chronic respiratory failure with hypoxia (HCC)    Acute on chronic respiratory failure with hypoxia and hypercapnia ([facility])    Chronic hypercapnic respiratory failure ([facility])    Smoking greater than 20 pack years    Hyponatremia    Hyperkalemia    Chronic obstructive pulmonary disease, unspecified COPD type ([facility])    Supplemental oxygen dependent     Azotemia    Metabolic alkalosis    End stage COPD (HCC)    Acute on chronic hypoxic respiratory failure (HCC)       Assessment:  #1. Acute on chronic hypoxic respiratory failure; chronic hypercapnic respiratory failure  Multifactorial due to CHF exacerbation with underlying COPD   Continue nebs, steroids   Will need repeat o2 walk prior to discharge  He was previously ordered and used AVAPS with Dr. Lorenz however he has been unable to afford and now is to use BIPAP-remains hypercapnic well compensated on BiPAP     #2. Acute CHF exacerbation/chronic diastolic heart failure with  moderate pulmonary hypertension with hx of afib with RVR-  Diurese as per cardiology  Remains in sinus following cardioversion  May need right heart cath/repeat echo     #3. COPD/CPFE   Severe obstruction on previous PFTs 3/2021  No clear exacerbation at present, suspect current issue is more fluid overload/ CHF exacerbation  Continue on symbicort BID --> advair while hospitalized  Cannot tolerate albuterol due to palpitations, tremors; continue with levalbuterol and ipratropium nebulizers every 6 hours.    Continue on budesonide nebs BID  Previously reviewed BLVR - not a candidate suspect component CPFE        #4. Pulmonary nodules  7/2017 CT with worsening peripheral lingular atelectasis. Nodules stable   5/2018 CT stable nodules  12/2020 CT stable  7/2021 CT stable but worsening mediastinal LAD suspect reactive to recent cardiac surgery  1/2022 CT with stable nodules, improved LAD  3/2023 CT chest with several small nodules  9/2023 CT chest with stable findings   7/1/2024 CT chest with worsening ALYSON lesion, possible scarring, rounded atelectasis vs neoplasm  Will need outpatient follow up including PET/CT-plan for repeat CT  Now status post CT left upper lobe peripheral lesion seems stable plans to follow-up     #5. Tobacco abuse  30+ pack years, pipe smoker  As above    Plan:  Now to p.o. diuretic--if remains stable okay for discharge  tomorrow  10 L concentrator has been ordered plan for O2 walk prior to discharge  Begin steroid wean  Plan for close pulmonary follow-up    CC     Pat Crum MD  8/18/2024  2:18 PM

## 2024-08-18 NOTE — PROGRESS NOTES
[facility]  Progress Note    Keny Marshall Patient Status:  Inpatient    1960 MRN [id number]   Formerly [facility] 2NE-A Attending Rusty Stokes MD   Hosp Day # 5 PCP Dustin Avina MD       Impression;  recurrent hypoxemic respiratory failure--> multifactorial: adv COPD/exacerbation, CHF, AF  hx AF/flutter s/p flutter ablation 24 ([place])  SOB - 2/2 acute dCHF and COPD exac  Acute diastolic CHF - improved  S/p bio AVR  TTE (24): LVEF 55%, +DD, 23mm SAVR 3.3m/s, mean 22mmHg.  Plan:  diuresis: -1.4L UOP, -13#. Cr stable/baseline. not significant volume overloaded.  continue lasix 40mg IV BID while inpt--> lasix 40mg po bid on dc.  hx AF: remains in SR.  continue amiodarone 200mg po daily.    AC: rivaroxaban, continue.  severe COPD/end-stage emphysema- prednisone, inhaler therapy, per pulmonary service.        Subjective:  no acute issues overnight.  feels well and hopes to go home soon.      Objective:  /57 (BP Location: Right arm)   Pulse 72   Temp 97.3 °F (36.3 °C) (Oral)   Resp 19   Wt 149 lb 6.4 oz (67.8 kg)   SpO2 98%   BMI 21.44 kg/m²   Temp (24hrs), Av.6 °F (36.4 °C), Min:97.3 °F (36.3 °C), Max:98.3 °F (36.8 °C)      Intake/Output Summary (Last 24 hours) at 2024 5637  Last data filed at 2024 1740  Gross per 24 hour   Intake 480 ml   Output 1250 ml   Net -770 ml     Wt Readings from Last 3 Encounters:   24 149 lb 6.4 oz (67.8 kg)   24 151 lb 14.4 oz (68.9 kg)   24 159 lb 6.3 oz (72.3 kg)       General: Awake and alert; in no acute distress  Cardiac: Regular rate and regular rhythm; no murmurs/rubs/gallops are appreciated  Lungs: Clear to auscultation bilaterally; no accessory muscle use  Abdomen: Soft, non-tender; bowel sounds are normoactive  Extremities: No clubbing/cyanosis; moves all 4 extremities normally    Current Facility-Administered Medications   Medication Dose Route Frequency    acetaZOLAMIDE (Diamox) tab  125 mg  125 mg Oral TID    methylPREDNISolone sodium succinate (Solu-MEDROL) injection 60 mg  60 mg Intravenous Q12H    Roflumilast TABS 500 mcg  500 mcg Oral Daily    fluticasone-salmeterol (Advair Diskus) 250-50 MCG/ACT inhaler 1 puff  1 puff Inhalation BID    ipratropium (Atrovent) 0.02 % nebulizer solution 500 mcg  500 mcg Nebulization TID & HS    albuterol (Ventolin) (2.5 MG/3ML) 0.083% nebulizer solution   Nebulization Q4H PRN    amiodarone (Pacerone) tab 200 mg  200 mg Oral Daily    aspirin DR tab 81 mg  81 mg Oral Daily    atorvastatin (Lipitor) tab 20 mg  20 mg Oral Nightly    ferrous sulfate DR tab 325 mg  325 mg Oral Daily    guaiFENesin ER (Mucinex) 12 hr tab 1,200 mg  1,200 mg Oral BID    levETIRAcetam (Keppra) tab 500 mg  500 mg Oral BID    rivaroxaban (Xarelto) tab 20 mg  20 mg Oral Daily with food    acetaminophen (Tylenol Extra Strength) tab 1,000 mg  1,000 mg Oral Q4H PRN    melatonin tab 3 mg  3 mg Oral Nightly PRN    polyethylene glycol (PEG 3350) (Miralax) 17 g oral packet 17 g  17 g Oral Daily PRN    sennosides (Senokot) tab 17.2 mg  17.2 mg Oral Nightly PRN    bisacodyl (Dulcolax) 10 MG rectal suppository 10 mg  10 mg Rectal Daily PRN    fleet enema (Fleet) 7-19 GM/118ML rectal enema 133 mL  1 enema Rectal Once PRN    benzonatate (Tessalon) cap 200 mg  200 mg Oral TID PRN    ondansetron (Zofran) 4 MG/2ML injection 4 mg  4 mg Intravenous Q6H PRN    prochlorperazine (Compazine) 10 MG/2ML injection 5 mg  5 mg Intravenous Q8H PRN    furosemide (Lasix) 10 mg/mL injection 40 mg  40 mg Intravenous BID (Diuretic)    glycerin-hypromellose- (Artificial Tears) 0.2-0.2-1 % ophthalmic solution 1 drop  1 drop Both Eyes QID PRN    sodium chloride (Saline Mist) 0.65 % nasal solution 1 spray  1 spray Each Nare Q3H PRN       Laboratory Data:       Lab Results   Component Value Date    INR 1.59 (H) 06/29/2024    INR 1.06 06/11/2024    INR 2.26 (H) 09/22/2021            Telemetry: No malignant  tachyarrhythmias or bradyarrhythmias      Thank you for allowing our practice to participate in the care of your patient. Please do not hesitate to contact me if you have any questions.

## 2024-08-18 NOTE — PROGRESS NOTES
Received patient at 0730. Pt is A+Ox4. VSS on 6-7 L at rest, and up to 15L with ambulation. NSR on tele. Voids in urinal. Independent in room and SBA in hallway. Hopeful to DC tomorrow if higher O2 concentrator can be set up for him. O2 walk needed in AM. Pt updated on current POC. All needs met at this time.

## 2024-08-18 NOTE — PROGRESS NOTES
[facility]   part of [facility]     Hospitalist Progress Note     Keny Marshall Patient Status:  Inpatient    1960 MRN [id number]   Location [facility] 2NE-A Attending Megan De Leon MD   Hosp Day # 5 PCP Dustin Avina MD     Chief Complaint: cough erythema rt foot    Subjective:     Breathing better overall but still desating with exertion    Objective:    Review of Systems:   A 6 point review of systems was completed; pertinent positive and negatives stated in subjective.    Vital signs:  Temp:  [97.3 °F (36.3 °C)-98.3 °F (36.8 °C)] 97.3 °F (36.3 °C)  Pulse:  [72-83] 72  Resp:  [16-20] 19  BP: (104-126)/(57-69) 105/57  SpO2:  [89 %-98 %] 98 %    Physical Exam:    General: No acute distress  Respiratory: No wheezes, no rhonchi; diminished however  Cardiovascular: S1, S2, regular rate and rhythm  Abdomen: Soft, Non-tender, non-distended  Neuro: No new focal deficits.   Extremities: trace LE edema       Diagnostic Data:    Labs:  Recent Labs   Lab 24  1428 24  0715 08/15/24  0644 24  0800   WBC 8.5 7.0 6.0 5.6   HGB 10.6* 10.2* 9.9* 9.9*   MCV 98.8 102.4* 100.6* 97.3   .0 229.0 229.0 239.0       Recent Labs   Lab 24  1428 24  0715 08/15/24  0644 24  0800 24  0640 24  0738   * 116* 88 90  --  163*   BUN 17 17 15 14  --  21   CREATSERUM 0.74 0.83 0.67* 0.66*  --  0.73   CA 9.4 9.1 9.1 9.1  --  9.3   ALB 4.1 3.7 3.7  --   --   --     142 140 139  --  138   K 3.7 4.4 4.3 3.8 4.7 3.7    101 100 99  --  100   CO2 37.0* >40.0* 33.0* >40.0*  --  38.0*   ALKPHO 56 53 45  --   --   --    AST 20 16 37*  --   --   --    ALT 11 10 <7*  --   --   --    BILT 0.4 0.4 0.3  --   --   --    TP 6.8 6.5 6.5  --   --   --        Estimated Creatinine Clearance: 98 mL/min (based on SCr of 0.73 mg/dL).    Recent Labs   Lab 24  1428   TROPHS 31       No results for input(s): \"PTP\", \"INR\" in the last 168 hours.                Microbiology    No results found for this visit on 08/13/24.      Imaging: Reviewed in Epic.    Medications:    potassium chloride  40 mEq Oral Once    methylPREDNISolone  60 mg Intravenous Q12H    Roflumilast  500 mcg Oral Daily    fluticasone-salmeterol  1 puff Inhalation BID    ipratropium  500 mcg Nebulization TID & HS    amiodarone  200 mg Oral Daily    aspirin  81 mg Oral Daily    atorvastatin  20 mg Oral Nightly    ferrous sulfate  325 mg Oral Daily    guaiFENesin ER  1,200 mg Oral BID    levETIRAcetam  500 mg Oral BID    rivaroxaban  20 mg Oral Daily with food    furosemide  40 mg Intravenous BID (Diuretic)       Assessment & Plan:      # Acute on chronic hypoxic/hypercapnic respiratory failure in context of severe baseline COPD (with possible  exacerbation?) and acute on chronic diastolic CHF exacerbation; continue lasix; steroid trial per pulm; switch to oral lasix today possibly? Will check with cards  Will probably needed higher O2 at home than previous; defer to pulm    #S/p bio AVR     #R foot cellulitis ruled out.  Suspect stasis dermatitis; stopped abx     #Pulmonary nodules  -Followed by pulm     #Previous smoker     #Paroxysmal A-fib/flutter s/p ablation 7/12/224- continue xarelto  #Aortic stenosis  #History of left renal cell cancer status post nephrectomy  #Hypertension  #Hyperlipidemia  #ABIGAIL    Dvt prophy;  on xarelto already    Rusty Stokes MD  [facility]  Internal Medicine Hospitalist

## 2024-08-18 NOTE — PLAN OF CARE
Assumed care of patient at 1930  Awake and alert x 4, ambulating around room with supplemental oxygen 8- 10 liters HF per nasal cannula.  Desaturates with activity quickly  to mid 70's when oxygen is off for any reason. Extension tubing in use  for trips to bathroom or ambulation around room.  Plan of care updated for evening medications,and fall prevention.  Reports leg cramps have resolved stretching at bedside seems to help.  Telemetry monitoring in progress. NSR  Continue Steroid therapy  Neb treatments.    Problem: Patient/Family Goals  Goal: Patient/Family Long Term Goal  Description: Patient's Long Term Goal: go home    Interventions:  - wean O2, scheduled nebs, IV lasix  - See additional Care Plan goals for specific interventions  Outcome: Progressing  Goal: Patient/Family Short Term Goal  Description: Patient's Short Term Goal: feel better    Interventions:   - wean O2, scheduled nebs, IV lasix  - See additional Care Plan goals for specific interventions  Outcome: Progressing     Problem: CARDIOVASCULAR - ADULT  Goal: Maintains optimal cardiac output and hemodynamic stability  Description: INTERVENTIONS:  - Monitor vital signs, rhythm, and trends  - Monitor for bleeding, hypotension and signs of decreased cardiac output  - Evaluate effectiveness of vasoactive medications to optimize hemodynamic stability  - Monitor arterial and/or venous puncture sites for bleeding and/or hematoma  - Assess quality of pulses, skin color and temperature  - Assess for signs of decreased coronary artery perfusion - ex. Angina  - Evaluate fluid balance, assess for edema, trend weights  Outcome: Progressing  Goal: Absence of cardiac arrhythmias or at baseline  Description: INTERVENTIONS:  - Continuous cardiac monitoring, monitor vital signs, obtain 12 lead EKG if indicated  - Evaluate effectiveness of antiarrhythmic and heart rate control medications as ordered  - Initiate emergency measures for life threatening arrhythmias  -  Monitor electrolytes and administer replacement therapy as ordered  Outcome: Progressing     Problem: RESPIRATORY - ADULT  Goal: Achieves optimal ventilation and oxygenation  Description: INTERVENTIONS:  - Assess for changes in respiratory status  - Assess for changes in mentation and behavior  - Position to facilitate oxygenation and minimize respiratory effort  - Oxygen supplementation based on oxygen saturation or ABGs  - Provide Smoking Cessation handout, if applicable  - Encourage broncho-pulmonary hygiene including cough, deep breathe, Incentive Spirometry  - Assess the need for suctioning and perform as needed  - Assess and instruct to report SOB or any respiratory difficulty  - Respiratory Therapy support as indicated  - Manage/alleviate anxiety  - Monitor for signs/symptoms of CO2 retention  Outcome: Progressing

## 2024-08-19 VITALS
WEIGHT: 149.69 LBS | BODY MASS INDEX: 21 KG/M2 | TEMPERATURE: 98 F | OXYGEN SATURATION: 94 % | HEART RATE: 76 BPM | DIASTOLIC BLOOD PRESSURE: 83 MMHG | RESPIRATION RATE: 15 BRPM | SYSTOLIC BLOOD PRESSURE: 154 MMHG

## 2024-08-19 LAB — POTASSIUM SERPL-SCNC: 4.2 MMOL/L (ref 3.5–5.1)

## 2024-08-19 PROCEDURE — 99232 SBSQ HOSP IP/OBS MODERATE 35: CPT | Performed by: INTERNAL MEDICINE

## 2024-08-19 PROCEDURE — 99239 HOSP IP/OBS DSCHRG MGMT >30: CPT | Performed by: HOSPITALIST

## 2024-08-19 RX ORDER — FUROSEMIDE 40 MG
40 TABLET ORAL 2 TIMES DAILY
Qty: 180 TABLET | Refills: 0 | Status: SHIPPED | OUTPATIENT
Start: 2024-08-19 | End: 2024-11-17

## 2024-08-19 RX ORDER — PREDNISONE 10 MG/1
TABLET ORAL
Qty: 30 TABLET | Refills: 0 | Status: SHIPPED | OUTPATIENT
Start: 2024-08-19

## 2024-08-19 NOTE — PLAN OF CARE
Assumed care of patient at 19:30,   Alert and oriented x4,   Sinus rhythm with brief episodes of atrial fib on tele,   5L HFNC, CPAP while asleep, rhonchi auscultated bilaterally, congestive cough noted. Continuous pulse oximetry maintained. Denies shortness of breath and dizziness.   Complaints of moderate pain to dorsal region of feet. PRN pain med given.   Continent of bowel and bladder.   Safety precautions maintained.    Discussed plan of care with patient. All questions answered.    Problem: Patient/Family Goals  Goal: Patient/Family Long Term Goal  Description: Patient's Long Term Goal: go home    Interventions:  - wean O2, scheduled nebs, IV lasix  - See additional Care Plan goals for specific interventions  Outcome: Progressing  Goal: Patient/Family Short Term Goal  Description: Patient's Short Term Goal: feel better    Interventions:   - wean O2, scheduled nebs, IV lasix  - See additional Care Plan goals for specific interventions  Outcome: Progressing     Problem: CARDIOVASCULAR - ADULT  Goal: Maintains optimal cardiac output and hemodynamic stability  Description: INTERVENTIONS:  - Monitor vital signs, rhythm, and trends  - Monitor for bleeding, hypotension and signs of decreased cardiac output  - Evaluate effectiveness of vasoactive medications to optimize hemodynamic stability  - Monitor arterial and/or venous puncture sites for bleeding and/or hematoma  - Assess quality of pulses, skin color and temperature  - Assess for signs of decreased coronary artery perfusion - ex. Angina  - Evaluate fluid balance, assess for edema, trend weights  Outcome: Progressing  Goal: Absence of cardiac arrhythmias or at baseline  Description: INTERVENTIONS:  - Continuous cardiac monitoring, monitor vital signs, obtain 12 lead EKG if indicated  - Evaluate effectiveness of antiarrhythmic and heart rate control medications as ordered  - Initiate emergency measures for life threatening arrhythmias  - Monitor electrolytes and  administer replacement therapy as ordered  Outcome: Progressing     Problem: RESPIRATORY - ADULT  Goal: Achieves optimal ventilation and oxygenation  Description: INTERVENTIONS:  - Assess for changes in respiratory status  - Assess for changes in mentation and behavior  - Position to facilitate oxygenation and minimize respiratory effort  - Oxygen supplementation based on oxygen saturation or ABGs  - Provide Smoking Cessation handout, if applicable  - Encourage broncho-pulmonary hygiene including cough, deep breathe, Incentive Spirometry  - Assess the need for suctioning and perform as needed  - Assess and instruct to report SOB or any respiratory difficulty  - Respiratory Therapy support as indicated  - Manage/alleviate anxiety  - Monitor for signs/symptoms of CO2 retention  Outcome: Progressing

## 2024-08-19 NOTE — PROGRESS NOTES
O2 at rest 85% room air  O2 sat 97% at rest with 5L/nc.  O2 sat 88% at rest with 4L/nc.  O2 sat 96% with 6L/nc with ambulation.  O2 sat 85% with 4L/nc with ambulation but denies any sob and want to continue to ambulate.

## 2024-08-19 NOTE — DISCHARGE SUMMARY
[facility]  DISCHARGE SUMMARY     Keny Marshall Patient Status:  Inpatient    1960 MRN [id number]   Location [facility] 2NE-A Attending Rusty Stokes MD   Hosp Day # 6 PCP Dustin Avina MD     Date of Admission: 2024  Date of Discharge: 2024  Discharge Disposition: Home Health Care Services  Chief complaint:   Chief Complaint   Patient presents with    Difficulty Breathing         Discharge Diagnoses and Brief Synopsis:  # Acute on chronic hypoxic/hypercapnic respiratory failure in context of severe baseline COPD (with possible  exacerbation?) and acute on chronic diastolic CHF exacerbation; improved with diuresis and steroids.  Discharged with 10L o2 concentrator per pulm, as he needs higher O2 needs than prior with exertion.     #S/p bio AVR      #R foot cellulitis ruled out.  Suspect stasis dermatitis; stopped abx     #Pulmonary nodules  -Followed by pulm     #Previous smoker     #Paroxysmal A-fib/flutter s/p ablation - on  xarelto  #Aortic stenosis  #History of left renal cell cancer status post nephrectomy  #Hypertension  #Hyperlipidemia  #ABIGAIL        Lab/Test results pending at Discharge:   none        Admission History of Present Illness (author of HPI not necessarily myself; see H&P author): Keny Marshall is a 63 year old male with past medical history of aortic stenosis, A-fib, renal cancer, COPD, on 4 L home O2, GERD, hypertension, hyperlipidemia, ABIGAIL who presents with shortness of breath.  Has had 3 days of cough.  Has chronic bilateral lower extremity swelling.  Pretty significant but states this is about the same as it usually is.  Over last few days has noticed some erythema on his right foot which has been growing.  Unclear if he got a bug bite but there seems to be a dot in the middle.  His shortness of breath has gotten worse and his O2 sats have been dropping into the 60s at times now.  Denies any fever, chills.       Lace+ Score: 84  59-90 High  Risk  29-58 Medium Risk  0-28   Low Risk       TCM Follow-Up Recommendation:  LACE > 58: High Risk of readmission after discharge from the hospital.      Discharge Medication List:     Discharge Medications        CHANGE how you take these medications        Instructions Prescription details   furosemide 40 MG Tabs  Commonly known as: Lasix  What changed: when to take this      Take 1 tablet (40 mg total) by mouth 2 (two) times daily.   Stop taking on: November 17, 2024  Quantity: 180 tablet  Refills: 0     predniSONE 10 MG Tabs  Commonly known as: Deltasone  What changed: Another medication with the same name was added. Make sure you understand how and when to take each.      Take 3 tablets daily for 3 days then 2 tablets daily for 3 days then 1 tablets daily for 3 days then stop.   Quantity: 30 tablet  Refills: 0     predniSONE 10 MG Tabs  Commonly known as: Deltasone  What changed: You were already taking a medication with the same name, and this prescription was added. Make sure you understand how and when to take each.      take 4 tablets daily for 3 days then 3 tablets daily for 3 days then 2 tablets daily for 3 days then 1 tablets daily for 3 days then stop.   Quantity: 30 tablet  Refills: 0            CONTINUE taking these medications        Instructions Prescription details   amiodarone 200 MG Tabs  Commonly known as: Pacerone      Take one table twice daily x 2 weeks then take one tablet daily thereafter   Quantity: 90 tablet  Refills: 0     aspirin 81 MG Tbec      Take 1 tablet (81 mg total) by mouth daily.   Refills: 0     atorvastatin 20 MG Tabs  Commonly known as: Lipitor      Take 1 tablet (20 mg total) by mouth nightly.   Refills: 0     B Complete Tabs      Take 1 tablet by mouth daily.   Refills: 0     Budesonide-Formoterol Fumarate 160-4.5 MCG/ACT Aero  Commonly known as: SYMBICORT      Inhale 2 puffs into the lungs 2 (two) times daily.   Quantity: 3 each  Refills: 3     Ferrous Sulfate 324 (65 Fe)  MG Tbec      Take 65 mg by mouth daily.   Refills: 0     guaiFENesin  MG Tb12  Commonly known as: Mucinex      Take 2 tablets (1,200 mg total) by mouth 2 (two) times daily.   Refills: 0     ipratropium 0.02 % Soln  Commonly known as: Atrovent      Take 2.5 mL (500 mcg total) by nebulization every 6 (six) hours as needed for Wheezing.   Refills: 0     Jardiance 10 MG Tabs  Generic drug: empagliflozin       Refills: 0     ketoconazole 2 % Crea  Commonly known as: Nizoral      Apply topically as needed.   Refills: 0     Levalbuterol HCl 0.63 MG/3ML Nebu  Commonly known as: XOPENEX      Take 3 mL (0.63 mg total) by nebulization every 6 (six) hours as needed for Shortness of Breath or Wheezing.   Quantity: 3 each  Refills: 3     levETIRAcetam 500 MG Tabs  Commonly known as: Keppra      Take 1 tablet (500 mg total) by mouth 2 (two) times daily.   Stop taking on: October 18, 2024  Quantity: 180 tablet  Refills: 0     magnesium 250 MG Tabs      Take 1 tablet (250 mg total) by mouth every evening.   Refills: 0     Roflumilast 500 MCG Tabs      Take 500 mcg by mouth daily.   Quantity: 90 tablet  Refills: 0     Tab-A-Arnold Tabs      Take 1 tablet by mouth daily.   Refills: 0     tiotropium 18 MCG Caps  Commonly known as: Spiriva Handihaler      Inhale 1 capsule (18 mcg total) into the lungs daily.   Refills: 0     Xarelto 20 MG Tabs  Generic drug: rivaroxaban      Take 1 tablet by mouth daily with food   Quantity: 30 tablet  Refills: 3            STOP taking these medications      sulfamethoxazole-trimethoprim -160 MG Tabs per tablet  Commonly known as: Bactrim DS                  Where to Get Your Medications        These medications were sent to [facility] [address] [place] [address] - [address] [facility] [phone], [phone]  [address] [facility] [address]      Phone: [phone]   furosemide 40 MG Tabs       Please  your prescriptions at the location directed by your  doctor or nurse    Bring a paper prescription for each of these medications  predniSONE 10 MG Tabs         [place]    Follow-up appointment:   Dustin Avina MD  [address]  [facility] [address] [phone]    Follow up in 1 week(s)      Keny Salazar MD  [address]  [facility] [address] [phone]    Schedule an appointment as soon as possible for a visit in 3 week(s)      Pat Crum MD  [address]  [facility] [id number] [phone]    Schedule an appointment as soon as possible for a visit in 2 week(s)      Appointments for Next 30 Days 8/20/2024 - 9/19/2024      None            Vital signs:  Temp:  [97.5 °F (36.4 °C)-97.9 °F (36.6 °C)] 97.9 °F (36.6 °C)  Pulse:  [64-78] 76  Resp:  [15-20] 15  BP: (118-154)/(65-83) 154/83  SpO2:  [89 %-98 %] 94 %    Physical Exam:    General: No acute distress  Respiratory: No wheezes, no rhonchi; diminished however  Cardiovascular: S1, S2, regular rate and rhythm  Abdomen: Soft, Non-tender, non-distended  Neuro: No new focal deficits.   Extremities: trace LE edema  -----------------------------------------------------------------------------------------------  PATIENT DISCHARGE INSTRUCTIONS: See electronic chart    Rusty Smith MD    Time spent:   32 minutes

## 2024-08-19 NOTE — PLAN OF CARE
Pt received after report at 1500. Pt alert and oriented. Following commands. No reports of pain. Ambulating. Pt on 5L nasal cannula. Oxygen saturation stable. Afebrile. Sinus rhythm with Bundle Branch Block. Blood pressure stable. Cardiac diet in place. Voiding in urinal. Cleared by consults for discharge.

## 2024-08-19 NOTE — CM/SW NOTE
08/19/24 1600   Discharge disposition   Expected discharge disposition Home-Health   Post Acute Care Provider   ([facility])   DME/Infusion Providers [facility]   Discharge transportation Private car     Pt cleared to DC today. Per RN, pt's wife will be picking up pt at DC and transporting him home.    [facility]. SW sent them a message in Aidin informing them of DC.    Pt is current with HME for home O2. Pt currently has 5L concentrator at home, but is needing more O2. Pt will have 5L concentrator switched with a 10L concentrator today around 6pm. Marianna from E informed SW that she would be in contact with pt/family to confirm information for exchange. Per Marianna, pt can get HME tank from Respiratory if needed for DC.     to remain available for support and/or discharge planning.    DONIS Scott  Discharge Planner  [phone]

## 2024-08-19 NOTE — CM/SW NOTE
SW noted and acknowledged consult order for home O2. Pt already has home O2 through [facility], but is needing an increase in O2.     ROSALINDA sent referral in Aidin to [facility] and uploaded O2 walk note. SW placed home O2 order but is pending cosign from Dr. Crum. ROSALINDA also sent a message in Epic to Dr. Crum with the needed verbiage. SW will upload signed order and note with verbiage to Aidin when available.     to remain available for support and/or discharge planning.    Addendum: ROSALINDA received a message in Aidin from Dr. Crum requesting for SW to send information to Dr. Danya Whaley as he is seeing pt today.  ROSALINDA sent a message in Epic to Dr. Whaley requesting for him to sign off on O2 order and to add needed verbiage to his note. Awaiting response.    Addendum: ROSALINDA uploaded signed order and note with verbiage to Aidin. ROSALINDA called and spoke with Marianna at [facility] ([phone]). ROSALINDA informed Marianna that needed information had been uploaded. Marianna stated she would have her team do an exchange of the concentrators today, so they would be picking up pt's old 5L concentrator and replacing it with a 10L concentrator. Marianna stated they would try to get this done around 6pm today, but will be in contact with pt/family to confirm information. Marianna also stated that if pt needs portable tank for DC, pt is able to obtain one of HME's tanks from our respiratory team at [facility].    ROSALINDA spoke with RADHA Gunter and informed him that new concentrator should be getting delivered today and pt can get an HME from respiratory if needed for transport. Jani informed ROSALINDA that pt's wife will be picking up pt at DC.    Breann Cohn, [facility]  Discharge Planner  [phone]

## 2024-08-19 NOTE — PROGRESS NOTES
Assumed care of patient at 0730  Alert and oriented x4,   Sinus rhythm with brief episodes of atrial fib on tele,   5L HFNC, CPAP while asleep, rhonchi auscultated bilaterally, congestive cough noted. Continuous pulse oximetry maintained. Denies shortness of breath and dizziness.   Complaints of moderate pain to dorsal region of feet. PRN pain med given.   Continent of bowel and bladder.   Safety precautions maintained.     Discussed plan of care with patient. All questions answered.     Problem: Patient/Family Goals  Goal: Patient/Family Long Term Goal  Description: Patient's Long Term Goal: go home    Interventions:  - wean O2, scheduled nebs  - See additional Care Plan goals for specific interventions  Outcome: Progressing  Goal: Patient/Family Short Term Goal  Description: Patient's Short Term Goal: feel better    Interventions:   - wean O2, scheduled nebs, IV lasix  - See additional Care Plan goals for specific interventions  Outcome: Progressing     Problem: CARDIOVASCULAR - ADULT  Goal: Maintains optimal cardiac output and hemodynamic stability  Description: INTERVENTIONS:  - Monitor vital signs, rhythm, and trends  - Monitor for bleeding, hypotension and signs of decreased cardiac output  - Evaluate effectiveness of vasoactive medications to optimize hemodynamic stability  - Monitor arterial and/or venous puncture sites for bleeding and/or hematoma  - Assess quality of pulses, skin color and temperature  - Assess for signs of decreased coronary artery perfusion - ex. Angina  - Evaluate fluid balance, assess for edema, trend weights  Outcome: Progressing  Goal: Absence of cardiac arrhythmias or at baseline  Description: INTERVENTIONS:  - Continuous cardiac monitoring, monitor vital signs, obtain 12 lead EKG if indicated  - Evaluate effectiveness of antiarrhythmic and heart rate control medications as ordered  - Initiate emergency measures for life threatening arrhythmias  - Monitor electrolytes and administer  replacement therapy as ordered  Outcome: Progressing     Problem: RESPIRATORY - ADULT  Goal: Achieves optimal ventilation and oxygenation  Description: INTERVENTIONS:  - Assess for changes in respiratory status  - Assess for changes in mentation and behavior  - Position to facilitate oxygenation and minimize respiratory effort  - Oxygen supplementation based on oxygen saturation or ABGs  - Provide Smoking Cessation handout, if applicable  - Encourage broncho-pulmonary hygiene including cough, deep breathe, Incentive Spirometry  - Assess the need for suctioning and perform as needed  - Assess and instruct to report SOB or any respiratory difficulty  - Respiratory Therapy support as indicated  - Manage/alleviate anxiety  - Monitor for signs/symptoms of CO2 retention  Outcome: Progressing

## 2024-08-19 NOTE — PROGRESS NOTES
[facility]  Progress Note    Keny Marshall Patient Status:  Inpatient    1960 MRN [id number]   [facility] 2NE-A Attending Rusty Stokes MD   Hosp Day # 6 PCP Dustin Avina MD       Impression;  recurrent hypoxemic respiratory failure--> multifactorial: adv COPD/exacerbation, CHF, AF  hx AF/flutter s/p flutter ablation 24 ([facility])  SOB - 2/2 acute dCHF and COPD exac  Acute diastolic CHF - improved  S/p bio AVR  TTE (24): LVEF 55%, +DD, 23mm SAVR 3.3m/s, mean 22mmHg.  Plan:  diuresis: -1.4L UOP, -13#. Cr stable/baseline. not significant volume overloaded. transitioned to lasix 40mg po bid (dc dose).  hx AF: remains in SR.  continue amiodarone 200mg po daily.    AC: rivaroxaban, continue.  severe COPD/end-stage emphysema- prednisone, inhaler therapy, per pulmonary service.   no cv issues prohibitive of discharge today. f/u with Shanapaige Ovalle 24 as scheduled.       Subjective:  no acute issues overnight.      Objective:  /65 (BP Location: Right arm)   Pulse 76   Temp 97.7 °F (36.5 °C) (Oral)   Resp 20   Wt 149 lb 11.1 oz (67.9 kg)   SpO2 93%   BMI 21.48 kg/m²   Temp (24hrs), Av °F (36.7 °C), Min:97.5 °F (36.4 °C), Max:98.4 °F (36.9 °C)      Intake/Output Summary (Last 24 hours) at 2024 1250  Last data filed at 2024 1214  Gross per 24 hour   Intake 1320 ml   Output 1475 ml   Net -155 ml     Wt Readings from Last 3 Encounters:   24 149 lb 11.1 oz (67.9 kg)   24 151 lb 14.4 oz (68.9 kg)   24 159 lb 6.3 oz (72.3 kg)       General: Awake and alert; in no acute distress  Cardiac: Regular rate and regular rhythm; no murmurs/rubs/gallops are appreciated  Lungs: Clear to auscultation bilaterally; no accessory muscle use  Abdomen: Soft, non-tender; bowel sounds are normoactive  Extremities: No clubbing/cyanosis; moves all 4 extremities normally    Current Facility-Administered Medications   Medication Dose Route Frequency     furosemide (Lasix) tab 40 mg  40 mg Oral BID (Diuretic)    predniSONE (Deltasone) tab 40 mg  40 mg Oral Daily with breakfast    Roflumilast TABS 500 mcg  500 mcg Oral Daily    fluticasone-salmeterol (Advair Diskus) 250-50 MCG/ACT inhaler 1 puff  1 puff Inhalation BID    ipratropium (Atrovent) 0.02 % nebulizer solution 500 mcg  500 mcg Nebulization TID & HS    albuterol (Ventolin) (2.5 MG/3ML) 0.083% nebulizer solution   Nebulization Q4H PRN    amiodarone (Pacerone) tab 200 mg  200 mg Oral Daily    aspirin DR tab 81 mg  81 mg Oral Daily    atorvastatin (Lipitor) tab 20 mg  20 mg Oral Nightly    ferrous sulfate DR tab 325 mg  325 mg Oral Daily    guaiFENesin ER (Mucinex) 12 hr tab 1,200 mg  1,200 mg Oral BID    levETIRAcetam (Keppra) tab 500 mg  500 mg Oral BID    rivaroxaban (Xarelto) tab 20 mg  20 mg Oral Daily with food    acetaminophen (Tylenol Extra Strength) tab 1,000 mg  1,000 mg Oral Q4H PRN    melatonin tab 3 mg  3 mg Oral Nightly PRN    polyethylene glycol (PEG 3350) (Miralax) 17 g oral packet 17 g  17 g Oral Daily PRN    sennosides (Senokot) tab 17.2 mg  17.2 mg Oral Nightly PRN    bisacodyl (Dulcolax) 10 MG rectal suppository 10 mg  10 mg Rectal Daily PRN    fleet enema (Fleet) 7-19 GM/118ML rectal enema 133 mL  1 enema Rectal Once PRN    benzonatate (Tessalon) cap 200 mg  200 mg Oral TID PRN    ondansetron (Zofran) 4 MG/2ML injection 4 mg  4 mg Intravenous Q6H PRN    prochlorperazine (Compazine) 10 MG/2ML injection 5 mg  5 mg Intravenous Q8H PRN    glycerin-hypromellose- (Artificial Tears) 0.2-0.2-1 % ophthalmic solution 1 drop  1 drop Both Eyes QID PRN    sodium chloride (Saline Mist) 0.65 % nasal solution 1 spray  1 spray Each Nare Q3H PRN       Laboratory Data:       Lab Results   Component Value Date    INR 1.59 (H) 06/29/2024    INR 1.06 06/11/2024    INR 2.26 (H) 09/22/2021     Lab Results   Component Value Date    K 4.2 08/19/2024         Telemetry: No malignant tachyarrhythmias or  bradyarrhythmias      Thank you for allowing our practice to participate in the care of your patient. Please do not hesitate to contact me if you have any questions.

## 2024-08-19 NOTE — PROGRESS NOTES
[facility]   part of [facility]     Hospitalist Progress Note     Keny Marshall Patient Status:  Inpatient    1960 MRN [id number]   Location [facility] 2NE-A Attending Megan De Leon MD   Hosp Day # 6 PCP Dustin Avina MD     Chief Complaint: cough erythema rt foot    Subjective:     Breathing better overall     Objective:    Review of Systems:   A 6 point review of systems was completed; pertinent positive and negatives stated in subjective.    Vital signs:  Temp:  [97.5 °F (36.4 °C)-98.4 °F (36.9 °C)] 97.5 °F (36.4 °C)  Pulse:  [64-80] 78  Resp:  [18-20] 20  BP: (115-132)/(67-98) 123/73  SpO2:  [89 %-99 %] 89 %    Physical Exam:    General: No acute distress  Respiratory: No wheezes, no rhonchi; diminished however  Cardiovascular: S1, S2, regular rate and rhythm  Abdomen: Soft, Non-tender, non-distended  Neuro: No new focal deficits.   Extremities: trace LE edema       Diagnostic Data:    Labs:  Recent Labs   Lab 24  1428 24  0715 08/15/24  0644 24  0800   WBC 8.5 7.0 6.0 5.6   HGB 10.6* 10.2* 9.9* 9.9*   MCV 98.8 102.4* 100.6* 97.3   .0 229.0 229.0 239.0       Recent Labs   Lab 24  1428 24  0715 08/15/24  0644 24  0800 24  0640 24  0738 24  0744   * 116* 88 90  --  163*  --    BUN 17 17 15 14  --  21  --    CREATSERUM 0.74 0.83 0.67* 0.66*  --  0.73  --    CA 9.4 9.1 9.1 9.1  --  9.3  --    ALB 4.1 3.7 3.7  --   --   --   --     142 140 139  --  138  --    K 3.7 4.4 4.3 3.8 4.7 3.7 4.2    101 100 99  --  100  --    CO2 37.0* >40.0* 33.0* >40.0*  --  38.0*  --    ALKPHO 56 53 45  --   --   --   --    AST 20 16 37*  --   --   --   --    ALT 11 10 <7*  --   --   --   --    BILT 0.4 0.4 0.3  --   --   --   --    TP 6.8 6.5 6.5  --   --   --   --        Estimated Creatinine Clearance: 98.2 mL/min (based on SCr of 0.73 mg/dL).    Recent Labs   Lab 24  1428   TROPHS 31       No results for input(s): \"PTP\", \"INR\"  in the last 168 hours.               Microbiology    No results found for this visit on 08/13/24.      Imaging: Reviewed in Epic.    Medications:    furosemide  40 mg Oral BID (Diuretic)    predniSONE  40 mg Oral Daily with breakfast    Roflumilast  500 mcg Oral Daily    fluticasone-salmeterol  1 puff Inhalation BID    ipratropium  500 mcg Nebulization TID & HS    amiodarone  200 mg Oral Daily    aspirin  81 mg Oral Daily    atorvastatin  20 mg Oral Nightly    ferrous sulfate  325 mg Oral Daily    guaiFENesin ER  1,200 mg Oral BID    levETIRAcetam  500 mg Oral BID    rivaroxaban  20 mg Oral Daily with food       Assessment & Plan:      # Acute on chronic hypoxic/hypercapnic respiratory failure in context of severe baseline COPD (with possible  exacerbation?) and acute on chronic diastolic CHF exacerbation; continue lasix; steroid trial per pulm  10L O2 concentrator for home per pulm    #S/p bio AVR     #R foot cellulitis ruled out.  Suspect stasis dermatitis; stopped abx     #Pulmonary nodules  -Followed by pulm     #Previous smoker     #Paroxysmal A-fib/flutter s/p ablation 7/12/224- continue xarelto  #Aortic stenosis  #History of left renal cell cancer status post nephrectomy  #Hypertension  #Hyperlipidemia  #ABIGAIL    Discharge today if ok with pulm and cards    Dvt prophy;  on xarelto already    Rusty Stokes MD  [facility]  Internal Medicine Hospitalist

## 2024-08-19 NOTE — PAYOR COMM NOTE
--------------  CONTINUED STAY REVIEW  8/17-8/18  Payor: [employer] MED[employer] ADV PPO  Subscriber #:  [id number]  Authorization Number: [id number]    Admit date: 8/13/24  Admit time:  6:41 PM    REVIEW DOCUMENTATION:  8/17      Chief Complaint: cough erythema rt foot        Subjective:  Breathing better overall but still desating with exertion           Objective:  Review of Systems:   A 6 point review of systems was completed; pertinent positive and negatives stated in subjective.     Vital signs:  Temp:  [97.7 °F (36.5 °C)-98 °F (36.7 °C)] 97.7 °F (36.5 °C)  Pulse:  [70-82] 82  Resp:  [18-20] 20  BP: (105-125)/(63-70) 125/66  SpO2:  [87 %-99 %] 98 %     Physical Exam:    General: No acute distress  Respiratory: No wheezes, no rhonchi; diminished however  Cardiovascular: S1, S2, regular rate and rhythm  Abdomen: Soft, Non-tender, non-distended  Neuro: No new focal deficits.   Extremities: trace LE edema with mild bilateral redness, possibly slightly worse on right        Diagnostic Data:    Labs:         Recent Labs   Lab 08/13/24  1428 08/14/24  0715 08/15/24  0644 08/16/24  0800   WBC 8.5 7.0 6.0 5.6   HGB 10.6* 10.2* 9.9* 9.9*   MCV 98.8 102.4* 100.6* 97.3   .0 229.0 229.0 239.0                 Recent Labs   Lab 08/13/24  1428 08/14/24  0715 08/15/24  0644 08/16/24  0800 08/17/24  0640   * 116* 88 90  --    BUN 17 17 15 14  --    CREATSERUM 0.74 0.83 0.67* 0.66*  --    CA 9.4 9.1 9.1 9.1  --    ALB 4.1 3.7 3.7  --   --     142 140 139  --    K 3.7 4.4 4.3 3.8 4.7    101 100 99  --    CO2 37.0* >40.0* 33.0* >40.0*  --    ALKPHO 56 53 45  --   --    AST 20 16 37*  --   --    ALT 11 10 <7*  --   --    BILT 0.4 0.4 0.3  --   --    TP 6.8 6.5 6.5  --   --          Estimated Creatinine Clearance: 108.9 mL/min (A) (based on SCr of 0.66 mg/dL (L)).         Recent Labs   Lab 08/13/24  1428   TROPHS 31         No results for input(s): \"PTP\", \"INR\" in the last 168 hours.                 Microbiology      No results found for this visit on 08/13/24.        Imaging: Reviewed in Epic.     Medications:   Scheduled Medications    methylPREDNISolone  60 mg Intravenous Q12H    Roflumilast  500 mcg Oral Daily    fluticasone-salmeterol  1 puff Inhalation BID    ipratropium  500 mcg Nebulization TID & HS    amiodarone  200 mg Oral Daily    aspirin  81 mg Oral Daily    atorvastatin  20 mg Oral Nightly    ferrous sulfate  325 mg Oral Daily    guaiFENesin ER  1,200 mg Oral BID    levETIRAcetam  500 mg Oral BID    rivaroxaban  20 mg Oral Daily with food    furosemide  40 mg Intravenous BID (Diuretic)    ceFAZolin  2 g Intravenous Q8H                  Assessment & Plan:  # Acute on chronic hypoxic/hypercapnic respiratory failure in context of severe baseline COPD (with possible  exacerbation?) and acute on chronic diastolic CHF exacerbation; continue lasix; steroid trial per pulm     #S/p bio AVR      #R foot cellulitis ruled out.  Suspect stasis dermatitis; stopped abx     #Pulmonary nodules  -Followed by pulm     #Previous smoker     #Paroxysmal A-fib/flutter s/p ablation 7/12/224- continue xarelto  #Aortic stenosis  #History of left renal cell cancer status post nephrectomy  #Hypertension  #Hyperlipidemia  #ABIGAIL     Dvt prophy;  on xarelto already            8/18    5 PCP Dustin Avina MD      Chief Complaint: cough erythema rt foot        Subjective:  Breathing better overall but still desating with exertion           Objective:  Review of Systems:   A 6 point review of systems was completed; pertinent positive and negatives stated in subjective.     Vital signs:  Temp:  [97.3 °F (36.3 °C)-98.3 °F (36.8 °C)] 97.3 °F (36.3 °C)  Pulse:  [72-83] 72  Resp:  [16-20] 19  BP: (104-126)/(57-69) 105/57  SpO2:  [89 %-98 %] 98 %     Physical Exam:    General: No acute distress  Respiratory: No wheezes, no rhonchi; diminished however  Cardiovascular: S1, S2, regular rate and rhythm  Abdomen: Soft, Non-tender, non-distended  Neuro: No  new focal deficits.   Extremities: trace LE edema         Diagnostic Data:    Labs:         Recent Labs   Lab 08/13/24  1428 08/14/24  0715 08/15/24  0644 08/16/24  0800   WBC 8.5 7.0 6.0 5.6   HGB 10.6* 10.2* 9.9* 9.9*   MCV 98.8 102.4* 100.6* 97.3   .0 229.0 229.0 239.0                  Recent Labs   Lab 08/13/24  1428 08/14/24  0715 08/15/24  0644 08/16/24  0800 08/17/24  0640 08/18/24  0738   * 116* 88 90  --  163*   BUN 17 17 15 14  --  21   CREATSERUM 0.74 0.83 0.67* 0.66*  --  0.73   CA 9.4 9.1 9.1 9.1  --  9.3   ALB 4.1 3.7 3.7  --   --   --     142 140 139  --  138   K 3.7 4.4 4.3 3.8 4.7 3.7    101 100 99  --  100   CO2 37.0* >40.0* 33.0* >40.0*  --  38.0*   ALKPHO 56 53 45  --   --   --    AST 20 16 37*  --   --   --    ALT 11 10 <7*  --   --   --    BILT 0.4 0.4 0.3  --   --   --    TP 6.8 6.5 6.5  --   --   --          Estimated Creatinine Clearance: 98 mL/min (based on SCr of 0.73 mg/dL).         Recent Labs   Lab 08/13/24  1428   TROPHS 31         No results for input(s): \"PTP\", \"INR\" in the last 168 hours.                 Microbiology     No results found for this visit on 08/13/24.        Imaging: Reviewed in Epic.     Medications:   Scheduled Medications    potassium chloride  40 mEq Oral Once    methylPREDNISolone  60 mg Intravenous Q12H    Roflumilast  500 mcg Oral Daily    fluticasone-salmeterol  1 puff Inhalation BID    ipratropium  500 mcg Nebulization TID & HS    amiodarone  200 mg Oral Daily    aspirin  81 mg Oral Daily    atorvastatin  20 mg Oral Nightly    ferrous sulfate  325 mg Oral Daily    guaiFENesin ER  1,200 mg Oral BID    levETIRAcetam  500 mg Oral BID    rivaroxaban  20 mg Oral Daily with food    furosemide  40 mg Intravenous BID (Diuretic)                  Assessment & Plan:  # Acute on chronic hypoxic/hypercapnic respiratory failure in context of severe baseline COPD (with possible  exacerbation?) and acute on chronic diastolic CHF exacerbation;  continue lasix; steroid trial per pulm; switch to oral lasix today possibly? Will check with cards  Will probably needed higher O2 at home than previous; defer to pulm     #S/p bio AVR      #R foot cellulitis ruled out.  Suspect stasis dermatitis; stopped abx     #Pulmonary nodules  -Followed by pulm     #Previous smoker     #Paroxysmal A-fib/flutter s/p ablation 7/12/224- continue xarelto  #Aortic stenosis  #History of left renal cell cancer status post nephrectomy  #Hypertension  #Hyperlipidemia  #ABIGAIL     Dvt prophy;  on xarelto already                MEDICATIONS ADMINISTERED IN LAST 1 DAY:  acetaminophen (Tylenol Extra Strength) tab 1,000 mg       Date Action Dose Route User    8/18/2024 2221 Given 1,000 mg Oral Dmitri George RN          amiodarone (Pacerone) tab 200 mg       Date Action Dose Route User    8/19/2024 0838 Given 200 mg Oral Tressa Connolly RN          aspirin DR tab 81 mg       Date Action Dose Route User    8/19/2024 0838 Given 81 mg Oral Tressa Connolly RN          atorvastatin (Lipitor) tab 20 mg       Date Action Dose Route User    8/18/2024 2209 Given 20 mg Oral Dmitri George RN          ferrous sulfate DR tab 325 mg       Date Action Dose Route User    8/19/2024 0839 Given 325 mg Oral Tressa Connolly RN          fluticasone-salmeterol (Advair Diskus) 250-50 MCG/ACT inhaler 1 puff       Date Action Dose Route User    8/19/2024 0839 Given 1 puff Inhalation Tressa Connolly RN    8/18/2024 2212 Given 1 puff Inhalation Dmitri George RN          furosemide (Lasix) tab 40 mg       Date Action Dose Route User    8/19/2024 0838 Given 40 mg Oral Tressa Connolly RN    8/18/2024 1727 Given 40 mg Oral Mirlande Choe RN          guaiFENesin ER (Mucinex) 12 hr tab 1,200 mg       Date Action Dose Route User    8/19/2024 0838 Given 1,200 mg Oral Tressa Connolly RN    8/18/2024 2209 Given 1,200 mg Oral Dmitri George RN          ipratropium (Atrovent) 0.02 % nebulizer solution 500  mcg       Date Action Dose Route User    8/19/2024 1304 Given 500 mcg Nebulization Duncan Jacobo, [facility]    8/19/2024 0855 Given 500 mcg Nebulization Fercho Alexander, [facility]    8/18/2024 2156 Given 500 mcg Nebulization Keyonna Car, [facility]    8/18/2024 1735 Given 500 mcg Nebulization Keyonna Car, [facility]          levETIRAcetam (Keppra) tab 500 mg       Date Action Dose Route User    8/19/2024 0838 Given 500 mg Oral Tressa Connolly RN    8/18/2024 2209 Given 500 mg Oral Dmitri George RN          predniSONE (Deltasone) tab 40 mg       Date Action Dose Route User    8/19/2024 0838 Given 40 mg Oral Tressa Connolly RN          rivaroxaban (Xarelto) tab 20 mg       Date Action Dose Route User    8/19/2024 0838 Given 20 mg Oral Tressa Connolly RN          Roflumilast TABS 500 mcg       Date Action Dose Route User    8/19/2024 1020 Given 500 mcg Oral Tressa Connolly RN            Vitals (last day)       Date/Time Temp Pulse Resp BP SpO2 Weight O2 Device O2 Flow Rate (L/min) [facility]    08/19/24 1304 -- -- -- -- 94 % -- Nasal cannula 5 L/min AD    08/19/24 1304 -- 76 -- -- -- -- -- --     08/19/24 1214 97.7 °F (36.5 °C) 76 20 118/65 93 % -- Nasal cannula 5 L/min     08/19/24 0855 -- -- -- -- 89 % -- Nasal cannula 5 L/min DL    08/19/24 0855 -- 78 -- -- -- -- -- --     08/19/24 0750 97.5 °F (36.4 °C) 67 20 123/73 98 % -- Nasal cannula 5 L/min     08/19/24 0700 -- 64 -- -- 96 % -- -- --     08/19/24 0544 -- -- -- -- -- 149 lb 11.1 oz (67.9 kg) -- -- PM    08/19/24 0421 98.4 °F (36.9 °C) 66 18 116/67 96 % -- -- -- PM    08/18/24 2214 98.2 °F (36.8 °C) 71 18 122/69 90 % -- -- -- PM    08/18/24 2156 -- -- -- -- 95 % -- CPAP 5 L/min ER    08/18/24 2100 -- -- -- -- 99 % -- High flow nasal cannula 5 L/min AA    08/18/24 1735 -- -- -- -- -- -- High flow nasal cannula 5 L/min ER    08/18/24 1600 98.4 °F (36.9 °C) 79 19 132/70 98 % -- High flow nasal cannula 9 L/min RY    08/18/24 1238 -- -- -- -- -- -- High flow nasal cannula 7  L/min  ER    08/18/24 1200 98 °F (36.7 °C) 80 19 115/98 92 % -- Nasal cannula 9 L/min RY    08/18/24 0751 97.3 °F (36.3 °C) -- 19 -- -- -- Nasal cannula 9 L/min RY    08/18/24 0435 97.6 °F (36.4 °C) 72 19 105/57 98 % 149 lb 6.4 oz (67.8 kg) Bi-PAP -- JH    08/18/24 0409 -- -- 18 -- -- -- -- -- RL    08/18/24 0010 -- 79 18 -- -- -- -- -- RL          CIWA Scores (since admission)       None

## 2024-08-19 NOTE — PROGRESS NOTES
[facility] Pulmonary Progress Note    Keny Marshall Patient Status:  Inpatient    1960 MRN [id number]   Location [facility] 2NE-A Attending Rusty Stokes MD   Hosp Day # 6 PCP Dustin Avina MD     Subjective:  Keny Marshall is a(n) 63 year old male who is admitted for respiratory failure.    Overnight: no acute events overnight ready to go home    Objective:  /70 (BP Location: Right arm)   Pulse 70   Temp 97.8 °F (36.6 °C) (Oral)   Resp 20   Wt 149 lb 11.1 oz (67.9 kg)   SpO2 94%   BMI 21.48 kg/m²       Temp (24hrs), Av.9 °F (36.6 °C), Min:97.5 °F (36.4 °C), Max:98.4 °F (36.9 °C)      Intake/Output:    Intake/Output Summary (Last 24 hours) at 2024 1610  Last data filed at 2024 1537  Gross per 24 hour   Intake 1320 ml   Output 1775 ml   Net -455 ml       Physical Exam:   General: alert, cooperative, oriented.  No respiratory distress.   Head: Normocephalic, without obvious abnormality, atraumatic.   Throat: Lips, mucosa, and tongue normal.  No thrush noted.   Neck: trachea midline, no adenopathy, no thyromegaly. No JVD.   Lungs: clear to auscultation bilaterally   Chest wall: No tenderness or deformity.   Heart: regular rate and rhythm, S1, S2 normal, no murmur, click, rub or gallop   Abdomen: soft, non-distended, no masses, no guarding, no     Rebound.   Extremity: No edema or cyanosis   Skin: No rashes or lesions.   Neurological: Alert, interactive, no focal deficits    Lab Data Review:  No results for input(s): \"WBC\", \"HGB\", \"PLT\" in the last 72 hours.  Recent Labs     24  0640 24  0738 24  0744   NA  --  138  --    K 4.7 3.7 4.2   CL  --  100  --    CO2  --  38.0*  --    BUN  --  21  --    CREATSERUM  --  0.73  --      No results for input(s): \"PTP\", \"INR\", \"PTT\" in the last 168 hours.    Cultures:   No results found for this visit on 24.    Radiology:  CT CHEST (OXL=46537)  Narrative: PROCEDURE:  CT CHEST (CPT=71250)     COMPARISON:  [facility] , CT, CT  CHEST(CONTRAST ONLY) (CPT=71260), 7/01/2024, 10:16 AM.     INDICATIONS:  Pleural effusions     TECHNIQUE:  Unenhanced multislice CT scanning is performed through the chest.  Dose reduction techniques were used. Dose information is transmitted to the ACR (American College of Radiology) NRDR (National Radiology Data Registry) which includes the Dose   Index Registry.     PATIENT STATED HISTORY: (As transcribed by Technologist)           FINDINGS:       Postoperative changes of CABG and aortic valve replacement.  Cardiomegaly with prominent biatrial enlargement.  No pericardial effusion.  Normal caliber of the thoracic aorta with extensive calcified atherosclerosis throughout its course.  Dilation of   the pulmonary trunk measuring up to 3.7 cm consistent with pulmonary arterial hypertension.  Few enlarged lymph nodes identified within the right upper/lower paratracheal, subcarinal, and right hilar stations, index right lower paratracheal node   measuring 1.8 cm.  These are stable and likely reactive.  Normal thyroid and esophagus.  No central airway stenosis.     Advanced, upper lobe predominant emphysema.  Extensive, diffuse interlobular septal thickening with intervening ground-glass opacities resulting in a crazy paving pattern.  Moderate pulmonary edema is favored.  Calcified pleural plaques are present   bilaterally with trace right-sided pleural effusion and suspected partial atelectasis of the posterior basal segment left lower lobe and inferior lingula.  Trace pleural fluid extends along the right and left major fissures.       Regional soft tissues are within normal limits.  No acute abnormality of the visualized superior abdomen.  Stable diffuse thickening of the left adrenal gland.     Mild degenerative changes of the spine.  No aggressive osseous lesions.                   Impression: CONCLUSION:       1. Extensive, diffuse interlobular septal thickening with intervening ground-glass opacities resulting in  a crazy paving pattern.  This is favored to represent moderate pulmonary edema with infection and drug toxicity felt less likely.     2. Small right and trace left pleural effusions with extension of pleural fluid along the right and left major fissures.     3. Bilateral calcified pleural plaques indicating chronic asbestos exposure.  Areas of round atelectasis noted within portions of the left lower lobe and lingula.     4. Advanced, upper lobe predominant emphysema with dilation of the pulmonary trunk consistent with pulmonary arterial hypertension.       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 ([facility])    Medial meniscus tear, right, initial encounter    Primary osteoarthritis of right knee              GLOBAL EXP 6-25-16 / RIGHT KNEE / TRK     Acute medial meniscus tear of right knee            GLOBAL EXP 6-25-16 / RIGHT KNEE / TRK     Acute lateral meniscus tear of left knee               GLOBAL EXP 6-25-16 / LEFT KNEE / TRK     Bursitis of left shoulder             GLOBAL EXP 6-25-16 / LEFT SHOULDER / TRK     Moderate aortic stenosis    Stage 3 severe COPD by GOLD classification ([facility])    Hyperglycemia    Hypervolemia    Hypervolemia, unspecified hypervolemia type    Dyspnea, unspecified type    Hypoxemia    Atrial fibrillation with RVR ([facility])    Acute heart failure with preserved ejection fraction (HFpEF) ([facility])    Atrial flutter with rapid ventricular response ([facility])    Acute on chronic congestive heart failure (HCC)    Acute on chronic congestive heart failure, unspecified heart failure type ([facility])    Hypoxia    Atrial fibrillation with  rapid ventricular response (HCC)    Pleural effusion    Hypokalemia    Acute respiratory failure with hypoxia (HCC)    Normocytic anemia    Chronic heart failure with preserved ejection fraction (HCC)    Leukocytosis (leucocytosis)    Aortic valve regurgitation    HFrEF (heart failure with reduced ejection fraction) (HCC)    Chronic hypoxemic respiratory failure (HCC)    ABIGAIL (obstructive sleep apnea)    COPD exacerbation (HCC)    Acute on chronic respiratory failure with hypoxia (HCC)    Acute on chronic respiratory failure with hypoxia and hypercapnia (HCC)    Chronic hypercapnic respiratory failure (HCC)    Smoking greater than 20 pack years    Hyponatremia    Hyperkalemia    Chronic obstructive pulmonary disease, unspecified COPD type (HCC)    Supplemental oxygen dependent    Azotemia    Metabolic alkalosis    End stage COPD (HCC)    Acute on chronic hypoxic respiratory failure (HCC)       Assessment:  Acute on chronic hypoxic respiratory failure; chronic hypercapnic respiratory failure   Acute CHF exacerbation/chronic diastolic heart failure with moderate pulmonary hypertension with hx of afib with RVR-   COPD/CPFE   Pulmonary nodules   Tobacco abuse     Plan:  Ongoing diuresis  Wean oxygen as able, send home with o2  Walk completed today  Continue ongoing nebs and breathing treatments  Prednisone taper written for  Ongoing lung nodule management  Ok for dc from my persepctive with close fu      I had a face to face visit with this patient and I evaluated the patient's O2 saturations.  The patient is mobile in their home and is in need of a portable oxygen tank.  The home oxygen will improve the patient's condition.           Danya Whaley MD  [facility]  Office: [phone]

## 2024-08-20 NOTE — DISCHARGE PLANNING
Patient cleared for discharge by doctors. Discharge paperwork given to patient, all questions answered. IV removed, tele monitor removed. Discharged off unit via wheelchair with home O2.

## 2024-08-20 NOTE — PAYOR COMM NOTE
--------------  DISCHARGE REVIEW    Payor: [employer] MED[employer]ARE ADV PPO  Subscriber #:  [id number]  Authorization Number: [id number]    Admit date: 24  Admit time:   6:41 PM  Discharge Date: 2024  8:44 PM     Admitting Physician: Sidney Silver MD  Attending Physician:  No att. providers found  Primary Care Physician: Dustin Avina MD          Discharge Summary Notes        Discharge Summary signed by Rusty Stokes MD at 2024  6:17 AM       Author: Rusty Stokes MD Specialty: HOSPITALIST, Internal Medicine Author Type: Physician    Filed: 2024  6:17 AM Date of Service: 2024 10:38 AM Status: Signed    : Rusty Stokes MD (Physician)           [facility]     [place] Patient Status:  Inpatient    1960 MRN [id number]   Location [facility] 2NE-A Attending Rusty Stokes MD   Hosp Day # 6 PCP Dustin Avina MD     Date of Admission: 2024  Date of Discharge: 2024  Discharge Disposition: Home Health Care Services  Chief complaint:   Chief Complaint   Patient presents with    Difficulty Breathing         Discharge Diagnoses and Brief Synopsis:  # Acute on chronic hypoxic/hypercapnic respiratory failure in context of severe baseline COPD (with possible  exacerbation?) and acute on chronic diastolic CHF exacerbation; improved with diuresis and steroids.  Discharged with 10L o2 concentrator per pulm, as he needs higher O2 needs than prior with exertion.     #S/p bio AVR      #R foot cellulitis ruled out.  Suspect stasis dermatitis; stopped abx     #Pulmonary nodules  -Followed by pulm     #Previous smoker     #Paroxysmal A-fib/flutter s/p ablation - on  xarelto  #Aortic stenosis  #History of left renal cell cancer status post nephrectomy  #Hypertension  #Hyperlipidemia  #ABIGAIL        Lab/Test results pending at Discharge:   none        Admission History of Present Illness (author of HPI not necessarily myself; see H&P author):  Keny [place] is a 63 year old male with past medical history of aortic stenosis, A-fib, renal cancer, COPD, on 4 L home O2, GERD, hypertension, hyperlipidemia, ABIGAIL who presents with shortness of breath.  Has had 3 days of cough.  Has chronic bilateral lower extremity swelling.  Pretty significant but states this is about the same as it usually is.  Over last few days has noticed some erythema on his right foot which has been growing.  Unclear if he got a bug bite but there seems to be a dot in the middle.  His shortness of breath has gotten worse and his O2 sats have been dropping into the 60s at times now.  Denies any fever, chills.       Lace+ Score: 84  59-90 High Risk  29-58 Medium Risk  0-28   Low Risk       TCM Follow-Up Recommendation:  LACE > 58: High Risk of readmission after discharge from the hospital.      Discharge Medication List:     Discharge Medications        CHANGE how you take these medications        Instructions Prescription details   furosemide 40 MG Tabs  Commonly known as: Lasix  What changed: when to take this      Take 1 tablet (40 mg total) by mouth 2 (two) times daily.   Stop taking on: November 17, 2024  Quantity: 180 tablet  Refills: 0     predniSONE 10 MG Tabs  Commonly known as: Deltasone  What changed: Another medication with the same name was added. Make sure you understand how and when to take each.      Take 3 tablets daily for 3 days then 2 tablets daily for 3 days then 1 tablets daily for 3 days then stop.   Quantity: 30 tablet  Refills: 0     predniSONE 10 MG Tabs  Commonly known as: Deltasone  What changed: You were already taking a medication with the same name, and this prescription was added. Make sure you understand how and when to take each.      take 4 tablets daily for 3 days then 3 tablets daily for 3 days then 2 tablets daily for 3 days then 1 tablets daily for 3 days then stop.   Quantity: 30 tablet  Refills: 0            CONTINUE taking these medications         Instructions Prescription details   amiodarone 200 MG Tabs  Commonly known as: Pacerone      Take one table twice daily x 2 weeks then take one tablet daily thereafter   Quantity: 90 tablet  Refills: 0     aspirin 81 MG Tbec      Take 1 tablet (81 mg total) by mouth daily.   Refills: 0     atorvastatin 20 MG Tabs  Commonly known as: Lipitor      Take 1 tablet (20 mg total) by mouth nightly.   Refills: 0     B Complete Tabs      Take 1 tablet by mouth daily.   Refills: 0     Budesonide-Formoterol Fumarate 160-4.5 MCG/ACT Aero  Commonly known as: SYMBICORT      Inhale 2 puffs into the lungs 2 (two) times daily.   Quantity: 3 each  Refills: 3     Ferrous Sulfate 324 (65 Fe) MG Tbec      Take 65 mg by mouth daily.   Refills: 0     guaiFENesin  MG Tb12  Commonly known as: Mucinex      Take 2 tablets (1,200 mg total) by mouth 2 (two) times daily.   Refills: 0     ipratropium 0.02 % Soln  Commonly known as: Atrovent      Take 2.5 mL (500 mcg total) by nebulization every 6 (six) hours as needed for Wheezing.   Refills: 0     Jardiance 10 MG Tabs  Generic drug: empagliflozin       Refills: 0     ketoconazole 2 % Crea  Commonly known as: Nizoral      Apply topically as needed.   Refills: 0     Levalbuterol HCl 0.63 MG/3ML Nebu  Commonly known as: XOPENEX      Take 3 mL (0.63 mg total) by nebulization every 6 (six) hours as needed for Shortness of Breath or Wheezing.   Quantity: 3 each  Refills: 3     levETIRAcetam 500 MG Tabs  Commonly known as: Keppra      Take 1 tablet (500 mg total) by mouth 2 (two) times daily.   Stop taking on: October 18, 2024  Quantity: 180 tablet  Refills: 0     magnesium 250 MG Tabs      Take 1 tablet (250 mg total) by mouth every evening.   Refills: 0     Roflumilast 500 MCG Tabs      Take 500 mcg by mouth daily.   Quantity: 90 tablet  Refills: 0     Tab-A-Arnold Tabs      Take 1 tablet by mouth daily.   Refills: 0     tiotropium 18 MCG Caps  Commonly known as: Spiriva Handihaler      Inhale 1  capsule (18 mcg total) into the lungs daily.   Refills: 0     Xarelto 20 MG Tabs  Generic drug: rivaroxaban      Take 1 tablet by mouth daily with food   Quantity: 30 tablet  Refills: 3            STOP taking these medications      sulfamethoxazole-trimethoprim -160 MG Tabs per tablet  Commonly known as: Bactrim DS                  Where to Get Your Medications        These medications were sent to [facility] [address] [place] [address] - [address] [facility] [place] [phone], [phone]  [address] [facility] [address]      Phone: [phone]   furosemide 40 MG Tabs       Please  your prescriptions at the location directed by your doctor or nurse    Bring a paper prescription for each of these medications  predniSONE 10 MG Tabs         [place]    Follow-up appointment:   Dustin Avina MD  [address] [facility] [id number] [phone]    Follow up in 1 week(s)      Keny Salazar MD  [address]  [address]  [facility] [address] [phone]    Schedule an appointment as soon as possible for a visit in 3 week(s)      Pat Crum MD  [address]  [facility] [address] [phone]    Schedule an appointment as soon as possible for a visit in 2 week(s)      Appointments for Next 30 Days 8/20/2024 - 9/19/2024      None            Vital signs:  Temp:  [97.5 °F (36.4 °C)-97.9 °F (36.6 °C)] 97.9 °F (36.6 °C)  Pulse:  [64-78] 76  Resp:  [15-20] 15  BP: (118-154)/(65-83) 154/83  SpO2:  [89 %-98 %] 94 %    Physical Exam:    General: No acute distress  Respiratory: No wheezes, no rhonchi; diminished however  Cardiovascular: S1, S2, regular rate and rhythm  Abdomen: Soft, Non-tender, non-distended  Neuro: No new focal deficits.   Extremities: trace LE edema  -----------------------------------------------------------------------------------------------  PATIENT DISCHARGE INSTRUCTIONS: See electronic chart    Rusty Smith MD    Time spent:    32 minutes      Electronically signed by Rusty Stokes MD on 8/20/2024  6:17 AM         REVIEWER COMMENTS

## 2024-08-30 ENCOUNTER — TELEPHONE (OUTPATIENT)
Facility: CLINIC | Age: 64
End: 2024-08-30

## 2024-08-30 NOTE — TELEPHONE ENCOUNTER
Pt needs refill for Spiriva handihaler sent to [facility] in [place].  Pt last seen by Dr. Randle on 7-29-24.  Pt states he received it from his last hospitalization ion 8-19-24.  Please advise.

## 2024-08-30 NOTE — TELEPHONE ENCOUNTER
Per Dr. Randle: He's taking duonebs QID which will replace the spiriva.  Pt advised that he needs hospital follow up and he can discuss his medications with Dr. Randle.  Reviewed medication  instructions as per Dr. Colón office visit note from 7-29-24.  Transferred to .  Appointment scheduled for 9-6-24.

## 2024-09-03 ENCOUNTER — TELEMEDICINE (OUTPATIENT)
Facility: CLINIC | Age: 64
End: 2024-09-03
Payer: MEDICARE

## 2024-09-03 DIAGNOSIS — J96.12 CHRONIC HYPERCAPNIC RESPIRATORY FAILURE (HCC): ICD-10-CM

## 2024-09-03 DIAGNOSIS — J44.9 CHRONIC OBSTRUCTIVE PULMONARY DISEASE, UNSPECIFIED COPD TYPE (HCC): Primary | ICD-10-CM

## 2024-09-03 DIAGNOSIS — J43.2 CENTRILOBULAR EMPHYSEMA (HCC): ICD-10-CM

## 2024-09-03 DIAGNOSIS — R91.8 MULTIPLE PULMONARY NODULES: ICD-10-CM

## 2024-09-03 DIAGNOSIS — R06.02 SOB (SHORTNESS OF BREATH): ICD-10-CM

## 2024-09-03 DIAGNOSIS — J96.11 CHRONIC RESPIRATORY FAILURE WITH HYPOXIA (HCC): ICD-10-CM

## 2024-09-03 DIAGNOSIS — J96.11 CHRONIC HYPOXEMIC RESPIRATORY FAILURE (HCC): ICD-10-CM

## 2024-09-03 PROCEDURE — 99214 OFFICE O/P EST MOD 30 MIN: CPT | Performed by: INTERNAL MEDICINE

## 2024-09-03 RX ORDER — BUDESONIDE AND FORMOTEROL FUMARATE DIHYDRATE 160; 4.5 UG/1; UG/1
2 AEROSOL RESPIRATORY (INHALATION) 2 TIMES DAILY
Qty: 11 G | Refills: 0 | OUTPATIENT
Start: 2024-09-03

## 2024-09-03 RX ORDER — TIOTROPIUM BROMIDE 18 UG/1
18 CAPSULE ORAL; RESPIRATORY (INHALATION) DAILY
Qty: 3 CAPSULE | Refills: 3 | Status: SHIPPED | OUTPATIENT
Start: 2024-09-03 | End: 2024-09-04

## 2024-09-03 NOTE — TELEPHONE ENCOUNTER
Patient was given refills of Budesonide-formoterol Fumarate on 7/29/24.  Per [facility] patient has refills on file.  Refills denied.

## 2024-09-03 NOTE — PATIENT INSTRUCTIONS
Okay to resume on Spiriva inhaler daily  Continue the symbicort two puffs in the morning and again in evening. Rinse your mouth out  Continue to use ipratropium and levalbuterol nebulizers every 6 hours as needed for your breathing  Use the budesonide nebulizer twice a day - rinse your mouth out after using  Use the BIPAP machine with sleep/naps  You can use the hypertonic saline nebulizer every 6 hours as needed for thick phlegm  Call/message with questions/concerns

## 2024-09-03 NOTE — PROGRESS NOTES
[facility] General Pulmonary Progress Note    History of Present Illness:  Keny Marshall is a 63 year old male  smoker with significant PMH severe COPD, chronic hypoxic respiratory failure on supplemental o2, severe aortic stenosis, CHF, hx RCCA s/p left nephrectomy 2014 who presents today for follow up. Since last visit he was hospitalized with COPD and CHF flare in mid August 2024.  He feels better presently, stable chronic ARCEO and cough. Using supplemental o2    July 2024  Since last visit he has been hospitalized several times in the last couples due to COPD exacerbation, afib with RVR and CHF exacerbation.   He was discharged 1.5 weeks from [facility] after cardioversion but then after 2-3 days was admitted to [place] - noted elevated PCO2 of ~90 - improved after BIPAP.   He was discharged on 7/24.  Since his discharge he continues to have dyspnea on exertion and +productive coughing with thin clear phlegm.   Using atrovent TID, levalbuterol nebs at home    September 2023 previously  He denies acute concerns today, stable chronic ARCEO. Continued thick phlegm.no fevers. No pain     March 2023 previously  He denies new concerns, had his CT recently and concerned about results. Using inhaler and nebs, breathing is stable. Has thick phlegm, no blood. no fevers. No pain     previously  The patient has followed with me the last several years for his COPD and is on maximal therapy including inhalers and nebulizers. He does admit to continued smoking and is aware of recommendation for smoking cessation.   He denies change in chronic dyspnea on exertion. Has chronic cough and phlegm, no blood. No fevers. No pain  Has been monitoring his weight and following with [facility]  Hx ABIGAIL and intolerant of CPAP    Past Medical History:   Past Medical History:    Aortic stenosis    Arrhythmia    hx of a-fib    Arthritis    Cancer (HCC)    LEFT RENAL     Chronic hypoxemic respiratory failure (HCC)    On 4 LPM/NC at rest 24 hours/ day  but 6 LPM/NC on exertion    COPD    Pulmonary follow up with Dr. Crum -no O2    Depression    Difficult intubation    patient states history of difficult intubation   due to body weight.States this no longer issue due to sugnificant weight loss    Esophageal reflux    Eye disease    Fatigue    Fever, unknown origin    Heart murmur    Heart palpitations    High blood pressure    High cholesterol    Loss of appetite    Other and unspecified hyperlipidemia    Paroxysmal atrial fibrillation (HCC)    Pneumonia, organism unspecified(486)    Prediabetes    Renal cell carcinoma (HCC)    s/p left nephrectomy    Shortness of breath    uses oxygen 3-4 L/NC all the time    Unspecified essential hypertension    Unspecified sleep apnea    He was unable to tolerate CPAP/BiPAP    Visual impairment    reading glasses        Past Surgical History:   Past Surgical History:   Procedure Laterality Date    Adenoidectomy      Angiogram      Appendectomy      Appendectomy      Colonoscopy N/A 03/21/2016    Procedure: COLONOSCOPY;  Surgeon: Artur Okeefe MD;  Location:  ENDOSCOPY    Colonoscopy      Colonoscopy N/A 1/5/2023    Procedure: .;  Surgeon: Artur Okeefe MD;  Location:  ENDOSCOPY    Endovas repair, infrarenl abdom aortic aneurysm/dissect      Laparo radical nephrectomy Left 05/06/2014    Nephrectomy Left 2014    Other  meatus of urethra    Other Right     foreign body removal-arm    Other surgical history Right 03/25/2016    Procedure: KNEE ARTHROSCOPY;  Surgeon: Madhu Anaya MD;  Location:  MAIN OR    Repair rotator cuff,acute Right     Upper gi endoscopy,exam      Valve repair  2020         Family Medical History:   Family History   Problem Relation Age of Onset    Heart Disorder Mother     Pacemaker Mother     Other (Atrial fibrillation) Father     Other (Lung cancer) Maternal Aunt         Social History:   Social History     Socioeconomic History    Marital status:      Spouse name: Not on file     Number of children: Not on file    Years of education: Not on file    Highest education level: Not on file   Occupational History    Occupation:      Comment: [employer]   Tobacco Use    Smoking status: Former     Current packs/day: 0.00     Average packs/day: 1.5 packs/day for 40.0 years (60.0 ttl pk-yrs)     Types: Cigarettes     Start date: 3/8/1983     Quit date: 3/8/2023     Years since quittin.4     Passive exposure: Past    Smokeless tobacco: Never   Vaping Use    Vaping status: Never Used   Substance and Sexual Activity    Alcohol use: Not Currently     Alcohol/week: 14.0 standard drinks of alcohol     Types: 12 Cans of beer, 2 Shots of liquor per week     Comment: 14/week    Drug use: Never    Sexual activity: Yes     Partners: Female   Other Topics Concern    Not on file   Social History Narrative    Not on file     Social Determinants of Health     Financial Resource Strain: Not on file   Food Insecurity: No Food Insecurity (2024)    Food Insecurity     Food Insecurity: Never true   Transportation Needs: No Transportation Needs (2024)    Transportation Needs     Lack of Transportation: No     Car Seat: Not on file   Physical Activity: Not on file   Stress: Not on file   Social Connections: Feeling Socially Integrated (2024)    Received from [facility]    OASIS : Social Isolation     Frequency of experiencing loneliness or isolation: Rarely   Housing Stability: Low Risk  (2024)    Housing Stability     Housing Instability: No     Housing Instability Emergency: Not on file     Crib or [place]: Not on file        Medications:   Current Outpatient Medications   Medication Sig Dispense Refill    tiotropium 18 MCG Inhalation Cap Inhale 1 capsule (18 mcg total) into the lungs daily. 3 capsule 3    furosemide 40 MG Oral Tab Take 1 tablet (40 mg total) by mouth 2 (two) times daily. 180 tablet 0    predniSONE 10 MG Oral Tab take 4 tablets daily for 3 days then 3  tablets daily for 3 days then 2 tablets daily for 3 days then 1 tablets daily for 3 days then stop. 30 tablet 0    Budesonide-Formoterol Fumarate 160-4.5 MCG/ACT Inhalation Aerosol Inhale 2 puffs into the lungs 2 (two) times daily. 3 each 3    levETIRAcetam 500 MG Oral Tab Take 1 tablet (500 mg total) by mouth 2 (two) times daily. 180 tablet 0    predniSONE 10 MG Oral Tab Take 3 tablets daily for 3 days then 2 tablets daily for 3 days then 1 tablets daily for 3 days then stop. 30 tablet 0    amiodarone 200 MG Oral Tab Take one table twice daily x 2 weeks then take one tablet daily thereafter 90 tablet 0    Roflumilast 500 MCG Oral Tab Take 500 mcg by mouth daily. 90 tablet 0    guaiFENesin  MG Oral Tablet 12 Hr Take 2 tablets (1,200 mg total) by mouth 2 (two) times daily.      Ferrous Sulfate 324 (65 Fe) MG Oral Tab EC Take 65 mg by mouth daily.      Levalbuterol HCl 0.63 MG/3ML Inhalation Nebu Soln Take 3 mL (0.63 mg total) by nebulization every 6 (six) hours as needed for Shortness of Breath or Wheezing. 3 each 3    JARDIANCE 10 MG Oral Tab       atorvastatin 20 MG Oral Tab Take 1 tablet (20 mg total) by mouth nightly.      XARELTO 20 MG Oral Tab Take 1 tablet by mouth daily with food 30 tablet 3    Ipratropium Bromide 0.02 % Inhalation Solution Take 2.5 mL (500 mcg total) by nebulization every 6 (six) hours as needed for Wheezing.      ketoconazole 2 % External Cream Apply topically as needed.      Multiple Vitamin (TAB-A-CUCA) Oral Tab Take 1 tablet by mouth daily.      aspirin 81 MG Oral Tab EC Take 1 tablet (81 mg total) by mouth daily.      B Complex-Biotin-FA (B COMPLETE) Oral Tab Take 1 tablet by mouth daily.      magnesium 250 MG Oral Tab Take 1 tablet (250 mg total) by mouth every evening.         Review of Systems: Review of Systems   Constitutional: Negative.    HENT: Negative.     Respiratory:  Positive for cough and shortness of breath. Negative for wheezing and stridor.    All other systems  reviewed and are negative.       Physical Exam:  There were no vitals taken for this visit.     Constitutional: alert, cooperative. No acute distress.  HEENT: Head NC/AT.     Results:  Personally reviewed    WBC: 5.6, done on 8/16/2024.  HGB: 9.9, done on 8/16/2024.  PLT: 239, done on 8/16/2024.     Glucose: 163, done on 8/18/2024.  Cr: 0.73, done on 8/18/2024.  Last eGFR was 102 on 8/18/2024.  CA: 9.3, done on 8/18/2024.  Na: 138, done on 8/18/2024.  K: 4.2, done on 8/19/2024.  Cl: 100, done on 8/18/2024.  CO2: 38, done on 8/18/2024.  Last ALB was 3.7% done on 8/15/2024.     CT CHEST (CPT=71250)    Result Date: 8/16/2024  CONCLUSION:   1. Extensive, diffuse interlobular septal thickening with intervening ground-glass opacities resulting in a crazy paving pattern.  This is favored to represent moderate pulmonary edema with infection and drug toxicity felt less likely.  2. Small right and trace left pleural effusions with extension of pleural fluid along the right and left major fissures.  3. Bilateral calcified pleural plaques indicating chronic asbestos exposure.  Areas of round atelectasis noted within portions of the left lower lobe and lingula.  4. Advanced, upper lobe predominant emphysema with dilation of the pulmonary trunk consistent with pulmonary arterial hypertension.   LOCATION:  [facility]   Dictated by (CST): Jenn Guerra MD on 8/16/2024 at 2:32 PM     Finalized by (CST): Jenn Guerra MD on 8/16/2024 at 2:41 PM       XR CHEST AP PORTABLE  (CPT=71045)    Result Date: 8/13/2024  CONCLUSION:  1. Stable COPD changes. 2. Diffuse interstitial opacities are scattered throughout both lungs which was present previously.  Differential includes pulmonary fibrosis and other inflammatory interstitial lung disease. 3. Stable small bilateral pleural effusions. 4. Status post heart valve surgery.    LOCATION:  [facility]      Dictated by (CST): Milad Khoury MD on 8/13/2024 at 3:25 PM     Finalized by (CST): Iraida  MD Milad on 8/13/2024 at 3:28 PM       XR CHEST AP PORTABLE  (CPT=71045)    Result Date: 7/15/2024  CONCLUSION:   Stable significant emphysematous and COPD changes.   LOCATION:  [facility]      Dictated by (CST): Yunior James MD on 7/15/2024 at 7:51 AM     Finalized by (CST): Yunior James MD on 7/15/2024 at 7:54 AM       XR CHEST AP PORTABLE  (CPT=71045)    Result Date: 7/14/2024  CONCLUSION:   Postoperative changes of cardiothoracic surgery with stable cardiac and mediastinal contours.  Recent CT of 07/01/2024 better demonstrates upper lobe predominant emphysema.  Bilateral calcified pleural plaques with persistent diffuse interstitial and bronchial wall thickening.  This may reflect some combination of interstitial pulmonary edema, bronchitis, or reactive airway disease.  Suspected parenchymal scarring of the peripheral left mid lung adjacent to a densely calcified pleural plaque.  Small bilateral pleural effusions persist.  No pneumothorax.  Overall, findings are not substantially changed compared to 07/04/2024.   LOCATION:  [facility]      Dictated by (CST): Jenn Guerra MD on 7/14/2024 at 7:34 AM     Finalized by (CST): Jenn Guerra MD on 7/14/2024 at 7:38 AM       XR CHEST AP PORTABLE  (CPT=71045)    Result Date: 7/4/2024  CONCLUSION:  1. Interval improvement in interstitial and alveolar opacities since prior examination with residual remaining. 2. Small bilateral pleural effusions with bilateral basilar atelectasis/infiltrates. 3. Hyperexpansion of the lungs.    LOCATION:  [facility]      Dictated by (CST): Lior Donnelly MD on 7/04/2024 at 10:25 PM     Finalized by (CST): Lior Donnelly MD on 7/04/2024 at 10:27 PM       XR CHEST AP PORTABLE  (CPT=71045)    Result Date: 7/3/2024  CONCLUSION:  No significant change since prior examination.   LOCATION:  [facility]      Dictated by (CST): Lior Donnelly MD on 7/03/2024 at 11:00 AM     Finalized by (CST): Lior Donnelly MD on 7/03/2024 at 11:01 AM       US VENOUS  DOPPLER ARM BILAT - DIAG IMG (CPT=93970)    Result Date: 7/1/2024  CONCLUSION:  Superficial venous thrombosis involving the distal left cephalic vein.  No evidence of deep venous thrombosis in the bilateral upper extremities.  LOCATION:  [id number]    Dictated by (CST): Von Toussaint MD on 7/01/2024 at 2:29 PM     Finalized by (CST): Von Toussaint MD on 7/01/2024 at 2:31 PM       CT CHEST(CONTRAST ONLY) (CPT=71260)    Result Date: 7/1/2024  CONCLUSION:  There is now a small right pleural effusion and in the event smaller left pleural effusion.  These were not present on the prior CT.  Increase in the size of a masslike area of tissue density in the left midlung laterally adjacent to chronic  pleural calcification.  This could reflect a zone of increasing chronic rounded atelectasis, with neoplasm within the differential.  Redemonstration extensive severe chronic lung disease.  Nonspecific adenopathy right hilum and mediastinum.  Dilated pulmonary artery could reflect chronic pulmonary hypertension, particularly in the setting of extensive chronic severe lung disease.   LOCATION:  [id number]   Dictated by (CST): Severino Roque MD on 7/01/2024 at 10:31 AM     Finalized by (CST): Severino Roque MD on 7/01/2024 at 10:38 AM       XR CHEST AP PORTABLE  (CPT=71045)    Result Date: 6/29/2024  CONCLUSION:  Findings are unchanged relative to the previous study.  Please see further details above.   LOCATION:  [facility]      Dictated by (CST): Marielena Martines DO on 6/29/2024 at 11:32 AM     Finalized by (CST): Marielena Martines DO on 6/29/2024 at 11:38 AM       XR CHEST AP PORTABLE  (CPT=71045)    Result Date: 6/11/2024  CONCLUSION:  1. No significant interval changes, with stable diffuse emphysematous changes again noted. 2. Stable linear regions of scarring radiating from a focus of pleural thickening and calcified plaque along the mid aspect of the left lateral pleural margin. 3. No evidence of an acute process.   LOCATION:  [id number]       Dictated by (CST): Marielena Martines DO on 6/11/2024 at 4:43 PM     Finalized by (CST): Marielena Martines DO on 6/11/2024 at 4:46 PM         Assessment/Plan:  #1. COPD  Severe obstruction on previous PFTs 3/2021  Recent exacerbations with CHF exacerbation/afib and COPD; last hospitalized in mid August 2024  Continue on symbicort BID; he wants to resume spiriva in attempt to remain off of scheduled duonebs - will give trial and see how he responds.    Cannot tolerate albuterol due to palpitations, tremors; continue with levalbuterol and ipratropium nebulizers every 6 hours PRN for now - as above, if no better on spiriva, will need to resume scheduled duonebs  Budesonide nebs BID  Continue hypertonic saline nebs prn  Monitor sx as he weans off of prednisone taper; may need prolonged taper or remain on chronic prednisone  Previously enrolled in pulmonary rehab but unable to consistently go due to difficulty with transportation  Continue roflumilast  Encouraged to exercise, lose excess weight and maintain vaccinations up to date  We previously discussed EBV and BLVR.  He is not a candidate due to his respiratory status and pulmonary HTN    #2. Chronic hypoxic respiratory failure  Multifactorial due to afib, CHF and COPD  He continues to use and benefit from supplemental oxygenation  Last o2 walk with: 2L at rest, 4L on exertion; using and benefiting from portable oxygen concentrator (3 at rest, 6 on exertion)  8/2024 o2 walk: 5L at rest, 6L on exertion; now has o2 up to 10L  Noted issues with affording AVAPS at home. Now transitioning to BIPAP     #3. Pulmonary nodules  7/2017 CT with worsening peripheral lingular atelectasis. Nodules stable   5/2018 CT stable nodules  12/2020 CT stable  7/2021 CT stable but worsening mediastinal LAD suspect reactive to recent cardiac surgery  1/2022 CT with stable nodules, improved LAD  3/2023 CT chest with several small nodules  9/2023 CT chest with stable findings   7/1/2024 CT chest with  worsening ALYSON lesion, possible scarring, rounded atelectasis vs neoplasm  8/2024 CT chest with stable ALYSON nodule, possible scarring, rounded atelectasis vs neoplasm  Will consider outpatient follow up PET/CT vs monitoring with repeat CT imaging     #4. Tobacco abuse  30+ pack years, pipe smoker  Hold on LDCT given recent CT with worsening ALYSON lesion, will need PET/CT as above  Lung Cancer Counseling and Shared Decision Making Session in an Asymptomatic Smoker/Former Smoker   Keny Marshall is a 63 year old male without current symptoms of lung cancer.  History   Smoking Status    Former    Types: Cigarettes   Smokeless Tobacco    Never        He received information on the importance of adherence to annual lung cancer Low Dose CT (LDCT) screening, the impact of his comorbidities and his ability or willingness to undergo diagnosis and treatment. he qualifies for low dose CT lung cancer screening, however due to evaluation above, low dose CT lung cancer screening should be postponed until he completes above evaluation.  We counseled the importance of maintaining cigarette smoking abstinence if he is a former smoker and the importance of smoking cessation if he is a current smoker and we discussed and furnished information about tobacco cessation interventions.     Stefano Randle MD     This visit is conducted using Telemedicine with live, interactive video and audio.    Patient has been referred to the [facility] website at [email or web address] to review the yearly Consent to Treat document.    Patient understands and accepts financial responsibility for any deductible, co-insurance and/or co-pays associated with this service.

## 2024-09-04 RX ORDER — TIOTROPIUM BROMIDE 18 UG/1
18 CAPSULE ORAL; RESPIRATORY (INHALATION) DAILY
Qty: 90 CAPSULE | Refills: 3 | Status: ON HOLD | OUTPATIENT
Start: 2024-09-04

## 2024-09-09 ENCOUNTER — APPOINTMENT (OUTPATIENT)
Dept: GENERAL RADIOLOGY | Facility: HOSPITAL | Age: 64
End: 2024-09-09
Attending: EMERGENCY MEDICINE
Payer: MEDICARE

## 2024-09-09 ENCOUNTER — HOSPITAL ENCOUNTER (INPATIENT)
Facility: HOSPITAL | Age: 64
LOS: 2 days | Discharge: HOME HEALTH | End: 2024-09-11
Attending: EMERGENCY MEDICINE | Admitting: HOSPITALIST
Payer: MEDICARE

## 2024-09-09 ENCOUNTER — TELEPHONE (OUTPATIENT)
Facility: CLINIC | Age: 64
End: 2024-09-09

## 2024-09-09 DIAGNOSIS — I48.92 ATRIAL FIBRILLATION AND FLUTTER (HCC): ICD-10-CM

## 2024-09-09 DIAGNOSIS — E87.6 HYPOKALEMIA: ICD-10-CM

## 2024-09-09 DIAGNOSIS — I48.91 ATRIAL FIBRILLATION AND FLUTTER (HCC): ICD-10-CM

## 2024-09-09 DIAGNOSIS — J44.1 COPD EXACERBATION (HCC): Primary | ICD-10-CM

## 2024-09-09 LAB
ALBUMIN SERPL-MCNC: 4.5 G/DL (ref 3.2–4.8)
ALBUMIN/GLOB SERPL: 1.2 {RATIO} (ref 1–2)
ALP LIVER SERPL-CCNC: 64 U/L
ALT SERPL-CCNC: 17 U/L
ANION GAP SERPL CALC-SCNC: 5 MMOL/L (ref 0–18)
ARTERIAL PATENCY WRIST A: POSITIVE
AST SERPL-CCNC: 19 U/L (ref ?–34)
BASE EXCESS BLDA CALC-SCNC: 14.5 MMOL/L (ref ?–2)
BASE EXCESS BLDV CALC-SCNC: 18.5 MMOL/L
BASOPHILS # BLD AUTO: 0.03 X10(3) UL (ref 0–0.2)
BASOPHILS NFR BLD AUTO: 0.2 %
BILIRUB SERPL-MCNC: 0.6 MG/DL (ref 0.2–1.1)
BODY TEMPERATURE: 98.6 F
BUN BLD-MCNC: 17 MG/DL (ref 9–23)
CALCIUM BLD-MCNC: 9.9 MG/DL (ref 8.7–10.4)
CHLORIDE SERPL-SCNC: 98 MMOL/L (ref 98–112)
CO2 SERPL-SCNC: 38 MMOL/L (ref 21–32)
COHGB MFR BLD: 2.5 % SAT (ref 0–3)
CREAT BLD-MCNC: 0.81 MG/DL
EGFRCR SERPLBLD CKD-EPI 2021: 98 ML/MIN/1.73M2 (ref 60–?)
EOSINOPHIL # BLD AUTO: 0.36 X10(3) UL (ref 0–0.7)
EOSINOPHIL NFR BLD AUTO: 2.8 %
ERYTHROCYTE [DISTWIDTH] IN BLOOD BY AUTOMATED COUNT: 16 %
EST. AVERAGE GLUCOSE BLD GHB EST-MCNC: 105 MG/DL (ref 68–126)
FLUAV + FLUBV RNA SPEC NAA+PROBE: NEGATIVE
FLUAV + FLUBV RNA SPEC NAA+PROBE: NEGATIVE
GLOBULIN PLAS-MCNC: 3.7 G/DL (ref 2–3.5)
GLUCOSE BLD-MCNC: 223 MG/DL (ref 70–99)
GLUCOSE BLD-MCNC: 236 MG/DL (ref 70–99)
HBA1C MFR BLD: 5.3 % (ref ?–5.7)
HCO3 BLDA-SCNC: 36.1 MEQ/L (ref 21–27)
HCO3 BLDV-SCNC: 38.5 MEQ/L (ref 22–26)
HCT VFR BLD AUTO: 39.5 %
HGB BLD-MCNC: 12.2 G/DL
HGB BLD-MCNC: 12.2 G/DL
IMM GRANULOCYTES # BLD AUTO: 0.04 X10(3) UL (ref 0–1)
IMM GRANULOCYTES NFR BLD: 0.3 %
L/M: 6 L/MIN
LACTATE SERPL-SCNC: 0.8 MMOL/L (ref 0.5–2)
LYMPHOCYTES # BLD AUTO: 0.79 X10(3) UL (ref 1–4)
LYMPHOCYTES NFR BLD AUTO: 6 %
MCH RBC QN AUTO: 29.2 PG (ref 26–34)
MCHC RBC AUTO-ENTMCNC: 30.9 G/DL (ref 31–37)
MCV RBC AUTO: 94.5 FL
METHGB MFR BLD: 0.5 % SAT (ref 0.4–1.5)
MONOCYTES # BLD AUTO: 0.83 X10(3) UL (ref 0.1–1)
MONOCYTES NFR BLD AUTO: 6.3 %
NEUTROPHILS # BLD AUTO: 11.03 X10 (3) UL (ref 1.5–7.7)
NEUTROPHILS # BLD AUTO: 11.03 X10(3) UL (ref 1.5–7.7)
NEUTROPHILS NFR BLD AUTO: 84.4 %
NT-PROBNP SERPL-MCNC: 3416 PG/ML (ref ?–125)
OSMOLALITY SERPL CALC.SUM OF ELEC: 301 MOSM/KG (ref 275–295)
OXYHGB MFR BLDA: 89.6 % (ref 92–100)
OXYHGB MFR BLDV: 65.1 % (ref 72–78)
PCO2 BLDA: 51 MM HG (ref 35–45)
PCO2 BLDV: 56 MM HG (ref 38–50)
PH BLDA: 7.5 [PH] (ref 7.35–7.45)
PH BLDV: 7.51 [PH] (ref 7.33–7.43)
PLATELET # BLD AUTO: 196 10(3)UL (ref 150–450)
PO2 BLDA: 59 MM HG (ref 80–100)
PO2 BLDV: 40 MM HG (ref 30–50)
POTASSIUM SERPL-SCNC: 3.3 MMOL/L (ref 3.5–5.1)
PROT SERPL-MCNC: 8.2 G/DL (ref 5.7–8.2)
RBC # BLD AUTO: 4.18 X10(6)UL
RSV RNA SPEC NAA+PROBE: NEGATIVE
SARS-COV-2 RNA RESP QL NAA+PROBE: NOT DETECTED
SODIUM SERPL-SCNC: 141 MMOL/L (ref 136–145)
TROPONIN I SERPL HS-MCNC: 49 NG/L
VENOUS LITERS PER MINUTE: 6 L/MIN
WBC # BLD AUTO: 13.1 X10(3) UL (ref 4–11)

## 2024-09-09 PROCEDURE — 5A0935A ASSISTANCE WITH RESPIRATORY VENTILATION, LESS THAN 24 CONSECUTIVE HOURS, HIGH NASAL FLOW/VELOCITY: ICD-10-PCS | Performed by: INTERNAL MEDICINE

## 2024-09-09 PROCEDURE — 71045 X-RAY EXAM CHEST 1 VIEW: CPT | Performed by: EMERGENCY MEDICINE

## 2024-09-09 PROCEDURE — 99223 1ST HOSP IP/OBS HIGH 75: CPT | Performed by: HOSPITALIST

## 2024-09-09 RX ORDER — NICOTINE POLACRILEX 4 MG
15 LOZENGE BUCCAL
Status: DISCONTINUED | OUTPATIENT
Start: 2024-09-09 | End: 2024-09-11

## 2024-09-09 RX ORDER — DOXYCYCLINE 100 MG/1
100 CAPSULE ORAL ONCE
Status: COMPLETED | OUTPATIENT
Start: 2024-09-09 | End: 2024-09-09

## 2024-09-09 RX ORDER — FLUTICASONE PROPIONATE AND SALMETEROL 250; 50 UG/1; UG/1
1 POWDER RESPIRATORY (INHALATION) 2 TIMES DAILY
Status: DISCONTINUED | OUTPATIENT
Start: 2024-09-09 | End: 2024-09-11

## 2024-09-09 RX ORDER — ALBUTEROL SULFATE 0.83 MG/ML
2.5 SOLUTION RESPIRATORY (INHALATION)
Status: DISCONTINUED | OUTPATIENT
Start: 2024-09-09 | End: 2024-09-10

## 2024-09-09 RX ORDER — ALBUTEROL SULFATE 5 MG/ML
10 SOLUTION RESPIRATORY (INHALATION) ONCE
Status: COMPLETED | OUTPATIENT
Start: 2024-09-09 | End: 2024-09-09

## 2024-09-09 RX ORDER — AMIODARONE HYDROCHLORIDE 200 MG/1
200 TABLET ORAL DAILY
Status: DISCONTINUED | OUTPATIENT
Start: 2024-09-10 | End: 2024-09-11

## 2024-09-09 RX ORDER — ACETAMINOPHEN 500 MG
500 TABLET ORAL EVERY 4 HOURS PRN
Status: DISCONTINUED | OUTPATIENT
Start: 2024-09-09 | End: 2024-09-11

## 2024-09-09 RX ORDER — ECHINACEA PURPUREA EXTRACT 125 MG
1 TABLET ORAL
Status: DISCONTINUED | OUTPATIENT
Start: 2024-09-09 | End: 2024-09-11

## 2024-09-09 RX ORDER — METHYLPREDNISOLONE SODIUM SUCCINATE 125 MG/2ML
125 INJECTION, POWDER, LYOPHILIZED, FOR SOLUTION INTRAMUSCULAR; INTRAVENOUS ONCE
Status: COMPLETED | OUTPATIENT
Start: 2024-09-09 | End: 2024-09-09

## 2024-09-09 RX ORDER — MELATONIN
3 NIGHTLY PRN
Status: DISCONTINUED | OUTPATIENT
Start: 2024-09-09 | End: 2024-09-11

## 2024-09-09 RX ORDER — NICOTINE POLACRILEX 4 MG
30 LOZENGE BUCCAL
Status: DISCONTINUED | OUTPATIENT
Start: 2024-09-09 | End: 2024-09-11

## 2024-09-09 RX ORDER — FUROSEMIDE 40 MG
40 TABLET ORAL 2 TIMES DAILY
Status: DISCONTINUED | OUTPATIENT
Start: 2024-09-09 | End: 2024-09-11

## 2024-09-09 RX ORDER — FERROUS SULFATE 325(65) MG
325 TABLET, DELAYED RELEASE (ENTERIC COATED) ORAL DAILY
Status: DISCONTINUED | OUTPATIENT
Start: 2024-09-10 | End: 2024-09-11

## 2024-09-09 RX ORDER — FUROSEMIDE 10 MG/ML
40 INJECTION INTRAMUSCULAR; INTRAVENOUS DAILY
Status: DISCONTINUED | OUTPATIENT
Start: 2024-09-09 | End: 2024-09-11

## 2024-09-09 RX ORDER — ROFLUMILAST 500 UG/1
500 TABLET ORAL DAILY
Status: DISCONTINUED | OUTPATIENT
Start: 2024-09-10 | End: 2024-09-11

## 2024-09-09 RX ORDER — ASPIRIN 81 MG/1
81 TABLET ORAL DAILY
Status: DISCONTINUED | OUTPATIENT
Start: 2024-09-10 | End: 2024-09-11

## 2024-09-09 RX ORDER — LEVETIRACETAM 500 MG/1
500 TABLET ORAL 2 TIMES DAILY
Status: DISCONTINUED | OUTPATIENT
Start: 2024-09-09 | End: 2024-09-11

## 2024-09-09 RX ORDER — GUAIFENESIN 600 MG/1
1200 TABLET, EXTENDED RELEASE ORAL 2 TIMES DAILY
Status: DISCONTINUED | OUTPATIENT
Start: 2024-09-09 | End: 2024-09-11

## 2024-09-09 RX ORDER — POTASSIUM CHLORIDE 1500 MG/1
40 TABLET, EXTENDED RELEASE ORAL ONCE
Status: COMPLETED | OUTPATIENT
Start: 2024-09-09 | End: 2024-09-09

## 2024-09-09 RX ORDER — DEXTROSE MONOHYDRATE 25 G/50ML
50 INJECTION, SOLUTION INTRAVENOUS
Status: DISCONTINUED | OUTPATIENT
Start: 2024-09-09 | End: 2024-09-11

## 2024-09-09 RX ORDER — ATORVASTATIN CALCIUM 20 MG/1
20 TABLET, FILM COATED ORAL NIGHTLY
Status: DISCONTINUED | OUTPATIENT
Start: 2024-09-09 | End: 2024-09-11

## 2024-09-09 NOTE — ED INITIAL ASSESSMENT (HPI)
SOB; progressing x3days; from home; COPD, CHF; normally 6L O2; ETCO2 25-27 w/ems; showing A-fib RVR;  w/[facility]

## 2024-09-09 NOTE — ED PROVIDER NOTES
Patient Seen in: [facility] Emergency Department      History     Chief Complaint   Patient presents with    Difficulty Breathing     SOB; progressing x3days; from home; COPD, CHF; normally 6L O2; ETCO2 25-27 w/ems; showing A-fib RVR     Stated Complaint: SOB; progressing x3days; from home    Subjective:   HPI  Patient is a 64 yo M with history of CHF, severe COPD on 6L NC at baseline, a fib/flutter on xarelto and amio who presents to ED for evaluation of SOB over past 3 to 4 days.  Patient states that he has increased cough with occasional phlegm.  Patient reports that he has been using his inhaler and nebulizers with no significant improvement.  Denies any fevers, chills, orthopnea, lower extremity swelling.  Denies any chest pain or hemoptysis.  Patient has been compliant with his medications and wears CPAP at night.    Of note, patient has had multiple admissions due to dyspnea/hypoxemia due to severe underlying COPD, CHF as well as atrial arrhythmias.  In July, patient underwent a flutter ablation and was discharged home on amiodarone. Patient was most recently admitted 8/13 to 8/19 for possible COPD exacerbation, acute on chronic diastolic CHF exacerbation improved with diuresis and steroids. Patient was discharged with 10L O2 concentrator per pulm as he needs higher with exertion.     Objective:   Past Medical History:    Aortic stenosis    Arrhythmia    hx of a-fib    Arthritis    Cancer (HCC)    LEFT RENAL     CHF (congestive heart failure) (HCC)    Chronic hypoxemic respiratory failure (HCC)    On 4 LPM/NC at rest 24 hours/ day but 6 LPM/NC on exertion    COPD    Pulmonary follow up with Dr. Crum -no O2    Depression    Difficult intubation    patient states history of difficult intubation   due to body weight.States this no longer issue due to sugnificant weight loss    Esophageal reflux    Eye disease    Fatigue    Fever, unknown origin    Heart murmur    Heart palpitations    High blood pressure     High cholesterol    Loss of appetite    Other and unspecified hyperlipidemia    Paroxysmal atrial fibrillation (HCC)    Pneumonia, organism unspecified(486)    Prediabetes    Renal cell carcinoma (HCC)    s/p left nephrectomy    Shortness of breath    uses oxygen 3-4 L/NC all the time    Unspecified essential hypertension    Unspecified sleep apnea    He was unable to tolerate CPAP/BiPAP    Visual impairment    reading glasses              Past Surgical History:   Procedure Laterality Date    Adenoidectomy      Angiogram      Appendectomy      Appendectomy      Colonoscopy N/A 2016    Procedure: COLONOSCOPY;  Surgeon: Artur Okeefe MD;  Location:  ENDOSCOPY    Colonoscopy      Colonoscopy N/A 2023    Procedure: .;  Surgeon: Artur Okeefe MD;  Location:  ENDOSCOPY    Endovas repair, infrarenl abdom aortic aneurysm/dissect      Laparo radical nephrectomy Left 2014    Nephrectomy Left     Other  meatus of urethra    Other Right     foreign body removal-arm    Other surgical history Right 2016    Procedure: KNEE ARTHROSCOPY;  Surgeon: Madhu Anaya MD;  Location:  MAIN OR    Repair rotator cuff,acute Right     Upper gi endoscopy,exam      Valve repair                  Social History     Socioeconomic History    Marital status:    Occupational History    Occupation:      Comment: [employer]   Tobacco Use    Smoking status: Former     Current packs/day: 0.00     Average packs/day: 1.5 packs/day for 40.0 years (60.0 ttl pk-yrs)     Types: Cigarettes     Start date: 3/8/1983     Quit date: 3/8/2023     Years since quittin.5     Passive exposure: Past    Smokeless tobacco: Never   Vaping Use    Vaping status: Never Used   Substance and Sexual Activity    Alcohol use: Not Currently     Alcohol/week: 14.0 standard drinks of alcohol     Types: 12 Cans of beer, 2 Shots of liquor per week     Comment: 14/week    Drug use: Never    Sexual activity: Yes      Partners: Female     Social Determinants of Health     Food Insecurity: No Food Insecurity (8/13/2024)    Food Insecurity     Food Insecurity: Never true   Transportation Needs: No Transportation Needs (8/13/2024)    Transportation Needs     Lack of Transportation: No   Social Connections: Feeling Socially Integrated (6/18/2024)    Received from [facility]    OASIS : Social Isolation     Frequency of experiencing loneliness or isolation: Rarely   Housing Stability: Low Risk  (8/13/2024)    Housing Stability     Housing Instability: No              Review of Systems    Positive for stated Chief Complaint: Difficulty Breathing (SOB; progressing x3days; from home; COPD, CHF; normally 6L O2; ETCO2 25-27 w/ems; showing A-fib RVR)    Other systems are as noted in HPI.  Constitutional and vital signs reviewed.      All other systems reviewed and negative except as noted above.    Physical Exam     ED Triage Vitals   BP 09/09/24 1120 121/83   Pulse 09/09/24 1100 118   Resp 09/09/24 1100 (!) 30   Temp 09/09/24 1118 98.2 °F (36.8 °C)   Temp src 09/09/24 1118 Oral   SpO2 09/09/24 1100 90 %   O2 Device 09/09/24 1100 Nasal cannula       Current Vitals:   Vital Signs  BP: 116/86  Pulse: 113  Resp: 26  Temp: 98.2 °F (36.8 °C)  Temp src: Oral  MAP (mmHg): 96    Oxygen Therapy  SpO2: 96 %  O2 Device: High flow/High humidity  FiO2 (%): 65 %  O2 Flow Rate (L/min): 20 L/min            Physical Exam  Vitals and nursing note reviewed.   Constitutional:       General: He is not in acute distress.  HENT:      Head: Normocephalic and atraumatic.      Nose: Nose normal.      Mouth/Throat:      Mouth: Mucous membranes are moist.   Eyes:      Extraocular Movements: Extraocular movements intact.      Pupils: Pupils are equal, round, and reactive to light.   Cardiovascular:      Rate and Rhythm: Normal rate and regular rhythm.      Pulses: Normal pulses.   Pulmonary:      Effort: No respiratory distress.      Breath sounds: Decreased  breath sounds present. No wheezing.      Comments: Mild tachypnea and accessory muscle use  Abdominal:      General: There is no distension.      Palpations: Abdomen is soft.   Musculoskeletal:         General: No swelling. Normal range of motion.      Cervical back: Normal range of motion.      Right lower leg: No edema.      Left lower leg: No edema.   Skin:     General: Skin is warm and dry.      Capillary Refill: Capillary refill takes less than 2 seconds.   Neurological:      General: No focal deficit present.      Mental Status: He is alert.   Psychiatric:         Mood and Affect: Mood normal.               ED Course     Labs Reviewed   CBC WITH DIFFERENTIAL WITH PLATELET - Abnormal; Notable for the following components:       Result Value    WBC 13.1 (*)     RBC 4.18 (*)     HGB 12.2 (*)     MCHC 30.9 (*)     Neutrophil Absolute Prelim 11.03 (*)     Neutrophil Absolute 11.03 (*)     Lymphocyte Absolute 0.79 (*)     All other components within normal limits   COMP METABOLIC PANEL (14) - Abnormal; Notable for the following components:    Glucose 236 (*)     Potassium 3.3 (*)     CO2 38.0 (*)     Calculated Osmolality 301 (*)     Globulin  3.7 (*)     All other components within normal limits   PRO BETA NATRIURETIC PEPTIDE - Abnormal; Notable for the following components:    Pro-Beta Natriuretic Peptide 3,416 (*)     All other components within normal limits   VENOUS BLOOD GAS - Abnormal; Notable for the following components:    Venous pH 7.51 (*)     Venous pCO2 56 (*)     Venous HCO3 38.5 (*)     Venous O2Hb 65.1 (*)     All other components within normal limits   ARTERIAL BLOOD GAS - Abnormal; Notable for the following components:    ABG pH 7.50 (*)     ABG pCO2 51 (*)     ABG pO2 59 (*)     ABG HCO3 36.1 (*)     ABG Base Excess 14.5 (*)     Arterial Blood Gas O2Hb 89.6 (*)     Total Hemoglobin 12.2 (*)     All other components within normal limits   TROPONIN I HIGH SENSITIVITY - Normal   LACTIC ACID, PLASMA  - Normal   SARS-COV-2/FLU A AND B/RSV BY PCR (GENEXPERT) - Normal    Narrative:     This test is intended for the qualitative detection and differentiation of SARS-CoV-2, influenza A, influenza B, and respiratory syncytial virus (RSV) viral RNA in nasopharyngeal or nares swabs from individuals suspected of respiratory viral infection consistent with COVID-19 by their healthcare provider. Signs and symptoms of respiratory viral infection due to SARS-CoV-2, influenza, and RSV can be similar.    Test performed using the Xpert Xpress SARS-CoV-2/FLU/RSV (real time RT-PCR)  assay on the GeneXpert instrument, [employer], [place] [address].   This test is being used under the Food and Drug Administration's Emergency Use Authorization.    The authorized Fact Sheet for Healthcare Providers for this assay is available upon request from the laboratory.   RAINBOW DRAW LAVENDER   RAINBOW DRAW LIGHT GREEN   RAINBOW DRAW BLUE   RAINBOW DRAW GOLD   BLOOD CULTURE   BLOOD CULTURE     EKG    Rate, intervals and axes as noted on EKG Report.  Rate: 117  Rhythm: Undetermined  Reading: Sinus tachycardia with frequent PVCs vs. A fib with LBBB, ventricular rate of 117, similar to prior                    MDM      Patient is a 62 yo M with history of CHF, severe COPD on 6L NC at baseline, a fib/flutter on xarelto and amio who presents to ED for evaluation of SOB over past 3 to 4 days. Pt is afebrile, mildly tachycardic but stable BP, satting 90% on home 6L NC, placed on 10L on arrival. Patient has diminished BS bilaterally, no wheezing, mild increase in WOB. No peripheral edema. Differential diagnosis includes but not limited to  COPD exacerbation, fluid overload, pneumonia, arrhythmia, ACS. Low suspicion for PE given pt is on xarelto. Will obtain labs, CXR, give hour long and IV steroids.    ED Course:   CBC shows WBC of 13.1, hgb 12.2, CMP with potassium of 3.3. BNP mildly elevated at 3,416 which is improved from prior. Trop neg. Covid, flu,  RSV negative. Patient was given an hour long but still feeling some SOB. He was also given solumedrol. I discussed with pulmonology. ABG shows pH of 7.50, bicarb of 36, CO2 of 51. Patient started on HFNC (20 LPM, 65% FiO2) for mild increase in WOB with improvement in his tachypnea. Patient had no wheezing on re-examination. CXR is similar to prior, no large focal consolidation or fluid overload but given symptoms he was empirically treated with IV rocephin and doxy.  Lactate normal. I discussed with [facility] who will also evaluate patient given possible a fib with RVR. Currently HR is between 100-110s. BP stable. I had conversation with patient regarding code status. Patient wishes to be full code at this time. His wife is his POA.       Admission disposition: 9/9/2024  2:22 PM         Critical Care:  A total of 45 minutes of critical care time (exclusive of billable procedures) was administered to manage the patient's respiratory instability and cardiovascular instability due to his acute on chronic hypoxic respiratory failure, a fib with RVR.  This involved direct patient intervention, complex decision making, and/or extensive discussions with the patient, family, and clinical staff.           Medical Decision Making      Disposition and Plan     Clinical Impression:  1. COPD exacerbation (HCC)    2. Atrial fibrillation and flutter (HCC)    3. Hypokalemia         Disposition:  Admit  9/9/2024  2:22 pm    Follow-up:  No follow-up provider specified.        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

## 2024-09-09 NOTE — ED QUICK NOTES
Orders for admission, patient is aware of plan and ready to go upstairs. Any questions, please call ED RN Tamika at extension [phone].     Patient Covid vaccination status: Fully vaccinated     COVID Test Ordered in ED: SARS-CoV-2/Flu A and B/RSV by PCR (GeneXpert)    COVID Suspicion at Admission: N/A    Running Infusions:  None    Mental Status/LOC at time of transport: Alert, oriented X4. Uses urinal independently.    Other pertinent information: On [place] 20L @ 65%FiO2.  CIWA score: N/A   NIH score:  N/A

## 2024-09-09 NOTE — TELEPHONE ENCOUNTER
Pt returned call.  Pt states that he has been doing well since is discharge from hospital on 8-19-21 but over the last 4-5 days, he has been inncreasing short of breath.  Sometimes with activity, his O2 level goes down to 85% but mostly in the low 90s with the increased shortness of breath.  Has been using O2 at 6.5 LPM since being discharged.  Used to only need 4 LPM.  States he has been following his fluid restrictions.  Denies swelling of his lower extremities.  At time cough is \"thick and heavy.\" Does not usually look at mucus but the times that he has, it is brown.  Denies fever and chills but reports increase in body aches.  Advised pt to do covid test and to call back.

## 2024-09-09 NOTE — CONSULTS
[facility]  Consultation Note      Keny Marshall Patient Status:  Emergency    1960 MRN [id number]   Location [facility] EMERGENCY DEPARTMENT Attending Keyonna Pacheco,    Hosp Day # 0 PCP Dustin Avina MD     Impression:  1. acute hypoxemic respiratory failure, recurrent; acute on chronic, severe COPD exacerbation, which is also triggering AF RVR, mild diastolic CHF   2. hx of A flutter ablation (, [place])  3. s/p bioAVR  - TTE (): LVEF 55%, 23mm SAVR- 3.3m/s, mean 22mmHg    Recommendations:  - rate control: currently 90-100s; goal HR <120 consistently while managing COPD exacerbation.  If persistently above that >30minutes, start diltiazem gtt. for now, continue amiodarone 200mg daily.   - mild CHF: keep on dry side.  lasix 40mg IV daily (home dose lasix 40mg po bid)  - AC: rivaroxaban  - COPD management per pulmonary/primary service.  - will follow.    History of Present Illness:  Keny Marshall is a 63 year old male who presented to [facility] on 2024.    Seen in cardiology consultation for management of AF and CHF.  Well known to me, hx of pAF, s/p bioprosthetic AVR, severe COPD on home O2.  This is his 4th admission in the past 3 months for hypoxemic respiratory failure.  Discharged mid last month, says he felt really well for a couple weeks but has taken some steps back over the past week or so- recurrent SOB with minimal exertion, although O2 sats have been in the low 90s. Denies CP or significant LE edema.  He has noted HR up to 120s, ECG/tele shows AF.  CXR without gross consoldation, but +alvarado bilateral effusions. pBNP 3400.  Started on vapotherm, solumedrol, lasix- admitted.      Medications:  Current Facility-Administered Medications   Medication Dose Route Frequency    potassium chloride (Klor-Con M20) tab 40 mEq  40 mEq Oral Once       Past Medical History:    Aortic stenosis    Arrhythmia    hx of a-fib    Arthritis    Cancer (HCC)    LEFT RENAL      CHF (congestive heart failure) (HCC)    Chronic hypoxemic respiratory failure (HCC)    On 4 LPM/NC at rest 24 hours/ day but 6 LPM/NC on exertion    COPD    Pulmonary follow up with Dr. Crum -no O2    Depression    Difficult intubation    patient states history of difficult intubation   due to body weight.States this no longer issue due to sugnificant weight loss    Esophageal reflux    Eye disease    Fatigue    Fever, unknown origin    Heart murmur    Heart palpitations    High blood pressure    High cholesterol    Loss of appetite    Other and unspecified hyperlipidemia    Paroxysmal atrial fibrillation (HCC)    Pneumonia, organism unspecified(486)    Prediabetes    Renal cell carcinoma (HCC)    s/p left nephrectomy    Shortness of breath    uses oxygen 3-4 L/NC all the time    Unspecified essential hypertension    Unspecified sleep apnea    He was unable to tolerate CPAP/BiPAP    Visual impairment    reading glasses       Past Surgical History:   Procedure Laterality Date    Adenoidectomy      Angiogram      Appendectomy      Appendectomy      Colonoscopy N/A 03/21/2016    Procedure: COLONOSCOPY;  Surgeon: Artur Okeefe MD;  Location:  ENDOSCOPY    Colonoscopy      Colonoscopy N/A 1/5/2023    Procedure: .;  Surgeon: Artur Okeefe MD;  Location: [facility]    Endovas repair, infrarenl abdom aortic aneurysm/dissect      Laparo radical nephrectomy Left 05/06/2014    Nephrectomy Left 2014    Other  meatus of urethra    Other Right     foreign body removal-arm    Other surgical history Right 03/25/2016    Procedure: KNEE ARTHROSCOPY;  Surgeon: Madhu Anaya MD;  Location:  MAIN OR    Repair rotator cuff,acute Right     Upper gi endoscopy,exam      Valve repair  2020       Family History  family history includes Atrial fibrillation in his father; Heart Disorder in his mother; Lung cancer in his maternal aunt; Pacemaker in his mother.    Social History   reports that he quit smoking about 18  months ago. His smoking use included cigarettes. He started smoking about 41 years ago. He has a 60 pack-year smoking history. He has been exposed to tobacco smoke. He has never used smokeless tobacco. He reports that he does not currently use alcohol after a past usage of about 14.0 standard drinks of alcohol per week. He reports that he does not use drugs.     Allergies  Allergies   Allergen Reactions    Bees ANAPHYLAXIS    Other ANAPHYLAXIS     Bee stings         Review of Systems:  Constitutional: negative for fevers  Eyes: negative for visual disturbance  Ears, nose, mouth, throat, and face: negative for epistaxis  Respiratory: negative for dyspnea on exertion  Cardiovascular: negative for chest pain  Gastrointestinal: negative for melena  Genitourinary:negative for hematuria  Hematologic/lymphatic: negative for bleeding  Musculoskeletal:negative for myalgias  Neurological: negative for dizziness and headaches  Endocrine: negative for temperature intolerance      Physical Exam:  Blood pressure 116/86, pulse 113, temperature 98.2 °F (36.8 °C), temperature source Oral, resp. rate 26, height 5' 10\" (1.778 m), weight 156 lb (70.8 kg), SpO2 96%.  Temp (24hrs), Av.2 °F (36.8 °C), Min:98.2 °F (36.8 °C), Max:98.2 °F (36.8 °C)    Wt Readings from Last 3 Encounters:   24 156 lb (70.8 kg)   24 149 lb 11.1 oz (67.9 kg)   24 151 lb 14.4 oz (68.9 kg)       General: Awake and alert; in no acute distress  HEENT: Extraocular movements are intact; sclerae are anicteric; scalp is atrauamatic; no thyromegaly  Neck: Supple; no JVD; no carotid bruits  Cardiac: irregularly irregular/tachycardic; no murmurs/rubs/gallops are appreciated; PMI is non-displaced; there is no evidence of a sternal heave  Lungs: decreased breath sounds bilaterally; no accessory muscle use is noted  Abdomen: Soft, non-tender; bowel sounds are normoactive; no hepatosplenomegaly  Extremities: No clubbing or cyanosis; moves all 4  extremities normally  Psychiatric: Normal mood and affect; answers questions appropriately  Dermatologic: No rashes; normal skin turgor    Diagnostic testing:    EKG: AF    Labs:   Lab Results   Component Value Date    PT 16.9 (H) 05/05/2014    PT 13.3 05/04/2014    INR 1.59 (H) 06/29/2024    INR 1.06 06/11/2024        Lab Results   Component Value Date    WBC 13.1 09/09/2024    HGB 12.2 09/09/2024    HCT 39.5 09/09/2024    .0 09/09/2024    CREATSERUM 0.81 09/09/2024    BUN 17 09/09/2024     09/09/2024    K 3.3 09/09/2024    CL 98 09/09/2024    CO2 38.0 09/09/2024     09/09/2024    CA 9.9 09/09/2024    ALB 4.5 09/09/2024    ALKPHO 64 09/09/2024    BILT 0.6 09/09/2024    TP 8.2 09/09/2024    AST 19 09/09/2024    ALT 17 09/09/2024         Thank you for allowing our practice to participate in the care of your patient. Please do not hesitate to contact me if you have any questions.    Keny Salazar MD  9/9/2024  4:01 PM

## 2024-09-09 NOTE — TELEPHONE ENCOUNTER
Pt called back and states his breathing is getting worse.  Pt states he is calling 911.  Dr. Randle notified.

## 2024-09-09 NOTE — ED QUICK NOTES
Report from RADHA Sloan. RT called, placed patient on high flow 12L. States he usually uses 6L O2 at home. Hx of COPD. Patient states his baseline SpO2 is 88%.

## 2024-09-09 NOTE — H&P
[facility]     Keny Marshall Patient Status:  Emergency    1960 MRN [id number]   Location [facility] EMERGENCY DEPARTMENT Attending Keyonna Pacheco,    Hosp Day # 0 PCP Dustin Avina MD     Chief Complaint: SOB    Subjective:    History of Present Illness:     Keny Marshall is a 63 year old male with past medical history significant for paroxysmal atrial fibrillation, COPD, depression, GERD, CHF, hypertension, dyslipidemia, renal cell carcinoma, ABIGAIL presents to the ER with worsening shortness of breath.  Patient was admitted last month with similar complaints and treated for COPD exacerbation as well as heart failure exacerbation.  He states that for the past 3 days he has had worsening shortness of breath.  Patient states he has had worsening cough as well which is productive of clear sputum.  He denies fever, chills, sick contacts, recent travel.  He is compliant with all his medications as well as inhalers.  He is normally on 6 L of oxygen at home.  Upon arrival, heart rate was elevated as well in the 110s/120s.      History/Other:    Past Medical History:  Past Medical History:    Aortic stenosis    Arrhythmia    hx of a-fib    Arthritis    Cancer (HCC)    LEFT RENAL     CHF (congestive heart failure) (HCC)    Chronic hypoxemic respiratory failure (HCC)    On 4 LPM/NC at rest 24 hours/ day but 6 LPM/NC on exertion    COPD    Pulmonary follow up with Dr. Crum -no O2    Depression    Difficult intubation    patient states history of difficult intubation   due to body weight.States this no longer issue due to sugnificant weight loss    Esophageal reflux    Eye disease    Fatigue    Fever, unknown origin    Heart murmur    Heart palpitations    High blood pressure    High cholesterol    Loss of appetite    Other and unspecified hyperlipidemia    Paroxysmal atrial fibrillation (HCC)    Pneumonia, organism unspecified(486)    Prediabetes    Renal cell carcinoma (HCC)     s/p left nephrectomy    Shortness of breath    uses oxygen 3-4 L/NC all the time    Unspecified essential hypertension    Unspecified sleep apnea    He was unable to tolerate CPAP/BiPAP    Visual impairment    reading glasses     Past Surgical History:   Past Surgical History:   Procedure Laterality Date    Adenoidectomy      Angiogram      Appendectomy      Appendectomy      Colonoscopy N/A 03/21/2016    Procedure: COLONOSCOPY;  Surgeon: Artur Okeefe MD;  Location:  ENDOSCOPY    Colonoscopy      Colonoscopy N/A 1/5/2023    Procedure: .;  Surgeon: Artur Okeefe MD;  Location:  ENDOSCOPY    Endovas repair, infrarenl abdom aortic aneurysm/dissect      Laparo radical nephrectomy Left 05/06/2014    Nephrectomy Left 2014    Other  meatus of urethra    Other Right     foreign body removal-arm    Other surgical history Right 03/25/2016    Procedure: KNEE ARTHROSCOPY;  Surgeon: Madhu Anaya MD;  Location:  MAIN OR    Repair rotator cuff,acute Right     Upper gi endoscopy,exam      Valve repair  2020      Family History:   Family History   Problem Relation Age of Onset    Heart Disorder Mother     Pacemaker Mother     Other (Atrial fibrillation) Father     Other (Lung cancer) Maternal Aunt      Social History:    reports that he quit smoking about 18 months ago. His smoking use included cigarettes. He started smoking about 41 years ago. He has a 60 pack-year smoking history. He has been exposed to tobacco smoke. He has never used smokeless tobacco. He reports that he does not currently use alcohol after a past usage of about 14.0 standard drinks of alcohol per week. He reports that he does not use drugs.     Allergies:   Allergies   Allergen Reactions    Bees ANAPHYLAXIS    Other ANAPHYLAXIS     Bee stings       Medications:    Current Facility-Administered Medications on File Prior to Encounter   Medication Dose Route Frequency Provider Last Rate Last Admin    [COMPLETED] potassium chloride (Klor-Con  M20) tab 40 mEq  40 mEq Oral Once Rusty Stokes MD   40 mEq at 24 1138    [COMPLETED] acetaZOLAMIDE (Diamox) tab 125 mg  125 mg Oral TID Pat Crum MD   125 mg at 24 0816    [COMPLETED] potassium chloride (Klor-Con M20) tab 40 mEq  40 mEq Oral Once Rusty Stokes MD   40 mEq at 24 0931    [COMPLETED] ipratropium-albuterol (Duoneb) 0.5-2.5 (3) MG/3ML inhalation solution 3 mL  3 mL Nebulization Once Molina Mcgregor MD   3 mL at 24 1506    [COMPLETED] furosemide (Lasix) 10 mg/mL injection 80 mg  80 mg Intravenous Once Molina Mcgregor MD   80 mg at 24 1548    [COMPLETED] potassium chloride (Klor-Con M20) tab 40 mEq  40 mEq Oral Once Sidney Silver MD   40 mEq at 24 2348    [COMPLETED] furosemide (Lasix) 10 mg/mL injection 40 mg  40 mg Intravenous Once Sunita Zepeda APRN   40 mg at 24 0926    [COMPLETED] potassium chloride (Klor-Con M20) tab 40 mEq  40 mEq Oral Once Alfredo Garcia MD   40 mEq at 24 1609    [COMPLETED] metOLazone (Zaroxolyn) tab 5 mg  5 mg Oral Once Sunita Zepeda APRN   5 mg at 24 1056    [] ipratropium (Atrovent) 0.02 % nebulizer solution             [COMPLETED] insulin NPH-human isophane (Novolin N) 100 Units/mL injection 8 Units  8 Units Subcutaneous Once Devorah Allen MD   8 Units at 24 1528    [COMPLETED] insulin NPH-human isophane (Novolin N) 100 Units/mL injection 8 Units  8 Units Subcutaneous Once Devorah Allen MD   8 Units at 07/15/24 1351    [COMPLETED] dilTIAZem (cardIZEM) 25 mg/5mL injection 10 mg  10 mg Intravenous Once Stanislaw Case MD   10 mg at 24    [COMPLETED] predniSONE (Deltasone) tab 20 mg  20 mg Oral Once Pat Crum MD   20 mg at 24 153    [COMPLETED] furosemide (Lasix) 10 mg/mL injection 40 mg  40 mg Intravenous Once Devorah Allen MD   40 mg at 24    [COMPLETED] heparin (Porcine) 5000 UNIT/ML injection             [COMPLETED] lidocaine PF (Xylocaine-MPF) 1 %  injection             [COMPLETED] chlorhexidine (Hibiclens) 4 % external liquid   Topical On Call Chidi Fatima MD   Given at 24 2100    [COMPLETED] digoxin (Lanoxin) 250 MCG/ML injection 250 mcg  250 mcg Intravenous Once Keny Salazar MD   250 mcg at 24 1007    [COMPLETED] dilTIAZem 10 mg BOLUS FROM BAG infusion  10 mg Intravenous Once Audi Larkin MD   10 mg at 24 0915    [COMPLETED] dilTIAZem (cardIZEM) 25 mg/5mL injection 5 mg  5 mg Intravenous Once Audi Larkin MD   5 mg at 24    [COMPLETED] dilTIAZem (cardIZEM) 25 mg/5mL injection 5 mg  5 mg Intravenous Once Audi Larkin MD   5 mg at 24 2203    [COMPLETED] dilTIAZem 10 mg BOLUS FROM BAG infusion  10 mg Intravenous Once Audi Larkin MD   10 mg at 24 2330    [COMPLETED] metoprolol (Lopressor) 5 mg/5mL injection 5 mg  5 mg Intravenous Once Devorah Allen MD   5 mg at 24 2156    [COMPLETED] iopamidol 76% (ISOVUE-370) injection for power injector  75 mL Intravenous ONCE PRN Perfecto Pace, DO   75 mL at 24 1023    [COMPLETED] potassium chloride (Klor-Con M20) tab 40 mEq  40 mEq Oral Q4H Perfecto Pace, DO   40 mEq at 24 1604    [COMPLETED] digoxin (Lanoxin) 250 MCG/ML injection 500 mcg  500 mcg Intravenous Once Stanislaw Case MD   500 mcg at 24 0911    [] dilTIAZem 10 mg BOLUS FROM BAG infusion  10 mg Intravenous Q1H PRN Yesenia Anderson MD        [COMPLETED] methohexital (BrevITAL) 100 mg/10mL IV syringe 100 mg  100 mg Intravenous Once Keny Salazar MD   30 mg at 24 0909    [COMPLETED] dilTIAZem (cardIZEM) 25 mg/5mL injection 5 mg  5 mg Intravenous Once Audi Larkin MD   5 mg at 24 0812    [COMPLETED] methylPREDNISolone sodium succinate (Solu-MEDROL) injection 60 mg  60 mg Intravenous Once Tomasz Lyon DO   60 mg at 24 0134    [COMPLETED] sodium chloride 0.9 % IV bolus 500 mL  500 mL Intravenous Once Morales Yeh DO    Stopped at 24 1609    [] dilTIAZem 5 mg BOLUS FROM BAG infusion  5 mg Intravenous Q1H PRN Morales Yeh DO   5 mg at 24 1539    [COMPLETED] dilTIAZem (cardIZEM) 100 MG injection              Current Outpatient Medications on File Prior to Encounter   Medication Sig Dispense Refill    tiotropium 18 MCG Inhalation Cap Inhale 1 capsule (18 mcg total) into the lungs daily. 90 capsule 3    furosemide 40 MG Oral Tab Take 1 tablet (40 mg total) by mouth 2 (two) times daily. 180 tablet 0    predniSONE 10 MG Oral Tab take 4 tablets daily for 3 days then 3 tablets daily for 3 days then 2 tablets daily for 3 days then 1 tablets daily for 3 days then stop. 30 tablet 0    Budesonide-Formoterol Fumarate 160-4.5 MCG/ACT Inhalation Aerosol Inhale 2 puffs into the lungs 2 (two) times daily. 3 each 3    levETIRAcetam 500 MG Oral Tab Take 1 tablet (500 mg total) by mouth 2 (two) times daily. 180 tablet 0    predniSONE 10 MG Oral Tab Take 3 tablets daily for 3 days then 2 tablets daily for 3 days then 1 tablets daily for 3 days then stop. 30 tablet 0    amiodarone 200 MG Oral Tab Take one table twice daily x 2 weeks then take one tablet daily thereafter 90 tablet 0    Roflumilast 500 MCG Oral Tab Take 500 mcg by mouth daily. 90 tablet 0    guaiFENesin  MG Oral Tablet 12 Hr Take 2 tablets (1,200 mg total) by mouth 2 (two) times daily.      Ferrous Sulfate 324 (65 Fe) MG Oral Tab EC Take 65 mg by mouth daily.      Levalbuterol HCl 0.63 MG/3ML Inhalation Nebu Soln Take 3 mL (0.63 mg total) by nebulization every 6 (six) hours as needed for Shortness of Breath or Wheezing. 3 each 3    JARDIANCE 10 MG Oral Tab       atorvastatin 20 MG Oral Tab Take 1 tablet (20 mg total) by mouth nightly.      XARELTO 20 MG Oral Tab Take 1 tablet by mouth daily with food 30 tablet 3    Ipratropium Bromide 0.02 % Inhalation Solution Take 2.5 mL (500 mcg total) by nebulization every 6 (six) hours as needed for Wheezing.       ketoconazole 2 % External Cream Apply topically as needed.      Multiple Vitamin (TAB-A-CUCA) Oral Tab Take 1 tablet by mouth daily.      aspirin 81 MG Oral Tab EC Take 1 tablet (81 mg total) by mouth daily.      B Complex-Biotin-FA (B COMPLETE) Oral Tab Take 1 tablet by mouth daily.      magnesium 250 MG Oral Tab Take 1 tablet (250 mg total) by mouth every evening.         Review of Systems:   A comprehensive review of systems was completed.    Pertinent positives and negatives noted in the HPI.    Objective:   Physical Exam:    /75   Pulse 113   Temp 98.2 °F (36.8 °C) (Oral)   Resp 26   Ht 5' 10\" (1.778 m)   Wt 156 lb (70.8 kg)   SpO2 96%   BMI 22.38 kg/m²   General: No acute distress, Alert  Respiratory: No rhonchi, no wheezes  Cardiovascular: S1, S2. Regular rate and rhythm  Abdomen: Soft, Non-tender, non-distended, positive bowel sounds  Neuro: No new focal deficits  Extremities: No edema      Results:    Labs:      Labs Last 24 Hours:    Recent Labs   Lab 09/09/24  1057   RBC 4.18*   HGB 12.2*   HCT 39.5   MCV 94.5   MCH 29.2   MCHC 30.9*   RDW 16.0   NEPRELIM 11.03*   WBC 13.1*   .0       Recent Labs   Lab 09/09/24  1058   *   BUN 17   CREATSERUM 0.81   EGFRCR 98   CA 9.9   ALB 4.5      K 3.3*   CL 98   CO2 38.0*   ALKPHO 64   AST 19   ALT 17   BILT 0.6   TP 8.2       Lab Results   Component Value Date    PT 16.9 (H) 05/05/2014    PT 13.3 05/04/2014    INR 1.59 (H) 06/29/2024    INR 1.06 06/11/2024    INR 2.26 (H) 09/22/2021       Recent Labs   Lab 09/09/24  1058   TROPHS 49       Recent Labs   Lab 09/09/24  1058   PBNP 3,416*       No results for input(s): \"PCT\" in the last 168 hours.    Imaging: Imaging data reviewed in Epic.    Assessment & Plan:      #Acute on chronic hypoxemic respiratory failure, primarily due to COPD exacerbation  Supplemental oxygen as needed  IV steroids  BD protocol  Empiric abx  Pulmonary to eval    #CHF, appears compensated  Pro-bnp and CXR  reviewed  Resume home dose lasix  Cardiology to eval    #A fib with RVR  Cardizem gtt  Resume Xarelto  Cardiology to eval    #DMII, hyperglycemia protocol    #DL, statin    #RCC    #ABIGAIL    #GERD    #Depression, resume home meds        Plan of care discussed with pt and RN.    Kory Burt MD    Supplementary Documentation:     The 21st Century Cures Act makes medical notes like these available to patients in the interest of transparency. Please be advised this is a medical document. Medical documents are intended to carry relevant information, facts as evident, and the clinical opinion of the practitioner. The medical note is intended as peer to peer communication and may appear blunt or direct. It is written in medical language and may contain abbreviations or verbiage that are unfamiliar.                                ]

## 2024-09-09 NOTE — ED QUICK NOTES
Ordered patient's dinner. Awaiting inpatient room assignment.   Remains on 20L/min 65% FiO2 high flow O2.

## 2024-09-10 PROBLEM — J96.01 ACUTE HYPOXIC ON CHRONIC HYPERCAPNIC RESPIRATORY FAILURE (HCC): Status: ACTIVE | Noted: 2024-08-13

## 2024-09-10 PROBLEM — J96.12 ACUTE HYPOXIC ON CHRONIC HYPERCAPNIC RESPIRATORY FAILURE (HCC): Status: ACTIVE | Noted: 2024-08-13

## 2024-09-10 LAB
ANION GAP SERPL CALC-SCNC: 5 MMOL/L (ref 0–18)
ATRIAL RATE: 117 BPM
BASOPHILS # BLD AUTO: 0.02 X10(3) UL (ref 0–0.2)
BASOPHILS NFR BLD AUTO: 0.2 %
BUN BLD-MCNC: 21 MG/DL (ref 9–23)
CALCIUM BLD-MCNC: 9.2 MG/DL (ref 8.7–10.4)
CHLORIDE SERPL-SCNC: 103 MMOL/L (ref 98–112)
CO2 SERPL-SCNC: 33 MMOL/L (ref 21–32)
CREAT BLD-MCNC: 0.64 MG/DL
EGFRCR SERPLBLD CKD-EPI 2021: 106 ML/MIN/1.73M2 (ref 60–?)
EOSINOPHIL # BLD AUTO: 0.02 X10(3) UL (ref 0–0.7)
EOSINOPHIL NFR BLD AUTO: 0.2 %
ERYTHROCYTE [DISTWIDTH] IN BLOOD BY AUTOMATED COUNT: 15.7 %
GLUCOSE BLD-MCNC: 136 MG/DL (ref 70–99)
GLUCOSE BLD-MCNC: 138 MG/DL (ref 70–99)
GLUCOSE BLD-MCNC: 150 MG/DL (ref 70–99)
GLUCOSE BLD-MCNC: 158 MG/DL (ref 70–99)
GLUCOSE BLD-MCNC: 266 MG/DL (ref 70–99)
HCT VFR BLD AUTO: 33.4 %
HGB BLD-MCNC: 10.6 G/DL
IMM GRANULOCYTES # BLD AUTO: 0.05 X10(3) UL (ref 0–1)
IMM GRANULOCYTES NFR BLD: 0.6 %
LYMPHOCYTES # BLD AUTO: 0.68 X10(3) UL (ref 1–4)
LYMPHOCYTES NFR BLD AUTO: 7.7 %
MCH RBC QN AUTO: 29.9 PG (ref 26–34)
MCHC RBC AUTO-ENTMCNC: 31.7 G/DL (ref 31–37)
MCV RBC AUTO: 94.1 FL
MONOCYTES # BLD AUTO: 0.75 X10(3) UL (ref 0.1–1)
MONOCYTES NFR BLD AUTO: 8.5 %
NEUTROPHILS # BLD AUTO: 7.3 X10 (3) UL (ref 1.5–7.7)
NEUTROPHILS # BLD AUTO: 7.3 X10(3) UL (ref 1.5–7.7)
NEUTROPHILS NFR BLD AUTO: 82.8 %
OSMOLALITY SERPL CALC.SUM OF ELEC: 297 MOSM/KG (ref 275–295)
P AXIS: 109 DEGREES
P-R INTERVAL: 186 MS
PLATELET # BLD AUTO: 207 10(3)UL (ref 150–450)
POTASSIUM SERPL-SCNC: 4.2 MMOL/L (ref 3.5–5.1)
Q-T INTERVAL: 348 MS
QRS DURATION: 174 MS
QTC CALCULATION (BEZET): 485 MS
R AXIS: 63 DEGREES
RBC # BLD AUTO: 3.55 X10(6)UL
SODIUM SERPL-SCNC: 141 MMOL/L (ref 136–145)
T AXIS: 197 DEGREES
VENTRICULAR RATE: 117 BPM
WBC # BLD AUTO: 8.8 X10(3) UL (ref 4–11)

## 2024-09-10 PROCEDURE — 99233 SBSQ HOSP IP/OBS HIGH 50: CPT | Performed by: STUDENT IN AN ORGANIZED HEALTH CARE EDUCATION/TRAINING PROGRAM

## 2024-09-10 PROCEDURE — 99223 1ST HOSP IP/OBS HIGH 75: CPT | Performed by: INTERNAL MEDICINE

## 2024-09-10 RX ORDER — METHYLPREDNISOLONE SODIUM SUCCINATE 40 MG/ML
40 INJECTION, POWDER, LYOPHILIZED, FOR SOLUTION INTRAMUSCULAR; INTRAVENOUS EVERY 8 HOURS
Status: DISCONTINUED | OUTPATIENT
Start: 2024-09-10 | End: 2024-09-10

## 2024-09-10 RX ORDER — ALBUTEROL SULFATE 5 MG/ML
2.5 SOLUTION RESPIRATORY (INHALATION) EVERY 2 HOUR PRN
Status: DISCONTINUED | OUTPATIENT
Start: 2024-09-10 | End: 2024-09-11

## 2024-09-10 RX ORDER — METHYLPREDNISOLONE SODIUM SUCCINATE 125 MG/2ML
60 INJECTION, POWDER, LYOPHILIZED, FOR SOLUTION INTRAMUSCULAR; INTRAVENOUS EVERY 8 HOURS
Status: DISCONTINUED | OUTPATIENT
Start: 2024-09-10 | End: 2024-09-11

## 2024-09-10 RX ORDER — IPRATROPIUM BROMIDE AND ALBUTEROL SULFATE 2.5; .5 MG/3ML; MG/3ML
3 SOLUTION RESPIRATORY (INHALATION)
Status: DISCONTINUED | OUTPATIENT
Start: 2024-09-10 | End: 2024-09-11

## 2024-09-10 NOTE — PROGRESS NOTES
[facility]   part of [facility]     Hospitalist Progress Note     Keny Marshall Patient Status:  Inpatient    1960 MRN [id number]   Location [facility] 8NE-A Attending Clementine Zhao MD   Hosp Day # 1 PCP Dustin Avina MD     Chief Complaint: Hypoxia     Subjective:     Patient  feels better. Wants to be on less O2.    Objective:    Review of Systems:   A comprehensive review of systems was completed; pertinent positive and negatives stated in subjective.    Vital signs:  Temp:  [97.8 °F (36.6 °C)-98.5 °F (36.9 °C)] 97.8 °F (36.6 °C)  Pulse:  [] 76  Resp:  [16-51] 18  BP: (109-143)/() 116/77  SpO2:  [81 %-99 %] 98 %  FiO2 (%):  [45 %-65 %] 55 %    Physical Exam:    General: No acute distress  Respiratory: No wheezes, no rhonchi  Cardiovascular: S1, S2, regular rate and rhythm  Abdomen: Soft, Non-tender, non-distended, positive bowel sounds  Neuro: No new focal deficits.   Extremities: No edema      Diagnostic Data:    Labs:  Recent Labs   Lab 24  1057 09/10/24  0638   WBC 13.1* 8.8   HGB 12.2* 10.6*   MCV 94.5 94.1   .0 207.0       Recent Labs   Lab 24  1058 09/10/24  0638   * 136*   BUN 17 21   CREATSERUM 0.81 0.64*   CA 9.9 9.2   ALB 4.5  --     141   K 3.3* 4.2   CL 98 103   CO2 38.0* 33.0*   ALKPHO 64  --    AST 19  --    ALT 17  --    BILT 0.6  --    TP 8.2  --        Estimated Creatinine Clearance: 117.6 mL/min (A) (based on SCr of 0.64 mg/dL (L)).    Recent Labs   Lab 24  1058   TROPHS 49       No results for input(s): \"PTP\", \"INR\" in the last 168 hours.               Microbiology    No results found for this visit on 24.      Imaging: Reviewed in Epic.    Medications:    ipratropium-albuterol  3 mL Nebulization Q6H WA    methylPREDNISolone  60 mg Intravenous Q8H    amiodarone  200 mg Oral Daily    aspirin  81 mg Oral Daily    atorvastatin  20 mg Oral Nightly    fluticasone-salmeterol  1 puff Inhalation BID    ferrous sulfate   325 mg Oral Daily    [Held by provider] furosemide  40 mg Oral BID    guaiFENesin ER  1,200 mg Oral BID    levETIRAcetam  500 mg Oral BID    Roflumilast  500 mcg Oral Daily    umeclidinium bromide  1 puff Inhalation Daily    rivaroxaban  20 mg Oral Daily with food    furosemide  40 mg Intravenous Daily    insulin aspart  1-10 Units Subcutaneous TID AC and HS       Assessment & Plan:      #acute hypoxic resp failure due to COPD exacerbation, Afib   Steroids ERENDIRA nebs   Pulm on Cs  Cards on CS  #Aflutter s/p CV in 7/2024  RVR on admission- amiodarone   DOAC  #CHF systolic with mild acute exacerbation   Diuresis per cards  #DM type 2  ISS  #HLD  Statin   #RCC  #Dispo  Pt wants to be DC prior to his wifes CABG Thursday       Clementine Zhao MD    Supplementary Documentation:     Quality:  DVT Mechanical Prophylaxis:   SCDs,    DVT Pharmacologic Prophylaxis   Medication    rivaroxaban (Xarelto) tab 20 mg         DVT Pharmacologic prophylaxis: Aspirin 81 mg      Code Status: Full Code  Peters: No urinary catheter in place  Peters Duration (in days):   Central line:    ARUN:     Discharge is dependent on: clinical   At this point Mr. Marshall is expected to be discharge to: home     The 21st Century Cures Act makes medical notes like these available to patients in the interest of transparency. Please be advised this is a medical document. Medical documents are intended to carry relevant information, facts as evident, and the clinical opinion of the practitioner. The medical note is intended as peer to peer communication and may appear blunt or direct. It is written in medical language and may contain abbreviations or verbiage that are unfamiliar.              **Certification      PHYSICIAN Certification of Need for Inpatient Hospitalization - Initial Certification    Patient will require inpatient services that will reasonably be expected to span two midnight's based on the clinical documentation in H+P.   Based on patients current  state of illness, I anticipate that, after discharge, patient will require TBD.

## 2024-09-10 NOTE — PROGRESS NOTES
09/09/24 2111 09/09/24 2113 09/09/24 2115   Vital Signs   BP (!) 129/94 (!) 143/118 114/73   MAP (mmHg) (!) 104 (!) 128 87   BP Location Left arm Left arm Left arm   BP Method Automatic Automatic Automatic   Patient Position Lying Sitting Standing        Patient requesting sooner appt than 12/20/17. Per referredby Dr. Hernández, pt seen for Chronic kidney disease (CKD), stage IV (severe), consult and treatment, routine priority.     Msg routed to Dr. Lyons's office to review and advise.

## 2024-09-10 NOTE — PAYOR COMM NOTE
--------------  ADMISSION REVIEW     Payor: [employer] MEDICARE ADV PPO  Subscriber #:  [id number]  Authorization Number: [id number]    Admit date: 9/9/24  Admit time:  9:02 PM       REVIEW DOCUMENTATION:    Patient Seen in: [facility] Emergency Department      History     Chief Complaint   Patient presents with    Difficulty Breathing     SOB; progressing x3days; from home; COPD, CHF; normally 6L O2; ETCO2 25-27 w/ems; showing A-fib RVR     Stated Complaint: SOB; progressing x3days; from home    Subjective:   HPI  Patient is a 62 yo M with history of CHF, severe COPD on 6L NC at baseline, a fib/flutter on xarelto and amio who presents to ED for evaluation of SOB over past 3 to 4 days.  Patient states that he has increased cough with occasional phlegm.  Patient reports that he has been using his inhaler and nebulizers with no significant improvement.  Denies any fevers, chills, orthopnea, lower extremity swelling.  Denies any chest pain or hemoptysis.  Patient has been compliant with his medications and wears CPAP at night.      Objective:   Past Medical History:    Aortic stenosis    Arrhythmia    hx of a-fib    Arthritis    Cancer (HCC)    LEFT RENAL     CHF (congestive heart failure) (HCC)    Chronic hypoxemic respiratory failure (HCC)    On 4 LPM/NC at rest 24 hours/ day but 6 LPM/NC on exertion    COPD    Pulmonary follow up with Dr. Crum -no O2    Depression    Difficult intubation    patient states history of difficult intubation   due to body weight.States this no longer issue due to sugnificant weight loss    Esophageal reflux    Eye disease    Fatigue    Fever, unknown origin    Heart murmur    Heart palpitations    High blood pressure    High cholesterol    Loss of appetite    Other and unspecified hyperlipidemia    Paroxysmal atrial fibrillation (HCC)    Pneumonia, organism unspecified(486)    Prediabetes    Renal cell carcinoma (HCC)    s/p left nephrectomy    Shortness of breath    uses oxygen 3-4  L/NC all the time    Unspecified essential hypertension    Unspecified sleep apnea    He was unable to tolerate CPAP/BiPAP    Visual impairment    reading glasses     Past Surgical History:   Procedure Laterality Date    Adenoidectomy      Angiogram      Appendectomy      Appendectomy      Colonoscopy N/A 03/21/2016    Procedure: COLONOSCOPY;  Surgeon: Artur Okeefe MD;  Location:  ENDOSCOPY    Colonoscopy      Colonoscopy N/A 1/5/2023    Procedure: .;  Surgeon: Artur Okeefe MD;  Location:  ENDOSCOPY    Endovas repair, infrarenl abdom aortic aneurysm/dissect      Laparo radical nephrectomy Left 05/06/2014    Nephrectomy Left 2014    Other  meatus of urethra    Other Right     foreign body removal-arm    Other surgical history Right 03/25/2016    Procedure: KNEE ARTHROSCOPY;  Surgeon: Madhu Anaya MD;  Location:  MAIN OR    Repair rotator cuff,acute Right     Upper gi endoscopy,exam      Valve repair  2020       Physical Exam     ED Triage Vitals   BP 09/09/24 1120 121/83   Pulse 09/09/24 1100 118   Resp 09/09/24 1100 (!) 30   Temp 09/09/24 1118 98.2 °F (36.8 °C)   Temp src 09/09/24 1118 Oral   SpO2 09/09/24 1100 90 %   O2 Device 09/09/24 1100 Nasal cannula       Current Vitals:   Vital Signs  BP: 116/86  Pulse: 113  Resp: 26  Temp: 98.2 °F (36.8 °C)  Temp src: Oral  MAP (mmHg): 96    Oxygen Therapy  SpO2: 96 %  O2 Device: High flow/High humidity  FiO2 (%): 65 %  O2 Flow Rate (L/min): 20 L/min    Physical Exam  Vitals and nursing note reviewed.   Constitutional:       General: He is not in acute distress.  HENT:      Head: Normocephalic and atraumatic.      Nose: Nose normal.      Mouth/Throat:      Mouth: Mucous membranes are moist.   Eyes:      Extraocular Movements: Extraocular movements intact.      Pupils: Pupils are equal, round, and reactive to light.   Cardiovascular:      Rate and Rhythm: Normal rate and regular rhythm.      Pulses: Normal pulses.   Pulmonary:      Effort: No  respiratory distress.      Breath sounds: Decreased breath sounds present. No wheezing.      Comments: Mild tachypnea and accessory muscle use  Abdominal:      General: There is no distension.      Palpations: Abdomen is soft.   Musculoskeletal:         General: No swelling. Normal range of motion.      Cervical back: Normal range of motion.      Right lower leg: No edema.      Left lower leg: No edema.   Skin:     General: Skin is warm and dry.      Capillary Refill: Capillary refill takes less than 2 seconds.   Neurological:      General: No focal deficit present.      Mental Status: He is alert.   Psychiatric:         Mood and Affect: Mood normal.       ED Course     Labs Reviewed   CBC WITH DIFFERENTIAL WITH PLATELET - Abnormal; Notable for the following components:       Result Value    WBC 13.1 (*)     RBC 4.18 (*)     HGB 12.2 (*)     MCHC 30.9 (*)     Neutrophil Absolute Prelim 11.03 (*)     Neutrophil Absolute 11.03 (*)     Lymphocyte Absolute 0.79 (*)     All other components within normal limits   COMP METABOLIC PANEL (14) - Abnormal; Notable for the following components:    Glucose 236 (*)     Potassium 3.3 (*)     CO2 38.0 (*)     Calculated Osmolality 301 (*)     Globulin  3.7 (*)     All other components within normal limits   PRO BETA NATRIURETIC PEPTIDE - Abnormal; Notable for the following components:    Pro-Beta Natriuretic Peptide 3,416 (*)     All other components within normal limits   VENOUS BLOOD GAS - Abnormal; Notable for the following components:    Venous pH 7.51 (*)     Venous pCO2 56 (*)     Venous HCO3 38.5 (*)     Venous O2Hb 65.1 (*)     All other components within normal limits   ARTERIAL BLOOD GAS - Abnormal; Notable for the following components:    ABG pH 7.50 (*)     ABG pCO2 51 (*)     ABG pO2 59 (*)     ABG HCO3 36.1 (*)     ABG Base Excess 14.5 (*)     Arterial Blood Gas O2Hb 89.6 (*)     Total Hemoglobin 12.2 (*)     All other components within normal limits   TROPONIN I HIGH  SENSITIVITY - Normal   LACTIC ACID, PLASMA - Normal   SARS-COV-2/FLU A AND B/RSV BY PCR (GENEXPERT) - Normal   BLOOD CULTURE   BLOOD CULTURE     EKG    Rate, intervals and axes as noted on EKG Report.  Rate: 117  Rhythm: Undetermined  Reading: Sinus tachycardia with frequent PVCs vs. A fib with LBBB, ventricular rate of 117, similar to prior      Will obtain labs, CXR, give hour long and IV steroids.    ED Course:   CBC shows WBC of 13.1, hgb 12.2, CMP with potassium of 3.3. BNP mildly elevated at 3,416 which is improved from prior. Trop neg. Covid, flu, RSV negative. Patient was given an hour long but still feeling some SOB. He was also given solumedrol. I discussed with pulmonology. ABG shows pH of 7.50, bicarb of 36, CO2 of 51. Patient started on HFNC (20 LPM, 65% FiO2) for mild increase in WOB with improvement in his tachypnea. Patient had no wheezing on re-examination. CXR is similar to prior, no large focal consolidation or fluid overload but given symptoms he was empirically treated with IV rocephin and doxy.  Lactate normal. I discussed with [facility] who will also evaluate patient given possible a fib with RVR. Currently HR is between 100-110s. BP stable. I had conversation with patient regarding code status. Patient wishes to be full code at this time. His wife is his POA.       Admission disposition: 9/9/2024  2:22 PM    Disposition and Plan     Clinical Impression:  1. COPD exacerbation (HCC)    2. Atrial fibrillation and flutter (HCC)    3. Hypokalemia         Disposition:  Admit  9/9/2024  2:22 pm    Signed by Keyonna Pacheco DO on 9/9/2024  3:34 PM         HISTORY AND PHYSICAL        Assessment & Plan:  #Acute on chronic hypoxemic respiratory failure, primarily due to COPD exacerbation  Supplemental oxygen as needed  IV steroids  BD protocol  Empiric abx  Pulmonary to eval    #CHF, appears compensated  Pro-bnp and CXR reviewed  Resume home dose lasix  Cardiology to eval    #A fib with  RVR  Cardizem gtt  Resume Xarelto  Cardiology to eval     #DMII, hyperglycemia protocol     #DL, statin    #RCC     #ABIGAIL     #GERD     #Depression, resume home meds        Cardiology  Consultation Note     Impression:  1. acute hypoxemic respiratory failure, recurrent; acute on chronic, severe COPD exacerbation, which is also triggering AF RVR, mild diastolic CHF   2. hx of A flutter ablation (7/24, [facility])  3. s/p bioAVR  - TTE (6/24): LVEF 55%, 23mm SAVR- 3.3m/s, mean 22mmHg     Recommendations:  - rate control: currently 90-100s; goal HR <120 consistently while managing COPD exacerbation.  If persistently above that >30minutes, start diltiazem gtt. for now, continue amiodarone 200mg daily.   - mild CHF: keep on dry side.  lasix 40mg IV daily (home dose lasix 40mg po bid)  - AC: rivaroxaban  - COPD management per pulmonary/primary service.  - will follow.    Seen in cardiology consultation for management of AF and CHF.  Well known to me, hx of pAF, s/p bioprosthetic AVR, severe COPD on home O2.  This is his 4th admission in the past 3 months for hypoxemic respiratory failure.  Discharged mid last month, says he felt really well for a couple weeks but has taken some steps back over the past week or so- recurrent SOB with minimal exertion, although O2 sats have been in the low 90s. Denies CP or significant LE edema.  He has noted HR up to 120s, ECG/tele shows AF.  CXR without gross consoldation, but +alvarado bilateral effusions. pBNP 3400.  Started on vapotherm, solumedrol, lasix- admitted     EKG: AF     Lab Results   Component Value Date     WBC 13.1 09/09/2024     HGB 12.2 09/09/2024     HCT 39.5 09/09/2024     .0 09/09/2024     CREATSERUM 0.81 09/09/2024     BUN 17 09/09/2024      09/09/2024     K 3.3 09/09/2024     CL 98 09/09/2024     CO2 38.0 09/09/2024      09/09/2024     CA 9.9 09/09/2024     ALB 4.5 09/09/2024     ALKPHO 64 09/09/2024     BILT 0.6 09/09/2024     TP 8.2 09/09/2024     AST  19 09/09/2024     ALT 17 09/09/2024           MEDICATIONS ADMINISTERED IN LAST 1 DAY:  albuterol (Ventolin) (2.5 MG/3ML) 0.083% nebulizer solution 2.5 mg       Date Action Dose Route User    9/10/2024 0840 Given 2.5 mg Nebulization Manoj Dubose RCP    9/9/2024 2028 Given 2.5 mg Nebulization Rodolfo Coronado RCP          albuterol (Ventolin) (5 MG/ML) 0.5% nebulizer solution 10 mg       Date Action Dose Route User    9/9/2024 1121 Given 10 mg Nebulization Tamika Mathews RN          amiodarone (Pacerone) tab 200 mg       Date Action Dose Route User    9/10/2024 0836 Given 200 mg Oral Mora Brush RN          aspirin DR tab 81 mg       Date Action Dose Route User    9/10/2024 0836 Given 81 mg Oral Mora Brush RN          atorvastatin (Lipitor) tab 20 mg       Date Action Dose Route User    9/9/2024 2146 Given 20 mg Oral Minoo Lee RN          cefTRIAXone (Rocephin) 2 g in sodium chloride 0.9% 100 mL IVPB-ADDV       Date Action Dose Route User    9/9/2024 1524 New Bag 2 g Intravenous Tamika Mathews RN          doxycycline (Vibramycin) cap 100 mg       Date Action Dose Route User    9/9/2024 1404 Given 100 mg Oral Tamika Mathews RN          ferrous sulfate DR tab 325 mg       Date Action Dose Route User    9/10/2024 0835 Given 325 mg Oral Mora Brush RN          fluticasone-salmeterol (Advair Diskus) 250-50 MCG/ACT inhaler 1 puff       Date Action Dose Route User    9/10/2024 0850 Given 1 puff Inhalation Manoj Dubose RCP          furosemide (Lasix) 10 mg/mL injection 40 mg       Date Action Dose Route User    9/10/2024 0836 Given 40 mg Intravenous Mora Brush RN    9/9/2024 1620 Given 40 mg Intravenous Tamika Mathews RN          guaiFENesin ER (Mucinex) 12 hr tab 1,200 mg       Date Action Dose Route User    9/10/2024 0835 Given 1,200 mg Oral Mora Brush RN    9/9/2024 8166 Given 1,200 mg Oral Minoo Lee, RN          insulin aspart (NovoLOG) 100 Units/mL FlexPen 1-10 Units       Date  Action Dose Route User    9/9/2024 2156 Given 3 Units Subcutaneous (Left Upper Arm) Minoo Lee RN          ipratropium (Atrovent) 0.02 % nebulizer solution 1 mg       Date Action Dose Route User    9/9/2024 1121 Given 1 mg Nebulization Tamika Mathews RN          levETIRAcetam (Keppra) tab 500 mg       Date Action Dose Route User    9/10/2024 0836 Given 500 mg Oral Mora Brush RN    9/9/2024 2146 Given 500 mg Oral Minoo Lee RN          methylPREDNISolone sodium succinate (Solu-MEDROL) injection 125 mg       Date Action Dose Route User    9/9/2024 1121 Given 125 mg Intravenous Tamika Mathews RN          methylPREDNISolone sodium succinate (Solu-MEDROL) injection 60 mg       Date Action Dose Route User    9/10/2024 0925 Given 60 mg Intravenous Mora Brush RN          potassium chloride (Klor-Con M20) tab 40 mEq       Date Action Dose Route User    9/9/2024 1615 Given 40 mEq Oral Tamika Mathews RN          rivaroxaban (Xarelto) tab 20 mg       Date Action Dose Route User    9/10/2024 0836 Given 20 mg Oral Mora Brush RN          Roflumilast TABS 500 mcg       Date Action Dose Route User    9/10/2024 0836 Given 500 mcg Oral Mora Brush RN          umeclidinium bromide (Incruse Ellipta) 62.5 MCG/ACT inhaler 1 puff       Date Action Dose Route User    9/10/2024 0840 Given 1 puff Inhalation Manoj Dubose, JOSEPH            Vitals (last day)       Date/Time Temp Pulse Resp BP SpO2 Weight O2 Device O2 Flow Rate (L/min) Who    09/10/24 0849 -- -- -- -- -- -- High flow/High humidity 25 L/min GC    09/09/24 2323 98.5 °F (36.9 °C) 85 16 109/80 91 % -- Other (Comment) 20 L/min MV    09/09/24 2220 -- 97 -- -- 86 % -- -- -- DU    09/09/24 2139 -- -- -- -- 94 % -- High flow/High humidity 25 L/min DD    09/09/24 2115 98.2 °F (36.8 °C) -- -- -- 81 % 157 lb 3 oz (71.3 kg) High flow nasal cannula --     09/09/24 2115 -- 98 -- 114/73 -- -- -- --     09/09/24 2113 -- -- -- 143/118 -- -- -- --     09/09/24  2111 -- -- 22 -- -- -- -- -- DU    09/09/24 2111 -- -- -- 129/94 -- -- -- -- GD    09/09/24 1931 -- -- -- -- -- -- High flow/High humidity 20 L/min     09/09/24 1900 -- 84 26 125/79 90 % -- High flow/High humidity 20 L/min     09/09/24 1845 -- 126 51 -- 94 % -- High flow/High humidity 20 L/min     09/09/24 1715 -- 89 34 123/88 94 % -- High flow/High humidity 20 L/min     09/09/24 1700 -- 119 33 -- 93 % -- High flow/High humidity 20 L/min     09/09/24 1615 -- 106 26 -- 96 % -- High flow/High humidity 20 L/min     09/09/24 1120 -- -- -- 121/83 -- -- -- --     09/09/24 1118 98.2 °F (36.8 °C) 66 -- -- -- -- -- --     09/09/24 1115 -- 133 24 -- 94 % -- High flow nasal cannula --     09/09/24 1111 -- -- -- -- -- -- Nasal cannula --     09/09/24 1100 -- 118 30 -- 90 % -- Nasal cannula 6 L/min     09/09/24 1041 -- -- -- -- -- 156 lb (70.8 kg) -- -- CN

## 2024-09-10 NOTE — CM/SW NOTE
09/10/24 1100   CM/SW Referral Data   Referral Source Social Work (self-referral)   Reason for Referral Discharge planning;Psychosocial assessment;Readmission   Informant Patient   Readmission Assessment   Factors that patient feels contributed to this readmission Acute/Chronic Clinical Presentation   Patient Info   Patient's Current Mental Status at Time of Assessment Alert;Oriented   Patient's Home Environment House   Patient lives with Spouse/Significant other   Discharge Needs   Anticipated D/C needs Transportation services     SW met with pt at bedside to discuss discharge planning. SW received [facility] order for transportation services/resources as pt can not drive and spouse is having open heart surgery this week. SW placed resources on AVS for pt. SW also encouraged pt to utilize neighbors and family assist for transportation. Pt already plans on having his children assist, even though they live about an hour away. Pt is hopeful to be discharged soon so that he can be at the hospital for spouse's surgery. SDOH order acknowledged.      &  to remain available and supportive for discharge planning needs.    Sherrie Dean, MSW, LSW  Discharge Planner

## 2024-09-10 NOTE — PLAN OF CARE
NURSING ADMISSION NOTE      Patient admitted via Cart  Oriented to room.  Safety precautions initiated.  Bed in low position.  Call light in reach.  Patient alert and oriented x 4. On 25L 50% FiO2 HFNC, keeping sats >90%. Up with SBA. NSR on tele. Continent of bowel/bladder. No complaints of pain, chest pain/discomfort. POC: wean O2 as able, pulm to see, IV solumedrol. Call light within reach. Fall precautions in place. Admission navigator completed with patient. Patient updated on POC, all questions answered at this time.     Problem: Diabetes/Glucose Control  Goal: Glucose maintained within prescribed range  Description: INTERVENTIONS:  - Monitor Blood Glucose as ordered  - Assess for signs and symptoms of hyperglycemia and hypoglycemia  - Administer ordered medications to maintain glucose within target range  - Assess barriers to adequate nutritional intake and initiate nutrition consult as needed  - Instruct patient on self management of diabetes  Outcome: Progressing     Problem: Patient/Family Goals  Goal: Patient/Family Long Term Goal  Description: Patient's Long Term Goal: to go home    Interventions:  - consults to see  -wean O2 as able  -tests as ordered  - See additional Care Plan goals for specific interventions  Outcome: Progressing  Goal: Patient/Family Short Term Goal  Description: Patient's Short Term Goal: to feel better    Interventions:   - IV steroids  -IV lasix  -monitor for SOB  - See additional Care Plan goals for specific interventions  Outcome: Progressing

## 2024-09-10 NOTE — PLAN OF CARE
Assumed care of pt around 0730  AxO x4, up SBA  25 L vapotherm, goal to wean as able  NSR on tele, no cardiac symptoms  Pt denies any pain  IV Lasix, IV solumedrol  Continent of bowel and bladder  Fall precautions in place, pt updated on plan of care            Problem: Diabetes/Glucose Control  Goal: Glucose maintained within prescribed range  Description: INTERVENTIONS:  - Monitor Blood Glucose as ordered  - Assess for signs and symptoms of hyperglycemia and hypoglycemia  - Administer ordered medications to maintain glucose within target range  - Assess barriers to adequate nutritional intake and initiate nutrition consult as needed  - Instruct patient on self management of diabetes  Outcome: Progressing     Problem: Patient/Family Goals  Goal: Patient/Family Long Term Goal  Description: Patient's Long Term Goal: to go home    Interventions:  - consults to see  -wean O2 as able  -tests as ordered  - See additional Care Plan goals for specific interventions  Outcome: Progressing  Goal: Patient/Family Short Term Goal  Description: Patient's Short Term Goal: to feel better    Interventions:   - IV steroids  -IV lasix  -monitor for SOB  - See additional Care Plan goals for specific interventions  Outcome: Progressing     Problem: RESPIRATORY - ADULT  Goal: Achieves optimal ventilation and oxygenation  Description: INTERVENTIONS:  - Assess for changes in respiratory status  - Assess for changes in mentation and behavior  - Position to facilitate oxygenation and minimize respiratory effort  - Oxygen supplementation based on oxygen saturation or ABGs  - Provide Smoking Cessation handout, if applicable  - Encourage broncho-pulmonary hygiene including cough, deep breathe, Incentive Spirometry  - Assess the need for suctioning and perform as needed  - Assess and instruct to report SOB or any respiratory difficulty  - Respiratory Therapy support as indicated  - Manage/alleviate anxiety  - Monitor for signs/symptoms of CO2  retention  Outcome: Progressing

## 2024-09-10 NOTE — DISCHARGE INSTRUCTIONS
-[employer]ist [place] - [phone] [email or web address]  -Ride [place] [phone] or [phone] [email or web address]  -[employer] [phone] [email or web address]    -[place] Ride Around [facility] [phone]  -[facility] Transit System [phone]  -[facility] Transportation [phone] Ext. [phone]   -[facility] Pace Dial-a-Ride Service  Reservations: [phone]  -[facility] Senior Shuttle Bus Line at [phone]  -[facility] Senior Bus Service [phone]  -[facility] [phone].  -[facility] Transit [phone]  -[facility] [phone]   -[employer] Transportation [phone]  -[employer] Grandparent Transportation [phone] [email or web address]  -[employer] Transport [phone]  -Disabled on the Go [phone]  -[place] Safety Transfer [phone]  -[facility] Wheelchair Transport [phone]   -[employer]: [phone]  -Connections thro mobile [place] [phone]  -[employer] [phone]  -[employer] Prov[employer] [facility] [place], [facility], [place], and [facility]. [phone] [email or web address]  -[employer] Transportation [facility] and surrounding communities [phone] [email or web address]    Senior Services [address] [address]  [phone]  [email or web address]          Going Home Instructions  In this section you will find the tools which will guide you through the first few days after you leave the hospital. Continued use of these tools will help you develop the skills necessary to keep your heart failure under control.     Home Care Instructions Following Heart Failure - the most important things to do every day include:   Weigh yourself and review the “Self-Check Plan” sheet every morning.   Call your cardiologist  office if you are in the “Pay Attention-Use Caution” (yellow zone) or “Medical Alert-Warning!” (red zone) as outlined in the Self-Check Plan sheet.  Take your medicines as prescribed.  Limit your sodium (salt) intake.  Know when to call your cardiologist, primary doctor, or nurse.  Know when to seek emergency care.      Things for You to Remember:   1. See your doctor or healthcare provider as written on your discharge instructions.  It is important that you attend this appointment to make sure your symptoms are under control.     2. Your recommended sodium intake is 7121-7179 mg daily.    3.  Weigh yourself every day.    4. Some exercise and activity is important to help keep your heart functioning and strong. Unless instructed not to exercise, you may walk at a slow to moderate pace for 10-15 minutes 2-3 days per week to start. Pace your activity to prevent shortness of breath or fatigue. Stop exercising if you develop chest pain, lightheadedness, or significant shortness of breath.       Call Your Cardiologist If:   You gain 2-3 pounds in one day or 5 pounds in one week.  You have more difficulty breathing.  You are getting more tired with normal activity.  You are more short of breath lying down, or awaken at night short of breath.  You have swelling of your feet or legs.  You urinate less often during the day and more often at night.  You have cramps in your legs.  You have blurred vision or see yellowish-green halos around objects of lights.    Go to the Emergency Room If:   You have pain or tightness in your chest  You are extremely short of breath  You are coughing up pink-frothy mucus  You are traveling and develop symptoms of worsening heart failure      ** Please follow up with your cardiologist or Advanced Practice Provider as written on your discharge instructions. If you are not provided with an appointment, let your nurse know so you can get an appointment**

## 2024-09-10 NOTE — PLAN OF CARE
Assumed care of patient at 2300.  A&Ox4. Pt denies any pain. AVAPS at night, 15L HF NC during the day. Diminished lung sounds, crackles at base of lungs. NSR on tele. Continent of bowel and bladder, last BM 9/8/24.     Bed is locked and in low position. Call light and personal items within reach.  Pt updated with plan of care    Plan of care:   Wean O2  Pulm to see   IV Lasix  IV Solumderol

## 2024-09-10 NOTE — CONSULTS
[facility]  Report of Consultation    Keny Marshall Patient Status:  Inpatient    1960 MRN [id number]   Location [facility] 8NE-A Attending Clementine Zhao MD   Hosp Day # 1 PCP Dustin Avina MD     Reason for Consultation:  Ongoing management of COPD    History of Present Illness:  Keny Marshall is a a(n) 63 year old male. Recent ex-smoker with a history of severe COPD O2 dependent known hypercapnic respiratory failure maintained on nocturnal BiPAP, history of A-fib with RVR status post recent ablation remains on anticoagulation, history of bio AVR, history of diastolic dysfunction with recurrent volume overload-frequent recent admissions last approximately 1 month ago.  Patient states that he felt improved and stronger on his baseline of 4 to 6 L until approximately 1 week ago where he noted increasing dyspnea, increasing cough nonproductive.  It had no fevers chills or sweats he had no significant mucus he was unaware of rapid A-fib and feels that his heart failure was under good control with minimal lower extremity edema.  Currently feels improved    History:  Past Medical History:    Aortic stenosis    Arrhythmia    hx of a-fib    Arthritis    Cancer (HCC)    LEFT RENAL     CHF (congestive heart failure) (HCC)    Chronic hypoxemic respiratory failure (HCC)    On 4 LPM/NC at rest 24 hours/ day but 6 LPM/NC on exertion    COPD    Pulmonary follow up with Dr. Crum -no O2    Depression    Difficult intubation    patient states history of difficult intubation   due to body weight.States this no longer issue due to sugnificant weight loss    Esophageal reflux    Eye disease    Fatigue    Fever, unknown origin    Heart murmur    Heart palpitations    High blood pressure    High cholesterol    Loss of appetite    Other and unspecified hyperlipidemia    Paroxysmal atrial fibrillation (HCC)    Pneumonia, organism unspecified(486)    Prediabetes    Renal cell carcinoma (HCC)    s/p left nephrectomy     Shortness of breath    uses oxygen 3-4 L/NC all the time    Unspecified essential hypertension    Unspecified sleep apnea    He was unable to tolerate CPAP/BiPAP    Visual impairment    reading glasses     Family History   Problem Relation Age of Onset    Heart Disorder Mother     Pacemaker Mother     Other (Atrial fibrillation) Father     Other (Lung cancer) Maternal Aunt       reports that he quit smoking about 18 months ago. His smoking use included cigarettes. He started smoking about 41 years ago. He has a 60 pack-year smoking history. He has been exposed to tobacco smoke. He has never used smokeless tobacco. He reports that he does not currently use alcohol after a past usage of about 14.0 standard drinks of alcohol per week. He reports that he does not use drugs.    Allergies:  Allergies   Allergen Reactions    Bees ANAPHYLAXIS    Other ANAPHYLAXIS     Bee stings       Medications:  Medications Prior to Admission   Medication Sig Dispense Refill Last Dose    tiotropium 18 MCG Inhalation Cap Inhale 1 capsule (18 mcg total) into the lungs daily. 90 capsule 3 9/9/2024 at 0800    furosemide 40 MG Oral Tab Take 1 tablet (40 mg total) by mouth 2 (two) times daily. 180 tablet 0 9/9/2024 at 0800    Budesonide-Formoterol Fumarate 160-4.5 MCG/ACT Inhalation Aerosol Inhale 2 puffs into the lungs 2 (two) times daily. 3 each 3 9/9/2024 at 0800    levETIRAcetam 500 MG Oral Tab Take 1 tablet (500 mg total) by mouth 2 (two) times daily. 180 tablet 0 9/9/2024 at 0800    amiodarone 200 MG Oral Tab Take one table twice daily x 2 weeks then take one tablet daily thereafter 90 tablet 0 9/9/2024 at 0800    Roflumilast 500 MCG Oral Tab Take 500 mcg by mouth daily. 90 tablet 0 9/9/2024 at 0800    guaiFENesin  MG Oral Tablet 12 Hr Take 2 tablets (1,200 mg total) by mouth 2 (two) times daily.   9/9/2024 at 0800    Ferrous Sulfate 324 (65 Fe) MG Oral Tab EC Take 65 mg by mouth daily.   9/9/2024 at 0800    Levalbuterol HCl 0.63  MG/3ML Inhalation Nebu Soln Take 3 mL (0.63 mg total) by nebulization every 6 (six) hours as needed for Shortness of Breath or Wheezing. 3 each 3 Past Week    JARDIANCE 10 MG Oral Tab    9/9/2024 at 0800    atorvastatin 20 MG Oral Tab Take 1 tablet (20 mg total) by mouth nightly.   9/8/2024 at 2100    XARELTO 20 MG Oral Tab Take 1 tablet by mouth daily with food 30 tablet 3 9/9/2024 at 0800    Ipratropium Bromide 0.02 % Inhalation Solution Take 2.5 mL (500 mcg total) by nebulization every 6 (six) hours as needed for Wheezing.   Past Week    Multiple Vitamin (TAB-A-CUCA) Oral Tab Take 1 tablet by mouth daily.   9/9/2024 at 0800    aspirin 81 MG Oral Tab EC Take 1 tablet (81 mg total) by mouth daily.   9/9/2024 at 0800    B Complex-Biotin-FA (B COMPLETE) Oral Tab Take 1 tablet by mouth daily.   9/9/2024 at 0800    magnesium 250 MG Oral Tab Take 1 tablet (250 mg total) by mouth every evening.   9/8/2024 at 1700    ketoconazole 2 % External Cream Apply topically as needed.   Unknown       Review of Systems:    Constitutional: Follows weight is home and stays stable denies any vision changes  Eyes: Denies any issues  Ears, nose, mouth, throat, and face: Denies any sore throat or difficulty swallowing  Respiratory: As above denies palpitations or chest pain  Cardiovascular:  as above  Gastrointestinal: Denies any reflux or heartburn lower extremity edema has been minimal  Musculoskeletal: Denies any significant joint pain issues denies any issues  Neurological:      All other review of systems are negative.  Reports great compliance with his BiPAP    Vital signs in last 24 hours:  Patient Vitals for the past 24 hrs:   BP Temp Temp src Pulse Resp SpO2 Height Weight   09/10/24 0735 116/77 97.8 °F (36.6 °C) Oral 76 18 98 % -- --   09/10/24 0513 -- -- -- 78 -- 99 % -- --   09/10/24 0346 -- 98.2 °F (36.8 °C) Oral -- 18 -- -- --   09/10/24 0129 -- -- -- -- -- 98 % -- --   09/09/24 0013 109/80 98.5 °F (36.9 °C) Oral 85 16 91 %  -- --   09/09/24 2220 -- -- -- 97 -- (!) 86 % -- --   09/09/24 2139 -- -- -- -- -- 94 % -- --   09/09/24 2115 114/73 98.2 °F (36.8 °C) Oral 98 -- (!) 81 % -- 157 lb 3 oz (71.3 kg)   09/09/24 2113 (!) 143/118 -- -- -- -- -- -- --   09/09/24 2111 (!) 129/94 -- -- -- 22 -- -- --   09/09/24 1900 125/79 -- -- 84 26 90 % -- --   09/09/24 1845 -- -- -- (!) 126 (!) 51 94 % -- --   09/09/24 1715 123/88 -- -- 89 (!) 34 94 % -- --   09/09/24 1700 -- -- -- 119 (!) 33 93 % -- --   09/09/24 1615 -- -- -- 106 26 96 % -- --   09/09/24 1515 116/86 -- -- 113 26 96 % -- --   09/09/24 1400 -- -- -- 103 23 93 % -- --   09/09/24 1300 -- -- -- 105 (!) 29 95 % -- --   09/09/24 1245 -- -- -- 84 (!) 37 93 % -- --   09/09/24 1241 -- -- -- -- -- 94 % -- --   09/09/24 1215 127/75 -- -- 59 (!) 28 95 % -- --   09/09/24 1130 -- -- -- 110 (!) 31 95 % -- --   09/09/24 1120 121/83 -- -- -- -- -- -- --   09/09/24 1118 -- 98.2 °F (36.8 °C) Oral 66 -- -- -- --   09/09/24 1115 -- -- -- (!) 133 24 94 % -- --   09/09/24 1100 -- -- -- 118 (!) 30 90 % -- --   09/09/24 1041 -- -- -- -- -- -- 5' 10\" (1.778 m) 156 lb (70.8 kg)         Physical Exam:   General: alert, cooperative, oriented.  No respiratory distress.   Head: Normocephalic, without obvious abnormality, atraumatic.   Eyes/Nose: Pupils equal and reactive   Throat: Lips, mucosa, and tongue normal.  No thrush noted.   Neck: trachea midline, no adenopathy, no thyromegaly. No JVD.   Lungs: Markedly diminished tones bilaterally with minimal crackles at the bases   Chest wall: No tenderness or deformity.   Heart: Regular rate and rhythm murmur noted   Abdomen: soft, non-distended, no masses, no guarding, no     Rebound.  No obvious hepatosplenomegaly or ascites   Extremity: Trace dependent edema most   Skin: No rashes or lesions.   Neurological: Alert, interactive, no focal deficits    Lab Data Review:  Lab Results   Component Value Date    WBC 8.8 09/10/2024    HGB 10.6 09/10/2024    HCT 33.4 09/10/2024     .0 09/10/2024    CREATSERUM 0.64 09/10/2024    BUN 21 09/10/2024     09/10/2024    K 4.2 09/10/2024     09/10/2024    CO2 33.0 09/10/2024     09/10/2024    CA 9.2 09/10/2024    ALB 4.5 09/09/2024    ALKPHO 64 09/09/2024    BILT 0.6 09/09/2024    TP 8.2 09/09/2024    AST 19 09/09/2024    ALT 17 09/09/2024    PGLU 138 09/10/2024     Recent Labs   Lab 09/09/24  1343   ABGPHT 7.50*   EDFIFF3F 51*   ZFDXH9B 59*   ABGHCO3 36.1*   ABGBE 14.5*   TEMP 98.6   ROMARIO Positive   SITE Right Radial   DEV Nasal cannula   THGB 12.2*           Cultures:   Blood cultures remain negative SARS flu RSV negative    Radiology:  XR CHEST AP PORTABLE  (CPT=71045)    Result Date: 9/9/2024  CONCLUSION:  1. When compared to 8/13/2024 chest radiograph, findings are similar with increasing airspace opacities bilaterally may represent unfavorable progression of edema, inflammatory and or infectious process, correlate clinically. 2. Please refer to CT of the chest performed 8/16/2024 for further details.   LOCATION:  [facility]      Dictated by (CST): Hetal Domingo MD on 9/09/2024 at 12:04 PM     Finalized by (CST): Hetal Domingo MD on 9/09/2024 at 12:08 PM        Chest x-rays reviewed--increasing areas of airspace as well as questionable interstitial changes-diffusely increased from last month  CT from 1 month ago noted and reviewed  Assessment and Plan:  Patient Active Problem List   Diagnosis    Anemia    HTN (hypertension)    DM2 (diabetes mellitus, type 2) (HCC)    COPD (chronic obstructive pulmonary disease) (HCC)    Clear cell renal cell carcinoma of left kidney (HCC)    Medial meniscus tear, right, initial encounter    Primary osteoarthritis of right knee              GLOBAL EXP 6-25-16 / RIGHT KNEE / TRK     Acute medial meniscus tear of right knee            GLOBAL EXP 6-25-16 / RIGHT KNEE / TRK     Acute lateral meniscus tear of left knee               GLOBAL EXP 6-25-16 / LEFT KNEE / TRK     Bursitis of left shoulder              GLOBAL EXP 6-25-16 / LEFT SHOULDER / TRK     Moderate aortic stenosis    Stage 3 severe COPD by GOLD classification ([facility])    Hyperglycemia    Hypervolemia    Hypervolemia, unspecified hypervolemia type    Dyspnea, unspecified type    Hypoxemia    Atrial fibrillation with RVR ([facility])    Acute heart failure with preserved ejection fraction (HFpEF) ([facility])    Atrial flutter with rapid ventricular response ([facility])    Acute on chronic congestive heart failure (HCC)    Acute on chronic congestive heart failure, unspecified heart failure type ([facility])    Hypoxia    Atrial fibrillation with rapid ventricular response ([facility])    Pleural effusion    Hypokalemia    Acute respiratory failure with hypoxia ([facility])    Normocytic anemia    Chronic heart failure with preserved ejection fraction ([facility])    Leukocytosis (leucocytosis)    Aortic valve regurgitation    HFrEF (heart failure with reduced ejection fraction) ([facility])    Chronic hypoxemic respiratory failure ([facility])    ABIGAIL (obstructive sleep apnea)    COPD exacerbation ([facility])    Acute on chronic respiratory failure with hypoxia ([facility])    Acute on chronic respiratory failure with hypoxia and hypercapnia ([facility])    Chronic hypercapnic respiratory failure ([facility])    Smoking greater than 20 pack years    Hyponatremia    Hyperkalemia    Chronic obstructive pulmonary disease, unspecified COPD type ([facility])    Supplemental oxygen dependent    Azotemia    Metabolic alkalosis    End stage COPD ([facility])    Acute on chronic hypoxic respiratory failure ([facility])    Atrial fibrillation and flutter ([facility])       Assessment:  Very severe COPD/component CPFE with flare questionably related to prednisone wean  Hypercapnic and hypoxic respiratory failure chronically-hypercapnia controlled on nocturnal BiPAP  A-fib/flutter with last ablation 7/2024-remains in sinus on anticoagulation and rate control medications  Diastolic dysfunction with moderate pulmonary hypertension-fluid status currently seems stable  History of  renal cell carcinoma status post nephrectomy    Plan:  Plan at this time to continue ICS/LABA/LAMA  Continue ongoing nebulized bronchodilators  Plan to increase steroids and consider prolonged course his wife is having surgery on Thursday-  Ongoing discussion concerning role of antifibrotics    Pat Crum MD  9/10/2024  9:13 AM

## 2024-09-10 NOTE — CDS QUERY
Dear Dr. Zhao:  Please clarify heart failure:  (  x) Acute on Chronic Diastolic Heart Failure  (  ) Acute on Chronic Systolic Heart Failure  (  ) Other (please specify):        __ekg: Sinus tachycardia with Premature supraventricular complexes    __ed impression: copd exac; afb and flutter; hypokalemia  ed quick note: usually uses 6L O2 at home.    __h&p: acute on chronic hypox resp failure primarily d/t copd exac; chf- appears compensated; afib with rvr; dm2; dl; rcc; janice; gerd; depression;     __9/10 cardiology:  acute hypoxemic respiratory failure, recurrent; acute on chronic, severe COPD exacerbation, which is also triggering AF RVR, mild diastolic CHF;   mild CHF: keep on dry side. lasix 40mg IV daily (home dose lasix 40mg po bid)  hx of A flutter ablation (7/24, [facility])   s/p bioAVR - TTE (6/24): LVEF 55%, 23mm SAVR- 3.3m/s, mean 22mmHg    __9/10 pn: acute hypox resp failure d/t copd exacerbation, afib; aflutter; chf systolic with mild acute exacerbation, dm2; hld;    Treatment: iv lasix    If you have any questions, please contact Clinical  Lary Cooper RN, CDS  at #[phone]. Thank You!    THIS FORM IS A PERMANENT PART OF THE MEDICAL RECORD

## 2024-09-10 NOTE — PROGRESS NOTES
[facility] Cardiology  Progress Note    Keny Marshall Patient Status:  Inpatient    1960 MRN [id number]   [facility] 8NE-A Attending Clementine Zhao MD   Hosp Day # 1 PCP Dustin Avina MD       Impression:  1. acute hypoxemic respiratory failure, recurrent; acute on chronic, severe COPD exacerbation, which is also triggering AF RVR, mild diastolic CHF   2. hx of A flutter ablation (, [facility])  3. s/p bioAVR  - TTE (): LVEF 55%, 23mm SAVR- 3.3m/s, mean 22mmHg     Recommendations:  - rate control: back in SR 70s, continue amiodarone 200mg daily.  consider diltiazem gtt if AF HR >120bpm for greater than 30 minutes.    - mild CHF: keep on dry side.  lasix 40mg IV daily (home dose lasix 40mg po bid)  - AC: rivaroxaban  - COPD management per pulmonary/primary service.  - will follow.      Subjective:  The patient denies any chest pain or dyspnea at this time. Remains on 25L vapotherm.      Objective:  /77 (BP Location: Left arm)   Pulse 76   Temp 97.8 °F (36.6 °C) (Oral)   Resp 18   Ht 5' 10\" (1.778 m)   Wt 157 lb 3 oz (71.3 kg)   SpO2 98%   BMI 22.55 kg/m²   Temp (24hrs), Av.2 °F (36.8 °C), Min:97.8 °F (36.6 °C), Max:98.5 °F (36.9 °C)      Intake/Output Summary (Last 24 hours) at 9/10/2024 1154  Last data filed at 9/10/2024 0930  Gross per 24 hour   Intake 240 ml   Output 2250 ml   Net -2010 ml     Wt Readings from Last 3 Encounters:   24 157 lb 3 oz (71.3 kg)   24 149 lb 11.1 oz (67.9 kg)   24 151 lb 14.4 oz (68.9 kg)       General: Awake and alert; in no acute distress  Cardiac: Regular rate and regular rhythm; no murmurs/rubs/gallops are appreciated  Lungs: Clear to auscultation bilaterally; no accessory muscle use  Abdomen: Soft, non-tender; bowel sounds are normoactive  Extremities: No clubbing/cyanosis; moves all 4 extremities normally    Current Facility-Administered Medications   Medication Dose Route Frequency    ipratropium-albuterol  (Duoneb) 0.5-2.5 (3) MG/3ML inhalation solution 3 mL  3 mL Nebulization Q6H WA    albuterol (Ventolin) (5 MG/ML) 0.5% nebulizer solution 2.5 mg  2.5 mg Nebulization Q2H PRN    methylPREDNISolone sodium succinate (Solu-MEDROL) injection 60 mg  60 mg Intravenous Q8H    amiodarone (Pacerone) tab 200 mg  200 mg Oral Daily    aspirin DR tab 81 mg  81 mg Oral Daily    atorvastatin (Lipitor) tab 20 mg  20 mg Oral Nightly    fluticasone-salmeterol (Advair Diskus) 250-50 MCG/ACT inhaler 1 puff  1 puff Inhalation BID    ferrous sulfate DR tab 325 mg  325 mg Oral Daily    [Held by provider] furosemide (Lasix) tab 40 mg  40 mg Oral BID    guaiFENesin ER (Mucinex) 12 hr tab 1,200 mg  1,200 mg Oral BID    ipratropium (Atrovent) 0.02 % nebulizer solution 500 mcg  500 mcg Nebulization Q6H PRN    levETIRAcetam (Keppra) tab 500 mg  500 mg Oral BID    Roflumilast TABS 500 mcg  500 mcg Oral Daily    umeclidinium bromide (Incruse Ellipta) 62.5 MCG/ACT inhaler 1 puff  1 puff Inhalation Daily    rivaroxaban (Xarelto) tab 20 mg  20 mg Oral Daily with food    acetaminophen (Tylenol Extra Strength) tab 500 mg  500 mg Oral Q4H PRN    melatonin tab 3 mg  3 mg Oral Nightly PRN    glycerin-hypromellose- (Artificial Tears) 0.2-0.2-1 % ophthalmic solution 1 drop  1 drop Both Eyes QID PRN    sodium chloride (Saline Mist) 0.65 % nasal solution 1 spray  1 spray Each Nare Q3H PRN    furosemide (Lasix) 10 mg/mL injection 40 mg  40 mg Intravenous Daily    glucose (Dex4) 15 GM/59ML oral liquid 15 g  15 g Oral Q15 Min PRN    Or    glucose (Glutose) 40% oral gel 15 g  15 g Oral Q15 Min PRN    Or    glucose-vitamin C (Dex-4) chewable tab 4 tablet  4 tablet Oral Q15 Min PRN    Or    dextrose 50% injection 50 mL  50 mL Intravenous Q15 Min PRN    Or    glucose (Dex4) 15 GM/59ML oral liquid 30 g  30 g Oral Q15 Min PRN    Or    glucose (Glutose) 40% oral gel 30 g  30 g Oral Q15 Min PRN    Or    glucose-vitamin C (Dex-4) chewable tab 8 tablet  8 tablet  Oral Q15 Min PRN    insulin aspart (NovoLOG) 100 Units/mL FlexPen 1-10 Units  1-10 Units Subcutaneous TID AC and HS       Laboratory Data:  Lab Results   Component Value Date    WBC 8.8 09/10/2024    HGB 10.6 09/10/2024    HCT 33.4 09/10/2024    .0 09/10/2024     Lab Results   Component Value Date    INR 1.59 (H) 06/29/2024    INR 1.06 06/11/2024    INR 2.26 (H) 09/22/2021     Lab Results   Component Value Date     09/10/2024    K 4.2 09/10/2024     09/10/2024    CO2 33.0 09/10/2024    BUN 21 09/10/2024    CREATSERUM 0.64 09/10/2024     09/10/2024    CA 9.2 09/10/2024       Telemetry: No malignant tachyarrhythmias or bradyarrhythmias      Thank you for allowing our practice to participate in the care of your patient. Please do not hesitate to contact me if you have any questions.

## 2024-09-11 VITALS
BODY MASS INDEX: 22.32 KG/M2 | TEMPERATURE: 98 F | DIASTOLIC BLOOD PRESSURE: 73 MMHG | SYSTOLIC BLOOD PRESSURE: 111 MMHG | HEIGHT: 70 IN | RESPIRATION RATE: 18 BRPM | HEART RATE: 83 BPM | OXYGEN SATURATION: 87 % | WEIGHT: 155.88 LBS

## 2024-09-11 LAB
ANION GAP SERPL CALC-SCNC: 3 MMOL/L (ref 0–18)
BASOPHILS # BLD AUTO: 0 X10(3) UL (ref 0–0.2)
BASOPHILS NFR BLD AUTO: 0 %
BUN BLD-MCNC: 29 MG/DL (ref 9–23)
CALCIUM BLD-MCNC: 9.4 MG/DL (ref 8.7–10.4)
CHLORIDE SERPL-SCNC: 104 MMOL/L (ref 98–112)
CO2 SERPL-SCNC: 34 MMOL/L (ref 21–32)
CREAT BLD-MCNC: 0.7 MG/DL
EGFRCR SERPLBLD CKD-EPI 2021: 103 ML/MIN/1.73M2 (ref 60–?)
EOSINOPHIL # BLD AUTO: 0 X10(3) UL (ref 0–0.7)
EOSINOPHIL NFR BLD AUTO: 0 %
ERYTHROCYTE [DISTWIDTH] IN BLOOD BY AUTOMATED COUNT: 15.6 %
GLUCOSE BLD-MCNC: 143 MG/DL (ref 70–99)
GLUCOSE BLD-MCNC: 150 MG/DL (ref 70–99)
GLUCOSE BLD-MCNC: 208 MG/DL (ref 70–99)
HCT VFR BLD AUTO: 33.9 %
HGB BLD-MCNC: 10.8 G/DL
IMM GRANULOCYTES # BLD AUTO: 0.03 X10(3) UL (ref 0–1)
IMM GRANULOCYTES NFR BLD: 0.4 %
LYMPHOCYTES # BLD AUTO: 0.29 X10(3) UL (ref 1–4)
LYMPHOCYTES NFR BLD AUTO: 4.2 %
MCH RBC QN AUTO: 29.3 PG (ref 26–34)
MCHC RBC AUTO-ENTMCNC: 31.9 G/DL (ref 31–37)
MCV RBC AUTO: 92.1 FL
MONOCYTES # BLD AUTO: 0.1 X10(3) UL (ref 0.1–1)
MONOCYTES NFR BLD AUTO: 1.5 %
NEUTROPHILS # BLD AUTO: 6.44 X10 (3) UL (ref 1.5–7.7)
NEUTROPHILS # BLD AUTO: 6.44 X10(3) UL (ref 1.5–7.7)
NEUTROPHILS NFR BLD AUTO: 93.9 %
OSMOLALITY SERPL CALC.SUM OF ELEC: 301 MOSM/KG (ref 275–295)
PLATELET # BLD AUTO: 234 10(3)UL (ref 150–450)
POTASSIUM SERPL-SCNC: 4.4 MMOL/L (ref 3.5–5.1)
RBC # BLD AUTO: 3.68 X10(6)UL
SODIUM SERPL-SCNC: 141 MMOL/L (ref 136–145)
WBC # BLD AUTO: 6.9 X10(3) UL (ref 4–11)

## 2024-09-11 PROCEDURE — 99239 HOSP IP/OBS DSCHRG MGMT >30: CPT | Performed by: STUDENT IN AN ORGANIZED HEALTH CARE EDUCATION/TRAINING PROGRAM

## 2024-09-11 PROCEDURE — 99232 SBSQ HOSP IP/OBS MODERATE 35: CPT | Performed by: INTERNAL MEDICINE

## 2024-09-11 RX ORDER — PREDNISONE 10 MG/1
TABLET ORAL
Qty: 90 TABLET | Refills: 0 | Status: SHIPPED | OUTPATIENT
Start: 2024-09-11

## 2024-09-11 RX ORDER — PREDNISONE 20 MG/1
40 TABLET ORAL ONCE
Status: COMPLETED | OUTPATIENT
Start: 2024-09-11 | End: 2024-09-11

## 2024-09-11 NOTE — PAYOR COMM NOTE
--------------  CONTINUED STAY REVIEW    Payor: [employer] MEDICARE ADV PPO  Subscriber #:  [id number]  Authorization Number: [id number]    Admit date: 9/9/24  Admit time:  9:02 PM    REVIEW DOCUMENTATION:   9-10-24     SpO2:  [81 %-99 %] 98 %  FiO2 (%):  [45 %-65 %] 55 %     Physical Exam:    General: No acute distress  Respiratory: No wheezes, no rhonchi  Cardiovascular: S1, S2, regular rate and rhythm  Abdomen: Soft, Non-tender, non-distended, positive bowel sounds  Neuro: No new focal deficits.   Extremities: No edema        Diagnostic Data:    Labs:      Recent Labs   Lab 09/10/24  0638   WBC 8.8   HGB 10.6*   MCV 94.1   .0             Recent Labs   Lab 09/10/24  0638   *   BUN 21   CREATSERUM 0.64*   CA 9.2   ALB  --       K 4.2      CO2 33.0*   ALKPHO  --    AST  --    ALT  --    BILT  --    TP  --      Assessment & Plan:  #acute hypoxic resp failure due to COPD exacerbation, Afib   Steroids ERENDIRA nebs   Pulm on Cs  Cards on CS  #Aflutter s/p CV in 7/2024  RVR on admission- amiodarone   DOAC  #CHF systolic with mild acute exacerbation   Diuresis per cards  #DM type 2  ISS  #HLD  Statin   #RCC  #Dispo  Pt wants to be DC prior to his wifes CABG Thursday       PER CARDS      Impression:  1. acute hypoxemic respiratory failure, recurrent; acute on chronic, severe COPD exacerbation, which is also triggering AF RVR, mild diastolic CHF   2. hx of A flutter ablation (7/24, [facility])  3. s/p bioAVR  - TTE (6/24): LVEF 55%, 23mm SAVR- 3.3m/s, mean 22mmHg     Recommendations:  - rate control: back in SR 70s, continue amiodarone 200mg daily.  consider diltiazem gtt if AF HR >120bpm for greater than 30 minutes.    - mild CHF: keep on dry side.  lasix 40mg IV daily (home dose lasix 40mg po bid)  - AC: rivaroxaban  - COPD management per pulmonary/primary service.  - will follow.        Subjective:  The patient denies any chest pain or dyspnea at this time. Remains on 25L vapotherm.            MEDICATIONS  ADMINISTERED IN LAST 1 DAY:  amiodarone (Pacerone) tab 200 mg       Date Action Dose Route User    9/11/2024 0848 Given 200 mg Oral Miono Lee RN          aspirin DR tab 81 mg       Date Action Dose Route User    9/11/2024 0848 Given 81 mg Oral Minoo Lee RN          atorvastatin (Lipitor) tab 20 mg       Date Action Dose Route User    9/10/2024 2037 Given 20 mg Oral María Elena Huertas RN          ferrous sulfate DR tab 325 mg       Date Action Dose Route User    9/11/2024 0848 Given 325 mg Oral Minoo Lee RN          fluticasone-salmeterol (Advair Diskus) 250-50 MCG/ACT inhaler 1 puff       Date Action Dose Route User    9/11/2024 0859 Given 1 puff Inhalation Minoo Lee RN    9/10/2024 2036 Given 1 puff Inhalation María Elena Huertas RN          furosemide (Lasix) 10 mg/mL injection 40 mg       Date Action Dose Route User    9/11/2024 0848 Given 40 mg Intravenous Minoo Lee RN          guaiFENesin ER (Mucinex) 12 hr tab 1,200 mg       Date Action Dose Route User    9/11/2024 0848 Given 1,200 mg Oral Minoo Lee RN    9/10/2024 2037 Given 1,200 mg Oral María Elena Huertas RN          insulin aspart (NovoLOG) 100 Units/mL FlexPen 1-10 Units       Date Action Dose Route User    9/10/2024 2039 Given 4 Units Subcutaneous (Left Upper Arm) María Elena Huertas RN          ipratropium-albuterol (Duoneb) 0.5-2.5 (3) MG/3ML inhalation solution 3 mL       Date Action Dose Route User    9/11/2024 0900 Given 3 mL Nebulization Molina Lipscomb, JOSEPH    9/10/2024 2001 Given 3 mL Nebulization Miky Ellis, JOSEPH    9/10/2024 1310 Given 3 mL Nebulization Manoj Dubose, JOSEPH          levETIRAcetam (Keppra) tab 500 mg       Date Action Dose Route User    9/11/2024 0848 Given 500 mg Oral Minoo Lee RN    9/10/2024 2037 Given 500 mg Oral Bolda, María Elena, RN          methylPREDNISolone sodium succinate (Solu-MEDROL) injection 60 mg       Date Action Dose Route User    9/11/2024 0850 Given 60 mg Intravenous Rosa,  RADHA Hennessy    9/11/2024 0043 Given 60 mg Intravenous María Elena Huertas RN    9/10/2024 1717 Given 60 mg Intravenous Mora Brush RN          rivaroxaban (Xarelto) tab 20 mg       Date Action Dose Route User    9/11/2024 0848 Given 20 mg Oral Minoo Lee RN          Roflumilast TABS 500 mcg       Date Action Dose Route User    9/11/2024 0848 Given 500 mcg Oral Minoo Lee RN          umeclidinium bromide (Incruse Ellipta) 62.5 MCG/ACT inhaler 1 puff       Date Action Dose Route User    9/11/2024 0859 Given 1 puff Inhalation Minoo Lee RN            Vitals (last day)       Date/Time Temp Pulse Resp BP SpO2 Weight O2 Device O2 Flow Rate (L/min) [facility]    09/11/24 1035 -- 86 -- -- 98 % -- -- -- AL    09/11/24 0855 97.9 °F (36.6 °C) 70 18 120/64 95 % -- Nasal cannula 6 L/min GD    09/11/24 0524 -- 83 -- -- 100 % 155 lb 13.8 oz (70.7 kg) -- -- PM    09/11/24 0404 -- -- -- -- -- -- Bi-PAP -- PB    09/11/24 0404 97.9 °F (36.6 °C) 81 18 109/63 99 % -- -- -- PM    09/10/24 2030 97.7 °F (36.5 °C) 95 20 123/89 -- -- High flow nasal cannula 6 L/min ML    09/10/24 2030 -- -- -- -- 92 % -- -- -- PB    09/10/24 1719 -- 79 -- -- 95 % -- -- -- CY    09/10/24 1616 -- -- 20 -- -- -- -- -- SM    09/10/24 1615 98 °F (36.7 °C) 81 8 130/78 91 % -- High flow nasal cannula 6 L/min CY    09/10/24 1545 -- 83 -- -- 92 % -- High flow nasal cannula 6 L/min SM    09/10/24 1450 98.2 °F (36.8 °C) 84 18 119/87 90 % -- High flow nasal cannula 8 L/min CY    09/10/24 1310 -- -- -- -- -- -- High flow nasal cannula 8 L/min GC    09/10/24 1310 -- -- -- -- 100 % -- -- --     09/10/24 0849 -- -- -- -- -- -- High flow/High humidity 25 L/min GC    09/10/24 0735 97.8 °F (36.6 °C) 76 18 116/77 98 % -- Other (Comment) --     09/10/24 0513 -- 78 -- -- 99 % -- -- -- DU    09/10/24 0346 -- -- -- -- -- -- Other (Comment) --     09/10/24 0346 98.2 °F (36.8 °C) -- 18 -- -- -- -- -- DU    09/10/24 0129 -- -- -- -- 98 % -- High flow/High humidity 20  L/min CU

## 2024-09-11 NOTE — CM/SW NOTE
Pt current with [facility]. DONA and updates sent. AVS sent.     Sherrie Dean MSW, LSW  Discharge Planner

## 2024-09-11 NOTE — PLAN OF CARE
Patient alert and oriented x 4. On 6L NC, at baseline. Up ad anuel. NSR on tele. Continent of bowel/bladder. No complaints of pain, shortness of breath, chest pain/discomfort. POC: IV lasix, IV solumedrol, hopefully home today?. Call light within reach. Fall precautions in place.     Problem: Diabetes/Glucose Control  Goal: Glucose maintained within prescribed range  Description: INTERVENTIONS:  - Monitor Blood Glucose as ordered  - Assess for signs and symptoms of hyperglycemia and hypoglycemia  - Administer ordered medications to maintain glucose within target range  - Assess barriers to adequate nutritional intake and initiate nutrition consult as needed  - Instruct patient on self management of diabetes  Outcome: Progressing     Problem: Patient/Family Goals  Goal: Patient/Family Long Term Goal  Description: Patient's Long Term Goal: to go home    Interventions:  - consults to see  -wean O2 as able  -tests as ordered  - See additional Care Plan goals for specific interventions  Outcome: Progressing  Goal: Patient/Family Short Term Goal  Description: Patient's Short Term Goal: to feel better    Interventions:   - IV steroids  -IV lasix  -monitor for SOB  - See additional Care Plan goals for specific interventions  Outcome: Progressing     Problem: RESPIRATORY - ADULT  Goal: Achieves optimal ventilation and oxygenation  Description: INTERVENTIONS:  - Assess for changes in respiratory status  - Assess for changes in mentation and behavior  - Position to facilitate oxygenation and minimize respiratory effort  - Oxygen supplementation based on oxygen saturation or ABGs  - Provide Smoking Cessation handout, if applicable  - Encourage broncho-pulmonary hygiene including cough, deep breathe, Incentive Spirometry  - Assess the need for suctioning and perform as needed  - Assess and instruct to report SOB or any respiratory difficulty  - Respiratory Therapy support as indicated  - Manage/alleviate anxiety  - Monitor for  signs/symptoms of CO2 retention  Outcome: Progressing

## 2024-09-11 NOTE — CM/SW NOTE
Department  notified of request for HHC, aidin referrals started. Assigned CM/SW to follow up with pt/family on further discharge planning.     Layne Teixeira, DSC

## 2024-09-11 NOTE — PROGRESS NOTES
[facility]  Progress Note    Keny Marshall Patient Status:  Inpatient    1960 MRN [id number]   Location [facility] 8NE-A Attending Clementine Zhao MD   Hosp Day # 2 PCP Dustin Avina MD     Subjective:  Keny Marshall is a(n) 63 year old male remains afebrile reports feeling much better shortness of breath is much improved oxygenation consistently    Objective:  /64 (BP Location: Left arm)   Pulse 86   Temp 97.9 °F (36.6 °C) (Oral)   Resp 18   Ht 5' 10\" (1.778 m)   Wt 155 lb 13.8 oz (70.7 kg)   SpO2 98%   BMI 22.36 kg/m² nasal cannula      Temp (24hrs), Av.9 °F (36.6 °C), Min:97.7 °F (36.5 °C), Max:98.2 °F (36.8 °C)      Intake/Output:    Intake/Output Summary (Last 24 hours) at 2024 1216  Last data filed at 2024 1129  Gross per 24 hour   Intake 960 ml   Output 2555 ml   Net -1595 ml       Physical Exam:   General: alert, cooperative, oriented.  No respiratory distress.   Head: Normocephalic, without obvious abnormality, atraumatic.   Throat: Lips, mucosa, and tongue normal.  No thrush noted.   Neck: trachea midline, no adenopathy, no thyromegaly. No JVD.   Lungs: Markedly diminished bilaterally with mild crackles no auscultated wheeze   Chest wall: No tenderness or deformity.   Heart: Regular rate and rhythm remains in sinus   Abdomen: soft, non-distended, no masses, no guarding, no     Rebound.  Taking p.o. well no diarrhea   Extremity: Trace edema at most   Skin: No rashes or lesions.   Neurological: Alert, interactive, no focal deficits    Lab Data Review:  Recent Labs     24  1057 09/10/24  0638 24  0538   WBC 13.1* 8.8 6.9   HGB 12.2* 10.6* 10.8*   .0 207.0 234.0     Recent Labs     24  1058 09/10/24  0638 24  0538    141 141   K 3.3* 4.2 4.4   CL 98 103 104   CO2 38.0* 33.0* 34.0*   BUN 17 21 29*   CREATSERUM 0.81 0.64* 0.70     No results for input(s): \"PTP\", \"INR\", \"PTT\" in the last 168 hours.    Cultures: Blood cultures  remain negative    Radiology:  No results found.  Chest x-rays reviewed      Medications reviewed     Assessment and Plan:   Patient Active Problem List   Diagnosis    Anemia    HTN (hypertension)    DM2 (diabetes mellitus, type 2) ([facility])    COPD (chronic obstructive pulmonary disease) ([facility])    Clear cell renal cell carcinoma of left kidney ([facility])    Medial meniscus tear, right, initial encounter    Primary osteoarthritis of right knee              GLOBAL EXP 6-25-16 / RIGHT KNEE / TRK     Acute medial meniscus tear of right knee            GLOBAL EXP 6-25-16 / RIGHT KNEE / TRK     Acute lateral meniscus tear of left knee               GLOBAL EXP 6-25-16 / LEFT KNEE / TRK     Bursitis of left shoulder             GLOBAL EXP 6-25-16 / LEFT SHOULDER / TRK     Moderate aortic stenosis    Stage 3 severe COPD by GOLD classification ([facility])    Hyperglycemia    Hypervolemia    Hypervolemia, unspecified hypervolemia type    Dyspnea, unspecified type    Hypoxemia    Atrial fibrillation with RVR ([facility])    Acute heart failure with preserved ejection fraction (HFpEF) ([facility])    Atrial flutter with rapid ventricular response ([facility])    Acute on chronic congestive heart failure ([facility])    Acute on chronic congestive heart failure, unspecified heart failure type ([facility])    Hypoxia    Atrial fibrillation with rapid ventricular response ([facility])    Pleural effusion    Hypokalemia    Acute respiratory failure with hypoxia ([facility])    Normocytic anemia    Chronic heart failure with preserved ejection fraction ([facility])    Leukocytosis (leucocytosis)    Aortic valve regurgitation    HFrEF (heart failure with reduced ejection fraction) ([facility])    Chronic hypoxemic respiratory failure ([facility])    ABIGAIL (obstructive sleep apnea)    COPD exacerbation ([facility])    Acute on chronic respiratory failure with hypoxia (HCC)    Acute on chronic respiratory failure with hypoxia and hypercapnia ([facility])    Chronic hypercapnic respiratory failure ([facility])    Smoking greater than 20 pack  years    Hyponatremia    Hyperkalemia    Chronic obstructive pulmonary disease, unspecified COPD type (HCC)    Supplemental oxygen dependent    Azotemia    Metabolic alkalosis    End stage COPD (HCC)    Acute hypoxic on chronic hypercapnic respiratory failure (HCC)    Atrial fibrillation and flutter (HCC)       Assessment:  Very severe COPD/component CPFE with flare questionably related to prednisone wean  Hypercapnic and hypoxic respiratory failure chronically-hypercapnia controlled on nocturnal BiPAP  A-fib/flutter with last ablation 7/2024-remains in sinus on anticoagulation and rate control medications  Diastolic dysfunction with moderate pulmonary hypertension-fluid status currently seems stable  History of renal cell carcinoma status post nephrectomy    Plan:  Okay to home from pulmonary standpoint  Plan for longer prednisone wean as his wife will be needing more assistance  Plan for close follow-up    CC     Pat Crum MD  9/11/2024  12:16 PM

## 2024-09-11 NOTE — PROGRESS NOTES
[facility]   part of [facility]     Hospitalist Progress Note     Keny Marshall Patient Status:  Inpatient    1960 MRN [id number]   Location [facility] 8NE-A Attending Clementine Zhao MD   Hosp Day # 2 PCP Dustin Avina MD     Chief Complaint: Hypoxia     Subjective:     Patient  feels better. Wants to be on less O2.    Objective:    Review of Systems:   A comprehensive review of systems was completed; pertinent positive and negatives stated in subjective.    Vital signs:  Temp:  [97.7 °F (36.5 °C)-98.2 °F (36.8 °C)] 97.8 °F (36.6 °C)  Pulse:  [70-95] 83  Resp:  [8-20] 18  BP: (109-130)/(63-89) 111/73  SpO2:  [87 %-100 %] 87 %    Physical Exam:    General: No acute distress  Respiratory: No wheezes, no rhonchi  Cardiovascular: S1, S2, regular rate and rhythm  Abdomen: Soft, Non-tender, non-distended, positive bowel sounds  Neuro: No new focal deficits.   Extremities: No edema      Diagnostic Data:    Labs:  Recent Labs   Lab 24  1057 09/10/24  0638 24  0538   WBC 13.1* 8.8 6.9   HGB 12.2* 10.6* 10.8*   MCV 94.5 94.1 92.1   .0 207.0 234.0       Recent Labs   Lab 24  1058 09/10/24  0638 24  0538   * 136* 150*   BUN 17 21 29*   CREATSERUM 0.81 0.64* 0.70   CA 9.9 9.2 9.4   ALB 4.5  --   --     141 141   K 3.3* 4.2 4.4   CL 98 103 104   CO2 38.0* 33.0* 34.0*   ALKPHO 64  --   --    AST 19  --   --    ALT 17  --   --    BILT 0.6  --   --    TP 8.2  --   --        Estimated Creatinine Clearance: 106.6 mL/min (based on SCr of 0.7 mg/dL).    Recent Labs   Lab 24  1058   TROPHS 49       No results for input(s): \"PTP\", \"INR\" in the last 168 hours.               Microbiology    Hospital Encounter on 24   1. Blood Culture     Status: None (Preliminary result)    Collection Time: 24  1:58 PM    Specimen: Blood,peripheral   Result Value Ref Range    Blood Culture Result No Growth 1 Day N/A         Imaging: Reviewed in Epic.    Medications:     ipratropium-albuterol  3 mL Nebulization Q6H WA    methylPREDNISolone  60 mg Intravenous Q8H    amiodarone  200 mg Oral Daily    aspirin  81 mg Oral Daily    atorvastatin  20 mg Oral Nightly    fluticasone-salmeterol  1 puff Inhalation BID    ferrous sulfate  325 mg Oral Daily    [Held by provider] furosemide  40 mg Oral BID    guaiFENesin ER  1,200 mg Oral BID    levETIRAcetam  500 mg Oral BID    Roflumilast  500 mcg Oral Daily    umeclidinium bromide  1 puff Inhalation Daily    rivaroxaban  20 mg Oral Daily with food    furosemide  40 mg Intravenous Daily    insulin aspart  1-10 Units Subcutaneous TID AC and HS       Assessment & Plan:      #acute hypoxic resp failure due to COPD exacerbation, Afib   Steroids ERENDIRA nebs   Pulm on Cs  Cards on CS  #Aflutter s/p CV in 7/2024  RVR on admission- amiodarone   DOAC  #CHF diastolic with mild acute exacerbation   Diuresis per cards  #DM type 2  ISS  #HLD  Statin   #RCC  #Dispo  Pt wants to be DC prior to his wifes CABG Thursday       Clementine Zhao MD    Supplementary Documentation:     Quality:  DVT Mechanical Prophylaxis:   SCDs,    DVT Pharmacologic Prophylaxis   Medication    rivaroxaban (Xarelto) tab 20 mg         DVT Pharmacologic prophylaxis: Aspirin 81 mg      Code Status: Full Code  Peters: No urinary catheter in place  Peters Duration (in days):   Central line:    ARUN: 9/11/2024    Discharge is dependent on: clinical   At this point Mr. Marshall is expected to be discharge to: home     The 21st Century Cures Act makes medical notes like these available to patients in the interest of transparency. Please be advised this is a medical document. Medical documents are intended to carry relevant information, facts as evident, and the clinical opinion of the practitioner. The medical note is intended as peer to peer communication and may appear blunt or direct. It is written in medical language and may contain abbreviations or verbiage that are unfamiliar.               **Certification      PHYSICIAN Certification of Need for Inpatient Hospitalization - Initial Certification    Patient will require inpatient services that will reasonably be expected to span two midnight's based on the clinical documentation in H+P.   Based on patients current state of illness, I anticipate that, after discharge, patient will require TBD.

## 2024-09-11 NOTE — PLAN OF CARE
Assumed care at 1930.    Patient alert and orientated x 4. Intermittent hand tremors. Seizure and contact precautions in place. Maintaining oxygen saturations on 6L nasal cannula, BiPAP w/ sleep. Lung sounds: crackles w/ inspiratory wheezes. Normal sinus on tele monitor. Denies cardiac symptoms. Skin w/ generalized bruising. Continent of bowel and bladder. No complaints of pain. Up independently. Patient updated with plan of care. Bed locked in lowest position. Call light within reach.    Plan: IV lasix daily, IV steroids, monitor labs    Problem: Diabetes/Glucose Control  Goal: Glucose maintained within prescribed range  Description: INTERVENTIONS:  - Monitor Blood Glucose as ordered  - Assess for signs and symptoms of hyperglycemia and hypoglycemia  - Administer ordered medications to maintain glucose within target range  - Assess barriers to adequate nutritional intake and initiate nutrition consult as needed  - Instruct patient on self management of diabetes  Outcome: Progressing  Problem: Patient/Family Goals  Goal: Patient/Family Long Term Goal  Description: Patient's Long Term Goal: to go home  Interventions:  - consults to see  -wean O2 as able  -tests as ordered  - See additional Care Plan goals for specific interventions  Outcome: Progressing  Goal: Patient/Family Short Term Goal  Description: Patient's Short Term Goal: to feel better  Interventions:   - IV steroids  -IV lasix  -monitor for SOB  - See additional Care Plan goals for specific interventions  Outcome: Progressing  Problem: RESPIRATORY - ADULT  Goal: Achieves optimal ventilation and oxygenation  Description: INTERVENTIONS:  - Assess for changes in respiratory status  - Assess for changes in mentation and behavior  - Position to facilitate oxygenation and minimize respiratory effort  - Oxygen supplementation based on oxygen saturation or ABGs  - Provide Smoking Cessation handout, if applicable  - Encourage broncho-pulmonary hygiene including  cough, deep breathe, Incentive Spirometry  - Assess the need for suctioning and perform as needed  - Assess and instruct to report SOB or any respiratory difficulty  - Respiratory Therapy support as indicated  - Manage/alleviate anxiety  - Monitor for signs/symptoms of CO2 retention  Outcome: Progressing

## 2024-09-11 NOTE — PROGRESS NOTES
[facility]  Progress Note    Keny Marshall Patient Status:  Inpatient    1960 MRN [id number]   formerly [facility] 8NE-A Attending Clementine Zhao MD   Hosp Day # 2 PCP Dustin Avina MD       Impression:  1. acute hypoxemic respiratory failure, recurrent; acute on chronic, severe COPD exacerbation, which is also triggering AF RVR, mild diastolic CHF   2. hx of A flutter ablation (, [place])  3. s/p bioAVR  - TTE (): LVEF 55%, 23mm SAVR- 3.3m/s, mean 22mmHg     Recommendations:  - rate control: in/out AF, rates 70-100s.  continue amiodarone 200mg daily.      - mild CHF: resume home dose lasix 40mg po bid  - AC: rivaroxaban  - no cv issues prohibitive of dc if cleared by other services.      Subjective:  The patient denies any chest pain or dyspnea at this time. Breathing improved, back to baseline 6-7L O2.  Wife having mitral valve repair surgery tomorrow at [facility].    Objective:  /73 (BP Location: Left arm)   Pulse 83   Temp 97.8 °F (36.6 °C) (Oral)   Resp 18   Ht 5' 10\" (1.778 m)   Wt 155 lb 13.8 oz (70.7 kg)   SpO2 (!) 87%   BMI 22.36 kg/m²   Temp (24hrs), Av.9 °F (36.6 °C), Min:97.7 °F (36.5 °C), Max:98 °F (36.7 °C)      Intake/Output Summary (Last 24 hours) at 2024 1458  Last data filed at 2024 1220  Gross per 24 hour   Intake 960 ml   Output 2455 ml   Net -1495 ml     Wt Readings from Last 3 Encounters:   24 155 lb 13.8 oz (70.7 kg)   24 149 lb 11.1 oz (67.9 kg)   24 151 lb 14.4 oz (68.9 kg)       General: Awake and alert; in no acute distress  Cardiac: Regular rate and regular rhythm; no murmurs/rubs/gallops are appreciated  Lungs: Clear to auscultation bilaterally; no accessory muscle use  Abdomen: Soft, non-tender; bowel sounds are normoactive  Extremities: No clubbing/cyanosis; moves all 4 extremities normally    Current Facility-Administered Medications   Medication Dose Route Frequency    predniSONE (Deltasone) tab 40  mg  40 mg Oral Once    ipratropium-albuterol (Duoneb) 0.5-2.5 (3) MG/3ML inhalation solution 3 mL  3 mL Nebulization Q6H WA    albuterol (Ventolin) (5 MG/ML) 0.5% nebulizer solution 2.5 mg  2.5 mg Nebulization Q2H PRN    amiodarone (Pacerone) tab 200 mg  200 mg Oral Daily    aspirin DR tab 81 mg  81 mg Oral Daily    atorvastatin (Lipitor) tab 20 mg  20 mg Oral Nightly    fluticasone-salmeterol (Advair Diskus) 250-50 MCG/ACT inhaler 1 puff  1 puff Inhalation BID    ferrous sulfate DR tab 325 mg  325 mg Oral Daily    [Held by provider] furosemide (Lasix) tab 40 mg  40 mg Oral BID    guaiFENesin ER (Mucinex) 12 hr tab 1,200 mg  1,200 mg Oral BID    ipratropium (Atrovent) 0.02 % nebulizer solution 500 mcg  500 mcg Nebulization Q6H PRN    levETIRAcetam (Keppra) tab 500 mg  500 mg Oral BID    Roflumilast TABS 500 mcg  500 mcg Oral Daily    umeclidinium bromide (Incruse Ellipta) 62.5 MCG/ACT inhaler 1 puff  1 puff Inhalation Daily    rivaroxaban (Xarelto) tab 20 mg  20 mg Oral Daily with food    acetaminophen (Tylenol Extra Strength) tab 500 mg  500 mg Oral Q4H PRN    melatonin tab 3 mg  3 mg Oral Nightly PRN    glycerin-hypromellose- (Artificial Tears) 0.2-0.2-1 % ophthalmic solution 1 drop  1 drop Both Eyes QID PRN    sodium chloride (Saline Mist) 0.65 % nasal solution 1 spray  1 spray Each Nare Q3H PRN    furosemide (Lasix) 10 mg/mL injection 40 mg  40 mg Intravenous Daily    glucose (Dex4) 15 GM/59ML oral liquid 15 g  15 g Oral Q15 Min PRN    Or    glucose (Glutose) 40% oral gel 15 g  15 g Oral Q15 Min PRN    Or    glucose-vitamin C (Dex-4) chewable tab 4 tablet  4 tablet Oral Q15 Min PRN    Or    dextrose 50% injection 50 mL  50 mL Intravenous Q15 Min PRN    Or    glucose (Dex4) 15 GM/59ML oral liquid 30 g  30 g Oral Q15 Min PRN    Or    glucose (Glutose) 40% oral gel 30 g  30 g Oral Q15 Min PRN    Or    glucose-vitamin C (Dex-4) chewable tab 8 tablet  8 tablet Oral Q15 Min PRN    insulin aspart (NovoLOG) 100  Units/mL FlexPen 1-10 Units  1-10 Units Subcutaneous TID AC and HS       Laboratory Data:  Lab Results   Component Value Date    WBC 6.9 09/11/2024    HGB 10.8 09/11/2024    HCT 33.9 09/11/2024    .0 09/11/2024     Lab Results   Component Value Date    INR 1.59 (H) 06/29/2024    INR 1.06 06/11/2024    INR 2.26 (H) 09/22/2021     Lab Results   Component Value Date     09/11/2024    K 4.4 09/11/2024     09/11/2024    CO2 34.0 09/11/2024    BUN 29 09/11/2024    CREATSERUM 0.70 09/11/2024     09/11/2024    CA 9.4 09/11/2024       Telemetry: No malignant tachyarrhythmias or bradyarrhythmias      Thank you for allowing our practice to participate in the care of your patient. Please do not hesitate to contact me if you have any questions.

## 2024-09-15 NOTE — DISCHARGE SUMMARY
[facility]  DISCHARGE SUMMARY     Keny Marshall Patient Status:  Inpatient    1960 MRN [id number]   Location [facility] 8NE-A Attending No att. providers found   Hosp Day # 2 PCP Dustin Avina MD     Date of Admission: 2024  Date of Discharge:  2024     Discharge Disposition: Home Health Care Services    Discharge Diagnosis:  #acute hypoxic resp failure due to COPD exacerbation, Afib   Steroids ERENDIRA nebs   Pulm on Cs  Cards on CS  #Aflutter s/p CV in 2024  RVR on admission- amiodarone   DOAC  #CHF diastolic with mild acute exacerbation   Diuresis per cards  #DM type 2  ISS  #HLD  Statin   #RCC  #Dispo  Pt wants to be DC prior to his wifes CABG Thursday     History of Present Illness:   Keny Marshall is a 63 year old male with past medical history significant for paroxysmal atrial fibrillation, COPD, depression, GERD, CHF, hypertension, dyslipidemia, renal cell carcinoma, ABIGAIL presents to the ER with worsening shortness of breath.  Patient was admitted last month with similar complaints and treated for COPD exacerbation as well as heart failure exacerbation.  He states that for the past 3 days he has had worsening shortness of breath.  Patient states he has had worsening cough as well which is productive of clear sputum.  He denies fever, chills, sick contacts, recent travel.  He is compliant with all his medications as well as inhalers.  He is normally on 6 L of oxygen at home.  Upon arrival, heart rate was elevated as well in the 110s/120s.      Brief Synopsis:   PT was admitted for COPD exacerbation, treated with steroids. Pt with afib/RVR thx with amiodarone. Pt DC home.     Lace+ Score: 80  59-90 High Risk  29-58 Medium Risk  0-28   Low Risk       TCM Follow-Up Recommendation:  LACE > 58: High Risk of readmission after discharge from the hospital.      Procedures during hospitalization:   no    Incidental or significant findings and recommendations (brief  descriptions):  no    Lab/Test results pending at Discharge:   no    Consultants:  Pulm     Discharge Medication List:     Discharge Medications        START taking these medications        Instructions Prescription details   predniSONE 10 MG Tabs  Commonly known as: Deltasone      4 p.o. every morning x 3 days then 3 p.o. every morning x 4 days then 2 p.o. every morning for 6 days then 1 p.o. every morning for 7 days then 1 p.o. q. other day   Quantity: 90 tablet  Refills: 0            CONTINUE taking these medications        Instructions Prescription details   amiodarone 200 MG Tabs  Commonly known as: Pacerone  Notes to patient: Currently taking 200 mg daily.      Take one table twice daily x 2 weeks then take one tablet daily thereafter   Quantity: 90 tablet  Refills: 0     aspirin 81 MG Tbec      Take 1 tablet (81 mg total) by mouth daily.   Refills: 0     atorvastatin 20 MG Tabs  Commonly known as: Lipitor      Take 1 tablet (20 mg total) by mouth nightly.   Refills: 0     B Complete Tabs      Take 1 tablet by mouth daily.   Refills: 0     Budesonide-Formoterol Fumarate 160-4.5 MCG/ACT Aero  Commonly known as: SYMBICORT      Inhale 2 puffs into the lungs 2 (two) times daily.   Quantity: 3 each  Refills: 3     Ferrous Sulfate 324 (65 Fe) MG Tbec      Take 65 mg by mouth daily.   Refills: 0     furosemide 40 MG Tabs  Commonly known as: Lasix      Take 1 tablet (40 mg total) by mouth 2 (two) times daily.   Stop taking on: November 17, 2024  Quantity: 180 tablet  Refills: 0     guaiFENesin  MG Tb12  Commonly known as: Mucinex      Take 2 tablets (1,200 mg total) by mouth 2 (two) times daily.   Refills: 0     ipratropium 0.02 % Soln  Commonly known as: Atrovent      Take 2.5 mL (500 mcg total) by nebulization every 6 (six) hours as needed for Wheezing.   Refills: 0     Jardiance 10 MG Tabs  Generic drug: empagliflozin      Take 1 tablet (10 mg total) by mouth daily.   Refills: 0     ketoconazole 2 %  Crea  Commonly known as: Nizoral      Apply topically as needed.   Refills: 0     Levalbuterol HCl 0.63 MG/3ML Nebu  Commonly known as: XOPENEX      Take 3 mL (0.63 mg total) by nebulization every 6 (six) hours as needed for Shortness of Breath or Wheezing.   Quantity: 3 each  Refills: 3     levETIRAcetam 500 MG Tabs  Commonly known as: Keppra      Take 1 tablet (500 mg total) by mouth 2 (two) times daily.   Stop taking on: October 18, 2024  Quantity: 180 tablet  Refills: 0     magnesium 250 MG Tabs      Take 1 tablet (250 mg total) by mouth every evening.   Refills: 0     Roflumilast 500 MCG Tabs      Take 500 mcg by mouth daily.   Quantity: 90 tablet  Refills: 0     Tab-A-Arnold Tabs      Take 1 tablet by mouth daily.   Refills: 0     tiotropium 18 MCG Caps  Commonly known as: Spiriva Handihaler      Inhale 1 capsule (18 mcg total) into the lungs daily.   Quantity: 90 capsule  Refills: 3     Xarelto 20 MG Tabs  Generic drug: rivaroxaban      Take 1 tablet by mouth daily with food   Quantity: 30 tablet  Refills: 3               Where to Get Your Medications        These medications were sent to [facility] [address] [place] [address] [address] [facility] [phone], [phone]  [address] [facility] [address]      Phone: [phone]   predniSONE 10 MG Tabs         [facility]    Follow-up appointment:   Dustin Avina MD  [address] [place] [address] [phone]    Schedule an appointment as soon as possible for a visit in 1 week(s)      Keny Salazar MD  [address]  [facility] [address] [phone]    Schedule an appointment as soon as possible for a visit in 1 month(s)      Stefano Randle MD  [address]  [facility] [address] [phone]    Schedule an appointment as soon as possible for a visit in 2 week(s)      Appointments for Next 30 Days 9/15/2024 - 10/15/2024      None            Vital signs:       Physical Exam:     General: No acute distress   Lungs: clear to auscultation  Cardiovascular: S1, S2  Abdomen: Soft      -----------------------------------------------------------------------------------------------  PATIENT DISCHARGE INSTRUCTIONS: See electronic chart    Clementine Zhao MD    Total time spent on discharge plannin minutes     The  [employer] C[employer] Act makes medical notes like these available to patients in the interest of transparency. Please be advised this is a medical document. Medical documents are intended to carry relevant information, facts as evident, and the clinical opinion of the practitioner. The medical note is intended as peer to peer communication and may appear blunt or direct. It is written in medical language and may contain abbreviations or verbiage that are unfamiliar.

## 2024-10-08 ENCOUNTER — TELEPHONE (OUTPATIENT)
Facility: CLINIC | Age: 64
End: 2024-10-08

## 2024-10-08 NOTE — TELEPHONE ENCOUNTER
Spoke with patient. States he's still experiencing shortness of breath. Spo2 ranges in the 90's but heart rate is often low. Patient states he was told by his doctor that his pulse oximeter is not accurate. Patient states he's been using Spiriva, Symbicort, prednisone and furosemide. Patient advised to contact his cardiologist office. Patient states he has an appointment to see him on Friday. Patient states not wanting to go back to the hospital. Willing to see Dr. Randle via telehealth visit.

## 2024-10-09 ENCOUNTER — TELEMEDICINE (OUTPATIENT)
Age: 64
End: 2024-10-09
Payer: MEDICARE

## 2024-10-09 ENCOUNTER — TELEPHONE (OUTPATIENT)
Facility: CLINIC | Age: 64
End: 2024-10-09

## 2024-10-09 DIAGNOSIS — J96.21 ACUTE ON CHRONIC HYPOXIC RESPIRATORY FAILURE (HCC): Primary | ICD-10-CM

## 2024-10-09 DIAGNOSIS — J43.2 CENTRILOBULAR EMPHYSEMA (HCC): ICD-10-CM

## 2024-10-09 DIAGNOSIS — J96.12 CHRONIC HYPERCAPNIC RESPIRATORY FAILURE (HCC): ICD-10-CM

## 2024-10-09 DIAGNOSIS — R91.8 MULTIPLE PULMONARY NODULES: ICD-10-CM

## 2024-10-09 DIAGNOSIS — R06.02 SOB (SHORTNESS OF BREATH): ICD-10-CM

## 2024-10-09 DIAGNOSIS — J44.9 CHRONIC OBSTRUCTIVE PULMONARY DISEASE, UNSPECIFIED COPD TYPE (HCC): Primary | ICD-10-CM

## 2024-10-09 PROCEDURE — 99214 OFFICE O/P EST MOD 30 MIN: CPT | Performed by: INTERNAL MEDICINE

## 2024-10-09 RX ORDER — ARFORMOTEROL TARTRATE 15 UG/2ML
15 SOLUTION RESPIRATORY (INHALATION) 2 TIMES DAILY
Qty: 360 ML | Refills: 1 | Status: SHIPPED | OUTPATIENT
Start: 2024-10-09

## 2024-10-09 RX ORDER — PREDNISONE 5 MG/1
TABLET ORAL
Qty: 150 TABLET | Refills: 0 | Status: SHIPPED | OUTPATIENT
Start: 2024-10-09 | End: 2024-11-06

## 2024-10-09 NOTE — PATIENT INSTRUCTIONS
Increase the prednisone to 30mg daily and continue for one week  Then wean to 20mg daily for one week, then 15mg daily for one week, then 10mg daily for one week  Follow up in 2-4 weeks  We will start on nebulizer medications:   1) start arformoterol nebulized twice a day  2) continue on budesonide nebuilzed twice a day  3) continue on ipratropium and levalbuterol nebulizers 3 to 4 times a day  You can use the hypertonic saline nebulizer every 6 hours as needed for thick phlegm  Use the BIPAP machine with sleep/naps  Call/message with questions/concerns

## 2024-10-09 NOTE — PROGRESS NOTES
[facility] General Pulmonary Progress Note    History of Present Illness:  Keny Marshall is a 63 year old male  smoker with significant PMH severe COPD, chronic hypoxic respiratory failure on supplemental o2, severe aortic stenosis, CHF, hx RCCA s/p left nephrectomy 2014 who presents today for follow up. Since last visit he was hospitalized with COPD and CHF exacerbation, diuresed and started on steroids with improvement. He has noted worsening dyspnea over the past week or so.    Using his inhalers and nebs. Has weaned his steroids to 10mg daily at present    September 2024 previously  Since last visit he was hospitalized with COPD and CHF flare in mid August 2024.  He feels better presently, stable chronic ARCEO and cough. Using supplemental o2    July 2024  Since last visit he has been hospitalized several times in the last couples due to COPD exacerbation, afib with RVR and CHF exacerbation.   He was discharged 1.5 weeks from [facility] after cardioversion but then after 2-3 days was admitted to [facility] - noted elevated PCO2 of ~90 - improved after BIPAP.   He was discharged on 7/24.  Since his discharge he continues to have dyspnea on exertion and +productive coughing with thin clear phlegm.   Using atrovent TID, levalbuterol nebs at home    September 2023 previously  He denies acute concerns today, stable chronic ARCEO. Continued thick phlegm.no fevers. No pain     March 2023 previously  He denies new concerns, had his CT recently and concerned about results. Using inhaler and nebs, breathing is stable. Has thick phlegm, no blood. no fevers. No pain     previously  The patient has followed with me the last several years for his COPD and is on maximal therapy including inhalers and nebulizers. He does admit to continued smoking and is aware of recommendation for smoking cessation.   He denies change in chronic dyspnea on exertion. Has chronic cough and phlegm, no blood. No fevers. No pain  Has been monitoring his weight  and following with [facility]  Hx ABIGAIL and intolerant of CPAP    Past Medical History:   Past Medical History:    Aortic stenosis    Arrhythmia    hx of a-fib    Arthritis    Cancer (HCC)    LEFT RENAL     CHF (congestive heart failure) (HCC)    Chronic hypoxemic respiratory failure (HCC)    On 4 LPM/NC at rest 24 hours/ day but 6 LPM/NC on exertion    COPD    Pulmonary follow up with Dr. Crum -no O2    Depression    Difficult intubation    patient states history of difficult intubation   due to body weight.States this no longer issue due to sugnificant weight loss    Esophageal reflux    Eye disease    Fatigue    Fever, unknown origin    Heart murmur    Heart palpitations    High blood pressure    High cholesterol    Loss of appetite    Other and unspecified hyperlipidemia    Paroxysmal atrial fibrillation (HCC)    Pneumonia, organism unspecified(486)    Prediabetes    Renal cell carcinoma (HCC)    s/p left nephrectomy    Shortness of breath    uses oxygen 3-4 L/NC all the time    Unspecified essential hypertension    Unspecified sleep apnea    He was unable to tolerate CPAP/BiPAP    Visual impairment    reading glasses        Past Surgical History:   Past Surgical History:   Procedure Laterality Date    Adenoidectomy      Angiogram      Appendectomy      Appendectomy      Colonoscopy N/A 03/21/2016    Procedure: COLONOSCOPY;  Surgeon: Artur Okeefe MD;  Location:  ENDOSCOPY    Colonoscopy      Colonoscopy N/A 1/5/2023    Procedure: .;  Surgeon: Artur Okeefe MD;  Location:  ENDOSCOPY    Endovas repair, infrarenl abdom aortic aneurysm/dissect      Laparo radical nephrectomy Left 05/06/2014    Nephrectomy Left 2014    Other  meatus of urethra    Other Right     foreign body removal-arm    Other surgical history Right 03/25/2016    Procedure: KNEE ARTHROSCOPY;  Surgeon: Madhu Anaya MD;  Location:  MAIN OR    Repair rotator cuff,acute Right     Upper gi endoscopy,exam      Valve repair            Family Medical History:   Family History   Problem Relation Age of Onset    Heart Disorder Mother     Pacemaker Mother     Other (Atrial fibrillation) Father     Other (Lung cancer) Maternal Aunt         Social History:   Social History     Socioeconomic History    Marital status:      Spouse name: Not on file    Number of children: Not on file    Years of education: Not on file    Highest education level: Not on file   Occupational History    Occupation:      Comment: [employer]   Tobacco Use    Smoking status: Former     Current packs/day: 0.00     Average packs/day: 1.5 packs/day for 40.0 years (60.0 ttl pk-yrs)     Types: Cigarettes     Start date: 3/8/1983     Quit date: 3/8/2023     Years since quittin.5     Passive exposure: Past    Smokeless tobacco: Never   Vaping Use    Vaping status: Never Used   Substance and Sexual Activity    Alcohol use: Not Currently     Alcohol/week: 14.0 standard drinks of alcohol     Types: 12 Cans of beer, 2 Shots of liquor per week     Comment: 14/week    Drug use: Never    Sexual activity: Yes     Partners: Female   Other Topics Concern    Not on file   Social History Narrative    Not on file     Social Drivers of Health     Financial Resource Strain: Low Risk  (2024)    Received from [employer]    Financial Resource Strain     In the past year, have you or any family members you live with been unable to get any of the following when it was really needed? Check all that apply.: None   Food Insecurity: No Food Insecurity (2024)    Food Insecurity     Food Insecurity: Never true   Transportation Needs: Unmet Transportation Needs (2024)    Transportation Needs     Lack of Transportation: Yes     Car Seat: Not on file   Physical Activity: Low Risk  (2024)    Received from [employer]    Exercise Vital Sign     On average, how many days per week do you engage in moderate to strenuous exercise (like a brisk  walk)?: 7 days     On average, how many minutes do you engage in exercise at this level?: 30 min   Stress: Low Risk  (9/16/2024)    Received from [facility]    Stress     Stress is when someone feels tense, nervous, anxious, or can't sleep at night because their mind is troubled. How stressed are you? : Not at all   Social Connections: Feeling Socially Integrated (6/18/2024)    Received from [facility]    OASIS : Social Isolation     Frequency of experiencing loneliness or isolation: Rarely   Housing Stability: Low Risk  (9/9/2024)    Housing Stability     Housing Instability: No     Housing Instability Emergency: Not on file     Crib or [place]in[place]: Not on file        Medications:   Current Outpatient Medications   Medication Sig Dispense Refill    predniSONE 5 MG Oral Tab Take 6 tablets (30 mg total) by mouth daily for 7 days, THEN 4 tablets (20 mg total) daily for 7 days, THEN 3 tablets (15 mg total) daily for 7 days, THEN 2 tablets (10 mg total) daily for 7 days. 150 tablet 0    predniSONE 10 MG Oral Tab 4 p.o. every morning x 3 days then 3 p.o. every morning x 4 days then 2 p.o. every morning for 6 days then 1 p.o. every morning for 7 days then 1 p.o. q. other day 90 tablet 0    tiotropium 18 MCG Inhalation Cap Inhale 1 capsule (18 mcg total) into the lungs daily. 90 capsule 3    furosemide 40 MG Oral Tab Take 1 tablet (40 mg total) by mouth 2 (two) times daily. 180 tablet 0    Budesonide-Formoterol Fumarate 160-4.5 MCG/ACT Inhalation Aerosol Inhale 2 puffs into the lungs 2 (two) times daily. 3 each 3    levETIRAcetam 500 MG Oral Tab Take 1 tablet (500 mg total) by mouth 2 (two) times daily. 180 tablet 0    amiodarone 200 MG Oral Tab Take one table twice daily x 2 weeks then take one tablet daily thereafter 90 tablet 0    Roflumilast 500 MCG Oral Tab Take 500 mcg by mouth daily. 90 tablet 0    guaiFENesin  MG Oral Tablet 12 Hr Take 2 tablets (1,200 mg total) by mouth 2 (two) times daily.       Ferrous Sulfate 324 (65 Fe) MG Oral Tab EC Take 65 mg by mouth daily.      Levalbuterol HCl 0.63 MG/3ML Inhalation Nebu Soln Take 3 mL (0.63 mg total) by nebulization every 6 (six) hours as needed for Shortness of Breath or Wheezing. 3 each 3    JARDIANCE 10 MG Oral Tab Take 1 tablet (10 mg total) by mouth daily.      atorvastatin 20 MG Oral Tab Take 1 tablet (20 mg total) by mouth nightly.      XARELTO 20 MG Oral Tab Take 1 tablet by mouth daily with food 30 tablet 3    Ipratropium Bromide 0.02 % Inhalation Solution Take 2.5 mL (500 mcg total) by nebulization every 6 (six) hours as needed for Wheezing.      ketoconazole 2 % External Cream Apply topically as needed.      Multiple Vitamin (TAB-A-CUCA) Oral Tab Take 1 tablet by mouth daily.      aspirin 81 MG Oral Tab EC Take 1 tablet (81 mg total) by mouth daily.      B Complex-Biotin-FA (B COMPLETE) Oral Tab Take 1 tablet by mouth daily.      magnesium 250 MG Oral Tab Take 1 tablet (250 mg total) by mouth every evening.         Review of Systems: Review of Systems   Constitutional: Negative.    HENT: Negative.     Respiratory:  Positive for cough and shortness of breath. Negative for wheezing and stridor.    All other systems reviewed and are negative.       Physical Exam:  There were no vitals taken for this visit.     Constitutional: alert, cooperative. No acute distress.  HEENT: Head NC/AT.     Results:  Personally reviewed    WBC: 6.9, done on 9/11/2024.  HGB: 10.8, done on 9/11/2024.  PLT: 234, done on 9/11/2024.     Glucose: 150, done on 9/11/2024.  Cr: 0.7, done on 9/11/2024.  Last eGFR was 103 on 9/11/2024.  CA: 9.4, done on 9/11/2024.  Na: 141, done on 9/11/2024.  K: 4.4, done on 9/11/2024.  Cl: 104, done on 9/11/2024.  CO2: 34, done on 9/11/2024.  Last ALB was 4.5% done on 9/9/2024.     XR CHEST AP PORTABLE  (CPT=71045)    Result Date: 9/9/2024  CONCLUSION:  1. When compared to 8/13/2024 chest radiograph, findings are similar with increasing airspace  opacities bilaterally may represent unfavorable progression of edema, inflammatory and or infectious process, correlate clinically. 2. Please refer to CT of the chest performed 8/16/2024 for further details.   LOCATION:  [facility]      Dictated by (CST): Hetal Domingo MD on 9/09/2024 at 12:04 PM     Finalized by (CST): Hetal Domingo MD on 9/09/2024 at 12:08 PM       CT CHEST (VTG=61610)    Result Date: 8/16/2024  CONCLUSION:   1. Extensive, diffuse interlobular septal thickening with intervening ground-glass opacities resulting in a crazy paving pattern.  This is favored to represent moderate pulmonary edema with infection and drug toxicity felt less likely.  2. Small right and trace left pleural effusions with extension of pleural fluid along the right and left major fissures.  3. Bilateral calcified pleural plaques indicating chronic asbestos exposure.  Areas of round atelectasis noted within portions of the left lower lobe and lingula.  4. Advanced, upper lobe predominant emphysema with dilation of the pulmonary trunk consistent with pulmonary arterial hypertension.   LOCATION:  [facility]   Dictated by (CST): Jenn Guerra MD on 8/16/2024 at 2:32 PM     Finalized by (CST): Jenn Guerra MD on 8/16/2024 at 2:41 PM       XR CHEST AP PORTABLE  (CPT=71045)    Result Date: 8/13/2024  CONCLUSION:  1. Stable COPD changes. 2. Diffuse interstitial opacities are scattered throughout both lungs which was present previously.  Differential includes pulmonary fibrosis and other inflammatory interstitial lung disease. 3. Stable small bilateral pleural effusions. 4. Status post heart valve surgery.    LOCATION:  [facility]      Dictated by (CST): Milad Khoury MD on 8/13/2024 at 3:25 PM     Finalized by (CST): Milad Khoury MD on 8/13/2024 at 3:28 PM       XR CHEST AP PORTABLE  (CPT=71045)    Result Date: 7/15/2024  CONCLUSION:   Stable significant emphysematous and COPD changes.   LOCATION:  [facility]      Dictated by (CST): Yunior James  MD on 7/15/2024 at 7:51 AM     Finalized by (CST): Yunior James MD on 7/15/2024 at 7:54 AM       XR CHEST AP PORTABLE  (CPT=71045)    Result Date: 7/14/2024  CONCLUSION:   Postoperative changes of cardiothoracic surgery with stable cardiac and mediastinal contours.  Recent CT of 07/01/2024 better demonstrates upper lobe predominant emphysema.  Bilateral calcified pleural plaques with persistent diffuse interstitial and bronchial wall thickening.  This may reflect some combination of interstitial pulmonary edema, bronchitis, or reactive airway disease.  Suspected parenchymal scarring of the peripheral left mid lung adjacent to a densely calcified pleural plaque.  Small bilateral pleural effusions persist.  No pneumothorax.  Overall, findings are not substantially changed compared to 07/04/2024.   LOCATION:  [facility]      Dictated by (CST): Jenn Guerra MD on 7/14/2024 at 7:34 AM     Finalized by (CST): Jenn Guerra MD on 7/14/2024 at 7:38 AM         Assessment/Plan:  #1. COPD  Severe obstruction on previous PFTs 3/2021  Recent exacerbations with CHF exacerbation/afib and COPD; last hospitalized in mid sept 2024  Presently on symbicort BID and spiriva - given his lack of control, advise to stop inhalers and advise to start nebs  start arformoterol BID and continue with budesonide BID  Continue to use levalbuterol 3-4x/day. Cannot tolerate albuterol due to palpitations, tremors  Can continue with ipratropium 3-4x/day.  We did discuss yupelri but he prefers to wait since he has large number of vials of ipratropium  Continue hypertonic saline nebs prn  Will extend prednisone course given worsening sx - will increase prednisone to 30mg daily x 1 week, then plan slow wean  Previously enrolled in pulmonary rehab but unable to consistently go due to difficulty with transportation  Continue roflumilast  Encouraged to exercise, lose excess weight and maintain vaccinations up to date  We previously discussed EBV and BLVR.  He is  not a candidate due to his respiratory status and pulmonary HTN    #2. Chronic hypoxic respiratory failure  Multifactorial due to afib, CHF and COPD  He continues to use and benefit from supplemental oxygenation  Last o2 walk with: 2L at rest, 4L on exertion; using and benefiting from portable oxygen concentrator (3 at rest, 6 on exertion)  8/2024 o2 walk: 5L at rest, 6L on exertion; now has o2 up to 10L  Noted issues with affording AVAPS at home. Transitioned to BIPAP     #3. Pulmonary nodules  7/2017 CT with worsening peripheral lingular atelectasis. Nodules stable   5/2018 CT stable nodules  12/2020 CT stable  7/2021 CT stable but worsening mediastinal LAD suspect reactive to recent cardiac surgery  1/2022 CT with stable nodules, improved LAD  3/2023 CT chest with several small nodules  9/2023 CT chest with stable findings   7/1/2024 CT chest with worsening ALYSON lesion, possible scarring, rounded atelectasis vs neoplasm  8/2024 CT chest with stable ALYSON nodule, possible scarring, rounded atelectasis vs neoplasm  Will consider outpatient follow up PET/CT vs monitoring with repeat CT imaging     #4. Tobacco abuse  30+ pack years, pipe smoker  Hold on LDCT given recent CT with worsening ALYSON lesion, will need PET/CT as above  Lung Cancer Counseling and Shared Decision Making Session in an Asymptomatic Smoker/Former Smoker   Keny Marshall is a 63 year old male without current symptoms of lung cancer.  History   Smoking Status    Former    Types: Cigarettes   Smokeless Tobacco    Never        He received information on the importance of adherence to annual lung cancer Low Dose CT (LDCT) screening, the impact of his comorbidities and his ability or willingness to undergo diagnosis and treatment. he qualifies for low dose CT lung cancer screening, however due to uncontrolled symptoms, low dose CT lung cancer screening should be postponed until he has improvement.  We counseled the importance of maintaining cigarette  smoking abstinence if he is a former smoker and the importance of smoking cessation if he is a current smoker and we discussed and furnished information about tobacco cessation interventions.     Stefano Randle MD     This visit is conducted using Telemedicine with live, interactive video and audio.    Patient has been referred to the [facility] website at [email or web address] to review the yearly Consent to Treat document.    Patient understands and accepts financial responsibility for any deductible, co-insurance and/or co-pays associated with this service.

## 2024-10-09 NOTE — TELEPHONE ENCOUNTER
Per Dr. Randle: \"Please send order for arformoterol to his home company. 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 Pharmacy [facility] is [facility] pharmacy of preference. Ordered entered as well as entered via [facility]. Patient called and notified. Made aware will receive a call from [employer] [phone]. If has not heard from them within 24 hours, he should call [phone]. Patient verbalized understanding.

## 2024-10-18 ENCOUNTER — TELEPHONE (OUTPATIENT)
Facility: CLINIC | Age: 64
End: 2024-10-18

## 2024-10-18 NOTE — TELEPHONE ENCOUNTER
Received a call from [facility] RN.  She reports that pt has fluid in lungs, diminished lung sounds and gasping for air.  They determined that oxygen concentrator is not working and he was switched to tanks.  O2 was increased to 8 LPM and O2 sats at 89-90%.  She reports that pt states he is following his fluid restrictions and he was advised to go to ER if worsening symptoms or if he runs out of O2 tanks before [facility] can get to his home.  Nurse reports that he is stable right now.  Call placed to pt and his family is now on the phone with [employer] and they are working on getting him a new oxygen concentrator.  Pt advised to call 911 if decline in respiratory status or if oxygen tanks run out before concentrator is replaced.  Pt verbalizes understanding.

## 2024-11-05 ENCOUNTER — LAB REQUISITION (OUTPATIENT)
Dept: LAB | Age: 64
End: 2024-11-05

## 2024-11-05 DIAGNOSIS — Z13.9 ENCOUNTER FOR SCREENING, UNSPECIFIED: ICD-10-CM

## 2024-11-05 LAB
ALBUMIN SERPL-MCNC: 3.1 G/DL (ref 3.4–5)
ALBUMIN/GLOB SERPL: 1 {RATIO} (ref 1–2.4)
ALP SERPL-CCNC: 71 UNITS/L (ref 45–117)
ALT SERPL-CCNC: 65 UNITS/L
ANION GAP SERPL CALC-SCNC: 8 MMOL/L (ref 7–19)
AST SERPL-CCNC: 31 UNITS/L
BASOPHILS # BLD: 0 K/MCL (ref 0–0.3)
BASOPHILS NFR BLD: 0 %
BILIRUB SERPL-MCNC: 0.7 MG/DL (ref 0.2–1)
BUN SERPL-MCNC: 37 MG/DL (ref 6–20)
BUN/CREAT SERPL: 46 (ref 7–25)
CALCIUM SERPL-MCNC: 8.7 MG/DL (ref 8.4–10.2)
CHLORIDE SERPL-SCNC: 95 MMOL/L (ref 97–110)
CO2 SERPL-SCNC: 40 MMOL/L (ref 21–32)
CREAT SERPL-MCNC: 0.8 MG/DL (ref 0.67–1.17)
DEPRECATED RDW RBC: 54.4 FL (ref 39–50)
EGFRCR SERPLBLD CKD-EPI 2021: >90 ML/MIN/{1.73_M2}
EOSINOPHIL # BLD: 0 K/MCL (ref 0–0.5)
EOSINOPHIL NFR BLD: 0 %
ERYTHROCYTE [DISTWIDTH] IN BLOOD: 15.7 % (ref 11–15)
FASTING DURATION TIME PATIENT: ABNORMAL H
GLOBULIN SER-MCNC: 3.2 G/DL (ref 2–4)
GLUCOSE SERPL-MCNC: 159 MG/DL (ref 70–99)
HCT VFR BLD CALC: 38.2 % (ref 39–51)
HGB BLD-MCNC: 11.3 G/DL (ref 13–17)
IMM GRANULOCYTES # BLD AUTO: 0.1 K/MCL (ref 0–0.2)
IMM GRANULOCYTES # BLD: 1 %
LYMPHOCYTES # BLD: 0.3 K/MCL (ref 1–4)
LYMPHOCYTES NFR BLD: 2 %
MCH RBC QN AUTO: 28.1 PG (ref 26–34)
MCHC RBC AUTO-ENTMCNC: 29.6 G/DL (ref 32–36.5)
MCV RBC AUTO: 95 FL (ref 78–100)
MONOCYTES # BLD: 0.4 K/MCL (ref 0.3–0.9)
MONOCYTES NFR BLD: 2 %
NEUTROPHILS # BLD: 14.3 K/MCL (ref 1.8–7.7)
NEUTROPHILS NFR BLD: 95 %
NRBC BLD MANUAL-RTO: 0 /100 WBC
PHOSPHATE SERPL-MCNC: 4.1 MG/DL (ref 2.4–4.7)
PLATELET # BLD AUTO: 243 K/MCL (ref 140–450)
POTASSIUM SERPL-SCNC: 4.1 MMOL/L (ref 3.4–5.1)
PROT SERPL-MCNC: 6.3 G/DL (ref 6.4–8.2)
RBC # BLD: 4.02 MIL/MCL (ref 4.5–5.9)
SODIUM SERPL-SCNC: 139 MMOL/L (ref 135–145)
TSH SERPL-ACNC: 1.8 MCUNITS/ML (ref 0.35–5)
WBC # BLD: 15.1 K/MCL (ref 4.2–11)

## 2024-11-05 PROCEDURE — PSEU8279 PHOSPHORUS: Performed by: CLINICAL MEDICAL LABORATORY

## 2024-11-05 PROCEDURE — PSEU8443 PROTHROMBIN TIME (INR/PT): Performed by: CLINICAL MEDICAL LABORATORY

## 2024-11-05 PROCEDURE — 80053 COMPREHEN METABOLIC PANEL: CPT | Performed by: CLINICAL MEDICAL LABORATORY

## 2024-11-05 PROCEDURE — 85610 PROTHROMBIN TIME: CPT | Performed by: CLINICAL MEDICAL LABORATORY

## 2024-11-05 PROCEDURE — 84443 ASSAY THYROID STIM HORMONE: CPT | Performed by: CLINICAL MEDICAL LABORATORY

## 2024-11-05 PROCEDURE — PSEU8250 COMPREHENSIVE METABOLIC PANEL: Performed by: CLINICAL MEDICAL LABORATORY

## 2024-11-05 PROCEDURE — 84100 ASSAY OF PHOSPHORUS: CPT | Performed by: CLINICAL MEDICAL LABORATORY

## 2024-11-05 PROCEDURE — 85025 COMPLETE CBC W/AUTO DIFF WBC: CPT | Performed by: CLINICAL MEDICAL LABORATORY

## 2024-11-05 PROCEDURE — PSEU8299 THYROID STIMULATING HORMONE: Performed by: CLINICAL MEDICAL LABORATORY

## 2024-11-06 LAB
INR PPP: 1.3
PROTHROMBIN TIME: 14.2 SEC (ref 9.7–11.8)

## 2024-11-07 ENCOUNTER — LAB REQUISITION (OUTPATIENT)
Dept: LAB | Age: 64
End: 2024-11-07

## 2024-11-07 DIAGNOSIS — Z13.9 ENCOUNTER FOR SCREENING, UNSPECIFIED: ICD-10-CM

## 2024-11-07 LAB
ANION GAP SERPL CALC-SCNC: 8 MMOL/L (ref 7–19)
ANION GAP SERPL CALC-SCNC: 8 MMOL/L (ref 7–19)
BASOPHILS # BLD: 0 K/MCL (ref 0–0.3)
BASOPHILS NFR BLD: 0 %
BUN SERPL-MCNC: 39 MG/DL (ref 6–20)
BUN SERPL-MCNC: 40 MG/DL (ref 6–20)
BUN/CREAT SERPL: 42 (ref 7–25)
BUN/CREAT SERPL: 43 (ref 7–25)
CALCIUM SERPL-MCNC: 8.5 MG/DL (ref 8.4–10.2)
CALCIUM SERPL-MCNC: 8.6 MG/DL (ref 8.4–10.2)
CHLORIDE SERPL-SCNC: 96 MMOL/L (ref 97–110)
CHLORIDE SERPL-SCNC: 97 MMOL/L (ref 97–110)
CO2 SERPL-SCNC: 39 MMOL/L (ref 21–32)
CO2 SERPL-SCNC: 39 MMOL/L (ref 21–32)
CREAT SERPL-MCNC: 0.93 MG/DL (ref 0.67–1.17)
CREAT SERPL-MCNC: 0.93 MG/DL (ref 0.67–1.17)
DEPRECATED RDW RBC: 56.5 FL (ref 39–50)
EGFRCR SERPLBLD CKD-EPI 2021: >90 ML/MIN/{1.73_M2}
EGFRCR SERPLBLD CKD-EPI 2021: >90 ML/MIN/{1.73_M2}
EOSINOPHIL # BLD: 0.1 K/MCL (ref 0–0.5)
EOSINOPHIL NFR BLD: 1 %
ERYTHROCYTE [DISTWIDTH] IN BLOOD: 15.8 % (ref 11–15)
FASTING DURATION TIME PATIENT: ABNORMAL H
FASTING DURATION TIME PATIENT: ABNORMAL H
GLUCOSE SERPL-MCNC: 120 MG/DL (ref 70–99)
GLUCOSE SERPL-MCNC: 122 MG/DL (ref 70–99)
HCT VFR BLD CALC: 37.5 % (ref 39–51)
HGB BLD-MCNC: 10.8 G/DL (ref 13–17)
IMM GRANULOCYTES # BLD AUTO: 0.1 K/MCL (ref 0–0.2)
IMM GRANULOCYTES # BLD: 1 %
LACTATE BLDV-SCNC: 0.7 MMOL/L (ref 0–2)
LYMPHOCYTES # BLD: 0.8 K/MCL (ref 1–4)
LYMPHOCYTES NFR BLD: 5 %
MAGNESIUM SERPL-MCNC: 2.3 MG/DL (ref 1.7–2.4)
MCH RBC QN AUTO: 28 PG (ref 26–34)
MCHC RBC AUTO-ENTMCNC: 28.8 G/DL (ref 32–36.5)
MCV RBC AUTO: 97.2 FL (ref 78–100)
MONOCYTES # BLD: 1 K/MCL (ref 0.3–0.9)
MONOCYTES NFR BLD: 7 %
NEUTROPHILS # BLD: 12.9 K/MCL (ref 1.8–7.7)
NEUTROPHILS NFR BLD: 86 %
NRBC BLD MANUAL-RTO: 0 /100 WBC
NT-PROBNP SERPL-MCNC: ABNORMAL PG/ML
PHOSPHATE SERPL-MCNC: 4.6 MG/DL (ref 2.4–4.7)
PLATELET # BLD AUTO: 213 K/MCL (ref 140–450)
POTASSIUM SERPL-SCNC: 4.3 MMOL/L (ref 3.4–5.1)
POTASSIUM SERPL-SCNC: 4.3 MMOL/L (ref 3.4–5.1)
RBC # BLD: 3.86 MIL/MCL (ref 4.5–5.9)
SODIUM SERPL-SCNC: 139 MMOL/L (ref 135–145)
SODIUM SERPL-SCNC: 140 MMOL/L (ref 135–145)
WBC # BLD: 15 K/MCL (ref 4.2–11)

## 2024-11-07 PROCEDURE — 87040 BLOOD CULTURE FOR BACTERIA: CPT | Performed by: CLINICAL MEDICAL LABORATORY

## 2024-11-07 PROCEDURE — PSEU8964 BLOOD CULTURE: Performed by: CLINICAL MEDICAL LABORATORY

## 2024-11-08 ENCOUNTER — LAB REQUISITION (OUTPATIENT)
Dept: LAB | Age: 64
End: 2024-11-08

## 2024-11-08 DIAGNOSIS — Z13.9 ENCOUNTER FOR SCREENING, UNSPECIFIED: ICD-10-CM

## 2024-11-08 LAB
ANION GAP SERPL CALC-SCNC: 9 MMOL/L (ref 7–19)
BASOPHILS # BLD: 0.1 K/MCL (ref 0–0.3)
BASOPHILS NFR BLD: 0 %
BUN SERPL-MCNC: 47 MG/DL (ref 6–20)
BUN/CREAT SERPL: 42 (ref 7–25)
CALCIUM SERPL-MCNC: 8.7 MG/DL (ref 8.4–10.2)
CHLORIDE SERPL-SCNC: 95 MMOL/L (ref 97–110)
CO2 SERPL-SCNC: 38 MMOL/L (ref 21–32)
CREAT SERPL-MCNC: 1.11 MG/DL (ref 0.67–1.17)
DEPRECATED RDW RBC: 55 FL (ref 39–50)
EGFRCR SERPLBLD CKD-EPI 2021: 74 ML/MIN/{1.73_M2}
EOSINOPHIL # BLD: 0.1 K/MCL (ref 0–0.5)
EOSINOPHIL NFR BLD: 0 %
ERYTHROCYTE [DISTWIDTH] IN BLOOD: 15.9 % (ref 11–15)
FASTING DURATION TIME PATIENT: ABNORMAL H
GLUCOSE SERPL-MCNC: 182 MG/DL (ref 70–99)
HCT VFR BLD CALC: 37.2 % (ref 39–51)
HGB BLD-MCNC: 10.8 G/DL (ref 13–17)
IMM GRANULOCYTES # BLD AUTO: 0.3 K/MCL (ref 0–0.2)
IMM GRANULOCYTES # BLD: 1 %
LYMPHOCYTES # BLD: 0.7 K/MCL (ref 1–4)
LYMPHOCYTES NFR BLD: 4 %
MCH RBC QN AUTO: 27.7 PG (ref 26–34)
MCHC RBC AUTO-ENTMCNC: 29 G/DL (ref 32–36.5)
MCV RBC AUTO: 95.4 FL (ref 78–100)
MONOCYTES # BLD: 1.4 K/MCL (ref 0.3–0.9)
MONOCYTES NFR BLD: 7 %
NEUTROPHILS # BLD: 16.9 K/MCL (ref 1.8–7.7)
NEUTROPHILS NFR BLD: 88 %
NRBC BLD MANUAL-RTO: 0 /100 WBC
PLATELET # BLD AUTO: 273 K/MCL (ref 140–450)
POTASSIUM SERPL-SCNC: 5 MMOL/L (ref 3.4–5.1)
RBC # BLD: 3.9 MIL/MCL (ref 4.5–5.9)
SODIUM SERPL-SCNC: 137 MMOL/L (ref 135–145)
WBC # BLD: 19.4 K/MCL (ref 4.2–11)

## 2024-11-08 PROCEDURE — 87070 CULTURE OTHR SPECIMN AEROBIC: CPT | Performed by: CLINICAL MEDICAL LABORATORY

## 2024-11-08 PROCEDURE — 87205 SMEAR GRAM STAIN: CPT | Performed by: CLINICAL MEDICAL LABORATORY

## 2024-11-08 PROCEDURE — PSEU9000 SPUTUM, BACTERIAL CULTURE WITH GRAM STAIN: Performed by: CLINICAL MEDICAL LABORATORY

## 2024-11-09 ENCOUNTER — LAB REQUISITION (OUTPATIENT)
Dept: LAB | Age: 64
End: 2024-11-09

## 2024-11-09 DIAGNOSIS — Z13.9 ENCOUNTER FOR SCREENING, UNSPECIFIED: ICD-10-CM

## 2024-11-09 LAB
ANION GAP SERPL CALC-SCNC: 8 MMOL/L (ref 7–19)
BACTERIA BLD CULT: NORMAL
BACTERIA BLD CULT: NORMAL
BACTERIA SPT AEROBE CULT: NORMAL
BASOPHILS # BLD: 0 K/MCL (ref 0–0.3)
BASOPHILS NFR BLD: 0 %
BUN SERPL-MCNC: 48 MG/DL (ref 6–20)
BUN/CREAT SERPL: 51 (ref 7–25)
CALCIUM SERPL-MCNC: 7.9 MG/DL (ref 8.4–10.2)
CHLORIDE SERPL-SCNC: 96 MMOL/L (ref 97–110)
CO2 SERPL-SCNC: 40 MMOL/L (ref 21–32)
CREAT SERPL-MCNC: 0.95 MG/DL (ref 0.67–1.17)
DEPRECATED RDW RBC: 53.8 FL (ref 39–50)
EGFRCR SERPLBLD CKD-EPI 2021: 89 ML/MIN/{1.73_M2}
EOSINOPHIL # BLD: 0 K/MCL (ref 0–0.5)
EOSINOPHIL NFR BLD: 0 %
ERYTHROCYTE [DISTWIDTH] IN BLOOD: 16 % (ref 11–15)
FASTING DURATION TIME PATIENT: ABNORMAL H
GLUCOSE SERPL-MCNC: 168 MG/DL (ref 70–99)
GRAM STN SPEC: NORMAL
HCT VFR BLD CALC: 32.9 % (ref 39–51)
HGB BLD-MCNC: 9.9 G/DL (ref 13–17)
IMM GRANULOCYTES # BLD AUTO: 0.1 K/MCL (ref 0–0.2)
IMM GRANULOCYTES # BLD: 1 %
LACTATE BLDV-SCNC: 0.6 MMOL/L (ref 0–2)
LYMPHOCYTES # BLD: 0.3 K/MCL (ref 1–4)
LYMPHOCYTES NFR BLD: 2 %
MAGNESIUM SERPL-MCNC: 2.4 MG/DL (ref 1.7–2.4)
MCH RBC QN AUTO: 28 PG (ref 26–34)
MCHC RBC AUTO-ENTMCNC: 30.1 G/DL (ref 32–36.5)
MCV RBC AUTO: 92.9 FL (ref 78–100)
MONOCYTES # BLD: 0.1 K/MCL (ref 0.3–0.9)
MONOCYTES NFR BLD: 1 %
NEUTROPHILS # BLD: 11.8 K/MCL (ref 1.8–7.7)
NEUTROPHILS NFR BLD: 96 %
NRBC BLD MANUAL-RTO: 0 /100 WBC
PHOSPHATE SERPL-MCNC: 4.4 MG/DL (ref 2.4–4.7)
PLATELET # BLD AUTO: 217 K/MCL (ref 140–450)
POTASSIUM SERPL-SCNC: 4.3 MMOL/L (ref 3.4–5.1)
RBC # BLD: 3.54 MIL/MCL (ref 4.5–5.9)
SODIUM SERPL-SCNC: 140 MMOL/L (ref 135–145)
WBC # BLD: 12.3 K/MCL (ref 4.2–11)

## 2024-11-09 PROCEDURE — 83605 ASSAY OF LACTIC ACID: CPT | Performed by: CLINICAL MEDICAL LABORATORY

## 2024-11-09 PROCEDURE — PSEU8205 LACTIC ACID, VENOUS: Performed by: CLINICAL MEDICAL LABORATORY

## 2024-11-09 PROCEDURE — PSEU8235 BASIC METABOLIC PANEL: Performed by: CLINICAL MEDICAL LABORATORY

## 2024-11-09 PROCEDURE — PSEU8279 PHOSPHORUS: Performed by: CLINICAL MEDICAL LABORATORY

## 2024-11-09 PROCEDURE — PSEU8274 MAGNESIUM: Performed by: CLINICAL MEDICAL LABORATORY

## 2024-11-09 PROCEDURE — 83735 ASSAY OF MAGNESIUM: CPT | Performed by: CLINICAL MEDICAL LABORATORY

## 2024-11-09 PROCEDURE — 84100 ASSAY OF PHOSPHORUS: CPT | Performed by: CLINICAL MEDICAL LABORATORY

## 2024-11-09 PROCEDURE — 85025 COMPLETE CBC W/AUTO DIFF WBC: CPT | Performed by: CLINICAL MEDICAL LABORATORY

## 2024-11-09 PROCEDURE — 80048 BASIC METABOLIC PNL TOTAL CA: CPT | Performed by: CLINICAL MEDICAL LABORATORY

## 2024-11-10 ENCOUNTER — LAB REQUISITION (OUTPATIENT)
Dept: LAB | Age: 64
End: 2024-11-10

## 2024-11-10 DIAGNOSIS — Z13.9 ENCOUNTER FOR SCREENING, UNSPECIFIED: ICD-10-CM

## 2024-11-10 LAB
ANION GAP SERPL CALC-SCNC: 8 MMOL/L (ref 7–19)
BASOPHILS # BLD: 0 K/MCL (ref 0–0.3)
BASOPHILS NFR BLD: 0 %
BUN SERPL-MCNC: 53 MG/DL (ref 6–20)
BUN/CREAT SERPL: 53 (ref 7–25)
CALCIUM SERPL-MCNC: 8.2 MG/DL (ref 8.4–10.2)
CHLORIDE SERPL-SCNC: 97 MMOL/L (ref 97–110)
CO2 SERPL-SCNC: 38 MMOL/L (ref 21–32)
CREAT SERPL-MCNC: 1 MG/DL (ref 0.67–1.17)
DEPRECATED RDW RBC: 58.4 FL (ref 39–50)
EGFRCR SERPLBLD CKD-EPI 2021: 84 ML/MIN/{1.73_M2}
EOSINOPHIL # BLD: 0 K/MCL (ref 0–0.5)
EOSINOPHIL NFR BLD: 0 %
ERYTHROCYTE [DISTWIDTH] IN BLOOD: 16.3 % (ref 11–15)
FASTING DURATION TIME PATIENT: ABNORMAL H
GLUCOSE SERPL-MCNC: 204 MG/DL (ref 70–99)
HCT VFR BLD CALC: 34.8 % (ref 39–51)
HGB BLD-MCNC: 9.8 G/DL (ref 13–17)
IMM GRANULOCYTES # BLD AUTO: 0.1 K/MCL (ref 0–0.2)
IMM GRANULOCYTES # BLD: 1 %
LACTATE BLDV-SCNC: 0.8 MMOL/L (ref 0–2)
LYMPHOCYTES # BLD: 0.2 K/MCL (ref 1–4)
LYMPHOCYTES NFR BLD: 1 %
MAGNESIUM SERPL-MCNC: 2.4 MG/DL (ref 1.7–2.4)
MCH RBC QN AUTO: 27.8 PG (ref 26–34)
MCHC RBC AUTO-ENTMCNC: 27.8 G/DL (ref 32–36.5)
MCV RBC AUTO: 99.7 FL (ref 78–100)
MONOCYTES # BLD: 0.4 K/MCL (ref 0.3–0.9)
MONOCYTES NFR BLD: 3 %
NEUTROPHILS # BLD: 15.3 K/MCL (ref 1.8–7.7)
NEUTROPHILS NFR BLD: 95 %
NRBC BLD MANUAL-RTO: 0 /100 WBC
PHOSPHATE SERPL-MCNC: 4.4 MG/DL (ref 2.4–4.7)
PLATELET # BLD AUTO: 240 K/MCL (ref 140–450)
POTASSIUM SERPL-SCNC: 4.5 MMOL/L (ref 3.4–5.1)
RBC # BLD: 3.48 MIL/MCL (ref 4.5–5.9)
SODIUM SERPL-SCNC: 138 MMOL/L (ref 135–145)
WBC # BLD: 16.1 K/MCL (ref 4.2–11)

## 2024-11-10 PROCEDURE — 83735 ASSAY OF MAGNESIUM: CPT | Performed by: CLINICAL MEDICAL LABORATORY

## 2024-11-10 PROCEDURE — 85025 COMPLETE CBC W/AUTO DIFF WBC: CPT | Performed by: CLINICAL MEDICAL LABORATORY

## 2024-11-10 PROCEDURE — 80202 ASSAY OF VANCOMYCIN: CPT | Performed by: CLINICAL MEDICAL LABORATORY

## 2024-11-10 PROCEDURE — PSEU8205 LACTIC ACID, VENOUS: Performed by: CLINICAL MEDICAL LABORATORY

## 2024-11-10 PROCEDURE — 83605 ASSAY OF LACTIC ACID: CPT | Performed by: CLINICAL MEDICAL LABORATORY

## 2024-11-10 PROCEDURE — PSEU8279 PHOSPHORUS: Performed by: CLINICAL MEDICAL LABORATORY

## 2024-11-10 PROCEDURE — PSEU8235 BASIC METABOLIC PANEL: Performed by: CLINICAL MEDICAL LABORATORY

## 2024-11-10 PROCEDURE — PSEU8274 MAGNESIUM: Performed by: CLINICAL MEDICAL LABORATORY

## 2024-11-10 PROCEDURE — 80048 BASIC METABOLIC PNL TOTAL CA: CPT | Performed by: CLINICAL MEDICAL LABORATORY

## 2024-11-10 PROCEDURE — 84100 ASSAY OF PHOSPHORUS: CPT | Performed by: CLINICAL MEDICAL LABORATORY

## 2024-11-10 PROCEDURE — PSEU8228 VANCOMYCIN, TROUGH: Performed by: CLINICAL MEDICAL LABORATORY

## 2024-11-11 ENCOUNTER — TELEPHONE (OUTPATIENT)
Facility: CLINIC | Age: 64
End: 2024-11-11

## 2024-11-11 ENCOUNTER — LAB REQUISITION (OUTPATIENT)
Dept: LAB | Age: 64
End: 2024-11-11

## 2024-11-11 DIAGNOSIS — Z13.9 ENCOUNTER FOR SCREENING, UNSPECIFIED: ICD-10-CM

## 2024-11-11 LAB
ALBUMIN SERPL-MCNC: 2.8 G/DL (ref 3.4–5)
ALBUMIN/GLOB SERPL: 0.9 {RATIO} (ref 1–2.4)
ALP SERPL-CCNC: 100 UNITS/L (ref 45–117)
ALT SERPL-CCNC: 88 UNITS/L
ANION GAP SERPL CALC-SCNC: 11 MMOL/L (ref 7–19)
AST SERPL-CCNC: 33 UNITS/L
BILIRUB SERPL-MCNC: 0.7 MG/DL (ref 0.2–1)
BUN SERPL-MCNC: 56 MG/DL (ref 6–20)
BUN/CREAT SERPL: 47 (ref 7–25)
CALCIUM SERPL-MCNC: 8.7 MG/DL (ref 8.4–10.2)
CHLORIDE SERPL-SCNC: 96 MMOL/L (ref 97–110)
CO2 SERPL-SCNC: 38 MMOL/L (ref 21–32)
CREAT SERPL-MCNC: 1.18 MG/DL (ref 0.67–1.17)
DEPRECATED RDW RBC: 58.9 FL (ref 39–50)
EGFRCR SERPLBLD CKD-EPI 2021: 68 ML/MIN/{1.73_M2}
ERYTHROCYTE [DISTWIDTH] IN BLOOD: 16.1 % (ref 11–15)
FASTING DURATION TIME PATIENT: ABNORMAL H
GLOBULIN SER-MCNC: 3 G/DL (ref 2–4)
GLUCOSE SERPL-MCNC: 197 MG/DL (ref 70–99)
HCT VFR BLD CALC: 35.8 % (ref 39–51)
HGB BLD-MCNC: 10.3 G/DL (ref 13–17)
MAGNESIUM SERPL-MCNC: 2.8 MG/DL (ref 1.7–2.4)
MCH RBC QN AUTO: 28.6 PG (ref 26–34)
MCHC RBC AUTO-ENTMCNC: 28.8 G/DL (ref 32–36.5)
MCV RBC AUTO: 99.4 FL (ref 78–100)
NRBC BLD MANUAL-RTO: 0 /100 WBC
PHOSPHATE SERPL-MCNC: 4.6 MG/DL (ref 2.4–4.7)
PLATELET # BLD AUTO: 241 K/MCL (ref 140–450)
POTASSIUM SERPL-SCNC: 4.9 MMOL/L (ref 3.4–5.1)
PROT SERPL-MCNC: 5.8 G/DL (ref 6.4–8.2)
RBC # BLD: 3.6 MIL/MCL (ref 4.5–5.9)
SODIUM SERPL-SCNC: 140 MMOL/L (ref 135–145)
VANCOMYCIN TROUGH SERPL-MCNC: 16 MCG/ML (ref 10–20)
WBC # BLD: 19.8 K/MCL (ref 4.2–11)

## 2024-11-11 PROCEDURE — PSEU8274 MAGNESIUM: Performed by: CLINICAL MEDICAL LABORATORY

## 2024-11-11 PROCEDURE — 83735 ASSAY OF MAGNESIUM: CPT | Performed by: CLINICAL MEDICAL LABORATORY

## 2024-11-11 PROCEDURE — 85027 COMPLETE CBC AUTOMATED: CPT | Performed by: CLINICAL MEDICAL LABORATORY

## 2024-11-11 PROCEDURE — PSEU10425 CBC NO DIFFERENTIAL (PERFORMABLE ONLY): Performed by: CLINICAL MEDICAL LABORATORY

## 2024-11-11 PROCEDURE — PSEU8279 PHOSPHORUS: Performed by: CLINICAL MEDICAL LABORATORY

## 2024-11-11 PROCEDURE — PSEU8250 COMPREHENSIVE METABOLIC PANEL: Performed by: CLINICAL MEDICAL LABORATORY

## 2024-11-11 PROCEDURE — 80053 COMPREHEN METABOLIC PANEL: CPT | Performed by: CLINICAL MEDICAL LABORATORY

## 2024-11-11 PROCEDURE — 84100 ASSAY OF PHOSPHORUS: CPT | Performed by: CLINICAL MEDICAL LABORATORY

## 2024-11-11 NOTE — TELEPHONE ENCOUNTER
Spoke with wife.  States that they are returning a call from our office, possibly from Dr. Randle.   Message stated to call our office.  No message found in EMR.  Pt was admitted to [facility] about 10 days ago and is now at [facility].  Please call [phone].

## 2024-11-11 NOTE — TELEPHONE ENCOUNTER
Returning call from Dr. Randle. Patient is currently in [facility] in [place].  Best phone # to reach him at: [phone]

## 2024-11-12 ENCOUNTER — LAB REQUISITION (OUTPATIENT)
Dept: LAB | Age: 64
End: 2024-11-12

## 2024-11-12 DIAGNOSIS — Z13.9 ENCOUNTER FOR SCREENING, UNSPECIFIED: ICD-10-CM

## 2024-11-12 LAB
ANION GAP SERPL CALC-SCNC: 9 MMOL/L (ref 7–19)
BACTERIA BLD CULT: NORMAL
BACTERIA BLD CULT: NORMAL
BASOPHILS # BLD: 0 K/MCL (ref 0–0.3)
BASOPHILS NFR BLD: 0 %
BUN SERPL-MCNC: 60 MG/DL (ref 6–20)
BUN/CREAT SERPL: 51 (ref 7–25)
CALCIUM SERPL-MCNC: 8.6 MG/DL (ref 8.4–10.2)
CHLORIDE SERPL-SCNC: 94 MMOL/L (ref 97–110)
CO2 SERPL-SCNC: 38 MMOL/L (ref 21–32)
CREAT SERPL-MCNC: 1.18 MG/DL (ref 0.67–1.17)
DEPRECATED RDW RBC: 58 FL (ref 39–50)
EGFRCR SERPLBLD CKD-EPI 2021: 68 ML/MIN/{1.73_M2}
EOSINOPHIL # BLD: 0 K/MCL (ref 0–0.5)
EOSINOPHIL NFR BLD: 0 %
ERYTHROCYTE [DISTWIDTH] IN BLOOD: 15.9 % (ref 11–15)
FASTING DURATION TIME PATIENT: ABNORMAL H
GLUCOSE SERPL-MCNC: 182 MG/DL (ref 70–99)
HCT VFR BLD CALC: 34.9 % (ref 39–51)
HGB BLD-MCNC: 9.7 G/DL (ref 13–17)
IMM GRANULOCYTES # BLD AUTO: 0.2 K/MCL (ref 0–0.2)
IMM GRANULOCYTES # BLD: 1 %
LYMPHOCYTES # BLD: 0.1 K/MCL (ref 1–4)
LYMPHOCYTES NFR BLD: 1 %
MCH RBC QN AUTO: 27.7 PG (ref 26–34)
MCHC RBC AUTO-ENTMCNC: 27.8 G/DL (ref 32–36.5)
MCV RBC AUTO: 99.7 FL (ref 78–100)
MONOCYTES # BLD: 0.5 K/MCL (ref 0.3–0.9)
MONOCYTES NFR BLD: 3 %
NEUTROPHILS # BLD: 14.1 K/MCL (ref 1.8–7.7)
NEUTROPHILS NFR BLD: 95 %
NRBC BLD MANUAL-RTO: 2 /100 WBC
PLATELET # BLD AUTO: 196 K/MCL (ref 140–450)
POTASSIUM SERPL-SCNC: 5.2 MMOL/L (ref 3.4–5.1)
RBC # BLD: 3.5 MIL/MCL (ref 4.5–5.9)
SODIUM SERPL-SCNC: 136 MMOL/L (ref 135–145)
WBC # BLD: 15 K/MCL (ref 4.2–11)

## 2024-11-12 PROCEDURE — 85025 COMPLETE CBC W/AUTO DIFF WBC: CPT | Performed by: CLINICAL MEDICAL LABORATORY

## 2024-11-12 PROCEDURE — PSEU8235 BASIC METABOLIC PANEL: Performed by: CLINICAL MEDICAL LABORATORY

## 2024-11-12 PROCEDURE — 80048 BASIC METABOLIC PNL TOTAL CA: CPT | Performed by: CLINICAL MEDICAL LABORATORY

## 2024-11-13 ENCOUNTER — LAB REQUISITION (OUTPATIENT)
Dept: LAB | Age: 64
End: 2024-11-13

## 2024-11-13 DIAGNOSIS — Z13.9 ENCOUNTER FOR SCREENING, UNSPECIFIED: ICD-10-CM

## 2024-11-13 LAB
ANION GAP SERPL CALC-SCNC: 9 MMOL/L (ref 7–19)
BUN SERPL-MCNC: 68 MG/DL (ref 6–20)
BUN/CREAT SERPL: 57 (ref 7–25)
CALCIUM SERPL-MCNC: 8.8 MG/DL (ref 8.4–10.2)
CHLORIDE SERPL-SCNC: 94 MMOL/L (ref 97–110)
CO2 SERPL-SCNC: 35 MMOL/L (ref 21–32)
CREAT SERPL-MCNC: 1.2 MG/DL (ref 0.67–1.17)
EGFRCR SERPLBLD CKD-EPI 2021: 67 ML/MIN/{1.73_M2}
FASTING DURATION TIME PATIENT: ABNORMAL H
GLUCOSE SERPL-MCNC: 169 MG/DL (ref 70–99)
POTASSIUM SERPL-SCNC: 6.2 MMOL/L (ref 3.4–5.1)
SODIUM SERPL-SCNC: 132 MMOL/L (ref 135–145)

## 2024-11-13 PROCEDURE — PSEU8235 BASIC METABOLIC PANEL: Performed by: CLINICAL MEDICAL LABORATORY

## 2024-11-13 PROCEDURE — 80048 BASIC METABOLIC PNL TOTAL CA: CPT | Performed by: CLINICAL MEDICAL LABORATORY

## 2024-11-26 ENCOUNTER — MED REC SCAN ONLY (OUTPATIENT)
Facility: CLINIC | Age: 64
End: 2024-11-26

## 2024-12-23 RX ORDER — TIOTROPIUM BROMIDE 18 UG/1
1 CAPSULE ORAL; RESPIRATORY (INHALATION) DAILY
Qty: 30 CAPSULE | Refills: 1 | Status: SHIPPED | OUTPATIENT
Start: 2024-12-23

## 2025-02-25 NOTE — RESPIRATORY THERAPY NOTE
I explained to the patient to wear his BiPAP for the two hours, then if he is feeling better and not distress, he could slowly eat his food. He agreed and understood at the time.of placing the bipap on. Now, Patient is upset because his food arrived and he is hungry. I advised him that if he has a hypoxic episode and we have to place the bipap on him immediately, he could possibly vomit and aspirated his food. He is currently eating his food and he is aware of the pro and cons of his decision to eat. RADHA Jacobs notified.    No

## (undated) DEVICE — 1200CC GUARDIAN II: Brand: GUARDIAN

## (undated) DEVICE — TOWEL OR BLU 16X26 STRL

## (undated) DEVICE — SUTURE SILK 0 FSL

## (undated) DEVICE — SPONGE LAP 18X18 XRAY STRL

## (undated) DEVICE — MASK OXYGEN ADULT W/ C02 10FT

## (undated) DEVICE — REM POLYHESIVE ADULT PATIENT RETURN ELECTRODE: Brand: VALLEYLAB

## (undated) DEVICE — 3M™ RED DOT™ MONITORING ELECTRODE WITH FOAM TAPE AND STICKY GEL, 50/BAG, 20/CASE, 72/PLT 2570: Brand: RED DOT™

## (undated) DEVICE — SOLUTION SURG DURA PREP HAZMAT

## (undated) DEVICE — ENDOSCOPY PACK UPPER: Brand: MEDLINE INDUSTRIES, INC.

## (undated) DEVICE — FILTERLINE NASAL ADULT O2/CO2

## (undated) DEVICE — Device: Brand: DEFENDO AIR/WATER/SUCTION AND BIOPSY VALVE

## (undated) DEVICE — MEDI-VAC SUCTION HANDLE REGULAR CAPACITY: Brand: CARDINAL HEALTH

## (undated) DEVICE — TRAP SPEC REMOVAL ETRAP 15CM

## (undated) DEVICE — GLOVE SURG TRIUMPH SZ 8

## (undated) DEVICE — 3M™ BAIR HUGGER® UNDERBODY BLANKET, FULL ACCESS, 10 PER CASE 63500: Brand: BAIR HUGGER™

## (undated) DEVICE — 3M™ IOBAN™ 2 ANTIMICROBIAL INCISE DRAPE 6651EZ: Brand: IOBAN™ 2

## (undated) DEVICE — DRAPE TABLE COVER 44X90 TC-10

## (undated) DEVICE — TRANSPOSAL ULTRAFLEX DUO/QUAD ULTRA CART MANIFOLD

## (undated) DEVICE — FORCEP BIOPSY RJ4 LG CAP W/ND

## (undated) DEVICE — ENDOSCOPY PACK - LOWER: Brand: MEDLINE INDUSTRIES, INC.

## (undated) DEVICE — TIBURON DRAPE TOWELS: Brand: CONVERTORS

## (undated) DEVICE — X-RAY DETECTABLE SPONGES,16 PLY: Brand: VISTEC

## (undated) DEVICE — GOWN,SIRUS,FABRIC-REINFORCED,X-LARGE: Brand: MEDLINE

## (undated) DEVICE — STERILE POLYISOPRENE POWDER-FREE SURGICAL GLOVES: Brand: PROTEXIS

## (undated) DEVICE — STANDARD HYPODERMIC NEEDLE,POLYPROPYLENE HUB: Brand: MONOJECT

## (undated) DEVICE — CSTM UNIVERSAL DRAPE PK: Brand: MEDLINE INDUSTRIES, INC.

## (undated) DEVICE — GAUZE SPONGES,12 PLY: Brand: CURITY

## (undated) DEVICE — MEDI-VAC NON-CONDUCTIVE SUCTION TUBING: Brand: CARDINAL HEALTH

## (undated) DEVICE — ADULT, RADIOTRANSPARENT ELEMENT, COMPATIBLE W/ GRAY FLAT CONNECTOR: Brand: DEFIBRILLATION ELECTRODES

## (undated) DEVICE — SYRINGE 30ML LL TIP

## (undated) DEVICE — 1010 S-DRAPE TOWEL DRAPE 10/BX: Brand: STERI-DRAPE™

## (undated) DEVICE — 3M™ TEGADERM™ TRANSPARENT FILM DRESSING, 1626W, 4 IN X 4-3/4 IN (10 CM X 12 CM), 50 EACH/CARTON, 4 CARTON/CASE: Brand: 3M™ TEGADERM™

## (undated) DEVICE — SNARE 9MM 230CM 2.4MM EXACTO

## (undated) NOTE — LETTER
Patient Name: Steff Valdes  YOB: 1960          MRN number:  [id number]  Date:  11/3/2017  Referring Physician: Anna Osborne  Discharge Summary  Initial Functional Outcome Score 66/100  Final Functional Outcome Score 73/100  Number of Visits At [facility]: -      Goals:  Goals met    · Pt will demonstrate good understanding of proper posture and body mechanics to decrease pain and improve spinal safety (6 visits)  · Pt will report improved symptom centralization and absence of radicular symptoms for 3

## (undated) NOTE — LETTER
[address]  [address]  Consent for Procedure/Sedation  Date: 7/18/24         Time: 1830    I hereby authorize Dr. Fatima, my physician and his/her assistants (if applicable), which may include medical students, residents, and/or fellows, to perform the following surgical operation/ procedure and administer such anesthesia as may be determined necessary by my physician: Cardioversion on Keny Marshall  2.   I recognize that during the surgical operation/procedure, unforeseen conditions may necessitate additional or different procedures than those listed above.  I, therefore, further authorize and request that the above-named surgeon, assistants, or designees perform such procedures as are, in their judgment, necessary and desirable.    3.   My surgeon/physician has discussed prior to my surgery the potential benefits, risks and side effects of this procedure; the likelihood of achieving goals; and potential problems that might occur during recuperation.  They also discussed reasonable alternatives to the procedure, including risks, benefits, and side effects related to the alternatives and risks related to not receiving this procedure.  I have had all my questions answered and I acknowledge that no guarantee has been made as to the result that may be obtained.    4.   Should the need arise during my operation/procedure, which includes change of level of care prior to discharge, I also consent to the administration of blood and/or blood products.  Further, I understand that despite careful testing and screening of blood or blood products by collecting agencies, I may still be subject to ill effects as a result of receiving a blood transfusion and/or blood products.  The following are some, but not all, of the potential risks that can occur: fever and allergic reactions, hemolytic reactions, transmission of diseases such as Hepatitis, AIDS and Cytomegalovirus (CMV) and fluid  overload.  In the event that I wish to have an autologous transfusion of my own blood, or a directed donor transfusion, I will discuss this with my physician.   Check only if Refusing Blood or Blood Products  I understand refusal of blood or blood products as deemed necessary by my physician may have serious consequences to my condition to include possible death. I hereby assume responsibility for my refusal and release the hospital, its personnel, and my physicians from any responsibility for the consequences of my refusal.         o  Refuse         5.   I authorize the use of any specimen, organs, tissues, body parts or foreign objects that may be removed from my body during the operation/procedure for diagnosis, research or teaching purposes and their subsequent disposal by hospital authorities.  I also authorize the release of specimen test results and/or written reports to my treating physician on the hospital medical staff or other referring or consulting physicians involved in my care, at the discretion of the Pathologist or my treating physician.    6.   I consent to the photographing or videotaping of the operations or procedures to be performed, including appropriate portions of my body for medical, scientific, or educational purposes, provided my identity is not revealed by the pictures or by descriptive texts accompanying them.  If the procedure has been photographed/videotaped, the surgeon will obtain the original picture, image, videotape or CD.  The hospital will not be responsible for storage, release or maintenance of the picture, image, tape or CD.    7.   I consent to the presence of a  or observers in the operating room as deemed necessary by my physician or their designees.    8.   I recognize that in the event my procedure results in extended X-Ray/fluoroscopy time, I may develop a skin reaction.    9. If I have a Do Not Attempt Resuscitation (DNAR) order in place, that status  will be suspended while in the operating room, procedural suite, and during the recovery period unless otherwise explicitly stated by me (or a person authorized to consent on my behalf). The surgeon or my attending physician will determine when the applicable recovery period ends for purposes of reinstating the DNAR order.  10. Patients having a sterilization procedure: I understand that if the procedure is successful the results will be permanent and it will therefore be impossible for me to inseminate, conceive, or bear children.  I also understand that the procedure is intended to result in sterility, although the result has not been guaranteed.   11. I acknowledge that my physician has explained sedation/analgesia administration to me including the risk and benefits I consent to the administration of sedation/analgesia as may be necessary or desirable in the judgment of my physician.    I CERTIFY THAT I HAVE READ AND FULLY UNDERSTAND THE ABOVE CONSENT TO OPERATION and/or OTHER PROCEDURE.        ____________________________________       _________________________________      ______________________________  Signature of Patient         Signature of Responsible Person        Printed Name of Responsible Person        ____________________________________      _________________________________      ______________________________       Signature of Witness          Relationship to Patient                       Date                                       Time  Patient Name: Keny Marshall  : 1960    Reviewed: 2024   Printed: 2024  Medical Record #: [id number] Page 1 of 1

## (undated) NOTE — LETTER
[facility] [address], [address]  Consent for Procedure/Sedation    Date: 1/6/2021    Time: ______      1. I authorize the performance upon Humera Gil the following:  Transcatheter Aortic Valve Replacement    2.  I authorize  Printed: 2021   2:19 PM  Patient Name: Nathalia Matos        : 1960       Medical Record #: [id number]

## (undated) NOTE — MR AVS SNAPSHOT
After Visit Summary   5/17/2017    Ayanna Howard    MRN: [id number]           Diagnoses this Visit     Renal cell carcinoma of left kidney [facility])    -  Primary       Allergies     Other Anaphylaxis    Bee stings      Your Vital Signs Were     BP Puls Tray Internal:  CBC W/ DIFFERENTIAL        Thursday May 17, 2018     LAB:  CBC WITH DIFFERENTIAL WITH PLATELET        Thursday May 17, 2018     LAB:  COMP METABOLIC PANEL (14)        Thursday May 17, 2018     Imaging:  CT CHEST+ABDOMEN+PELVIS(ALL CNTRS Component Results     Component Value Flag Ref Range Units Status        U/L Final         Result Summary for CBC W/ DIFFERENTIAL      Component Results     Component Value Flag Ref Range Units Status    WBC 11.1  4.0-13.0  x10(3) uL Final

## (undated) NOTE — ED AVS SNAPSHOT
Mr. Maria Elena Eubanks   MRN: [id number]    Department:  [facility] Emergency Department   Date of Visit:  3/6/2019           Disclosure     Insurance plans vary and the physician(s) referred by the ER may not be covered by your plan.  Please contact yo tell this physician (or your personal doctor if your instructions are to return to your personal doctor) about any new or lasting problems. The primary care or specialist physician will see patients referred from the [facility] Emergency Department.  Geovanny Chi

## (undated) NOTE — Clinical Note
· Arrange BiPAP at 15/8 cwp and oxygen 4 LPM at bedtime and as needed and obtain download data and overnight oximetry  · Then discontinue AVAPS machine

## (undated) NOTE — LETTER
[facility]  Phone: [phone]  Right Fax: [phone]  [facility] [address] By:  Anusha Amaya RN Date: 9/27/17    Patient Name: Mainor Lomeli  Surgery Date: 9/28/2017    CSN: [id number]  Medical Record: [id number]

## (undated) NOTE — LETTER
E[facility] [address] [place] [address], [address] [place] [address]  Authorization for Surgical Operation and Procedure   Date: 1/6/2021                                                                                   Time:__________  1.  I hereby authorize  4.   Should the need arise during my operation or immediate post-operative period, I also consent to the administration of blood and/or blood products.   Further, I understand that despite careful testing and screening of blood or blood products by shai 8.   I recognize that in the event my procedure results in extended X-Ray/fluoroscopy time, I may develop a skin reaction. 9.  If I have a Do Not Attempt Resuscitation (DNAR) order in place, that status will be suspended while in the operating room, proc 1. IJim agree to be cared for by an anesthesiologist, who is specially trained to monitor me and give me medicine to put me to sleep or keep me comfortable during my procedure    I understand that my anesthesiologist is not an employee or agent 5. My doctor has explained to me other choices available to me for my care (alternatives).   6. Pregnant Patients (“epidural”):  I understand that the risks of having an epidural (medicine given into my back to help control pain during labor), include itchi

## (undated) NOTE — LETTER
[facility] [address], [address] [facility]    Consent for Anesthesia   1.    Lorena Trans agree to be cared for by a physician anesthesiologist alone and/or with a nurse anesthetist, who is specially trained to monitor me and give me me · Rare risks include: remembering what happened during my procedure, allergic reactions to medications, injury to my airway, heart, lungs, vision, nerves, or muscles and in extremely rare instances death.   5. My doctor has explained to me other choices ovidio Patient Name: Raj Franks     : 1960                 Printed: 2021 at 1:59 PM    Medical Record #: [id number]                                            Page 1 of 1

## (undated) NOTE — LETTER
[facility] [address], [address]  Consent for Procedure/Sedation    Date:     Time:       1. I authorize the performance upon Ricardo Goltz the following:  Cardioversion     2.  I authorize Dr. Jasbir Harris MD (and whomever is desig Record #: [id number]

## (undated) NOTE — LETTER
[facility] Department  Phone: [phone]  Right Fax: [phone]  [facility] [address] By:  Юлия Bender RN Date: 9/27/17    Patient Name: Bharti Pendleton  Surgery Date: 9/28/2017    CSN: [id number]  Medical Record: [id number]

## (undated) NOTE — ED AVS SNAPSHOT
[facility] in [address]    Phone:  [phone]    Fax:  [phone]           Mr. Ramya Ceballos   MRN: [id number]    Department:  [facility]   Date of Visit:  5/18/2017 Elvia Mendez [address], Zeng ProcMichelle Reed Jeffery [address]   [phone]       To Check ER Wait Times:  TEXT 'ERwait' to [address]      Click [email or web address]      Or call [phone]    If you have any problems with your follow-up, please call our  at [phone] I have read and understand the instructions given to me by my caregivers. 24[facility]Hour Pharmacies        Pharmacy Address Phone Number   [facility] [address] ([address]) Beatrice Hines CONCLUSION:  Acute fracture involving the base distal phalanx left fifth toe. Soft tissue swelling about the left fifth MTP joint.            Dictated by: Rosemary Simms MD on 5/18/2017 at 18:45       Approved by: Rosemary Simms MD              [facility] office, you can view your past visit information in [employer] by going to Visits < Visit Summaries. [employer] questions? Call [phone] for help. [employer] is NOT to be used for urgent needs. For medical emergencies, dial 911.

## (undated) NOTE — LETTER
[address]  [address]  Consent for Procedure/Sedation  Date: 7/11/24         Time: 2279    I hereby authorize Chidi Fatima, my physician and his/her assistants (if applicable), which may include medical students, residents, and/or fellows, to perform the following surgical operation/ procedure and administer such anesthesia as may be determined necessary by my physician: A Flutter ablation on Keny Marshall  2.   I recognize that during the surgical operation/procedure, unforeseen conditions may necessitate additional or different procedures than those listed above.  I, therefore, further authorize and request that the above-named surgeon, assistants, or designees perform such procedures as are, in their judgment, necessary and desirable.    3.   My surgeon/physician has discussed prior to my surgery the potential benefits, risks and side effects of this procedure; the likelihood of achieving goals; and potential problems that might occur during recuperation.  They also discussed reasonable alternatives to the procedure, including risks, benefits, and side effects related to the alternatives and risks related to not receiving this procedure.  I have had all my questions answered and I acknowledge that no guarantee has been made as to the result that may be obtained.    4.   Should the need arise during my operation/procedure, which includes change of level of care prior to discharge, I also consent to the administration of blood and/or blood products.  Further, I understand that despite careful testing and screening of blood or blood products by collecting agencies, I may still be subject to ill effects as a result of receiving a blood transfusion and/or blood products.  The following are some, but not all, of the potential risks that can occur: fever and allergic reactions, hemolytic reactions, transmission of diseases such as Hepatitis, AIDS and Cytomegalovirus (CMV) and  fluid overload.  In the event that I wish to have an autologous transfusion of my own blood, or a directed donor transfusion, I will discuss this with my physician.   Check only if Refusing Blood or Blood Products  I understand refusal of blood or blood products as deemed necessary by my physician may have serious consequences to my condition to include possible death. I hereby assume responsibility for my refusal and release the hospital, its personnel, and my physicians from any responsibility for the consequences of my refusal.         o  Refuse         5.   I authorize the use of any specimen, organs, tissues, body parts or foreign objects that may be removed from my body during the operation/procedure for diagnosis, research or teaching purposes and their subsequent disposal by hospital authorities.  I also authorize the release of specimen test results and/or written reports to my treating physician on the hospital medical staff or other referring or consulting physicians involved in my care, at the discretion of the Pathologist or my treating physician.    6.   I consent to the photographing or videotaping of the operations or procedures to be performed, including appropriate portions of my body for medical, scientific, or educational purposes, provided my identity is not revealed by the pictures or by descriptive texts accompanying them.  If the procedure has been photographed/videotaped, the surgeon will obtain the original picture, image, videotape or CD.  The hospital will not be responsible for storage, release or maintenance of the picture, image, tape or CD.    7.   I consent to the presence of a  or observers in the operating room as deemed necessary by my physician or their designees.    8.   I recognize that in the event my procedure results in extended X-Ray/fluoroscopy time, I may develop a skin reaction.    9. If I have a Do Not Attempt Resuscitation (DNAR) order in place, that  status will be suspended while in the operating room, procedural suite, and during the recovery period unless otherwise explicitly stated by me (or a person authorized to consent on my behalf). The surgeon or my attending physician will determine when the applicable recovery period ends for purposes of reinstating the DNAR order.  10. Patients having a sterilization procedure: I understand that if the procedure is successful the results will be permanent and it will therefore be impossible for me to inseminate, conceive, or bear children.  I also understand that the procedure is intended to result in sterility, although the result has not been guaranteed.   11. I acknowledge that my physician has explained sedation/analgesia administration to me including the risk and benefits I consent to the administration of sedation/analgesia as may be necessary or desirable in the judgment of my physician.    I CERTIFY THAT I HAVE READ AND FULLY UNDERSTAND THE ABOVE CONSENT TO OPERATION and/or OTHER PROCEDURE.        ____________________________________       _________________________________      ______________________________  Signature of Patient         Signature of Responsible Person        Printed Name of Responsible Person        ____________________________________      _________________________________      ______________________________       Signature of Witness          Relationship to Patient                       Date                                       Time  Patient Name: Keny IQBAL Joanna  : 1960    Reviewed: 2024   Printed: 2024  Medical Record #: [id number] Page 1 of 1